# Patient Record
Sex: FEMALE | Race: WHITE | Employment: UNEMPLOYED | URBAN - METROPOLITAN AREA
[De-identification: names, ages, dates, MRNs, and addresses within clinical notes are randomized per-mention and may not be internally consistent; named-entity substitution may affect disease eponyms.]

---

## 2017-01-02 RX ORDER — IBUPROFEN 800 MG/1
TABLET ORAL
Qty: 90 TAB | Refills: 0 | Status: SHIPPED | COMMUNITY
Start: 2017-01-02 | End: 2017-02-01 | Stop reason: SDUPTHER

## 2017-02-01 RX ORDER — IBUPROFEN 800 MG/1
TABLET ORAL
Qty: 90 TAB | Refills: 0 | Status: SHIPPED | OUTPATIENT
Start: 2017-02-01 | End: 2017-03-07 | Stop reason: SDUPTHER

## 2017-02-10 RX ORDER — POTASSIUM CHLORIDE 750 MG/1
CAPSULE, EXTENDED RELEASE ORAL
Qty: 30 CAP | Refills: 0 | Status: SHIPPED | OUTPATIENT
Start: 2017-02-10 | End: 2017-08-13

## 2017-02-27 RX ORDER — POTASSIUM CHLORIDE 750 MG/1
CAPSULE, EXTENDED RELEASE ORAL
Qty: 30 CAP | Refills: 0 | Status: SHIPPED | OUTPATIENT
Start: 2017-02-27 | End: 2018-08-20 | Stop reason: SDUPTHER

## 2017-03-06 ENCOUNTER — OFFICE VISIT (OUTPATIENT)
Dept: INTERNAL MEDICINE CLINIC | Age: 62
End: 2017-03-06

## 2017-03-06 VITALS
HEART RATE: 72 BPM | SYSTOLIC BLOOD PRESSURE: 116 MMHG | DIASTOLIC BLOOD PRESSURE: 74 MMHG | RESPIRATION RATE: 16 BRPM | BODY MASS INDEX: 28.7 KG/M2 | TEMPERATURE: 98.2 F | HEIGHT: 63 IN | OXYGEN SATURATION: 97 % | WEIGHT: 162 LBS

## 2017-03-06 DIAGNOSIS — E11.9 CONTROLLED TYPE 2 DIABETES MELLITUS WITHOUT COMPLICATION, WITHOUT LONG-TERM CURRENT USE OF INSULIN (HCC): ICD-10-CM

## 2017-03-06 DIAGNOSIS — I10 HYPERTENSION WITH GOAL BLOOD PRESSURE LESS THAN 130/85: ICD-10-CM

## 2017-03-06 DIAGNOSIS — E78.00 HYPERCHOLESTEREMIA: ICD-10-CM

## 2017-03-06 DIAGNOSIS — Z12.11 SCREEN FOR COLON CANCER: Primary | ICD-10-CM

## 2017-03-06 LAB
CHOLEST SERPL-MCNC: 194 MG/DL
GLUCOSE POC: 113 MG/DL
HBA1C MFR BLD HPLC: 7.2 %
HDLC SERPL-MCNC: 48 MG/DL
LDL CHOLESTEROL POC: 88
MICROALBUMIN UR TEST STR-MCNC: 10 MG/L
MICROALBUMIN/CREAT RATIO POC: <30 MG/G
NON-HDL GOAL (POC): 147
TCHOL/HDL RATIO (POC): 4.1
TRIGL SERPL-MCNC: 297 MG/DL

## 2017-03-06 NOTE — PROGRESS NOTES
Kimmy Auguste is a 63 y.o. female and presents with Diabetes; Cholesterol Problem; and Hypertension  . Subjective:  Hypertension Review:  The patient has hypertension . Diet and Lifestyle: generally follows a low sodium diet, exercises sporadically  Home BP Monitoring: is not measured at home. Pertinent ROS: taking medications as instructed, no medication side effects noted, no TIA's, no chest pain on exertion, no dyspnea on exertion, no swelling of ankles. Dyslipidemia Review:  Patient presents for evaluation of lipids. Compliance with treatment thus far has been excellent. A repeat fasting lipid profile was done. The patient does not use medications that may worsen dyslipidemias . The patient exercises sporadically. The patient is not known to have coexisting coronary artery disease    Diabetes Mellitus Review:  She has diabetes mellitus. Diabetic ROS - medication compliance: compliant all of the time, diabetic diet compliance: compliant all of the time, home glucose monitoring: is performed. Known diabetic complications: none  Cardiovascular risk factors: family history, dyslipidemia, diabetes mellitus, obesity, hypertension  Current diabetic medications include oral agents  Eye exam current (within one year): no  Weight trend: stable  Prior visit with dietician: no  Current diet: \"healthy\" diet  in general  Current exercise: walking  Current monitoring regimen: home blood tests - daily  Home blood sugar records: trend: stable  Any episodes of hypoglycemia? no  Is She on ACE inhibitor or angiotensin II receptor blocker? Yes   Lab review: orders written for new lab studies as appropriate; see orders.      Health maintenance suggests the needs for a test for occult blood    Health maintenance suggest urine protein check        Review of Systems  Constitutional: negative for fevers, chills, anorexia and weight loss  Eyes:   negative for visual disturbance and irritation  ENT:   negative for tinnitus,sore throat,nasal congestion,ear pains. hoarseness  Respiratory:  negative for cough, hemoptysis, dyspnea,wheezing  CV:   negative for chest pain, palpitations, lower extremity edema  GI:   negative for nausea, vomiting, diarrhea, abdominal pain,melena  Endo:               negative for polyuria,polydipsia,polyphagia,heat intolerance  Genitourinary: negative for frequency, dysuria and hematuria  Integument:  negative for rash and pruritus  Hematologic:  negative for easy bruising and gum/nose bleeding  Musculoskel: negative for myalgias, arthralgias, back pain, muscle weakness, joint pain  Neurological:  negative for headaches, dizziness, vertigo, memory problems and gait   Behavl/Psych: negative for feelings of anxiety, depression, mood changes    Past Medical History:   Diagnosis Date    Arthritis, Degenerative, Knee 7/15/2011    COPD (chronic obstructive pulmonary disease) ([facility] [address] 7/15/2011    Diabetes ([facility]     Gastrointestinal disorder     acid reflux    GERD, gastro - Esophageal Reflux Disease 7/15/2011    Hypercholesterolemia 7/15/2011    Hypertension     Hypertension 7/15/2011    Other ill-defined conditions(799.89)     high cholesterol    Type II or unspecified type diabetes mellitus without mention of complication, uncontrolled 7/15/2011     Past Surgical History:   Procedure Laterality Date    HX GYN  2004    uterine surgery     Social History     Social History    Marital status:      Spouse name: N/A    Number of children: N/A    Years of education: N/A     Social History Main Topics    Smoking status: Former Smoker     Quit date: 4/15/2015    Smokeless tobacco: Never Used    Alcohol use Yes      Comment: occ    Drug use: Yes     Special: Marijuana      Comment: used 3 days ago    Sexual activity: Yes     Partners: Male     Other Topics Concern    None     Social History Narrative     Family History   Problem Relation Age of Onset    Diabetes Father     Deep Vein Thrombosis Daughter     Lung Disease Daughter     Other Daughter      cholesterol     Current Outpatient Prescriptions   Medication Sig Dispense Refill    potassium chloride SA (MICRO-K) 10 mEq capsule TAKE ONE CAPSULE BY MOUTH TWICE DAILY 30 Cap 0    potassium chloride SA (MICRO-K) 10 mEq capsule TAKE ONE CAPSULE BY MOUTH TWICE DAILY 30 Cap 0    ibuprofen (MOTRIN) 800 mg tablet TAKE 1 TABLET BY MOUTH EVERY 8 HOURS AS NEEDED FOR PAIN 90 Tab 0    promethazine (PHENERGAN) 25 mg tablet Take 1 Tab by mouth every six (6) hours as needed. 12 Tab 0    potassium chloride SA (MICRO-K) 10 mEq capsule TAKE ONE CAPSULE BY MOUTH TWICE DAILY 30 Cap 0    potassium chloride SA (MICRO-K) 10 mEq capsule TAKE 1 CAPSULE BY MOUTH TWICE DAILY 30 Cap 0    hydrochlorothiazide (MICROZIDE) 12.5 mg capsule TAKE ONE CAPSULE BY MOUTH ONCE DAILY 30 Cap 12    lisinopril (PRINIVIL, ZESTRIL) 40 mg tablet TAKE 1 TABLET BY MOUTH EVERY DAY 30 Tab 12    glyBURIDE (DIABETA) 2.5 mg tablet TAKE 1 TABLET BY MOUTH ONCE DAILY 30 Tab 12    ranitidine (ZANTAC) 300 mg tablet TAKE 1 TABLET BY MOUTH EVERY DAY 30 Tab 12    sucralfate (CARAFATE) 1 gram tablet Take 1 Tab by mouth four (4) times daily. 120 Tab 12    atorvastatin (LIPITOR) 80 mg tablet Take 1 Tab by mouth daily. 30 Tab 12    ibuprofen (MOTRIN) 800 mg tablet Take 1 Tab by mouth every six (6) hours as needed for Pain. 60 Tab 12    temazepam (RESTORIL) 30 mg capsule Take 1 Cap by mouth nightly as needed. 30 Cap 0    atenolol (TENORMIN) 25 mg tablet   0    Hydrochlorothiazide (HYDRODIURIL) 12.5 mg tablet   0    loperamide (IMODIUM A-D) 2 mg tablet Take 1 Tab by mouth four (4) times daily as needed for Diarrhea. 20 Tab 0    ONETOUCH ULTRA TEST strip TEST BLOOD SUGARS 3 TIMES DAILY 100 Each 12    nisoldipine SR (SULAR) 17 mg Tb24 tablet Take 1 Tab by mouth daily.  30 Tab 12    loperamide (IMODIUM) 2 mg capsule   0    nystatin-triamcinolone (MYCOLOG) 100,000-0.1 unit/gram-% ointment Apply  to affected area two (2) times a day. 30 g 0     No Known Allergies    Objective:  Visit Vitals    /74    Pulse 72    Temp 98.2 °F (36.8 °C) (Oral)    Resp 16    Ht 5' 3\" (1.6 m)    Wt 162 lb (73.5 kg)    SpO2 97%    BMI 28.7 kg/m2     Physical Exam:   General appearance - alert, well appearing, and in no distress  Mental status - alert, oriented to person, place, and time  EYE-KODY, EOMI, corneas normal, no foreign bodies  ENT-ENT exam normal, no neck nodes or sinus tenderness  Nose - normal and patent, no erythema, discharge or polyps  Mouth - mucous membranes moist, pharynx normal without lesions  Neck - supple, no significant adenopathy   Chest - clear to auscultation, no wheezes, rales or rhonchi, symmetric air entry   Heart - normal rate, regular rhythm, normal S1, S2, no murmurs, rubs, clicks or gallops   Abdomen - soft, nontender, nondistended, no masses or organomegaly  Lymph- no adenopathy palpable  Ext-peripheral pulses normal, no pedal edema, no clubbing or cyanosis  Skin-Warm and dry. no hyperpigmentation, vitiligo, or suspicious lesions  Neuro -alert, oriented, normal speech, no focal findings or movement disorder noted  Neck-normal C-spine, no tenderness, full ROM without pain  Feet-no nail deformities or callus formation with good pulses noted      Results for orders placed or performed in visit on 03/06/17   AMB POC GLUCOSE BLOOD, BY GLUCOSE MONITORING DEVICE   Result Value Ref Range    Glucose  mg/dL   AMB POC HEMOGLOBIN A1C   Result Value Ref Range    Hemoglobin A1c (POC) 7.2 %   AMB POC LIPID PROFILE   Result Value Ref Range    Cholesterol (POC) 194     Triglycerides (POC) 297     HDL Cholesterol (POC) 48     LDL Cholesterol (POC) 88     Non-HDL Goal (POC) 147     TChol/HDL Ratio (POC) 4.1        Assessment/Plan:    ICD-10-CM ICD-9-CM    1. Screen for colon cancer Z12.11 V76.51 OCCULT BLOOD, IMMUNOASSAY (FIT)   2.  Hypercholesteremia E78.00 272.0 AMB POC GLUCOSE BLOOD, BY GLUCOSE MONITORING DEVICE      AMB POC HEMOGLOBIN A1C      AMB POC LIPID PROFILE   3. Hypertension with goal blood pressure less than 130/85 I10 401.9 AMB POC GLUCOSE BLOOD, BY GLUCOSE MONITORING DEVICE      AMB POC HEMOGLOBIN A1C      AMB POC LIPID PROFILE   4. Controlled type 2 diabetes mellitus without complication, without long-term current use of insulin (HCC) E11.9 250.00 AMB POC GLUCOSE BLOOD, BY GLUCOSE MONITORING DEVICE      AMB POC HEMOGLOBIN A1C      AMB POC LIPID PROFILE      AMB POC URINE, MICROALBUMIN, SEMIQUANTITATIVE     Orders Placed This Encounter    OCCULT BLOOD, IMMUNOASSAY (FIT)    AMB POC GLUCOSE BLOOD, BY GLUCOSE MONITORING DEVICE    AMB POC HEMOGLOBIN A1C    AMB POC LIPID PROFILE    AMB POC URINE, MICROALBUMIN, SEMIQUANTITATIVE     lose weight, increase physical activity, follow low fat diet, follow low salt diet,Take 81mg aspirin daily  Patient Instructions   [employer] Activation    Thank you for requesting access to [employer]. Please follow the instructions below to securely access and download your online medical record. [employer] allows you to send messages to your doctor, view your test results, renew your prescriptions, schedule appointments, and more. How Do I Sign Up? 1. In your internet browser, go to [email or web address]  2. Click on the First Time User? Click Here link in the Sign In box. You will be redirect to the New Member Sign Up page. 3. Enter your [employer] Access Code exactly as it appears below. You will not need to use this code after youve completed the sign-up process. If you do not sign up before the expiration date, you must request a new code. [employer] Access Code: Activation code not generated  Current [employer] Status: Patient Declined (This is the date your [employer] access code will )    4. Enter the last four digits of your Social Security Number (xxxx) and Date of Birth (mm/dd/yyyy) as indicated and click Submit.  You will be taken to the next sign-up page. 5. Create a FREEjitt ID. This will be your [employer] login ID and cannot be changed, so think of one that is secure and easy to remember. 6. Create a W[employer] password. You can change your password at any time. 7. Enter your Password Reset Question and Answer. This can be used at a later time if you forget your password. 8. Enter your e-mail address. You will receive e-mail notification when new information is available in [address] 9. Click Sign Up. You can now view and download portions of your medical record. 10. Click the Download Summary menu link to download a portable copy of your medical information. Additional Information    If you have questions, please visit the Frequently Asked Questions section of the [employer] website at [email or web address] Remember, [employer] is NOT to be used for urgent needs. For medical emergencies, dial 911. Follow-up Disposition:  Return in about 3 months (around 6/6/2017), or if symptoms worsen or fail to improve. I have reviewed with the patient details of the assessment and plan and all questions were answered. Relevent patient education was performed    An After Visit Summary was printed and given to the patient.

## 2017-03-06 NOTE — PATIENT INSTRUCTIONS
[employer] Activation    Thank you for requesting access to [employer]. Please follow the instructions below to securely access and download your online medical record. [employer] allows you to send messages to your doctor, view your test results, renew your prescriptions, schedule appointments, and more. How Do I Sign Up? 1. In your internet browser, go to [email or web address]  2. Click on the First Time User? Click Here link in the Sign In box. You will be redirect to the New Member Sign Up page. 3. Enter your [employer] Access Code exactly as it appears below. You will not need to use this code after youve completed the sign-up process. If you do not sign up before the expiration date, you must request a new code. [employer] Access Code: Activation code not generated  Current [employer] Status: Patient Declined (This is the date your [employer] access code will )    4. Enter the last four digits of your Social Security Number (xxxx) and Date of Birth (mm/dd/yyyy) as indicated and click Submit. You will be taken to the next sign-up page. 5. Create a [employer] ID. This will be your [employer] login ID and cannot be changed, so think of one that is secure and easy to remember. 6. Create a [employer] password. You can change your password at any time. 7. Enter your Password Reset Question and Answer. This can be used at a later time if you forget your password. 8. Enter your e-mail address. You will receive e-mail notification when new information is available in [address] 9. Click Sign Up. You can now view and download portions of your medical record. 10. Click the Download Summary menu link to download a portable copy of your medical information. Additional Information    If you have questions, please visit the Frequently Asked Questions section of the [employer] website at [email or web address] Remember, [employer] is NOT to be used for urgent needs. For medical emergencies, dial 911.

## 2017-03-06 NOTE — MR AVS SNAPSHOT
Visit Information Date & Time Provider Department Dept. Phone Encounter #  
 3/6/2017  2:00 PM Anuj Feldman MD [facility] [phone] [id number] Follow-up Instructions Return in about 3 months (around 6/6/2017), or if symptoms worsen or fail to improve. Your Appointments 3/14/2017  2:00 PM  
YEAR CHECK UP with Elizabeth Frias MD  
[facility] ([address] [facility]) Appt Note: yearly exam; hx of abnormal pap [place] [address] [facility] [address]  
[phone]  
  
   
 [place] [address] Upcoming Health Maintenance Date Due  
 EYE EXAM RETINAL OR DILATED Q1 8/15/1967 Pneumococcal 19-64 Medium Risk (1 of 1 - PPSV23) 8/15/1976 FOBT Q 1 YEAR AGE 50-75 2/3/2017 HEMOGLOBIN A1C Q6M 6/2/2017 LIPID PANEL Q1 12/2/2017 FOOT EXAM Q1 3/6/2018 MICROALBUMIN Q1 3/6/2018 BREAST CANCER SCRN MAMMOGRAM 8/16/2018 PAP AKA CERVICAL CYTOLOGY 11/9/2018 DTaP/Tdap/Td series (2 - Td) 3/6/2027 Allergies as of 3/6/2017  Review Complete On: 3/6/2017 By: Anuj Feldman MD  
 No Known Allergies Current Immunizations  Never Reviewed No immunizations on file. Not reviewed this visit You Were Diagnosed With   
  
 Codes Comments Screen for colon cancer    -  Primary ICD-10-CM: Z12.11 ICD-9-CM: V76.51 Hypercholesteremia     ICD-10-CM: E78.00 ICD-9-CM: 272.0 Hypertension with goal blood pressure less than 130/85     ICD-10-CM: I10 
ICD-9-CM: 401.9 Controlled type 2 diabetes mellitus without complication, without long-term current use of insulin ([facility] [address]     ICD-10-CM: E11.9 ICD-9-CM: 250.00 Vitals BP Pulse Temp Resp Height(growth percentile) Weight(growth percentile) 116/74 72 98.2 °F (36.8 °C) (Oral) 16 5' 3\" (1.6 m) 162 lb (73.5 kg) SpO2 BMI OB Status Smoking Status 97% 28.7 kg/m2 Postmenopausal Former Smoker Vitals History BMI and BSA Data Body Mass Index Body Surface Area 28.7 kg/m 2 1.81 m 2 Preferred Pharmacy Pharmacy Name Phone [facility], [address] [phone] Your Updated Medication List  
  
   
This list is accurate as of: 3/6/17  3:34 PM.  Always use your most recent med list.  
  
  
  
  
 atenolol 25 mg tablet Commonly known as:  TENORMIN  
  
 atorvastatin 80 mg tablet Commonly known as:  LIPITOR Take 1 Tab by mouth daily. glyBURIDE 2.5 mg tablet Commonly known as:  Martha Locks TAKE 1 TABLET BY MOUTH ONCE DAILY * hydroCHLOROthiazide 12.5 mg tablet Commonly known as:  HYDRODIURIL * hydroCHLOROthiazide 12.5 mg capsule Commonly known as:  Peter Grange TAKE ONE CAPSULE BY MOUTH ONCE DAILY * ibuprofen 800 mg tablet Commonly known as:  MOTRIN Take 1 Tab by mouth every six (6) hours as needed for Pain. * ibuprofen 800 mg tablet Commonly known as:  MOTRIN  
TAKE 1 TABLET BY MOUTH EVERY 8 HOURS AS NEEDED FOR PAIN  
  
 lisinopril 40 mg tablet Commonly known as:  PRINIVIL, ZESTRIL  
TAKE 1 TABLET BY MOUTH EVERY DAY  
  
 * loperamide 2 mg tablet Commonly known as:  IMODIUM A-D Take 1 Tab by mouth four (4) times daily as needed for Diarrhea. * loperamide 2 mg capsule Commonly known as:  IMODIUM  
  
 nisoldipine SR 17 mg Tb24 tablet Commonly known as:  Florinda Nurse Take 1 Tab by mouth daily. nystatin-triamcinolone 100,000-0.1 unit/gram-% ointment Commonly known as:  Maryla Areas Apply  to affected area two (2) times a day. ONETOUCH ULTRA TEST strip Generic drug:  glucose blood VI test strips TEST BLOOD SUGARS 3 TIMES DAILY * potassium chloride SA 10 mEq capsule Commonly known as:  MICRO-K  
TAKE 1 CAPSULE BY MOUTH TWICE DAILY * potassium chloride SA 10 mEq capsule Commonly known as:  MICRO-K  
TAKE ONE CAPSULE BY MOUTH TWICE DAILY * potassium chloride SA 10 mEq capsule Commonly known as:  MICRO-K  
TAKE ONE CAPSULE BY MOUTH TWICE DAILY * potassium chloride SA 10 mEq capsule Commonly known as:  MICRO-K  
TAKE ONE CAPSULE BY MOUTH TWICE DAILY promethazine 25 mg tablet Commonly known as:  PHENERGAN Take 1 Tab by mouth every six (6) hours as needed. raNITIdine 300 mg tablet Commonly known as:  ZANTAC TAKE 1 TABLET BY MOUTH EVERY DAY  
  
 sucralfate 1 gram tablet Commonly known as:  Merlin Boy Take 1 Tab by mouth four (4) times daily. temazepam 30 mg capsule Commonly known as:  RESTORIL Take 1 Cap by mouth nightly as needed. * Notice: This list has 10 medication(s) that are the same as other medications prescribed for you. Read the directions carefully, and ask your doctor or other care provider to review them with you. We Performed the Following AMB POC GLUCOSE BLOOD, BY GLUCOSE MONITORING DEVICE [98207 CPT(R)] AMB POC HEMOGLOBIN A1C [51269 CPT(R)] AMB POC LIPID PROFILE [04057 CPT(R)] AMB POC URINE, MICROALBUMIN, SEMIQUANTITATIVE [75610 CPT(R)] OCCULT BLOOD, IMMUNOASSAY (FIT) [id number] CPT(R)] Follow-up Instructions Return in about 3 months (around 6/6/2017), or if symptoms worsen or fail to improve. Patient Instructions [employer] Activation Thank you for requesting access to [employer]. Please follow the instructions below to securely access and download your online medical record. [employer] allows you to send messages to your doctor, view your test results, renew your prescriptions, schedule appointments, and more. How Do I Sign Up? 1. In your internet browser, go to [email or web address] 
2. Click on the First Time User? Click Here link in the Sign In box. You will be redirect to the New Member Sign Up page. 3. Enter your [employer] Access Code exactly as it appears below. You will not need to use this code after youve completed the sign-up process. If you do not sign up before the expiration date, you must request a new code. MyChart Access Code: Activation code not generated Current [employer] Status: Patient Declined (This is the date your MyChart access code will ) 4. Enter the last four digits of your Social Security Number (xxxx) and Date of Birth (mm/dd/yyyy) as indicated and click Submit. You will be taken to the next sign-up page. 5. Create a [employer] ID. This will be your [employer] login ID and cannot be changed, so think of one that is secure and easy to remember. 6. Create a [employer] password. You can change your password at any time. 7. Enter your Password Reset Question and Answer. This can be used at a later time if you forget your password. 8. Enter your e-mail address. You will receive e-mail notification when new information is available in [address] 9. Click Sign Up. You can now view and download portions of your medical record. 10. Click the Download Summary menu link to download a portable copy of your medical information. Additional Information If you have questions, please visit the Frequently Asked Questions section of the [employer] website at [email or web address] Remember, [employer] is NOT to be used for urgent needs. For medical emergencies, dial 911. Please provide this summary of care documentation to your next provider. Your primary care clinician is listed as Meri Alcala. If you have any questions after today's visit, please call [phone].

## 2017-03-07 RX ORDER — SUCRALFATE 1 G/1
TABLET ORAL
Qty: 120 TAB | Refills: 0 | Status: SHIPPED | OUTPATIENT
Start: 2017-03-07 | End: 2017-04-03 | Stop reason: SDUPTHER

## 2017-03-07 RX ORDER — IBUPROFEN 800 MG/1
TABLET ORAL
Qty: 90 TAB | Refills: 0 | Status: SHIPPED | OUTPATIENT
Start: 2017-03-07 | End: 2017-04-10 | Stop reason: SDUPTHER

## 2017-03-14 ENCOUNTER — OFFICE VISIT (OUTPATIENT)
Dept: OBGYN CLINIC | Age: 62
End: 2017-03-14

## 2017-03-14 ENCOUNTER — HOSPITAL ENCOUNTER (OUTPATIENT)
Dept: LAB | Age: 62
Discharge: HOME | End: 2017-03-14
Payer: COMMERCIAL

## 2017-03-14 VITALS
HEIGHT: 63 IN | BODY MASS INDEX: 29.13 KG/M2 | OXYGEN SATURATION: 97 % | DIASTOLIC BLOOD PRESSURE: 88 MMHG | SYSTOLIC BLOOD PRESSURE: 135 MMHG | WEIGHT: 164.4 LBS | HEART RATE: 58 BPM | TEMPERATURE: 97.9 F | RESPIRATION RATE: 16 BRPM

## 2017-03-14 DIAGNOSIS — Z01.419 WELL WOMAN EXAM WITH ROUTINE GYNECOLOGICAL EXAM: Primary | ICD-10-CM

## 2017-03-14 DIAGNOSIS — R87.610 ASCUS OF CERVIX WITH NEGATIVE HIGH RISK HPV: ICD-10-CM

## 2017-03-14 DIAGNOSIS — B37.2 YEAST INFECTION OF THE SKIN: ICD-10-CM

## 2017-03-14 PROCEDURE — 87624 HPV HI-RISK TYP POOLED RSLT: CPT | Performed by: OBSTETRICS & GYNECOLOGY

## 2017-03-14 PROCEDURE — 88175 CYTOPATH C/V AUTO FLUID REDO: CPT | Performed by: OBSTETRICS & GYNECOLOGY

## 2017-03-14 RX ORDER — NYSTATIN AND TRIAMCINOLONE ACETONIDE 100000; 1 [USP'U]/G; MG/G
CREAM TOPICAL 2 TIMES DAILY
Qty: 30 G | Refills: 0 | Status: SHIPPED | OUTPATIENT
Start: 2017-03-14 | End: 2017-08-13

## 2017-03-14 NOTE — PATIENT INSTRUCTIONS

## 2017-03-14 NOTE — MR AVS SNAPSHOT
Visit Information Date & Time Provider Department Dept. Phone Encounter #  
 3/14/2017  2:00 PM Lucia Silvestre OB/GYN [id number] Follow-up Instructions Return in about 1 year (around 3/14/2018) for annual exam.  
  
Your Appointments 6/6/2017 11:15 AM  
ROUTINE CARE with Estelel Hopper MD  
[facility] - [address] [place] [address] ([address]) Appt Note: 3 month fu  
 [address] [facility] [facility] [address] [address] [phone]  
  
   
 [address] [facility] [facility] [address] Upcoming Health Maintenance Date Due FOBT Q 1 YEAR AGE 50-75 2/3/2017 BREAST CANCER SCRN MAMMOGRAM 8/16/2018 PAP AKA CERVICAL CYTOLOGY 11/9/2018 Allergies as of 3/14/2017  Review Complete On: 3/14/2017 By: Rhett Zavala No Known Allergies Current Immunizations  Never Reviewed No immunizations on file. Not reviewed this visit You Were Diagnosed With   
  
 Codes Comments Well woman exam with routine gynecological exam    -  Primary ICD-10-CM: E99.618 ICD-9-CM: V72.31   
 ASCUS of cervix with negative high risk HPV     ICD-10-CM: R87.610 ICD-9-CM: 795.01 Vitals Height(growth percentile) Weight(growth percentile) BMI OB Status Smoking Status 5' 3\" (1.6 m) 164 lb 6.4 oz (74.6 kg) 29.12 kg/m2 Postmenopausal Former Smoker BMI and BSA Data Body Mass Index Body Surface Area  
 29.12 kg/m 2 1.82 m 2 Preferred Pharmacy Pharmacy Name Phone [facility]ina Glez, [address] [phone] Your Updated Medication List  
  
   
This list is accurate as of: 3/14/17  2:21 PM.  Always use your most recent med list.  
  
  
  
  
 atenolol 25 mg tablet Commonly known as:  TENORMIN  
  
 atorvastatin 80 mg tablet Commonly known as:  LIPITOR Take 1 Tab by mouth daily. glyBURIDE 2.5 mg tablet Commonly known as:  Ronald Kalani TAKE 1 TABLET BY MOUTH ONCE DAILY * hydroCHLOROthiazide 12.5 mg tablet Commonly known as:  HYDRODIURIL * hydroCHLOROthiazide 12.5 mg capsule Commonly known as:  Leonel Benjamin TAKE ONE CAPSULE BY MOUTH ONCE DAILY * ibuprofen 800 mg tablet Commonly known as:  MOTRIN Take 1 Tab by mouth every six (6) hours as needed for Pain. * ibuprofen 800 mg tablet Commonly known as:  MOTRIN  
TAKE 1 TABLET BY MOUTH EVERY 8 HOURS AS NEEDED FOR PAIN  
  
 lisinopril 40 mg tablet Commonly known as:  PRINIVIL, ZESTRIL  
TAKE 1 TABLET BY MOUTH EVERY DAY  
  
 * loperamide 2 mg tablet Commonly known as:  IMODIUM A-D Take 1 Tab by mouth four (4) times daily as needed for Diarrhea. * loperamide 2 mg capsule Commonly known as:  IMODIUM  
  
 nisoldipine SR 17 mg Tb24 tablet Commonly known as:  Cherelle Gloss Take 1 Tab by mouth daily. nystatin-triamcinolone 100,000-0.1 unit/gram-% ointment Commonly known as:  Drusilla Bright Apply  to affected area two (2) times a day. ONETOUCH ULTRA TEST strip Generic drug:  glucose blood VI test strips TEST BLOOD SUGARS 3 TIMES DAILY * potassium chloride SA 10 mEq capsule Commonly known as:  MICRO-K  
TAKE 1 CAPSULE BY MOUTH TWICE DAILY * potassium chloride SA 10 mEq capsule Commonly known as:  MICRO-K  
TAKE ONE CAPSULE BY MOUTH TWICE DAILY * potassium chloride SA 10 mEq capsule Commonly known as:  MICRO-K  
TAKE ONE CAPSULE BY MOUTH TWICE DAILY * potassium chloride SA 10 mEq capsule Commonly known as:  MICRO-K  
TAKE ONE CAPSULE BY MOUTH TWICE DAILY promethazine 25 mg tablet Commonly known as:  PHENERGAN Take 1 Tab by mouth every six (6) hours as needed. raNITIdine 300 mg tablet Commonly known as:  ZANTAC TAKE 1 TABLET BY MOUTH EVERY DAY  
  
 sucralfate 1 gram tablet Commonly known as:  CARAFATE  
TAKE 1 TABLET BY MOUTH FOUR TIMES DAILY  
  
 temazepam 30 mg capsule Commonly known as:  RESTORIL Take 1 Cap by mouth nightly as needed. * Notice: This list has 10 medication(s) that are the same as other medications prescribed for you. Read the directions carefully, and ask your doctor or other care provider to review them with you. Follow-up Instructions Return in about 1 year (around 3/14/2018) for annual exam.  
  
  
Patient Instructions Eating Healthy Foods: Care Instructions Your Care Instructions Eating healthy foods can help lower your risk for disease. Healthy food gives you energy and keeps your heart strong, your brain active, your muscles working, and your bones strong. A healthy diet includes a variety of foods from the basic food groups: grains, vegetables, fruits, milk and milk products, and meat and beans. Some people may eat more of their favorite foods from only one food group and, as a result, miss getting the nutrients they need. So, it is important to pay attention not only to what you eat but also to what you are missing from your diet. You can eat a healthy, balanced diet by making a few small changes. Follow-up care is a key part of your treatment and safety. Be sure to make and go to all appointments, and call your doctor if you are having problems. Its also a good idea to know your test results and keep a list of the medicines you take. How can you care for yourself at home? Look at what you eat · Keep a food diary for a week or two and record everything you eat or drink. Track the number of servings you eat from each food group. · For a balanced diet every day, eat a variety of: ¨ 6 or more ounce-equivalents of grains, such as cereals, breads, crackers, rice, or pasta, every day. An ounce-equivalent is 1 slice of bread, 1 cup of ready-to-eat cereal, or ½ cup of cooked rice, cooked pasta, or cooked cereal. 
¨ 2½ cups of vegetables, especially: § Dark-green vegetables such as broccoli and spinach. § Orange vegetables such as carrots and sweet potatoes. § Dry beans (such as hagan and kidney beans) and peas (such as lentils). ¨ 2 cups of fresh, frozen, or canned fruit. A small apple or 1 banana or orange equals 1 cup. ¨ 3 cups of nonfat or low-fat milk, yogurt, or other milk products. ¨ 5½ ounces of meat and beans, such as chicken, fish, lean meat, beans, nuts, and seeds. One egg, 1 tablespoon of peanut butter, ½ ounce nuts or seeds, or ¼ cup of cooked beans equals 1 ounce of meat. · Learn how to read food labels for serving sizes and ingredients. Fast-food and convenience-food meals often contain few or no fruits or vegetables. Make sure you eat some fruits and vegetables to make the meal more nutritious. · Look at your food diary. For each food group, add up what you have eaten and then divide the total by the number of days. This will give you an idea of how much you are eating from each food group. See if you can find some ways to change your diet to make it more healthy. Start small · Do not try to make dramatic changes to your diet all at once. You might feel that you are missing out on your favorite foods and then be more likely to fail. · Start slowly, and gradually change your habits. Try some of the following: ¨ Use whole wheat bread instead of white bread. ¨ Use nonfat or low-fat milk instead of whole milk. ¨ Eat brown rice instead of white rice, and eat whole wheat pasta instead of white-flour pasta. ¨ Try low-fat cheeses and low-fat yogurt. ¨ Add more fruits and vegetables to meals and have them for snacks. ¨ Add lettuce, tomato, cucumber, and onion to sandwiches. ¨ Add fruit to yogurt and cereal. 
Enjoy food · You can still eat your favorite foods. You just may need to eat less of them. If your favorite foods are high in fat, salt, and sugar, limit how often you eat them, but do not cut them out entirely. · Eat a wide variety of foods. Make healthy choices when eating out · The type of restaurant you choose can help you make healthy choices. Even fast-food chains are now offering more low-fat or healthier choices on the menu. · Choose smaller portions, or take half of your meal home. · When eating out, try: ¨ A veggie pizza with a whole wheat crust or grilled chicken (instead of sausage or pepperoni). ¨ Pasta with roasted vegetables, grilled chicken, or marinara sauce instead of cream sauce. ¨ A vegetable wrap or grilled chicken wrap. ¨ Broiled or poached food instead of fried or breaded items. Make healthy choices easy · Buy packaged, prewashed, ready-to-eat fresh vegetables and fruits, such as baby carrots, salad mixes, and chopped or shredded broccoli and cauliflower. · Buy packaged, presliced fruits, such as melon or pineapple. · Choose 100% fruit or vegetable juice instead of soda. Limit juice intake to 4 to 6 oz (½ to ¾ cup) a day. · Blend low-fat yogurt, fruit juice, and canned or frozen fruit to make a smoothie for breakfast or a snack. Where can you learn more? Go to http://kalia-terell.info/. Enter T756 in the search box to learn more about \"Eating Healthy Foods: Care Instructions. \" Current as of: November 20, 2015 Content Version: 11.1 © 0641-7619 [employer]. Care instructions adapted under license by [employer] (which disclaims liability or warranty for this information). If you have questions about a medical condition or this instruction, always ask your healthcare professional. [place] [address] any warranty or liability for your use of this information. Please provide this summary of care documentation to your next provider. Your primary care clinician is listed as Crispin Serrano. If you have any questions after today's visit, please call [phone].

## 2017-03-14 NOTE — PROGRESS NOTES
SUBJECTIVE: Brittnee Owens is a 63 y.o. female who presents desire for annual well woman exam. No LMP recorded. Patient is postmenopausal.    GYN History  Menopausal age:52  Menopausal symptom: NO  Post menopausal bleeding:   NO  Sexually active: YES  Sexually transmitted diseases/infections: NO  Urinary symptoms:  NO  Dyspareunia: NO    Last pap: 2016  The prior Pap result: ASCUS with NEGATIVE high risk HPV      Past Medical History:   Diagnosis Date    Arthritis, Degenerative, Knee 7/15/2011    COPD (chronic obstructive pulmonary disease) ([facility] 7/15/2011    Diabetes (HCC)     Gastrointestinal disorder     acid reflux    GERD, gastro - Esophageal Reflux Disease 7/15/2011    Hypercholesterolemia 7/15/2011    Hypertension     Hypertension 7/15/2011    Other ill-defined conditions(799.89)     high cholesterol    Type II or unspecified type diabetes mellitus without mention of complication, uncontrolled 7/15/2011       Past Surgical History:   Procedure Laterality Date    HX GYN  2004    uterine surgery    LAP,TUBAL CAUTERY Bilateral 1992       Family History   Problem Relation Age of Onset    Diabetes Father     Deep Vein Thrombosis Daughter     Lung Disease Daughter     Other Daughter      cholesterol       Social History     Social History    Marital status:      Spouse name: N/A    Number of children: N/A    Years of education: N/A     Occupational History    Not on file. Social History Main Topics    Smoking status: Former Smoker     Quit date: 4/15/2015    Smokeless tobacco: Never Used    Alcohol use No      Comment: occ    Drug use: Yes     Special: Marijuana      Comment: used 3 days ago    Sexual activity: Yes     Partners: Male     Other Topics Concern    Not on file     Social History Narrative       Current Outpatient Prescriptions   Medication Sig Dispense Refill    nystatin-triamcinolone (MYCOLOG II) topical cream Apply  to affected area two (2) times a day.  30 g 0  ibuprofen (MOTRIN) 800 mg tablet TAKE 1 TABLET BY MOUTH EVERY 8 HOURS AS NEEDED FOR PAIN 90 Tab 0    potassium chloride SA (MICRO-K) 10 mEq capsule TAKE ONE CAPSULE BY MOUTH TWICE DAILY 30 Cap 0    potassium chloride SA (MICRO-K) 10 mEq capsule TAKE ONE CAPSULE BY MOUTH TWICE DAILY 30 Cap 0    potassium chloride SA (MICRO-K) 10 mEq capsule TAKE ONE CAPSULE BY MOUTH TWICE DAILY 30 Cap 0    potassium chloride SA (MICRO-K) 10 mEq capsule TAKE 1 CAPSULE BY MOUTH TWICE DAILY 30 Cap 0    hydrochlorothiazide (MICROZIDE) 12.5 mg capsule TAKE ONE CAPSULE BY MOUTH ONCE DAILY 30 Cap 12    lisinopril (PRINIVIL, ZESTRIL) 40 mg tablet TAKE 1 TABLET BY MOUTH EVERY DAY 30 Tab 12    glyBURIDE (DIABETA) 2.5 mg tablet TAKE 1 TABLET BY MOUTH ONCE DAILY 30 Tab 12    ranitidine (ZANTAC) 300 mg tablet TAKE 1 TABLET BY MOUTH EVERY DAY 30 Tab 12    atorvastatin (LIPITOR) 80 mg tablet Take 1 Tab by mouth daily. 30 Tab 12    ibuprofen (MOTRIN) 800 mg tablet Take 1 Tab by mouth every six (6) hours as needed for Pain. 60 Tab 12    ONETOUCH ULTRA TEST strip TEST BLOOD SUGARS 3 TIMES DAILY 100 Each 12    Hydrochlorothiazide (HYDRODIURIL) 12.5 mg tablet   0    loperamide (IMODIUM) 2 mg capsule   0    loperamide (IMODIUM A-D) 2 mg tablet Take 1 Tab by mouth four (4) times daily as needed for Diarrhea. 20 Tab 0    sucralfate (CARAFATE) 1 gram tablet TAKE 1 TABLET BY MOUTH FOUR TIMES DAILY 120 Tab 0    promethazine (PHENERGAN) 25 mg tablet Take 1 Tab by mouth every six (6) hours as needed. 12 Tab 0    temazepam (RESTORIL) 30 mg capsule Take 1 Cap by mouth nightly as needed. 30 Cap 0    nisoldipine SR (SULAR) 17 mg Tb24 tablet Take 1 Tab by mouth daily. 30 Tab 12    atenolol (TENORMIN) 25 mg tablet   0         Review of Systems:  Complete review of systems reviewed from social and history data forms. Pertinent positives in HPI.        Objective:     Visit Vitals    /88 (BP 1 Location: Right arm, BP Patient Position: Sitting)    Pulse (!) 58    Temp 97.9 °F (36.6 °C) (Oral)    Resp 16    Ht 5' 3\" (1.6 m)    Wt 164 lb 6.4 oz (74.6 kg)    SpO2 97%  Comment: room air    BMI 29.12 kg/m2     General:  alert, cooperative, no distress, appears stated age   Skin:  Normal.   Lymph Nodes:  Cervical, supraclavicular, and axillary nodes normal.   Breast Exam: normal appearance, no masses or tenderness    Lungs:  clear to auscultation bilaterally   Heart:  regular rate and rhythm, S1, S2 normal, no murmur, click, rub or gallop   Abdomen: soft, non-tender. No masses,  no organomegaly   Back:  Costovertebral angle tenderness absent   Genitourinary: BUS normal. Introitus normal. Normal appearing vaginal epithelium, Vaginal discharge described as normal and physiologic.,   Normal cervix without lesions or tenderness, Uterus normal size anteverted. NT., Adnexa normal in size left and right without tenderness. Extremities:  extremities normal, atraumatic, no cyanosis or edema     Neurologic:  negative   Psychiatric:  non focal           ASSESSMENT:      ICD-10-CM ICD-9-CM    1. Well woman exam with routine gynecological exam Z01.419 V72.31    2. ASCUS of cervix with negative high risk HPV R87.610 795.01    3. Yeast infection of the skin B37.2 112.3 nystatin-triamcinolone (MYCOLOG II) topical cream         Follow-up Disposition:  Return in about 1 year (around 3/14/2018) for annual exam.    Today's care was reviewed and discussed with the patient. She expresses understanding and approval of today's plan.     Gladis Reid MD

## 2017-03-14 NOTE — PROGRESS NOTES
Chief Complaint   Patient presents with    Annual Exam     Patient presents in stable condition, denies complaints. Requests cream for skin under abdomen due to irritation.

## 2017-03-15 ENCOUNTER — DOCUMENTATION ONLY (OUTPATIENT)
Dept: OBGYN CLINIC | Age: 62
End: 2017-03-15

## 2017-04-03 RX ORDER — SUCRALFATE 1 G/1
TABLET ORAL
Qty: 120 TAB | Refills: 0 | Status: SHIPPED | OUTPATIENT
Start: 2017-04-03 | End: 2017-05-03 | Stop reason: SDUPTHER

## 2017-04-10 RX ORDER — IBUPROFEN 800 MG/1
TABLET ORAL
Qty: 90 TAB | Refills: 0 | Status: SHIPPED | OUTPATIENT
Start: 2017-04-10 | End: 2017-05-13 | Stop reason: SDUPTHER

## 2017-05-15 RX ORDER — IBUPROFEN 800 MG/1
TABLET ORAL
Qty: 90 TAB | Refills: 0 | Status: SHIPPED | OUTPATIENT
Start: 2017-05-15 | End: 2017-06-06 | Stop reason: ALTCHOICE

## 2017-06-06 ENCOUNTER — OFFICE VISIT (OUTPATIENT)
Dept: INTERNAL MEDICINE CLINIC | Age: 62
End: 2017-06-06

## 2017-06-06 VITALS
WEIGHT: 161.7 LBS | HEART RATE: 63 BPM | SYSTOLIC BLOOD PRESSURE: 124 MMHG | OXYGEN SATURATION: 97 % | TEMPERATURE: 98 F | BODY MASS INDEX: 28.65 KG/M2 | HEIGHT: 63 IN | RESPIRATION RATE: 16 BRPM | DIASTOLIC BLOOD PRESSURE: 84 MMHG

## 2017-06-06 DIAGNOSIS — M17.0 PRIMARY OSTEOARTHRITIS OF BOTH KNEES: Primary | ICD-10-CM

## 2017-06-06 DIAGNOSIS — I10 HYPERTENSION WITH GOAL BLOOD PRESSURE LESS THAN 130/85: ICD-10-CM

## 2017-06-06 DIAGNOSIS — E11.9 CONTROLLED TYPE 2 DIABETES MELLITUS WITHOUT COMPLICATION, WITHOUT LONG-TERM CURRENT USE OF INSULIN (HCC): ICD-10-CM

## 2017-06-06 DIAGNOSIS — Z12.11 SCREEN FOR COLON CANCER: ICD-10-CM

## 2017-06-06 DIAGNOSIS — E78.00 HYPERCHOLESTEREMIA: ICD-10-CM

## 2017-06-06 LAB
CHOLEST SERPL-MCNC: 170 MG/DL
GLUCOSE POC: 113 MG/DL
HBA1C MFR BLD HPLC: 6.9 %
HDLC SERPL-MCNC: 38 MG/DL
LDL CHOLESTEROL POC: 90
NON-HDL GOAL (POC): 131
TCHOL/HDL RATIO (POC): 4.4
TRIGL SERPL-MCNC: 208 MG/DL

## 2017-06-06 RX ORDER — DICLOFENAC SODIUM 75 MG/1
75 TABLET, DELAYED RELEASE ORAL 2 TIMES DAILY
Qty: 60 TAB | Refills: 12 | Status: SHIPPED | OUTPATIENT
Start: 2017-06-06 | End: 2017-08-13

## 2017-06-06 RX ORDER — DICLOFENAC SODIUM 75 MG/1
75 TABLET, DELAYED RELEASE ORAL 2 TIMES DAILY
Qty: 60 TAB | Refills: 12 | Status: SHIPPED | OUTPATIENT
Start: 2017-06-06 | End: 2017-06-06 | Stop reason: SDUPTHER

## 2017-06-06 NOTE — MR AVS SNAPSHOT
Visit Information Date & Time Provider Department Dept. Phone Encounter #  
 6/6/2017 11:15 AM Aleksandr Chung MD [address] [facility] [phone] [id number] Follow-up Instructions Return in about 3 months (around 9/6/2017), or if symptoms worsen or fail to improve. Your Appointments 3/15/2018 11:00 AM  
YEAR CHECK UP with Jagdish Bridges MD  
[facility] ([facility]) Appt Note: annual exam  
 [place] [address] [address]  
[phone]  
  
   
 [place] [address] Upcoming Health Maintenance Date Due  
 EYE EXAM RETINAL OR DILATED Q1 8/15/1967 Pneumococcal 19-64 Medium Risk (1 of 1 - PPSV23) 8/15/1976 FOBT Q 1 YEAR AGE 50-75 2/3/2017 INFLUENZA AGE 9 TO ADULT 8/1/2017 HEMOGLOBIN A1C Q6M 9/6/2017 FOOT EXAM Q1 3/6/2018 MICROALBUMIN Q1 3/6/2018 LIPID PANEL Q1 3/6/2018 BREAST CANCER SCRN MAMMOGRAM 8/16/2018 PAP AKA CERVICAL CYTOLOGY 3/14/2020 DTaP/Tdap/Td series (2 - Td) 3/6/2027 Allergies as of 6/6/2017  Review Complete On: 6/6/2017 By: Francine Hackett LPN No Known Allergies Current Immunizations  Never Reviewed No immunizations on file. Not reviewed this visit You Were Diagnosed With   
  
 Codes Comments Primary osteoarthritis of both knees    -  Primary ICD-10-CM: M17.0 ICD-9-CM: 715.16 Hypercholesteremia     ICD-10-CM: E78.00 ICD-9-CM: 272.0 Hypertension with goal blood pressure less than 130/85     ICD-10-CM: I10 
ICD-9-CM: 401.9 Controlled type 2 diabetes mellitus without complication, without long-term current use of insulin ([facility]     ICD-10-CM: E11.9 ICD-9-CM: 250.00 Screen for colon cancer     ICD-10-CM: Z12.11 ICD-9-CM: V76.51 Vitals BP Pulse Temp Resp Height(growth percentile) Weight(growth percentile) 124/84 63 98 °F (36.7 °C) (Oral) 16 5' 3\" (1.6 m) 161 lb 11.2 oz (73.3 kg) SpO2 BMI OB Status Smoking Status 97% 28.64 kg/m2 Postmenopausal Former Smoker Vitals History BMI and BSA Data Body Mass Index Body Surface Area  
 28.64 kg/m 2 1.81 m 2 Preferred Pharmacy Pharmacy Name Phone [facility]lez, [address] [phone] Your Updated Medication List  
  
   
This list is accurate as of: 6/6/17 12:29 PM.  Always use your most recent med list.  
  
  
  
  
 atenolol 25 mg tablet Commonly known as:  TENORMIN  
  
 atorvastatin 80 mg tablet Commonly known as:  LIPITOR Take 1 Tab by mouth daily. diclofenac EC 75 mg EC tablet Commonly known as:  VOLTAREN Take 1 Tab by mouth two (2) times a day. glyBURIDE 2.5 mg tablet Commonly known as:  Mansi Fournier TAKE 1 TABLET BY MOUTH EVERY DAY  
  
 * hydroCHLOROthiazide 12.5 mg tablet Commonly known as:  HYDRODIURIL * hydroCHLOROthiazide 12.5 mg capsule Commonly known as:  Graham Spurling TAKE 1 CAPSULE BY MOUTH EVERY DAY  
  
 ibuprofen 800 mg tablet Commonly known as:  MOTRIN Take 1 Tab by mouth every six (6) hours as needed for Pain. lisinopril 40 mg tablet Commonly known as:  PRINIVIL, ZESTRIL  
TAKE 1 TABLET BY MOUTH EVERY DAY  
  
 * loperamide 2 mg tablet Commonly known as:  IMODIUM A-D Take 1 Tab by mouth four (4) times daily as needed for Diarrhea. * loperamide 2 mg capsule Commonly known as:  IMODIUM  
  
 nisoldipine SR 17 mg Tb24 tablet Commonly known as:  Watt Lightning Take 1 Tab by mouth daily. nystatin-triamcinolone topical cream  
Commonly known as:  MYCOLOG II Apply  to affected area two (2) times a day. ONETOUCH ULTRA TEST strip Generic drug:  glucose blood VI test strips TEST BLOOD SUGARS 3 TIMES DAILY * potassium chloride SA 10 mEq capsule Commonly known as:  MICRO-K  
TAKE 1 CAPSULE BY MOUTH TWICE DAILY * potassium chloride SA 10 mEq capsule Commonly known as:  MICRO-K  
TAKE ONE CAPSULE BY MOUTH TWICE DAILY * potassium chloride SA 10 mEq capsule Commonly known as:  MICRO-K  
TAKE ONE CAPSULE BY MOUTH TWICE DAILY promethazine 25 mg tablet Commonly known as:  PHENERGAN Take 1 Tab by mouth every six (6) hours as needed. raNITIdine 300 mg tablet Commonly known as:  ZANTAC TAKE 1 TABLET BY MOUTH EVERY DAY  
  
 sucralfate 1 gram tablet Commonly known as:  CARAFATE  
TAKE 1 TABLET BY MOUTH FOUR TIMES DAILY  
  
 temazepam 30 mg capsule Commonly known as:  RESTORIL Take 1 Cap by mouth nightly as needed. * Notice: This list has 9 medication(s) that are the same as other medications prescribed for you. Read the directions carefully, and ask your doctor or other care provider to review them with you. Prescriptions Sent to Pharmacy Refills  
 diclofenac EC (VOLTAREN) 75 mg EC tablet 12 Sig: Take 1 Tab by mouth two (2) times a day. Class: Normal  
 Pharmacy: [facility] [address] [place] [address] #: [phone] Route: Oral  
  
We Performed the Following AMB POC GLUCOSE BLOOD, BY GLUCOSE MONITORING DEVICE [35217 CPT(R)] AMB POC HEMOGLOBIN A1C [39138 CPT(R)] AMB POC LIPID PROFILE [49617 CPT(R)] OCCULT BLOOD, IMMUNOASSAY (FIT) [id number] CPT(R)] REFERRAL TO ORTHOPEDICS [CXN567 Custom] Follow-up Instructions Return in about 3 months (around 9/6/2017), or if symptoms worsen or fail to improve. Referral Information Referral ID Referred By Referred To [id number] [facility]   
   [address] [place], [address] Visits Status Start Date End Date 1 New Request 6/6/17 6/6/18 If your referral has a status of pending review or denied, additional information will be sent to support the outcome of this decision. Please provide this summary of care documentation to your next provider. Your primary care clinician is listed as Ana Lora. If you have any questions after today's visit, please call [phone].

## 2017-06-06 NOTE — PROGRESS NOTES
Ian Hedrick is a 63 y.o. female and presents with Diabetes; Cholesterol Problem; and Hypertension  . Subjective:    Knee pain Review:  Patient complains of bilaterally knee pain. Onset of the symptoms was months  ago. Inciting event: longstanding problem which has been getting worse. Current symptoms include pain location: both: medial and swelling. none,  Aggravating symptoms: going up and down stairs, walking, lateral movements, any weight bearing. Patient's overall course: gradually worsening Patient has had prior knee problems. Evaluation to date: x-rays. Treatment to date:NSAIDS/steroid injection        Hypertension Review:  The patient has hypertension . Diet and Lifestyle: generally follows a low sodium diet, exercises sporadically  Home BP Monitoring: is not measured at home. Pertinent ROS: taking medications as instructed, no medication side effects noted, no TIA's, no chest pain on exertion, no dyspnea on exertion, no swelling of ankles. Dyslipidemia Review:  Patient presents for evaluation of lipids. Compliance with treatment thus far has been excellent. A repeat fasting lipid profile was done. The patient does not use medications that may worsen dyslipidemias . The patient exercises sporadically. The patient is not known to have coexisting coronary artery disease    Diabetes Mellitus Review:  She has diabetes mellitus. Diabetic ROS - medication compliance: compliant all of the time, diabetic diet compliance: compliant all of the time, home glucose monitoring: is performed.    Known diabetic complications: none  Cardiovascular risk factors: family history, dyslipidemia, diabetes mellitus, obesity, hypertension  Current diabetic medications include oral agents  Eye exam current (within one year): no  Weight trend: stable  Prior visit with dietician: no  Current diet: \"healthy\" diet  in general  Current exercise: walking  Current monitoring regimen: home blood tests - daily  Home blood sugar records: trend: stable  Any episodes of hypoglycemia? no  Is She on ACE inhibitor or angiotensin II receptor blocker? Yes   Lab review: orders written for new lab studies as appropriate; see orders. Review of Systems  Constitutional: negative for fevers, chills, anorexia and weight loss  Eyes:   negative for visual disturbance and irritation  ENT:   negative for tinnitus,sore throat,nasal congestion,ear pains. hoarseness  Respiratory:  negative for cough, hemoptysis, dyspnea,wheezing  CV:   negative for chest pain, palpitations, lower extremity edema  GI:   negative for nausea, vomiting, diarrhea, abdominal pain,melena  Endo:               negative for polyuria,polydipsia,polyphagia,heat intolerance  Genitourinary: negative for frequency, dysuria and hematuria  Integument:  negative for rash and pruritus  Hematologic:  negative for easy bruising and gum/nose bleeding  Musculoskel: negative for myalgias, arthralgias, back pain, muscle weakness, joint pain  Neurological:  negative for headaches, dizziness, vertigo, memory problems and gait   Behavl/Psych: negative for feelings of anxiety, depression, mood changes    Past Medical History:   Diagnosis Date    Arthritis, Degenerative, Knee 7/15/2011    COPD (chronic obstructive pulmonary disease) ([facility] 7/15/2011    Diabetes ([facility]     Gastrointestinal disorder     acid reflux    GERD, gastro - Esophageal Reflux Disease 7/15/2011    Hypercholesterolemia 7/15/2011    Hypertension     Hypertension 7/15/2011    Other ill-defined conditions     high cholesterol    Type II or unspecified type diabetes mellitus without mention of complication, uncontrolled 7/15/2011     Past Surgical History:   Procedure Laterality Date    HX GYN  2004    uterine surgery    LAP,TUBAL CAUTERY Bilateral 1992     Social History     Social History    Marital status:      Spouse name: N/A    Number of children: N/A    Years of education: N/A     Social History Main Topics    Smoking status: Former Smoker     Quit date: 4/15/2015    Smokeless tobacco: Never Used    Alcohol use No      Comment: occ    Drug use: Yes     Special: Marijuana      Comment: used 3 days ago    Sexual activity: Yes     Partners: Male     Other Topics Concern    None     Social History Narrative     Family History   Problem Relation Age of Onset    Diabetes Father     Deep Vein Thrombosis Daughter     Lung Disease Daughter     Other Daughter      cholesterol     Current Outpatient Prescriptions   Medication Sig Dispense Refill    diclofenac EC (VOLTAREN) 75 mg EC tablet Take 1 Tab by mouth two (2) times a day. 60 Tab 12    hydroCHLOROthiazide (MICROZIDE) 12.5 mg capsule TAKE 1 CAPSULE BY MOUTH EVERY DAY 30 Cap 6    lisinopril (PRINIVIL, ZESTRIL) 40 mg tablet TAKE 1 TABLET BY MOUTH EVERY DAY 30 Tab 6    glyBURIDE (DIABETA) 2.5 mg tablet TAKE 1 TABLET BY MOUTH EVERY DAY 30 Tab 6    sucralfate (CARAFATE) 1 gram tablet TAKE 1 TABLET BY MOUTH FOUR TIMES DAILY 120 Tab 6    raNITIdine (ZANTAC) 300 mg tablet TAKE 1 TABLET BY MOUTH EVERY DAY 30 Tab 6    potassium chloride SA (MICRO-K) 10 mEq capsule TAKE ONE CAPSULE BY MOUTH TWICE DAILY 30 Cap 12    nystatin-triamcinolone (MYCOLOG II) topical cream Apply  to affected area two (2) times a day. 30 g 0    potassium chloride SA (MICRO-K) 10 mEq capsule TAKE ONE CAPSULE BY MOUTH TWICE DAILY 30 Cap 0    potassium chloride SA (MICRO-K) 10 mEq capsule TAKE ONE CAPSULE BY MOUTH TWICE DAILY 30 Cap 0    promethazine (PHENERGAN) 25 mg tablet Take 1 Tab by mouth every six (6) hours as needed. 12 Tab 0    potassium chloride SA (MICRO-K) 10 mEq capsule TAKE ONE CAPSULE BY MOUTH TWICE DAILY 30 Cap 0    potassium chloride SA (MICRO-K) 10 mEq capsule TAKE 1 CAPSULE BY MOUTH TWICE DAILY 30 Cap 0    atorvastatin (LIPITOR) 80 mg tablet Take 1 Tab by mouth daily.  30 Tab 12    ibuprofen (MOTRIN) 800 mg tablet Take 1 Tab by mouth every six (6) hours as needed for Pain. 60 Tab 12    ONETOUCH ULTRA TEST strip TEST BLOOD SUGARS 3 TIMES DAILY 100 Each 12    temazepam (RESTORIL) 30 mg capsule Take 1 Cap by mouth nightly as needed. 30 Cap 0    atenolol (TENORMIN) 25 mg tablet   0    Hydrochlorothiazide (HYDRODIURIL) 12.5 mg tablet   0    loperamide (IMODIUM) 2 mg capsule   0    loperamide (IMODIUM A-D) 2 mg tablet Take 1 Tab by mouth four (4) times daily as needed for Diarrhea. 20 Tab 0    nisoldipine SR (SULAR) 17 mg Tb24 tablet Take 1 Tab by mouth daily. 30 Tab 12     No Known Allergies    Objective:  Visit Vitals    /84    Pulse 63    Temp 98 °F (36.7 °C) (Oral)    Resp 16    Ht 5' 3\" (1.6 m)    Wt 161 lb 11.2 oz (73.3 kg)    SpO2 97%    BMI 28.64 kg/m2     Physical Exam:   General appearance - alert, well appearing, and in no distress  Mental status - alert, oriented to person, place, and time  EYE-KODY, EOMI, corneas normal, no foreign bodies  ENT-ENT exam normal, no neck nodes or sinus tenderness  Nose - normal and patent, no erythema, discharge or polyps  Mouth - mucous membranes moist, pharynx normal without lesions  Neck - supple, no significant adenopathy   Chest - clear to auscultation, no wheezes, rales or rhonchi, symmetric air entry   Heart - normal rate, regular rhythm, normal S1, S2, no murmurs, rubs, clicks or gallops   Abdomen - soft, nontender, nondistended, no masses or organomegaly  Lymph- no adenopathy palpable  Ext-peripheral pulses normal, no pedal edema, no clubbing or cyanosis  Skin-Warm and dry.  no hyperpigmentation, vitiligo, or suspicious lesions  Neuro -alert, oriented, normal speech, no focal findings or movement disorder noted  Neck-normal C-spine, no tenderness, full ROM without pain  Feet-no nail deformities or callus formation with good pulses noted  Knees-medial joint line tenderness noted,crepitations bilaterally        Results for orders placed or performed in visit on 03/06/17   AMB POC GLUCOSE BLOOD, BY GLUCOSE MONITORING DEVICE   Result Value Ref Range    Glucose  mg/dL   AMB POC HEMOGLOBIN A1C   Result Value Ref Range    Hemoglobin A1c (POC) 7.2 %   AMB POC LIPID PROFILE   Result Value Ref Range    Cholesterol (POC) 194     Triglycerides (POC) 297     HDL Cholesterol (POC) 48     LDL Cholesterol (POC) 88     Non-HDL Goal (POC) 147     TChol/HDL Ratio (POC) 4.1    AMB POC URINE, MICROALBUMIN, SEMIQUANTITATIVE   Result Value Ref Range    Microalbumin urine (POC) 10 mg/L    Microalbumin/creat ratio (POC) <30 mg/g       Assessment/Plan:    ICD-10-CM ICD-9-CM    1. Primary osteoarthritis of both knees M17.0 715.16 diclofenac EC (VOLTAREN) 75 mg EC tablet      REFERRAL TO ORTHOPEDICS      XR KNEE LT MAX 2 VWS      XR KNEE RT MAX 2 VWS   2. Hypercholesteremia E78.00 272.0 AMB POC GLUCOSE BLOOD, BY GLUCOSE MONITORING DEVICE      AMB POC HEMOGLOBIN A1C      AMB POC LIPID PROFILE   3. Hypertension with goal blood pressure less than 130/85 I10 401.9 AMB POC GLUCOSE BLOOD, BY GLUCOSE MONITORING DEVICE      AMB POC HEMOGLOBIN A1C      AMB POC LIPID PROFILE   4. Controlled type 2 diabetes mellitus without complication, without long-term current use of insulin (HCC) E11.9 250.00 AMB POC GLUCOSE BLOOD, BY GLUCOSE MONITORING DEVICE      AMB POC HEMOGLOBIN A1C      AMB POC LIPID PROFILE   5. Screen for colon cancer Z12.11 V76.51 OCCULT BLOOD, IMMUNOASSAY (FIT)     Orders Placed This Encounter    XR KNEE LT MAX 2 VWS     Standing Status:   Future     Standing Expiration Date:   12/6/2017     Order Specific Question:   Reason for Exam     Answer:   knee pains     Order Specific Question:   Is Patient Allergic to Contrast Dye? Answer:   No    XR KNEE RT MAX 2 VWS     Standing Status:   Future     Standing Expiration Date:   12/6/2017     Order Specific Question:   Reason for Exam     Answer:   knee pains     Order Specific Question:   Is Patient Allergic to Contrast Dye?      Answer:   No    OCCULT BLOOD, IMMUNOASSAY (FIT)    REFERRAL TO ORTHOPEDICS     Referral Priority:   Routine     Referral Type:   Consultation     Referral Reason:   Specialty Services Required     Referral Location:   [facility]     Referred to Provider:   Jamil Melissa MD     Number of Visits Requested:   1    AMB POC GLUCOSE BLOOD, BY GLUCOSE MONITORING DEVICE    AMB POC HEMOGLOBIN A1C    AMB POC LIPID PROFILE    diclofenac EC (VOLTAREN) 75 mg EC tablet     Sig: Take 1 Tab by mouth two (2) times a day. Dispense:  60 Tab     Refill:  12     lose weight, increase physical activity, follow low fat diet, follow low salt diet,Take 81mg aspirin daily  There are no Patient Instructions on file for this visit. Follow-up Disposition:  Return in about 3 months (around 9/6/2017), or if symptoms worsen or fail to improve. I have reviewed with the patient details of the assessment and plan and all questions were answered. Relevent patient education was performed    An After Visit Summary was printed and given to the patient.

## 2017-06-27 RX ORDER — IBUPROFEN 800 MG/1
TABLET ORAL
Qty: 90 TAB | Refills: 0 | Status: SHIPPED | OUTPATIENT
Start: 2017-06-27 | End: 2017-08-13

## 2017-08-13 ENCOUNTER — HOSPITAL ENCOUNTER (INPATIENT)
Age: 62
LOS: 3 days | Discharge: HOME | DRG: 244 | End: 2017-08-16
Attending: EMERGENCY MEDICINE | Admitting: INTERNAL MEDICINE
Payer: COMMERCIAL

## 2017-08-13 ENCOUNTER — APPOINTMENT (OUTPATIENT)
Dept: CT IMAGING | Age: 62
DRG: 244 | End: 2017-08-13
Attending: EMERGENCY MEDICINE
Payer: COMMERCIAL

## 2017-08-13 DIAGNOSIS — K57.80 DIVERTICULITIS OF INTESTINE WITH ABSCESS WITHOUT BLEEDING, UNSPECIFIED PART OF INTESTINAL TRACT: Primary | ICD-10-CM

## 2017-08-13 DIAGNOSIS — N39.0 URINARY TRACT INFECTION WITHOUT HEMATURIA, SITE UNSPECIFIED: ICD-10-CM

## 2017-08-13 PROBLEM — K57.92 ACUTE DIVERTICULITIS: Status: ACTIVE | Noted: 2017-08-13

## 2017-08-13 PROBLEM — K57.32 DIVERTICULITIS OF COLON: Status: ACTIVE | Noted: 2017-08-13

## 2017-08-13 LAB
ALBUMIN SERPL BCP-MCNC: 2.8 G/DL (ref 3.5–5)
ALBUMIN/GLOB SERPL: 0.8 {RATIO} (ref 1.1–2.2)
ALP SERPL-CCNC: 100 U/L (ref 45–117)
ALT SERPL-CCNC: 30 U/L (ref 12–78)
ANION GAP BLD CALC-SCNC: 10 MMOL/L (ref 5–15)
APPEARANCE UR: ABNORMAL
AST SERPL W P-5'-P-CCNC: 15 U/L (ref 15–37)
BACTERIA URNS QL MICRO: ABNORMAL /HPF
BASOPHILS # BLD AUTO: 0 K/UL (ref 0–0.1)
BASOPHILS # BLD: 0 % (ref 0–1)
BILIRUB SERPL-MCNC: 0.3 MG/DL (ref 0.2–1)
BILIRUB UR QL: NEGATIVE
BUN SERPL-MCNC: 17 MG/DL (ref 6–20)
BUN/CREAT SERPL: 26 (ref 12–20)
CALCIUM SERPL-MCNC: 7.9 MG/DL (ref 8.5–10.1)
CHLORIDE SERPL-SCNC: 107 MMOL/L (ref 97–108)
CO2 SERPL-SCNC: 24 MMOL/L (ref 21–32)
COLOR UR: ABNORMAL
CREAT SERPL-MCNC: 0.66 MG/DL (ref 0.55–1.02)
EOSINOPHIL # BLD: 0.2 K/UL (ref 0–0.4)
EOSINOPHIL NFR BLD: 1 % (ref 0–7)
EPITH CASTS URNS QL MICRO: ABNORMAL /LPF
ERYTHROCYTE [DISTWIDTH] IN BLOOD BY AUTOMATED COUNT: 12.2 % (ref 11.5–14.5)
GLOBULIN SER CALC-MCNC: 3.7 G/DL (ref 2–4)
GLUCOSE BLD STRIP.AUTO-MCNC: 124 MG/DL (ref 65–100)
GLUCOSE SERPL-MCNC: 134 MG/DL (ref 65–100)
GLUCOSE UR STRIP.AUTO-MCNC: NEGATIVE MG/DL
HCT VFR BLD AUTO: 37.1 % (ref 35–47)
HGB BLD-MCNC: 12.6 G/DL (ref 11.5–16)
HGB UR QL STRIP: ABNORMAL
KETONES UR QL STRIP.AUTO: NEGATIVE MG/DL
LEUKOCYTE ESTERASE UR QL STRIP.AUTO: NEGATIVE
LIPASE SERPL-CCNC: 69 U/L (ref 73–393)
LYMPHOCYTES # BLD AUTO: 18 % (ref 12–49)
LYMPHOCYTES # BLD: 2.1 K/UL (ref 0.8–3.5)
MCH RBC QN AUTO: 31.3 PG (ref 26–34)
MCHC RBC AUTO-ENTMCNC: 34 G/DL (ref 30–36.5)
MCV RBC AUTO: 92.3 FL (ref 80–99)
MONOCYTES # BLD: 0.9 K/UL (ref 0–1)
MONOCYTES NFR BLD AUTO: 8 % (ref 5–13)
NEUTS SEG # BLD: 8.4 K/UL (ref 1.8–8)
NEUTS SEG NFR BLD AUTO: 73 % (ref 32–75)
NITRITE UR QL STRIP.AUTO: POSITIVE
PH UR STRIP: 7 [PH] (ref 5–8)
PLATELET # BLD AUTO: 200 K/UL (ref 150–400)
POTASSIUM SERPL-SCNC: 3.5 MMOL/L (ref 3.5–5.1)
PROT SERPL-MCNC: 6.5 G/DL (ref 6.4–8.2)
PROT UR STRIP-MCNC: NEGATIVE MG/DL
RBC # BLD AUTO: 4.02 M/UL (ref 3.8–5.2)
RBC #/AREA URNS HPF: ABNORMAL /HPF (ref 0–5)
SERVICE CMNT-IMP: ABNORMAL
SODIUM SERPL-SCNC: 141 MMOL/L (ref 136–145)
SP GR UR REFRACTOMETRY: 1.01 (ref 1–1.03)
UA: UC IF INDICATED,UAUC: ABNORMAL
UROBILINOGEN UR QL STRIP.AUTO: 0.2 EU/DL (ref 0.2–1)
WBC # BLD AUTO: 11.5 K/UL (ref 3.6–11)
WBC URNS QL MICRO: ABNORMAL /HPF (ref 0–4)

## 2017-08-13 PROCEDURE — 74011250636 HC RX REV CODE- 250/636: Performed by: INTERNAL MEDICINE

## 2017-08-13 PROCEDURE — 80053 COMPREHEN METABOLIC PANEL: CPT | Performed by: EMERGENCY MEDICINE

## 2017-08-13 PROCEDURE — 74011250637 HC RX REV CODE- 250/637: Performed by: EMERGENCY MEDICINE

## 2017-08-13 PROCEDURE — 83690 ASSAY OF LIPASE: CPT | Performed by: EMERGENCY MEDICINE

## 2017-08-13 PROCEDURE — 74011000258 HC RX REV CODE- 258: Performed by: EMERGENCY MEDICINE

## 2017-08-13 PROCEDURE — 82962 GLUCOSE BLOOD TEST: CPT

## 2017-08-13 PROCEDURE — 96361 HYDRATE IV INFUSION ADD-ON: CPT

## 2017-08-13 PROCEDURE — 96376 TX/PRO/DX INJ SAME DRUG ADON: CPT

## 2017-08-13 PROCEDURE — 85025 COMPLETE CBC W/AUTO DIFF WBC: CPT | Performed by: EMERGENCY MEDICINE

## 2017-08-13 PROCEDURE — 96367 TX/PROPH/DG ADDL SEQ IV INF: CPT

## 2017-08-13 PROCEDURE — 87077 CULTURE AEROBIC IDENTIFY: CPT | Performed by: EMERGENCY MEDICINE

## 2017-08-13 PROCEDURE — 74177 CT ABD & PELVIS W/CONTRAST: CPT

## 2017-08-13 PROCEDURE — 81001 URINALYSIS AUTO W/SCOPE: CPT | Performed by: EMERGENCY MEDICINE

## 2017-08-13 PROCEDURE — 74011250636 HC RX REV CODE- 250/636: Performed by: EMERGENCY MEDICINE

## 2017-08-13 PROCEDURE — 96375 TX/PRO/DX INJ NEW DRUG ADDON: CPT

## 2017-08-13 PROCEDURE — 74011000250 HC RX REV CODE- 250: Performed by: EMERGENCY MEDICINE

## 2017-08-13 PROCEDURE — 36415 COLL VENOUS BLD VENIPUNCTURE: CPT | Performed by: EMERGENCY MEDICINE

## 2017-08-13 PROCEDURE — 87186 SC STD MICRODIL/AGAR DIL: CPT | Performed by: EMERGENCY MEDICINE

## 2017-08-13 PROCEDURE — 87086 URINE CULTURE/COLONY COUNT: CPT | Performed by: EMERGENCY MEDICINE

## 2017-08-13 PROCEDURE — 65270000032 HC RM SEMIPRIVATE

## 2017-08-13 PROCEDURE — 99284 EMERGENCY DEPT VISIT MOD MDM: CPT

## 2017-08-13 PROCEDURE — 74011636320 HC RX REV CODE- 636/320: Performed by: EMERGENCY MEDICINE

## 2017-08-13 PROCEDURE — 96365 THER/PROPH/DIAG IV INF INIT: CPT

## 2017-08-13 RX ORDER — SODIUM CHLORIDE 9 MG/ML
150 INJECTION, SOLUTION INTRAVENOUS CONTINUOUS
Status: DISCONTINUED | OUTPATIENT
Start: 2017-08-13 | End: 2017-08-14

## 2017-08-13 RX ORDER — ONDANSETRON 2 MG/ML
4 INJECTION INTRAMUSCULAR; INTRAVENOUS
Status: DISCONTINUED | OUTPATIENT
Start: 2017-08-13 | End: 2017-08-16 | Stop reason: HOSPADM

## 2017-08-13 RX ORDER — ONDANSETRON 2 MG/ML
4 INJECTION INTRAMUSCULAR; INTRAVENOUS
Status: COMPLETED | OUTPATIENT
Start: 2017-08-13 | End: 2017-08-13

## 2017-08-13 RX ORDER — PROMETHAZINE HYDROCHLORIDE 25 MG/1
25 TABLET ORAL
Status: COMPLETED | OUTPATIENT
Start: 2017-08-13 | End: 2017-08-13

## 2017-08-13 RX ORDER — HYDROMORPHONE HYDROCHLORIDE 1 MG/ML
0.5 INJECTION, SOLUTION INTRAMUSCULAR; INTRAVENOUS; SUBCUTANEOUS
Status: COMPLETED | OUTPATIENT
Start: 2017-08-13 | End: 2017-08-13

## 2017-08-13 RX ORDER — HYDROMORPHONE HYDROCHLORIDE 2 MG/ML
0.5 INJECTION, SOLUTION INTRAMUSCULAR; INTRAVENOUS; SUBCUTANEOUS
Status: DISCONTINUED | OUTPATIENT
Start: 2017-08-13 | End: 2017-08-14

## 2017-08-13 RX ORDER — LISINOPRIL 20 MG/1
40 TABLET ORAL DAILY
Status: DISCONTINUED | OUTPATIENT
Start: 2017-08-14 | End: 2017-08-16 | Stop reason: HOSPADM

## 2017-08-13 RX ORDER — METRONIDAZOLE 500 MG/100ML
500 INJECTION, SOLUTION INTRAVENOUS EVERY 8 HOURS
Status: DISCONTINUED | OUTPATIENT
Start: 2017-08-14 | End: 2017-08-16 | Stop reason: HOSPADM

## 2017-08-13 RX ORDER — SODIUM CHLORIDE 0.9 % (FLUSH) 0.9 %
10 SYRINGE (ML) INJECTION
Status: COMPLETED | OUTPATIENT
Start: 2017-08-13 | End: 2017-08-13

## 2017-08-13 RX ORDER — MAGNESIUM SULFATE 100 %
4 CRYSTALS MISCELLANEOUS AS NEEDED
Status: DISCONTINUED | OUTPATIENT
Start: 2017-08-13 | End: 2017-08-16 | Stop reason: HOSPADM

## 2017-08-13 RX ORDER — DICYCLOMINE HYDROCHLORIDE 10 MG/1
10 CAPSULE ORAL
Status: COMPLETED | OUTPATIENT
Start: 2017-08-13 | End: 2017-08-13

## 2017-08-13 RX ORDER — LEVOFLOXACIN 5 MG/ML
750 INJECTION, SOLUTION INTRAVENOUS EVERY 24 HOURS
Status: DISCONTINUED | OUTPATIENT
Start: 2017-08-14 | End: 2017-08-16 | Stop reason: HOSPADM

## 2017-08-13 RX ORDER — ENOXAPARIN SODIUM 100 MG/ML
40 INJECTION SUBCUTANEOUS EVERY 24 HOURS
Status: DISCONTINUED | OUTPATIENT
Start: 2017-08-13 | End: 2017-08-16 | Stop reason: HOSPADM

## 2017-08-13 RX ORDER — SODIUM CHLORIDE 0.9 % (FLUSH) 0.9 %
5-10 SYRINGE (ML) INJECTION AS NEEDED
Status: DISCONTINUED | OUTPATIENT
Start: 2017-08-13 | End: 2017-08-16 | Stop reason: HOSPADM

## 2017-08-13 RX ORDER — METRONIDAZOLE 500 MG/100ML
500 INJECTION, SOLUTION INTRAVENOUS
Status: COMPLETED | OUTPATIENT
Start: 2017-08-13 | End: 2017-08-13

## 2017-08-13 RX ORDER — MORPHINE SULFATE 2 MG/ML
2 INJECTION, SOLUTION INTRAMUSCULAR; INTRAVENOUS
Status: COMPLETED | OUTPATIENT
Start: 2017-08-13 | End: 2017-08-13

## 2017-08-13 RX ORDER — LEVOFLOXACIN 500 MG/1
500 TABLET, FILM COATED ORAL
Status: COMPLETED | OUTPATIENT
Start: 2017-08-13 | End: 2017-08-13

## 2017-08-13 RX ORDER — SUCRALFATE 1 G/1
1 TABLET ORAL 4 TIMES DAILY
COMMUNITY
End: 2019-09-19 | Stop reason: SDUPTHER

## 2017-08-13 RX ORDER — DEXTROSE 50 % IN WATER (D50W) INTRAVENOUS SYRINGE
12.5-25 AS NEEDED
Status: DISCONTINUED | OUTPATIENT
Start: 2017-08-13 | End: 2017-08-16 | Stop reason: HOSPADM

## 2017-08-13 RX ORDER — INSULIN LISPRO 100 [IU]/ML
INJECTION, SOLUTION INTRAVENOUS; SUBCUTANEOUS
Status: DISCONTINUED | OUTPATIENT
Start: 2017-08-13 | End: 2017-08-16 | Stop reason: HOSPADM

## 2017-08-13 RX ORDER — SODIUM CHLORIDE 0.9 % (FLUSH) 0.9 %
5-10 SYRINGE (ML) INJECTION EVERY 8 HOURS
Status: DISCONTINUED | OUTPATIENT
Start: 2017-08-13 | End: 2017-08-16 | Stop reason: HOSPADM

## 2017-08-13 RX ADMIN — ONDANSETRON 4 MG: 2 INJECTION INTRAMUSCULAR; INTRAVENOUS at 15:49

## 2017-08-13 RX ADMIN — LEVOFLOXACIN 500 MG: 500 TABLET, FILM COATED ORAL at 18:27

## 2017-08-13 RX ADMIN — CEFTRIAXONE SODIUM 1 G: 1 INJECTION, POWDER, FOR SOLUTION INTRAMUSCULAR; INTRAVENOUS at 17:31

## 2017-08-13 RX ADMIN — METRONIDAZOLE 500 MG: 500 INJECTION, SOLUTION INTRAVENOUS at 18:10

## 2017-08-13 RX ADMIN — ONDANSETRON 4 MG: 2 INJECTION INTRAMUSCULAR; INTRAVENOUS at 14:47

## 2017-08-13 RX ADMIN — SODIUM CHLORIDE 150 ML/HR: 900 INJECTION, SOLUTION INTRAVENOUS at 18:49

## 2017-08-13 RX ADMIN — Medication 10 ML: at 23:53

## 2017-08-13 RX ADMIN — SODIUM CHLORIDE 150 ML/HR: 900 INJECTION, SOLUTION INTRAVENOUS at 21:04

## 2017-08-13 RX ADMIN — DIATRIZOATE MEGLUMINE AND DIATRIZOATE SODIUM 30 ML: 600; 100 SOLUTION ORAL; RECTAL at 15:48

## 2017-08-13 RX ADMIN — PROMETHAZINE HYDROCHLORIDE 25 MG: 25 TABLET ORAL at 17:29

## 2017-08-13 RX ADMIN — Medication 2 MG: at 15:49

## 2017-08-13 RX ADMIN — ENOXAPARIN SODIUM 40 MG: 100 INJECTION SUBCUTANEOUS at 23:52

## 2017-08-13 RX ADMIN — IOPAMIDOL 100 ML: 755 INJECTION, SOLUTION INTRAVENOUS at 17:21

## 2017-08-13 RX ADMIN — SODIUM CHLORIDE 1000 ML: 900 INJECTION, SOLUTION INTRAVENOUS at 17:30

## 2017-08-13 RX ADMIN — Medication 10 ML: at 17:22

## 2017-08-13 RX ADMIN — SODIUM CHLORIDE 1000 ML: 900 INJECTION, SOLUTION INTRAVENOUS at 14:47

## 2017-08-13 RX ADMIN — HYDROMORPHONE HYDROCHLORIDE 0.5 MG: 1 INJECTION, SOLUTION INTRAMUSCULAR; INTRAVENOUS; SUBCUTANEOUS at 17:30

## 2017-08-13 RX ADMIN — DICYCLOMINE HYDROCHLORIDE 10 MG: 10 CAPSULE ORAL at 14:46

## 2017-08-13 NOTE — ED NOTES
Patient here with c/o abdominal pain and cramping with diarrhea. Patient states that she thinks she ate something Friday night which set her body on a bad loop, reports that she ate chocolate bar and chocolate cake and salad Friday and states that she doesn't often eat salads. Patient reports that she had a week in [facility], getting back into town late Wednesday night. Patient reports cramping, pain, along lower abdomen. Reports stools began as hard but quickly transitioned to loose and have been frequent the last couple days. Emergency Department Nursing Plan of Care       The Nursing Plan of Care is developed from the Nursing assessment and Emergency Department Attending provider initial evaluation. The plan of care may be reviewed in the ED Provider note.     The Plan of Care was developed with the following considerations:   Patient / Family readiness to learn indicated by:verbalized understanding  Persons(s) to be included in education: patient  Barriers to Learning/Limitations:No    Signed     Shirley Chiu RN    8/13/2017   2:27 PM

## 2017-08-13 NOTE — IP AVS SNAPSHOT
Benjy Silva 
 
 
 [address] Patient: Baljeet Mayberry MRN: [id number] OUY:[id number] You are allergic to the following No active allergies Recent Documentation Height Weight BMI OB Status Smoking Status 1.6 m 69.4 kg 27.1 kg/m2 Postmenopausal Former Smoker Unresulted Labs Order Current Status LACTOFERRIN, FECAL In process OVA & PARASITES, STOOL In process WBC, STOOL In process Emergency Contacts Name Discharge Info Relation Home Work Mobile Raisa Loepz  Child [2] [phone] About your hospitalization You were admitted on:  August 13, 2017 You last received care in the:  [address] You were discharged on:  August 16, 2017 Unit phone number:  [phone] Why you were hospitalized Your primary diagnosis was:  Acute Diverticulitis Your diagnoses also included:  Diverticulitis Of Colon Providers Seen During Your Hospitalizations Provider Role Specialty Primary office phone Boris London MD Attending Provider Emergency Medicine [phone] Anitra Cueto MD Attending Provider Internal Medicine [phone] Molly Encinas MD Attending Provider Internal Medicine [phone] Your Primary Care Physician (PCP) Primary Care Physician Office Phone Office Fax [address] [phone] Follow-up Information Follow up With Details Comments Contact Info Mona Gibbs MD Go on 8/22/2017 Your appointment is scheduled for 8/22/17 at 12:15pm. [address] [facility] 
[phone] [facility] Go on 8/24/2017 Your appointment is scheduled for 8/24/17 at 10:45am with Dr. Manda Maldonado. Please arrive early to complete new patient paperwork. [address] [facility] [address] [place] [id number] Your Appointments Tuesday August 22, 2017 12:15 PM EDT TRANSITIONAL CARE MANAGEMENT with Anibal Linares MD  
[facility] - [address] [place] [address] ([facility]) [address] [place] [id number]  
[phone] Wednesday September 06, 2017 11:00 AM EDT ROUTINE CARE with Anibal Linares MD  
[facility] - [address] [place] [address] ([facility]) [address] [place] [id number]  
[phone] Current Discharge Medication List  
  
START taking these medications Dose & Instructions Dispensing Information Comments Morning Noon Evening Bedtime  
 ciprofloxacin HCl 500 mg tablet Commonly known as:  CIPRO Your last dose was: Your next dose is:    
   
   
 Dose:  500 mg Take 1 Tab by mouth two (2) times a day for 10 days. Quantity:  20 Tab Refills:  0  
     
   
   
   
  
 metroNIDAZOLE 500 mg tablet Commonly known as:  FLAGYL Your last dose was: Your next dose is:    
   
   
 Dose:  500 mg Take 1 Tab by mouth three (3) times daily for 10 days. Quantity:  30 Tab Refills:  0 CONTINUE these medications which have CHANGED Dose & Instructions Dispensing Information Comments Morning Noon Evening Bedtime  
 potassium chloride SA 10 mEq capsule Commonly known as:  Jimmy Joel What changed:  See the new instructions. Your last dose was: Your next dose is: TAKE ONE CAPSULE BY MOUTH TWICE DAILY Quantity:  30 Cap Refills:  0 CONTINUE these medications which have NOT CHANGED Dose & Instructions Dispensing Information Comments Morning Noon Evening Bedtime  
 bismuth subsalicylate 875 AQ/37 mL Susp Commonly known as:  PEPTO-BISMOL MAXIMUM STRENGTH Your last dose was: Your next dose is:    
   
   
 Dose:  30 mL Take 30 mL by mouth every six (6) hours as needed. Refills:  0 CENTRUM SILVER WOMEN PO Your last dose was: Your next dose is: Take  by mouth daily. Refills:  0  
     
   
   
   
  
 diclofenac EC 75 mg EC tablet Commonly known as:  VOLTAREN Your last dose was: Your next dose is: TAKE 1 TABLET BY MOUTH TWICE DAILY Quantity:  60 Tab Refills:  12  
     
   
   
   
  
 glyBURIDE 2.5 mg tablet Commonly known as:  Negro Nguyen Your last dose was: Your next dose is: TAKE 1 TABLET BY MOUTH EVERY DAY Quantity:  30 Tab Refills:  6  
     
   
   
   
  
 hydroCHLOROthiazide 12.5 mg tablet Commonly known as:  HYDRODIURIL Your last dose was: Your next dose is:    
   
   
 Dose:  12.5 mg Take 12.5 mg by mouth daily. Refills:  0  
     
   
   
   
  
 lisinopril 40 mg tablet Commonly known as:  Angela Pierce Your last dose was: Your next dose is: TAKE 1 TABLET BY MOUTH EVERY DAY Quantity:  30 Tab Refills:  6  
     
   
   
   
  
 loperamide 2 mg tablet Commonly known as:  IMODIUM A-D Your last dose was: Your next dose is:    
   
   
 Dose:  2 mg Take 1 Tab by mouth four (4) times daily as needed for Diarrhea. Quantity:  20 Tab Refills:  0  
     
   
   
   
  
 ONETOUCH ULTRA TEST strip Generic drug:  glucose blood VI test strips Your last dose was: Your next dose is:    
   
   
 TEST BLOOD SUGARS 3 TIMES DAILY Quantity:  100 Each Refills:  12  
     
   
   
   
  
 raNITIdine 300 mg tablet Commonly known as:  ZANTAC Your last dose was: Your next dose is: TAKE 1 TABLET BY MOUTH EVERY DAY Quantity:  30 Tab Refills:  6  
     
   
   
   
  
 sucralfate 1 gram tablet Commonly known as:  Cody Dias Your last dose was: Your next dose is:    
   
   
 Dose:  1 g Take 1 g by mouth four (4) times daily. Refills:  0 Where to Get Your Medications Information on where to get these meds will be given to you by the nurse or doctor. ! Ask your nurse or doctor about these medications  
  ciprofloxacin HCl 500 mg tablet  
 metroNIDAZOLE 500 mg tablet Discharge Instructions Diverticulitis: Care Instructions Your Care Instructions Diverticulitis occurs when pouches form in the wall of the colon and become inflamed or infected. It can be very painful. Doctors aren't sure what causes diverticulitis. There is no proof that foods such as nuts, seeds, or berries cause it or make it worse. A low-fiber diet may cause the colon to work harder to push stool forward. Pouches may form because of this extra work. It may be hard to think about healthy eating while you're in pain. But as you recover, you might think about how you can use healthy eating for overall better health. Healthy eating may help you avoid future attacks. Follow-up care is a key part of your treatment and safety. Be sure to make and go to all appointments, and call your doctor if you are having problems. It's also a good idea to know your test results and keep a list of the medicines you take. How can you care for yourself at home? · Drink plenty of fluids, enough so that your urine is light yellow or clear like water. If you have kidney, heart, or liver disease and have to limit fluids, talk with your doctor before you increase the amount of fluids you drink. · Stick to liquids or a bland diet (plain rice, bananas, dry toast or crackers, applesauce) until you are feeling better. Then you can return to regular foods and gradually increase the amount of fiber in your diet. · Use a heating pad set on low on your belly to relieve mild cramps and pain. · Get extra rest until you are feeling better. · Be safe with medicines. Read and follow all instructions on the label. ¨ If the doctor gave you a prescription medicine for pain, take it as prescribed. ¨ If you are not taking a prescription pain medicine, ask your doctor if you can take an over-the-counter medicine. · If your doctor prescribed antibiotics, take them as directed. Do not stop taking them just because you feel better. You need to take the full course of antibiotics. To prevent future attacks of diverticulitis · Avoid constipation: 
¨ Include fruits, vegetables, beans, and whole grains in your diet each day. These foods are high in fiber. ¨ Drink plenty of fluids, enough so that your urine is light yellow or clear like water. If you have kidney, heart, or liver disease and have to limit fluids, talk with your doctor before you increase the amount of fluids you drink. ¨ Get some exercise every day. Build up slowly to 30 to 60 minutes a day on 5 or more days of the week. ¨ Take a fiber supplement, such as Citrucel or Metamucil, every day if needed. Read and follow all instructions on the label. ¨ Schedule time each day for a bowel movement. Having a daily routine may help. Take your time and do not strain when having a bowel movement. When should you call for help? Call 911 anytime you think you may need emergency care. For example, call if: 
· You passed out (lost consciousness). · You vomit blood or what looks like coffee grounds. · You pass maroon or very bloody stools. Call your doctor now or seek immediate medical care if: 
· You have severe pain or swelling in your belly. · You have a new or higher fever. · You cannot keep down fluids or medicines. · You have new pain that gets worse when you move or cough. Watch closely for changes in your health, and be sure to contact your doctor if: · The symptoms you had when you first started feeling sick come back. · You do not get better as expected. Where can you learn more? Go to http://kalia-terell.info/. Enter H901 in the search box to learn more about \"Diverticulitis: Care Instructions. \" Current as of: August 9, 2016 Content Version: 11.3 © 2006-2017 [employer]. Care instructions adapted under license by [employer] (which disclaims liability or warranty for this information). If you have questions about a medical condition or this instruction, always ask your healthcare professional. [address] any warranty or liability for your use of this information. Discharge Orders None [facility] Announcement We are excited to announce that we are making your provider's discharge notes available to you in [facility]. You will see these notes when they are completed and signed by the physician that discharged you from your recent hospital stay. If you have any questions or concerns about any information you see in [employer], please call the Health Information Department where you were seen or reach out to your Primary Care Provider for more information about your plan of care. General Information Please provide this summary of care documentation to your next provider. Patient Signature:  ____________________________________________________________ Date:  ____________________________________________________________  
  
Mignon Wilson Provider Signature:  ____________________________________________________________ Date:  ____________________________________________________________

## 2017-08-13 NOTE — ED PROVIDER NOTES
HPI Comments: Massimo Peters is a 63 y.o. female with PMhx significant for HTN, HLD, GERD, DM, and COPD who presents ambulatory to the ED with cc of waxing and waning diffuse lower abdominal cramping beginning 8/9/2017 and > 5 episodes of diarrhea beginning ~0400. She states she ate eggs, chicken, and mashed potatoes yesterday, but denies eating any food today as it stimulates her diarrhea. She denies taking any medication for her current symptoms. Of note, pt flew out to [facility] and back last week prior to onset. Pt reports hx of colon polyps she has had removed, but denies any hx of liver disease, obstruction, or pancreatitis. Pt states her blood glucose has been in normal range. She reports marijuana use and occasional EtOH use, but denies any tobacco use. Pt specifically denies any fever and hematochezia. PCP: Rey Obrien MD    There are no other complaints, changes or physical findings at this time. The history is provided by the patient and medical records.         Past Medical History:   Diagnosis Date    Arthritis, Degenerative, Knee 7/15/2011    COPD (chronic obstructive pulmonary disease) ([facility] [address] 7/15/2011    Diabetes (HCC)     Gastrointestinal disorder     acid reflux    GERD, gastro - Esophageal Reflux Disease 7/15/2011    Hypercholesterolemia 7/15/2011    Hypertension     Hypertension 7/15/2011    Other ill-defined conditions     high cholesterol    Type II or unspecified type diabetes mellitus without mention of complication, uncontrolled 7/15/2011       Past Surgical History:   Procedure Laterality Date    HX GYN  2004    uterine surgery    LAP,TUBAL CAUTERY Bilateral 1992         Family History:   Problem Relation Age of Onset    Diabetes Father     Deep Vein Thrombosis Daughter     Lung Disease Daughter     Other Daughter      cholesterol       Social History     Social History    Marital status:      Spouse name: N/A    Number of children: N/A    Years of education: N/A     Occupational History    Not on file. Social History Main Topics    Smoking status: Former Smoker     Quit date: 4/15/2015    Smokeless tobacco: Never Used    Alcohol use No      Comment: occ    Drug use: Yes     Special: Marijuana      Comment: used 3 days ago    Sexual activity: Yes     Partners: Male     Other Topics Concern    Not on file     Social History Narrative         ALLERGIES: Review of patient's allergies indicates no known allergies. Review of Systems   Constitutional: Negative for chills and fever. HENT: Negative for congestion and rhinorrhea. Eyes: Negative for visual disturbance. Respiratory: Negative for cough and shortness of breath. Cardiovascular: Negative for chest pain. Gastrointestinal: Positive for abdominal pain (lower) and diarrhea. Negative for blood in stool, nausea and vomiting. Genitourinary: Negative for difficulty urinating and dysuria. Musculoskeletal: Negative for arthralgias, back pain and neck pain. Skin: Negative for color change and rash. Neurological: Negative for dizziness, weakness and headaches. Patient Vitals for the past 12 hrs:   Temp Pulse Resp BP SpO2   08/13/17 1809 - 89 18 (!) 139/93 99 %   08/13/17 1625 - 81 18 138/87 99 %   08/13/17 1352 98.7 °F (37.1 °C) 98 20 125/84 96 %     Physical Exam   Constitutional: She is oriented to person, place, and time. She appears well-developed and well-nourished. Neck: Normal range of motion. Cardiovascular: Normal rate, regular rhythm, normal heart sounds and intact distal pulses. Pulmonary/Chest: Effort normal and breath sounds normal.   Abdominal: Soft. Bowel sounds are normal. There is tenderness. There is no rebound and no guarding. Suprapubic, RLQ, and LLQ tenderness   Musculoskeletal:   No peripheral edema   Neurological: She is alert and oriented to person, place, and time. Skin: Skin is warm and dry.    No jaundice   Nursing note and vitals reviewed. MDM  Number of Diagnoses or Management Options  Diverticulitis of intestine with abscess without bleeding, unspecified part of intestinal tract:   Urinary tract infection without hematuria, site unspecified:   Diagnosis management comments: DDx: food borne illness, colitis, DM complication, UTI       Amount and/or Complexity of Data Reviewed  Clinical lab tests: ordered and reviewed  Tests in the radiology section of CPT®: ordered and reviewed  Review and summarize past medical records: yes  Discuss the patient with other providers: yes (General surgery)  Independent visualization of images, tracings, or specimens: yes    Patient Progress  Patient progress: stable    ED Course       Procedures    PROGRESS NOTE:  3:23 PM  Pt reports she is still in pain. Written by TYRON Scruggs, as dictated by Leny Bryant MD.    PROGRESS NOTE:  6:01 PM  Pt got her abx and states she is feeling better. Written by TYRON Scruggs, as dictated by Leny Bryant MD.    CONSULT NOTE:   6:06 PM  Leny Bryant MD spoke with Ale Bean MD   Specialty: general surgery  Discussed pt's hx, disposition, and available diagnostic and imaging results. Reviewed care plans. Consultant agrees with plans as outlined. Dr. Kitty Wallace advises pt is admitted. Written by TYRON Scruggs, as dictated by Leny Bryant MD.    PROGRESS NOTE:  6:12 PM  Discussed CT results with pt and Dr. Evelia Lundberg advice to stay. Pt is in agreement. Written by TYRON Scruggs, as dictated by Leny Bryant MD.    CONSULT NOTE:   7:05 PM  Leny Bryant MD spoke with Dr Dashawn Gaming,   Specialty: Hospitalist  Discussed pt's hx, disposition, and available diagnostic and imaging results. Reviewed care plans. Consultant will evaluate pt for admission. They state they will call the overnight hospitalist for admission.   Written by TYRON Scruggs, as dictated by Leny Bryant MD.    LABORATORY TESTS:  Recent Results (from the past 12 hour(s))   URINALYSIS W/ REFLEX CULTURE    Collection Time: 08/13/17  2:10 PM   Result Value Ref Range    Color YELLOW/STRAW      Appearance CLOUDY (A) CLEAR      Specific gravity 1.010 1.003 - 1.030      pH (UA) 7.0 5.0 - 8.0      Protein NEGATIVE  NEG mg/dL    Glucose NEGATIVE  NEG mg/dL    Ketone NEGATIVE  NEG mg/dL    Bilirubin NEGATIVE  NEG      Blood MODERATE (A) NEG      Urobilinogen 0.2 0.2 - 1.0 EU/dL    Nitrites POSITIVE (A) NEG      Leukocyte Esterase NEGATIVE  NEG      WBC 0-4 0 - 4 /hpf    RBC 0-5 0 - 5 /hpf    Epithelial cells FEW FEW /lpf    Bacteria 3+ (A) NEG /hpf    UA:UC IF INDICATED URINE CULTURE ORDERED (A) CNI     CBC WITH AUTOMATED DIFF    Collection Time: 08/13/17  2:38 PM   Result Value Ref Range    WBC 11.5 (H) 3.6 - 11.0 K/uL    RBC 4.02 3.80 - 5.20 M/uL    HGB 12.6 11.5 - 16.0 g/dL    HCT 37.1 35.0 - 47.0 %    MCV 92.3 80.0 - 99.0 FL    MCH 31.3 26.0 - 34.0 PG    MCHC 34.0 30.0 - 36.5 g/dL    RDW 12.2 11.5 - 14.5 %    PLATELET 889 840 - 724 K/uL    NEUTROPHILS 73 32 - 75 %    LYMPHOCYTES 18 12 - 49 %    MONOCYTES 8 5 - 13 %    EOSINOPHILS 1 0 - 7 %    BASOPHILS 0 0 - 1 %    ABS. NEUTROPHILS 8.4 (H) 1.8 - 8.0 K/UL    ABS. LYMPHOCYTES 2.1 0.8 - 3.5 K/UL    ABS. MONOCYTES 0.9 0.0 - 1.0 K/UL    ABS. EOSINOPHILS 0.2 0.0 - 0.4 K/UL    ABS. BASOPHILS 0.0 0.0 - 0.1 K/UL   METABOLIC PANEL, COMPREHENSIVE    Collection Time: 08/13/17  3:07 PM   Result Value Ref Range    Sodium 141 136 - 145 mmol/L    Potassium 3.5 3.5 - 5.1 mmol/L    Chloride 107 97 - 108 mmol/L    CO2 24 21 - 32 mmol/L    Anion gap 10 5 - 15 mmol/L    Glucose 134 (H) 65 - 100 mg/dL    BUN 17 6 - 20 MG/DL    Creatinine 0.66 0.55 - 1.02 MG/DL    BUN/Creatinine ratio 26 (H) 12 - 20      GFR est AA >60 >60 ml/min/1.73m2    GFR est non-AA >60 >60 ml/min/1.73m2    Calcium 7.9 (L) 8.5 - 10.1 MG/DL    Bilirubin, total 0.3 0.2 - 1.0 MG/DL    ALT (SGPT) 30 12 - 78 U/L    AST (SGOT) 15 15 - 37 U/L    Alk.  phosphatase 100 45 - 117 U/L    Protein, total 6.5 6.4 - 8.2 g/dL    Albumin 2.8 (L) 3.5 - 5.0 g/dL    Globulin 3.7 2.0 - 4.0 g/dL    A-G Ratio 0.8 (L) 1.1 - 2.2     LIPASE    Collection Time: 08/13/17  3:07 PM   Result Value Ref Range    Lipase 69 (L) 73 - 393 U/L       IMAGING RESULTS:  CT Results  (Last 48 hours)               08/13/17 1722  CT ABD PELV W CONT Final result    Impression:   impression: Findings likely relate to acute diverticulitis as above. Narrative:  EXAM:  CT ABD PELV W CONT       INDICATION: abd pain, diarrhea x 4d       COMPARISON: 2013       CONTRAST:  100 mL of Isovue-370. TECHNIQUE:    Following the uneventful intravenous administration of contrast, thin axial   images were obtained through the abdomen and pelvis. Coronal and sagittal   reconstructions were generated. Oral contrast was administered. CT dose   reduction was achieved through use of a standardized protocol tailored for this   examination and automatic exposure control for dose modulation. FINDINGS:    Views hepatic steatosis, small liver cyst. Small gallstones suspected without   wall thickening. The a pancreas appears unremarkable. The kidneys appear   unobstructed. The appendix appears normal. There is a focal area of abnormal   sigmoid colon with wall thickening fat reaction around it and a small   pericolonic fluid collection. This is nonspecific but likely correspond to acute   diverticulitis with small abscess formation. The bladder does not show any focal   findings. Uterus and ovaries appear normal by this technique. There is no bowel   obstruction. No adenopathy.                  MEDICATIONS GIVEN:  Medications   sodium chloride 0.9 % bolus infusion 1,000 mL (1,000 mL IntraVENous New Bag 8/13/17 1730)   metroNIDAZOLE (FLAGYL) IVPB premix 500 mg (500 mg IntraVENous New Bag 8/13/17 1810)   0.9% sodium chloride infusion (not administered)   levoFLOXacin (LEVAQUIN) tablet 500 mg (not administered)   sodium chloride 0.9 % bolus infusion 1,000 mL (0 mL IntraVENous IV Completed 8/13/17 1554)   dicyclomine (BENTYL) capsule 10 mg (10 mg Oral Given 8/13/17 1446)   ondansetron (ZOFRAN) injection 4 mg (4 mg IntraVENous Given 8/13/17 1447)   morphine injection 2 mg (2 mg IntraVENous Given 8/13/17 1549)   ondansetron (ZOFRAN) injection 4 mg (4 mg IntraVENous Given 8/13/17 1549)   diatrizoate meglumine-d.sodium (MD-GASTROVIEW,GASTROGRAFIN) 66-10 % contrast solution 30 mL (30 mL Oral Given 8/13/17 1548)   iopamidol (ISOVUE-370) 76 % injection 100 mL (100 mL IntraVENous Given 8/13/17 1721)   sodium chloride (NS) flush 10 mL (10 mL IntraVENous Given 8/13/17 1722)   HYDROmorphone (PF) (DILAUDID) injection 0.5 mg (0.5 mg IntraVENous Given 8/13/17 1730)   cefTRIAXone (ROCEPHIN) 1 g in 0.9% sodium chloride (MBP/ADV) 50 mL (0 g IntraVENous IV Completed 8/13/17 1811)   promethazine (PHENERGAN) tablet 25 mg (25 mg Oral Given 8/13/17 1729)       IMPRESSION:  1. Diverticulitis of intestine with abscess without bleeding, unspecified part of intestinal tract    2. Urinary tract infection without hematuria, site unspecified        PLAN:  1. Admit to hospitalist    ADMIT NOTE:  705pm  The patient is being admitted to the hospital by Dr. Dennys Crawford. The results of their tests and reasons for their admission have been discussed with the patient and/or available family. They convey agreement and understanding for the need to be admitted and for their admission diagnosis. This note is prepared by Sagar Diaz, acting as Scribe for Laura Lockwood MD.    Laura Lockwood MD: The scribe's documentation has been prepared under my direction and personally reviewed by me in its entirety. I confirm that the note above accurately reflects all work, treatment, procedures, and medical decision making performed by me.

## 2017-08-13 NOTE — IP AVS SNAPSHOT
[facility] [facility] 
 
 
 [address] Patient: Tor Guzman MRN: [id number] Current Discharge Medication List  
  
START taking these medications Dose & Instructions Dispensing Information Comments Morning Noon Evening Bedtime  
 ciprofloxacin HCl 500 mg tablet Commonly known as:  CIPRO Your last dose was: Your next dose is:    
   
   
 Dose:  500 mg Take 1 Tab by mouth two (2) times a day for 10 days. Quantity:  20 Tab Refills:  0  
     
   
   
   
  
 metroNIDAZOLE 500 mg tablet Commonly known as:  FLAGYL Your last dose was: Your next dose is:    
   
   
 Dose:  500 mg Take 1 Tab by mouth three (3) times daily for 10 days. Quantity:  30 Tab Refills:  0 CONTINUE these medications which have CHANGED Dose & Instructions Dispensing Information Comments Morning Noon Evening Bedtime  
 potassium chloride SA 10 mEq capsule Commonly known as:  Servando China What changed:  See the new instructions. Your last dose was: Your next dose is: TAKE ONE CAPSULE BY MOUTH TWICE DAILY Quantity:  30 Cap Refills:  0 CONTINUE these medications which have NOT CHANGED Dose & Instructions Dispensing Information Comments Morning Noon Evening Bedtime  
 bismuth subsalicylate 869 KO/84 mL Susp Commonly known as:  PEPTO-BISMOL MAXIMUM STRENGTH Your last dose was: Your next dose is:    
   
   
 Dose:  30 mL Take 30 mL by mouth every six (6) hours as needed. Refills:  0 CENTRUM SILVER WOMEN PO Your last dose was: Your next dose is: Take  by mouth daily. Refills:  0  
     
   
   
   
  
 diclofenac EC 75 mg EC tablet Commonly known as:  VOLTAREN Your last dose was: Your next dose is: TAKE 1 TABLET BY MOUTH TWICE DAILY Quantity:  60 Tab Refills:  12  
     
   
   
   
  
 glyBURIDE 2.5 mg tablet Commonly known as:  Babak Kristin Your last dose was: Your next dose is: TAKE 1 TABLET BY MOUTH EVERY DAY Quantity:  30 Tab Refills:  6  
     
   
   
   
  
 hydroCHLOROthiazide 12.5 mg tablet Commonly known as:  HYDRODIURIL Your last dose was: Your next dose is:    
   
   
 Dose:  12.5 mg Take 12.5 mg by mouth daily. Refills:  0  
     
   
   
   
  
 lisinopril 40 mg tablet Commonly known as:  Dionna Massed Your last dose was: Your next dose is: TAKE 1 TABLET BY MOUTH EVERY DAY Quantity:  30 Tab Refills:  6  
     
   
   
   
  
 loperamide 2 mg tablet Commonly known as:  IMODIUM A-D Your last dose was: Your next dose is:    
   
   
 Dose:  2 mg Take 1 Tab by mouth four (4) times daily as needed for Diarrhea. Quantity:  20 Tab Refills:  0  
     
   
   
   
  
 ONETOUCH ULTRA TEST strip Generic drug:  glucose blood VI test strips Your last dose was: Your next dose is:    
   
   
 TEST BLOOD SUGARS 3 TIMES DAILY Quantity:  100 Each Refills:  12  
     
   
   
   
  
 raNITIdine 300 mg tablet Commonly known as:  ZANTAC Your last dose was: Your next dose is: TAKE 1 TABLET BY MOUTH EVERY DAY Quantity:  30 Tab Refills:  6  
     
   
   
   
  
 sucralfate 1 gram tablet Commonly known as:  Fredda Susana Your last dose was: Your next dose is:    
   
   
 Dose:  1 g Take 1 g by mouth four (4) times daily. Refills:  0 Where to Get Your Medications Information on where to get these meds will be given to you by the nurse or doctor. ! Ask your nurse or doctor about these medications  
  ciprofloxacin HCl 500 mg tablet  
 metroNIDAZOLE 500 mg tablet

## 2017-08-13 NOTE — PROGRESS NOTES
[facility] Medication Reconciliation     Findings:   1) Added: Sucralfate 1 g po QID, Pepto Bismol Liq prn, Centrum Silver multivitamins po daily  2) Deleted: Atenolol 25 mg and Ibuprofen 800 mg  3) Potassium 10 mEq should be taken BID, but patient states she takes ONCE daily. Information obtained from: Patient interview, Rx Query    Past Medical History/Disease States:  Past Medical History:   Diagnosis Date    Arthritis, Degenerative, Knee 7/15/2011    COPD (chronic obstructive pulmonary disease) ([facility] [address] 7/15/2011    Diabetes ([facility])     Gastrointestinal disorder     acid reflux    GERD, gastro - Esophageal Reflux Disease 7/15/2011    Hypercholesterolemia 7/15/2011    Hypertension     Hypertension 7/15/2011    Other ill-defined conditions     high cholesterol    Type II or unspecified type diabetes mellitus without mention of complication, uncontrolled 7/15/2011     Patient allergies: Allergies as of 08/13/2017    (No Known Allergies)     Prior to Admission Medications   Prescriptions Last Dose Informant     Hydrochlorothiazide (HYDRODIURIL) 12.5 mg tablet 8/12/2017  Self     Sig: Take 12.5 mg by mouth daily. MULTIVIT-MIN/IRON/FOLIC/LUTEIN (CENTRUM SILVER WOMEN PO) 8/12/2017 Self     Sig: Take  by mouth daily. ONETOUCH ULTRA TEST strip 8/12/2017 Self     Sig: TEST BLOOD SUGARS 3 TIMES DAILY   bismuth subsalicylate (PEPTO-BISMOL MAXIMUM STRENGTH) 525 mg/15 mL susp 8/12/2017  Self     Sig: Take 30 mL by mouth every six (6) hours as needed.    diclofenac EC (VOLTAREN) 75 mg EC tablet 8/12/2017  Self     Sig: TAKE 1 TABLET BY MOUTH TWICE DAILY   glyBURIDE (DIABETA) 2.5 mg tablet 8/12/2017  Self     Sig: TAKE 1 TABLET BY MOUTH EVERY DAY   lisinopril (PRINIVIL, ZESTRIL) 40 mg tablet 8/12/2017  Self     Sig: TAKE 1 TABLET BY MOUTH EVERY DAY   loperamide (IMODIUM A-D) 2 mg tablet 8/12/2017 Self     Sig: Take 1 Tab by mouth four (4) times daily as needed for Diarrhea.   potassium chloride SA (MICRO-K) 10 mEq capsule 8/12/2017  Self     Sig: TAKE ONE CAPSULE BY MOUTH TWICE DAILY   Patient taking differently: TAKE ONE CAPSULE BY MOUTH once DAILY   raNITIdine (ZANTAC) 300 mg tablet 8/12/2017  Self     Sig: TAKE 1 TABLET BY MOUTH EVERY DAY   sucralfate (CARAFATE) 1 gram tablet 8/12/2017  Self     Sig: Take 1 g by mouth four (4) times daily.          Thank you,  Lashonda Hurley, [facility] [place]

## 2017-08-13 NOTE — ED TRIAGE NOTES
Patient was brought into triage and before I could get her vital signs she said she had to go the bathroom immediately and walked out

## 2017-08-13 NOTE — ED NOTES
Bedside and Verbal shift change report given to me (oncoming nurse) by Shelley Pritchard RN  (offgoing nurse). Report included the following information SBAR, Kardex and ED Summary.

## 2017-08-13 NOTE — ED NOTES
Patient has been instructed that they have been given dilaudid* which contains opioids, benzodiazepines, or other sedating drugs. Patient is aware that they  will need to refrain from driving or operating heavy machinery after taking this medication. Patient also instructed that they need to avoid drinking alcohol and using other products containing opioids, benzodiazepines, or other sedating drugs. Patient verbalized understanding.

## 2017-08-14 LAB
BASOPHILS # BLD AUTO: 0 K/UL (ref 0–0.1)
BASOPHILS # BLD: 0 % (ref 0–1)
EOSINOPHIL # BLD: 0.1 K/UL (ref 0–0.4)
EOSINOPHIL NFR BLD: 1 % (ref 0–7)
ERYTHROCYTE [DISTWIDTH] IN BLOOD BY AUTOMATED COUNT: 12.3 % (ref 11.5–14.5)
GLUCOSE BLD STRIP.AUTO-MCNC: 114 MG/DL (ref 65–100)
GLUCOSE BLD STRIP.AUTO-MCNC: 128 MG/DL (ref 65–100)
GLUCOSE BLD STRIP.AUTO-MCNC: 148 MG/DL (ref 65–100)
GLUCOSE BLD STRIP.AUTO-MCNC: 150 MG/DL (ref 65–100)
HCT VFR BLD AUTO: 33.2 % (ref 35–47)
HGB BLD-MCNC: 11.1 G/DL (ref 11.5–16)
LYMPHOCYTES # BLD AUTO: 26 % (ref 12–49)
LYMPHOCYTES # BLD: 2.2 K/UL (ref 0.8–3.5)
MCH RBC QN AUTO: 31.1 PG (ref 26–34)
MCHC RBC AUTO-ENTMCNC: 33.4 G/DL (ref 30–36.5)
MCV RBC AUTO: 93 FL (ref 80–99)
MONOCYTES # BLD: 0.7 K/UL (ref 0–1)
MONOCYTES NFR BLD AUTO: 8 % (ref 5–13)
NEUTS SEG # BLD: 5.5 K/UL (ref 1.8–8)
NEUTS SEG NFR BLD AUTO: 65 % (ref 32–75)
PLATELET # BLD AUTO: 159 K/UL (ref 150–400)
RBC # BLD AUTO: 3.57 M/UL (ref 3.8–5.2)
SERVICE CMNT-IMP: ABNORMAL
WBC # BLD AUTO: 8.5 K/UL (ref 3.6–11)

## 2017-08-14 PROCEDURE — 74011250636 HC RX REV CODE- 250/636: Performed by: SURGERY

## 2017-08-14 PROCEDURE — 74011250636 HC RX REV CODE- 250/636: Performed by: INTERNAL MEDICINE

## 2017-08-14 PROCEDURE — 85025 COMPLETE CBC W/AUTO DIFF WBC: CPT | Performed by: INTERNAL MEDICINE

## 2017-08-14 PROCEDURE — 83631 LACTOFERRIN FECAL (QUANT): CPT | Performed by: STUDENT IN AN ORGANIZED HEALTH CARE EDUCATION/TRAINING PROGRAM

## 2017-08-14 PROCEDURE — 83036 HEMOGLOBIN GLYCOSYLATED A1C: CPT | Performed by: STUDENT IN AN ORGANIZED HEALTH CARE EDUCATION/TRAINING PROGRAM

## 2017-08-14 PROCEDURE — 87177 OVA AND PARASITES SMEARS: CPT | Performed by: STUDENT IN AN ORGANIZED HEALTH CARE EDUCATION/TRAINING PROGRAM

## 2017-08-14 PROCEDURE — 36415 COLL VENOUS BLD VENIPUNCTURE: CPT | Performed by: INTERNAL MEDICINE

## 2017-08-14 PROCEDURE — 82962 GLUCOSE BLOOD TEST: CPT

## 2017-08-14 PROCEDURE — 65270000032 HC RM SEMIPRIVATE

## 2017-08-14 PROCEDURE — 87045 FECES CULTURE AEROBIC BACT: CPT | Performed by: STUDENT IN AN ORGANIZED HEALTH CARE EDUCATION/TRAINING PROGRAM

## 2017-08-14 PROCEDURE — 74011636637 HC RX REV CODE- 636/637: Performed by: INTERNAL MEDICINE

## 2017-08-14 PROCEDURE — 74011000250 HC RX REV CODE- 250: Performed by: INTERNAL MEDICINE

## 2017-08-14 RX ORDER — HYDROMORPHONE HYDROCHLORIDE 1 MG/ML
0.5 INJECTION, SOLUTION INTRAMUSCULAR; INTRAVENOUS; SUBCUTANEOUS
Status: DISCONTINUED | OUTPATIENT
Start: 2017-08-14 | End: 2017-08-16 | Stop reason: HOSPADM

## 2017-08-14 RX ADMIN — INSULIN LISPRO 2 UNITS: 100 INJECTION, SOLUTION INTRAVENOUS; SUBCUTANEOUS at 12:34

## 2017-08-14 RX ADMIN — HYDROMORPHONE HYDROCHLORIDE 0.5 MG: 1 INJECTION, SOLUTION INTRAMUSCULAR; INTRAVENOUS; SUBCUTANEOUS at 11:37

## 2017-08-14 RX ADMIN — METRONIDAZOLE 500 MG: 500 INJECTION, SOLUTION INTRAVENOUS at 03:31

## 2017-08-14 RX ADMIN — ONDANSETRON 4 MG: 2 SOLUTION INTRAMUSCULAR; INTRAVENOUS at 10:54

## 2017-08-14 RX ADMIN — HYDROMORPHONE HYDROCHLORIDE 0.5 MG: 1 INJECTION, SOLUTION INTRAMUSCULAR; INTRAVENOUS; SUBCUTANEOUS at 20:33

## 2017-08-14 RX ADMIN — HYDROMORPHONE HYDROCHLORIDE 0.5 MG: 1 INJECTION, SOLUTION INTRAMUSCULAR; INTRAVENOUS; SUBCUTANEOUS at 16:46

## 2017-08-14 RX ADMIN — HYDROMORPHONE HYDROCHLORIDE 0.5 MG: 1 INJECTION, SOLUTION INTRAMUSCULAR; INTRAVENOUS; SUBCUTANEOUS at 05:58

## 2017-08-14 RX ADMIN — Medication 10 ML: at 23:28

## 2017-08-14 RX ADMIN — INSULIN LISPRO 2 UNITS: 100 INJECTION, SOLUTION INTRAVENOUS; SUBCUTANEOUS at 16:30

## 2017-08-14 RX ADMIN — LEVOFLOXACIN 750 MG: 5 INJECTION, SOLUTION INTRAVENOUS at 17:55

## 2017-08-14 RX ADMIN — Medication 10 ML: at 05:59

## 2017-08-14 RX ADMIN — METRONIDAZOLE 500 MG: 500 INJECTION, SOLUTION INTRAVENOUS at 18:45

## 2017-08-14 RX ADMIN — METRONIDAZOLE 500 MG: 500 INJECTION, SOLUTION INTRAVENOUS at 10:54

## 2017-08-14 RX ADMIN — Medication 10 ML: at 13:42

## 2017-08-14 RX ADMIN — ONDANSETRON 4 MG: 2 SOLUTION INTRAMUSCULAR; INTRAVENOUS at 16:21

## 2017-08-14 RX ADMIN — ENOXAPARIN SODIUM 40 MG: 100 INJECTION SUBCUTANEOUS at 23:27

## 2017-08-14 NOTE — CONSULTS
Patient seen at request of Dr. Soy Goel. Riri Perrin is an 63 y.o. female who was recently admitted with diverticulitis of the sigmoid colon. Ms. Belgica Ruffin tells me that she began experiencing lower abdominal pain several days ago. She also developed diarrhea which resolved but then recurred. Denies fevers or chills. No nausea or vomitting. No /o melena or blood per rectum. Denies hematuria or pneumaturia. No chest pain or shortness of breath. She has otherwise been in her usual state of health. CT Scan abd/pelvis with po/IV contrast - Focal area of abnormal sigmoid colon with wall thickening and small pericolonic fluid collection. This is nonspecific but likely correspond to acute diverticulitis with small abscess fluid collection. No bowel obstruction. No focal bladder findings. Uterus and ovaries normal. No adenopathy. Started on Levaquin and Flagyl. Of note, colonoscopy in 10/2012 - 5mm polyp in sigmoid colon. Allergies - Review of patient's allergies indicates no known allergies. Meds - Reviewed.     PMH -   Past Medical History:   Diagnosis Date    Arthritis, Degenerative, Knee 7/15/2011    COPD (chronic obstructive pulmonary disease) ([facility] [address] 7/15/2011    Diabetes ([facility])     Gastrointestinal disorder     acid reflux    GERD, gastro - Esophageal Reflux Disease 7/15/2011    Hypercholesterolemia 7/15/2011    Hypertension     Hypertension 7/15/2011    Other ill-defined conditions     high cholesterol    Type II or unspecified type diabetes mellitus without mention of complication, uncontrolled 7/15/2011     PSH -   Past Surgical History:   Procedure Laterality Date    HX GYN  2004    uterine surgery    LAP,TUBAL CAUTERY Bilateral 1992     [facility] Hx -   Family History   Problem Relation Age of Onset    Diabetes Father     Deep Vein Thrombosis Daughter     Lung Disease Daughter     Other Daughter      cholesterol     Soc Hx -   Social History   Substance Use Topics    Smoking status: Former Smoker     Quit date: 4/15/2015    Smokeless tobacco: Never Used    Alcohol use No      Comment: occ     Patient is a well developed, well nourished female in no acute distress. Visit Vitals    /82 (BP 1 Location: Right arm, BP Patient Position: Lying left side)    Pulse 75    Temp 98.1 °F (36.7 °C)    Resp 20    Ht 5' 3\" (1.6 m)    Wt 153 lb (69.4 kg)    SpO2 97%    BMI 27.1 kg/m2     HEENT: Anicteric. Neck: Supple without Lymphadenopathy. Cor: RRR. Lungs: CTA Bilat. Abd: Soft. Non distended. Non tender. No rebound or guarding. Ext: No edema. Neuro: Grossly Non focal.     Labs -   Recent Results (from the past 24 hour(s))   GLUCOSE, POC    Collection Time: 08/13/17 10:52 PM   Result Value Ref Range    Glucose (POC) 124 (H) 65 - 100 mg/dL    Performed by Eloina Bocanegra    CBC WITH AUTOMATED DIFF    Collection Time: 08/14/17  3:37 AM   Result Value Ref Range    WBC 8.5 3.6 - 11.0 K/uL    RBC 3.57 (L) 3.80 - 5.20 M/uL    HGB 11.1 (L) 11.5 - 16.0 g/dL    HCT 33.2 (L) 35.0 - 47.0 %    MCV 93.0 80.0 - 99.0 FL    MCH 31.1 26.0 - 34.0 PG    MCHC 33.4 30.0 - 36.5 g/dL    RDW 12.3 11.5 - 14.5 %    PLATELET 347 227 - 524 K/uL    NEUTROPHILS 65 32 - 75 %    LYMPHOCYTES 26 12 - 49 %    MONOCYTES 8 5 - 13 %    EOSINOPHILS 1 0 - 7 %    BASOPHILS 0 0 - 1 %    ABS. NEUTROPHILS 5.5 1.8 - 8.0 K/UL    ABS. LYMPHOCYTES 2.2 0.8 - 3.5 K/UL    ABS. MONOCYTES 0.7 0.0 - 1.0 K/UL    ABS. EOSINOPHILS 0.1 0.0 - 0.4 K/UL    ABS.  BASOPHILS 0.0 0.0 - 0.1 K/UL   GLUCOSE, POC    Collection Time: 08/14/17  9:01 AM   Result Value Ref Range    Glucose (POC) 128 (H) 65 - 100 mg/dL    Performed by Anderson Punter, POC    Collection Time: 08/14/17 11:35 AM   Result Value Ref Range    Glucose (POC) 148 (H) 65 - 100 mg/dL    Performed by Anderson Espinal, POC    Collection Time: 08/14/17  4:16 PM   Result Value Ref Range    Glucose (POC) 150 (H) 65 - 100 mg/dL    Performed by Jojo Jaramillo (PCT) CT Scan - Reviewed. Imp: Pt. Is a 63 y.o. female with diverticulitis of the sigmoid colon and a small pericolonic abscess. Plan: 1. At this point in time, do not believe that there is an indication for surgical intervention. 2. Continue IV abx - Levaquin and Flagyl. 3. Pain medication and anti-emetics as needed. 4. Will try clear liquid diet. 5. Dieticians to see for instruction in low residue diet. 6. Will hold off on repeat CT Scan abd/pelvis for now. 7. Plans per Dr. Soy Goel. Following.

## 2017-08-14 NOTE — PROGRESS NOTES
RRAT Score: 11  Initial Assessment: CM reviewed chart and met with patient for discharge planning. CM verified patients address and contact number as correct on the facesheet. Pt presented to ED with diverticulitis of the colon. Patient is currently living with her  and friend. At the time of discharge patient will return home and she will arrange her own transportation. Patient is unemployed. Patient consented for CM to make appointment arrangements. Patients PCP is Dr. Bertha Hill. Patient already has an appointment scheduled for 9/6/17. CM has rescheduled the appointment for 8/22/17 at 12:15pm. Patient will not need assistance with obtaining medications. Patient voiced that she does have medications at home. Medications refills will likely be needed on discharge. [facility] to obtain [facility]. Emergency Contact: Larry Bah (child) [phone]     Pertinent Medical Hx: see H&P     Transition Plan: Home with outpatient services. Involve patient/caregiver in assessment, planning, education and implement of intervention. Yes. CM will continue to follow case for discharge planning. CM daily patient care huddles/interdisciplinary rounds. Rounded with IDT. CM will handoff to [address] [place] [address] or PCP practice. CM evaluated for [facility] or [facility], community care coordination of resources. CM will further assess if needed. Care Management Interventions  PCP Verified by CM: Yes  Palliative Care Consult (Criteria: CHF and RRAT>21): No  Mode of Transport at Discharge:  Other (see comment) (Patient will arrange)  Transition of Care Consult (CM Consult): Discharge Planning  Discharge Durable Medical Equipment: No  Physical Therapy Consult: No  Occupational Therapy Consult: No  Speech Therapy Consult: No  Current Support Network: Lives with Spouse (Patient lives with her  and friend)  Confirm Follow Up Transport: Self  Discharge Location  Discharge Placement: Home with outpatient services    Drew Haji

## 2017-08-14 NOTE — PROGRESS NOTES
Problem: Falls - Risk of  Goal: *Absence of Falls  Document Katlyn Fall Risk and appropriate interventions in the flowsheet.   Outcome: Progressing Towards Goal  Fall Risk Interventions:                                   Comments:   Pt will remain free of falls during this admission

## 2017-08-14 NOTE — ROUTINE PROCESS
Bedside shift change report given to Jacinta (oncoming nurse) by Trinh Chakraborty (offgoing nurse). Report included the following information SBAR, Kardex, Intake/Output, MAR and Recent Results.

## 2017-08-14 NOTE — ED NOTES
TRANSFER - OUT REPORT:    Verbal report given to Judah RN (name) on Ingris Manuel  being transferred to [address] [place] [address] (unit) for routine progression of care       Report consisted of patients Situation, Background, Assessment and   Recommendations(SBAR). Information from the following report(s) SBAR, Kardex and ED Summary was reviewed with the receiving nurse. Lines:   Peripheral IV 08/13/17 Left Antecubital (Active)   Phlebitis Assessment 0 8/13/2017  2:43 PM   Infiltration Assessment 0 8/13/2017  2:43 PM   Dressing Status Clean, dry, & intact 8/13/2017  2:43 PM   Dressing Type Transparent 8/13/2017  2:43 PM   Hub Color/Line Status Pink;Flushed;Patent 8/13/2017  2:43 PM   Action Taken Blood drawn 8/13/2017  2:43 PM        Opportunity for questions and clarification was provided.       Patient transported with:   [employer]

## 2017-08-14 NOTE — PROGRESS NOTES
TRANSFER - IN REPORT:    Verbal report received from CarylRN(name) on Romy Zamarripa  being received from ER(unit) for routine progression of care      Report consisted of patients Situation, Background, Assessment and   Recommendations(SBAR). Information from the following report(s) SBAR, Kardex, ED Summary, Intake/Output, MAR and Recent Results was reviewed with the receiving nurse. Opportunity for questions and clarification was provided.

## 2017-08-14 NOTE — H&P
History & Physical Dictation Template      NAME: Bridgett Chun   Date of Service  8/13/2017   MRN  [id number]   Date of Birth 1955   AGE: 63 y.o. ROOM: 210/01     [unfilled]    [unfilled]     [unfilled]     [facility]  The patient Bridgett Chun is a 63 y.o. female that is admitted with abdominal cramping, nausea, loose stools for the past 3-4 days. Urinalysis indicated pyuria and she was initially given a dose of Rocephin in ER. A CT revealed acute diverticulitis  Of sigmoid colon and a small fluid collection with possible abscess. ER DR spoke with surgeon on call Dr. Octavia Gonzalez and has asked for hospitalist service to admit patient and he will consult in AM.    She has been given a liter of NS in ER along with morphine dilaudid and has had some abdominal pain relief  THE PATIENT WAS SEEN VIA REMOTE PRESENCE WITH RN PRESENT. ASSESSMENT/ PLAN   1. Acute sigmoid diverticulitis. With concern of possible small abscess. Dr Octavia Gonzalez surgeon to consult in am.  Continue IV Flagyl, IV levaquin. Recheck CBC in am.  Symptomatic tx with dilaudidi and zofran along with additional 1 liter NS. Clear liquid diet  2. Pyuria. Initially given a dose of rocephin in ER. Continue a/bx as above. F/u urine cx. 3. DM.  SSI. 4. HTN. Continue lisinopril. 5. lovenox for dvt prophylaxis. Principal Problem:    Acute diverticulitis (8/13/2017)    Active Problems:    Diverticulitis of colon (8/13/2017)           Chart reviewed   Pt seen and examined   Please see full dictation for additional        BMI: Body mass index is 27.1 kg/(m^2).   Visit Vitals    BP (!) 145/103 (BP 1 Location: Right arm, BP Patient Position: Sitting)    Pulse 81    Temp 98.7 °F (37.1 °C)    Resp 16    Ht 5' 3\" (1.6 m)    Wt 69.4 kg (153 lb)    SpO2 96%    BMI 27.1 kg/m2       Pertinent Physical Exam Findings: none  ________________________________________________________________________  family history includes Deep Vein Thrombosis in her daughter; Diabetes in her father; Lung Disease in her daughter; Other in her daughter.  ________________________________________________________________________  Present on Admission:   Acute diverticulitis    ________________________________________________________________________  ________________________________________________________________________  Recent Results (from the past 24 hour(s))   URINALYSIS W/ REFLEX CULTURE    Collection Time: 08/13/17  2:10 PM   Result Value Ref Range    Color YELLOW/STRAW      Appearance CLOUDY (A) CLEAR      Specific gravity 1.010 1.003 - 1.030      pH (UA) 7.0 5.0 - 8.0      Protein NEGATIVE  NEG mg/dL    Glucose NEGATIVE  NEG mg/dL    Ketone NEGATIVE  NEG mg/dL    Bilirubin NEGATIVE  NEG      Blood MODERATE (A) NEG      Urobilinogen 0.2 0.2 - 1.0 EU/dL    Nitrites POSITIVE (A) NEG      Leukocyte Esterase NEGATIVE  NEG      WBC 0-4 0 - 4 /hpf    RBC 0-5 0 - 5 /hpf    Epithelial cells FEW FEW /lpf    Bacteria 3+ (A) NEG /hpf    UA:UC IF INDICATED URINE CULTURE ORDERED (A) CNI     CBC WITH AUTOMATED DIFF    Collection Time: 08/13/17  2:38 PM   Result Value Ref Range    WBC 11.5 (H) 3.6 - 11.0 K/uL    RBC 4.02 3.80 - 5.20 M/uL    HGB 12.6 11.5 - 16.0 g/dL    HCT 37.1 35.0 - 47.0 %    MCV 92.3 80.0 - 99.0 FL    MCH 31.3 26.0 - 34.0 PG    MCHC 34.0 30.0 - 36.5 g/dL    RDW 12.2 11.5 - 14.5 %    PLATELET 605 805 - 843 K/uL    NEUTROPHILS 73 32 - 75 %    LYMPHOCYTES 18 12 - 49 %    MONOCYTES 8 5 - 13 %    EOSINOPHILS 1 0 - 7 %    BASOPHILS 0 0 - 1 %    ABS. NEUTROPHILS 8.4 (H) 1.8 - 8.0 K/UL    ABS. LYMPHOCYTES 2.1 0.8 - 3.5 K/UL    ABS. MONOCYTES 0.9 0.0 - 1.0 K/UL    ABS. EOSINOPHILS 0.2 0.0 - 0.4 K/UL    ABS.  BASOPHILS 0.0 0.0 - 0.1 K/UL   METABOLIC PANEL, COMPREHENSIVE    Collection Time: 08/13/17  3:07 PM   Result Value Ref Range    Sodium 141 136 - 145 mmol/L    Potassium 3.5 3.5 - 5.1 mmol/L    Chloride 107 97 - 108 mmol/L    CO2 24 21 - 32 mmol/L    Anion gap 10 5 - 15 mmol/L    Glucose 134 (H) 65 - 100 mg/dL    BUN 17 6 - 20 MG/DL    Creatinine 0.66 0.55 - 1.02 MG/DL    BUN/Creatinine ratio 26 (H) 12 - 20      GFR est AA >60 >60 ml/min/1.73m2    GFR est non-AA >60 >60 ml/min/1.73m2    Calcium 7.9 (L) 8.5 - 10.1 MG/DL    Bilirubin, total 0.3 0.2 - 1.0 MG/DL    ALT (SGPT) 30 12 - 78 U/L    AST (SGOT) 15 15 - 37 U/L    Alk.  phosphatase 100 45 - 117 U/L    Protein, total 6.5 6.4 - 8.2 g/dL    Albumin 2.8 (L) 3.5 - 5.0 g/dL    Globulin 3.7 2.0 - 4.0 g/dL    A-G Ratio 0.8 (L) 1.1 - 2.2     LIPASE    Collection Time: 08/13/17  3:07 PM   Result Value Ref Range    Lipase 69 (L) 73 - 393 U/L     ________________________________________________________________________  Medications reviewed  Current Facility-Administered Medications   Medication Dose Route Frequency    0.9% sodium chloride infusion  150 mL/hr IntraVENous CONTINUOUS    sodium chloride (NS) flush 5-10 mL  5-10 mL IntraVENous Q8H    sodium chloride (NS) flush 5-10 mL  5-10 mL IntraVENous PRN    [START ON 8/14/2017] levoFLOXacin (LEVAQUIN) 750 mg in D5W IVPB  750 mg IntraVENous Q24H    enoxaparin (LOVENOX) injection 40 mg  40 mg SubCUTAneous Q24H    [START ON 8/14/2017] metroNIDAZOLE (FLAGYL) IVPB premix 500 mg  500 mg IntraVENous Q8H    [START ON 8/14/2017] lisinopril (PRINIVIL, ZESTRIL) tablet 40 mg  40 mg Oral DAILY    HYDROmorphone (DILAUDID) injection 0.5 mg  0.5 mg IntraVENous Q4H PRN    ondansetron (ZOFRAN) 4 mg in 0.9% sodium chloride 50 mL IVPB  4 mg IntraVENous Q6H PRN       Konstantin Rankin MD  8/13/2017  9:18 PM

## 2017-08-14 NOTE — H&P
Pt Name  Peggy Carrillo   Date of Birth 1955   Medical Record Number  [id number]      Age  63 y.o. PCP Roni Bertrand MD   Admit date:  8/13/2017    Room Number  210/01  @ [facility]   Date of Service  8/14/2017    Chart reviewed   Pt seen and examined       Admission Diagnoses:  Acute diverticulitis     We are admitting Peggy Carrillo 63 y.o. female with a principle diagnosis of Acute diverticulitis; this patient also suffers from other comorbidities listed below. I have a high level of concern for  leading to life threatening complications      Assessment and plan:    UTI  ->100k GNR    Infectious Diarrhea  -on ivf, iv Levoflox/metronidazole  -stool studies  -concern for diverticulitis, based on CT findings, but this is not clinically consistent given her predominant diarrhea, increased BS    DM  -SSI/accuchecks/ Aic    COPD/HTN:       ED: [address] [facility] 11.5/ Hb 11/ Dirty UA/ Cr 0.6/   Principal Problem:    Acute diverticulitis (8/13/2017)    Active Problems:    Diverticulitis of colon (8/13/2017)        Body mass index is 27.1 kg/(m^2). Functional Status  good   CODE STATUS  Full   Surrogate decision maker: Pt is competent   Prophylaxis  Lovenox   Discharge Plan: Home w/Family,    Contact Payor: [address] [place] [address] Road / Plan: [address] / Product Type: Managed Care Medicaid /    Social issues  Date Comment           Prognosis          PC:Diarrhea    History of Present Illness :   Miss Robe Almaguer presented because of diarrhea. She says this started Saturday from 9 to 12 a.m. and then returned Sunday at 3 a.m. recurring every hour so she decided to come to hospital.  Stools are blackish but is no blood. No fever. She does have mild lower abdominal pain. Jorge García, she did return from [place] on Thursday she was there for one week. She denies any strange foods recently no antibiotics she uses city water. Per review of old notes:   Had C scope and polypectomy in 2012, with Dr Patrice Badillo. Past Medical History:   Diagnosis Date    Arthritis, Degenerative, Knee 7/15/2011    COPD (chronic obstructive pulmonary disease) ([address] 7/15/2011    Diabetes (HCC)     Gastrointestinal disorder     acid reflux    GERD, gastro - Esophageal Reflux Disease 7/15/2011    Hypercholesterolemia 7/15/2011    Hypertension     Hypertension 7/15/2011    Other ill-defined conditions     high cholesterol    Type II or unspecified type diabetes mellitus without mention of complication, uncontrolled 7/15/2011       Past Surgical History:   Procedure Laterality Date    HX GYN  2004    uterine surgery    LAP,TUBAL CAUTERY Bilateral 1992      No Known Allergies   Social History     Social History    Marital status:      Spouse name: N/A    Number of children: N/A    Years of education: N/A     Occupational History    Not on file.      Social History Main Topics    Smoking status: Former Smoker     Quit date: 4/15/2015    Smokeless tobacco: Never Used    Alcohol use No      Comment: occ    Drug use: Yes     Special: Marijuana      Comment: used 3 days ago    Sexual activity: Yes     Partners: Male     Other Topics Concern    Not on file     Social History Narrative        She lives with her  she has five adult children denies alcohol cigarette use     Social History   Substance Use Topics    Smoking status: Former Smoker     Quit date: 4/15/2015    Smokeless tobacco: Never Used    Alcohol use No      Comment: occ       Family History   Problem Relation Age of Onset    Diabetes Father     Deep Vein Thrombosis Daughter     Lung Disease Daughter     Other Daughter      cholesterol       Review of Systems:  (negative unless bold)    Gen:  Weight gain, weight loss, fever, chills, fatigue  Eyes:  Visual changes, pain, conjunctivitis  ENT:  Sore throat, rhinorrhea, decreased hearing  CVS:  Palpitations, chest pain, dizziness, syncope, edema, PND  Pulm:  Cough, dyspnea, sputum, hemoptysis, wheezing  GI: Per HPI  :  Hematuria, incontinence, nocturia, dysuria, discharge  MS:  Pain, weakness, swelling, arthritis  Skin:  Rash, erythema, abscess, wound, moles  Psych: Insomnia, depression, anxiety, crying, suicidal ideation  Endo:  Heat intolerance, cold intolerance, weight gain, polyuria  Hem:  Enlarged nodes, bruising, bleeding, night sweats  Renal:  Edema, change in urine, flank pain  Neuro:  Numbness, tingling, weakness, seizure, headache, tremors          Physical Exam:    Gen:  Well-developed, well-nourished, in no acute distress  HEENT:  Pink conjunctivae, PERRL, hearing intact to voice, moist mucous membranes  Neck:  Supple, without masses, thyroid non-tender  Resp:  No accessory muscle use, clear breath sounds without wheezes rales or rhonchi  Card:  No murmurs, normal S1, S2 without thrills, bruits or peripheral edema  Abd:  Soft, increased bowel sounds globally, lower abd tenderness  Lymph:  No cervical adenopathy  Musc:  No cyanosis or clubbing  Skin:  No rashes or ulcers, skin turgor is good  Neuro:  Cranial nerves 3-12 are grossly intact,  strength is 5/5 bilaterally, dorsi / plantarflexion strength is 5/5 bilaterally, follows commands appropriately  Psych:  Alert with good insight. Oriented to person, place, and time      Home Meds     Prior to Admission medications    Medication Sig Start Date End Date Taking? Authorizing Provider   sucralfate (CARAFATE) 1 gram tablet Take 1 g by mouth four (4) times daily. Yes Historical Provider   bismuth subsalicylate (PEPTO-BISMOL MAXIMUM STRENGTH) 525 mg/15 mL susp Take 30 mL by mouth every six (6) hours as needed. Yes Historical Provider   MULTIVIT-MIN/IRON/FOLIC/LUTEIN (CENTRUM SILVER WOMEN PO) Take  by mouth daily.    Yes Historical Provider   diclofenac EC (VOLTAREN) 75 mg EC tablet TAKE 1 TABLET BY MOUTH TWICE DAILY 7/27/17  Yes Polo Jay MD   lisinopril (PRINIVIL, ZESTRIL) 40 mg tablet TAKE 1 TABLET BY MOUTH EVERY DAY 5/3/17  Yes Mike Velazquez MD   glyBURIDE (DIABETA) 2.5 mg tablet TAKE 1 TABLET BY MOUTH EVERY DAY 5/3/17  Yes Mike Velazquez MD   raNITIdine (ZANTAC) 300 mg tablet TAKE 1 TABLET BY MOUTH EVERY DAY 5/3/17  Yes Mike Velazquez MD   potassium chloride SA (MICRO-K) 10 mEq capsule TAKE ONE CAPSULE BY MOUTH TWICE DAILY  Patient taking differently: TAKE ONE CAPSULE BY MOUTH once DAILY 2/27/17  Yes Mike Velazquez MD   [facility] ULTRA TEST strip TEST BLOOD SUGARS 3 TIMES DAILY 6/9/15  Yes Mike Velazquez MD   Hydrochlorothiazide (HYDRODIURIL) 12.5 mg tablet Take 12.5 mg by mouth daily. 3/8/15  Yes Historical Provider   loperamide (IMODIUM A-D) 2 mg tablet Take 1 Tab by mouth four (4) times daily as needed for Diarrhea. 4/6/15  Yes Yao Han MD       Relevant other informations: Other medical conditions listed in this following active hospital problem list section; all of these and other pertinent data were taken into consideration when treatment plan is developed and customized to this patient's unique overall circumstances and needs.    Patient Active Problem List    Diagnosis Date Noted    Acute diverticulitis 08/13/2017    Diverticulitis of colon 08/13/2017    COPD (chronic obstructive pulmonary disease) ([facility] 07/15/2011    DM (dermatomyositis) 07/15/2011    Insomnia 07/15/2011    Arthritis, Degenerative, Knee 07/15/2011    Hypercholesteremia 07/15/2011    Hypertension with goal blood pressure less than 130/85 07/15/2011    GERD, gastro - Esophageal Reflux Disease 07/15/2011        Data Review:   Recent Days:  Recent Labs      08/14/17   0337  08/13/17   1438   WBC  8.5  11.5*   HGB  11.1*  12.6   HCT  33.2*  37.1   PLT  159  200     Recent Labs      08/13/17   1507   NA  141   K  3.5   CL  107   CO2  24   GLU  134*   BUN  17   CREA  0.66   CA  7.9*   ALB  2.8*   TBILI  0.3   SGOT  15   ALT  30     No results found for: TSH, TSHEXT, TSHEXT  ______________________________________________________________________________________________________    Medications reviewed     Current Facility-Administered Medications   Medication Dose Route Frequency    HYDROmorphone (PF) (DILAUDID) injection 0.5 mg  0.5 mg IntraVENous Q4H PRN    sodium chloride (NS) flush 5-10 mL  5-10 mL IntraVENous Q8H    sodium chloride (NS) flush 5-10 mL  5-10 mL IntraVENous PRN    levoFLOXacin (LEVAQUIN) 750 mg in D5W IVPB  750 mg IntraVENous Q24H    enoxaparin (LOVENOX) injection 40 mg  40 mg SubCUTAneous Q24H    metroNIDAZOLE (FLAGYL) IVPB premix 500 mg  500 mg IntraVENous Q8H    lisinopril (PRINIVIL, ZESTRIL) tablet 40 mg  40 mg Oral DAILY    insulin lispro (HUMALOG) injection   SubCUTAneous AC&HS    glucose chewable tablet 16 g  4 Tab Oral PRN    dextrose (D50W) injection syrg 12.5-25 g  12.5-25 g IntraVENous PRN    glucagon (GLUCAGEN) injection 1 mg  1 mg IntraMUSCular PRN    ondansetron (ZOFRAN) injection 4 mg  4 mg IntraVENous Q6H PRN       _________________________________________________________________________________  Care Plan discussed with: Patient/Family  ______________________________________________________________________________________________________    High complexity decision making was performed for this patient who is at high risk for decompensation with multiple organ involvement. Today total floor/unit time was 60 minutes while caring for this patient and greater than 50% of that time was spent with patient (and/or family) discussing patients clinical issues.     ________________________________________________________________________  Molly Encinas MD                            8/14/2017 8:26 AM   ________________________________________________________________________

## 2017-08-14 NOTE — PROGRESS NOTES
Problem: Falls - Risk of  Goal: *Absence of Falls  Document Katlyn Fall Risk and appropriate interventions in the flowsheet.    Outcome: Progressing Towards Goal  Fall Risk Interventions:              Medication Interventions: Teach patient to arise slowly, Patient to call before getting OOB

## 2017-08-15 LAB
ANION GAP BLD CALC-SCNC: 8 MMOL/L (ref 5–15)
BACTERIA SPEC CULT: ABNORMAL
BUN SERPL-MCNC: 10 MG/DL (ref 6–20)
BUN/CREAT SERPL: 14 (ref 12–20)
C DIFF TOX GENS STL QL NAA+PROBE: NEGATIVE
CALCIUM SERPL-MCNC: 8.7 MG/DL (ref 8.5–10.1)
CC UR VC: ABNORMAL
CHLORIDE SERPL-SCNC: 106 MMOL/L (ref 97–108)
CO2 SERPL-SCNC: 27 MMOL/L (ref 21–32)
CREAT SERPL-MCNC: 0.72 MG/DL (ref 0.55–1.02)
EST. AVERAGE GLUCOSE BLD GHB EST-MCNC: 154 MG/DL
GLUCOSE BLD STRIP.AUTO-MCNC: 100 MG/DL (ref 65–100)
GLUCOSE BLD STRIP.AUTO-MCNC: 125 MG/DL (ref 65–100)
GLUCOSE BLD STRIP.AUTO-MCNC: 137 MG/DL (ref 65–100)
GLUCOSE SERPL-MCNC: 121 MG/DL (ref 65–100)
HBA1C MFR BLD: 7 % (ref 4.2–6.3)
HEMOCCULT STL QL: NEGATIVE
MAGNESIUM SERPL-MCNC: 1.8 MG/DL (ref 1.6–2.4)
PHOSPHATE SERPL-MCNC: 2.2 MG/DL (ref 2.6–4.7)
POTASSIUM SERPL-SCNC: 3.4 MMOL/L (ref 3.5–5.1)
SERVICE CMNT-IMP: ABNORMAL
SERVICE CMNT-IMP: NORMAL
SODIUM SERPL-SCNC: 141 MMOL/L (ref 136–145)

## 2017-08-15 PROCEDURE — 89055 LEUKOCYTE ASSESSMENT FECAL: CPT | Performed by: STUDENT IN AN ORGANIZED HEALTH CARE EDUCATION/TRAINING PROGRAM

## 2017-08-15 PROCEDURE — 87493 C DIFF AMPLIFIED PROBE: CPT | Performed by: STUDENT IN AN ORGANIZED HEALTH CARE EDUCATION/TRAINING PROGRAM

## 2017-08-15 PROCEDURE — 74011250636 HC RX REV CODE- 250/636: Performed by: INTERNAL MEDICINE

## 2017-08-15 PROCEDURE — 84100 ASSAY OF PHOSPHORUS: CPT | Performed by: SURGERY

## 2017-08-15 PROCEDURE — 74011250637 HC RX REV CODE- 250/637: Performed by: STUDENT IN AN ORGANIZED HEALTH CARE EDUCATION/TRAINING PROGRAM

## 2017-08-15 PROCEDURE — 74011250637 HC RX REV CODE- 250/637: Performed by: EMERGENCY MEDICINE

## 2017-08-15 PROCEDURE — 65270000032 HC RM SEMIPRIVATE

## 2017-08-15 PROCEDURE — 80048 BASIC METABOLIC PNL TOTAL CA: CPT | Performed by: SURGERY

## 2017-08-15 PROCEDURE — 74011250637 HC RX REV CODE- 250/637: Performed by: INTERNAL MEDICINE

## 2017-08-15 PROCEDURE — 82272 OCCULT BLD FECES 1-3 TESTS: CPT | Performed by: STUDENT IN AN ORGANIZED HEALTH CARE EDUCATION/TRAINING PROGRAM

## 2017-08-15 PROCEDURE — 82962 GLUCOSE BLOOD TEST: CPT

## 2017-08-15 PROCEDURE — 36415 COLL VENOUS BLD VENIPUNCTURE: CPT | Performed by: SURGERY

## 2017-08-15 PROCEDURE — 83735 ASSAY OF MAGNESIUM: CPT | Performed by: SURGERY

## 2017-08-15 PROCEDURE — 74011636637 HC RX REV CODE- 636/637: Performed by: INTERNAL MEDICINE

## 2017-08-15 PROCEDURE — 74011000250 HC RX REV CODE- 250: Performed by: INTERNAL MEDICINE

## 2017-08-15 RX ORDER — POTASSIUM CHLORIDE 750 MG/1
40 TABLET, FILM COATED, EXTENDED RELEASE ORAL
Status: COMPLETED | OUTPATIENT
Start: 2017-08-15 | End: 2017-08-15

## 2017-08-15 RX ORDER — POTASSIUM CHLORIDE 750 MG/1
40 TABLET, FILM COATED, EXTENDED RELEASE ORAL ONCE
Status: COMPLETED | OUTPATIENT
Start: 2017-08-15 | End: 2017-08-15

## 2017-08-15 RX ADMIN — ENOXAPARIN SODIUM 40 MG: 100 INJECTION SUBCUTANEOUS at 22:02

## 2017-08-15 RX ADMIN — LEVOFLOXACIN 750 MG: 5 INJECTION, SOLUTION INTRAVENOUS at 17:19

## 2017-08-15 RX ADMIN — METRONIDAZOLE 500 MG: 500 INJECTION, SOLUTION INTRAVENOUS at 03:33

## 2017-08-15 RX ADMIN — LISINOPRIL 40 MG: 20 TABLET ORAL at 08:56

## 2017-08-15 RX ADMIN — METRONIDAZOLE 500 MG: 500 INJECTION, SOLUTION INTRAVENOUS at 19:35

## 2017-08-15 RX ADMIN — Medication 10 ML: at 13:14

## 2017-08-15 RX ADMIN — POTASSIUM CHLORIDE 40 MEQ: 750 TABLET, FILM COATED, EXTENDED RELEASE ORAL at 06:38

## 2017-08-15 RX ADMIN — Medication 10 ML: at 06:38

## 2017-08-15 RX ADMIN — POTASSIUM CHLORIDE 40 MEQ: 750 TABLET, FILM COATED, EXTENDED RELEASE ORAL at 10:39

## 2017-08-15 RX ADMIN — METRONIDAZOLE 500 MG: 500 INJECTION, SOLUTION INTRAVENOUS at 10:39

## 2017-08-15 RX ADMIN — INSULIN LISPRO 2 UNITS: 100 INJECTION, SOLUTION INTRAVENOUS; SUBCUTANEOUS at 08:56

## 2017-08-15 RX ADMIN — POTASSIUM CHLORIDE 40 MEQ: 750 TABLET, FILM COATED, EXTENDED RELEASE ORAL at 08:56

## 2017-08-15 RX ADMIN — Medication 5 ML: at 22:03

## 2017-08-15 NOTE — PROGRESS NOTES
Discussed pt in rounds this morning. Dr. Tonja Treviño advised that we wait on low residue diet instruction until stool studies are back. Consult will pend until then. RD following.

## 2017-08-15 NOTE — ROUTINE PROCESS
Bedside shift change report given to SOFY Boyce (oncoming nurse) by Libertad Paez (offgoing nurse). Report included the following information SBAR, Kardex, Intake/Output, MAR and Recent Results.

## 2017-08-15 NOTE — PROGRESS NOTES
0710hrs . Cha Shaikh Bedside and Verbal shift change report given to Sandra Flores (oncoming nurse) by Drea Houser (offgoing nurse). Report included the following information SBAR, Kardex, Intake/Output, MAR, Recent Results and Med Rec Status. 1100hrs Patient had one formed stool, stool specimen for occult blood and WBC-stool was sent to lab. 1230hrs Patient had one loose stool, stool specimen for CDIFF was sent to lab, patient was placed on cdiff contact precaution. MD was notified. 1200hrs Patient's diet was advanced from clear liquid to full liquid, tolerated well. No new abdominal pain, no nausea and vomiting. 1910hrs . Cha Shaikh Bedside shift change report given to Yolanda Jimenez (oncoming nurse) by Sandra Flores (offgoing nurse). Report included the following information SBAR, Kardex, Intake/Output, MAR, Recent Results and Med Rec Status.

## 2017-08-15 NOTE — PROGRESS NOTES
Problem: Falls - Risk of  Goal: *Absence of Falls  Document Katlyn Fall Risk and appropriate interventions in the flowsheet.    Outcome: Progressing Towards Goal  Fall Risk Interventions:              Medication Interventions: Patient to call before getting OOB, Teach patient to arise slowly

## 2017-08-15 NOTE — PROGRESS NOTES
Received consult for diet education for this pt. Will see her this morning. Thank you for the consult.

## 2017-08-15 NOTE — INTERDISCIPLINARY ROUNDS
CM rounded with IDT and discussed patient's care. CM will continue to assist as needed.     JUANJOSE Haji

## 2017-08-15 NOTE — PROGRESS NOTES
No complaints this AM. Ms. Fercho Hester is feeling better today. Tolerating clear liquids. Tm 99.7 HR: 65 BP: 137/97 Resp Rate: 16 per minute 98% sat on room air. Intake/Output Summary (Last 24 hours) at 08/15/17 1115  Last data filed at 08/15/17 0640   Gross per 24 hour   Intake              280 ml   Output                0 ml   Net              280 ml   Exam: Cor: RRR. Lungs: Bilat BS, Clear to auscultation. Abd: Soft, Non distended. Non tender. No associated rebound or guarding. Labs:   Recent Results (from the past 12 hour(s))   METABOLIC PANEL, BASIC    Collection Time: 08/15/17  4:20 AM   Result Value Ref Range    Sodium 141 136 - 145 mmol/L    Potassium 3.4 (L) 3.5 - 5.1 mmol/L    Chloride 106 97 - 108 mmol/L    CO2 27 21 - 32 mmol/L    Anion gap 8 5 - 15 mmol/L    Glucose 121 (H) 65 - 100 mg/dL    BUN 10 6 - 20 MG/DL    Creatinine 0.72 0.55 - 1.02 MG/DL    BUN/Creatinine ratio 14 12 - 20      GFR est AA >60 >60 ml/min/1.73m2    GFR est non-AA >60 >60 ml/min/1.73m2    Calcium 8.7 8.5 - 10.1 MG/DL   MAGNESIUM    Collection Time: 08/15/17  4:20 AM   Result Value Ref Range    Magnesium 1.8 1.6 - 2.4 mg/dL   PHOSPHORUS    Collection Time: 08/15/17  4:20 AM   Result Value Ref Range    Phosphorus 2.2 (L) 2.6 - 4.7 MG/DL   Will try low residue diet. Dieticians to see. IV abx - Levaquin and Flagyl as ordered. OOB, Ambulate. Pain medication and anti-emetics as needed. Plans per Dr. Eliud Renae. Following.

## 2017-08-15 NOTE — PROGRESS NOTES
Hospitalist Progress Note   Patient Name  Riri Perrin   Admit date:  8/13/2017   Medical Record Number  [id number]    Age  63 y.o.    Date of Birth 1955   PCP Paulie Morel MD    Room Number  210/01 [facility]     Admission Diagnoses:  Acute diverticulitis     Assessment/ Plan:     UTI  ->100k GNR     Infectious Diarrhea  -on ivf, iv Levoflox/metronidazole  -stool culture neg, awaiting further studies  -concern for diverticulitis, based on CT findings, but this is not clinically consistent given her predominant diarrhea, increased BS     DM  -SSI/accuchecks/ Aic      Hypokalemia: supplementing       COPD/HTN:         ED: [address] 11.5/ Hb 11/ Dirty UA/ Cr 0.6/     HANNA:    Principal Problem:    Acute diverticulitis (8/13/2017)    Active Problems:    Diverticulitis of colon (8/13/2017)      Body mass index is 27.1 kg/(m^2).     Functional Status  good   CODE STATUS  Full   Surrogate decision maker: Pt is competent   Prophylaxis  Lovenox   Discharge Plan: Home w/Family,    Contact Payor: [address] [place] [address] / Plan: [address] / Product Type: Managed Care Medicaid /    Social issues  Date Comment              Prognosis                Subjective:   pt feels a little better, formed stools, decreased abd tenderness      Objective:     Physical Exam:    Gen:  Well-developed, well-nourished, in no acute distress  HEENT:  Pink conjunctivae, PERRL, hearing intact to voice, moist mucous membranes  Neck:  Supple, without masses, thyroid non-tender  Resp:  No accessory muscle use, clear breath sounds without wheezes rales or rhonchi  Card:  No murmurs, normal S1, S2 without thrills, bruits or peripheral edema  Abd:  Soft, non-tender, non-distended, normoactive bowel sounds are present, no palpable organomegaly  Lymph:  No cervical adenopathy  Musc:  No cyanosis or clubbing  Skin:  No rashes or ulcers, skin turgor is good  Neuro:  Cranial nerves 3-12 are grossly intact,  strength is 5/5 bilaterally, dorsi / plantarflexion strength is 5/5 bilaterally, follows commands appropriately  Psych:  Alert with good insight. Oriented to person, place, and time            Intake/Output Summary (Last 24 hours) at 08/15/17 0832  Last data filed at 08/15/17 0640   Gross per 24 hour   Intake              280 ml   Output                0 ml   Net              280 ml    Weight change: Wt Readings from Last 10 Encounters:   08/13/17 69.4 kg (153 lb)   06/06/17 73.3 kg (161 lb 11.2 oz)   03/14/17 74.6 kg (164 lb 6.4 oz)   03/06/17 73.5 kg (162 lb)   12/02/16 75.8 kg (167 lb)   10/16/16 74.4 kg (164 lb)   10/02/16 74.5 kg (164 lb 3.2 oz)   09/19/16 73.9 kg (163 lb)   09/02/16 75.3 kg (166 lb)   06/02/16 73.5 kg (162 lb)        Relevant informations: Other medical conditions listed in this following active hospital problem list section; all of these and other pertinent data were taken into consideration when treatment plan is developed and customized to this patient's unique overall circumstances and needs.    Patient Active Problem List    Diagnosis Date Noted    Acute diverticulitis 08/13/2017    Diverticulitis of colon 08/13/2017    COPD (chronic obstructive pulmonary disease) ([facility] [address] 07/15/2011    DM (dermatomyositis) 07/15/2011    Insomnia 07/15/2011    Arthritis, Degenerative, Knee 07/15/2011    Hypercholesteremia 07/15/2011    Hypertension with goal blood pressure less than 130/85 07/15/2011    GERD, gastro - Esophageal Reflux Disease 07/15/2011          Data Review:   Recent Days:  Recent Labs      08/14/17   0337  08/13/17   1438   WBC  8.5  11.5*   HGB  11.1*  12.6   HCT  33.2*  37.1   PLT  159  200     Recent Labs      08/15/17   0420  08/13/17   1507   NA  141  141   K  3.4*  3.5   CL  106  107   CO2  27  24   GLU  121*  134*   BUN  10  17   CREA  0.72  0.66   CA  8.7  7.9*   MG  1.8   --    PHOS  2.2*   --    ALB   --   2.8*   TBILI   --   0.3   SGOT   --   15   ALT   --   30 No results found for: TSH, TSHEXT  ______________________________________________________________________________________________________    Medications reviewed     Current Facility-Administered Medications   Medication Dose Route Frequency    HYDROmorphone (PF) (DILAUDID) injection 0.5 mg  0.5 mg IntraVENous Q4H PRN    sodium chloride (NS) flush 5-10 mL  5-10 mL IntraVENous Q8H    sodium chloride (NS) flush 5-10 mL  5-10 mL IntraVENous PRN    levoFLOXacin (LEVAQUIN) 750 mg in D5W IVPB  750 mg IntraVENous Q24H    enoxaparin (LOVENOX) injection 40 mg  40 mg SubCUTAneous Q24H    metroNIDAZOLE (FLAGYL) IVPB premix 500 mg  500 mg IntraVENous Q8H    lisinopril (PRINIVIL, ZESTRIL) tablet 40 mg  40 mg Oral DAILY    insulin lispro (HUMALOG) injection   SubCUTAneous AC&HS    glucose chewable tablet 16 g  4 Tab Oral PRN    dextrose (D50W) injection syrg 12.5-25 g  12.5-25 g IntraVENous PRN    glucagon (GLUCAGEN) injection 1 mg  1 mg IntraMUSCular PRN    ondansetron (ZOFRAN) injection 4 mg  4 mg IntraVENous Q6H PRN       ______________________________________________________________________________________________________  Care Plan discussed with: Patient/Family  ____________________________________________________________________________________________________    High complexity decision making was performed for this patient who is at high risk for decompensation with multiple organ involvement. Today total floor/unit time was 20 minutes while caring for this patient and greater than 50% of that time was spent with patient (and/or family) discussing patients clinical issues.   ______________________________________________________________________________________________________  Diana Donnelly MD                      8/15/2017

## 2017-08-16 VITALS
BODY MASS INDEX: 27.11 KG/M2 | RESPIRATION RATE: 16 BRPM | SYSTOLIC BLOOD PRESSURE: 139 MMHG | HEIGHT: 63 IN | WEIGHT: 153 LBS | TEMPERATURE: 98.3 F | OXYGEN SATURATION: 97 % | DIASTOLIC BLOOD PRESSURE: 97 MMHG | HEART RATE: 59 BPM

## 2017-08-16 LAB
ANION GAP SERPL CALC-SCNC: 9 MMOL/L (ref 5–15)
BACTERIA SPEC CULT: NORMAL
BACTERIA SPEC CULT: NORMAL
BASOPHILS # BLD: 0 K/UL (ref 0–0.1)
BASOPHILS NFR BLD: 1 % (ref 0–1)
BUN SERPL-MCNC: 12 MG/DL (ref 6–20)
BUN/CREAT SERPL: 15 (ref 12–20)
C JEJUNI+C COLI AG STL QL: NEGATIVE
CALCIUM SERPL-MCNC: 9 MG/DL (ref 8.5–10.1)
CHLORIDE SERPL-SCNC: 106 MMOL/L (ref 97–108)
CO2 SERPL-SCNC: 24 MMOL/L (ref 21–32)
CREAT SERPL-MCNC: 0.78 MG/DL (ref 0.55–1.02)
E COLI SXT1+2 STL IA: NEGATIVE
EOSINOPHIL # BLD: 0.2 K/UL (ref 0–0.4)
EOSINOPHIL NFR BLD: 3 % (ref 0–7)
ERYTHROCYTE [DISTWIDTH] IN BLOOD BY AUTOMATED COUNT: 11.9 % (ref 11.5–14.5)
GLUCOSE BLD STRIP.AUTO-MCNC: 127 MG/DL (ref 65–100)
GLUCOSE BLD STRIP.AUTO-MCNC: 142 MG/DL (ref 65–100)
GLUCOSE SERPL-MCNC: 189 MG/DL (ref 65–100)
HCT VFR BLD AUTO: 35.4 % (ref 35–47)
HGB BLD-MCNC: 12.3 G/DL (ref 11.5–16)
LYMPHOCYTES # BLD: 1.9 K/UL (ref 0.8–3.5)
LYMPHOCYTES NFR BLD: 33 % (ref 12–49)
MCH RBC QN AUTO: 31.3 PG (ref 26–34)
MCHC RBC AUTO-ENTMCNC: 34.7 G/DL (ref 30–36.5)
MCV RBC AUTO: 90.1 FL (ref 80–99)
MONOCYTES # BLD: 0.5 K/UL (ref 0–1)
MONOCYTES NFR BLD: 9 % (ref 5–13)
NEUTS SEG # BLD: 3.1 K/UL (ref 1.8–8)
NEUTS SEG NFR BLD: 54 % (ref 32–75)
PLATELET # BLD AUTO: 198 K/UL (ref 150–400)
POTASSIUM SERPL-SCNC: 3.6 MMOL/L (ref 3.5–5.1)
RBC # BLD AUTO: 3.93 M/UL (ref 3.8–5.2)
SERVICE CMNT-IMP: ABNORMAL
SERVICE CMNT-IMP: ABNORMAL
SERVICE CMNT-IMP: NORMAL
SODIUM SERPL-SCNC: 139 MMOL/L (ref 136–145)
WBC # BLD AUTO: 5.7 K/UL (ref 3.6–11)
WBC #/AREA STL HPF: NORMAL /HPF (ref 0–4)

## 2017-08-16 PROCEDURE — 74011000250 HC RX REV CODE- 250: Performed by: INTERNAL MEDICINE

## 2017-08-16 PROCEDURE — 36415 COLL VENOUS BLD VENIPUNCTURE: CPT | Performed by: STUDENT IN AN ORGANIZED HEALTH CARE EDUCATION/TRAINING PROGRAM

## 2017-08-16 PROCEDURE — 80048 BASIC METABOLIC PNL TOTAL CA: CPT | Performed by: STUDENT IN AN ORGANIZED HEALTH CARE EDUCATION/TRAINING PROGRAM

## 2017-08-16 PROCEDURE — 74011250637 HC RX REV CODE- 250/637: Performed by: INTERNAL MEDICINE

## 2017-08-16 PROCEDURE — 85025 COMPLETE CBC W/AUTO DIFF WBC: CPT | Performed by: STUDENT IN AN ORGANIZED HEALTH CARE EDUCATION/TRAINING PROGRAM

## 2017-08-16 RX ORDER — METRONIDAZOLE 500 MG/1
500 TABLET ORAL 3 TIMES DAILY
Qty: 30 TAB | Refills: 0 | Status: SHIPPED | OUTPATIENT
Start: 2017-08-16 | End: 2017-08-26

## 2017-08-16 RX ORDER — POTASSIUM CHLORIDE 750 MG/1
40 TABLET, FILM COATED, EXTENDED RELEASE ORAL
Status: DISCONTINUED | OUTPATIENT
Start: 2017-08-16 | End: 2017-08-16

## 2017-08-16 RX ORDER — CIPROFLOXACIN 500 MG/1
500 TABLET ORAL 2 TIMES DAILY
Qty: 20 TAB | Refills: 0 | Status: SHIPPED | OUTPATIENT
Start: 2017-08-16 | End: 2017-08-26

## 2017-08-16 RX ADMIN — LISINOPRIL 40 MG: 20 TABLET ORAL at 08:36

## 2017-08-16 RX ADMIN — METRONIDAZOLE 500 MG: 500 INJECTION, SOLUTION INTRAVENOUS at 04:11

## 2017-08-16 RX ADMIN — Medication 10 ML: at 08:38

## 2017-08-16 NOTE — PROGRESS NOTES
Discharge instructions given to patient. Follow up appointment noted. Prescriptions given to patient. Diverticulitis diet printed for patient and given with discharge instructions. IV removed. Hard copy signed and placed on chart.

## 2017-08-16 NOTE — DISCHARGE SUMMARY
Physician Discharge Summary     Pt Name  Rabia Holcomb date:  8/13/2017   Discharge date and time:  8/16/2017 10:58 AM    Room Number  210/01    Medical Record Number  [id number] @ [facility]   Age  63 y.o. Date of Birth 1955   PCP Ana Maria Lowe MD     Admission Diagnoses:                        Acute diverticulitis     Hospital Problems  Date Reviewed: 8/14/2017          Codes Class Noted POA    * (Principal)Acute diverticulitis ICD-10-CM: C95.11  ICD-9-CM: 562.11  8/13/2017 Yes        Diverticulitis of colon ICD-10-CM: K57.32  ICD-9-CM: 562.11  8/13/2017 Unknown               No Known Allergies     Excerpt from HPI : Diarrhea   Miss Junnie Seip presented because of diarrhea. She says this started Saturday from 9 to 12 a.m. and then returned Sunday at 3 a.m. recurring every hour so she decided to come to hospital.  Stools are blackish but is no blood. No fever. She does have mild lower abdominal pain. Jamie Bañuelosluann, she did return from [place] on Thursday she was there for one week. She denies any strange foods recently no antibiotics she uses city water.      Per review of old notes: Had C scope and polypectomy in 2012, with Dr Ernesto Adams. Hospital Course: This pt was admitted with  Acute diverticulitis on 8/13/2017. Diverticulitis  -on ivf, iv Levoflox/metronidazole  -stool culture and C diff neg  -concern for diverticulitis based on CT findings      E coli UTI  ->100k GNR      DM  -SSI/accuchecks/ Aic7        Hypokalemia: supplementing         COPD/HTN:           ED: [facility] 11.5/ Hb 11/ Dirty UA/ Cr 0.6/         Condition at the time of discharge improved and stable .      Query:   Treatment team[de-identified] Treatment Team: Attending Provider: Parmjit Cline MD; Consulting Provider: Porter Mary MD; Consulting Provider: Irvin Roman MD; Care Manager: Adithya; Utilization Review: Sahra Lyles       Other Pertinent data:   TODAY's CLINICAL FINDINGS:   Visit Vitals  BP (!) 139/97 (BP 1 Location: Right arm, BP Patient Position: Lying left side)    Pulse (!) 59    Temp 98.3 °F (36.8 °C)    Resp 16    Ht 5' 3\" (1.6 m)    Wt 69.4 kg (153 lb)    SpO2 97%    BMI 27.1 kg/m2      Wt Readings from Last 10 Encounters:   08/13/17 69.4 kg (153 lb)   06/06/17 73.3 kg (161 lb 11.2 oz)   03/14/17 74.6 kg (164 lb 6.4 oz)   03/06/17 73.5 kg (162 lb)   12/02/16 75.8 kg (167 lb)   10/16/16 74.4 kg (164 lb)   10/02/16 74.5 kg (164 lb 3.2 oz)   09/19/16 73.9 kg (163 lb)   09/02/16 75.3 kg (166 lb)   06/02/16 73.5 kg (162 lb)       Physical Exam:    Gen:  Well-developed, well-nourished, in no acute distress  HEENT:  Pink conjunctivae, PERRL, hearing intact to voice, moist mucous membranes  Neck:  Supple, without masses, thyroid non-tender  Resp:  No accessory muscle use, clear breath sounds without wheezes rales or rhonchi  Card:  No murmurs, normal S1, S2 without thrills, bruits or peripheral edema  Abd:  Soft, non-tender, non-distended, normoactive bowel sounds are present, no palpable organomegaly  Lymph:  No cervical adenopathy  Musc:  No cyanosis or clubbing  Skin:  No rashes or ulcers, skin turgor is good  Neuro:  Cranial nerves 3-12 are grossly intact,  strength is 5/5 bilaterally, dorsi / plantarflexion strength is 5/5 bilaterally, follows commands appropriately  Psych:  Alert with good insight. Oriented to person, place, and time       Disposition:Home. Diet: Regular Diet   Care Plan discussed with: Patient/Family   Follow up   Follow-up Information     Follow up With Details Comments Contact Info    Lazaro Cunningham MD Go on 8/22/2017 Your appointment is scheduled for 8/22/17 at 12:15pm. [address]  [phone]           Current Discharge Medication List      START taking these medications    Details   ciprofloxacin HCl (CIPRO) 500 mg tablet Take 1 Tab by mouth two (2) times a day for 10 days.   Qty: 20 Tab, Refills: 0      metroNIDAZOLE (FLAGYL) 500 mg tablet Take 1 Tab by mouth three (3) times daily for 10 days. Qty: 30 Tab, Refills: 0         CONTINUE these medications which have NOT CHANGED    Details   sucralfate (CARAFATE) 1 gram tablet Take 1 g by mouth four (4) times daily. bismuth subsalicylate (PEPTO-BISMOL MAXIMUM STRENGTH) 525 mg/15 mL susp Take 30 mL by mouth every six (6) hours as needed. MULTIVIT-MIN/IRON/FOLIC/LUTEIN (CENTRUM SILVER WOMEN PO) Take  by mouth daily. diclofenac EC (VOLTAREN) 75 mg EC tablet TAKE 1 TABLET BY MOUTH TWICE DAILY  Qty: 60 Tab, Refills: 12    Associated Diagnoses: Primary osteoarthritis of both knees      lisinopril (PRINIVIL, ZESTRIL) 40 mg tablet TAKE 1 TABLET BY MOUTH EVERY DAY  Qty: 30 Tab, Refills: 6      glyBURIDE (DIABETA) 2.5 mg tablet TAKE 1 TABLET BY MOUTH EVERY DAY  Qty: 30 Tab, Refills: 6      raNITIdine (ZANTAC) 300 mg tablet TAKE 1 TABLET BY MOUTH EVERY DAY  Qty: 30 Tab, Refills: 6      potassium chloride SA (MICRO-K) 10 mEq capsule TAKE ONE CAPSULE BY MOUTH TWICE DAILY  Qty: 30 Cap, Refills: 0      ONETOUCH ULTRA TEST strip TEST BLOOD SUGARS 3 TIMES DAILY  Qty: 100 Each, Refills: 12      Hydrochlorothiazide (HYDRODIURIL) 12.5 mg tablet Take 12.5 mg by mouth daily. Refills: 0      loperamide (IMODIUM A-D) 2 mg tablet Take 1 Tab by mouth four (4) times daily as needed for Diarrhea.   Qty: 20 Tab, Refills: 0                Significant Diagnostic Studies:   Recent Labs      08/16/17   0949  08/14/17   0337   WBC  5.7  8.5   HGB  12.3  11.1*   HCT  35.4  33.2*   PLT  198  159     Recent Labs      08/16/17   0949  08/15/17   0420  08/13/17   1507   NA  139  141  141   K  3.6  3.4*  3.5   CL  106  106  107   CO2  24  27  24   BUN  12  10  17   CREA  0.78  0.72  0.66   GLU  189*  121*  134*   CA  9.0  8.7  7.9*   MG   --   1.8   --    PHOS   --   2.2*   --      Recent Labs      08/13/17   1507   SGOT  15   ALT  30   AP  100   TBILI  0.3   TP  6.5   ALB  2.8*   GLOB  3.7   LPSE  69* No results for input(s): INR, PTP, APTT in the last 72 hours. No lab exists for component: INREXT, INREXT   No results for input(s): FE, TIBC, PSAT, FERR in the last 72 hours. No results for input(s): PH, PCO2, PO2 in the last 72 hours. No results for input(s): CPK, CKMB in the last 72 hours. No lab exists for component: TROPONINI  Lab Results   Component Value Date/Time    Glucose (POC) 127 08/16/2017 08:15 AM    Glucose (POC) 125 08/15/2017 09:22 PM    Glucose (POC) 100 08/15/2017 05:06 PM    Glucose (POC) 137 08/15/2017 11:13 AM    Glucose (POC) 142 08/15/2017 08:01 AM           ________________________________________________________________________    Filemon Feather Time for face to face meeting with the pt and examination including care coordination with staff and chart review (>50% of total time listed here )  approx.  30 min]    ________________________________________________________________________    Steven Washburn MD  8/16/2017

## 2017-08-16 NOTE — INTERDISCIPLINARY ROUNDS
CM rounded with IDT and discussed patient's care. CM is assisting with discharge planning due to patient discharging today. YOGI scheduled a follow-up appointment at [facility] for 8/24/17 at 10:45am with Dr. Karolina Shelby.     JUANJOSE Haji

## 2017-08-16 NOTE — PROGRESS NOTES
Visit charted 8/16/17  Spiritual Care Partner Volunteer visited patient in Med Surg/Tele unit on 8/15/17.   Documented by:  Radford Seek [id number] ([id number])

## 2017-08-16 NOTE — INTERDISCIPLINARY ROUNDS
IDR with Dr. Paula Shelby (MD), Юлия Mishra (pharmacy), Silvana Forrester (), Elner Nageotte (nurse manager), and [facility] (RN) to discuss plan of care including waiting for labs, tolerating diet, outpatient coloscopy, GI consult, and possible discharge today.

## 2017-08-16 NOTE — DISCHARGE INSTRUCTIONS
Diverticulitis: Care Instructions  Your Care Instructions    Diverticulitis occurs when pouches form in the wall of the colon and become inflamed or infected. It can be very painful. Doctors aren't sure what causes diverticulitis. There is no proof that foods such as nuts, seeds, or berries cause it or make it worse. A low-fiber diet may cause the colon to work harder to push stool forward. Pouches may form because of this extra work. It may be hard to think about healthy eating while you're in pain. But as you recover, you might think about how you can use healthy eating for overall better health. Healthy eating may help you avoid future attacks. Follow-up care is a key part of your treatment and safety. Be sure to make and go to all appointments, and call your doctor if you are having problems. It's also a good idea to know your test results and keep a list of the medicines you take. How can you care for yourself at home? · Drink plenty of fluids, enough so that your urine is light yellow or clear like water. If you have kidney, heart, or liver disease and have to limit fluids, talk with your doctor before you increase the amount of fluids you drink. · Stick to liquids or a bland diet (plain rice, bananas, dry toast or crackers, applesauce) until you are feeling better. Then you can return to regular foods and gradually increase the amount of fiber in your diet. · Use a heating pad set on low on your belly to relieve mild cramps and pain. · Get extra rest until you are feeling better. · Be safe with medicines. Read and follow all instructions on the label. ¨ If the doctor gave you a prescription medicine for pain, take it as prescribed. ¨ If you are not taking a prescription pain medicine, ask your doctor if you can take an over-the-counter medicine. · If your doctor prescribed antibiotics, take them as directed. Do not stop taking them just because you feel better.  You need to take the full course of antibiotics. To prevent future attacks of diverticulitis  · Avoid constipation:  ¨ Include fruits, vegetables, beans, and whole grains in your diet each day. These foods are high in fiber. ¨ Drink plenty of fluids, enough so that your urine is light yellow or clear like water. If you have kidney, heart, or liver disease and have to limit fluids, talk with your doctor before you increase the amount of fluids you drink. ¨ Get some exercise every day. Build up slowly to 30 to 60 minutes a day on 5 or more days of the week. ¨ Take a fiber supplement, such as Citrucel or Metamucil, every day if needed. Read and follow all instructions on the label. ¨ Schedule time each day for a bowel movement. Having a daily routine may help. Take your time and do not strain when having a bowel movement. When should you call for help? Call 911 anytime you think you may need emergency care. For example, call if:  · You passed out (lost consciousness). · You vomit blood or what looks like coffee grounds. · You pass maroon or very bloody stools. Call your doctor now or seek immediate medical care if:  · You have severe pain or swelling in your belly. · You have a new or higher fever. · You cannot keep down fluids or medicines. · You have new pain that gets worse when you move or cough. Watch closely for changes in your health, and be sure to contact your doctor if:  · The symptoms you had when you first started feeling sick come back. · You do not get better as expected. Where can you learn more? Go to http://kalia-terell.info/. Enter H901 in the search box to learn more about \"Diverticulitis: Care Instructions. \"  Current as of: August 9, 2016  Content Version: 11.3  © 1342-9938 [employer]. Care instructions adapted under license by [employer] (which disclaims liability or warranty for this information).  If you have questions about a medical condition or this instruction, always ask your healthcare professional. [place] [address] any warranty or liability for your use of this information.

## 2017-08-17 ENCOUNTER — TELEPHONE (OUTPATIENT)
Dept: CASE MANAGEMENT | Age: 62
End: 2017-08-17

## 2017-08-17 LAB
LACTOFERRIN, LCTFLT: 5.35 UG/ML(G) (ref 0–7.24)
O+P SPEC MICRO: NORMAL
O+P STL CONC: NORMAL
SPECIMEN SOURCE: NORMAL

## 2017-08-17 NOTE — PROGRESS NOTES
2732) Bedside shift change report given to Neli Escobar, RN (oncoming nurse) by Mortimer Kirks, RN (offgoing nurse). Report included the following information SBAR, Kardex, MAR, Accordion and Recent Results.    1140) Discharge education with Piedad Church.   [phone]) Pt wheeled downstairs by Piedad Church

## 2017-08-17 NOTE — TELEPHONE ENCOUNTER
Care manager called patient to follow up. Identified as correct patient. She states that she is taking all medications as prescribed and is aware of her PCP f/u on 8/22 and GI on 8/24. She does not have any questions for care management today.     Lilliam Curtis, MSW  [phone]

## 2017-08-17 NOTE — PROGRESS NOTES
Care Management Interventions  PCP Verified by CM: Yes  Palliative Care Consult (Criteria: CHF and RRAT>21): No  Mode of Transport at Discharge:  Other (see comment) (Patient will arrange)  Transition of Care Consult (CM Consult): Discharge Planning  Discharge Durable Medical Equipment: No  Physical Therapy Consult: No  Occupational Therapy Consult: No  Speech Therapy Consult: No  Current Support Network: Lives with Spouse (Patient lives with her  and friend)  Confirm Follow Up Transport: Self  Discharge Location  Discharge Placement: Home with outpatient services    Patient will follow up with Dr. Oz Cuellar on 8/22/17 at 12:15pm and Dr. Yrn Mccollum at [facility] on 8/24/17 at 10:45am.    [facility], [facility]

## 2017-08-22 ENCOUNTER — OFFICE VISIT (OUTPATIENT)
Dept: INTERNAL MEDICINE CLINIC | Age: 62
End: 2017-08-22

## 2017-08-22 VITALS
SYSTOLIC BLOOD PRESSURE: 116 MMHG | OXYGEN SATURATION: 98 % | DIASTOLIC BLOOD PRESSURE: 78 MMHG | HEIGHT: 63 IN | HEART RATE: 61 BPM | WEIGHT: 155 LBS | RESPIRATION RATE: 16 BRPM | BODY MASS INDEX: 27.46 KG/M2 | TEMPERATURE: 98.1 F

## 2017-08-22 DIAGNOSIS — E78.00 HYPERCHOLESTEREMIA: ICD-10-CM

## 2017-08-22 DIAGNOSIS — I10 ESSENTIAL HYPERTENSION: ICD-10-CM

## 2017-08-22 DIAGNOSIS — J44.9 CHRONIC OBSTRUCTIVE PULMONARY DISEASE, UNSPECIFIED COPD TYPE (HCC): ICD-10-CM

## 2017-08-22 DIAGNOSIS — K57.32 DIVERTICULITIS OF COLON: Primary | ICD-10-CM

## 2017-08-22 NOTE — PATIENT INSTRUCTIONS
[employer] Activation    Thank you for requesting access to Terapio. Please follow the instructions below to securely access and download your online medical record. Terapio allows you to send messages to your doctor, view your test results, renew your prescriptions, schedule appointments, and more. How Do I Sign Up? 1. In your internet browser, go to [email or web address]  2. Click on the First Time User? Click Here link in the Sign In box. You will be redirect to the New Member Sign Up page. 3. Enter your Terapio Access Code exactly as it appears below. You will not need to use this code after youve completed the sign-up process. If you do not sign up before the expiration date, you must request a new code. Terapio Access Code: Activation code not generated  Current Terapio Status: Patient Declined (This is the date your Terapio access code will )    4. Enter the last four digits of your Social Security Number (xxxx) and Date of Birth (mm/dd/yyyy) as indicated and click Submit. You will be taken to the next sign-up page. 5. Create a Terapio ID. This will be your Terapio login ID and cannot be changed, so think of one that is secure and easy to remember. 6. Create a Terapio password. You can change your password at any time. 7. Enter your Password Reset Question and Answer. This can be used at a later time if you forget your password. 8. Enter your e-mail address. You will receive e-mail notification when new information is available in [address] 9. Click Sign Up. You can now view and download portions of your medical record. 10. Click the Download Summary menu link to download a portable copy of your medical information. Additional Information    If you have questions, please visit the Frequently Asked Questions section of the Terapio website at [email or web address] Remember, Terapio is NOT to be used for urgent needs. For medical emergencies, dial 911.

## 2017-08-22 NOTE — PROGRESS NOTES
Massimo Peters is a 63 y.o. female and presents with Hospital Follow Up and Diverticulitis  . Subjective:  She states she was admitted for diverticulitis treated and released. Hypertension Review:  The patient has essential hypertension  Diet and Lifestyle: generally follows a  low sodium diet, exercises sporadically  Home BP Monitoring: is not measured at home. Pertinent ROS: taking medications as instructed, no medication side effects noted, no TIA's, no chest pain on exertion, no dyspnea on exertion, no swelling of ankles. Diabetes Mellitus Review:  She has diabetes mellitus. Diabetic ROS - medication compliance: compliant all of the time, diabetic diet compliance: compliant all of the time, home glucose monitoring: is performed. Known diabetic complications: none  Cardiovascular risk factors: family history, dyslipidemia, diabetes mellitus, obesity, hypertension  Current diabetic medications include oral agents   Eye exam current (within one year): no  Weight trend: stable  Prior visit with dietician: no  Current diet: \"healthy\" diet  in general  Current exercise: walking  Current monitoring regimen: home blood tests - daily  Home blood sugar records: trend: stable  Any episodes of hypoglycemia? no  Is She on ACE inhibitor or angiotensin II receptor blocker? Yes         Review of Systems  Constitutional: negative for fevers, chills, anorexia and weight loss  Eyes:   negative for visual disturbance and irritation  ENT:   negative for tinnitus,sore throat,nasal congestion,ear pains. hoarseness  Respiratory:  negative for cough, hemoptysis, dyspnea,wheezing  CV:   negative for chest pain, palpitations, lower extremity edema  GI:    diarrhea, abdominal pain,  Endo:               negative for polyuria,polydipsia,polyphagia,heat intolerance  Genitourinary: negative for frequency, dysuria and hematuria  Integument:  negative for rash and pruritus  Hematologic:  negative for easy bruising and gum/nose bleeding  Musculoskel: negative for myalgias, arthralgias, back pain, muscle weakness, joint pain  Neurological:  negative for headaches, dizziness, vertigo, memory problems and gait   Behavl/Psych: negative for feelings of anxiety, depression, mood changes    Past Medical History:   Diagnosis Date    Arthritis, Degenerative, Knee 7/15/2011    COPD (chronic obstructive pulmonary disease) ([facility] 7/15/2011    Diabetes ([facility]     Gastrointestinal disorder     acid reflux    GERD, gastro - Esophageal Reflux Disease 7/15/2011    Hypercholesterolemia 7/15/2011    Hypertension     Hypertension 7/15/2011    Other ill-defined conditions     high cholesterol    Type II or unspecified type diabetes mellitus without mention of complication, uncontrolled 7/15/2011     Past Surgical History:   Procedure Laterality Date    HX GYN  2004    uterine surgery    LAP,TUBAL CAUTERY Bilateral 1992     Social History     Social History    Marital status:      Spouse name: N/A    Number of children: N/A    Years of education: N/A     Social History Main Topics    Smoking status: Former Smoker     Quit date: 4/15/2015    Smokeless tobacco: Never Used    Alcohol use No      Comment: occ    Drug use: Yes     Special: Marijuana      Comment: used 3 days ago    Sexual activity: Yes     Partners: Male     Other Topics Concern    None     Social History Narrative        She lives with her  she has five adult children denies alcohol cigarette use     Family History   Problem Relation Age of Onset    Diabetes Father     Deep Vein Thrombosis Daughter     Lung Disease Daughter     Other Daughter      cholesterol     Current Outpatient Prescriptions   Medication Sig Dispense Refill    ciprofloxacin HCl (CIPRO) 500 mg tablet Take 1 Tab by mouth two (2) times a day for 10 days. 20 Tab 0    metroNIDAZOLE (FLAGYL) 500 mg tablet Take 1 Tab by mouth three (3) times daily for 10 days.  30 Tab 0    sucralfate (CARAFATE) 1 gram tablet Take 1 g by mouth four (4) times daily.  bismuth subsalicylate (PEPTO-BISMOL MAXIMUM STRENGTH) 525 mg/15 mL susp Take 30 mL by mouth every six (6) hours as needed.  MULTIVIT-MIN/IRON/FOLIC/LUTEIN (CENTRUM SILVER WOMEN PO) Take  by mouth daily.  diclofenac EC (VOLTAREN) 75 mg EC tablet TAKE 1 TABLET BY MOUTH TWICE DAILY 60 Tab 12    lisinopril (PRINIVIL, ZESTRIL) 40 mg tablet TAKE 1 TABLET BY MOUTH EVERY DAY 30 Tab 6    glyBURIDE (DIABETA) 2.5 mg tablet TAKE 1 TABLET BY MOUTH EVERY DAY 30 Tab 6    raNITIdine (ZANTAC) 300 mg tablet TAKE 1 TABLET BY MOUTH EVERY DAY 30 Tab 6    potassium chloride SA (MICRO-K) 10 mEq capsule TAKE ONE CAPSULE BY MOUTH TWICE DAILY (Patient taking differently: TAKE ONE CAPSULE BY MOUTH once DAILY) 30 Cap 0    ONETOUCH ULTRA TEST strip TEST BLOOD SUGARS 3 TIMES DAILY 100 Each 12    Hydrochlorothiazide (HYDRODIURIL) 12.5 mg tablet Take 12.5 mg by mouth daily. 0    loperamide (IMODIUM A-D) 2 mg tablet Take 1 Tab by mouth four (4) times daily as needed for Diarrhea.  20 Tab 0     No Known Allergies    Objective:  Visit Vitals    /78    Pulse 61    Temp 98.1 °F (36.7 °C) (Oral)    Resp 16    Ht 5' 3\" (1.6 m)    Wt 155 lb (70.3 kg)    SpO2 98%    BMI 27.46 kg/m2     Physical Exam:   General appearance - alert, well appearing, and in no distress  Mental status - alert, oriented to person, place, and time  EYE-KODY, EOMI, corneas normal, no foreign bodies  ENT-ENT exam normal, no neck nodes or sinus tenderness  Nose - normal and patent, no erythema, discharge or polyps  Mouth - mucous membranes moist, pharynx normal without lesions  Neck - supple, no significant adenopathy   Chest - clear to auscultation, no wheezes, rales or rhonchi, symmetric air entry   Heart - normal rate, regular rhythm, normal S1, S2, no murmurs, rubs, clicks or gallops   Abdomen - soft, nontender, nondistended, no masses or organomegaly  Lymph- no adenopathy palpable  Ext-peripheral pulses normal, no pedal edema, no clubbing or cyanosis  Skin-Warm and dry.  no hyperpigmentation, vitiligo, or suspicious lesions  Neuro -alert, oriented, normal speech, no focal findings or movement disorder noted  Neck-normal C-spine, no tenderness, full ROM without pain  Feet-no nail deformities or callus formation with good pulses noted      Results for orders placed or performed during the hospital encounter of 08/13/17   CULTURE, URINE   Result Value Ref Range    Special Requests: NO SPECIAL REQUESTS  Reflexed from [id number]        Higgins Count >100,000  COLONIES/mL        Culture result: ESCHERICHIA COLI (A)         Susceptibility    Escherichia coli - YESSICA     Amikacin ($) <=16 Susceptible ug/mL     Ampicillin ($) >16 Resistant ug/mL     Ampicillin/sulbactam ($) <=8/4 Susceptible ug/mL     Aztreonam ($$$$) <=4 Susceptible ug/mL     Cefazolin ($) <=8 Susceptible ug/mL     Cefepime ($$) <=4 Susceptible ug/mL     Cefotaxime <=2 Susceptible ug/mL     Ceftazidime ($) <=1 Susceptible ug/mL     Ceftriaxone ($) <=1 Susceptible ug/mL     Cefuroxime ($) <=4 Susceptible ug/mL     Ciprofloxacin ($) <=1 Susceptible ug/mL     Gentamicin ($) <=4 Susceptible ug/mL     Imipenem <=1 Susceptible ug/mL     Levofloxacin ($) <=2 Susceptible ug/mL     Meropenem ($$) <=1 Susceptible ug/mL     Nitrofurantoin <=32 Susceptible ug/mL     Piperacillin/Tazobac ($) <=16 Susceptible ug/mL     Tobramycin ($) <=4 Susceptible ug/mL     Trimeth-Sulfamethoxa <=2/38 Susceptible ug/mL   OVA & PARASITES, STOOL   Result Value Ref Range    Source STOOL      Ova & Parasite exam Final Report Below    CULTURE, STOOL   Result Value Ref Range    Special Requests: NO SPECIAL REQUESTS      Campylobacter antigen NEGATIVE      Shiga toxin-producing E. coli Ag NEGATIVE      Culture result:        NO ROUTINE ENTERIC PATHOGENS ISOLATED INCLUDING SALMONELLA, SHIGELLA, YERSINIA, VIBRIO OR SHIGA TOXIN PRODUCING E. COLI    Culture result: MARKEDLY REDUCED COLIFORMS NOTED     C. DIFFICILE (DNA)   Result Value Ref Range    C. difficile (DNA) NEGATIVE  NEG     URINALYSIS W/ REFLEX CULTURE   Result Value Ref Range    Color YELLOW/STRAW      Appearance CLOUDY (A) CLEAR      Specific gravity 1.010 1.003 - 1.030      pH (UA) 7.0 5.0 - 8.0      Protein NEGATIVE  NEG mg/dL    Glucose NEGATIVE  NEG mg/dL    Ketone NEGATIVE  NEG mg/dL    Bilirubin NEGATIVE  NEG      Blood MODERATE (A) NEG      Urobilinogen 0.2 0.2 - 1.0 EU/dL    Nitrites POSITIVE (A) NEG      Leukocyte Esterase NEGATIVE  NEG      WBC 0-4 0 - 4 /hpf    RBC 0-5 0 - 5 /hpf    Epithelial cells FEW FEW /lpf    Bacteria 3+ (A) NEG /hpf    UA:UC IF INDICATED URINE CULTURE ORDERED (A) CNI     CBC WITH AUTOMATED DIFF   Result Value Ref Range    WBC 11.5 (H) 3.6 - 11.0 K/uL    RBC 4.02 3.80 - 5.20 M/uL    HGB 12.6 11.5 - 16.0 g/dL    HCT 37.1 35.0 - 47.0 %    MCV 92.3 80.0 - 99.0 FL    MCH 31.3 26.0 - 34.0 PG    MCHC 34.0 30.0 - 36.5 g/dL    RDW 12.2 11.5 - 14.5 %    PLATELET 247 526 - 456 K/uL    NEUTROPHILS 73 32 - 75 %    LYMPHOCYTES 18 12 - 49 %    MONOCYTES 8 5 - 13 %    EOSINOPHILS 1 0 - 7 %    BASOPHILS 0 0 - 1 %    ABS. NEUTROPHILS 8.4 (H) 1.8 - 8.0 K/UL    ABS. LYMPHOCYTES 2.1 0.8 - 3.5 K/UL    ABS. MONOCYTES 0.9 0.0 - 1.0 K/UL    ABS. EOSINOPHILS 0.2 0.0 - 0.4 K/UL    ABS. BASOPHILS 0.0 0.0 - 0.1 K/UL   METABOLIC PANEL, COMPREHENSIVE   Result Value Ref Range    Sodium 141 136 - 145 mmol/L    Potassium 3.5 3.5 - 5.1 mmol/L    Chloride 107 97 - 108 mmol/L    CO2 24 21 - 32 mmol/L    Anion gap 10 5 - 15 mmol/L    Glucose 134 (H) 65 - 100 mg/dL    BUN 17 6 - 20 MG/DL    Creatinine 0.66 0.55 - 1.02 MG/DL    BUN/Creatinine ratio 26 (H) 12 - 20      GFR est AA >60 >60 ml/min/1.73m2    GFR est non-AA >60 >60 ml/min/1.73m2    Calcium 7.9 (L) 8.5 - 10.1 MG/DL    Bilirubin, total 0.3 0.2 - 1.0 MG/DL    ALT (SGPT) 30 12 - 78 U/L    AST (SGOT) 15 15 - 37 U/L    Alk.  phosphatase 100 45 - 117 U/L    Protein, total 6.5 6.4 - 8.2 g/dL    Albumin 2.8 (L) 3.5 - 5.0 g/dL    Globulin 3.7 2.0 - 4.0 g/dL    A-G Ratio 0.8 (L) 1.1 - 2.2     LIPASE   Result Value Ref Range    Lipase 69 (L) 73 - 393 U/L   CBC WITH AUTOMATED DIFF   Result Value Ref Range    WBC 8.5 3.6 - 11.0 K/uL    RBC 3.57 (L) 3.80 - 5.20 M/uL    HGB 11.1 (L) 11.5 - 16.0 g/dL    HCT 33.2 (L) 35.0 - 47.0 %    MCV 93.0 80.0 - 99.0 FL    MCH 31.1 26.0 - 34.0 PG    MCHC 33.4 30.0 - 36.5 g/dL    RDW 12.3 11.5 - 14.5 %    PLATELET 224 348 - 672 K/uL    NEUTROPHILS 65 32 - 75 %    LYMPHOCYTES 26 12 - 49 %    MONOCYTES 8 5 - 13 %    EOSINOPHILS 1 0 - 7 %    BASOPHILS 0 0 - 1 %    ABS. NEUTROPHILS 5.5 1.8 - 8.0 K/UL    ABS. LYMPHOCYTES 2.2 0.8 - 3.5 K/UL    ABS. MONOCYTES 0.7 0.0 - 1.0 K/UL    ABS. EOSINOPHILS 0.1 0.0 - 0.4 K/UL    ABS.  BASOPHILS 0.0 0.0 - 0.1 K/UL   LACTOFERRIN, FECAL   Result Value Ref Range    Lactoferrin, fecal 5.35 0.00 - 7.24 ug/ml(g)   METABOLIC PANEL, BASIC   Result Value Ref Range    Sodium 141 136 - 145 mmol/L    Potassium 3.4 (L) 3.5 - 5.1 mmol/L    Chloride 106 97 - 108 mmol/L    CO2 27 21 - 32 mmol/L    Anion gap 8 5 - 15 mmol/L    Glucose 121 (H) 65 - 100 mg/dL    BUN 10 6 - 20 MG/DL    Creatinine 0.72 0.55 - 1.02 MG/DL    BUN/Creatinine ratio 14 12 - 20      GFR est AA >60 >60 ml/min/1.73m2    GFR est non-AA >60 >60 ml/min/1.73m2    Calcium 8.7 8.5 - 10.1 MG/DL   MAGNESIUM   Result Value Ref Range    Magnesium 1.8 1.6 - 2.4 mg/dL   PHOSPHORUS   Result Value Ref Range    Phosphorus 2.2 (L) 2.6 - 4.7 MG/DL   HEMOGLOBIN A1C WITH EAG   Result Value Ref Range    Hemoglobin A1c 7.0 (H) 4.2 - 6.3 %    Est. average glucose 154 mg/dL   OCCULT BLOOD, STOOL   Result Value Ref Range    Occult blood, stool NEGATIVE  NEG     WBC, STOOL   Result Value Ref Range    White blood cells, stool 0 TO 4 0 - 4 /HPF   CBC WITH AUTOMATED DIFF   Result Value Ref Range    WBC 5.7 3.6 - 11.0 K/uL    RBC 3.93 3.80 - 5.20 M/uL    HGB 12.3 11.5 - 16.0 g/dL    HCT 35.4 35.0 - 47.0 %    MCV 90.1 80.0 - 99.0 FL    MCH 31.3 26.0 - 34.0 PG    MCHC 34.7 30.0 - 36.5 g/dL    RDW 11.9 11.5 - 14.5 %    PLATELET 544 366 - 323 K/uL    NEUTROPHILS 54 32 - 75 %    LYMPHOCYTES 33 12 - 49 %    MONOCYTES 9 5 - 13 %    EOSINOPHILS 3 0 - 7 %    BASOPHILS 1 0 - 1 %    ABS. NEUTROPHILS 3.1 1.8 - 8.0 K/UL    ABS. LYMPHOCYTES 1.9 0.8 - 3.5 K/UL    ABS. MONOCYTES 0.5 0.0 - 1.0 K/UL    ABS. EOSINOPHILS 0.2 0.0 - 0.4 K/UL    ABS.  BASOPHILS 0.0 0.0 - 0.1 K/UL   METABOLIC PANEL, BASIC   Result Value Ref Range    Sodium 139 136 - 145 mmol/L    Potassium 3.6 3.5 - 5.1 mmol/L    Chloride 106 97 - 108 mmol/L    CO2 24 21 - 32 mmol/L    Anion gap 9 5 - 15 mmol/L    Glucose 189 (H) 65 - 100 mg/dL    BUN 12 6 - 20 MG/DL    Creatinine 0.78 0.55 - 1.02 MG/DL    BUN/Creatinine ratio 15 12 - 20      GFR est AA >60 >60 ml/min/1.73m2    GFR est non-AA >60 >60 ml/min/1.73m2    Calcium 9.0 8.5 - 10.1 MG/DL   OVA & PARASITES, STOOL, REFLEX   Result Value Ref Range    Result 1 Comment     GLUCOSE, POC   Result Value Ref Range    Glucose (POC) 124 (H) 65 - 100 mg/dL    Performed by Amisha Duff    GLUCOSE, POC   Result Value Ref Range    Glucose (POC) 128 (H) 65 - 100 mg/dL    Performed by Max Machado    GLUCOSE, POC   Result Value Ref Range    Glucose (POC) 148 (H) 65 - 100 mg/dL    Performed by Max Machado    GLUCOSE, POC   Result Value Ref Range    Glucose (POC) 150 (H) 65 - 100 mg/dL    Performed by Judy Hargrove (PCT)    GLUCOSE, POC   Result Value Ref Range    Glucose (POC) 114 (H) 65 - 100 mg/dL    Performed by Brad Adams    GLUCOSE, POC   Result Value Ref Range    Glucose (POC) 137 (H) 65 - 100 mg/dL    Performed by Ryder Christopher (PCT)    GLUCOSE, POC   Result Value Ref Range    Glucose (POC) 100 65 - 100 mg/dL    Performed by Franco Jones    GLUCOSE, POC   Result Value Ref Range    Glucose (POC) 125 (H) 65 - 100 mg/dL    Performed by Brad Adams    GLUCOSE, POC   Result Value Ref Range    Glucose (POC) 142 (H) 65 - 100 mg/dL    Performed by Ramiro End    GLUCOSE, POC   Result Value Ref Range    Glucose (POC) 127 (H) 65 - 100 mg/dL    Performed by Maxi Hobbs        Assessment/Plan:    ICD-10-CM ICD-9-CM    1. Diverticulitis of colon K57.32 562.11 REFERRAL TO GASTROENTEROLOGY   2. Chronic obstructive pulmonary disease, unspecified COPD type ([facility] J44.9 496    3. Hypercholesteremia E78.00 272.0    4. Essential hypertension I10 401.9      Orders Placed This Encounter    REFERRAL TO GASTROENTEROLOGY     Referral Priority:   Routine     Referral Type:   Consultation     Referral Reason:   Specialty Services Required     Referred to Provider:   Jesus Flores MD     Requested Specialty:   Gastroenterology     lose weight, increase physical activity, follow low fat diet, follow low salt diet,Take 81mg aspirin daily    She is to follow up with gi  Patient Instructions   ThoughtBox Activation    Thank you for requesting access to ThoughtBox. Please follow the instructions below to securely access and download your online medical record. ThoughtBox allows you to send messages to your doctor, view your test results, renew your prescriptions, schedule appointments, and more. How Do I Sign Up? 1. In your internet browser, go to [email or web address]  2. Click on the First Time User? Click Here link in the Sign In box. You will be redirect to the New Member Sign Up page. 3. Enter your ThoughtBox Access Code exactly as it appears below. You will not need to use this code after youve completed the sign-up process. If you do not sign up before the expiration date, you must request a new code. ThoughtBox Access Code: Activation code not generated  Current ThoughtBox Status: Patient Declined (This is the date your ThoughtBox access code will )    4. Enter the last four digits of your Social Security Number (xxxx) and Date of Birth (mm/dd/yyyy) as indicated and click Submit.  You will be taken to the next sign-up page.  5. Create a Optynt ID. This will be your FullStory login ID and cannot be changed, so think of one that is secure and easy to remember. 6. Create a FullStory password. You can change your password at any time. 7. Enter your Password Reset Question and Answer. This can be used at a later time if you forget your password. 8. Enter your e-mail address. You will receive e-mail notification when new information is available in [address] 9. Click Sign Up. You can now view and download portions of your medical record. 10. Click the Download Summary menu link to download a portable copy of your medical information. Additional Information    If you have questions, please visit the Frequently Asked Questions section of the FullStory website at [email or web address] Remember, FullStory is NOT to be used for urgent needs. For medical emergencies, dial 911. Follow-up Disposition:  Return in about 4 weeks (around 9/19/2017), or if symptoms worsen or fail to improve. I have reviewed with the patient details of the assessment and plan and all questions were answered. Relevent patient education was performed. The most recent lab findings were reviewed with the patient. An After Visit Summary was printed and given to the patient.

## 2017-08-22 NOTE — MR AVS SNAPSHOT
Visit Information Date & Time Provider Department Dept. Phone Encounter #  
 8/22/2017 12:15 PM John Hall MD [address] [facility] [phone] [id number] Follow-up Instructions Return in about 4 weeks (around 9/19/2017), or if symptoms worsen or fail to improve. Your Appointments 9/6/2017 11:00 AM  
ROUTINE CARE with John Hall MD  
[facility] ([address]) Appt Note: 3 month fu  
 [address] [facility] [facility] [address]  
  
   
 [address] [facility] [facility] 7 [phone]  
  
    
 3/15/2018 11:00 AM  
YEAR CHECK UP with José Miguel London MD  
[facility] ([address] [facility]) Appt Note: annual exam  
 [place] [address] [facility] [phone]  
[phone]  
  
   
 [place] [address] Upcoming Health Maintenance Date Due  
 EYE EXAM RETINAL OR DILATED Q1 8/15/1967 Pneumococcal 19-64 Medium Risk (1 of 1 - PPSV23) 8/15/1976 ZOSTER VACCINE AGE 60> 6/15/2017 INFLUENZA AGE 9 TO ADULT 8/1/2017 HEMOGLOBIN A1C Q6M 2/14/2018 FOOT EXAM Q1 3/6/2018 MICROALBUMIN Q1 3/6/2018 LIPID PANEL Q1 6/6/2018 FOBT Q 1 YEAR AGE 50-75 8/15/2018 BREAST CANCER SCRN MAMMOGRAM 8/16/2018 PAP AKA CERVICAL CYTOLOGY 3/14/2020 DTaP/Tdap/Td series (2 - Td) 3/6/2027 Allergies as of 8/22/2017  Review Complete On: 8/22/2017 By: John Hall MD  
 No Known Allergies Current Immunizations  Never Reviewed No immunizations on file. Not reviewed this visit You Were Diagnosed With   
  
 Codes Comments Diverticulitis of colon    -  Primary ICD-10-CM: K57.32 
ICD-9-CM: 562.11 Chronic obstructive pulmonary disease, unspecified COPD type ([facility]     ICD-10-CM: J44.9 ICD-9-CM: 823 Hypercholesteremia     ICD-10-CM: E78.00 ICD-9-CM: 272.0 Essential hypertension     ICD-10-CM: I10 
ICD-9-CM: 401.9 Vitals BP Pulse Temp Resp Height(growth percentile) Weight(growth percentile) 116/78 61 98.1 °F (36.7 °C) (Oral) 16 5' 3\" (1.6 m) 155 lb (70.3 kg) SpO2 BMI OB Status Smoking Status 98% 27.46 kg/m2 Postmenopausal Former Smoker Vitals History BMI and BSA Data Body Mass Index Body Surface Area  
 27.46 kg/m 2 1.77 m 2 Preferred Pharmacy Pharmacy Name Phone [facility] [address] [facility]  [place] [phone] Your Updated Medication List  
  
   
This list is accurate as of: 8/22/17 12:50 PM.  Always use your most recent med list.  
  
  
  
  
 bismuth subsalicylate 072 OA/81 mL Susp Commonly known as:  PEPTO-BISMOL MAXIMUM STRENGTH Take 30 mL by mouth every six (6) hours as needed. CENTRUM SILVER WOMEN PO Take  by mouth daily. ciprofloxacin HCl 500 mg tablet Commonly known as:  CIPRO Take 1 Tab by mouth two (2) times a day for 10 days. diclofenac EC 75 mg EC tablet Commonly known as:  VOLTAREN  
TAKE 1 TABLET BY MOUTH TWICE DAILY  
  
 glyBURIDE 2.5 mg tablet Commonly known as:  Euna Alar TAKE 1 TABLET BY MOUTH EVERY DAY  
  
 hydroCHLOROthiazide 12.5 mg tablet Commonly known as:  HYDRODIURIL Take 12.5 mg by mouth daily. lisinopril 40 mg tablet Commonly known as:  PRINIVIL, ZESTRIL  
TAKE 1 TABLET BY MOUTH EVERY DAY  
  
 loperamide 2 mg tablet Commonly known as:  IMODIUM A-D Take 1 Tab by mouth four (4) times daily as needed for Diarrhea. metroNIDAZOLE 500 mg tablet Commonly known as:  FLAGYL Take 1 Tab by mouth three (3) times daily for 10 days. ONETOUCH ULTRA TEST strip Generic drug:  glucose blood VI test strips TEST BLOOD SUGARS 3 TIMES DAILY potassium chloride SA 10 mEq capsule Commonly known as:  MICRO-K  
TAKE ONE CAPSULE BY MOUTH TWICE DAILY raNITIdine 300 mg tablet Commonly known as:  ZANTAC TAKE 1 TABLET BY MOUTH EVERY DAY  
  
 sucralfate 1 gram tablet Commonly known as:  Cody Otter Take 1 g by mouth four (4) times daily. We Performed the Following REFERRAL TO GASTROENTEROLOGY [OBB09 Custom] Follow-up Instructions Return in about 4 weeks (around 2017), or if symptoms worsen or fail to improve. Referral Information Referral ID Referred By Referred To  
  
 [id number] Jackson Oppenheim, [address] [place] [address], MD Jesus  [address] , [address] Phone: [phone] Fax: [phone] Visits Status Start Date End Date 1 New Request 17 If your referral has a status of pending review or denied, additional information will be sent to support the outcome of this decision. Patient Instructions Gatheredtablehart Activation Thank you for requesting access to [employer]. Please follow the instructions below to securely access and download your online medical record. [employer] allows you to send messages to your doctor, view your test results, renew your prescriptions, schedule appointments, and more. How Do I Sign Up? 1. In your internet browser, go to [email or web address] 
2. Click on the First Time User? Click Here link in the Sign In box. You will be redirect to the New Member Sign Up page. 3. Enter your [employer] Access Code exactly as it appears below. You will not need to use this code after youve completed the sign-up process. If you do not sign up before the expiration date, you must request a new code. [employer] Access Code: Activation code not generated Current [employer] Status: Patient Declined (This is the date your [employer] access code will ) 4. Enter the last four digits of your Social Security Number (xxxx) and Date of Birth (mm/dd/yyyy) as indicated and click Submit. You will be taken to the next sign-up page. 5. Create a Kaliont ID. This will be your Localisto login ID and cannot be changed, so think of one that is secure and easy to remember. 6. Create a Localisto password. You can change your password at any time. 7. Enter your Password Reset Question and Answer. This can be used at a later time if you forget your password. 8. Enter your e-mail address. You will receive e-mail notification when new information is available in [address] 9. Click Sign Up. You can now view and download portions of your medical record. 10. Click the Download Summary menu link to download a portable copy of your medical information. Additional Information If you have questions, please visit the Frequently Asked Questions section of the Localisto website at [email or web address] Remember, Localisto is NOT to be used for urgent needs. For medical emergencies, dial 911. Please provide this summary of care documentation to your next provider. Your primary care clinician is listed as Elizabeth Jones. If you have any questions after today's visit, please call [phone].

## 2017-09-11 ENCOUNTER — HOSPITAL ENCOUNTER (EMERGENCY)
Age: 62
Discharge: HOME | End: 2017-09-11
Attending: EMERGENCY MEDICINE
Payer: COMMERCIAL

## 2017-09-11 VITALS
BODY MASS INDEX: 25.69 KG/M2 | TEMPERATURE: 98.4 F | DIASTOLIC BLOOD PRESSURE: 81 MMHG | HEIGHT: 63 IN | HEART RATE: 87 BPM | SYSTOLIC BLOOD PRESSURE: 118 MMHG | RESPIRATION RATE: 16 BRPM | OXYGEN SATURATION: 97 % | WEIGHT: 145 LBS

## 2017-09-11 DIAGNOSIS — K57.92 DIVERTICULITIS OF INTESTINE WITHOUT PERFORATION OR ABSCESS WITHOUT BLEEDING, UNSPECIFIED PART OF INTESTINAL TRACT: Primary | ICD-10-CM

## 2017-09-11 LAB
ALBUMIN SERPL-MCNC: 3.5 G/DL (ref 3.5–5)
ALBUMIN/GLOB SERPL: 0.9 {RATIO} (ref 1.1–2.2)
ALP SERPL-CCNC: 92 U/L (ref 45–117)
ALT SERPL-CCNC: 30 U/L (ref 12–78)
ANION GAP SERPL CALC-SCNC: 10 MMOL/L (ref 5–15)
APPEARANCE UR: CLEAR
AST SERPL-CCNC: 15 U/L (ref 15–37)
BACTERIA URNS QL MICRO: NEGATIVE /HPF
BASOPHILS # BLD: 0 K/UL (ref 0–0.1)
BASOPHILS NFR BLD: 0 % (ref 0–1)
BILIRUB SERPL-MCNC: 0.7 MG/DL (ref 0.2–1)
BILIRUB UR QL: NEGATIVE
BUN SERPL-MCNC: 14 MG/DL (ref 6–20)
BUN/CREAT SERPL: 19 (ref 12–20)
CALCIUM SERPL-MCNC: 9.3 MG/DL (ref 8.5–10.1)
CHLORIDE SERPL-SCNC: 102 MMOL/L (ref 97–108)
CO2 SERPL-SCNC: 27 MMOL/L (ref 21–32)
COLOR UR: ABNORMAL
CREAT SERPL-MCNC: 0.72 MG/DL (ref 0.55–1.02)
EOSINOPHIL # BLD: 0.1 K/UL (ref 0–0.4)
EOSINOPHIL NFR BLD: 1 % (ref 0–7)
EPITH CASTS URNS QL MICRO: ABNORMAL /LPF
ERYTHROCYTE [DISTWIDTH] IN BLOOD BY AUTOMATED COUNT: 12.8 % (ref 11.5–14.5)
GLOBULIN SER CALC-MCNC: 4.1 G/DL (ref 2–4)
GLUCOSE SERPL-MCNC: 137 MG/DL (ref 65–100)
GLUCOSE UR STRIP.AUTO-MCNC: NEGATIVE MG/DL
HCT VFR BLD AUTO: 39.7 % (ref 35–47)
HGB BLD-MCNC: 13.6 G/DL (ref 11.5–16)
HGB UR QL STRIP: ABNORMAL
KETONES UR QL STRIP.AUTO: NEGATIVE MG/DL
LACTATE SERPL-SCNC: 0.9 MMOL/L (ref 0.4–2)
LEUKOCYTE ESTERASE UR QL STRIP.AUTO: NEGATIVE
LYMPHOCYTES # BLD: 1.8 K/UL (ref 0.8–3.5)
LYMPHOCYTES NFR BLD: 15 % (ref 12–49)
MCH RBC QN AUTO: 31.6 PG (ref 26–34)
MCHC RBC AUTO-ENTMCNC: 34.3 G/DL (ref 30–36.5)
MCV RBC AUTO: 92.1 FL (ref 80–99)
MONOCYTES # BLD: 1.2 K/UL (ref 0–1)
MONOCYTES NFR BLD: 9 % (ref 5–13)
NEUTS SEG # BLD: 9.5 K/UL (ref 1.8–8)
NEUTS SEG NFR BLD: 75 % (ref 32–75)
NITRITE UR QL STRIP.AUTO: NEGATIVE
PH UR STRIP: 7 [PH] (ref 5–8)
PLATELET # BLD AUTO: 181 K/UL (ref 150–400)
POTASSIUM SERPL-SCNC: 3.6 MMOL/L (ref 3.5–5.1)
PROT SERPL-MCNC: 7.6 G/DL (ref 6.4–8.2)
PROT UR STRIP-MCNC: NEGATIVE MG/DL
RBC # BLD AUTO: 4.31 M/UL (ref 3.8–5.2)
RBC #/AREA URNS HPF: ABNORMAL /HPF (ref 0–5)
SODIUM SERPL-SCNC: 139 MMOL/L (ref 136–145)
SP GR UR REFRACTOMETRY: 1.01 (ref 1–1.03)
UA: UC IF INDICATED,UAUC: ABNORMAL
UROBILINOGEN UR QL STRIP.AUTO: 0.2 EU/DL (ref 0.2–1)
WBC # BLD AUTO: 12.6 K/UL (ref 3.6–11)
WBC URNS QL MICRO: ABNORMAL /HPF (ref 0–4)

## 2017-09-11 PROCEDURE — 96375 TX/PRO/DX INJ NEW DRUG ADDON: CPT

## 2017-09-11 PROCEDURE — 80053 COMPREHEN METABOLIC PANEL: CPT | Performed by: PHYSICIAN ASSISTANT

## 2017-09-11 PROCEDURE — 81001 URINALYSIS AUTO W/SCOPE: CPT | Performed by: EMERGENCY MEDICINE

## 2017-09-11 PROCEDURE — 96374 THER/PROPH/DIAG INJ IV PUSH: CPT

## 2017-09-11 PROCEDURE — 99283 EMERGENCY DEPT VISIT LOW MDM: CPT

## 2017-09-11 PROCEDURE — 74011250636 HC RX REV CODE- 250/636: Performed by: PHYSICIAN ASSISTANT

## 2017-09-11 PROCEDURE — 83605 ASSAY OF LACTIC ACID: CPT | Performed by: PHYSICIAN ASSISTANT

## 2017-09-11 PROCEDURE — 96361 HYDRATE IV INFUSION ADD-ON: CPT

## 2017-09-11 PROCEDURE — 85025 COMPLETE CBC W/AUTO DIFF WBC: CPT | Performed by: PHYSICIAN ASSISTANT

## 2017-09-11 PROCEDURE — 36415 COLL VENOUS BLD VENIPUNCTURE: CPT | Performed by: PHYSICIAN ASSISTANT

## 2017-09-11 RX ORDER — HYDROCODONE BITARTRATE AND ACETAMINOPHEN 5; 325 MG/1; MG/1
1 TABLET ORAL
Qty: 18 TAB | Refills: 0 | Status: SHIPPED | OUTPATIENT
Start: 2017-09-11 | End: 2018-04-24 | Stop reason: ALTCHOICE

## 2017-09-11 RX ORDER — METRONIDAZOLE 500 MG/1
500 TABLET ORAL 2 TIMES DAILY
Qty: 20 TAB | Refills: 0 | Status: SHIPPED | OUTPATIENT
Start: 2017-09-11 | End: 2017-09-21

## 2017-09-11 RX ORDER — ONDANSETRON 4 MG/1
4 TABLET, ORALLY DISINTEGRATING ORAL
Qty: 10 TAB | Refills: 0 | Status: SHIPPED | OUTPATIENT
Start: 2017-09-11 | End: 2018-04-24 | Stop reason: ALTCHOICE

## 2017-09-11 RX ORDER — SODIUM CHLORIDE 0.9 % (FLUSH) 0.9 %
5-10 SYRINGE (ML) INJECTION AS NEEDED
Status: DISCONTINUED | OUTPATIENT
Start: 2017-09-11 | End: 2017-09-11 | Stop reason: HOSPADM

## 2017-09-11 RX ORDER — SODIUM CHLORIDE 0.9 % (FLUSH) 0.9 %
5-10 SYRINGE (ML) INJECTION EVERY 8 HOURS
Status: DISCONTINUED | OUTPATIENT
Start: 2017-09-11 | End: 2017-09-11 | Stop reason: HOSPADM

## 2017-09-11 RX ORDER — MORPHINE SULFATE 4 MG/ML
2 INJECTION, SOLUTION INTRAMUSCULAR; INTRAVENOUS
Status: COMPLETED | OUTPATIENT
Start: 2017-09-11 | End: 2017-09-11

## 2017-09-11 RX ORDER — ONDANSETRON 2 MG/ML
4 INJECTION INTRAMUSCULAR; INTRAVENOUS
Status: COMPLETED | OUTPATIENT
Start: 2017-09-11 | End: 2017-09-11

## 2017-09-11 RX ORDER — CIPROFLOXACIN 500 MG/1
500 TABLET ORAL 2 TIMES DAILY
Qty: 20 TAB | Refills: 0 | Status: SHIPPED | OUTPATIENT
Start: 2017-09-11 | End: 2017-09-21

## 2017-09-11 RX ADMIN — SODIUM CHLORIDE 500 ML: 900 INJECTION, SOLUTION INTRAVENOUS at 12:33

## 2017-09-11 RX ADMIN — ONDANSETRON 4 MG: 2 INJECTION, SOLUTION INTRAMUSCULAR; INTRAVENOUS at 12:33

## 2017-09-11 RX ADMIN — MORPHINE SULFATE 2 MG: 4 INJECTION, SOLUTION INTRAMUSCULAR; INTRAVENOUS at 12:33

## 2017-09-11 NOTE — ED PROVIDER NOTES
HPI     To ED with complaints of lower abdominal pain. Cramping aching pain. Started this AM.  Left > Right. Admitted few days middle August with first episode diverticulitis. Due for colonoscopy later this month. Some nausea. No vomiting. No fevers. No urinary sx.  sts she completed all abx on discharge. No radiation of pain. Pain is moderate to severe. Has not taken any medications. sts has been trying to \"stay on the diverticulitis diet\". Past Medical History:   Diagnosis Date    Arthritis, Degenerative, Knee 7/15/2011    COPD (chronic obstructive pulmonary disease) ([address] 7/15/2011    Diabetes (HCC)     Gastrointestinal disorder     acid reflux    GERD, gastro - Esophageal Reflux Disease 7/15/2011    Hypercholesterolemia 7/15/2011    Hypertension     Hypertension 7/15/2011    Other ill-defined conditions     high cholesterol    Type II or unspecified type diabetes mellitus without mention of complication, uncontrolled 7/15/2011       Past Surgical History:   Procedure Laterality Date    HX GYN  2004    uterine surgery    LAP,TUBAL CAUTERY Bilateral 1992         Family History:   Problem Relation Age of Onset    Diabetes Father     Deep Vein Thrombosis Daughter     Lung Disease Daughter     Other Daughter      cholesterol       Social History     Social History    Marital status:      Spouse name: N/A    Number of children: N/A    Years of education: N/A     Occupational History    Not on file.      Social History Main Topics    Smoking status: Former Smoker     Quit date: 4/15/2015    Smokeless tobacco: Never Used    Alcohol use No      Comment: occ    Drug use: Yes     Special: Marijuana      Comment: \"daily\"    Sexual activity: Yes     Partners: Male     Other Topics Concern    Not on file     Social History Narrative        She lives with her  she has five adult children denies alcohol cigarette use         ALLERGIES: Review of patient's allergies indicates no known allergies. Review of Systems   Constitutional: Negative for chills and fever. HENT: Negative for congestion, rhinorrhea and sore throat. Eyes: Negative for pain and discharge. Respiratory: Negative for cough and shortness of breath. Cardiovascular: Negative for chest pain. Gastrointestinal: Positive for abdominal pain and nausea. Negative for abdominal distention, blood in stool, diarrhea and vomiting. Last BM this AM.    Genitourinary: Negative for dysuria, frequency and urgency. Musculoskeletal: Negative for back pain, joint swelling and neck pain. Skin: Negative for rash and wound. Neurological: Negative for seizures, syncope and headaches. Psychiatric/Behavioral: Negative for confusion. The patient is not nervous/anxious. All other systems reviewed and are negative. Vitals:    09/11/17 1138   BP: 115/83   Pulse: 91   Resp: 16   Temp: 98.4 °F (36.9 °C)   SpO2: 96%   Weight: 65.8 kg (145 lb)   Height: 5' 3\" (1.6 m)            Physical Exam   Constitutional: She is oriented to person, place, and time. She appears well-developed and well-nourished. HENT:   Head: Normocephalic and atraumatic. Right Ear: External ear normal.   Left Ear: External ear normal.   Nose: Nose normal.   Mouth/Throat: Oropharynx is clear and moist.   Eyes: Conjunctivae and EOM are normal. Pupils are equal, round, and reactive to light. Neck: Normal range of motion. Neck supple. Cardiovascular: Normal rate, regular rhythm and normal heart sounds. Exam reveals no gallop and no friction rub. No murmur heard. Pulmonary/Chest: Effort normal and breath sounds normal. She has no wheezes. She has no rales. Abdominal: Soft. Bowel sounds are normal. There is tenderness. There is no rebound and no guarding. Musculoskeletal: Normal range of motion. Neurological: She is alert and oriented to person, place, and time. She has normal reflexes. No cranial nerve deficit.  Coordination normal. Skin: Skin is warm and dry. Psychiatric: She has a normal mood and affect. Her behavior is normal.   Nursing note and vitals reviewed. MDM  Number of Diagnoses or Management Options  Diverticulitis of intestine without perforation or abscess without bleeding, unspecified part of intestinal tract:   Diagnosis management comments: DDX: UTI, diverticulitis, mass,     1:54 PM  Feeling much better. No pain. abd exam benign with minimal tenderness to LLQ, no guarding. No rebound. abd soft. Given recent CT finding of diverticulitis, no need to reimage with lack of fever and benign labs. Will start abx, pain meds, antinausea medication. Low threshold to return with any worsening - to which patient agrees. ED Course       Procedures           LABORATORY TESTS:  Recent Results (from the past 12 hour(s))   URINALYSIS W/ REFLEX CULTURE    Collection Time: 09/11/17 12:31 PM   Result Value Ref Range    Color YELLOW/STRAW      Appearance CLEAR CLEAR      Specific gravity 1.010 1.003 - 1.030      pH (UA) 7.0 5.0 - 8.0      Protein NEGATIVE  NEG mg/dL    Glucose NEGATIVE  NEG mg/dL    Ketone NEGATIVE  NEG mg/dL    Bilirubin NEGATIVE  NEG      Blood SMALL (A) NEG      Urobilinogen 0.2 0.2 - 1.0 EU/dL    Nitrites NEGATIVE  NEG      Leukocyte Esterase NEGATIVE  NEG      WBC 0-4 0 - 4 /hpf    RBC 0-5 0 - 5 /hpf    Epithelial cells FEW FEW /lpf    Bacteria NEGATIVE  NEG /hpf    UA:UC IF INDICATED CULTURE NOT INDICATED BY UA RESULT CNI     CBC WITH AUTOMATED DIFF    Collection Time: 09/11/17 12:31 PM   Result Value Ref Range    WBC 12.6 (H) 3.6 - 11.0 K/uL    RBC 4.31 3.80 - 5.20 M/uL    HGB 13.6 11.5 - 16.0 g/dL    HCT 39.7 35.0 - 47.0 %    MCV 92.1 80.0 - 99.0 FL    MCH 31.6 26.0 - 34.0 PG    MCHC 34.3 30.0 - 36.5 g/dL    RDW 12.8 11.5 - 14.5 %    PLATELET 537 039 - 862 K/uL    NEUTROPHILS 75 32 - 75 %    LYMPHOCYTES 15 12 - 49 %    MONOCYTES 9 5 - 13 %    EOSINOPHILS 1 0 - 7 %    BASOPHILS 0 0 - 1 %    ABS. NEUTROPHILS 9.5 (H) 1.8 - 8.0 K/UL    ABS. LYMPHOCYTES 1.8 0.8 - 3.5 K/UL    ABS. MONOCYTES 1.2 (H) 0.0 - 1.0 K/UL    ABS. EOSINOPHILS 0.1 0.0 - 0.4 K/UL    ABS. BASOPHILS 0.0 0.0 - 0.1 K/UL   METABOLIC PANEL, COMPREHENSIVE    Collection Time: 09/11/17 12:31 PM   Result Value Ref Range    Sodium 139 136 - 145 mmol/L    Potassium 3.6 3.5 - 5.1 mmol/L    Chloride 102 97 - 108 mmol/L    CO2 27 21 - 32 mmol/L    Anion gap 10 5 - 15 mmol/L    Glucose 137 (H) 65 - 100 mg/dL    BUN 14 6 - 20 MG/DL    Creatinine 0.72 0.55 - 1.02 MG/DL    BUN/Creatinine ratio 19 12 - 20      GFR est AA >60 >60 ml/min/1.73m2    GFR est non-AA >60 >60 ml/min/1.73m2    Calcium 9.3 8.5 - 10.1 MG/DL    Bilirubin, total 0.7 0.2 - 1.0 MG/DL    ALT (SGPT) 30 12 - 78 U/L    AST (SGOT) 15 15 - 37 U/L    Alk. phosphatase 92 45 - 117 U/L    Protein, total 7.6 6.4 - 8.2 g/dL    Albumin 3.5 3.5 - 5.0 g/dL    Globulin 4.1 (H) 2.0 - 4.0 g/dL    A-G Ratio 0.9 (L) 1.1 - 2.2     LACTIC ACID    Collection Time: 09/11/17 12:31 PM   Result Value Ref Range    Lactic acid 0.9 0.4 - 2.0 MMOL/L       IMAGING RESULTS:  No orders to display       MEDICATIONS GIVEN:  Medications   sodium chloride (NS) flush 5-10 mL (not administered)   sodium chloride (NS) flush 5-10 mL (not administered)   sodium chloride 0.9 % bolus infusion 500 mL (0 mL IntraVENous IV Completed 9/11/17 4804)   ondansetron (ZOFRAN) injection 4 mg (4 mg IntraVENous Given 9/11/17 1233)   morphine injection 2 mg (2 mg IntraVENous Given 9/11/17 1233)       IMPRESSION:  1. Diverticulitis of intestine without perforation or abscess without bleeding, unspecified part of intestinal tract        PLAN:  Low threshold to return with any worsening. Pt agrees to this plan of care. 1.   Discharge Medication List as of 9/11/2017  2:06 PM      START taking these medications    Details   ciprofloxacin HCl (CIPRO) 500 mg tablet Take 1 Tab by mouth two (2) times a day for 10 days. , Print, Disp-20 Tab, R-0 metroNIDAZOLE (FLAGYL) 500 mg tablet Take 1 Tab by mouth two (2) times a day for 10 days. , Normal, Disp-20 Tab, R-0      ondansetron (ZOFRAN ODT) 4 mg disintegrating tablet Take 1 Tab by mouth every eight (8) hours as needed for Nausea. , Print, Disp-10 Tab, R-0      HYDROcodone-acetaminophen (NORCO) 5-325 mg per tablet Take 1 Tab by mouth every four (4) hours as needed for Pain. Max Daily Amount: 6 Tabs., Print, Disp-18 Tab, R-0         CONTINUE these medications which have NOT CHANGED    Details   sucralfate (CARAFATE) 1 gram tablet Take 1 g by mouth four (4) times daily. , Historical Med      bismuth subsalicylate (PEPTO-BISMOL MAXIMUM STRENGTH) 525 mg/15 mL susp Take 30 mL by mouth every six (6) hours as needed., Historical Med      MULTIVIT-MIN/IRON/FOLIC/LUTEIN (CENTRUM SILVER WOMEN PO) Take  by mouth daily. , Historical Med      lisinopril (PRINIVIL, ZESTRIL) 40 mg tablet TAKE 1 TABLET BY MOUTH EVERY DAY, Normal, Disp-30 Tab, R-6      glyBURIDE (DIABETA) 2.5 mg tablet TAKE 1 TABLET BY MOUTH EVERY DAY, Normal, Disp-30 Tab, R-6      raNITIdine (ZANTAC) 300 mg tablet TAKE 1 TABLET BY MOUTH EVERY DAY, Normal, Disp-30 Tab, R-6      potassium chloride SA (MICRO-K) 10 mEq capsule TAKE ONE CAPSULE BY MOUTH TWICE DAILY, Normal, Disp-30 Cap, R-0      ONETOUCH ULTRA TEST strip TEST BLOOD SUGARS 3 TIMES DAILY, Normal, Disp-100 Each, R-12      Hydrochlorothiazide (HYDRODIURIL) 12.5 mg tablet Take 12.5 mg by mouth daily. , Historical Med, R-0      loperamide (IMODIUM A-D) 2 mg tablet Take 1 Tab by mouth four (4) times daily as needed for Diarrhea., Print, Disp-20 Tab, R-0         STOP taking these medications       diclofenac EC (VOLTAREN) 75 mg EC tablet Comments:   Reason for Stoppin.   Follow-up Information     Follow up With Details Comments Contact Info    Cody Casey MD Schedule an appointment as soon as possible for a visit in 2 days  [facility]  [phone] [facility] EMERGENCY DEPT  If symptoms worsen [address]        Return to ED if worse

## 2017-09-11 NOTE — ED TRIAGE NOTES
Patient states she's having a sharp abdominal pain in her lower abdomen since this morning. States is nauseous, denies vomiting, diarrhea.

## 2017-09-11 NOTE — ED NOTES
Pt presents to ED c/o intermittent LLQ abdominal pain x 2 days. Pt denies change in bowel habits or any urinary symptoms. Pt denies N/V/D. Pt placed in gown and in no acute distress. Emergency Department Nursing Plan of Care       The Nursing Plan of Care is developed from the Nursing assessment and Emergency Department Attending provider initial evaluation. The plan of care may be reviewed in the ED Provider note.     The Plan of Care was developed with the following considerations:   Patient / Family readiness to learn indicated by:verbalized understanding  Persons(s) to be included in education: patient  Barriers to Learning/Limitations:No    Signed     Maximo Esteban RN    9/11/2017   12:08 PM

## 2017-09-11 NOTE — ED NOTES
Discharge instructions were given to the patient by provider. Patient was given 4 prescriptions and was encouraged to call or return to the ED for worsening issues or problems and was encouraged to schedule a follow up appointment for continuing care. Patient given a current medication reconciliation form and verbalized understanding of their medications and importance of discussing medications at follow-up. The patient verbalized understanding of discharge instructions and prescriptions, all questions were answered. The patient has no further concerns at this time. Patient stable at time of discharge. The patient left the Emergency Department ambulatory, with self, and in no acute distress. Patient declined wheelchair transport out of Emergency Department.

## 2017-09-11 NOTE — DISCHARGE INSTRUCTIONS
Diverticulitis: Care Instructions  Your Care Instructions    Diverticulitis occurs when pouches form in the wall of the colon and become inflamed or infected. It can be very painful. Doctors aren't sure what causes diverticulitis. There is no proof that foods such as nuts, seeds, or berries cause it or make it worse. A low-fiber diet may cause the colon to work harder to push stool forward. Pouches may form because of this extra work. It may be hard to think about healthy eating while you're in pain. But as you recover, you might think about how you can use healthy eating for overall better health. Healthy eating may help you avoid future attacks. Follow-up care is a key part of your treatment and safety. Be sure to make and go to all appointments, and call your doctor if you are having problems. It's also a good idea to know your test results and keep a list of the medicines you take. How can you care for yourself at home? · Drink plenty of fluids, enough so that your urine is light yellow or clear like water. If you have kidney, heart, or liver disease and have to limit fluids, talk with your doctor before you increase the amount of fluids you drink. · Stick to liquids or a bland diet (plain rice, bananas, dry toast or crackers, applesauce) until you are feeling better. Then you can return to regular foods and gradually increase the amount of fiber in your diet. · Use a heating pad set on low on your belly to relieve mild cramps and pain. · Get extra rest until you are feeling better. · Be safe with medicines. Read and follow all instructions on the label. ¨ If the doctor gave you a prescription medicine for pain, take it as prescribed. ¨ If you are not taking a prescription pain medicine, ask your doctor if you can take an over-the-counter medicine. · If your doctor prescribed antibiotics, take them as directed. Do not stop taking them just because you feel better.  You need to take the full course of antibiotics. To prevent future attacks of diverticulitis  · Avoid constipation:  ¨ Include fruits, vegetables, beans, and whole grains in your diet each day. These foods are high in fiber. ¨ Drink plenty of fluids, enough so that your urine is light yellow or clear like water. If you have kidney, heart, or liver disease and have to limit fluids, talk with your doctor before you increase the amount of fluids you drink. ¨ Get some exercise every day. Build up slowly to 30 to 60 minutes a day on 5 or more days of the week. ¨ Take a fiber supplement, such as Citrucel or Metamucil, every day if needed. Read and follow all instructions on the label. ¨ Schedule time each day for a bowel movement. Having a daily routine may help. Take your time and do not strain when having a bowel movement. When should you call for help? Call 911 anytime you think you may need emergency care. For example, call if:  · You passed out (lost consciousness). · You vomit blood or what looks like coffee grounds. · You pass maroon or very bloody stools. Call your doctor now or seek immediate medical care if:  · You have severe pain or swelling in your belly. · You have a new or higher fever. · You cannot keep down fluids or medicines. · You have new pain that gets worse when you move or cough. Watch closely for changes in your health, and be sure to contact your doctor if:  · The symptoms you had when you first started feeling sick come back. · You do not get better as expected. Where can you learn more? Go to http://kalia-terell.info/. Enter H901 in the search box to learn more about \"Diverticulitis: Care Instructions. \"  Current as of: August 9, 2016  Content Version: 11.3  © 2543-2686 [employer]. Care instructions adapted under license by [employer] (which disclaims liability or warranty for this information).  If you have questions about a medical condition or this instruction, always ask your healthcare professional. [place] [address] any warranty or liability for your use of this information.

## 2017-09-12 NOTE — PERIOP NOTES
[facility]  Ambulatory Surgery Unit  Pre-operative Instructions for Endo Procedures    Procedure Date  9/18/17            Tentative Arrival Time 0615      1. On the day of your procedure, please report to the Ambulatory Surgery Unit Registration Desk and sign in at your designated time. The Ambulatory Surgery Unit is located in [facility] on the [facility] side of the \[facility] [place] [facility]\"" across from the [address] [place] [address]. Please have all of your health insurance cards and a photo ID. 2. You must have someone with you to drive you home, as you should not drive a car for 24 hours following anesthesia. Please make arrangements for a responsible adult friend or family member to stay with you for at least the first 24 hours after your procedure. 3. Do not have anything to eat or drink (including water, gum, mints, coffee, juice) after midnight   9/17/17. This may not apply to medications prescribed by your physician. (Please note below the special instructions with medications to take the morning of your procedure.)    4. If applicable, follow the clear liquid diet and bowel prep instructions provided by your physician's office. If you do not have this information, or have any questions, please contact your physician's office. 5. We recommend you do not drink any alcoholic beverages for 24 hours before and after your procedure. 6. Stop all Aspirin, non-steroidal anti-inflammatory drugs (i.e. Advil, Aleve), vitamins, and supplements as directed by your surgeon's office. **If you are currently taking Plavix, Coumadin, or other blood-thinning agents, contact your surgeon for instructions. **    7. In an effort to help prevent surgical site infection, we ask that you shower with an anti-bacterial soap (i.e. Dial or Safeguard) on the morning of your procedure. Do not apply any lotions, powders, or deodorants after showering. 8. Wear comfortable clothes. Wear glasses instead of contacts.  Do not bring any jewelry or money (other than copays or fees as instructed). Do not wear make-up, particularly mascara, the morning of your procedure. Wear your hair loose or down, no ponytails, buns, amanda pins or clips. All body piercings must be removed. 9. You should understand that if you do not follow these instructions your procedure may be cancelled. If your physical condition changes (i.e. fever, cold or flu) please contact your surgeon as soon as possible. 10. It is important that you be on time. If a situation occurs where you may be late, or if you have any questions or problems, please call [phone]. 11. Your procedure time may be subject to change. You will receive a phone call the day prior to confirm your arrival time. Special Instructions: Take all medications and inhalers, as prescribed, on the morning of surgery with a sip of water EXCEPT: no blood sugar medicine      I understand a pre-operative phone call will be made to verify my procedure time. In the event that I am not available, I give permission for a message to be left on my answering service and/or with another person?       Yes     (instructions given verbally during phone assessment- pt voiced understanding)     ___________________      ___________________      ___________________  (Signature of Patient)          (Witness)                   (Date and Time)

## 2017-09-15 ENCOUNTER — ANESTHESIA EVENT (OUTPATIENT)
Dept: SURGERY | Age: 62
End: 2017-09-15
Payer: COMMERCIAL

## 2017-09-18 ENCOUNTER — HOSPITAL ENCOUNTER (OUTPATIENT)
Age: 62
Setting detail: OUTPATIENT SURGERY
Discharge: HOME | End: 2017-09-18
Attending: INTERNAL MEDICINE | Admitting: INTERNAL MEDICINE
Payer: COMMERCIAL

## 2017-09-18 ENCOUNTER — ANESTHESIA (OUTPATIENT)
Dept: SURGERY | Age: 62
End: 2017-09-18
Payer: COMMERCIAL

## 2017-09-18 VITALS
OXYGEN SATURATION: 99 % | WEIGHT: 153 LBS | HEART RATE: 61 BPM | BODY MASS INDEX: 27.11 KG/M2 | RESPIRATION RATE: 11 BRPM | DIASTOLIC BLOOD PRESSURE: 87 MMHG | HEIGHT: 63 IN | SYSTOLIC BLOOD PRESSURE: 120 MMHG | TEMPERATURE: 97.6 F

## 2017-09-18 LAB
GLUCOSE BLD STRIP.AUTO-MCNC: 146 MG/DL (ref 65–100)
SERVICE CMNT-IMP: ABNORMAL

## 2017-09-18 PROCEDURE — 74011250636 HC RX REV CODE- 250/636: Performed by: ANESTHESIOLOGY

## 2017-09-18 PROCEDURE — 76210000046 HC AMBSU PH II REC FIRST 0.5 HR: Performed by: INTERNAL MEDICINE

## 2017-09-18 PROCEDURE — 82962 GLUCOSE BLOOD TEST: CPT

## 2017-09-18 PROCEDURE — 76060000073 HC AMB SURG ANES FIRST 0.5 HR: Performed by: INTERNAL MEDICINE

## 2017-09-18 PROCEDURE — 76030000002 HC AMB SURG OR TIME FIRST 0.: Performed by: INTERNAL MEDICINE

## 2017-09-18 PROCEDURE — 74011000250 HC RX REV CODE- 250

## 2017-09-18 PROCEDURE — 74011250636 HC RX REV CODE- 250/636

## 2017-09-18 PROCEDURE — 76210000040 HC AMBSU PH I REC FIRST 0.5 HR: Performed by: INTERNAL MEDICINE

## 2017-09-18 PROCEDURE — 77030020255 HC SOL INJ LR 1000ML BG: Performed by: INTERNAL MEDICINE

## 2017-09-18 RX ORDER — SODIUM CHLORIDE, SODIUM LACTATE, POTASSIUM CHLORIDE, CALCIUM CHLORIDE 600; 310; 30; 20 MG/100ML; MG/100ML; MG/100ML; MG/100ML
25 INJECTION, SOLUTION INTRAVENOUS CONTINUOUS
Status: DISCONTINUED | OUTPATIENT
Start: 2017-09-18 | End: 2017-09-18 | Stop reason: HOSPADM

## 2017-09-18 RX ORDER — FENTANYL CITRATE 50 UG/ML
25 INJECTION, SOLUTION INTRAMUSCULAR; INTRAVENOUS
Status: DISCONTINUED | OUTPATIENT
Start: 2017-09-18 | End: 2017-09-18 | Stop reason: HOSPADM

## 2017-09-18 RX ORDER — DIPHENHYDRAMINE HYDROCHLORIDE 50 MG/ML
12.5 INJECTION, SOLUTION INTRAMUSCULAR; INTRAVENOUS AS NEEDED
Status: DISCONTINUED | OUTPATIENT
Start: 2017-09-18 | End: 2017-09-18 | Stop reason: HOSPADM

## 2017-09-18 RX ORDER — PROPOFOL 10 MG/ML
INJECTION, EMULSION INTRAVENOUS AS NEEDED
Status: DISCONTINUED | OUTPATIENT
Start: 2017-09-18 | End: 2017-09-18 | Stop reason: HOSPADM

## 2017-09-18 RX ORDER — SODIUM CHLORIDE 0.9 % (FLUSH) 0.9 %
5-10 SYRINGE (ML) INJECTION EVERY 8 HOURS
Status: DISCONTINUED | OUTPATIENT
Start: 2017-09-18 | End: 2017-09-18 | Stop reason: HOSPADM

## 2017-09-18 RX ORDER — LIDOCAINE HYDROCHLORIDE 10 MG/ML
0.1 INJECTION, SOLUTION EPIDURAL; INFILTRATION; INTRACAUDAL; PERINEURAL AS NEEDED
Status: DISCONTINUED | OUTPATIENT
Start: 2017-09-18 | End: 2017-09-18 | Stop reason: HOSPADM

## 2017-09-18 RX ORDER — ONDANSETRON 2 MG/ML
4 INJECTION INTRAMUSCULAR; INTRAVENOUS AS NEEDED
Status: DISCONTINUED | OUTPATIENT
Start: 2017-09-18 | End: 2017-09-18 | Stop reason: HOSPADM

## 2017-09-18 RX ORDER — SODIUM CHLORIDE 0.9 % (FLUSH) 0.9 %
5-10 SYRINGE (ML) INJECTION AS NEEDED
Status: DISCONTINUED | OUTPATIENT
Start: 2017-09-18 | End: 2017-09-18 | Stop reason: HOSPADM

## 2017-09-18 RX ORDER — LIDOCAINE HYDROCHLORIDE 20 MG/ML
INJECTION, SOLUTION EPIDURAL; INFILTRATION; INTRACAUDAL; PERINEURAL AS NEEDED
Status: DISCONTINUED | OUTPATIENT
Start: 2017-09-18 | End: 2017-09-18 | Stop reason: HOSPADM

## 2017-09-18 RX ADMIN — PROPOFOL 120 MG: 10 INJECTION, EMULSION INTRAVENOUS at 07:49

## 2017-09-18 RX ADMIN — PROPOFOL 70 MG: 10 INJECTION, EMULSION INTRAVENOUS at 07:40

## 2017-09-18 RX ADMIN — SODIUM CHLORIDE, SODIUM LACTATE, POTASSIUM CHLORIDE, AND CALCIUM CHLORIDE 25 ML/HR: 600; 310; 30; 20 INJECTION, SOLUTION INTRAVENOUS at 06:51

## 2017-09-18 RX ADMIN — LIDOCAINE HYDROCHLORIDE 50 MG: 20 INJECTION, SOLUTION EPIDURAL; INFILTRATION; INTRACAUDAL; PERINEURAL at 07:40

## 2017-09-18 NOTE — ANESTHESIA PREPROCEDURE EVALUATION
Anesthetic History   No history of anesthetic complications            Review of Systems / Medical History  Patient summary reviewed, nursing notes reviewed and pertinent labs reviewed    Pulmonary    COPD      Smoker (marijuana daily)         Neuro/Psych   Within defined limits           Cardiovascular    Hypertension: well controlled              Exercise tolerance: >4 METS     GI/Hepatic/Renal     GERD: well controlled           Endo/Other    Diabetes: type 2    Arthritis     Other Findings              Physical Exam    Airway  Mallampati: I  TM Distance: 4 - 6 cm  Neck ROM: normal range of motion   Mouth opening: Normal     Cardiovascular    Rhythm: regular  Rate: normal      Pertinent negatives: No murmur   Dental    Dentition: Poor dentition     Pulmonary  Breath sounds clear to auscultation               Abdominal  GI exam deferred       Other Findings            Anesthetic Plan    ASA: 2  Anesthesia type: general and total IV anesthesia          Induction: Intravenous  Anesthetic plan and risks discussed with: Patient      preop glucose 146

## 2017-09-18 NOTE — PERIOP NOTES
PRIOR TO CASE, DR. QUINONES [facility] NOTIFIED THAT PATIENT STATES SHE ONLY HAD 1/2 OF THE PREP.

## 2017-09-18 NOTE — IP AVS SNAPSHOT
[address] [facility]. 
[phone] Patient: Sharmila Long MRN: [id number] DDT:7/20/[birth year]74 You are allergic to the following No active allergies Recent Documentation Height Weight BMI OB Status Smoking Status 1.6 m 69.4 kg 27.1 kg/m2 Postmenopausal Former Smoker Emergency Contacts Name Discharge Info Relation Home Work Mobile Melvi Zamarripa DISCHARGE CAREGIVER [3] Child [2] Dutch Amaya DISCHARGE CAREGIVER [3] Spouse [3]   [phone] About your hospitalization You were admitted on:  September 18, 2017 You last received care in the:  \[facility]\"" ASU PACU You were discharged on:  September 18, 2017 Unit phone number:  [phone] Why you were hospitalized Your primary diagnosis was:  Not on File Providers Seen During Your Hospitalizations Provider Role Specialty Primary office phone Hiram Ellis MD Attending Provider Gastroenterology [phone] Your Primary Care Physician (PCP) Primary Care Physician Office Phone Office Fax [address] [facility], [address] [place] [address] [phone] Follow-up Information None Your Appointments Tuesday September 19, 2017 10:45 AM EDT ROUTINE CARE with Kenny Phillips MD  
[facility], [place] ([facility]) [address] [place] [address],[address] [place] [address] [id number] [phone] Current Discharge Medication List  
  
CONTINUE these medications which have CHANGED Dose & Instructions Dispensing Information Comments Morning Noon Evening Bedtime  
 potassium chloride SA 10 mEq capsule Commonly known as:  [place]b Roy What changed:  See the new instructions. Your last dose was: Your next dose is: TAKE ONE CAPSULE BY MOUTH TWICE DAILY Quantity:  30 Cap Refills:  0 CONTINUE these medications which have NOT CHANGED Dose & Instructions Dispensing Information Comments Morning Noon Evening Bedtime  
 bismuth subsalicylate 225 EL/48 mL Susp Commonly known as:  PEPTO-BISMOL MAXIMUM STRENGTH Your last dose was: Your next dose is:    
   
   
 Dose:  30 mL Take 30 mL by mouth every six (6) hours as needed. Refills:  0 CENTRUM SILVER WOMEN PO Your last dose was: Your next dose is: Take  by mouth daily. Refills:  0  
     
   
   
   
  
 ciprofloxacin HCl 500 mg tablet Commonly known as:  CIPRO Your last dose was: Your next dose is:    
   
   
 Dose:  500 mg Take 1 Tab by mouth two (2) times a day for 10 days. Quantity:  20 Tab Refills:  0  
     
   
   
   
  
 glyBURIDE 2.5 mg tablet Commonly known as:  Sherlyn Phil Your last dose was: Your next dose is: TAKE 1 TABLET BY MOUTH EVERY DAY Quantity:  30 Tab Refills:  6  
     
   
   
   
  
 hydroCHLOROthiazide 12.5 mg tablet Commonly known as:  HYDRODIURIL Your last dose was: Your next dose is:    
   
   
 Dose:  12.5 mg Take 12.5 mg by mouth daily. Refills:  0 HYDROcodone-acetaminophen 5-325 mg per tablet Commonly known as:  Janas Wyola Your last dose was: Your next dose is:    
   
   
 Dose:  1 Tab Take 1 Tab by mouth every four (4) hours as needed for Pain. Max Daily Amount: 6 Tabs. Quantity:  18 Tab Refills:  0  
     
   
   
   
  
 lisinopril 40 mg tablet Commonly known as:  Alejandro Sheryl Your last dose was: Your next dose is: TAKE 1 TABLET BY MOUTH EVERY DAY Quantity:  30 Tab Refills:  6  
     
   
   
   
  
 loperamide 2 mg tablet Commonly known as:  IMODIUM A-D Your last dose was: Your next dose is:    
   
   
 Dose:  2 mg Take 1 Tab by mouth four (4) times daily as needed for Diarrhea. Quantity:  20 Tab Refills:  0  
     
   
   
   
  
 metroNIDAZOLE 500 mg tablet Commonly known as:  FLAGYL Your last dose was: Your next dose is:    
   
   
 Dose:  500 mg Take 1 Tab by mouth two (2) times a day for 10 days. Quantity:  20 Tab Refills:  0  
     
   
   
   
  
 ondansetron 4 mg disintegrating tablet Commonly known as:  ZOFRAN ODT Your last dose was: Your next dose is:    
   
   
 Dose:  4 mg Take 1 Tab by mouth every eight (8) hours as needed for Nausea. Quantity:  10 Tab Refills:  0  
     
   
   
   
  
 ONETOUCH ULTRA TEST strip Generic drug:  glucose blood VI test strips Your last dose was: Your next dose is:    
   
   
 TEST BLOOD SUGARS 3 TIMES DAILY Quantity:  100 Each Refills:  12  
     
   
   
   
  
 raNITIdine 300 mg tablet Commonly known as:  ZANTAC Your last dose was: Your next dose is: TAKE 1 TABLET BY MOUTH EVERY DAY Quantity:  30 Tab Refills:  6  
     
   
   
   
  
 sucralfate 1 gram tablet Commonly known as:  [place] Your last dose was: Your next dose is:    
   
   
 Dose:  1 g Take 1 g by mouth four (4) times daily. Refills:  0 Discharge Instructions [place] Office: [phone] Jenny Young [id number] 1955 EGD/COLONOSCOPY DISCHARGE INSTRUCTIONS Discomfort: 
Sore throat- throat lozenges or warm salt water gargle 
redness at IV site- apply warm compress to area; if redness or soreness persist- contact your physician Gaseous discomfort- walking, belching will help relieve any discomfort You may not operate a vehicle for 24 hours You may not engage in an occupation involving machinery or appliances for rest of today. You may not drink alcoholic beverages for at least 24 hours Avoid making any critical decisions for at least 24 hour DIET You may resume your regular diet  however -  remember your colon is empty and a heavy meal will produce gas. Avoid these foods:  fried / greasy foods, excessive carbonated drinks or too much caffeine MEDICATIONS Regarding Aspirin or Nonsteroidal medications specifically, please see below. ACTIVITY You may resume your normal daily activities. Spend the remainder of the day resting -  avoid any strenuous activity. CALL M.D. ANY SIGN OF Increasing pain, nausea, vomiting Abdominal distension (swelling) New increased bleeding (oral or rectal) Fever (chills) Pain in chest area Bloody discharge from nose or mouth Shortness of breath You may  take any Advil, Aspirin, Ibuprofen, Motrin, Aleve, or Tylenol as needed for pain. Follow-up Instructions: 
 Call  Koko Bonds MD for any questions or concerns Telephone # [phone] Follow-up Information None DO NOT TAKE TYLENOL/ACETAMINOPHEN WITH PERCOCET, [facility], [address] TAKE NARCOTIC PAIN MEDICATIONS WITH FOOD Narcotics tend to be constipating, we suggest taking a stool softener such as Colace or Miralax (follow package instructions). DO NOT DRIVE WHILE TAKING NARCOTIC PAIN MEDICATIONS. DO NOT TAKE SLEEPING MEDICATIONS OR ANTIANXIETY MEDICATIONS WHILE TAKING NARCOTIC PAIN MEDICATIONS,  ESPECIALLY THE NIGHT OF ANESTHESIA. CPAP PATIENTS BE SURE TO WEAR MACHINE WHENEVER NAPPING OR SLEEPING. DISCHARGE SUMMARY from Nurse The following personal items collected during your admission are returned to you:  
Dental Appliance: Dental Appliances: None Vision: Visual Aid: Glasses, With patient Hearing Aid:   
Jewelry:   
Clothing:   
Other Valuables:   
Valuables sent to safe:   
 
 
PATIENT INSTRUCTIONS: 
 
 
B - Balance E - Eyes F-  Face looks uneven A-  Arms unable to move or move even S-  Speech slurred or non-existent T-  Time-call 911 as soon as signs and symptoms begin-DO NOT go Back to bed or wait to see if you get better-TIME IS BRAIN. If you have not received your influenza and/or pneumococcal vaccine, please follow up with your primary care physician. The discharge information has been reviewed with the patient and caregiver. The patient and caregiver verbalized understanding. Discharge Orders None Introducing [facility]! Dear Jean Bumpers: Thank you for requesting a [employer] account. Our records indicate that you have previously registered for a [employer] account but its currently inactive. Please call our [employer] support line at [phone]. Additional Information If you have questions, please visit the Frequently Asked Questions section of the [employer] website at [email or web address] Remember, [employer] is NOT to be used for urgent needs. For medical emergencies, dial 911. Now available from your iPhone and Android! General Information Please provide this summary of care documentation to your next provider. Patient Signature:  ____________________________________________________________ Date:  ____________________________________________________________  
  
Deana Palencia Provider Signature:  ____________________________________________________________ Date:  ____________________________________________________________

## 2017-09-18 NOTE — ANESTHESIA POSTPROCEDURE EVALUATION
Post-Anesthesia Evaluation and Assessment    Patient: Massimo Peters MRN: [id number]  SSN: [id number]    YOB: 1955  Age: 63 y.o. Sex: female       Cardiovascular Function/Vital Signs  Visit Vitals    /87 (BP 1 Location: Left arm, BP Patient Position: At rest)    Pulse 61    Temp 36.4 °C (97.6 °F)    Resp 11    Ht 5' 3\" (1.6 m)    Wt 69.4 kg (153 lb)    SpO2 99%    BMI 27.1 kg/m2       Patient is status post general, total IV anesthesia anesthesia for Procedure(s):  COLONOSCOPY. Nausea/Vomiting: None    Postoperative hydration reviewed and adequate. Pain:  Pain Scale 1: Numeric (0 - 10) (09/18/17 0810)  Pain Intensity 1: 0 (09/18/17 0810)   Managed    Neurological Status: At baseline    Mental Status and Level of Consciousness: Arousable    Pulmonary Status:   O2 Device: Room air (09/18/17 0810)   Adequate oxygenation and airway patent    Complications related to anesthesia: None    Post-anesthesia assessment completed.  No concerns    Signed By: Esther Blanca MD     September 18, 2017

## 2017-09-18 NOTE — DISCHARGE INSTRUCTIONS
[place] Office: [phone]    Tayo Sauceda  [id number]  1955    EGD/COLONOSCOPY DISCHARGE INSTRUCTIONS  Discomfort:  Sore throat- throat lozenges or warm salt water gargle  redness at IV site- apply warm compress to area; if redness or soreness persist- contact your physician  Gaseous discomfort- walking, belching will help relieve any discomfort  You may not operate a vehicle for 24 hours  You may not engage in an occupation involving machinery or appliances for rest of today. You may not drink alcoholic beverages for at least 24 hours  Avoid making any critical decisions for at least 24 hour  DIET  You may resume your regular diet - however -  remember your colon is empty and a heavy meal will produce gas. Avoid these foods:  fried / greasy foods, excessive carbonated drinks or too much caffeine  MEDICATIONS   Regarding Aspirin or Nonsteroidal medications specifically, please see below. ACTIVITY  You may resume your normal daily activities. Spend the remainder of the day resting -  avoid any strenuous activity. CALL M.D. ANY SIGN OF   Increasing pain, nausea, vomiting  Abdominal distension (swelling)  New increased bleeding (oral or rectal)  Fever (chills)  Pain in chest area  Bloody discharge from nose or mouth  Shortness of breath    You may  take any Advil, Aspirin, Ibuprofen, Motrin, Aleve, or Tylenol as needed for pain. Follow-up Instructions:   Call  Koko Kaur MD for any questions or concerns   Telephone # [phone]      Follow-up Information     None          DO NOT TAKE TYLENOL/ACETAMINOPHEN WITH PERCOCET, [address], [address] [address] [facility]. TAKE NARCOTIC PAIN MEDICATIONS WITH FOOD   Narcotics tend to be constipating, we suggest taking a stool softener such as Colace or Miralax (follow package instructions). DO NOT DRIVE WHILE TAKING NARCOTIC PAIN MEDICATIONS.     DO NOT TAKE SLEEPING MEDICATIONS OR ANTIANXIETY MEDICATIONS WHILE TAKING NARCOTIC PAIN MEDICATIONS,  ESPECIALLY THE NIGHT OF ANESTHESIA. CPAP PATIENTS BE SURE TO WEAR MACHINE WHENEVER NAPPING OR SLEEPING. DISCHARGE SUMMARY from Nurse    The following personal items collected during your admission are returned to you:   Dental Appliance: Dental Appliances: None  Vision: Visual Aid: Glasses, With patient  Hearing Aid:    Jewelry:    Clothing:    Other Valuables:    Valuables sent to safe:        PATIENT INSTRUCTIONS:    After General Anesthesia or Intravenous Sedation, for 24 hours or while taking prescription Narcotics:        Someone should be with you for the next 24 hours. For your own safety, a responsible adult must drive you home. · Limit your activities  · Recommended activity: Rest today, up with assistance today. Do not climb stairs or shower unattended for the next 24 hours. · Please start with a soft bland diet and advance as tolerated (no nausea) to regular diet. · If you have a sore throat you should try the following: fluids, warm salt water gargles, or throat lozenges. If it does not improve after several days please follow up with your primary physician. · Do not drive and operate hazardous machinery  · Do not make important personal or business decisions  · Do  not drink alcoholic beverages  · If you have not urinated within 8 hours after discharge, please contact your surgeon on call. Report the following to your surgeon:  · Excessive pain, swelling, redness or odor of or around the surgical area  · Temperature over 100.5  · Nausea and vomiting lasting longer than 4 hours or if unable to take medications  · Any signs of decreased circulation or nerve impairment to extremity: change in color, persistent  numbness, tingling, coldness or increase pain      · You will receive a Post Operative Call from one of the Recovery Room Nurses on the day after your surgery to check on you. It is very important for us to know how you are recovering after your surgery.  If you have an issue or need to speak with someone, please call your surgeon, do not wait for the post operative call. · You may receive an e-mail or letter in the mail from [facility] regarding your experience with us in the Ambulatory Surgery Unit. Your feedback is valuable to us and we appreciate your participation in the survey. · If the above instructions are not adequate, please contact Shoshana England RN, [facility] anesthesia Nurse Manager or our Anesthesiologist, at [phone]. If you are having problems after your surgery, call the physician at his office number. · We wish you a speedy recovery ? What to do at Home:      *  Please give a list of your current medications to your Primary Care Provider. *  Please update this list whenever your medications are discontinued, doses are      changed, or new medications (including over-the-counter products) are added. *  Please carry medication information at all times in case of emergency situations. These are general instructions for a healthy lifestyle:    No smoking/ No tobacco products/ Avoid exposure to second hand smoke    Surgeon General's Warning:  Quitting smoking now greatly reduces serious risk to your health. Obesity, smoking, and sedentary lifestyle greatly increases your risk for illness    A healthy diet, regular physical exercise & weight monitoring are important for maintaining a healthy lifestyle    You may be retaining fluid if you have a history of heart failure or if you experience any of the following symptoms:  Weight gain of 3 pounds or more overnight or 5 pounds in a week, increased swelling in our hands or feet or shortness of breath while lying flat in bed. Please call your doctor as soon as you notice any of these symptoms; do not wait until your next office visit.     Recognize signs and symptoms of STROKE:    B - Balance  E - Eyes    F-  Face looks uneven    A-  Arms unable to move or move even    S-  Speech slurred or non-existent    T-  Time-call 911 as soon as signs and symptoms begin-DO NOT go       Back to bed or wait to see if you get better-TIME IS BRAIN. If you have not received your influenza and/or pneumococcal vaccine, please follow up with your primary care physician. The discharge information has been reviewed with the patient and caregiver. The patient and caregiver verbalized understanding.

## 2017-09-18 NOTE — H&P
Pre-endoscopy H and P    The patient was seen and examined in the room/pre-op holding area. The airway was assessed and documented. The problem list, past medical history, and medications were reviewed. Patient Active Problem List   Diagnosis Code    COPD (chronic obstructive pulmonary disease) ([facility] J44.9    DM (dermatomyositis) M33.90    Insomnia G47.00    Arthritis, Degenerative, Knee M17.10    Hypercholesteremia E78.00    Hypertension with goal blood pressure less than 130/85 I10    GERD, gastro - Esophageal Reflux Disease K21.9    Acute diverticulitis K57.92    Diverticulitis of colon K57.32     Social History     Social History    Marital status:      Spouse name: N/A    Number of children: N/A    Years of education: N/A     Occupational History    Not on file. Social History Main Topics    Smoking status: Former Smoker     Quit date: 4/15/2015    Smokeless tobacco: Never Used    Alcohol use No      Comment: occ    Drug use: Yes     Special: Marijuana      Comment: \"daily\"    Sexual activity: Yes     Partners: Male     Other Topics Concern    Not on file     Social History Narrative        She lives with her  she has five adult children denies alcohol cigarette use     Past Medical History:   Diagnosis Date    Arthritis, Degenerative, Knee 7/15/2011    COPD (chronic obstructive pulmonary disease) ([facility] 7/15/2011    Diverticulitis     GERD, gastro - Esophageal Reflux Disease 7/15/2011    Hypercholesterolemia 7/15/2011    Hypertension     Type II or unspecified type diabetes mellitus without mention of complication, uncontrolled 7/15/2011         Prior to Admission Medications   Prescriptions Last Dose Informant Patient Reported? Taking? HYDROcodone-acetaminophen (NORCO) 5-325 mg per tablet 9/17/2017 at Unknown time  No Yes   Sig: Take 1 Tab by mouth every four (4) hours as needed for Pain. Max Daily Amount: 6 Tabs.    Hydrochlorothiazide (HYDRODIURIL) 12.5 mg tablet 9/17/2017 at Unknown time Self Yes Yes   Sig: Take 12.5 mg by mouth daily. MULTIVIT-MIN/IRON/FOLIC/LUTEIN (CENTRUM SILVER WOMEN PO) 9/17/2017 at Unknown time Self Yes Yes   Sig: Take  by mouth daily. ONETOUCH ULTRA TEST strip 9/16/2017 Self No No   Sig: TEST BLOOD SUGARS 3 TIMES DAILY   bismuth subsalicylate (PEPTO-BISMOL MAXIMUM STRENGTH) 525 mg/15 mL susp Unknown at Unknown time Self Yes No   Sig: Take 30 mL by mouth every six (6) hours as needed. ciprofloxacin HCl (CIPRO) 500 mg tablet 9/17/2017 at Unknown time  No Yes   Sig: Take 1 Tab by mouth two (2) times a day for 10 days. glyBURIDE (DIABETA) 2.5 mg tablet 9/17/2017 at Unknown time Self No Yes   Sig: TAKE 1 TABLET BY MOUTH EVERY DAY   lisinopril (PRINIVIL, ZESTRIL) 40 mg tablet 9/17/2017 at Unknown time Self No Yes   Sig: TAKE 1 TABLET BY MOUTH EVERY DAY   loperamide (IMODIUM A-D) 2 mg tablet 8/18/2017 at Unknown time Self No Yes   Sig: Take 1 Tab by mouth four (4) times daily as needed for Diarrhea. metroNIDAZOLE (FLAGYL) 500 mg tablet 9/17/2017 at Unknown time  No Yes   Sig: Take 1 Tab by mouth two (2) times a day for 10 days. ondansetron (ZOFRAN ODT) 4 mg disintegrating tablet 9/10/2017  No Yes   Sig: Take 1 Tab by mouth every eight (8) hours as needed for Nausea. potassium chloride SA (MICRO-K) 10 mEq capsule 9/17/2017 at Unknown time Self No Yes   Sig: TAKE ONE CAPSULE BY MOUTH TWICE DAILY   Patient taking differently: TAKE ONE CAPSULE BY MOUTH once DAILY   raNITIdine (ZANTAC) 300 mg tablet 9/17/2017 at Unknown time Self No Yes   Sig: TAKE 1 TABLET BY MOUTH EVERY DAY   sucralfate (CARAFATE) 1 gram tablet 9/17/2017 at Unknown time Self Yes Yes   Sig: Take 1 g by mouth four (4) times daily.       Facility-Administered Medications: None           The review of systems is:  Negative  for shortness of breath or chest pain      The heart, lungs, and mental status were satisfactory for the administration of deep sedation and for the procedure. I discussed with the patient the objectives, risks, consequences and alternatives to the procedure.       Juanita Patiño MD  9/18/2017  7:34 AM

## 2017-09-18 NOTE — PERIOP NOTES
Quincy Mckeon  [birth year]  [id number]    Situation:  Verbal report given from: Asiya Lester and JANICE Mccarthy RN  Procedure: Procedure(s):  COLONOSCOPY    Background:    Preoperative diagnosis: DIVERTICULITIS   HISTORY OF POLYPS     Postoperative diagnosis: DIVERTICULOSIS, POOR PREP-SOLID STOOL, HEMORRHOIDS    :  Dr. Jasmine Maria    Assistant(s): Circ-1: Estela Serrano RN  Circ-2: Jonathan Carballo RN  Scrub Tech-1: Lisa Strange    Specimens: * No specimens in log *    Assessment:  Intra-procedure medications         Anesthesia gave intra-procedure sedation and medications, see anesthesia flow sheet     Intravenous fluids: LR@ KVO     Vital signs stable       Recommendation:    Permission to share finding with family or friend yes

## 2017-09-18 NOTE — PROCEDURES
Colonoscopy Procedure Note    Quincy Mckeon  [birth year]  [id number]        Pre-operative Diagnosis: DIVERTICULITIS   HISTORY OF POLYPS     Post-operative Diagnosis: DIVERTICULOSIS, POOR PREP/SOLID STOOL, INTERNAL HEMORRHOIDS      : Koko Maria MD    Referring Provider: Brian Rivera MD    Sedation:  MAC anesthesia Propofol        Procedure Details:    After detailed informed consent was obtained with all risks and benefits of procedure explained and preoperative exam completed, the patient was taken to the endoscopy suite and placed in the left lateral decubitus position. Upon sequential sedation as per above, a digital rectal exam was performed  And was normal.  The Olympus videocolonoscope  was inserted in the rectum and carefully advanced to the cecum, which was identified by the ileocecal valve. The quality of preparation was unsatisfactory. The colonoscope was slowly withdrawn with careful evaluation between folds. Retroflexion in the rectum was not performed. Findings:     · The colon is full of stool (solid in right colon and mixed with prep in the left colon). ·  Views are subintimal as a result. · Moderate sigmoid diverticulosis. · Internal hemorrhoids with lax anal muscle tone noted. · Polyps/masses cannot be ruled out with this prep. Therapies:  none    Specimen:  none     Complications: None were encountered during the procedure. EBL:  None. Recommendations:    -Repeat colonoscopy in 6 months.  -High fiber diet.    -For new bleeding, unexplained weight loss,change in bowel habits and anemia, an earlier colonoscopy should be considered. Koko Maria MD  9/18/2017  7:50 AM

## 2017-09-18 NOTE — PERIOP NOTES
Patient: Peggy Carrillo MRN: [id number]  SSN: [id number]   YOB: 1955  Age: 63 y.o. Sex: female     Patient is status post Procedure(s):  COLONOSCOPY. Surgeon(s) and Role:     * Izabel Mehta MD - Primary                      Peripheral IV 09/18/17 Right Hand (Active)   Site Assessment Clean, dry, & intact 9/18/2017  6:42 AM   Phlebitis Assessment 0 9/18/2017  6:42 AM   Infiltration Assessment 0 9/18/2017  6:42 AM   Dressing Status Clean, dry, & intact 9/18/2017  6:42 AM   Dressing Type Tape;Transparent 9/18/2017  6:42 AM   Hub Color/Line Status Pink; Infusing 9/18/2017  6:42 AM

## 2017-09-19 ENCOUNTER — OFFICE VISIT (OUTPATIENT)
Dept: INTERNAL MEDICINE CLINIC | Age: 62
End: 2017-09-19

## 2017-09-19 VITALS
SYSTOLIC BLOOD PRESSURE: 96 MMHG | DIASTOLIC BLOOD PRESSURE: 68 MMHG | WEIGHT: 153 LBS | HEIGHT: 63 IN | BODY MASS INDEX: 27.11 KG/M2 | TEMPERATURE: 98 F | OXYGEN SATURATION: 97 % | HEART RATE: 63 BPM | RESPIRATION RATE: 16 BRPM

## 2017-09-19 DIAGNOSIS — J44.9 CHRONIC OBSTRUCTIVE PULMONARY DISEASE, UNSPECIFIED COPD TYPE (HCC): ICD-10-CM

## 2017-09-19 DIAGNOSIS — K57.32 DIVERTICULITIS OF COLON: Primary | ICD-10-CM

## 2017-09-19 DIAGNOSIS — I10 ESSENTIAL HYPERTENSION: ICD-10-CM

## 2017-09-19 DIAGNOSIS — E11.9 CONTROLLED TYPE 2 DIABETES MELLITUS WITHOUT COMPLICATION, WITHOUT LONG-TERM CURRENT USE OF INSULIN (HCC): ICD-10-CM

## 2017-09-19 NOTE — PATIENT INSTRUCTIONS
[employer] Activation    Thank you for requesting access to [employer]. Please follow the instructions below to securely access and download your online medical record. [employer] allows you to send messages to your doctor, view your test results, renew your prescriptions, schedule appointments, and more. How Do I Sign Up? 1. In your internet browser, go to [email or web address]  2. Click on the First Time User? Click Here link in the Sign In box. You will be redirect to the New Member Sign Up page. 3. Enter your [employer] Access Code exactly as it appears below. You will not need to use this code after youve completed the sign-up process. If you do not sign up before the expiration date, you must request a new code. [employer] Access Code: Activation code not generated  Current [employer] Status: Patient Declined (This is the date your [employer] access code will )    4. Enter the last four digits of your Social Security Number (xxxx) and Date of Birth (mm/dd/yyyy) as indicated and click Submit. You will be taken to the next sign-up page. 5. Create a [employer] ID. This will be your [employer] login ID and cannot be changed, so think of one that is secure and easy to remember. 6. Create a [employer] password. You can change your password at any time. 7. Enter your Password Reset Question and Answer. This can be used at a later time if you forget your password. 8. Enter your e-mail address. You will receive e-mail notification when new information is available in [address] 9. Click Sign Up. You can now view and download portions of your medical record. 10. Click the Download Summary menu link to download a portable copy of your medical information. Additional Information    If you have questions, please visit the Frequently Asked Questions section of the [employer] website at [email or web address] Remember, [employer] is NOT to be used for urgent needs. For medical emergencies, dial 911.

## 2017-09-19 NOTE — PROGRESS NOTES
Massimo Peters is a 63 y.o. female and presents with Diverticulitis and COPD  . Subjective:  She states she was followed for diverticulitis treated recently  States she has a colonoscopy with   She no longer has any abdominal pains. Hypertension Review:  The patient has essential hypertension  Diet and Lifestyle: generally follows a  low sodium diet, exercises sporadically  Home BP Monitoring: is not measured at home. Pertinent ROS: taking medications as instructed, no medication side effects noted, no TIA's, no chest pain on exertion, no dyspnea on exertion, no swelling of ankles. Diabetes Mellitus Review:  She has diabetes mellitus. Diabetic ROS - medication compliance: compliant all of the time, diabetic diet compliance: compliant all of the time, home glucose monitoring: is performed. Known diabetic complications: none  Cardiovascular risk factors: family history, dyslipidemia, diabetes mellitus, obesity, hypertension  Current diabetic medications include oral agents   Eye exam current (within one year): no  Weight trend: stable  Prior visit with dietician: no  Current diet: \"healthy\" diet  in general  Current exercise: walking  Current monitoring regimen: home blood tests - daily  Home blood sugar records: trend: stable  Any episodes of hypoglycemia? no  Is She on ACE inhibitor or angiotensin II receptor blocker? Yes         Review of Systems  Constitutional: negative for fevers, chills, anorexia and weight loss  Eyes:   negative for visual disturbance and irritation  ENT:   negative for tinnitus,sore throat,nasal congestion,ear pains. hoarseness  Respiratory:  negative for cough, hemoptysis, dyspnea,wheezing  CV:   negative for chest pain, palpitations, lower extremity edema  GI:    diarrhea, abdominal pain,  Endo:               negative for polyuria,polydipsia,polyphagia,heat intolerance  Genitourinary: negative for frequency, dysuria and hematuria  Integument:  negative for rash and pruritus  Hematologic:  negative for easy bruising and gum/nose bleeding  Musculoskel: negative for myalgias, arthralgias, back pain, muscle weakness, joint pain  Neurological:  negative for headaches, dizziness, vertigo, memory problems and gait   Behavl/Psych: negative for feelings of anxiety, depression, mood changes    Past Medical History:   Diagnosis Date    Arthritis, Degenerative, Knee 7/15/2011    COPD (chronic obstructive pulmonary disease) ([facility] [address] 7/15/2011    Diverticulitis     GERD, gastro - Esophageal Reflux Disease 7/15/2011    Hypercholesterolemia 7/15/2011    Hypertension     Type II or unspecified type diabetes mellitus without mention of complication, uncontrolled 7/15/2011     Past Surgical History:   Procedure Laterality Date    HX GYN  2004    uterine surgery    LAP,TUBAL CAUTERY Bilateral 1992     Social History     Social History    Marital status:      Spouse name: N/A    Number of children: N/A    Years of education: N/A     Social History Main Topics    Smoking status: Former Smoker     Quit date: 4/15/2015    Smokeless tobacco: Never Used    Alcohol use No      Comment: occ    Drug use: Yes     Special: Marijuana      Comment: \"daily\"    Sexual activity: Yes     Partners: Male     Other Topics Concern    None     Social History Narrative        She lives with her  she has five adult children denies alcohol cigarette use     Family History   Problem Relation Age of Onset    Diabetes Father     Deep Vein Thrombosis Daughter     Lung Disease Daughter     Other Daughter      cholesterol     Current Outpatient Prescriptions   Medication Sig Dispense Refill    ciprofloxacin HCl (CIPRO) 500 mg tablet Take 1 Tab by mouth two (2) times a day for 10 days. 20 Tab 0    metroNIDAZOLE (FLAGYL) 500 mg tablet Take 1 Tab by mouth two (2) times a day for 10 days.  20 Tab 0    ondansetron (ZOFRAN ODT) 4 mg disintegrating tablet Take 1 Tab by mouth every eight (8) hours as needed for Nausea. 10 Tab 0    HYDROcodone-acetaminophen (NORCO) 5-325 mg per tablet Take 1 Tab by mouth every four (4) hours as needed for Pain. Max Daily Amount: 6 Tabs. 18 Tab 0    sucralfate (CARAFATE) 1 gram tablet Take 1 g by mouth four (4) times daily.  bismuth subsalicylate (PEPTO-BISMOL MAXIMUM STRENGTH) 525 mg/15 mL susp Take 30 mL by mouth every six (6) hours as needed.  MULTIVIT-MIN/IRON/FOLIC/LUTEIN (CENTRUM SILVER WOMEN PO) Take  by mouth daily.  lisinopril (PRINIVIL, ZESTRIL) 40 mg tablet TAKE 1 TABLET BY MOUTH EVERY DAY 30 Tab 6    glyBURIDE (DIABETA) 2.5 mg tablet TAKE 1 TABLET BY MOUTH EVERY DAY 30 Tab 6    raNITIdine (ZANTAC) 300 mg tablet TAKE 1 TABLET BY MOUTH EVERY DAY 30 Tab 6    potassium chloride SA (MICRO-K) 10 mEq capsule TAKE ONE CAPSULE BY MOUTH TWICE DAILY (Patient taking differently: TAKE ONE CAPSULE BY MOUTH once DAILY) 30 Cap 0    ONETOUCH ULTRA TEST strip TEST BLOOD SUGARS 3 TIMES DAILY 100 Each 12    Hydrochlorothiazide (HYDRODIURIL) 12.5 mg tablet Take 12.5 mg by mouth daily. 0    loperamide (IMODIUM A-D) 2 mg tablet Take 1 Tab by mouth four (4) times daily as needed for Diarrhea.  20 Tab 0     No Known Allergies    Objective:  Visit Vitals    BP 96/68    Pulse 63    Temp 98 °F (36.7 °C) (Oral)    Resp 16    Ht 5' 3\" (1.6 m)    Wt 153 lb (69.4 kg)    SpO2 97%    BMI 27.1 kg/m2     Physical Exam:   General appearance - alert, well appearing, and in no distress  Mental status - alert, oriented to person, place, and time  EYE-KODY, EOMI, corneas normal, no foreign bodies  ENT-ENT exam normal, no neck nodes or sinus tenderness  Nose - normal and patent, no erythema, discharge or polyps  Mouth - mucous membranes moist, pharynx normal without lesions  Neck - supple, no significant adenopathy   Chest - clear to auscultation, no wheezes, rales or rhonchi, symmetric air entry   Heart - normal rate, regular rhythm, normal S1, S2, no murmurs, rubs, clicks or gallops   Abdomen - soft, nontender, nondistended, no masses or organomegaly  Lymph- no adenopathy palpable  Ext-peripheral pulses normal, no pedal edema, no clubbing or cyanosis  Skin-Warm and dry. no hyperpigmentation, vitiligo, or suspicious lesions  Neuro -alert, oriented, normal speech, no focal findings or movement disorder noted  Neck-normal C-spine, no tenderness, full ROM without pain  Feet-no nail deformities or callus formation with good pulses noted      Results for orders placed or performed during the hospital encounter of 09/18/17   GLUCOSE, POC   Result Value Ref Range    Glucose (POC) 146 (H) 65 - 100 mg/dL    Performed by Sol Peraza        Assessment/Plan:    ICD-10-CM ICD-9-CM    1. Diverticulitis of colon K57.32 562.11    2. Chronic obstructive pulmonary disease, unspecified COPD type [facility] J44.9 496    3. Essential hypertension I10 401.9    4. Controlled type 2 diabetes mellitus without complication, without long-term current use of insulin ([facility]) E11.9 250.00      No orders of the defined types were placed in this encounter. lose weight, increase physical activity, follow low fat diet, follow low salt diet,Take 81mg aspirin daily      Patient Instructions   [employer] Activation    Thank you for requesting access to [employer]. Please follow the instructions below to securely access and download your online medical record. [employer] allows you to send messages to your doctor, view your test results, renew your prescriptions, schedule appointments, and more. How Do I Sign Up? 1. In your internet browser, go to [email or web address]  2. Click on the First Time User? Click Here link in the Sign In box. You will be redirect to the New Member Sign Up page. 3. Enter your [employer] Access Code exactly as it appears below. You will not need to use this code after youve completed the sign-up process. If you do not sign up before the expiration date, you must request a new code.     [employer] Access Code: Activation code not generated  Current iodine Status: Patient Declined (This is the date your [employer] access code will )    4. Enter the last four digits of your Social Security Number (xxxx) and Date of Birth (mm/dd/yyyy) as indicated and click Submit. You will be taken to the next sign-up page. 5. Create a [employer] ID. This will be your iodine login ID and cannot be changed, so think of one that is secure and easy to remember. 6. Create a [employer] password. You can change your password at any time. 7. Enter your Password Reset Question and Answer. This can be used at a later time if you forget your password. 8. Enter your e-mail address. You will receive e-mail notification when new information is available in [address] 9. Click Sign Up. You can now view and download portions of your medical record. 10. Click the Download Summary menu link to download a portable copy of your medical information. Additional Information    If you have questions, please visit the Frequently Asked Questions section of the iodine website at [email or web address] Remember, iodine is NOT to be used for urgent needs. For medical emergencies, dial 911. Follow-up Disposition:  Return in about 3 months (around 2017), or if symptoms worsen or fail to improve. I have reviewed with the patient details of the assessment and plan and all questions were answered. Relevent patient education was performed. The most recent lab findings were reviewed with the patient. An After Visit Summary was printed and given to the patient.

## 2017-09-19 NOTE — MR AVS SNAPSHOT
Visit Information Date & Time Provider Department Dept. Phone Encounter #  
 9/19/2017 10:45 AM Johanny Jones MD [facility] [phone] [id number] Follow-up Instructions Return in about 3 months (around 12/19/2017), or if symptoms worsen or fail to improve. Your Appointments 3/15/2018 11:00 AM  
YEAR CHECK UP with Mary Esparza MD  
[facility] ([address] [facility]) Appt Note: annual exam  
 [address] [address]  
[phone]  
  
   
 [address] Upcoming Health Maintenance Date Due  
 EYE EXAM RETINAL OR DILATED Q1 8/15/1967 Pneumococcal 19-64 Medium Risk (1 of 1 - PPSV23) 8/15/1976 ZOSTER VACCINE AGE 60> 6/15/2017 INFLUENZA AGE 9 TO ADULT 8/1/2017 HEMOGLOBIN A1C Q6M 2/14/2018 FOOT EXAM Q1 3/6/2018 MICROALBUMIN Q1 3/6/2018 LIPID PANEL Q1 6/6/2018 FOBT Q 1 YEAR AGE 50-75 8/15/2018 BREAST CANCER SCRN MAMMOGRAM 8/16/2018 PAP AKA CERVICAL CYTOLOGY 3/14/2020 DTaP/Tdap/Td series (2 - Td) 3/6/2027 Allergies as of 9/19/2017  Review Complete On: 9/19/2017 By: Johanny Jones MD  
 No Known Allergies Current Immunizations  Never Reviewed No immunizations on file. Not reviewed this visit You Were Diagnosed With   
  
 Codes Comments Diverticulitis of colon    -  Primary ICD-10-CM: K57.32 
ICD-9-CM: 562.11 Chronic obstructive pulmonary disease, unspecified COPD type ([facility] [address]     ICD-10-CM: J44.9 ICD-9-CM: 947 Essential hypertension     ICD-10-CM: I10 
ICD-9-CM: 401.9 Controlled type 2 diabetes mellitus without complication, without long-term current use of insulin ([facility] [address]     ICD-10-CM: E11.9 ICD-9-CM: 250.00 Vitals BP Pulse Temp Resp Height(growth percentile) Weight(growth percentile) 96/68 63 98 °F (36.7 °C) (Oral) 16 5' 3\" (1.6 m) 153 lb (69.4 kg) SpO2 BMI OB Status Smoking Status 97% 27.1 kg/m2 Postmenopausal Former Smoker BMI and BSA Data Body Mass Index Body Surface Area  
 27.1 kg/m 2 1.76 m 2 Preferred Pharmacy Pharmacy Name Phone [facility] [address] [facility]  [place] [phone] Your Updated Medication List  
  
   
This list is accurate as of: 9/19/17 12:30 PM.  Always use your most recent med list.  
  
  
  
  
 bismuth subsalicylate 691 LK/75 mL Susp Commonly known as:  PEPTO-BISMOL MAXIMUM STRENGTH Take 30 mL by mouth every six (6) hours as needed. CENTRUM SILVER WOMEN PO Take  by mouth daily. ciprofloxacin HCl 500 mg tablet Commonly known as:  CIPRO Take 1 Tab by mouth two (2) times a day for 10 days. glyBURIDE 2.5 mg tablet Commonly known as:  Anupama Justo TAKE 1 TABLET BY MOUTH EVERY DAY  
  
 hydroCHLOROthiazide 12.5 mg tablet Commonly known as:  HYDRODIURIL Take 12.5 mg by mouth daily. HYDROcodone-acetaminophen 5-325 mg per tablet Commonly known as:  Gleda Ridgel Take 1 Tab by mouth every four (4) hours as needed for Pain. Max Daily Amount: 6 Tabs. lisinopril 40 mg tablet Commonly known as:  PRINIVIL, ZESTRIL  
TAKE 1 TABLET BY MOUTH EVERY DAY  
  
 loperamide 2 mg tablet Commonly known as:  IMODIUM A-D Take 1 Tab by mouth four (4) times daily as needed for Diarrhea. metroNIDAZOLE 500 mg tablet Commonly known as:  FLAGYL Take 1 Tab by mouth two (2) times a day for 10 days. ondansetron 4 mg disintegrating tablet Commonly known as:  ZOFRAN ODT Take 1 Tab by mouth every eight (8) hours as needed for Nausea. ONETOUCH ULTRA TEST strip Generic drug:  glucose blood VI test strips TEST BLOOD SUGARS 3 TIMES DAILY potassium chloride SA 10 mEq capsule Commonly known as:  MICRO-K  
TAKE ONE CAPSULE BY MOUTH TWICE DAILY  
  
 raNITIdine 300 mg tablet Commonly known as:  ZANTAC TAKE 1 TABLET BY MOUTH EVERY DAY  
  
 sucralfate 1 gram tablet Commonly known as:  Maisha Breauxory Take 1 g by mouth four (4) times daily. Follow-up Instructions Return in about 3 months (around 2017), or if symptoms worsen or fail to improve. Patient Instructions [employer] Activation Thank you for requesting access to [employer]. Please follow the instructions below to securely access and download your online medical record. [employer] allows you to send messages to your doctor, view your test results, renew your prescriptions, schedule appointments, and more. How Do I Sign Up? 1. In your internet browser, go to [email or web address] 
2. Click on the First Time User? Click Here link in the Sign In box. You will be redirect to the New Member Sign Up page. 3. Enter your [employer] Access Code exactly as it appears below. You will not need to use this code after youve completed the sign-up process. If you do not sign up before the expiration date, you must request a new code. [employer] Access Code: Activation code not generated Current [employer] Status: Patient Declined (This is the date your [employer] access code will ) 4. Enter the last four digits of your Social Security Number (xxxx) and Date of Birth (mm/dd/yyyy) as indicated and click Submit. You will be taken to the next sign-up page. 5. Create a [employer] ID. This will be your [employer] login ID and cannot be changed, so think of one that is secure and easy to remember. 6. Create a [employer] password. You can change your password at any time. 7. Enter your Password Reset Question and Answer. This can be used at a later time if you forget your password. 8. Enter your e-mail address. You will receive e-mail notification when new information is available in [address] 9. Click Sign Up. You can now view and download portions of your medical record. 10. Click the Download Summary menu link to download a portable copy of your medical information. Additional Information If you have questions, please visit the Frequently Asked Questions section of the [employer] website at [email or web address] Remember, [employer] is NOT to be used for urgent needs. For medical emergencies, dial 911. Please provide this summary of care documentation to your next provider. Your primary care clinician is listed as Jackie Scriver. If you have any questions after today's visit, please call [phone].

## 2017-11-27 RX ORDER — LISINOPRIL 40 MG/1
TABLET ORAL
Qty: 30 TAB | Refills: 0 | Status: SHIPPED | OUTPATIENT
Start: 2017-11-27 | End: 2017-12-27 | Stop reason: SDUPTHER

## 2017-12-11 RX ORDER — RANITIDINE 300 MG/1
TABLET ORAL
Qty: 30 TAB | Refills: 0 | Status: SHIPPED | OUTPATIENT
Start: 2017-12-11 | End: 2018-01-08 | Stop reason: SDUPTHER

## 2017-12-15 RX ORDER — GLYBURIDE 2.5 MG/1
TABLET ORAL
Qty: 30 TAB | Refills: 6 | Status: SHIPPED | OUTPATIENT
Start: 2017-12-15 | End: 2018-07-12 | Stop reason: SDUPTHER

## 2017-12-26 RX ORDER — IBUPROFEN 800 MG/1
TABLET ORAL
Qty: 90 TAB | Refills: 0 | Status: SHIPPED | COMMUNITY
Start: 2017-12-26 | End: 2020-11-19

## 2017-12-27 RX ORDER — LISINOPRIL 40 MG/1
TABLET ORAL
Qty: 30 TAB | Refills: 0 | Status: SHIPPED | OUTPATIENT
Start: 2017-12-27 | End: 2018-01-23 | Stop reason: SDUPTHER

## 2018-01-08 RX ORDER — RANITIDINE 300 MG/1
TABLET ORAL
Qty: 30 TAB | Refills: 0 | Status: SHIPPED | OUTPATIENT
Start: 2018-01-08 | End: 2018-02-03 | Stop reason: SDUPTHER

## 2018-01-24 ENCOUNTER — OFFICE VISIT (OUTPATIENT)
Dept: INTERNAL MEDICINE CLINIC | Age: 63
End: 2018-01-24

## 2018-01-24 VITALS
HEIGHT: 63 IN | WEIGHT: 158 LBS | TEMPERATURE: 97.8 F | BODY MASS INDEX: 28 KG/M2 | OXYGEN SATURATION: 98 % | DIASTOLIC BLOOD PRESSURE: 88 MMHG | RESPIRATION RATE: 16 BRPM | HEART RATE: 65 BPM | SYSTOLIC BLOOD PRESSURE: 138 MMHG

## 2018-01-24 DIAGNOSIS — E78.00 HYPERCHOLESTEREMIA: Primary | ICD-10-CM

## 2018-01-24 DIAGNOSIS — E11.9 CONTROLLED TYPE 2 DIABETES MELLITUS WITHOUT COMPLICATION, WITHOUT LONG-TERM CURRENT USE OF INSULIN (HCC): ICD-10-CM

## 2018-01-24 DIAGNOSIS — M17.0 PRIMARY OSTEOARTHRITIS OF BOTH KNEES: ICD-10-CM

## 2018-01-24 DIAGNOSIS — I10 HYPERTENSION WITH GOAL BLOOD PRESSURE LESS THAN 130/85: ICD-10-CM

## 2018-01-24 DIAGNOSIS — M25.542 ARTHRALGIA OF LEFT HAND: ICD-10-CM

## 2018-01-24 LAB
CHOLEST SERPL-MCNC: 193 MG/DL
GLUCOSE POC: 50 MG/DL
GLUCOSE POC: 98 MG/DL
HBA1C MFR BLD HPLC: 5.9 %
HDLC SERPL-MCNC: 47 MG/DL
LDL CHOLESTEROL POC: 86 MG/DL
NON-HDL CHOLESTEROL, 011976: 146
TCHOL/HDL RATIO (POC): 4.1
TRIGL SERPL-MCNC: 297 MG/DL

## 2018-01-24 RX ORDER — LISINOPRIL 40 MG/1
TABLET ORAL
Qty: 30 TAB | Refills: 0 | Status: SHIPPED | OUTPATIENT
Start: 2018-01-24 | End: 2018-02-28 | Stop reason: SDUPTHER

## 2018-01-24 RX ORDER — IBUPROFEN 800 MG/1
TABLET ORAL
Qty: 90 TAB | Refills: 0 | Status: SHIPPED | OUTPATIENT
Start: 2018-01-24 | End: 2018-03-15 | Stop reason: SDUPTHER

## 2018-01-24 NOTE — PROGRESS NOTES
Chet Mcgee is a 63 y.o. female and presents with Diabetes; Cholesterol Problem; and Hypertension        Hypertension Review:  The patient has essential hypertension  Diet and Lifestyle: generally follows a  low sodium diet, exercises sporadically  Home BP Monitoring: is not measured at home. Pertinent ROS: taking medications as instructed, no medication side effects noted, no TIA's, no chest pain on exertion, no dyspnea on exertion, no swelling of ankles. Diabetes Mellitus Review:  She has diabetes mellitus. Diabetic ROS - medication compliance: compliant all of the time, diabetic diet compliance: compliant all of the time, home glucose monitoring: is performed. Known diabetic complications: none  Cardiovascular risk factors: family history, dyslipidemia, diabetes mellitus, obesity, hypertension  Current diabetic medications include oral agents   Eye exam current (within one year): no  Weight trend: stable  Prior visit with dietician: no  Current diet: \"healthy\" diet  in general  Current exercise: walking  Current monitoring regimen: home blood tests - daily  Home blood sugar records: trend: stable  Any episodes of hypoglycemia? no  Is She on ACE inhibitor or angiotensin II receptor blocker? Yes     She states she had an incomplete colonoscopy recently  She has diverticulosis history. She has pains involving the mcp joints      Knee pain Review:  Patient complains of bilaterally knee pain. Onset of the symptoms was months  ago. Inciting event: longstanding problem which has been getting worse. Current symptoms include pain location: both: medial and swelling. none,  Aggravating symptoms: going up and down stairs, walking, lateral movements, any weight bearing. Patient's overall course: gradually worsening Patient has had prior knee problems. Evaluation to date: none.   Treatment to date:NSAIDS              Review of Systems  Constitutional: negative for fevers, chills, anorexia and weight loss  Eyes:   negative for visual disturbance and irritation  ENT:   negative for tinnitus,sore throat,nasal congestion,ear pains. hoarseness  Respiratory:  negative for cough, hemoptysis, dyspnea,wheezing  CV:   negative for chest pain, palpitations, lower extremity edema  GI:    diarrhea, abdominal pain,  Endo:               negative for polyuria,polydipsia,polyphagia,heat intolerance  Genitourinary: negative for frequency, dysuria and hematuria  Integument:  negative for rash and pruritus  Hematologic:  negative for easy bruising and gum/nose bleeding  Musculoskel: negative for myalgias, arthralgias, back pain, muscle weakness, joint pain  Neurological:  negative for headaches, dizziness, vertigo, memory problems and gait   Behavl/Psych: negative for feelings of anxiety, depression, mood changes    Past Medical History:   Diagnosis Date    Arthritis, Degenerative, Knee 7/15/2011    COPD (chronic obstructive pulmonary disease) ([facility] 7/15/2011    Diverticulitis     GERD, gastro - Esophageal Reflux Disease 7/15/2011    Hypercholesterolemia 7/15/2011    Hypertension     Type II or unspecified type diabetes mellitus without mention of complication, uncontrolled 7/15/2011     Past Surgical History:   Procedure Laterality Date    COLONOSCOPY N/A 9/18/2017    COLONOSCOPY performed by Ro Frazier MD at [facility]\"" AMBULATORY OR    HX GYN  2004    uterine surgery    LAP,TUBAL CAUTERY Bilateral 1992     Social History     Social History    Marital status:      Spouse name: N/A    Number of children: N/A    Years of education: N/A     Social History Main Topics    Smoking status: Former Smoker     Quit date: 4/15/2015    Smokeless tobacco: Never Used    Alcohol use No      Comment: occ    Drug use: Yes     Special: Marijuana      Comment: \"daily\"    Sexual activity: Yes     Partners: Male     Other Topics Concern    None     Social History Narrative        She lives with her  she has five adult children denies alcohol cigarette use     Family History   Problem Relation Age of Onset    Diabetes Father     Deep Vein Thrombosis Daughter     Lung Disease Daughter     Other Daughter      cholesterol     Current Outpatient Prescriptions   Medication Sig Dispense Refill    lisinopril (PRINIVIL, ZESTRIL) 40 mg tablet TAKE 1 TABLET BY MOUTH EVERY DAY 30 Tab 0    ibuprofen (MOTRIN) 800 mg tablet TAKE 1 TABLET BY MOUTH EVERY 8 HOURS AS NEEDED FOR PAIN 90 Tab 0    raNITIdine (ZANTAC) 300 mg tablet TAKE 1 TABLET BY MOUTH EVERY DAY 30 Tab 0    ibuprofen (MOTRIN) 800 mg tablet TAKE 1 TABLET BY MOUTH EVERY 8 HOURS AS NEEDED FOR PAIN 90 Tab 0    glyBURIDE (DIABETA) 2.5 mg tablet TAKE 1 TABLET BY MOUTH EVERY DAY 30 Tab 6    ondansetron (ZOFRAN ODT) 4 mg disintegrating tablet Take 1 Tab by mouth every eight (8) hours as needed for Nausea. 10 Tab 0    sucralfate (CARAFATE) 1 gram tablet Take 1 g by mouth four (4) times daily.  bismuth subsalicylate (PEPTO-BISMOL MAXIMUM STRENGTH) 525 mg/15 mL susp Take 30 mL by mouth every six (6) hours as needed.  MULTIVIT-MIN/IRON/FOLIC/LUTEIN (CENTRUM SILVER WOMEN PO) Take  by mouth daily.  potassium chloride SA (MICRO-K) 10 mEq capsule TAKE ONE CAPSULE BY MOUTH TWICE DAILY (Patient taking differently: TAKE ONE CAPSULE BY MOUTH once DAILY) 30 Cap 0    ONETOUCH ULTRA TEST strip TEST BLOOD SUGARS 3 TIMES DAILY 100 Each 12    Hydrochlorothiazide (HYDRODIURIL) 12.5 mg tablet Take 12.5 mg by mouth daily. 0    loperamide (IMODIUM A-D) 2 mg tablet Take 1 Tab by mouth four (4) times daily as needed for Diarrhea. 20 Tab 0    HYDROcodone-acetaminophen (NORCO) 5-325 mg per tablet Take 1 Tab by mouth every four (4) hours as needed for Pain. Max Daily Amount: 6 Tabs.  18 Tab 0     No Known Allergies    Objective:  Visit Vitals    /88    Pulse 65    Temp 97.8 °F (36.6 °C) (Oral)    Resp 16    Ht 5' 3\" (1.6 m)    Wt 158 lb (71.7 kg)    SpO2 98%    BMI 27.99 kg/m2     Physical Exam:   General appearance - alert, well appearing, and in no distress  Mental status - alert, oriented to person, place, and time  EYE-KODY, EOMI, corneas normal, no foreign bodies  ENT-ENT exam normal, no neck nodes or sinus tenderness  Nose - normal and patent, no erythema, discharge or polyps  Mouth - mucous membranes moist, pharynx normal without lesions  Neck - supple, no significant adenopathy   Chest - clear to auscultation, no wheezes, rales or rhonchi, symmetric air entry   Heart - normal rate, regular rhythm, normal S1, S2, no murmurs, rubs, clicks or gallops   Abdomen - soft, nontender, nondistended, no masses or organomegaly  Lymph- no adenopathy palpable  Ext-peripheral pulses normal, no pedal edema, no clubbing or cyanosis  Skin-Warm and dry. no hyperpigmentation, vitiligo, or suspicious lesions  Neuro -alert, oriented, normal speech, no focal findings or movement disorder noted  Neck-normal C-spine, no tenderness, full ROM without pain  Feet-no nail deformities or callus formation with good pulses noted  Lt.hand-mcp joint tenderness    Results for orders placed or performed in visit on 01/24/18   AMB POC LIPID PROFILE   Result Value Ref Range    Cholesterol (POC) 193     Triglycerides (POC) 297     HDL Cholesterol (POC) 47     Non-HDL Cholesterol 146     LDL Cholesterol (POC) 86 MG/DL    TChol/HDL Ratio (POC) 4.1    AMB POC GLUCOSE BLOOD, BY GLUCOSE MONITORING DEVICE   Result Value Ref Range    Glucose POC 50 mg/dL   AMB POC HEMOGLOBIN A1C   Result Value Ref Range    Hemoglobin A1c (POC) 5.9 %   AMB POC GLUCOSE BLOOD, BY GLUCOSE MONITORING DEVICE   Result Value Ref Range    Glucose POC 98 mg/dL       Assessment/Plan:    ICD-10-CM ICD-9-CM    1. Hypercholesteremia E78.00 272.0 AMB POC LIPID PROFILE      AMB POC GLUCOSE BLOOD, BY GLUCOSE MONITORING DEVICE      AMB POC HEMOGLOBIN A1C      AMB POC GLUCOSE BLOOD, BY GLUCOSE MONITORING DEVICE   2.  Hypertension with goal blood pressure less than 130/85 I10 401.9 AMB POC LIPID PROFILE      AMB POC GLUCOSE BLOOD, BY GLUCOSE MONITORING DEVICE      AMB POC HEMOGLOBIN A1C      AMB POC GLUCOSE BLOOD, BY GLUCOSE MONITORING DEVICE      METABOLIC PANEL, COMPREHENSIVE      CBC W/O DIFF   3. Controlled type 2 diabetes mellitus without complication, without long-term current use of insulin (HCC) E11.9 250.00 AMB POC LIPID PROFILE      AMB POC GLUCOSE BLOOD, BY GLUCOSE MONITORING DEVICE      AMB POC HEMOGLOBIN A1C      AMB POC GLUCOSE BLOOD, BY GLUCOSE MONITORING DEVICE   4. Arthralgia of left hand M25.542 719.44 SED RATE (ESR)      RHEUMATOID FACTOR, QL   5. Primary osteoarthritis of both knees M17.0 715.16 SED RATE (ESR)      RHEUMATOID FACTOR, QL     Orders Placed This Encounter    SED RATE (ESR)     Standing Status:   Future     Standing Expiration Date:   7/24/2018    RHEUMATOID FACTOR, QL    METABOLIC PANEL, COMPREHENSIVE    CBC W/O DIFF    AMB POC LIPID PROFILE    AMB POC GLUCOSE BLOOD, BY GLUCOSE MONITORING DEVICE    AMB POC HEMOGLOBIN A1C    AMB POC GLUCOSE BLOOD, BY GLUCOSE MONITORING DEVICE     lose weight, increase physical activity, follow low fat diet, follow low salt diet,Take 81mg aspirin daily      Patient Instructions   1-800-DENTIST Activation    Thank you for requesting access to 1-800-DENTIST. Please follow the instructions below to securely access and download your online medical record. 1-800-DENTIST allows you to send messages to your doctor, view your test results, renew your prescriptions, schedule appointments, and more. How Do I Sign Up? 1. In your internet browser, go to [email or web address]  2. Click on the First Time User? Click Here link in the Sign In box. You will be redirect to the New Member Sign Up page. 3. Enter your 1-800-DENTIST Access Code exactly as it appears below. You will not need to use this code after youve completed the sign-up process.  If you do not sign up before the expiration date, you must request a new code.    MobileDataforcet Access Code: Activation code not generated  Current Donate Your Desktop Status: Patient Declined (This is the date your MobileDataforcet access code will )    4. Enter the last four digits of your Social Security Number (xxxx) and Date of Birth (mm/dd/yyyy) as indicated and click Submit. You will be taken to the next sign-up page. 5. Create a MobileDataforcet ID. This will be your Donate Your Desktop login ID and cannot be changed, so think of one that is secure and easy to remember. 6. Create a MobileDataforcet password. You can change your password at any time. 7. Enter your Password Reset Question and Answer. This can be used at a later time if you forget your password. 8. Enter your e-mail address. You will receive e-mail notification when new information is available in [address] 9. Click Sign Up. You can now view and download portions of your medical record. 10. Click the Download Summary menu link to download a portable copy of your medical information. Additional Information    If you have questions, please visit the Frequently Asked Questions section of the MobileDataforcet website at [email or web address] Remember, Donate Your Desktop is NOT to be used for urgent needs. For medical emergencies, dial 911. Follow-up Disposition:  Return in about 3 months (around 2018), or if symptoms worsen or fail to improve. I have reviewed with the patient details of the assessment and plan and all questions were answered. Relevent patient education was performed. The most recent lab findings were reviewed with the patient. An After Visit Summary was printed and given to the patient.

## 2018-01-24 NOTE — PATIENT INSTRUCTIONS
APE SystemsharKngine Activation    Thank you for requesting access to [email or web address]. Please follow the instructions below to securely access and download your online medical record. Citydeal.[email or web address] allows you to send messages to your doctor, view your test results, renew your prescriptions, schedule appointments, and more. How Do I Sign Up? 1. In your internet browser, go to [email or web address]  2. Click on the First Time User? Click Here link in the Sign In box. You will be redirect to the New Member Sign Up page. 3. Enter your Citydeal.de Access Code exactly as it appears below. You will not need to use this code after youve completed the sign-up process. If you do not sign up before the expiration date, you must request a new code. Citydeal.de Access Code: Activation code not generated  Current Citydeal.de Status: Patient Declined (This is the date your Citydeal.de access code will )    4. Enter the last four digits of your Social Security Number (xxxx) and Date of Birth (mm/dd/yyyy) as indicated and click Submit. You will be taken to the next sign-up page. 5. Create a Citydeal.de ID. This will be your Citydeal.de login ID and cannot be changed, so think of one that is secure and easy to remember. 6. Create a Citydeal.de password. You can change your password at any time. 7. Enter your Password Reset Question and Answer. This can be used at a later time if you forget your password. 8. Enter your e-mail address. You will receive e-mail notification when new information is available in [address] 9. Click Sign Up. You can now view and download portions of your medical record. 10. Click the Download Summary menu link to download a portable copy of your medical information. Additional Information    If you have questions, please visit the Frequently Asked Questions section of the [email or web address] website at [email or web address] Remember, Citydeal.[email or web address] is NOT to be used for urgent needs. For medical emergencies, dial 911.

## 2018-01-24 NOTE — MR AVS SNAPSHOT
[address] [facility] 
 
 
 [address] [facility],6Th Floor [facility] [address] 
[phone] Patient: Emma Garcia MRN: [id number] [facility]:9[birth year] Visit Information Date & Time Provider Department Dept. Phone Encounter #  
 1/24/2018 11:45 AM Joslyn Mcgrath MD [address] [facility] [phone] [id number] Follow-up Instructions Return in about 3 months (around 4/24/2018), or if symptoms worsen or fail to improve. Your Appointments 3/15/2018 11:00 AM  
ESTABLISHED PATIENT with Jovan Almaguer, QUINN  
[address] [facility] ([facility]) Appt Note: annual exam  
 [place] [address] [facility] 7 [address]  
[phone]  
  
   
 [place] [address] [place] [address], [facility] Upcoming Health Maintenance Date Due  
 EYE EXAM RETINAL OR DILATED Q1 8/15/1967 Pneumococcal 19-64 Medium Risk (1 of 1 - PPSV23) 8/15/1976 ZOSTER VACCINE AGE 60> 6/15/2017 HEMOGLOBIN A1C Q6M 2/14/2018 FOOT EXAM Q1 3/6/2018 MICROALBUMIN Q1 3/6/2018 LIPID PANEL Q1 6/6/2018 FOBT Q 1 YEAR AGE 50-75 8/15/2018 BREAST CANCER SCRN MAMMOGRAM 8/16/2018 PAP AKA CERVICAL CYTOLOGY 3/14/2020 DTaP/Tdap/Td series (2 - Td) 3/6/2027 Allergies as of 1/24/2018  Review Complete On: 1/24/2018 By: Joslyn Mcgrath MD  
 No Known Allergies Current Immunizations  Reviewed on 9/21/2017 Name Date Influenza Vaccine 9/11/2017 Not reviewed this visit You Were Diagnosed With   
  
 Codes Comments Hypercholesteremia    -  Primary ICD-10-CM: E78.00 ICD-9-CM: 272.0 Hypertension with goal blood pressure less than 130/85     ICD-10-CM: I10 
ICD-9-CM: 401.9 Controlled type 2 diabetes mellitus without complication, without long-term current use of insulin [facility]     ICD-10-CM: E11.9 ICD-9-CM: 250.00 Arthralgia of left hand     ICD-10-CM: M25.542 ICD-9-CM: 719.44   
 Primary osteoarthritis of both knees     ICD-10-CM: M17.0 ICD-9-CM: 715.16 Vitals BP Pulse Temp Resp Height(growth percentile) Weight(growth percentile) 138/88 65 97.8 °F (36.6 °C) (Oral) 16 5' 3\" (1.6 m) 158 lb (71.7 kg) SpO2 BMI OB Status Smoking Status 98% 27.99 kg/m2 Postmenopausal Former Smoker Vitals History BMI and BSA Data Body Mass Index Body Surface Area  
 27.99 kg/m 2 1.79 m 2 Preferred Pharmacy Pharmacy Name Phone [facility] [employer] [address]  [place] [phone] Your Updated Medication List  
  
   
This list is accurate as of: 1/24/18  1:32 PM.  Always use your most recent med list.  
  
  
  
  
 bismuth subsalicylate 622 JJ/25 mL Susp Commonly known as:  PEPTO-BISMOL MAXIMUM STRENGTH Take 30 mL by mouth every six (6) hours as needed. CENTRUM SILVER WOMEN PO Take  by mouth daily. glyBURIDE 2.5 mg tablet Commonly known as:  Norm Cheryl TAKE 1 TABLET BY MOUTH EVERY DAY  
  
 hydroCHLOROthiazide 12.5 mg tablet Commonly known as:  HYDRODIURIL Take 12.5 mg by mouth daily. HYDROcodone-acetaminophen 5-325 mg per tablet Commonly known as:  Dyann Srini Take 1 Tab by mouth every four (4) hours as needed for Pain. Max Daily Amount: 6 Tabs. * ibuprofen 800 mg tablet Commonly known as:  MOTRIN  
TAKE 1 TABLET BY MOUTH EVERY 8 HOURS AS NEEDED FOR PAIN  
  
 * ibuprofen 800 mg tablet Commonly known as:  MOTRIN  
TAKE 1 TABLET BY MOUTH EVERY 8 HOURS AS NEEDED FOR PAIN  
  
 lisinopril 40 mg tablet Commonly known as:  PRINIVIL, ZESTRIL  
TAKE 1 TABLET BY MOUTH EVERY DAY  
  
 loperamide 2 mg tablet Commonly known as:  IMODIUM A-D Take 1 Tab by mouth four (4) times daily as needed for Diarrhea. ondansetron 4 mg disintegrating tablet Commonly known as:  ZOFRAN ODT Take 1 Tab by mouth every eight (8) hours as needed for Nausea. ONETOUCH ULTRA TEST strip Generic drug:  glucose blood VI test strips TEST BLOOD SUGARS 3 TIMES DAILY potassium chloride SA 10 mEq capsule Commonly known as:  MICRO-K  
TAKE ONE CAPSULE BY MOUTH TWICE DAILY  
  
 raNITIdine 300 mg tablet Commonly known as:  ZANTAC TAKE 1 TABLET BY MOUTH EVERY DAY  
  
 sucralfate 1 gram tablet Commonly known as:  Unk Crane Take 1 g by mouth four (4) times daily. * Notice: This list has 2 medication(s) that are the same as other medications prescribed for you. Read the directions carefully, and ask your doctor or other care provider to review them with you. We Performed the Following AMB POC GLUCOSE BLOOD, BY GLUCOSE MONITORING DEVICE [52727 CPT(R)] AMB POC GLUCOSE BLOOD, BY GLUCOSE MONITORING DEVICE [53506 CPT(R)] AMB POC HEMOGLOBIN A1C [46978 CPT(R)] AMB POC LIPID PROFILE [24060 CPT(R)] CBC W/O DIFF [73320 CPT(R)] METABOLIC PANEL, COMPREHENSIVE [25042 CPT(R)] RHEUMATOID FACTOR, QL [id number] CPT(R)] Follow-up Instructions Return in about 3 months (around 4/24/2018), or if symptoms worsen or fail to improve. To-Do List   
 01/24/2018 Lab:  SED RATE (ESR) Patient Instructions [employer] Activation Thank you for requesting access to [employer]. Please follow the instructions below to securely access and download your online medical record. [employer]Ph[employer] allows you to send messages to your doctor, view your test results, renew your prescriptions, schedule appointments, and more. How Do I Sign Up? 1. In your internet browser, go to [email or web address] 
2. Click on the First Time User? Click Here link in the Sign In box. You will be redirect to the New Member Sign Up page. 3. Enter your [employer] Access Code exactly as it appears below. You will not need to use this code after youve completed the sign-up process. If you do not sign up before the expiration date, you must request a new code. LifePayt Access Code: Activation code not generated Current Studiekring Status: Patient Declined (This is the date your Studiekring access code will ) 4. Enter the last four digits of your Social Security Number (xxxx) and Date of Birth (mm/dd/yyyy) as indicated and click Submit. You will be taken to the next sign-up page. 5. Create a Studiekring ID. This will be your Studiekring login ID and cannot be changed, so think of one that is secure and easy to remember. 6. Create a Studiekring password. You can change your password at any time. 7. Enter your Password Reset Question and Answer. This can be used at a later time if you forget your password. 8. Enter your e-mail address. You will receive e-mail notification when new information is available in [address] 9. Click Sign Up. You can now view and download portions of your medical record. 10. Click the Download Summary menu link to download a portable copy of your medical information. Additional Information If you have questions, please visit the Frequently Asked Questions section of the Studiekring website at [email or web address] Remember, Studiekring is NOT to be used for urgent needs. For medical emergencies, dial 911. Please provide this summary of care documentation to your next provider. Your primary care clinician is listed as Ag Egan. If you have any questions after today's visit, please call [phone].

## 2018-01-25 LAB
ALBUMIN SERPL-MCNC: 4.1 G/DL (ref 3.6–4.8)
ALBUMIN/GLOB SERPL: 1.6 {RATIO} (ref 1.2–2.2)
ALP SERPL-CCNC: 110 IU/L (ref 39–117)
ALT SERPL-CCNC: 21 IU/L (ref 0–32)
AST SERPL-CCNC: 13 IU/L (ref 0–40)
BILIRUB SERPL-MCNC: <0.2 MG/DL (ref 0–1.2)
BUN SERPL-MCNC: 16 MG/DL (ref 8–27)
BUN/CREAT SERPL: 35 (ref 12–28)
CALCIUM SERPL-MCNC: 9.2 MG/DL (ref 8.7–10.3)
CHLORIDE SERPL-SCNC: 103 MMOL/L (ref 96–106)
CO2 SERPL-SCNC: 23 MMOL/L (ref 18–29)
CREAT SERPL-MCNC: 0.46 MG/DL (ref 0.57–1)
ERYTHROCYTE [DISTWIDTH] IN BLOOD BY AUTOMATED COUNT: 14 % (ref 12.3–15.4)
ERYTHROCYTE [SEDIMENTATION RATE] IN BLOOD BY WESTERGREN METHOD: 3 MM/HR (ref 0–40)
GFR SERPLBLD CREATININE-BSD FMLA CKD-EPI: 108 ML/MIN/1.73
GFR SERPLBLD CREATININE-BSD FMLA CKD-EPI: 125 ML/MIN/1.73
GLOBULIN SER CALC-MCNC: 2.6 G/DL (ref 1.5–4.5)
GLUCOSE SERPL-MCNC: 88 MG/DL (ref 65–99)
HCT VFR BLD AUTO: 41.5 % (ref 34–46.6)
HGB BLD-MCNC: 13.4 G/DL (ref 11.1–15.9)
MCH RBC QN AUTO: 30.6 PG (ref 26.6–33)
MCHC RBC AUTO-ENTMCNC: 32.3 G/DL (ref 31.5–35.7)
MCV RBC AUTO: 95 FL (ref 79–97)
PLATELET # BLD AUTO: 190 X10E3/UL (ref 150–379)
POTASSIUM SERPL-SCNC: 4.1 MMOL/L (ref 3.5–5.2)
PROT SERPL-MCNC: 6.7 G/DL (ref 6–8.5)
RBC # BLD AUTO: 4.38 X10E6/UL (ref 3.77–5.28)
RHEUMATOID FACT SERPL-ACNC: <10 IU/ML (ref 0–13.9)
SODIUM SERPL-SCNC: 144 MMOL/L (ref 134–144)
WBC # BLD AUTO: 8 X10E3/UL (ref 3.4–10.8)

## 2018-02-05 RX ORDER — RANITIDINE 300 MG/1
TABLET ORAL
Qty: 30 TAB | Refills: 0 | Status: SHIPPED | OUTPATIENT
Start: 2018-02-05 | End: 2019-02-19 | Stop reason: SDUPTHER

## 2018-02-28 RX ORDER — IBUPROFEN 800 MG/1
TABLET ORAL
Qty: 90 TAB | Refills: 0 | Status: SHIPPED | OUTPATIENT
Start: 2018-02-28 | End: 2018-03-15 | Stop reason: SDUPTHER

## 2018-02-28 RX ORDER — LISINOPRIL 40 MG/1
TABLET ORAL
Qty: 30 TAB | Refills: 0 | Status: SHIPPED | OUTPATIENT
Start: 2018-02-28 | End: 2018-03-28 | Stop reason: SDUPTHER

## 2018-03-15 ENCOUNTER — OFFICE VISIT (OUTPATIENT)
Dept: OBGYN CLINIC | Age: 63
End: 2018-03-15

## 2018-03-15 VITALS
SYSTOLIC BLOOD PRESSURE: 132 MMHG | HEIGHT: 63 IN | RESPIRATION RATE: 18 BRPM | HEART RATE: 110 BPM | BODY MASS INDEX: 28.31 KG/M2 | TEMPERATURE: 97.7 F | DIASTOLIC BLOOD PRESSURE: 82 MMHG | WEIGHT: 159.8 LBS

## 2018-03-15 DIAGNOSIS — Z01.419 WELL WOMAN EXAM WITH ROUTINE GYNECOLOGICAL EXAM: Primary | ICD-10-CM

## 2018-03-15 RX ORDER — POLYETHYLENE GLYCOL 3350 17 G/17G
POWDER, FOR SOLUTION ORAL
Refills: 3 | COMMUNITY
Start: 2017-12-30 | End: 2022-01-22

## 2018-03-15 RX ORDER — RANITIDINE 300 MG/1
TABLET ORAL
Qty: 30 TAB | Refills: 0 | OUTPATIENT
Start: 2018-03-15

## 2018-03-15 RX ORDER — DICLOFENAC SODIUM 75 MG/1
TABLET, DELAYED RELEASE ORAL
Refills: 12 | COMMUNITY
Start: 2018-03-04 | End: 2018-09-23 | Stop reason: SDUPTHER

## 2018-03-15 RX ORDER — RANITIDINE 300 MG/1
TABLET ORAL
Qty: 30 TAB | Refills: 12 | Status: SHIPPED | OUTPATIENT
Start: 2018-03-15 | End: 2018-03-15 | Stop reason: SDUPTHER

## 2018-03-15 NOTE — PROGRESS NOTES
NEW PATIENT EXAM  Vanna/Post Menopause    SUBJECTIVE: Chet Mcgee is a 63 y.o. female post menopausal  s/p BTL who presents for well woman exam.  Pt. Denies any complaints and reports a healthy year. Pt. Has history of ASCUS pap in  however pap taken in 2017 was WNL. Mammogram is done at [place] and due in November. Pt. Has already had a colonoscopy. Patient's last menstrual period was 03/15/2004 (approximate). GYN History  Menopause:  20 years  Post menopausal bleeding:   NO  Sexually active: NO  Contraception:  tubal ligation  Sexually transmitted diseases/infections: NO    Last pap: 2017  The prior Pap result: normal  Last Mammogram:    Last Dexa Scan: age 74 planned    Past Medical History:   Diagnosis Date    Arthritis, Degenerative, Knee 7/15/2011    COPD (chronic obstructive pulmonary disease) ([facility] [address] 7/15/2011    Diverticulitis     GERD, gastro - Esophageal Reflux Disease 7/15/2011    Hypercholesterolemia 7/15/2011    Hypertension     Type II or unspecified type diabetes mellitus without mention of complication, uncontrolled 7/15/2011     Past Surgical History:   Procedure Laterality Date    COLONOSCOPY N/A 2017    COLONOSCOPY performed by Femi Grande MD at [facility] AM[facility] OR     GYN      uterine surgery    LAP,TUBAL CAUTERY Bilateral      OB History      Para Term  AB Living    5 5 5   4    SAB TAB Ectopic Molar Multiple Live Births                 Obstetric Comments    1 female, 4 sons-- x 5 - no complications        Family History   Problem Relation Age of Onset    Diabetes Father     Deep Vein Thrombosis Daughter     Lung Disease Daughter     Other Daughter      cholesterol     Social History     Social History    Marital status:      Spouse name: N/A    Number of children: N/A    Years of education: N/A     Occupational History    Not on file.      Social History Main Topics    Smoking status: Former Smoker     Quit date: 4/15/2015    Smokeless tobacco: Never Used    Alcohol use No      Comment: occ    Drug use: Yes     Special: Marijuana      Comment: \"daily\"    Sexual activity: Yes     Partners: Male     Other Topics Concern    Not on file     Social History Narrative        She lives with her  she has five adult children denies alcohol cigarette use       Current Outpatient Prescriptions   Medication Sig Dispense Refill    lisinopril (PRINIVIL, ZESTRIL) 40 mg tablet TAKE 1 TABLET BY MOUTH EVERY DAY 30 Tab 0    raNITIdine (ZANTAC) 300 mg tablet TAKE 1 TABLET BY MOUTH EVERY DAY 30 Tab 0    ibuprofen (MOTRIN) 800 mg tablet TAKE 1 TABLET BY MOUTH EVERY 8 HOURS AS NEEDED FOR PAIN 90 Tab 0    glyBURIDE (DIABETA) 2.5 mg tablet TAKE 1 TABLET BY MOUTH EVERY DAY 30 Tab 6    sucralfate (CARAFATE) 1 gram tablet Take 1 g by mouth four (4) times daily.  bismuth subsalicylate (PEPTO-BISMOL MAXIMUM STRENGTH) 525 mg/15 mL susp Take 30 mL by mouth every six (6) hours as needed.  MULTIVIT-MIN/IRON/FOLIC/LUTEIN (CENTRUM SILVER WOMEN PO) Take  by mouth daily.  potassium chloride SA (MICRO-K) 10 mEq capsule TAKE ONE CAPSULE BY MOUTH TWICE DAILY (Patient taking differently: TAKE ONE CAPSULE BY MOUTH once DAILY) 30 Cap 0    ONETOUCH ULTRA TEST strip TEST BLOOD SUGARS 3 TIMES DAILY 100 Each 12    Hydrochlorothiazide (HYDRODIURIL) 12.5 mg tablet Take 12.5 mg by mouth daily. 0    loperamide (IMODIUM A-D) 2 mg tablet Take 1 Tab by mouth four (4) times daily as needed for Diarrhea. 20 Tab 0    diclofenac EC (VOLTAREN) 75 mg EC tablet TK 1 T PO BID  12    polyethylene glycol (MIRALAX) 17 gram/dose powder MIX 17 GRAMS IN A LIQUID AND THEN DRINK ONCE D UTD  3    ondansetron (ZOFRAN ODT) 4 mg disintegrating tablet Take 1 Tab by mouth every eight (8) hours as needed for Nausea. 10 Tab 0    HYDROcodone-acetaminophen (NORCO) 5-325 mg per tablet Take 1 Tab by mouth every four (4) hours as needed for Pain.  Max Daily Amount: 6 Tabs. 18 Tab 0         Review of Systems:   Complete review of systems reviewed from social and history data forms. Pertinent positives in HPI. Objective:     Visit Vitals    /82 (BP 1 Location: Right arm, BP Patient Position: Sitting)    Pulse (!) 110    Temp 97.7 °F (36.5 °C) (Oral)    Resp 18    Ht 5' 3\" (1.6 m)    Wt 159 lb 12.8 oz (72.5 kg)    LMP 03/15/2004 (Approximate)    BMI 28.31 kg/m2       General:  alert, cooperative, no distress, appears stated age   Skin:  Normal.   Lymph Nodes:  Cervical, supraclavicular, and axillary nodes normal.   Breast Exam: normal appearance, no masses or tenderness    Lungs:  clear to auscultation bilaterally   Heart:  regular rate and rhythm, S1, S2 normal, no murmur, click, rub or gallop   Abdomen: soft, non-tender. No masses,  no organomegaly   Back:  Costovertebral angle tenderness absent   Genitourinary: BUS normal. Introitus normal. Atrophic appearing vaginal epithelium, Vaginal discharge described as scant,  Normal cervix without lesions or tenderness, Uterus normal size anteverted. NT., Adnexa normal in size left and right without tenderness. Extremities:  extremities normal, atraumatic, no cyanosis or edema     Neurologic:  negative   Psychiatric:  non focal     ASSESSMENT:  Well woman exam- post menopausal    Plan:  Pap deferred. Mammogram planned in November. Colonoscopy current. Osteoporosis prevention strategies discussed. RTO 1 year. Pt. Voices understanding of treatment plan.   Follow-up Disposition:  Return for annual.    Roro Hernandez NP

## 2018-03-15 NOTE — MR AVS SNAPSHOT
[address] [facility] [address] [facility] 
[phone] Patient: Yazmin Sutton MRN: [id number] Visit Information Date & Time Provider Department Dept. Phone Encounter #  
 3/15/2018 11:00 AM Concepción Godinez NP [facility] [facility] OBGYN AT [address] [facility] [id number] Follow-up Instructions Return for annual.  
  
Your Appointments 4/24/2018 11:00 AM  
ROUTINE CARE with Misha Washington MD  
[facility] - [address] [place] [address]) Appt Note: 3mo fu  
 [facility],[address] [place] [address] [phone]  
[phone]  
  
   
 [address] [place] [address] 7 [phone] Upcoming Health Maintenance Date Due ZOSTER VACCINE AGE 60> 6/15/2017 FOBT Q 1 YEAR AGE 50-75 8/15/2018 BREAST CANCER SCRN MAMMOGRAM 8/16/2018 PAP AKA CERVICAL CYTOLOGY 3/14/2020 Allergies as of 3/15/2018  Review Complete On: 3/15/2018 By: Lucio Leyva LPN No Known Allergies Current Immunizations  Reviewed on 9/21/2017 Name Date Influenza Vaccine 9/11/2017 Not reviewed this visit Vitals BP Pulse Temp Resp Height(growth percentile) Weight(growth percentile) 132/82 (BP 1 Location: Right arm, BP Patient Position: Sitting) (!) 110 97.7 °F (36.5 °C) (Oral) 18 5' 3\" (1.6 m) 159 lb 12.8 oz (72.5 kg) LMP BMI OB Status Smoking Status 03/15/2004 (Approximate) 28.31 kg/m2 Postmenopausal Former Smoker Vitals History BMI and BSA Data Body Mass Index Body Surface Area  
 28.31 kg/m 2 1.8 m 2 Preferred Pharmacy Pharmacy Name Phone [facility]  [place] [phone] Your Updated Medication List  
  
   
This list is accurate as of 3/15/18 11:52 AM.  Always use your most recent med list.  
  
  
  
  
 bismuth subsalicylate 461 EZ/72 mL Susp Commonly known as:  PEPTO-BISMOL MAXIMUM STRENGTH Take 30 mL by mouth every six (6) hours as needed. CENTRUM SILVER WOMEN PO Take  by mouth daily. diclofenac EC 75 mg EC tablet Commonly known as:  VOLTAREN  
TK 1 T PO BID  
  
 glyBURIDE 2.5 mg tablet Commonly known as:  Orlean Polio TAKE 1 TABLET BY MOUTH EVERY DAY  
  
 hydroCHLOROthiazide 12.5 mg tablet Commonly known as:  HYDRODIURIL Take 12.5 mg by mouth daily. HYDROcodone-acetaminophen 5-325 mg per tablet Commonly known as:  Lia Michi Take 1 Tab by mouth every four (4) hours as needed for Pain. Max Daily Amount: 6 Tabs. ibuprofen 800 mg tablet Commonly known as:  MOTRIN  
TAKE 1 TABLET BY MOUTH EVERY 8 HOURS AS NEEDED FOR PAIN  
  
 lisinopril 40 mg tablet Commonly known as:  PRINIVIL, ZESTRIL  
TAKE 1 TABLET BY MOUTH EVERY DAY  
  
 loperamide 2 mg tablet Commonly known as:  IMODIUM A-D Take 1 Tab by mouth four (4) times daily as needed for Diarrhea. ondansetron 4 mg disintegrating tablet Commonly known as:  ZOFRAN ODT Take 1 Tab by mouth every eight (8) hours as needed for Nausea. ONETOUCH ULTRA TEST strip Generic drug:  glucose blood VI test strips TEST BLOOD SUGARS 3 TIMES DAILY polyethylene glycol 17 gram/dose powder Commonly known as:  [facility] MIX 17 GRAMS IN A LIQUID AND THEN DRINK ONCE D UTD potassium chloride SA 10 mEq capsule Commonly known as:  MICRO-K  
TAKE ONE CAPSULE BY MOUTH TWICE DAILY  
  
 raNITIdine 300 mg tablet Commonly known as:  ZANTAC TAKE 1 TABLET BY MOUTH EVERY DAY  
  
 sucralfate 1 gram tablet Commonly known as:  Jose Michael Take 1 g by mouth four (4) times daily. Follow-up Instructions Return for annual.  
  
  
Patient Instructions Preventing Osteoporosis: Care Instructions Your Care Instructions Osteoporosis means the bones are weak and thin enough that they can break easily. The older you are, the more likely you are to get osteoporosis. But with plenty of calcium, vitamin D, and exercise, you can help prevent osteoporosis. The preteen and teen years are a key time for bone building. With the help of calcium, vitamin D, and exercise in those early years and beyond, the bones reach their peak density and strength by age 27. After age 27, your bones naturally start to thin and weaken. The stronger your bones are at around age 27, the lower your risk for osteoporosis. But no matter what your age and risk are, your bones still need calcium, vitamin D, and exercise to stay strong. Also avoid smoking, and limit alcohol. Smoking and heavy alcohol use can make your bones thinner. Talk to your doctor about any special risks you might have, such as having a close relative with osteoporosis or taking a medicine that can weaken bones. Your doctor can tell you the best ways to protect your bones from thinning. Follow-up care is a key part of your treatment and safety. Be sure to make and go to all appointments, and call your doctor if you are having problems. It's also a good idea to know your test results and keep a list of the medicines you take. How can you care for yourself at home? · Get enough calcium and vitamin D. The [place] of Medicine recommends adults younger than age 46 need 1,000 mg of calcium and 600 IU of vitamin D each day. Women ages 46 to 79 need 1,200 mg of calcium and 600 IU of vitamin D each day. Men ages 46 to 79 need 1,000 mg of calcium and 600 IU of vitamin D each day. Adults 71 and older need 1,200 mg of calcium and 800 IU of vitamin D each day. ¨ Eat foods rich in calcium, like yogurt, cheese, milk, and dark green vegetables. ¨ Eat foods rich in vitamin D, like eggs, fatty fish, cereal, and fortified milk. ¨ Get some sunshine. Your body uses sunshine to make its own vitamin D. The safest time to be out in the sun is before 10 a.m. or after 3 p.m. Avoid getting sunburned. Sunburn can increase your risk of skin cancer. ¨ Talk to your doctor about taking a calcium plus vitamin D supplement. Ask about what type of calcium is right for you, and how much to take at a time. Adults ages 23 to 48 should not get more than 2,500 mg of calcium and 4,000 IU of vitamin D each day, whether it is from supplements and/or food. Adults ages 46 and older should not get more than 2,000 mg of calcium and 4,000 IU of vitamin D each day from supplements and/or food. · Get regular bone-building exercise. Weight-bearing and resistance exercises keep bones healthy by working the muscles and bones against gravity. Start out at an exercise level that feels right for you. Add a little at a time until you can do the following: ¨ Do 30 minutes of weight-bearing exercise on most days of the week. Walking, jogging, stair climbing, and dancing are good choices. ¨ Do resistance exercises with weights or elastic bands 2 to 3 days a week. · Limit alcohol. Drink no more than 1 alcohol drink a day if you are a woman. Drink no more than 2 alcohol drinks a day if you are a man. · Do not smoke. Smoking can make bones thin faster. If you need help quitting, talk to your doctor about stop-smoking programs and medicines. These can increase your chances of quitting for good. When should you call for help? Watch closely for changes in your health, and be sure to contact your doctor if you have any problems. Where can you learn more? Go to http://kalia-terell.info/. Enter F703 in the search box to learn more about \"Preventing Osteoporosis: Care Instructions. \" Current as of: May 12, 2017 Content Version: 11.4 © 9608-6415 [employer]. Care instructions adapted under license by [employer] (which disclaims liability or warranty for this information).  If you have questions about a medical condition or this instruction, always ask your healthcare professional. [address] any warranty or liability for your use of this information. Please provide this summary of care documentation to your next provider. Your primary care clinician is listed as Kiki Huerta. If you have any questions after today's visit, please call [phone].

## 2018-03-15 NOTE — PATIENT INSTRUCTIONS
Preventing Osteoporosis: Care Instructions  Your Care Instructions    Osteoporosis means the bones are weak and thin enough that they can break easily. The older you are, the more likely you are to get osteoporosis. But with plenty of calcium, vitamin D, and exercise, you can help prevent osteoporosis. The preteen and teen years are a key time for bone building. With the help of calcium, vitamin D, and exercise in those early years and beyond, the bones reach their peak density and strength by age 27. After age 27, your bones naturally start to thin and weaken. The stronger your bones are at around age 27, the lower your risk for osteoporosis. But no matter what your age and risk are, your bones still need calcium, vitamin D, and exercise to stay strong. Also avoid smoking, and limit alcohol. Smoking and heavy alcohol use can make your bones thinner. Talk to your doctor about any special risks you might have, such as having a close relative with osteoporosis or taking a medicine that can weaken bones. Your doctor can tell you the best ways to protect your bones from thinning. Follow-up care is a key part of your treatment and safety. Be sure to make and go to all appointments, and call your doctor if you are having problems. It's also a good idea to know your test results and keep a list of the medicines you take. How can you care for yourself at home? · Get enough calcium and vitamin D. The [place] of Medicine recommends adults younger than age 46 need 1,000 mg of calcium and 600 IU of vitamin D each day. Women ages 46 to 79 need 1,200 mg of calcium and 600 IU of vitamin D each day. Men ages 46 to 79 need 1,000 mg of calcium and 600 IU of vitamin D each day. Adults 71 and older need 1,200 mg of calcium and 800 IU of vitamin D each day. ¨ Eat foods rich in calcium, like yogurt, cheese, milk, and dark green vegetables.   ¨ Eat foods rich in vitamin D, like eggs, fatty fish, cereal, and fortified milk.  ¨ Get some sunshine. Your body uses sunshine to make its own vitamin D. The safest time to be out in the sun is before 10 a.m. or after 3 p.m. Avoid getting sunburned. Sunburn can increase your risk of skin cancer. ¨ Talk to your doctor about taking a calcium plus vitamin D supplement. Ask about what type of calcium is right for you, and how much to take at a time. Adults ages 23 to 48 should not get more than 2,500 mg of calcium and 4,000 IU of vitamin D each day, whether it is from supplements and/or food. Adults ages 46 and older should not get more than 2,000 mg of calcium and 4,000 IU of vitamin D each day from supplements and/or food. · Get regular bone-building exercise. Weight-bearing and resistance exercises keep bones healthy by working the muscles and bones against gravity. Start out at an exercise level that feels right for you. Add a little at a time until you can do the following:  ¨ Do 30 minutes of weight-bearing exercise on most days of the week. Walking, jogging, stair climbing, and dancing are good choices. ¨ Do resistance exercises with weights or elastic bands 2 to 3 days a week. · Limit alcohol. Drink no more than 1 alcohol drink a day if you are a woman. Drink no more than 2 alcohol drinks a day if you are a man. · Do not smoke. Smoking can make bones thin faster. If you need help quitting, talk to your doctor about stop-smoking programs and medicines. These can increase your chances of quitting for good. When should you call for help? Watch closely for changes in your health, and be sure to contact your doctor if you have any problems. Where can you learn more? Go to http://kalia-terell.info/. Enter R003 in the search box to learn more about \"Preventing Osteoporosis: Care Instructions. \"  Current as of: May 12, 2017  Content Version: 11.4  © 5657-7676 [employer].  Care instructions adapted under license by [employer] (which disclaims liability or warranty for this information). If you have questions about a medical condition or this instruction, always ask your healthcare professional. [place] [address] any warranty or liability for your use of this information.

## 2018-03-28 RX ORDER — IBUPROFEN 800 MG/1
TABLET ORAL
Qty: 90 TAB | Refills: 0 | Status: SHIPPED | OUTPATIENT
Start: 2018-03-28 | End: 2018-04-24 | Stop reason: SDUPTHER

## 2018-03-28 RX ORDER — LISINOPRIL 40 MG/1
TABLET ORAL
Qty: 30 TAB | Refills: 0 | Status: SHIPPED | OUTPATIENT
Start: 2018-03-28 | End: 2018-04-29 | Stop reason: SDUPTHER

## 2018-04-16 RX ORDER — POTASSIUM CHLORIDE 750 MG/1
CAPSULE, EXTENDED RELEASE ORAL
Qty: 30 CAP | Refills: 0 | Status: SHIPPED | OUTPATIENT
Start: 2018-04-16 | End: 2018-04-24 | Stop reason: SDUPTHER

## 2018-04-24 ENCOUNTER — OFFICE VISIT (OUTPATIENT)
Dept: INTERNAL MEDICINE CLINIC | Age: 63
End: 2018-04-24

## 2018-04-24 VITALS
HEIGHT: 63 IN | BODY MASS INDEX: 28.35 KG/M2 | HEART RATE: 62 BPM | DIASTOLIC BLOOD PRESSURE: 82 MMHG | SYSTOLIC BLOOD PRESSURE: 140 MMHG | RESPIRATION RATE: 16 BRPM | WEIGHT: 160 LBS | TEMPERATURE: 98.3 F | OXYGEN SATURATION: 98 %

## 2018-04-24 DIAGNOSIS — E11.9 TYPE 2 DIABETES MELLITUS WITHOUT COMPLICATION, WITHOUT LONG-TERM CURRENT USE OF INSULIN (HCC): ICD-10-CM

## 2018-04-24 DIAGNOSIS — I10 HYPERTENSION WITH GOAL BLOOD PRESSURE LESS THAN 130/85: ICD-10-CM

## 2018-04-24 DIAGNOSIS — E78.00 HYPERCHOLESTEREMIA: Primary | ICD-10-CM

## 2018-04-24 PROBLEM — R73.9 ELEVATED BLOOD SUGAR: Status: ACTIVE | Noted: 2018-04-24

## 2018-04-24 LAB
CHOLEST SERPL-MCNC: 196 MG/DL
GLUCOSE POC: 149 MG/DL
HBA1C MFR BLD HPLC: 5.9 %
HDLC SERPL-MCNC: 44 MG/DL
LDL CHOLESTEROL POC: 97 MG/DL
MICROALBUMIN UR TEST STR-MCNC: 30 MG/L
MICROALBUMIN/CREAT RATIO POC: NORMAL MG/G
TCHOL/HDL RATIO (POC): 4.4
TRIGL SERPL-MCNC: 272 MG/DL
VLDLC SERPL CALC-MCNC: 152 MG/DL

## 2018-04-24 RX ORDER — ASPIRIN 81 MG/1
81 TABLET ORAL DAILY
COMMUNITY
End: 2018-09-23 | Stop reason: SDUPTHER

## 2018-04-24 NOTE — PROGRESS NOTES
Duy Ware is a 63 y.o. female and presents with Cholesterol Problem; Diabetes; and Hypertension    Hypertension Review:  The patient has essential hypertension  Diet and Lifestyle: generally follows a  low sodium diet, exercises sporadically  Home BP Monitoring: is not measured at home. Pertinent ROS: taking medications as instructed, no medication side effects noted, no TIA's, no chest pain on exertion, no dyspnea on exertion, no swelling of ankles. Diabetes Mellitus Review:  She has diabetes mellitus. Diabetic ROS - medication compliance: compliant all of the time, diabetic diet compliance: compliant all of the time, home glucose monitoring: is performed. Known diabetic complications: none  Cardiovascular risk factors: family history, dyslipidemia, diabetes mellitus, obesity, hypertension  Current diabetic medications include oral agents   Eye exam current (within one year): no  Weight trend: stable  Prior visit with dietician: no  Current diet: \"healthy\" diet  in general  Current exercise: walking  Current monitoring regimen: home blood tests - daily  Home blood sugar records: trend: stable  Any episodes of hypoglycemia? no  Is She on ACE inhibitor or angiotensin II receptor blocker? Yes       Knee pain Review:  Patient complaints of bilaterally knee pain are improved. Onset of the symptoms was months  ago. Inciting event: longstanding problem which has been getting worse. Current symptoms include pain location: both: medial and swelling. none,  Aggravating symptoms: going up and down stairs, walking, lateral movements, any weight bearing. Patient's overall course: gradually worsening Patient has had prior knee problems. Evaluation to date: none.   Treatment to date:NSAIDS              Review of Systems  Constitutional: negative for fevers, chills, anorexia and weight loss  Eyes:   negative for visual disturbance and irritation  ENT:   negative for tinnitus,sore throat,nasal congestion,ear pains.hoarseness  Respiratory:  negative for cough, hemoptysis, dyspnea,wheezing  CV:   negative for chest pain, palpitations, lower extremity edema  GI:    diarrhea, abdominal pain,  Endo:               negative for polyuria,polydipsia,polyphagia,heat intolerance  Genitourinary: negative for frequency, dysuria and hematuria  Integument:  negative for rash and pruritus  Hematologic:  negative for easy bruising and gum/nose bleeding  Musculoskel: negative for myalgias, arthralgias, back pain, muscle weakness, joint pain  Neurological:  negative for headaches, dizziness, vertigo, memory problems and gait   Behavl/Psych: negative for feelings of anxiety, depression, mood changes    Past Medical History:   Diagnosis Date    Arthritis, Degenerative, Knee 7/15/2011    [facility]    Diverticulitis     GERD, gastro - Esophageal Reflux Disease 7/15/2011    Hypercholesterolemia 7/15/2011    Hypertension     Type II or unspecified type diabetes mellitus without mention of complication, uncontrolled 7/15/2011     Past Surgical History:   Procedure Laterality Date    COLONOSCOPY N/A 9/18/2017    COLONOSCOPY performed by Balaji Shields MD at [facility] AMBULATORY OR    HX GYN  2004    uterine surgery    LAP,TUBAL CAUTERY Bilateral 1992     Social History     Social History    Marital status:      Spouse name: N/A    Number of children: N/A    Years of education: N/A     Social History Main Topics    Smoking status: Former Smoker     Quit date: 4/15/2015    Smokeless tobacco: Never Used    Alcohol use No      Comment: occ    Drug use: Yes     Special: Marijuana      Comment: \"daily\"    Sexual activity: Yes     Partners: Male     Other Topics Concern    None     Social History Narrative        She lives with her  she has five adult children denies alcohol cigarette use     Family History   Problem Relation Age of Onset    Diabetes Father     Deep Vein Thrombosis Daughter     Lung Disease Daughter     Other Daughter      cholesterol     Current Outpatient Prescriptions   Medication Sig Dispense Refill    lisinopril (PRINIVIL, ZESTRIL) 40 mg tablet TAKE 1 TABLET BY MOUTH EVERY DAY 30 Tab 0    diclofenac EC (VOLTAREN) 75 mg EC tablet TK 1 T PO BID  12    polyethylene glycol (MIRALAX) 17 gram/dose powder MIX 17 GRAMS IN A LIQUID AND THEN DRINK ONCE D UTD  3    raNITIdine (ZANTAC) 300 mg tablet TAKE 1 TABLET BY MOUTH EVERY DAY 30 Tab 0    ibuprofen (MOTRIN) 800 mg tablet TAKE 1 TABLET BY MOUTH EVERY 8 HOURS AS NEEDED FOR PAIN 90 Tab 0    glyBURIDE (DIABETA) 2.5 mg tablet TAKE 1 TABLET BY MOUTH EVERY DAY 30 Tab 6    sucralfate (CARAFATE) 1 gram tablet Take 1 g by mouth four (4) times daily.  bismuth subsalicylate (PEPTO-BISMOL MAXIMUM STRENGTH) 525 mg/15 mL susp Take 30 mL by mouth every six (6) hours as needed.  MULTIVIT-MIN/IRON/FOLIC/LUTEIN (CENTRUM SILVER WOMEN PO) Take  by mouth daily.  potassium chloride SA (MICRO-K) 10 mEq capsule TAKE ONE CAPSULE BY MOUTH TWICE DAILY (Patient taking differently: TAKE ONE CAPSULE BY MOUTH once DAILY) 30 Cap 0    ONETOUCH ULTRA TEST strip TEST BLOOD SUGARS 3 TIMES DAILY 100 Each 12    loperamide (IMODIUM A-D) 2 mg tablet Take 1 Tab by mouth four (4) times daily as needed for Diarrhea.  20 Tab 0     No Known Allergies    Objective:  Visit Vitals    /82 (BP 1 Location: Right arm, BP Patient Position: Sitting)    Pulse 62    Temp 98.3 °F (36.8 °C)    Resp 16    Ht 5' 3\" (1.6 m)    Wt 160 lb (72.6 kg)    LMP 03/15/2004 (Approximate)    SpO2 98%    BMI 28.34 kg/m2     Physical Exam:   General appearance - alert, well appearing, and in no distress  Mental status - alert, oriented to person, place, and time  EYE-KODY, EOMI, corneas normal, no foreign bodies  ENT-ENT exam normal, no neck nodes or sinus tenderness  Nose - normal and patent, no erythema, discharge or polyps  Mouth - mucous membranes moist, pharynx normal without lesions  Neck - supple, no significant adenopathy   Chest - clear to auscultation, no wheezes, rales or rhonchi, symmetric air entry   Heart - normal rate, regular rhythm, normal S1, S2, no murmurs, rubs, clicks or gallops   Abdomen - soft, nontender, nondistended, no masses or organomegaly  Lymph- no adenopathy palpable  Ext-peripheral pulses normal, no pedal edema, no clubbing or cyanosis  Skin-Warm and dry. no hyperpigmentation, vitiligo, or suspicious lesions  Neuro -alert, oriented, normal speech, no focal findings or movement disorder noted  Neck-normal C-spine, no tenderness, full ROM without pain  Feet-no nail deformities or callus formation with good pulses noted  Lt.hand-mcp joint tenderness    Results for orders placed or performed in visit on 04/24/18   AMB POC HEMOGLOBIN A1C   Result Value Ref Range    Hemoglobin A1c (POC) 5.9 %   AMB POC GLUCOSE BLOOD, BY GLUCOSE MONITORING DEVICE   Result Value Ref Range    Glucose  mg/dL   AMB POC LIPID PROFILE   Result Value Ref Range    Cholesterol (POC) 196     Triglycerides (POC) 272     HDL Cholesterol (POC) 44     VLDL (POC) 152 MG/DL    LDL Cholesterol (POC) 97 MG/DL    TChol/HDL Ratio (POC) 4.4    AMB POC URINE, MICROALBUMIN, SEMIQUANTITATIVE   Result Value Ref Range    Microalbumin urine (POC) 30 MG/L    Microalbumin/creat ratio (POC)  <30 MG/G       Assessment/Plan:    ICD-10-CM ICD-9-CM    1. Hypercholesteremia E78.00 272.0 AMB POC LIPID PROFILE   2. Hypertension with goal blood pressure less than 130/85 I10 401.9 AMB POC LIPID PROFILE   3.  Type 2 diabetes mellitus without complication, without long-term current use of insulin (HCC) E11.9 250.00 AMB POC HEMOGLOBIN A1C      AMB POC GLUCOSE BLOOD, BY GLUCOSE MONITORING DEVICE      AMB POC URINE, MICROALBUMIN, SEMIQUANTITATIVE     Orders Placed This Encounter    AMB POC HEMOGLOBIN A1C    AMB POC GLUCOSE BLOOD, BY GLUCOSE MONITORING DEVICE    AMB POC LIPID PROFILE  AMB POC URINE, MICROALBUMIN, SEMIQUANTITATIVE     lose weight, increase physical activity, follow low fat diet, follow low salt diet,Take 81mg aspirin daily      Patient Instructions   [employer] Activation    Thank you for requesting access to [employer]. Please follow the instructions below to securely access and download your online medical record. [facility] allows you to send messages to your doctor, view your test results, renew your prescriptions, schedule appointments, and more. How Do I Sign Up? 1. In your internet browser, go to [email or web address]  2. Click on the First Time User? Click Here link in the Sign In box. You will be redirect to the New Member Sign Up page. 3. Enter your [employer] Access Code exactly as it appears below. You will not need to use this code after youve completed the sign-up process. If you do not sign up before the expiration date, you must request a new code. [facility] Access Code: Activation code not generated  Current [facility] Status: Patient Declined (This is the date your [facility] [employer] access code will )    4. Enter the last four digits of your Social Security Number (xxxx) and Date of Birth (mm/dd/yyyy) as indicated and click Submit. You will be taken to the next sign-up page. 5. Create a [facility] ID. This will be your [employer] login ID and cannot be changed, so think of one that is secure and easy to remember. 6. Create a [facility] [employer] password. You can change your password at any time. 7. Enter your Password Reset Question and Answer. This can be used at a later time if you forget your password. 8. Enter your e-mail address. You will receive e-mail notification when new information is available in [address] 9. Click Sign Up. You can now view and download portions of your medical record. 10. Click the Download Summary menu link to download a portable copy of your medical information.     Additional Information    If you have questions, please visit the Frequently Asked Questions section of the MyChart website at [email or web address] Remember, [employer]t is NOT to be used for urgent needs. For medical emergencies, dial 911. Follow-up Disposition:  Return in about 3 months (around 7/24/2018), or if symptoms worsen or fail to improve. I have reviewed with the patient details of the assessment and plan and all questions were answered. Relevent patient education was performed. The most recent lab findings were reviewed with the patient. An After Visit Summary was printed and given to the patient.

## 2018-04-24 NOTE — PATIENT INSTRUCTIONS
[employer] Activation    Thank you for requesting access to [facility]. Please follow the instructions below to securely access and download your online medical record. [facility] allows you to send messages to your doctor, view your test results, renew your prescriptions, schedule appointments, and more. How Do I Sign Up? 1. In your internet browser, go to [email or web address]  2. Click on the First Time User? Click Here link in the Sign In box. You will be redirect to the New Member Sign Up page. 3. Enter your [facility] Access Code exactly as it appears below. You will not need to use this code after youve completed the sign-up process. If you do not sign up before the expiration date, you must request a new code. [facility] Access Code: Activation code not generated  Current [facility] Status: Patient Declined (This is the date your [facility] access code will )    4. Enter the last four digits of your Social Security Number (xxxx) and Date of Birth (mm/dd/yyyy) as indicated and click Submit. You will be taken to the next sign-up page. 5. Create a [facility] ID. This will be your [facility] login ID and cannot be changed, so think of one that is secure and easy to remember. 6. Create a [facility] password. You can change your password at any time. 7. Enter your Password Reset Question and Answer. This can be used at a later time if you forget your password. 8. Enter your e-mail address. You will receive e-mail notification when new information is available in [address] 9. Click Sign Up. You can now view and download portions of your medical record. 10. Click the Download Summary menu link to download a portable copy of your medical information. Additional Information    If you have questions, please visit the Frequently Asked Questions section of the [facility] website at [email or web address] Remember, [facility] is NOT to be used for urgent needs. For medical emergencies, dial 911.

## 2018-04-24 NOTE — PROGRESS NOTES
Chief Complaint   Patient presents with    Cholesterol Problem    Diabetes    Hypertension     1. Have you been to the ER, urgent care clinic since your last visit? Hospitalized since your last visit? No    2. Have you seen or consulted any other health care providers outside of the [address] [place] [address] since your last visit? Include any pap smears or colon screening.  No

## 2018-04-24 NOTE — MR AVS SNAPSHOT
[address] [facility] [address] 
[phone] Patient: Susan Noyola MRN: [id number] Visit Information Date & Time Provider Department Dept. Phone Encounter #  
 4/24/2018 11:00 AM Shay Love MD [facility] [phone] [id number] Follow-up Instructions Return in about 3 months (around 7/24/2018), or if symptoms worsen or fail to improve. Upcoming Health Maintenance Date Due  
 EYE EXAM RETINAL OR DILATED Q1 8/15/1967 Pneumococcal 19-64 Medium Risk (1 of 1 - PPSV23) 8/15/1976 ZOSTER VACCINE AGE 60> 6/15/2017 MICROALBUMIN Q1 3/6/2018 BREAST CANCER SCRN MAMMOGRAM 8/16/2018 HEMOGLOBIN A1C Q6M 7/24/2018 FOBT Q 1 YEAR AGE 50-75 8/15/2018 LIPID PANEL Q1 1/24/2019 FOOT EXAM Q1 4/24/2019 PAP AKA CERVICAL CYTOLOGY 3/14/2020 DTaP/Tdap/Td series (2 - Td) 3/6/2027 Allergies as of 4/24/2018  Review Complete On: 4/24/2018 By: Shay Love MD  
 No Known Allergies Current Immunizations  Reviewed on 9/21/2017 Name Date Influenza Vaccine 9/11/2017 Not reviewed this visit You Were Diagnosed With   
  
 Codes Comments Hypercholesteremia    -  Primary ICD-10-CM: E78.00 ICD-9-CM: 272.0 Hypertension with goal blood pressure less than 130/85     ICD-10-CM: I10 
ICD-9-CM: 401.9 Type 2 diabetes mellitus without complication, without long-term current use of insulin (HCC)     ICD-10-CM: E11.9 ICD-9-CM: 250.00 Vitals BP Pulse Temp Resp Height(growth percentile) Weight(growth percentile) 140/82 (BP 1 Location: Right arm, BP Patient Position: Sitting) 62 98.3 °F (36.8 °C) 16 5' 3\" (1.6 m) 160 lb (72.6 kg) LMP SpO2 BMI OB Status Smoking Status 03/15/2004 (Approximate) 98% 28.34 kg/m2 Postmenopausal Former Smoker Vitals History BMI and BSA Data Body Mass Index Body Surface Area 28.34 kg/m 2 1.8 m 2 Preferred Pharmacy Pharmacy Name Phone [facility] [address] [facility]  [place] [phone] Your Updated Medication List  
  
   
This list is accurate as of 4/24/18 11:47 AM.  Always use your most recent med list.  
  
  
  
  
 bismuth subsalicylate 366 HI/31 mL Susp Commonly known as:  PEPTO-BISMOL MAXIMUM STRENGTH Take 30 mL by mouth every six (6) hours as needed. CENTRUM SILVER WOMEN PO Take  by mouth daily. diclofenac EC 75 mg EC tablet Commonly known as:  VOLTAREN  
TK 1 T PO BID  
  
 glyBURIDE 2.5 mg tablet Commonly known as:  Jetty Belt TAKE 1 TABLET BY MOUTH EVERY DAY  
  
 ibuprofen 800 mg tablet Commonly known as:  MOTRIN  
TAKE 1 TABLET BY MOUTH EVERY 8 HOURS AS NEEDED FOR PAIN  
  
 lisinopril 40 mg tablet Commonly known as:  PRINIVIL, ZESTRIL  
TAKE 1 TABLET BY MOUTH EVERY DAY  
  
 loperamide 2 mg tablet Commonly known as:  IMODIUM A-D Take 1 Tab by mouth four (4) times daily as needed for Diarrhea. ONETOUCH ULTRA TEST strip Generic drug:  glucose blood VI test strips TEST BLOOD SUGARS 3 TIMES DAILY polyethylene glycol 17 gram/dose powder Commonly known as:  Parviz Spaniel MIX 17 GRAMS IN A LIQUID AND THEN DRINK ONCE D UTD potassium chloride SA 10 mEq capsule Commonly known as:  MICRO-K  
TAKE ONE CAPSULE BY MOUTH TWICE DAILY  
  
 raNITIdine 300 mg tablet Commonly known as:  ZANTAC TAKE 1 TABLET BY MOUTH EVERY DAY  
  
 sucralfate 1 gram tablet Commonly known as:  Williams Dec Take 1 g by mouth four (4) times daily. We Performed the Following AMB POC GLUCOSE BLOOD, BY GLUCOSE MONITORING DEVICE [38884 CPT(R)] AMB POC HEMOGLOBIN A1C [46370 CPT(R)] AMB POC LIPID PROFILE [10562 CPT(R)] AMB POC URINE, MICROALBUMIN, SEMIQUANTITATIVE [69031 CPT(R)] CBC WITH AUTOMATED DIFF [43969 CPT(R)] METABOLIC PANEL, COMPREHENSIVE [99968 CPT(R)] Follow-up Instructions Return in about 3 months (around 2018), or if symptoms worsen or fail to improve. Patient Instructions [employer] Activation Thank you for requesting access to [facility]. Please follow the instructions below to securely access and download your online medical record. [facility] allows you to send messages to your doctor, view your test results, renew your prescriptions, schedule appointments, and more. How Do I Sign Up? 1. In your internet browser, go to [email or web address] 
2. Click on the First Time User? Click Here link in the Sign In box. You will be redirect to the New Member Sign Up page. 3. Enter your [facility] Access Code exactly as it appears below. You will not need to use this code after youve completed the sign-up process. If you do not sign up before the expiration date, you must request a new code. [facility] Access Code: Activation code not generated Current [facility] Status: Patient Declined (This is the date your [facility] [employer] access code will ) 4. Enter the last four digits of your Social Security Number (xxxx) and Date of Birth (mm/dd/yyyy) as indicated and click Submit. You will be taken to the next sign-up page. 5. Create a [facility] ID. This will be your [facility] login ID and cannot be changed, so think of one that is secure and easy to remember. 6. Create a [facility] [employer] password. You can change your password at any time. 7. Enter your Password Reset Question and Answer. This can be used at a later time if you forget your password. 8. Enter your e-mail address. You will receive e-mail notification when new information is available in [address] 9. Click Sign Up. You can now view and download portions of your medical record. 10. Click the Download Summary menu link to download a portable copy of your medical information. Additional Information If you have questions, please visit the Frequently Asked Questions section of the [employer] website at [email or web address] Remember, [employer] is NOT to be used for urgent needs. For medical emergencies, dial 911. Please provide this summary of care documentation to your next provider. Your primary care clinician is listed as Tiffany Wasserman. If you have any questions after today's visit, please call [phone].

## 2018-04-25 ENCOUNTER — ANESTHESIA EVENT (OUTPATIENT)
Dept: ENDOSCOPY | Age: 63
End: 2018-04-25
Payer: COMMERCIAL

## 2018-04-25 ENCOUNTER — ANESTHESIA (OUTPATIENT)
Dept: ENDOSCOPY | Age: 63
End: 2018-04-25
Payer: COMMERCIAL

## 2018-04-25 ENCOUNTER — HOSPITAL ENCOUNTER (OUTPATIENT)
Age: 63
Setting detail: OUTPATIENT SURGERY
Discharge: HOME | End: 2018-04-25
Attending: INTERNAL MEDICINE | Admitting: INTERNAL MEDICINE
Payer: COMMERCIAL

## 2018-04-25 VITALS
OXYGEN SATURATION: 94 % | SYSTOLIC BLOOD PRESSURE: 147 MMHG | WEIGHT: 159.13 LBS | BODY MASS INDEX: 28.2 KG/M2 | DIASTOLIC BLOOD PRESSURE: 96 MMHG | RESPIRATION RATE: 15 BRPM | HEIGHT: 63 IN | TEMPERATURE: 98 F | HEART RATE: 74 BPM

## 2018-04-25 LAB
ALBUMIN SERPL-MCNC: 4.1 G/DL (ref 3.6–4.8)
ALBUMIN/GLOB SERPL: 1.6 {RATIO} (ref 1.2–2.2)
ALP SERPL-CCNC: 127 IU/L (ref 39–117)
ALT SERPL-CCNC: 28 IU/L (ref 0–32)
AST SERPL-CCNC: 19 IU/L (ref 0–40)
BASOPHILS # BLD AUTO: 0 X10E3/UL (ref 0–0.2)
BASOPHILS NFR BLD AUTO: 1 %
BILIRUB SERPL-MCNC: 0.3 MG/DL (ref 0–1.2)
BUN SERPL-MCNC: 20 MG/DL (ref 8–27)
BUN/CREAT SERPL: 30 (ref 12–28)
CALCIUM SERPL-MCNC: 9.3 MG/DL (ref 8.7–10.3)
CHLORIDE SERPL-SCNC: 105 MMOL/L (ref 96–106)
CO2 SERPL-SCNC: 22 MMOL/L (ref 18–29)
CREAT SERPL-MCNC: 0.67 MG/DL (ref 0.57–1)
EOSINOPHIL # BLD AUTO: 0.3 X10E3/UL (ref 0–0.4)
EOSINOPHIL NFR BLD AUTO: 4 %
ERYTHROCYTE [DISTWIDTH] IN BLOOD BY AUTOMATED COUNT: 13.8 % (ref 12.3–15.4)
GFR SERPLBLD CREATININE-BSD FMLA CKD-EPI: 110 ML/MIN/1.73
GFR SERPLBLD CREATININE-BSD FMLA CKD-EPI: 96 ML/MIN/1.73
GLOBULIN SER CALC-MCNC: 2.5 G/DL (ref 1.5–4.5)
GLUCOSE SERPL-MCNC: 112 MG/DL (ref 65–99)
HCT VFR BLD AUTO: 42.5 % (ref 34–46.6)
HGB BLD-MCNC: 13.9 G/DL (ref 11.1–15.9)
IMM GRANULOCYTES # BLD: 0 X10E3/UL (ref 0–0.1)
IMM GRANULOCYTES NFR BLD: 0 %
LYMPHOCYTES # BLD AUTO: 2.8 X10E3/UL (ref 0.7–3.1)
LYMPHOCYTES NFR BLD AUTO: 41 %
MCH RBC QN AUTO: 30.8 PG (ref 26.6–33)
MCHC RBC AUTO-ENTMCNC: 32.7 G/DL (ref 31.5–35.7)
MCV RBC AUTO: 94 FL (ref 79–97)
MONOCYTES # BLD AUTO: 0.5 X10E3/UL (ref 0.1–0.9)
MONOCYTES NFR BLD AUTO: 7 %
NEUTROPHILS # BLD AUTO: 3.2 X10E3/UL (ref 1.4–7)
NEUTROPHILS NFR BLD AUTO: 47 %
PLATELET # BLD AUTO: 209 X10E3/UL (ref 150–379)
POTASSIUM SERPL-SCNC: 4.4 MMOL/L (ref 3.5–5.2)
PROT SERPL-MCNC: 6.6 G/DL (ref 6–8.5)
RBC # BLD AUTO: 4.51 X10E6/UL (ref 3.77–5.28)
SODIUM SERPL-SCNC: 141 MMOL/L (ref 134–144)
WBC # BLD AUTO: 6.8 X10E3/UL (ref 3.4–10.8)

## 2018-04-25 PROCEDURE — 74011250636 HC RX REV CODE- 250/636

## 2018-04-25 PROCEDURE — 76060000031 HC ANESTHESIA FIRST 0.5 HR: Performed by: INTERNAL MEDICINE

## 2018-04-25 PROCEDURE — 74011250636 HC RX REV CODE- 250/636: Performed by: INTERNAL MEDICINE

## 2018-04-25 PROCEDURE — 76040000019: Performed by: INTERNAL MEDICINE

## 2018-04-25 PROCEDURE — 74011250637 HC RX REV CODE- 250/637: Performed by: INTERNAL MEDICINE

## 2018-04-25 RX ORDER — EPINEPHRINE 0.1 MG/ML
1 INJECTION INTRACARDIAC; INTRAVENOUS
Status: DISCONTINUED | OUTPATIENT
Start: 2018-04-25 | End: 2018-04-25 | Stop reason: HOSPADM

## 2018-04-25 RX ORDER — SODIUM CHLORIDE 0.9 % (FLUSH) 0.9 %
5-10 SYRINGE (ML) INJECTION EVERY 8 HOURS
Status: DISCONTINUED | OUTPATIENT
Start: 2018-04-25 | End: 2018-04-25 | Stop reason: HOSPADM

## 2018-04-25 RX ORDER — FLUMAZENIL 0.1 MG/ML
0.2 INJECTION INTRAVENOUS
Status: CANCELLED | OUTPATIENT
Start: 2018-04-25 | End: 2018-04-25

## 2018-04-25 RX ORDER — FENTANYL CITRATE 50 UG/ML
50 INJECTION, SOLUTION INTRAMUSCULAR; INTRAVENOUS
Status: CANCELLED | OUTPATIENT
Start: 2018-04-25 | End: 2018-04-25

## 2018-04-25 RX ORDER — SODIUM CHLORIDE 0.9 % (FLUSH) 0.9 %
5-10 SYRINGE (ML) INJECTION AS NEEDED
Status: CANCELLED | OUTPATIENT
Start: 2018-04-25 | End: 2018-04-25

## 2018-04-25 RX ORDER — ATROPINE SULFATE 0.1 MG/ML
0.5 INJECTION INTRAVENOUS
Status: DISCONTINUED | OUTPATIENT
Start: 2018-04-25 | End: 2018-04-25 | Stop reason: HOSPADM

## 2018-04-25 RX ORDER — SODIUM CHLORIDE 9 MG/ML
75 INJECTION, SOLUTION INTRAVENOUS CONTINUOUS
Status: DISCONTINUED | OUTPATIENT
Start: 2018-04-25 | End: 2018-04-25 | Stop reason: HOSPADM

## 2018-04-25 RX ORDER — NALOXONE HYDROCHLORIDE 0.4 MG/ML
0.4 INJECTION, SOLUTION INTRAMUSCULAR; INTRAVENOUS; SUBCUTANEOUS
Status: DISCONTINUED | OUTPATIENT
Start: 2018-04-25 | End: 2018-04-25 | Stop reason: HOSPADM

## 2018-04-25 RX ORDER — SODIUM CHLORIDE 0.9 % (FLUSH) 0.9 %
5-10 SYRINGE (ML) INJECTION AS NEEDED
Status: DISCONTINUED | OUTPATIENT
Start: 2018-04-25 | End: 2018-04-25 | Stop reason: HOSPADM

## 2018-04-25 RX ORDER — MIDAZOLAM HYDROCHLORIDE 1 MG/ML
.25-5 INJECTION, SOLUTION INTRAMUSCULAR; INTRAVENOUS
Status: DISCONTINUED | OUTPATIENT
Start: 2018-04-25 | End: 2018-04-25 | Stop reason: HOSPADM

## 2018-04-25 RX ORDER — SODIUM CHLORIDE 0.9 % (FLUSH) 0.9 %
5-10 SYRINGE (ML) INJECTION EVERY 8 HOURS
Status: CANCELLED | OUTPATIENT
Start: 2018-04-25 | End: 2018-04-25

## 2018-04-25 RX ORDER — PROPOFOL 10 MG/ML
INJECTION, EMULSION INTRAVENOUS AS NEEDED
Status: DISCONTINUED | OUTPATIENT
Start: 2018-04-25 | End: 2018-04-25 | Stop reason: HOSPADM

## 2018-04-25 RX ORDER — NALOXONE HYDROCHLORIDE 0.4 MG/ML
0.4 INJECTION, SOLUTION INTRAMUSCULAR; INTRAVENOUS; SUBCUTANEOUS
Status: CANCELLED | OUTPATIENT
Start: 2018-04-25 | End: 2018-04-25

## 2018-04-25 RX ORDER — DEXTROMETHORPHAN/PSEUDOEPHED 2.5-7.5/.8
1.2 DROPS ORAL
Status: DISCONTINUED | OUTPATIENT
Start: 2018-04-25 | End: 2018-04-25 | Stop reason: HOSPADM

## 2018-04-25 RX ORDER — FLUMAZENIL 0.1 MG/ML
0.2 INJECTION INTRAVENOUS
Status: DISCONTINUED | OUTPATIENT
Start: 2018-04-25 | End: 2018-04-25 | Stop reason: HOSPADM

## 2018-04-25 RX ORDER — MIDAZOLAM HYDROCHLORIDE 1 MG/ML
.25-5 INJECTION, SOLUTION INTRAMUSCULAR; INTRAVENOUS
Status: CANCELLED | OUTPATIENT
Start: 2018-04-25 | End: 2018-04-25

## 2018-04-25 RX ADMIN — SODIUM CHLORIDE 75 ML/HR: 900 INJECTION, SOLUTION INTRAVENOUS at 10:52

## 2018-04-25 RX ADMIN — SIMETHICONE 80 MG: 20 SUSPENSION/ DROPS ORAL at 11:46

## 2018-04-25 RX ADMIN — PROPOFOL 250 MG: 10 INJECTION, EMULSION INTRAVENOUS at 11:51

## 2018-04-25 NOTE — IP AVS SNAPSHOT
[address] [facility] 83. 
[phone] Patient: Deveron Osgood MRN: [id number] ZAZ:2/79/2423 About your hospitalization You were admitted on:  April 25, 2018 You last received care in the:  [facility] ENDOSCOPY You were discharged on:  April 25, 2018 Why you were hospitalized Your primary diagnosis was:  Not on File Follow-up Information None Discharge Orders None A check chester indicates which time of day the medication should be taken. My Medications CHANGE how you take these medications Instructions Each Dose to Equal  
 Morning Noon Evening Bedtime  
 potassium chloride SA 10 mEq capsule Commonly known as:  Jeffrey Pump What changed:  See the new instructions. Your last dose was: Your next dose is: TAKE ONE CAPSULE BY MOUTH TWICE DAILY CONTINUE taking these medications Instructions Each Dose to Equal  
 Morning Noon Evening Bedtime  
 aspirin delayed-release 81 mg tablet Your last dose was: Your next dose is: Take 81 mg by mouth daily. 81 mg  
    
   
   
   
  
 bismuth subsalicylate 245 FP/62 mL Susp Commonly known as:  PEPTO-BISMOL MAXIMUM STRENGTH Your last dose was: Your next dose is: Take 30 mL by mouth every six (6) hours as needed. 30 mL CENTRUM SILVER WOMEN PO Your last dose was: Your next dose is: Take  by mouth daily. diclofenac EC 75 mg EC tablet Commonly known as:  VOLTAREN Your last dose was: Your next dose is:    
   
   
 TK 1 T PO DAILY  
     
   
   
   
  
 glyBURIDE 2.5 mg tablet Commonly known as:  Ceil  Your last dose was: Your next dose is: TAKE 1 TABLET BY MOUTH EVERY DAY  
     
   
   
   
  
 ibuprofen 800 mg tablet Commonly known as:  MOTRIN Your last dose was: Your next dose is: TAKE 1 TABLET BY MOUTH EVERY 8 HOURS AS NEEDED FOR PAIN  
     
   
   
   
  
 lisinopril 40 mg tablet Commonly known as:  Elizabeth Fish Your last dose was: Your next dose is: TAKE 1 TABLET BY MOUTH EVERY DAY  
     
   
   
   
  
 loperamide 2 mg tablet Commonly known as:  IMODIUM A-D Your last dose was: Your next dose is: Take 1 Tab by mouth four (4) times daily as needed for Diarrhea.  
 2 mg ONETOUCH ULTRA TEST strip Generic drug:  glucose blood VI test strips Your last dose was: Your next dose is:    
   
   
 TEST BLOOD SUGARS 3 TIMES DAILY polyethylene glycol 17 gram/dose powder Commonly known as:  Orysia [place] Your last dose was: Your next dose is: MIX 17 GRAMS IN A LIQUID AND THEN DRINK ONCE D UTD  
     
   
   
   
  
 raNITIdine 300 mg tablet Commonly known as:  ZANTAC Your last dose was: Your next dose is: TAKE 1 TABLET BY MOUTH EVERY DAY  
     
   
   
   
  
 sucralfate 1 gram tablet Commonly known as:  Samul Se Your last dose was: Your next dose is: Take 1 g by mouth four (4) times daily. 1 g Discharge Instructions [place] Office: [phone] Zelda Goldman [id number] 1955 EGD/COLONOSCOPY DISCHARGE INSTRUCTIONS Discomfort: 
Sore throat- throat lozenges or warm salt water gargle 
redness at IV site- apply warm compress to area; if redness or soreness persist- contact your physician Gaseous discomfort- walking, belching will help relieve any discomfort You may not operate a vehicle for 12 hours You may not engage in an occupation involving machinery or appliances for rest of today. You may not drink alcoholic beverages for at least 12 hours Avoid making any critical decisions for at least 24 hour DIET You may resume your regular diet  however -  remember your colon is empty and a heavy meal will produce gas. Avoid these foods:  fried / greasy foods, excessive carbonated drinks or too much caffeine MEDICATIONS Regarding Aspirin or Nonsteroidal medications specifically, please see below. ACTIVITY You may resume your normal daily activities. Spend the remainder of the day resting -  avoid any strenuous activity. CALL M.D. ANY SIGN OF Increasing pain, nausea, vomiting Abdominal distension (swelling) New increased bleeding (oral or rectal) Fever (chills) \ Follow-up Instructions: 
 Call  Koko Gregorio MD for any questions or concerns Telephone # [phone] Follow-up Information None Group 47hart Activation Thank you for requesting access to [employer]. Please follow the instructions below to securely access and download your online medical record. [employer] allows you to send messages to your doctor, view your test results, renew your prescriptions, schedule appointments, and more. How Do I Sign Up? 1. In your internet browser, go to [email or web address] 
2. Click on the First Time User? Click Here link in the Sign In box. You will be redirect to the New Member Sign Up page. 3. Enter your [employer] Access Code exactly as it appears below. You will not need to use this code after youve completed the sign-up process. If you do not sign up before the expiration date, you must request a new code. [employer] Access Code: Activation code not generated Current [employer] Status: Patient Declined (This is the date your [employer] access code will ) 4. Enter the last four digits of your Social Security Number (xxxx) and Date of Birth (mm/dd/yyyy) as indicated and click Submit. You will be taken to the next sign-up page. 5. Create a [employer] ID.  This will be your [employer] login ID and cannot be changed, so think of one that is secure and easy to remember. 6. Create a [employer] password. You can change your password at any time. 7. Enter your Password Reset Question and Answer. This can be used at a later time if you forget your password. 8. Enter your e-mail address. You will receive e-mail notification when new information is available in [address] 9. Click Sign Up. You can now view and download portions of your medical record. 10. Click the Download Summary menu link to download a portable copy of your medical information. Additional Information If you have questions, please visit the Frequently Asked Questions section of the [employer] website at [email or web address] Remember, [employer] is NOT to be used for urgent needs. For medical emergencies, dial 911. Introducing Edmundo Jernigan As a [facility] patient, I wanted to make you aware of our electronic visit tool called Edmundo Jernigan. [facility] 24/7 allows you to connect within minutes with a medical provider 24 hours a day, seven days a week via a mobile device or tablet or logging into a secure website from your computer. You can access Edmundo Jernigan from anywhere in the [place]. A virtual visit might be right for you when you have a simple condition and feel like you just dont want to get out of bed, or cant get away from work for an appointment, when your regular [facility] provider is not available (evenings, weekends or holidays), or when youre out of town and need minor care. Electronic visits cost only $49 and if the [facility] 24/7 provider determines a prescription is needed to treat your condition, one can be electronically transmitted to a nearby pharmacy*. Please take a moment to enroll today if you have not already done so. The enrollment process is free and takes just a few minutes.   To enroll, please download the [employer] 24/7 josé to your tablet or phone, or visit [email or web address] to enroll on your computer. And, as an [address] [place] [address] patient with a [employer] account, the results of your visits will be scanned into your electronic medical record and your primary care provider will be able to view the scanned results. We urge you to continue to see your regular [employer] provider for your ongoing medical care. And while your primary care provider may not be the one available when you seek a [employer] virtual visit, the peace of mind you get from getting a real diagnosis real time can be priceless. For more information on [employer], view our Frequently Asked Questions (FAQs) at [email or web address]. Sincerely, 
 
Torrie Garcia MD 
Chief Medical Officer Kurt Rita Brown *:  certain medications cannot be prescribed via [employer] Providers Seen During Your Hospitalization Provider Specialty Primary office phone Jesus Flores MD Gastroenterology [phone] Your Primary Care Physician (PCP) Primary Care Physician Office Phone Office Fax [address] [facility], [address] [place] [phone] You are allergic to the following No active allergies Recent Documentation Height Weight BMI OB Status Smoking Status 1.6 m 72.2 kg 28.19 kg/m2 Postmenopausal Former Smoker Emergency Contacts Name Discharge Info Relation Home Work Mobile Melvi Zamarripa DISCHARGE CAREGIVER [3] Child [2] Dutch Amaya DISCHARGE CAREGIVER [3] Spouse [3]   [phone] Patient Belongings The following personal items are in your possession at time of discharge: 
  Dental Appliances: None  Visual Aid: None Please provide this summary of care documentation to your next provider. Signatures-by signing, you are acknowledging that this After Visit Summary has been reviewed with you and you have received a copy. Patient Signature:  ____________________________________________________________ Date:  ____________________________________________________________  
  
[facility] Provider Signature:  ____________________________________________________________ Date:  ____________________________________________________________

## 2018-04-25 NOTE — PERIOP NOTES
Endoscope was pre-cleaned at the bedside immediately following procedure by [facility].

## 2018-04-25 NOTE — PROCEDURES
Colonoscopy Procedure Note    Tamela Conley  [birth year]  [id number]      Pre-operative Diagnosis: history of polyps    Post-operative Diagnosis: Diverticulosis, Hemorrhoids, rectal prolapse      : Koko Jain MD    Referring Provider: Lazaro Cunningham MD    Sedation:  MAC anesthesia Propofol        Procedure Details:    After detailed informed consent was obtained with all risks and benefits of procedure explained and preoperative exam completed, the patient was taken to the endoscopy suite and placed in the left lateral decubitus position. Upon sequential sedation as per above, a digital rectal exam was performed  And was normal.  The Olympus videocolonoscope  was inserted in the rectum and carefully advanced to the cecum, which was identified by the ileocecal valve and appendiceal orifice. The quality of preparation was good. The colonoscope was slowly withdrawn with careful evaluation between folds. Retroflexion in the rectum was performed. Findings:  · The procedure was made very challenging by her constant coughing and hacking (hx of COPD and daily marijuana smoking). · No large polyps are noted. · Moderate sigmoid diverticulosis. · Rectal views are very limited with mucosal prolapse from constant coughing. · Possible mixed hemorrhoids    Therapies:  none    Specimen:Specimens were collected as described above and sent to pathology. Complications: None were encountered during the procedure. EBL:  None. Recommendations:     -Repeat colonoscopy in 2-3 years.  -High fiber diet.  -Follow up with primary care physician.    -Naturally, for new bleeding, unexplained weight loss,change in bowel habits and anemia, an earlier colonoscopy should be considered. Koko Jain MD  4/25/2018  11:48 AM

## 2018-04-25 NOTE — ROUTINE PROCESS
Tayo Sauceda  1955  [id number]    Situation:  Verbal report received from: Liban Davis, RN  Procedure: Procedure(s):  COLONOSCOPY    Background:    Preoperative diagnosis: history of polyps  Postoperative diagnosis: Diverticulosis, Hemorrhoids, rectal prolapse    :  Dr. Regina Kaur  Assistant(s): Endoscopy Technician-1: Nikia Andrade  Endoscopy RN-1: Hilda Cooper RN    Specimens: * No specimens in log *  H. Pylori  no    Assessment:  Intra-procedure medications     Anesthesia gave intra-procedure sedation and medications, see anesthesia flow sheet yes    Intravenous fluids: NS@ KVO     Vital signs stable     Abdominal assessment: round and soft     Recommendation:  Discharge patient per MD order.   Family  Permission to share finding with family or friend yes

## 2018-04-25 NOTE — DISCHARGE INSTRUCTIONS
[place] Office: [phone]    Riri Perrin  [id number]  1955    EGD/COLONOSCOPY DISCHARGE INSTRUCTIONS  Discomfort:  Sore throat- throat lozenges or warm salt water gargle  redness at IV site- apply warm compress to area; if redness or soreness persist- contact your physician  Gaseous discomfort- walking, belching will help relieve any discomfort  You may not operate a vehicle for 12 hours  You may not engage in an occupation involving machinery or appliances for rest of today. You may not drink alcoholic beverages for at least 12 hours  Avoid making any critical decisions for at least 24 hour  DIET  You may resume your regular diet - however -  remember your colon is empty and a heavy meal will produce gas. Avoid these foods:  fried / greasy foods, excessive carbonated drinks or too much caffeine  MEDICATIONS   Regarding Aspirin or Nonsteroidal medications specifically, please see below. ACTIVITY  You may resume your normal daily activities. Spend the remainder of the day resting -  avoid any strenuous activity. CALL M.D. ANY SIGN OF   Increasing pain, nausea, vomiting  Abdominal distension (swelling)  New increased bleeding (oral or rectal)  Fever (chills)  \  Follow-up Instructions:   Call  Koko Gregorio MD for any questions or concerns   Telephone # [phone]      Follow-up Information     None         [employer] Activation    Thank you for requesting access to [facility]. Please follow the instructions below to securely access and download your online medical record. [facility] allows you to send messages to your doctor, view your test results, renew your prescriptions, schedule appointments, and more. How Do I Sign Up? 1. In your internet browser, go to [email or web address]  2. Click on the First Time User? Click Here link in the Sign In box. You will be redirect to the New Member Sign Up page. 3. Enter your [facility] Access Code exactly as it appears below.  You will not need to use this code after youve completed the sign-up process. If you do not sign up before the expiration date, you must request a new code. [employer] Access Code: Activation code not generated  Current [employer] Status: Patient Declined (This is the date your [employer] access code will )    4. Enter the last four digits of your Social Security Number (xxxx) and Date of Birth (mm/dd/yyyy) as indicated and click Submit. You will be taken to the next sign-up page. 5. Create a [employer] ID. This will be your [employer] login ID and cannot be changed, so think of one that is secure and easy to remember. 6. Create a [employer] password. You can change your password at any time. 7. Enter your Password Reset Question and Answer. This can be used at a later time if you forget your password. 8. Enter your e-mail address. You will receive e-mail notification when new information is available in [address] 9. Click Sign Up. You can now view and download portions of your medical record. 10. Click the Download Summary menu link to download a portable copy of your medical information. Additional Information    If you have questions, please visit the Frequently Asked Questions section of the [employer] website at [email or web address] Remember, [employer] is NOT to be used for urgent needs. For medical emergencies, dial 911.

## 2018-04-25 NOTE — ANESTHESIA PREPROCEDURE EVALUATION
Anesthetic History   No history of anesthetic complications            Review of Systems / Medical History  Patient summary reviewed, nursing notes reviewed and pertinent labs reviewed    Pulmonary    COPD      Smoker (marijuana daily)         Neuro/Psych   Within defined limits           Cardiovascular    Hypertension: well controlled              Exercise tolerance: >4 METS     GI/Hepatic/Renal     GERD: well controlled           Endo/Other    Diabetes: type 2    Arthritis     Other Findings              Physical Exam    Airway  Mallampati: I  TM Distance: 4 - 6 cm  Neck ROM: normal range of motion   Mouth opening: Normal     Cardiovascular    Rhythm: regular  Rate: normal      Pertinent negatives: No murmur   Dental    Dentition: Poor dentition     Pulmonary  Breath sounds clear to auscultation               Abdominal  GI exam deferred       Other Findings            Anesthetic Plan    ASA: 2  Anesthesia type: general and total IV anesthesia          Induction: Intravenous  Anesthetic plan and risks discussed with: Patient      Propofol MAC

## 2018-04-25 NOTE — H&P
Pre-endoscopy H and P    The patient was seen and examined in the room/pre-op holding area. The airway was assessed and documented. The problem list, past medical history, and medications were reviewed. 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 Social History Main Topics    Smoking status: Former Smoker     Quit date: 4/15/2015    Smokeless tobacco: Never Used    Alcohol use No    Drug use: Yes     Special: Marijuana      Comment: \"daily\"    Sexual activity: Yes     Partners: Male     Other Topics Concern    Not on file     Social History Narrative        She lives with her  she has five adult children denies alcohol cigarette use     Past Medical History:   Diagnosis Date    Arthritis, Degenerative, Knee 7/15/2011    COPD (chronic obstructive pulmonary disease) ([facility] 7/15/2011    Diverticulitis     GERD (gastroesophageal reflux disease)     GERD, gastro - Esophageal Reflux Disease 7/15/2011    Hypercholesterolemia 7/15/2011    Hypertension     Type II or unspecified type diabetes mellitus without mention of complication, uncontrolled 7/15/2011         Prior to Admission Medications   Prescriptions Last Dose Informant Patient Reported? Taking? MULTIVIT-MIN/IRON/FOLIC/LUTEIN (CENTRUM SILVER WOMEN PO) 4/24/2018 at Unknown time Self Yes Yes   Sig: Take  by mouth daily.    ONETOUCH ULTRA TEST strip  Self No Yes Sig: TEST BLOOD SUGARS 3 TIMES DAILY   aspirin delayed-release 81 mg tablet Unknown at Unknown time  Yes No   Sig: Take 81 mg by mouth daily. bismuth subsalicylate (PEPTO-BISMOL MAXIMUM STRENGTH) 525 mg/15 mL susp 3/25/2018 at Unknown time Self Yes Yes   Sig: Take 30 mL by mouth every six (6) hours as needed. diclofenac EC (VOLTAREN) 75 mg EC tablet Unknown at Unknown time  Yes No   Sig: TK 1 T PO DAILY   glyBURIDE (DIABETA) 2.5 mg tablet 4/24/2018 at Unknown time  No Yes   Sig: TAKE 1 TABLET BY MOUTH EVERY DAY   ibuprofen (MOTRIN) 800 mg tablet 4/18/2018 at Unknown time  No Yes   Sig: TAKE 1 TABLET BY MOUTH EVERY 8 HOURS AS NEEDED FOR PAIN   lisinopril (PRINIVIL, ZESTRIL) 40 mg tablet 4/24/2018 at Unknown time  No Yes   Sig: TAKE 1 TABLET BY MOUTH EVERY DAY   loperamide (IMODIUM A-D) 2 mg tablet 3/25/2018 at Unknown time Self No Yes   Sig: Take 1 Tab by mouth four (4) times daily as needed for Diarrhea.   polyethylene glycol (MIRALAX) 17 gram/dose powder 4/24/2018 at Unknown time  Yes Yes   Sig: MIX 17 GRAMS IN A LIQUID AND THEN DRINK ONCE D UTD   potassium chloride SA (MICRO-K) 10 mEq capsule 4/24/2018 at Unknown time Self No Yes   Sig: TAKE ONE CAPSULE BY MOUTH TWICE DAILY   Patient taking differently: TAKE ONE CAPSULE BY MOUTH once DAILY   raNITIdine (ZANTAC) 300 mg tablet 4/24/2018 at Unknown time  No Yes   Sig: TAKE 1 TABLET BY MOUTH EVERY DAY   sucralfate (CARAFATE) 1 gram tablet 4/24/2018 at Unknown time Self Yes Yes   Sig: Take 1 g by mouth four (4) times daily. Facility-Administered Medications: None           The review of systems is:  Negative  for shortness of breath or chest pain      The heart, lungs, and mental status were satisfactory for the administration of deep sedation and for the procedure. I discussed with the patient the objectives, risks, consequences and alternatives to the procedure.       Derek Diaz MD  4/25/2018  11:24 AM

## 2018-04-25 NOTE — PERIOP NOTES
Anesthesia reports 250mg Propofol and 300mL NS given during procedure. Received report from anesthesia staff on vital signs and status of patient.

## 2018-04-25 NOTE — ANESTHESIA POSTPROCEDURE EVALUATION
Post-Anesthesia Evaluation and Assessment    Patient: Baljeet Mayberry MRN: [id number]  SSN: [id number]    YOB: 1955  Age: 63 y.o. Sex: female       Cardiovascular Function/Vital Signs  Visit Vitals    BP (!) 147/96    Pulse 74    Temp 36.7 °C (98 °F)    Resp 15    Ht 5' 3\" (1.6 m)    Wt 72.2 kg (159 lb 2 oz)    SpO2 94%    BMI 28.19 kg/m2       Patient is status post general, total IV anesthesia anesthesia for Procedure(s):  COLONOSCOPY. Nausea/Vomiting: None    Postoperative hydration reviewed and adequate. Pain:  Pain Scale 1: Numeric (0 - 10) (04/25/18 1219)  Pain Intensity 1: 0 (04/25/18 1219)   Managed    Neurological Status: At baseline    Mental Status and Level of Consciousness: Arousable    Pulmonary Status:   O2 Device: Room air (04/25/18 1219)   Adequate oxygenation and airway patent    Complications related to anesthesia: None    Post-anesthesia assessment completed.  No concerns    Signed By: Marion Gee MD     April 25, 2018

## 2018-04-30 RX ORDER — LISINOPRIL 40 MG/1
TABLET ORAL
Qty: 30 TAB | Refills: 0 | Status: SHIPPED | OUTPATIENT
Start: 2018-04-30 | End: 2018-05-30 | Stop reason: SDUPTHER

## 2018-04-30 RX ORDER — IBUPROFEN 800 MG/1
TABLET ORAL
Qty: 90 TAB | Refills: 0 | Status: SHIPPED | OUTPATIENT
Start: 2018-04-30 | End: 2018-08-20 | Stop reason: SDUPTHER

## 2018-04-30 RX ORDER — POTASSIUM CHLORIDE 750 MG/1
CAPSULE, EXTENDED RELEASE ORAL
Qty: 30 CAP | Refills: 0 | Status: SHIPPED | OUTPATIENT
Start: 2018-04-30 | End: 2018-08-20 | Stop reason: SDUPTHER

## 2018-05-01 RX ORDER — NYSTATIN AND TRIAMCINOLONE ACETONIDE 100000; 1 [USP'U]/G; MG/G
CREAM TOPICAL 2 TIMES DAILY
Qty: 30 G | Refills: 0 | Status: SHIPPED | OUTPATIENT
Start: 2018-05-01 | End: 2018-09-06 | Stop reason: SDUPTHER

## 2018-05-30 RX ORDER — LISINOPRIL 40 MG/1
TABLET ORAL
Qty: 30 TAB | Refills: 0 | Status: SHIPPED | OUTPATIENT
Start: 2018-05-30 | End: 2018-06-30 | Stop reason: SDUPTHER

## 2018-05-30 RX ORDER — IBUPROFEN 800 MG/1
TABLET ORAL
Qty: 90 TAB | Refills: 0 | Status: SHIPPED | OUTPATIENT
Start: 2018-05-30 | End: 2018-08-20 | Stop reason: SDUPTHER

## 2018-07-02 RX ORDER — IBUPROFEN 800 MG/1
TABLET ORAL
Qty: 90 TAB | Refills: 0 | Status: SHIPPED | OUTPATIENT
Start: 2018-07-02 | End: 2018-08-20 | Stop reason: SDUPTHER

## 2018-07-02 RX ORDER — LISINOPRIL 40 MG/1
TABLET ORAL
Qty: 30 TAB | Refills: 0 | Status: SHIPPED | OUTPATIENT
Start: 2018-07-02 | End: 2018-08-04 | Stop reason: SDUPTHER

## 2018-07-04 DIAGNOSIS — M17.0 PRIMARY OSTEOARTHRITIS OF BOTH KNEES: ICD-10-CM

## 2018-07-05 RX ORDER — DICLOFENAC SODIUM 75 MG/1
TABLET, DELAYED RELEASE ORAL
Qty: 60 TAB | Refills: 0 | Status: SHIPPED | OUTPATIENT
Start: 2018-07-05 | End: 2018-08-16 | Stop reason: SDUPTHER

## 2018-07-12 RX ORDER — GLYBURIDE 2.5 MG/1
TABLET ORAL
Qty: 30 TAB | Refills: 0 | Status: SHIPPED | OUTPATIENT
Start: 2018-07-12 | End: 2018-08-13 | Stop reason: SDUPTHER

## 2018-08-06 RX ORDER — IBUPROFEN 800 MG/1
TABLET ORAL
Qty: 90 TAB | Refills: 0 | Status: SHIPPED | OUTPATIENT
Start: 2018-08-06 | End: 2018-08-20 | Stop reason: SDUPTHER

## 2018-08-06 RX ORDER — LISINOPRIL 40 MG/1
TABLET ORAL
Qty: 30 TAB | Refills: 0 | Status: SHIPPED | OUTPATIENT
Start: 2018-08-06 | End: 2018-09-11 | Stop reason: SDUPTHER

## 2018-08-14 RX ORDER — GLYBURIDE 2.5 MG/1
TABLET ORAL
Qty: 30 TAB | Refills: 0 | Status: SHIPPED | OUTPATIENT
Start: 2018-08-14 | End: 2018-08-20 | Stop reason: ALTCHOICE

## 2018-08-16 DIAGNOSIS — M17.0 PRIMARY OSTEOARTHRITIS OF BOTH KNEES: ICD-10-CM

## 2018-08-16 RX ORDER — DICLOFENAC SODIUM 75 MG/1
TABLET, DELAYED RELEASE ORAL
Qty: 60 TAB | Refills: 0 | Status: SHIPPED | OUTPATIENT
Start: 2018-08-16 | End: 2018-09-12 | Stop reason: SDUPTHER

## 2018-08-20 ENCOUNTER — OFFICE VISIT (OUTPATIENT)
Dept: INTERNAL MEDICINE CLINIC | Age: 63
End: 2018-08-20

## 2018-08-20 VITALS
OXYGEN SATURATION: 100 % | DIASTOLIC BLOOD PRESSURE: 80 MMHG | SYSTOLIC BLOOD PRESSURE: 140 MMHG | BODY MASS INDEX: 28.53 KG/M2 | HEART RATE: 61 BPM | WEIGHT: 161 LBS | HEIGHT: 63 IN | RESPIRATION RATE: 17 BRPM | TEMPERATURE: 98.1 F

## 2018-08-20 DIAGNOSIS — Z12.39 SCREENING FOR BREAST CANCER: ICD-10-CM

## 2018-08-20 DIAGNOSIS — E78.00 HYPERCHOLESTEREMIA: ICD-10-CM

## 2018-08-20 DIAGNOSIS — E11.9 TYPE 2 DIABETES MELLITUS WITHOUT COMPLICATION, WITHOUT LONG-TERM CURRENT USE OF INSULIN (HCC): Primary | ICD-10-CM

## 2018-08-20 DIAGNOSIS — Z12.11 SCREENING FOR COLON CANCER: ICD-10-CM

## 2018-08-20 DIAGNOSIS — I10 HYPERTENSION WITH GOAL BLOOD PRESSURE LESS THAN 130/85: ICD-10-CM

## 2018-08-20 LAB
CHOLEST SERPL-MCNC: 203 MG/DL
GLUCOSE POC: 62 MG/DL
HBA1C MFR BLD HPLC: 6.2 %
HDLC SERPL-MCNC: 42 MG/DL
LDL CHOLESTEROL POC: 95 MG/DL
NON-HDL GOAL (POC): 161
TCHOL/HDL RATIO (POC): 4.9
TRIGL SERPL-MCNC: 330 MG/DL

## 2018-08-20 RX ORDER — METFORMIN HYDROCHLORIDE 500 MG/1
TABLET ORAL
Qty: 30 TAB | Refills: 12 | Status: SHIPPED | OUTPATIENT
Start: 2018-08-20 | End: 2019-06-19

## 2018-08-20 NOTE — PROGRESS NOTES
1. Have you been to the ER, urgent care clinic since your last visit? Hospitalized since your last visit?no    2. Have you seen or consulted any other health care providers outside of the [address] [place] [address] since your last visit? Include any pap smears or colon screening.  No  PHQ over the last two weeks 8/20/2018   PHQ Not Done -   Little interest or pleasure in doing things Not at all   Feeling down, depressed, irritable, or hopeless Not at all   Total Score PHQ 2 0

## 2018-08-20 NOTE — MR AVS SNAPSHOT
[address] [place] [address],[address] [place] [id number] 
[phone] Patient: Mohamud Lo MRN: [id number] CGB:[id number] Visit Information Date & Time Provider Department Dept. Phone Encounter #  
 8/20/2018 11:00 AM Kelly Barrios MD [address] [place] [phone] [id number] Follow-up Instructions Return in about 3 months (around 11/20/2018), or if symptoms worsen or fail to improve. Follow-up and Disposition History Upcoming Health Maintenance Date Due  
 EYE EXAM RETINAL OR DILATED Q1 8/15/1967 Pneumococcal 19-64 Medium Risk (1 of 1 - PPSV23) 8/15/1976 ZOSTER VACCINE AGE 60> 6/15/2017 Influenza Age 5 to Adult 8/1/2018 FOBT Q 1 YEAR AGE 50-75 8/15/2018 BREAST CANCER SCRN MAMMOGRAM 8/16/2018 HEMOGLOBIN A1C Q6M 10/24/2018 FOOT EXAM Q1 4/24/2019 MICROALBUMIN Q1 4/24/2019 LIPID PANEL Q1 4/24/2019 PAP AKA CERVICAL CYTOLOGY 3/14/2020 DTaP/Tdap/Td series (2 - Td) 3/6/2027 Allergies as of 8/20/2018  Review Complete On: 8/20/2018 By: Kelly Barrios MD  
 No Known Allergies Current Immunizations  Reviewed on 9/21/2017 Name Date Influenza Vaccine 9/11/2017 Not reviewed this visit You Were Diagnosed With   
  
 Codes Comments Type 2 diabetes mellitus without complication, without long-term current use of insulin (HCC)    -  Primary ICD-10-CM: E11.9 ICD-9-CM: 250.00 Screening for colon cancer     ICD-10-CM: Z12.11 ICD-9-CM: V76.51 Hypertension with goal blood pressure less than 130/85     ICD-10-CM: I10 
ICD-9-CM: 401.9 Hypercholesteremia     ICD-10-CM: E78.00 ICD-9-CM: 272.0 Screening for breast cancer     ICD-10-CM: Z12.31 
ICD-9-CM: V76.10 Vitals BP Pulse Temp Resp Height(growth percentile) Weight(growth percentile)  140/80 (BP 1 Location: Right arm, BP Patient Position: Sitting) 61 98.1 °F (36.7 °C) (Oral) 17 5' 3\" (1.6 m) 161 lb (73 kg) LMP SpO2 BMI OB Status Smoking Status 03/15/2004 (Approximate) 100% 28.52 kg/m2 Postmenopausal Former Smoker Vitals History BMI and BSA Data Body Mass Index Body Surface Area 28.52 kg/m 2 1.8 m 2 Preferred Pharmacy Pharmacy Name Phone [facility] [email or web address]  [place] [phone] Your Updated Medication List  
  
   
This list is accurate as of 18 12:30 PM.  Always use your most recent med list.  
  
  
  
  
 aspirin delayed-release 81 mg tablet Take 81 mg by mouth daily. bismuth subsalicylate 165 XS/79 mL Susp Commonly known as:  PEPTO-BISMOL MAXIMUM STRENGTH Take 30 mL by mouth every six (6) hours as needed. CENTRUM SILVER WOMEN PO Take  by mouth daily. * diclofenac EC 75 mg EC tablet Commonly known as:  VOLTAREN  
TK 1 T PO DAILY  
  
 * diclofenac EC 75 mg EC tablet Commonly known as:  VOLTAREN  
TAKE 1 TABLET BY MOUTH TWICE DAILY  
  
 ibuprofen 800 mg tablet Commonly known as:  MOTRIN  
TAKE 1 TABLET BY MOUTH EVERY 8 HOURS AS NEEDED FOR PAIN  
  
 lisinopril 40 mg tablet Commonly known as:  PRINIVIL, ZESTRIL  
TAKE 1 TABLET BY MOUTH EVERY DAY  
  
 loperamide 2 mg tablet Commonly known as:  IMODIUM A-D Take 1 Tab by mouth four (4) times daily as needed for Diarrhea. metFORMIN 500 mg tablet Commonly known as:  GLUCOPHAGE Si tab daily  
  
 nystatin-triamcinolone topical cream  
Commonly known as:  MYCOLOG II Apply  to affected area two (2) times a day. ONETOUCH ULTRA TEST strip Generic drug:  glucose blood VI test strips TEST BLOOD SUGARS 3 TIMES DAILY polyethylene glycol 17 gram/dose powder Commonly known as:  Terri Margaux MIX 17 GRAMS IN A LIQUID AND THEN DRINK ONCE D UTD potassium chloride SA 10 mEq capsule Commonly known as:  Fabián Panda  
 TAKE ONE CAPSULE BY MOUTH TWICE DAILY  
  
 raNITIdine 300 mg tablet Commonly known as:  ZANTAC TAKE 1 TABLET BY MOUTH EVERY DAY  
  
 sucralfate 1 gram tablet Commonly known as:  Ronita Hy Take 1 g by mouth four (4) times daily. * Notice: This list has 2 medication(s) that are the same as other medications prescribed for you. Read the directions carefully, and ask your doctor or other care provider to review them with you. Prescriptions Sent to Pharmacy Refills  
 metFORMIN (GLUCOPHAGE) 500 mg tablet 12 Sig: Si tab daily Class: Normal  
 [facility] [address] #: [phone] We Performed the Following AMB POC GLUCOSE BLOOD, BY GLUCOSE MONITORING DEVICE [65213 CPT(R)] AMB POC HEMOGLOBIN A1C [64146 CPT(R)] AMB POC LIPID PROFILE [84622 CPT(R)] CBC W/O DIFF [59875 CPT(R)] METABOLIC PANEL, COMPREHENSIVE [67803 CPT(R)] OCCULT BLOOD IMMUNOASSAY,DIAGNOSTIC [58800 CPT(R)] REFERRAL TO OPHTHALMOLOGY [REF57 Custom] Follow-up Instructions Return in about 3 months (around 2018), or if symptoms worsen or fail to improve. To-Do List   
 2018 Imaging:  [facility] MAMMO BI SCREENING INCL CAD Referral Information Referral ID Referred By Referred To  
  
 [id number] Sebastián Mak, [address] MD   
   [address], [address] Phone: [phone] Fax: [phone] Visits Status Start Date End Date 1 New Request 18 If your referral has a status of pending review or denied, additional information will be sent to support the outcome of this decision. Patient Instructions Newsblur Activation Thank you for requesting access to Newsblur. Please follow the instructions below to securely access and download your online medical record.  Newsblur allows you to send messages to your doctor, view your test results, renew your prescriptions, schedule appointments, and more. How Do I Sign Up? 1. In your internet browser, go to [email or web address] 
2. Click on the First Time User? Click Here link in the Sign In box. You will be redirect to the New Member Sign Up page. 3. Enter your UR Mobile Access Code exactly as it appears below. You will not need to use this code after youve completed the sign-up process. If you do not sign up before the expiration date, you must request a new code. [employer] Access Code: Activation code not generated Current UR Mobile Status: Patient Declined (This is the date your [employer] access code will ) 4. Enter the last four digits of your Social Security Number (xxxx) and Date of Birth (mm/dd/yyyy) as indicated and click Submit. You will be taken to the next sign-up page. 5. Create a UR Mobile ID. This will be your UR Mobile login ID and cannot be changed, so think of one that is secure and easy to remember. 6. Create a UR Mobile password. You can change your password at any time. 7. Enter your Password Reset Question and Answer. This can be used at a later time if you forget your password. 8. Enter your e-mail address. You will receive e-mail notification when new information is available in [address] 9. Click Sign Up. You can now view and download portions of your medical record. 10. Click the Download Summary menu link to download a portable copy of your medical information. Additional Information If you have questions, please visit the Frequently Asked Questions section of the UR Mobile website at [email or web address] Remember, UR Mobile is NOT to be used for urgent needs. For medical emergencies, dial 911. Please provide this summary of care documentation to your next provider. Your primary care clinician is listed as Bridgett Hirsch.  If you have any questions after today's visit, please call [phone].

## 2018-08-20 NOTE — PATIENT INSTRUCTIONS
[employer] Activation    Thank you for requesting access to NBO TV. Please follow the instructions below to securely access and download your online medical record. NBO TV allows you to send messages to your doctor, view your test results, renew your prescriptions, schedule appointments, and more. How Do I Sign Up? 1. In your internet browser, go to [email or web address]  2. Click on the First Time User? Click Here link in the Sign In box. You will be redirect to the New Member Sign Up page. 3. Enter your NBO TV Access Code exactly as it appears below. You will not need to use this code after youve completed the sign-up process. If you do not sign up before the expiration date, you must request a new code. NBO TV Access Code: Activation code not generated  Current NBO TV Status: Patient Declined (This is the date your NBO TV access code will )    4. Enter the last four digits of your Social Security Number (xxxx) and Date of Birth (mm/dd/yyyy) as indicated and click Submit. You will be taken to the next sign-up page. 5. Create a NBO TV ID. This will be your NBO TV login ID and cannot be changed, so think of one that is secure and easy to remember. 6. Create a NBO TV password. You can change your password at any time. 7. Enter your Password Reset Question and Answer. This can be used at a later time if you forget your password. 8. Enter your e-mail address. You will receive e-mail notification when new information is available in [address] 9. Click Sign Up. You can now view and download portions of your medical record. 10. Click the Download Summary menu link to download a portable copy of your medical information. Additional Information    If you have questions, please visit the Frequently Asked Questions section of the NBO TV website at [email or web address] Remember, NBO TV is NOT to be used for urgent needs. For medical emergencies, dial 911.

## 2018-08-20 NOTE — PROGRESS NOTES
Monalisa Wharton is a 66 y.o. female and presents with Diabetes and Hypertension    Hypertension Review:  The patient has essential hypertension  Diet and Lifestyle: generally follows a  low sodium diet, exercises sporadically  Home BP Monitoring: is not measured at home. Pertinent ROS: taking medications as instructed, no medication side effects noted, no TIA's, no chest pain on exertion, no dyspnea on exertion, no swelling of ankles. Diabetes Mellitus Review:  She has diabetes mellitus. Diabetic ROS - medication compliance: compliant all of the time, diabetic diet compliance: compliant all of the time, home glucose monitoring: is performed. Known diabetic complications: hypoglycemia  Cardiovascular risk factors: family history, dyslipidemia, diabetes mellitus, obesity, hypertension  Current diabetic medications include oral agents   Eye exam current (within one year): no  Weight trend: stable  Prior visit with dietician: no  Current diet: \"healthy\" diet  in general  Current exercise: walking  Current monitoring regimen: home blood tests - daily  Home blood sugar records: trend: stable  Any episodes of hypoglycemia? no  Is She on ACE inhibitor or angiotensin II receptor blocker? Yes       Knee pain Review:  Patient complaints of bilaterally knee pain are improved. Onset of the symptoms was months  ago. Inciting event: longstanding problem which has been getting worse. Current symptoms include pain location: both: medial and swelling. none,  Aggravating symptoms: going up and down stairs, walking, lateral movements, any weight bearing. Patient's overall course: gradually worsening Patient has had prior knee problems. Evaluation to date: none.   Treatment to date:NSAIDS         Health maintenance suggests the needs for an eye referral    Health maintenance suggests the needs for a mammogram            Review of Systems  Constitutional: negative for fevers, chills, anorexia and weight loss  Eyes:   negative for visual disturbance and irritation  ENT:   negative for tinnitus,sore throat,nasal congestion,ear pains. hoarseness  Respiratory:  negative for cough, hemoptysis, dyspnea,wheezing  CV:   negative for chest pain, palpitations, lower extremity edema  GI:    diarrhea, abdominal pain,  Endo:               negative for polyuria,polydipsia,polyphagia,heat intolerance  Genitourinary: negative for frequency, dysuria and hematuria  Integument:  negative for rash and pruritus  Hematologic:  negative for easy bruising and gum/nose bleeding  Musculoskel: negative for myalgias, arthralgias, back pain, muscle weakness, joint pain  Neurological:  negative for headaches, dizziness, vertigo, memory problems and gait   Behavl/Psych: negative for feelings of anxiety, depression, mood changes    Past Medical History:   Diagnosis Date    Arthritis, Degenerative, Knee 7/15/2011    COPD (chronic obstructive pulmonary disease) ([facility] [address] 7/15/2011    Diverticulitis     GERD (gastroesophageal reflux disease)     GERD, gastro - Esophageal Reflux Disease 7/15/2011    Hypercholesterolemia 7/15/2011    Hypertension     Type II or unspecified type diabetes mellitus without mention of complication, uncontrolled 7/15/2011     Past Surgical History:   Procedure Laterality Date    COLONOSCOPY N/A 9/18/2017    COLONOSCOPY performed [facility] at [facility] AMBULATORY OR    COLONOSCOPY N/A 4/25/2018    COLONOSCOPY performed by [facility]upa [facility]ina MD at [facility] ENDOSCOPY    HX GYN  2004    uterine surgery    LAP,TUBAL CAUTERY Bilateral 1992     Social History     Social History    Marital status:      Spouse name: N/A    Number of children: N/A    Years of education: N/A     Social History Main Topics    Smoking status: Former Smoker     Quit date: 4/15/2015    Smokeless tobacco: Never Used    Alcohol use No    Drug use: Yes     Special: Marijuana      Comment: \"daily\"    Sexual activity: Yes     Partners: Male     Other Topics Concern    None     Social History Narrative        She lives with her  she has five adult children denies alcohol cigarette use     Family History   Problem Relation Age of Onset    Diabetes Father     Deep Vein Thrombosis Daughter     Lung Disease Daughter     Other Daughter      cholesterol     Current Outpatient Prescriptions   Medication Sig Dispense Refill    metFORMIN (GLUCOPHAGE) 500 mg tablet Si tab daily 30 Tab 12    diclofenac EC (VOLTAREN) 75 mg EC tablet TAKE 1 TABLET BY MOUTH TWICE DAILY 60 Tab 0    lisinopril (PRINIVIL, ZESTRIL) 40 mg tablet TAKE 1 TABLET BY MOUTH EVERY DAY 30 Tab 0    potassium chloride SA (MICRO-K) 10 mEq capsule TAKE ONE CAPSULE BY MOUTH TWICE DAILY 30 Cap 12    nystatin-triamcinolone (MYCOLOG II) topical cream Apply  to affected area two (2) times a day. 30 g 0    aspirin delayed-release 81 mg tablet Take 81 mg by mouth daily.  diclofenac EC (VOLTAREN) 75 mg EC tablet TK 1 T PO DAILY  12    polyethylene glycol (MIRALAX) 17 gram/dose powder MIX 17 GRAMS IN A LIQUID AND THEN DRINK ONCE D UTD  3    raNITIdine (ZANTAC) 300 mg tablet TAKE 1 TABLET BY MOUTH EVERY DAY 30 Tab 0    ibuprofen (MOTRIN) 800 mg tablet TAKE 1 TABLET BY MOUTH EVERY 8 HOURS AS NEEDED FOR PAIN 90 Tab 0    sucralfate (CARAFATE) 1 gram tablet Take 1 g by mouth four (4) times daily.  bismuth subsalicylate (PEPTO-BISMOL MAXIMUM STRENGTH) 525 mg/15 mL susp Take 30 mL by mouth every six (6) hours as needed.  MULTIVIT-MIN/IRON/FOLIC/LUTEIN (CENTRUM SILVER WOMEN PO) Take  by mouth daily.  ONETOUCH ULTRA TEST strip TEST BLOOD SUGARS 3 TIMES DAILY 100 Each 12    loperamide (IMODIUM A-D) 2 mg tablet Take 1 Tab by mouth four (4) times daily as needed for Diarrhea.  20 Tab 0     No Known Allergies    Objective:  Visit Vitals    /80 (BP 1 Location: Right arm, BP Patient Position: Sitting)    Pulse 61    Temp 98.1 °F (36.7 °C) (Oral)    Resp 17    Ht 5' 3\" (1.6 m)    [employer] 161 lb (73 kg)    LMP 03/15/2004 (Approximate)    SpO2 100%    BMI 28.52 kg/m2     Physical Exam:   General appearance - alert, well appearing, and in no distress  Mental status - alert, oriented to person, place, and time  EYE-KODY, EOMI, corneas normal, no foreign bodies  ENT-ENT exam normal, no neck nodes or sinus tenderness  Nose - normal and patent, no erythema, discharge or polyps  Mouth - mucous membranes moist, pharynx normal without lesions  Neck - supple, no significant adenopathy   Chest - clear to auscultation, no wheezes, rales or rhonchi, symmetric air entry   Heart - normal rate, regular rhythm, normal S1, S2, no murmurs, rubs, clicks or gallops   Abdomen - soft, nontender, nondistended, no masses or organomegaly  Lymph- no adenopathy palpable  Ext-peripheral pulses normal, no pedal edema, no clubbing or cyanosis  Skin-Warm and dry. no hyperpigmentation, vitiligo, or suspicious lesions  Neuro -alert, oriented, normal speech, no focal findings or movement disorder noted  Neck-normal C-spine, no tenderness, full ROM without pain  Feet-no nail deformities or callus formation with good pulses noted  Lt.hand-mcp joint tenderness    Results for orders placed or performed in visit on 08/20/18   AMB POC GLUCOSE BLOOD, BY GLUCOSE MONITORING DEVICE   Result Value Ref Range    Glucose POC 62 mg/dL   AMB POC HEMOGLOBIN A1C   Result Value Ref Range    Hemoglobin A1c (POC) 6.2 %   AMB POC LIPID PROFILE   Result Value Ref Range    Cholesterol (POC) 203     Triglycerides (POC) 330     HDL Cholesterol (POC) 42     LDL Cholesterol (POC) 95 MG/DL    Non-HDL Goal (POC) 161     TChol/HDL Ratio (POC) 4.9        Assessment/Plan:    ICD-10-CM ICD-9-CM    1. Type 2 diabetes mellitus without complication, without long-term current use of insulin (HCC) E11.9 250.00 REFERRAL TO OPHTHALMOLOGY      AMB POC GLUCOSE BLOOD, BY GLUCOSE MONITORING DEVICE      AMB POC HEMOGLOBIN A1C   2.  Screening for colon cancer Z12.11 V76.51 OCCULT BLOOD IMMUNOASSAY,DIAGNOSTIC   3. Hypertension with goal blood pressure less than 130/85 I10 401.9 AMB POC LIPID PROFILE      CBC W/O DIFF      METABOLIC PANEL, COMPREHENSIVE   4. Hypercholesteremia E78.00 272.0 AMB POC LIPID PROFILE   5. Screening for breast cancer Z12.31 V76.10 [facility] MAMMO BI SCREENING INCL CAD     Orders Placed This Encounter    [facility] MAMMO BI SCREENING INCL CAD     Standing Status:   Future     Standing Expiration Date:   2019     Order Specific Question:   Reason for Exam     Answer:   screening    OCCULT BLOOD IMMUNOASSAY,DIAGNOSTIC    CBC W/O DIFF    METABOLIC PANEL, COMPREHENSIVE    REFERRAL TO OPHTHALMOLOGY     Referral Priority:   Routine     Referral Type:   Consultation     Referral Reason:   Specialty Services Required     Referred to Provider:   Jacqueline Perez MD    AMB POC GLUCOSE BLOOD, BY GLUCOSE MONITORING DEVICE    AMB POC HEMOGLOBIN A1C    AMB POC LIPID PROFILE    metFORMIN (GLUCOPHAGE) 500 mg tablet     Sig: Si tab daily     Dispense:  30 Tab     Refill:  12     lose weight, increase physical activity, follow low fat diet, follow low salt diet,Take 81mg aspirin daily      Patient Instructions   Crowd[employer]der Activation    Thank you for requesting access to Crowdfunder. Please follow the instructions below to securely access and download your online medical record. Crowdfunder allows you to send messages to your doctor, view your test results, renew your prescriptions, schedule appointments, and more. How Do I Sign Up? 1. In your internet browser, go to [email or web address]  2. Click on the First Time User? Click Here link in the Sign In box. You will be redirect to the New Member Sign Up page. 3. Enter your Crowdfunder Access Code exactly as it appears below. You will not need to use this code after youve completed the sign-up process. If you do not sign up before the expiration date, you must request a new code. Crowdfunder Access Code:  Activation code not generated  Current NVMdurance Status: Patient Declined (This is the date your NVMdurance access code will )    4. Enter the last four digits of your Social Security Number (xxxx) and Date of Birth (mm/dd/yyyy) as indicated and click Submit. You will be taken to the next sign-up page. 5. Create a K[employer] Investment Managementt ID. This will be your NVMdurance login ID and cannot be changed, so think of one that is secure and easy to remember. 6. Create a K[employer]t password. You can change your password at any time. 7. Enter your Password Reset Question and Answer. This can be used at a later time if you forget your password. 8. Enter your e-mail address. You will receive e-mail notification when new information is available in [address] 9. Click Sign Up. You can now view and download portions of your medical record. 10. Click the Download Summary menu link to download a portable copy of your medical information. Additional Information    If you have questions, please visit the Frequently Asked Questions section of the NVMdurance website at [email or web address] Remember, NVMdurance is NOT to be used for urgent needs. For medical emergencies, dial 911. Follow-up Disposition:  Return in about 3 months (around 2018), or if symptoms worsen or fail to improve. I have reviewed with the patient details of the assessment and plan and all questions were answered. Relevent patient education was performed. The most recent lab findings were reviewed with the patient. An After Visit Summary was printed and given to the patient.

## 2018-08-21 LAB
ALBUMIN SERPL-MCNC: 4.4 G/DL (ref 3.6–4.8)
ALBUMIN/GLOB SERPL: 1.8 {RATIO} (ref 1.2–2.2)
ALP SERPL-CCNC: 134 IU/L (ref 39–117)
ALT SERPL-CCNC: 44 IU/L (ref 0–32)
AST SERPL-CCNC: 32 IU/L (ref 0–40)
BILIRUB SERPL-MCNC: 0.3 MG/DL (ref 0–1.2)
BUN SERPL-MCNC: 17 MG/DL (ref 8–27)
BUN/CREAT SERPL: 25 (ref 12–28)
CALCIUM SERPL-MCNC: 9.5 MG/DL (ref 8.7–10.3)
CHLORIDE SERPL-SCNC: 103 MMOL/L (ref 96–106)
CO2 SERPL-SCNC: 25 MMOL/L (ref 20–29)
CREAT SERPL-MCNC: 0.68 MG/DL (ref 0.57–1)
ERYTHROCYTE [DISTWIDTH] IN BLOOD BY AUTOMATED COUNT: 13.2 % (ref 12.3–15.4)
GLOBULIN SER CALC-MCNC: 2.5 G/DL (ref 1.5–4.5)
GLUCOSE SERPL-MCNC: 47 MG/DL (ref 65–99)
HCT VFR BLD AUTO: 42.2 % (ref 34–46.6)
HGB BLD-MCNC: 14 G/DL (ref 11.1–15.9)
MCH RBC QN AUTO: 31.3 PG (ref 26.6–33)
MCHC RBC AUTO-ENTMCNC: 33.2 G/DL (ref 31.5–35.7)
MCV RBC AUTO: 94 FL (ref 79–97)
PLATELET # BLD AUTO: 200 X10E3/UL (ref 150–379)
POTASSIUM SERPL-SCNC: 4.4 MMOL/L (ref 3.5–5.2)
PROT SERPL-MCNC: 6.9 G/DL (ref 6–8.5)
RBC # BLD AUTO: 4.48 X10E6/UL (ref 3.77–5.28)
SODIUM SERPL-SCNC: 142 MMOL/L (ref 134–144)
WBC # BLD AUTO: 8.8 X10E3/UL (ref 3.4–10.8)

## 2018-09-10 ENCOUNTER — HOSPITAL ENCOUNTER (EMERGENCY)
Age: 63
Discharge: HOME | End: 2018-09-10
Attending: EMERGENCY MEDICINE
Payer: MEDICAID

## 2018-09-10 ENCOUNTER — TELEPHONE (OUTPATIENT)
Dept: OBGYN CLINIC | Age: 63
End: 2018-09-10

## 2018-09-10 VITALS
OXYGEN SATURATION: 98 % | HEART RATE: 72 BPM | SYSTOLIC BLOOD PRESSURE: 152 MMHG | TEMPERATURE: 98.5 F | RESPIRATION RATE: 18 BRPM | HEIGHT: 63 IN | DIASTOLIC BLOOD PRESSURE: 84 MMHG | BODY MASS INDEX: 28.53 KG/M2 | WEIGHT: 161 LBS

## 2018-09-10 DIAGNOSIS — B37.2 INTERTRIGINOUS CANDIDIASIS: Primary | ICD-10-CM

## 2018-09-10 PROCEDURE — 99282 EMERGENCY DEPT VISIT SF MDM: CPT

## 2018-09-10 RX ORDER — NYSTATIN 100000 [USP'U]/G
POWDER TOPICAL 3 TIMES DAILY
Qty: 1 BOTTLE | Refills: 0 | Status: SHIPPED | OUTPATIENT
Start: 2018-09-10 | End: 2021-02-03 | Stop reason: SDUPTHER

## 2018-09-10 RX ORDER — NYSTATIN AND TRIAMCINOLONE ACETONIDE 100000; 1 [USP'U]/G; MG/G
CREAM TOPICAL
Qty: 30 G | Refills: 0 | Status: SHIPPED | OUTPATIENT
Start: 2018-09-10 | End: 2018-09-23

## 2018-09-10 NOTE — DISCHARGE INSTRUCTIONS
Yeast Skin Infection: Care Instructions  Your Care Instructions    Yeast normally lives on your skin. Sometimes too much yeast can overgrow in certain areas of the skin and cause an infection. The infection causes red, scaly, moist patches on your skin that may itch. Common areas for skin yeast infections are skin folds under the breasts or belly area. The warm and moist areas in the skin folds can make it easier for yeast to overgrow. Yeast infections also can be found on other parts of the body such as the groin or armpits. You will probably get a cream or ointment that contains an antifungal medicine. Examples of these are miconazole and clotrimazole. You put it on your skin to treat the infection. Your doctor may give you a prescription for the cream or ointment. Or you may be able to buy it without a prescription at most drugstores. If the infection is severe, the doctor will prescribe antifungal pills. A yeast infection usually goes away after about a week of treatment. But it's important to use the medicine for as long as your doctor tells you to. Follow-up care is a key part of your treatment and safety. Be sure to make and go to all appointments, and call your doctor if you are having problems. It's also a good idea to know your test results and keep a list of the medicines you take. How can you care for yourself at home? · Be safe with medicines. Take your medicines exactly as prescribed. Call your doctor if you think you are having a problem with your medicine. · Keep your skin clean and dry. Your doctor may suggest using powder that contains an antifungal medicine in the skin folds. · Wear loose clothing. When should you call for help? Call your doctor now or seek immediate medical care if:    · You have symptoms of infection, such as:  ¨ Increased pain, swelling, warmth, or redness. ¨ Red streaks leading from the area. ¨ Pus draining from the area.   ¨ A fever.    Watch closely for changes in your health, and be sure to contact your doctor if:    · You do not get better as expected. Where can you learn more? Go to http://kalia-terell.info/. Enter C591 in the search box to learn more about \"Yeast Skin Infection: Care Instructions. \"  Current as of: October 5, 2017  Content Version: 11.7  © 5696-8766 [employer]. Care instructions adapted under license by [employer] (which disclaims liability or warranty for this information). If you have questions about a medical condition or this instruction, always ask your healthcare professional. [address] any warranty or liability for your use of this information.

## 2018-09-10 NOTE — ED PROVIDER NOTES
EMERGENCY DEPARTMENT HISTORY AND PHYSICAL EXAM 
 
 
Date: 9/10/2018 Patient Name: Leonardo Lilly History of Presenting Illness Chief Complaint Patient presents with  Rash Pt c/o rash on lower left stomach x 3 days. History Provided By: Patient HPI: Leonardo Lilly, 66 y.o. female with PMHx significant for COPD, DM, arthritis, GERD, HTN, diverticulitis, presents ambulatory to the ED with cc of worsening rash on her lower abdomen and groin x 3 days. Pt reports she used abx cream with no relief to her rash. Pt endorses some relief with pain medication but did not specify what kind. Pt specifically denies any HA, CP, SOB, fever, chills, abdominal pain, nausea, vomiting, diarrhea, or urinary sxs. There are no other complaints, changes, or physical findings at this time. PCP: Cesar Lewis MD 
 
Current Outpatient Prescriptions Medication Sig Dispense Refill  nystatin (MYCOSTATIN) powder Apply  to affected area three (3) times daily. Apply until rash has resolved 1 Bottle 0  
 metFORMIN (GLUCOPHAGE) 500 mg tablet Si tab daily 30 Tab 12  
 diclofenac EC (VOLTAREN) 75 mg EC tablet TAKE 1 TABLET BY MOUTH TWICE DAILY 60 Tab 0  
 lisinopril (PRINIVIL, ZESTRIL) 40 mg tablet TAKE 1 TABLET BY MOUTH EVERY DAY 30 Tab 0  
 potassium chloride SA (MICRO-K) 10 mEq capsule TAKE ONE CAPSULE BY MOUTH TWICE DAILY 30 Cap 12  
 aspirin delayed-release 81 mg tablet Take 81 mg by mouth daily.  diclofenac EC (VOLTAREN) 75 mg EC tablet TK 1 T PO DAILY  12  
 polyethylene glycol (MIRALAX) 17 gram/dose powder MIX 17 GRAMS IN A LIQUID AND THEN DRINK ONCE D UTD  3  
 raNITIdine (ZANTAC) 300 mg tablet TAKE 1 TABLET BY MOUTH EVERY DAY 30 Tab 0  
 sucralfate (CARAFATE) 1 gram tablet Take 1 g by mouth four (4) times daily.  MULTIVIT-MIN/IRON/FOLIC/LUTEIN (CENTRUM SILVER WOMEN PO) Take  by mouth daily.     
 ONETOUCH ULTRA TEST strip TEST BLOOD SUGARS 3 TIMES DAILY 100 Each 12  
  loperamide (IMODIUM A-D) 2 mg tablet Take 1 Tab by mouth four (4) times daily as needed for Diarrhea. 20 Tab 0  
 nystatin-triamcinolone (MYCOLOG II) topical cream APPLY EXTERNALLY TO THE AFFECTED AREA TWICE DAILY 30 g 0  
 ibuprofen (MOTRIN) 800 mg tablet TAKE 1 TABLET BY MOUTH EVERY 8 HOURS AS NEEDED FOR PAIN 90 Tab 0  
 bismuth subsalicylate (PEPTO-BISMOL MAXIMUM STRENGTH) 525 mg/15 mL susp Take 30 mL by mouth every six (6) hours as needed. Past History Past Medical History: 
Past Medical History:  
Diagnosis Date  Arthritis, Degenerative, Knee 7/15/2011  COPD (chronic obstructive pulmonary disease) ([facility] [address] 7/15/2011  Diverticulitis  GERD (gastroesophageal reflux disease)  GERD, gastro - Esophageal Reflux Disease 7/15/2011  Hypercholesterolemia 7/15/2011  Hypertension  Type II or unspecified type diabetes mellitus without mention of complication, uncontrolled 7/15/2011 Past Surgical History: 
Past Surgical History:  
Procedure Laterality Date  COLONOSCOPY N/A 9/18/2017 COLONOSCOPY performed by Tayo Fong MD at . [facility] COLONOSCOPY N/A 4/25/2018 COLONOSCOPY performed by Tyao Fong MD at \[facility]\"" ENDOSCOPY  
 HX GYN  2004  
 uterine surgery [address] [place] [address] 1992 Family History: 
Family History Problem Relation Age of Onset  Diabetes Father  Deep Vein Thrombosis Daughter  Lung Disease Daughter  Other Daughter   
  cholesterol Social History: 
Social History Substance Use Topics  Smoking status: Former Smoker Quit date: 4/15/2015  Smokeless tobacco: Never Used  Alcohol use No  
 
 
Allergies: 
No Known Allergies Review of Systems Review of Systems Constitutional: Negative for chills, fatigue and fever. HENT: Negative for congestion, ear pain and rhinorrhea. Eyes: Negative for pain and visual disturbance. Respiratory: Negative for cough and shortness of breath. Cardiovascular: Negative for chest pain and leg swelling. Gastrointestinal: Negative for abdominal pain, diarrhea, nausea and vomiting. Genitourinary: Negative for dysuria and flank pain. Musculoskeletal: Negative for back pain and neck pain. Skin: Positive for rash (lower abdomen/groin). Negative for wound. Neurological: Negative for dizziness, syncope and headaches. Psychiatric/Behavioral: Negative for self-injury and suicidal ideas. Physical Exam  
Physical Exam 
GENERAL: alert and oriented, no acute distress EYES: PEERL, No injection, discharge or icterus. ENT: Mucous membranes pink and moist. 
NECK: Supple LUNGS: Airway patent. Non-labored respirations. Breath sounds clear with good air entry bilaterally. HEART: Regular rate and rhythm. No peripheral edema ABDOMEN: Non-distended and non-tender, without guarding or rebound. SKIN:  warm, dry, erythematous rash under panus with satellite lesions EXTREMITIES: Without swelling, tenderness or deformity, symmetric with normal ROM 
NEUROLOGICAL: Alert, oriented Diagnostic Study Results Labs - No results found for this or any previous visit (from the past 12 hour(s)). Radiologic Studies - No orders to display CT Results  (Last 48 hours) None CXR Results  (Last 48 hours) None Medical Decision Making I am the first provider for this patient. I reviewed the vital signs, available nursing notes, past medical history, past surgical history, family history and social history. Vital Signs-Reviewed the patient's vital signs. Patient Vitals for the past 12 hrs: 
 Temp Pulse Resp BP SpO2  
09/10/18 1943 - - - - 98 % 09/10/18 1843 98.5 °F (36.9 °C) 72 18 152/84 98 % Records Reviewed: Nursing Notes and Old Medical Records Provider Notes (Medical Decision Making): DDx: intertrigo, cellulitis, yeast infection, erysipelas On presentation the patient is well appearing, in no acute distress with normal vital signs. Based on the history and exam the differential diagnosis is above. Clinical exam c/w yeast intertrigo. less likely cellulitis. Will start on course of nystatin powder. ED Course:  
Initial assessment performed. The patients presenting problems have been discussed, and they are in agreement with the care plan formulated and outlined with them. I have encouraged them to ask questions as they arise throughout their visit. Critical Care Time: None Disposition: 
Discharge Note: 
7:53 PM 
The pt is ready for discharge. The pt's signs, symptoms, diagnosis, and discharge instructions have been discussed and pt has conveyed their understanding. The pt is to follow up as recommended or return to ER should their symptoms worsen. Plan has been discussed and pt is in agreement. PLAN: 
1. Discharge Medication List as of 9/10/2018  7:52 PM  
  
START taking these medications Details  
nystatin (MYCOSTATIN) powder Apply  to affected area three (3) times daily. Apply until rash has resolved, Normal, Disp-1 Bottle, R-0  
  
  
CONTINUE these medications which have NOT CHANGED Details  
metFORMIN (GLUCOPHAGE) 500 mg tablet Si tab daily, Normal, Disp-30 Tab, R-12  
  
!! diclofenac EC (VOLTAREN) 75 mg EC tablet TAKE 1 TABLET BY MOUTH TWICE DAILY, Normal, Disp-60 Tab, R-0  
  
lisinopril (PRINIVIL, ZESTRIL) 40 mg tablet TAKE 1 TABLET BY MOUTH EVERY DAY, Normal, Disp-30 Tab, R-0  
  
potassium chloride SA (MICRO-K) 10 mEq capsule TAKE ONE CAPSULE BY MOUTH TWICE DAILY, Normal, Disp-30 Cap, R-12  
  
aspirin delayed-release 81 mg tablet Take 81 mg by mouth daily. , Historical Med  
  
!! diclofenac EC (VOLTAREN) 75 mg EC tablet TK 1 T PO DAILY, Historical Med, R-12  
  
polyethylene glycol (MIRALAX) 17 gram/dose powder MIX 17 GRAMS IN A LIQUID AND THEN DRINK ONCE D UTD, Historical Med, R-3  
  
 raNITIdine (ZANTAC) 300 mg tablet TAKE 1 TABLET BY MOUTH EVERY DAY, Normal, Disp-30 Tab, R-0  
  
sucralfate (CARAFATE) 1 gram tablet Take 1 g by mouth four (4) times daily. , Historical Med MULTIVIT-MIN/IRON/FOLIC/LUTEIN (CENTRUM SILVER WOMEN PO) Take  by mouth daily. , Historical Med ONETOUCH ULTRA TEST strip TEST BLOOD SUGARS 3 TIMES DAILY, Normal, Disp-100 Each, R-12  
  
loperamide (IMODIUM A-D) 2 mg tablet Take 1 Tab by mouth four (4) times daily as needed for Diarrhea., Print, Disp-20 Tab, R-0  
  
nystatin-triamcinolone (MYCOLOG II) topical cream APPLY EXTERNALLY TO THE AFFECTED AREA TWICE DAILY, Normal, Disp-30 g, R-0  
  
ibuprofen (MOTRIN) 800 mg tablet TAKE 1 TABLET BY MOUTH EVERY 8 HOURS AS NEEDED FOR PAIN, Mail Order, Disp-90 Tab, R-0  
  
bismuth subsalicylate (PEPTO-BISMOL MAXIMUM STRENGTH) 525 mg/15 mL susp Take 30 mL by mouth every six (6) hours as needed., Historical Med  
  
 !! - Potential duplicate medications found. Please discuss with provider. 2.  
Follow-up Information Follow up With Details Comments Contact Info Amaris Lion MD In 2 days  [address] [facility] 
[phone] Return to ED if worse Diagnosis Clinical Impression:  
1. Intertriginous candidiasis Attestations: This note is prepared by [facility], acting as Scribe for Crew. Stephen Roe MD. Shoshana Roe MD: The scribe's documentation has been prepared under my direction and personally reviewed by me in its entirety. I confirm that the note above accurately reflects all work, treatment, procedures, and medical decision making performed by me.

## 2018-09-10 NOTE — ED NOTES
Pt arrived to ED via ambulatory with c/o rash left lower abdominal/groin area x 4 days. Pt states itches and burns. Area is red and damp in appearance. Pt is alert and orientated X 4; skin is intact; lungs are clear; pt breathes well on room air; Pt is in no acute distress. Will continue to monitor. See nursing assessment. Safety precautions in place; call light within reach. Emergency Department Nursing Plan of Care The Nursing Plan of Care is developed from the Nursing assessment and Emergency Department Attending provider initial evaluation. The plan of care may be reviewed in the ED Provider note. The Plan of Care was developed with the following considerations:  
Patient / Family readiness to learn indicated by:verbalized understanding Persons(s) to be included in education: patient Barriers to Learning/Limitations:No 
 
Signed Stacey Manzano RN   
9/10/2018   7:42 PM

## 2018-09-11 RX ORDER — GLYBURIDE 2.5 MG/1
TABLET ORAL
Qty: 30 TAB | Refills: 0 | Status: SHIPPED | OUTPATIENT
Start: 2018-09-11 | End: 2018-09-23

## 2018-09-11 RX ORDER — IBUPROFEN 800 MG/1
TABLET ORAL
Qty: 90 TAB | Refills: 0 | Status: SHIPPED | OUTPATIENT
Start: 2018-09-11 | End: 2018-09-23 | Stop reason: SDUPTHER

## 2018-09-11 RX ORDER — LISINOPRIL 40 MG/1
TABLET ORAL
Qty: 30 TAB | Refills: 0 | Status: SHIPPED | OUTPATIENT
Start: 2018-09-11 | End: 2018-10-13 | Stop reason: SDUPTHER

## 2018-09-12 DIAGNOSIS — M17.0 PRIMARY OSTEOARTHRITIS OF BOTH KNEES: ICD-10-CM

## 2018-09-12 RX ORDER — DICLOFENAC SODIUM 75 MG/1
TABLET, DELAYED RELEASE ORAL
Qty: 60 TAB | Refills: 0 | Status: SHIPPED | OUTPATIENT
Start: 2018-09-12 | End: 2018-10-13 | Stop reason: SDUPTHER

## 2018-09-23 ENCOUNTER — HOSPITAL ENCOUNTER (EMERGENCY)
Age: 63
Discharge: HOME | End: 2018-09-23
Attending: EMERGENCY MEDICINE
Payer: MEDICAID

## 2018-09-23 VITALS
DIASTOLIC BLOOD PRESSURE: 94 MMHG | TEMPERATURE: 98.2 F | HEIGHT: 63 IN | RESPIRATION RATE: 18 BRPM | BODY MASS INDEX: 25.69 KG/M2 | SYSTOLIC BLOOD PRESSURE: 163 MMHG | WEIGHT: 145 LBS | OXYGEN SATURATION: 99 % | HEART RATE: 55 BPM

## 2018-09-23 DIAGNOSIS — H10.9 CONJUNCTIVITIS OF RIGHT EYE, UNSPECIFIED CONJUNCTIVITIS TYPE: Primary | ICD-10-CM

## 2018-09-23 DIAGNOSIS — L03.213 PERIORBITAL CELLULITIS OF RIGHT EYE: ICD-10-CM

## 2018-09-23 PROCEDURE — 74011000250 HC RX REV CODE- 250: Performed by: EMERGENCY MEDICINE

## 2018-09-23 PROCEDURE — 99283 EMERGENCY DEPT VISIT LOW MDM: CPT

## 2018-09-23 RX ORDER — ERYTHROMYCIN 5 MG/G
OINTMENT OPHTHALMIC
Qty: 1 G | Refills: 0 | Status: SHIPPED | OUTPATIENT
Start: 2018-09-23 | End: 2018-09-30

## 2018-09-23 RX ORDER — CETIRIZINE HCL 10 MG
10 TABLET ORAL DAILY
Qty: 20 TAB | Refills: 0 | OUTPATIENT
Start: 2018-09-23 | End: 2022-01-22

## 2018-09-23 RX ORDER — CLINDAMYCIN HYDROCHLORIDE 300 MG/1
300 CAPSULE ORAL 3 TIMES DAILY
Qty: 21 CAP | Refills: 0 | Status: SHIPPED | OUTPATIENT
Start: 2018-09-23 | End: 2018-09-30

## 2018-09-23 RX ORDER — TETRACAINE HYDROCHLORIDE 5 MG/ML
1 SOLUTION OPHTHALMIC
Status: DISPENSED | OUTPATIENT
Start: 2018-09-23 | End: 2018-09-23

## 2018-09-23 RX ADMIN — FLUORESCEIN SODIUM 1 STRIP: 0.6 STRIP OPHTHALMIC at 19:33

## 2018-09-23 RX ADMIN — TETRACAINE HYDROCHLORIDE 1 DROP: 5 SOLUTION OPHTHALMIC at 19:33

## 2018-09-23 NOTE — ED NOTES
Pt presents to ED ambulatory complaining of redness and tenderness to the right eye that she noticed PTA. Pt denies recent injury. Pt reports she was working in the yard yesterday and was wiping her eyes a lot. Pt is alert and oriented x 4, RR even and unlabored, skin is warm and dry. Assessment completed and pt updated on plan of care. Emergency Department Nursing Plan of Care The Nursing Plan of Care is developed from the Nursing assessment and Emergency Department Attending provider initial evaluation. The plan of care may be reviewed in the ED Provider note. The Plan of Care was developed with the following considerations:  
Patient / Family readiness to learn indicated by:verbalized understanding Persons(s) to be included in education: patient Barriers to Learning/Limitations:No 
 
Signed Mary Lou Watson RN   
9/23/2018   7:46 PM

## 2018-09-24 NOTE — DISCHARGE INSTRUCTIONS
Pinkeye: Care Instructions  Your Care Instructions    Pinkeye is redness and swelling of the eye surface and the conjunctiva (the lining of the eyelid and the covering of the white part of the eye). Pinkeye is also called conjunctivitis. Pinkeye is often caused by infection with bacteria or a virus. Dry air, allergies, smoke, and chemicals are other common causes. Pinkeye often clears on its own in 7 to 10 days. Antibiotics only help if the pinkeye is caused by bacteria. Pinkeye caused by infection spreads easily. If an allergy or chemical is causing pinkeye, it will not go away unless you can avoid whatever is causing it. Follow-up care is a key part of your treatment and safety. Be sure to make and go to all appointments, and call your doctor if you are having problems. It's also a good idea to know your test results and keep a list of the medicines you take. How can you care for yourself at home? · Wash your hands often. Always wash them before and after you treat pinkeye or touch your eyes or face. · Use moist cotton or a clean, wet cloth to remove crust. Wipe from the inside corner of the eye to the outside. Use a clean part of the cloth for each wipe. · Put cold or warm wet cloths on your eye a few times a day if the eye hurts. · Do not wear contact lenses or eye makeup until the pinkeye is gone. Throw away any eye makeup you were using when you got pinkeye. Clean your contacts and storage case. If you wear disposable contacts, use a new pair when your eye has cleared and it is safe to wear contacts again. · If the doctor gave you antibiotic ointment or eyedrops, use them as directed. Use the medicine for as long as instructed, even if your eye starts looking better soon. Keep the bottle tip clean, and do not let it touch the eye area. · To put in eyedrops or ointment:  ¨ Tilt your head back, and pull your lower eyelid down with one finger.   ¨ Drop or squirt the medicine inside the lower lid.  ¨ Close your eye for 30 to 60 seconds to let the drops or ointment move around. ¨ Do not touch the ointment or dropper tip to your eyelashes or any other surface. · Do not share towels, pillows, or washcloths while you have pinkeye. When should you call for help? Call your doctor now or seek immediate medical care if:    · You have pain in your eye, not just irritation on the surface.     · You have a change in vision or loss of vision.     · You have an increase in discharge from the eye.     · Your eye has not started to improve or begins to get worse within 48 hours after you start using antibiotics.     · Pinkeye lasts longer than 7 days.    Watch closely for changes in your health, and be sure to contact your doctor if you have any problems. Where can you learn more? Go to http://kaliaEvodentalterell.info/. Enter Y392 in the search box to learn more about \"Pinkeye: Care Instructions. \"  Current as of: November 20, 2017  Content Version: 11.7  © 2631-0495 [employer]. Care instructions adapted under license by [employer] (which disclaims liability or warranty for this information). If you have questions about a medical condition or this instruction, always ask your healthcare professional. [address] any warranty or liability for your use of this information. Cellulitis: Care Instructions  Your Care Instructions    Cellulitis is a skin infection caused by bacteria, most often strep or staph. It often occurs after a break in the skin from a scrape, cut, bite, or puncture, or after a rash. Cellulitis may be treated without doing tests to find out what caused it. But your doctor may do tests, if needed, to look for a specific bacteria, like methicillin-resistant Staphylococcus aureus (MRSA). The doctor has checked you carefully, but problems can develop later.  If you notice any problems or new symptoms, get medical treatment right away.  Follow-up care is a key part of your treatment and safety. Be sure to make and go to all appointments, and call your doctor if you are having problems. It's also a good idea to know your test results and keep a list of the medicines you take. How can you care for yourself at home? · Take your antibiotics as directed. Do not stop taking them just because you feel better. You need to take the full course of antibiotics. · Prop up the infected area on pillows to reduce pain and swelling. Try to keep the area above the level of your heart as often as you can. · If your doctor told you how to care for your wound, follow your doctor's instructions. If you did not get instructions, follow this general advice:  ¨ Wash the wound with clean water 2 times a day. Don't use hydrogen peroxide or alcohol, which can slow healing. ¨ You may cover the wound with a thin layer of petroleum jelly, such as Vaseline, and a nonstick bandage. ¨ Apply more petroleum jelly and replace the bandage as needed. · Be safe with medicines. Take pain medicines exactly as directed. ¨ If the doctor gave you a prescription medicine for pain, take it as prescribed. ¨ If you are not taking a prescription pain medicine, ask your doctor if you can take an over-the-counter medicine. To prevent cellulitis in the future  · Try to prevent cuts, scrapes, or other injuries to your skin. Cellulitis most often occurs where there is a break in the skin. · If you get a scrape, cut, mild burn, or bite, wash the wound with clean water as soon as you can to help avoid infection. Don't use hydrogen peroxide or alcohol, which can slow healing. · If you have swelling in your legs (edema), support stockings and good skin care may help prevent leg sores and cellulitis. · Take care of your feet, especially if you have diabetes or other conditions that increase the risk of infection. Wear shoes and socks. Do not go barefoot.  If you have athlete's foot or other skin problems on your feet, talk to your doctor about how to treat them. When should you call for help? Call your doctor now or seek immediate medical care if:    · You have signs that your infection is getting worse, such as:  ¨ Increased pain, swelling, warmth, or redness. ¨ Red streaks leading from the area. ¨ Pus draining from the area. ¨ A fever.     · You get a rash.    Watch closely for changes in your health, and be sure to contact your doctor if:    · You do not get better as expected. Where can you learn more? Go to http://kalia-terell.info/. Rayo Bird in the search box to learn more about \"Cellulitis: Care Instructions. \"  Current as of: May 10, 2017  Content Version: 11.7  © 1564-3149 [employer]. Care instructions adapted under license by [employer] (which disclaims liability or warranty for this information). If you have questions about a medical condition or this instruction, always ask your healthcare professional. [address] any warranty or liability for your use of this information.

## 2018-09-24 NOTE — ED NOTES
Discharge instructions were given to the patient by Gurvinder Potter RN. The patient left the Emergency Department ambulatory, alert and oriented and in no acute distress with 3 prescriptions. The patient was encouraged to call or return to the ED for worsening issues or problems and was encouraged to schedule a follow up appointment for continuing care. The patient verbalized understanding of discharge instructions and prescriptions, all questions were answered. The patient has no further concerns at this time.

## 2018-09-24 NOTE — ED PROVIDER NOTES
EMERGENCY DEPARTMENT HISTORY AND PHYSICAL EXAM 
 
 
Date: 2018 Patient Name: Wells Goodpasture History of Presenting Illness Chief Complaint Patient presents with  Eye Pain Pt c/o R eye pain that started this AM. Pt c/o tenderness and redness in eye. History Provided By: Patient HPI: Wells Goodpasture, 66 y.o. female with PMHx significant for COPD, arthritis, hypercholesterolemia, GERC, HTN, DM, diverticulitis who presents ambulatory to the ED with cc of new onset of right eye redness that onset today. She repots associated tenderness around her eye as well as right eye pain that occurs with blinking. Pt denies use of medication to modify her symptoms. She states that she was working in her garden yesterday and often wiped her eye with a wet towel secondary to sweat. Pt denies any trauma to her eye. She denies any vision changes, photophobia, eye discharge, or HA. There are no other complaints, changes, or physical findings at this time. PCP: Casi Gardiner MD 
 
Current Outpatient Prescriptions Medication Sig Dispense Refill  erythromycin (ILOTYCIN) ophthalmic ointment 1/2 in strip in affected eye TID for 7 days 1 g 0  
 cetirizine (ZYRTEC) 10 mg tablet Take 1 Tab by mouth daily. 20 Tab 0  
 clindamycin (CLEOCIN) 300 mg capsule Take 1 Cap by mouth three (3) times daily for 7 days. 21 Cap 0  
 diclofenac EC (VOLTAREN) 75 mg EC tablet TAKE 1 TABLET BY MOUTH TWICE DAILY 60 Tab 0  
 lisinopril (PRINIVIL, ZESTRIL) 40 mg tablet TAKE 1 TABLET BY MOUTH EVERY DAY 30 Tab 0  
 nystatin (MYCOSTATIN) powder Apply  to affected area three (3) times daily.  Apply until rash has resolved 1 Bottle 0  
 metFORMIN (GLUCOPHAGE) 500 mg tablet Si tab daily 30 Tab 12  
 potassium chloride SA (MICRO-K) 10 mEq capsule TAKE ONE CAPSULE BY MOUTH TWICE DAILY 30 Cap 12  
 polyethylene glycol (MIRALAX) 17 gram/dose powder MIX 17 GRAMS IN A LIQUID AND THEN DRINK ONCE D UTD  3  
  raNITIdine (ZANTAC) 300 mg tablet TAKE 1 TABLET BY MOUTH EVERY DAY 30 Tab 0  ibuprofen (MOTRIN) 800 mg tablet TAKE 1 TABLET BY MOUTH EVERY 8 HOURS AS NEEDED FOR PAIN 90 Tab 0  
 sucralfate (CARAFATE) 1 gram tablet Take 1 g by mouth four (4) times daily.  bismuth subsalicylate (PEPTO-BISMOL MAXIMUM STRENGTH) 525 mg/15 mL susp Take 30 mL by mouth every six (6) hours as needed.  MULTIVIT-MIN/IRON/FOLIC/LUTEIN (CENTRUM SILVER WOMEN PO) Take  by mouth daily.  ONETOUCH ULTRA TEST strip TEST BLOOD SUGARS 3 TIMES DAILY 100 Each 12  
 loperamide (IMODIUM A-D) 2 mg tablet Take 1 Tab by mouth four (4) times daily as needed for Diarrhea. 20 Tab 0 Past History Past Medical History: 
Past Medical History:  
Diagnosis Date  Arthritis, Degenerative, Knee 7/15/2011  COPD (chronic obstructive pulmonary disease) ([facility] [address] 7/15/2011  Diverticulitis  GERD (gastroesophageal reflux disease)  GERD, gastro - Esophageal Reflux Disease 7/15/2011  Hypercholesterolemia 7/15/2011  Hypertension  Type II or unspecified type diabetes mellitus without mention of complication, uncontrolled 7/15/2011 Past Surgical History: 
Past Surgical History:  
Procedure Laterality Date  COLONOSCOPY N/A 9/18/2017 COLONOSCOPY performed by Karlie Lewis MD at . [facility] COLONOSCOPY N/A 4/25/2018 COLONOSCOPY performed by Karlie Lewis MD at [facility] ENDOSCOPY  
 HX GYN  2004  
 uterine surgery [address] [facility] 1992 Family History: 
Family History Problem Relation Age of Onset  Diabetes Father  Deep Vein Thrombosis Daughter  Lung Disease Daughter  Other Daughter   
  cholesterol Social History: 
Social History Substance Use Topics  Smoking status: Former Smoker Quit date: 4/15/2015  Smokeless tobacco: Never Used  Alcohol use No  
 
 
Allergies: 
No Known Allergies Review of Systems Review of Systems Constitutional: Negative for chills, fatigue and fever. HENT: Negative for congestion, ear pain and rhinorrhea. Eyes: Positive for pain (right) and redness (right). Negative for photophobia, discharge and visual disturbance. Respiratory: Negative for cough and shortness of breath. Cardiovascular: Negative for chest pain and leg swelling. Gastrointestinal: Negative for abdominal pain, diarrhea, nausea and vomiting. Genitourinary: Negative for dysuria and flank pain. Musculoskeletal: Negative for back pain and neck pain. Skin: Negative for rash and wound. Neurological: Negative for dizziness, syncope and headaches. Psychiatric/Behavioral: Negative for self-injury and suicidal ideas. Physical Exam  
Physical Exam  
GENERAL: alert and oriented, no acute distress EYES: PEERL, No injection, discharge or icterus. . No hyphema or hypopyon. Some conjunctival injection on the lateral conjunctiva that spares the limbus. intraocular pressures were 22 on the right and 18 on the left. Visual acuity documented by nursing, WNL. Mild jazmine-orbital erythema,  EOM intact and painless. ENT: Mucous membranes pink and moist. 
NECK: Supple LUNGS: Airway patent. Non-labored respirations. Breath sounds clear with good air entry bilaterally. HEART: Regular rate and rhythm. No peripheral edema ABDOMEN: Non-distended and non-tender, without guarding or rebound. SKIN:  warm, dry EXTREMITIES: Without swelling, tenderness or deformity, symmetric with normal ROM 
NEUROLOGICAL: Alert, oriented Medical Decision Making I am the first provider for this patient. I reviewed the vital signs, available nursing notes, past medical history, past surgical history, family history and social history. Vital Signs-Reviewed the patient's vital signs. Patient Vitals for the past 12 hrs: 
 Temp Pulse Resp BP SpO2  
09/23/18 1911 98.2 °F (36.8 °C) (!) 55 18 (!) 163/94 99 % Records Reviewed: Nursing Notes and Old Medical Records Provider Notes (Medical Decision Making): On presentation the patient is well appearing, in no acute distress with normal vital signs. Based on the history and exam the differential diagnosis for this patient includes Allergic conjunctivitis, viral conjunctivitis, bacterial conjunctivitis, glaucoma, iritis, preseptal cellulitis. No hpyopion or hyphema,  No photophobia to suggest iritis. IOP WNL with normal VA so ruled out glaucoma. No FB or fluorescein uptake on woods lamp exam.  She is complaining of some mild pain around the eye with some erythema so will treat for pre-septal cellulitis as well as possible allergic conjunctivitis. Nothing on exam so suggest orbital cellulitis. ED Course:  
Initial assessment performed. The patients presenting problems have been discussed, and they are in agreement with the care plan formulated and outlined with them. I have encouraged them to ask questions as they arise throughout their visit. Procedure Note - Wood's lamp exam: 
8:12 PM 
Performed by: Caleb Reddy MD 
Pts right eye was anesthetized with tetracaine, stained with fluorescein, and examined with a Wood's lamp, using lid eversion. Foreign body: no 
Fluorescein uptake: no The procedure took 1-15 minutes, and pt tolerated well. Written by Cathy Felder ED scribe, as dictated by Tanja Metcalf MD 
 
Disposition: 
 
DISCHARGE NOTE 
8:26 PM 
The patient has been re-evaluated and is ready for discharge. Reviewed available results with patient. Counseled patient on diagnosis and care plan. Patient has expressed understanding, and all questions have been answered. Patient agrees with plan and agrees to follow up as recommended, or return to the ED if their symptoms worsen. Discharge instructions have been provided and explained to the patient, along with reasons to return to the ED. PLAN: 
1. Current Discharge Medication List  
  
START taking these medications Details  
erythromycin (ILOTYCIN) ophthalmic ointment 1/2 in strip in affected eye TID for 7 days Qty: 1 g, Refills: 0  
  
cetirizine (ZYRTEC) 10 mg tablet Take 1 Tab by mouth daily. Qty: 20 Tab, Refills: 0  
  
clindamycin (CLEOCIN) 300 mg capsule Take 1 Cap by mouth three (3) times daily for 7 days. Qty: 21 Cap, Refills: 0  
  
  
 
2. Follow-up Information Follow up With Details Comments Contact Info Bola Lyn MD Schedule an appointment as soon as possible for a visit in 2 days  [address] [facility] [address] 
[phone] [facility] Call in 1 day  [address] [address] 
[phone] Return to ED if worse Diagnosis Clinical Impression: 1. Conjunctivitis of right eye, unspecified conjunctivitis type 2. Periorbital cellulitis of right eye Attestations: This note is prepared by Xavier Hoang, acting as Scribe for [employer]. Caroline Parsons MD. Ramirez Parsons MD: The scribe's documentation has been prepared under my direction and personally reviewed by me in its entirety. I confirm that the note above accurately reflects all work, treatment, procedures, and medical decision making performed by me.

## 2018-10-13 DIAGNOSIS — M17.0 PRIMARY OSTEOARTHRITIS OF BOTH KNEES: ICD-10-CM

## 2018-10-16 RX ORDER — DICLOFENAC SODIUM 75 MG/1
TABLET, DELAYED RELEASE ORAL
Qty: 60 TAB | Refills: 0 | Status: SHIPPED | OUTPATIENT
Start: 2018-10-16 | End: 2018-11-17 | Stop reason: SDUPTHER

## 2018-10-16 RX ORDER — LISINOPRIL 40 MG/1
TABLET ORAL
Qty: 30 TAB | Refills: 0 | Status: SHIPPED | OUTPATIENT
Start: 2018-10-16 | End: 2019-01-11 | Stop reason: SDUPTHER

## 2018-10-16 RX ORDER — GLYBURIDE 2.5 MG/1
TABLET ORAL
Qty: 30 TAB | Refills: 0 | Status: SHIPPED | OUTPATIENT
Start: 2018-10-16 | End: 2020-04-23 | Stop reason: ALTCHOICE

## 2018-10-16 RX ORDER — LISINOPRIL 40 MG/1
TABLET ORAL
Qty: 30 TAB | Refills: 0 | Status: SHIPPED | OUTPATIENT
Start: 2018-10-16 | End: 2019-06-19 | Stop reason: ALTCHOICE

## 2018-10-16 RX ORDER — IBUPROFEN 800 MG/1
TABLET ORAL
Qty: 90 TAB | Refills: 0 | Status: SHIPPED | OUTPATIENT
Start: 2018-10-16 | End: 2019-01-27

## 2018-11-17 DIAGNOSIS — M17.0 PRIMARY OSTEOARTHRITIS OF BOTH KNEES: ICD-10-CM

## 2018-11-19 RX ORDER — DICLOFENAC SODIUM 75 MG/1
TABLET, DELAYED RELEASE ORAL
Qty: 60 TAB | Refills: 0 | Status: SHIPPED | OUTPATIENT
Start: 2018-11-19 | End: 2018-12-18 | Stop reason: SDUPTHER

## 2018-11-19 RX ORDER — IBUPROFEN 800 MG/1
TABLET ORAL
Qty: 90 TAB | Refills: 0 | Status: SHIPPED | OUTPATIENT
Start: 2018-11-19 | End: 2019-01-27

## 2018-11-20 ENCOUNTER — OFFICE VISIT (OUTPATIENT)
Dept: INTERNAL MEDICINE CLINIC | Age: 63
End: 2018-11-20

## 2018-11-20 VITALS
SYSTOLIC BLOOD PRESSURE: 133 MMHG | HEIGHT: 63 IN | DIASTOLIC BLOOD PRESSURE: 76 MMHG | RESPIRATION RATE: 16 BRPM | HEART RATE: 66 BPM | TEMPERATURE: 98 F | WEIGHT: 156 LBS | OXYGEN SATURATION: 94 % | BODY MASS INDEX: 27.64 KG/M2

## 2018-11-20 DIAGNOSIS — E78.00 HYPERCHOLESTEREMIA: ICD-10-CM

## 2018-11-20 DIAGNOSIS — L30.4 INTERTRIGO: ICD-10-CM

## 2018-11-20 DIAGNOSIS — I10 HYPERTENSION WITH GOAL BLOOD PRESSURE LESS THAN 130/85: ICD-10-CM

## 2018-11-20 DIAGNOSIS — Z23 ENCOUNTER FOR IMMUNIZATION: ICD-10-CM

## 2018-11-20 DIAGNOSIS — E11.9 CONTROLLED TYPE 2 DIABETES MELLITUS WITHOUT COMPLICATION, WITHOUT LONG-TERM CURRENT USE OF INSULIN (HCC): ICD-10-CM

## 2018-11-20 DIAGNOSIS — J44.9 CHRONIC OBSTRUCTIVE PULMONARY DISEASE, UNSPECIFIED COPD TYPE (HCC): ICD-10-CM

## 2018-11-20 DIAGNOSIS — R73.9 ELEVATED BLOOD SUGAR: Primary | ICD-10-CM

## 2018-11-20 LAB
CHOLEST SERPL-MCNC: 196 MG/DL
GLUCOSE POC: 115 MG/DL
HBA1C MFR BLD HPLC: 7.2 %
HDLC SERPL-MCNC: 50 MG/DL
LDL CHOLESTEROL POC: 71 MG/DL
NON-HDL CHOLESTEROL, 011976: 146
TCHOL/HDL RATIO (POC): 3.9
TRIGL SERPL-MCNC: 373 MG/DL

## 2018-11-20 RX ORDER — NYSTATIN 100000 [USP'U]/G
POWDER TOPICAL 3 TIMES DAILY
Qty: 30 G | Refills: 3 | Status: SHIPPED | OUTPATIENT
Start: 2018-11-20 | End: 2019-01-27

## 2018-11-20 NOTE — PROGRESS NOTES
1. Have you been to the ER, urgent care clinic since your last visit? Hospitalized since your last visit?no    2. Have you seen or consulted any other health care providers outside of the [address] [place] [address] since your last visit? Include any pap smears or colon screening.  No    PHQ over the last two weeks 8/20/2018   PHQ Not Done Medical Reason (indicate in comments)   Little interest or pleasure in doing things Not at all   Feeling down, depressed, irritable, or hopeless Not at all   Total Score PHQ 2 0

## 2018-11-20 NOTE — PROGRESS NOTES
Lady Ro is a 66 y.o. female and presents with Diabetes; Cholesterol Problem; and Hypertension    Hypertension Review:  The patient has essential hypertension  Diet and Lifestyle: generally follows a  low sodium diet, exercises sporadically  Home BP Monitoring: is not measured at home. Pertinent ROS: taking medications as instructed, no medication side effects noted, no TIA's, no chest pain on exertion, no dyspnea on exertion, no swelling of ankles. She has a recurrent rash under both breast as well as the inguinal region    Diabetes Mellitus Review:  She has diabetes mellitus. Diabetic ROS - medication compliance: compliant all of the time, diabetic diet compliance: compliant all of the time, home glucose monitoring: is performed. Known diabetic complications: hypoglycemia  Cardiovascular risk factors: family history, dyslipidemia, diabetes mellitus, obesity, hypertension  Current diabetic medications include oral agents   Eye exam current (within one year): no  Weight trend: stable  Prior visit with dietician: no  Current diet: \"healthy\" diet  in general  Current exercise: walking  Current monitoring regimen: home blood tests - daily  Home blood sugar records: trend: stable  Any episodes of hypoglycemia? no  Is She on ACE inhibitor or angiotensin II receptor blocker? Yes       Review of Systems  Constitutional: negative for fevers, chills, anorexia and weight loss  Eyes:   negative for visual disturbance and irritation  ENT:   negative for tinnitus,sore throat,nasal congestion,ear pains. hoarseness  Respiratory:  negative for cough, hemoptysis, dyspnea,wheezing  CV:   negative for chest pain, palpitations, lower extremity edema  GI:    diarrhea, abdominal pain,  Endo:               negative for polyuria,polydipsia,polyphagia,heat intolerance  Genitourinary: negative for frequency, dysuria and hematuria  Integument:  negative for rash and pruritus  Hematologic:  negative for easy bruising and gum/nose bleeding  Musculoskel: negative for myalgias, arthralgias, back pain, muscle weakness, joint pain  Neurological:  negative for headaches, dizziness, vertigo, memory problems and gait   Behavl/Psych: negative for feelings of anxiety, depression, mood changes    Past Medical History:   Diagnosis Date    Arthritis, Degenerative, Knee 7/15/2011    COPD (chronic obstructive pulmonary disease) ([facility] [address] 7/15/2011    Diverticulitis     GERD (gastroesophageal reflux disease)     GERD, gastro - Esophageal Reflux Disease 7/15/2011    Hypercholesterolemia 7/15/2011    Hypertension     Type II or unspecified type diabetes mellitus without mention of complication, uncontrolled 7/15/2011     Past Surgical History:   Procedure Laterality Date    HX GYN  2004    uterine surgery    LAP,TUBAL CAUTERY Bilateral 1992       Family History   Problem Relation Age of Onset    Diabetes Father     Deep Vein Thrombosis Daughter     Lung Disease Daughter     Other Daughter         cholesterol     Current Outpatient Medications   Medication Sig Dispense Refill    nystatin (NYSTOP) powder Apply  to affected area three (3) times daily. 30 g 3    diclofenac EC (VOLTAREN) 75 mg EC tablet TAKE 1 TABLET BY MOUTH TWICE DAILY 60 Tab 0    lisinopril (PRINIVIL, ZESTRIL) 40 mg tablet TAKE 1 TABLET BY MOUTH EVERY DAY 30 Tab 0    cetirizine (ZYRTEC) 10 mg tablet Take 1 Tab by mouth daily. 20 Tab 0    ibuprofen (MOTRIN) 800 mg tablet TAKE 1 TABLET BY MOUTH EVERY 8 HOURS AS NEEDED FOR PAIN 90 Tab 0    sucralfate (CARAFATE) 1 gram tablet Take 1 g by mouth four (4) times daily.  MULTIVIT-MIN/IRON/FOLIC/LUTEIN (CENTRUM SILVER WOMEN PO) Take  by mouth daily.       ibuprofen (MOTRIN) 800 mg tablet TAKE 1 TABLET BY MOUTH EVERY 8 HOURS AS NEEDED FOR PAIN 90 Tab 0    glyBURIDE (DIABETA) 2.5 mg tablet TAKE 1 TABLET BY MOUTH EVERY DAY (Patient not taking: Reported on 11/20/2018) 30 Tab 0    ibuprofen (MOTRIN) 800 mg tablet TAKE 1 TABLET BY MOUTH EVERY 8 HOURS AS NEEDED FOR PAIN 90 Tab 0    lisinopril (PRINIVIL, ZESTRIL) 40 mg tablet TAKE 1 TABLET BY MOUTH EVERY DAY 30 Tab 0    nystatin (MYCOSTATIN) powder Apply  to affected area three (3) times daily. Apply until rash has resolved 1 Bottle 0    metFORMIN (GLUCOPHAGE) 500 mg tablet Si tab daily 30 Tab 12    potassium chloride SA (MICRO-K) 10 mEq capsule TAKE ONE CAPSULE BY MOUTH TWICE DAILY 30 Cap 12    polyethylene glycol (MIRALAX) 17 gram/dose powder MIX 17 GRAMS IN A LIQUID AND THEN DRINK ONCE D UTD  3    raNITIdine (ZANTAC) 300 mg tablet TAKE 1 TABLET BY MOUTH EVERY DAY 30 Tab 0    bismuth subsalicylate (PEPTO-BISMOL MAXIMUM STRENGTH) 525 mg/15 mL susp Take 30 mL by mouth every six (6) hours as needed.  ONETOUCH ULTRA TEST strip TEST BLOOD SUGARS 3 TIMES DAILY 100 Each 12    loperamide (IMODIUM A-D) 2 mg tablet Take 1 Tab by mouth four (4) times daily as needed for Diarrhea. 20 Tab 0     No Known Allergies    Objective:  Visit Vitals  /76   Pulse 66   Temp 98 °F (36.7 °C) (Oral)   Resp 16   Ht 5' 3\" (1.6 m)   Wt 156 lb (70.8 kg)   LMP 03/15/2004 (Approximate)   SpO2 94%   BMI 27.63 kg/m²     Physical Exam:   General appearance - alert, well appearing, and in no distress  Mental status - alert, oriented to person, place, and time  EYE-KODY, EOMI, corneas normal, no foreign bodies  ENT-ENT exam normal, no neck nodes or sinus tenderness  Nose - normal and patent, no erythema, discharge or polyps  Mouth - mucous membranes moist, pharynx normal without lesions  Neck - supple, no significant adenopathy   Chest - clear to auscultation, no wheezes, rales or rhonchi, symmetric air entry   Heart - normal rate, regular rhythm, normal S1, S2, no murmurs, rubs, clicks or gallops   Abdomen - soft, nontender, nondistended, no masses or organomegaly  Lymph- no adenopathy palpable  Ext-peripheral pulses normal, no pedal edema, no clubbing or cyanosis  Skin-Warm and dry.  no hyperpigmentation, vitiligo, or suspicious lesions  Neuro -alert, oriented, normal speech, no focal findings or movement disorder noted  Neck-normal C-spine, no tenderness, full ROM without pain  Feet-no nail deformities or callus formation with good pulses noted  Lt.hand-mcp joint tenderness    Results for orders placed or performed in visit on 11/20/18   AMB POC GLUCOSE BLOOD, BY GLUCOSE MONITORING DEVICE   Result Value Ref Range    Glucose  mg/dL   AMB POC HEMOGLOBIN A1C   Result Value Ref Range    Hemoglobin A1c (POC) 7.2 %   AMB POC LIPID PROFILE   Result Value Ref Range    Cholesterol (POC) 196     Triglycerides (POC) 373     HDL Cholesterol (POC) 50     Non-HDL Cholesterol 146     LDL Cholesterol (POC) 71 MG/DL    TChol/HDL Ratio (POC) 3.9        Assessment/Plan:    ICD-10-CM ICD-9-CM    1. Elevated blood sugar R73.9 790.29 AMB POC GLUCOSE BLOOD, BY GLUCOSE MONITORING DEVICE      AMB POC HEMOGLOBIN A1C      AMB POC LIPID PROFILE   2. Hypertension with goal blood pressure less than 130/85 I10 401.9 AMB POC GLUCOSE BLOOD, BY GLUCOSE MONITORING DEVICE      AMB POC HEMOGLOBIN A1C      AMB POC LIPID PROFILE   3. Hypercholesteremia E78.00 272.0 AMB POC GLUCOSE BLOOD, BY GLUCOSE MONITORING DEVICE      AMB POC HEMOGLOBIN A1C      AMB POC LIPID PROFILE   4. Intertrigo L30.4 695.89    5. Controlled type 2 diabetes mellitus without complication, without long-term current use of insulin ([facility]) E11.9 250.00    6. Chronic obstructive pulmonary disease, unspecified COPD type ([facility] [address] J44.9 496      Orders Placed This Encounter    AMB POC GLUCOSE BLOOD, BY GLUCOSE MONITORING DEVICE    AMB POC HEMOGLOBIN A1C    AMB POC LIPID PROFILE    nystatin (NYSTOP) powder     Sig: Apply  to affected area three (3) times daily.      Dispense:  30 g     Refill:  3     lose weight, increase physical activity, follow low fat diet, follow low salt diet,Take 81mg aspirin daily      Patient Instructions   MyChart Activation    Thank you for requesting access to [employer]. Please follow the instructions below to securely access and download your online medical record. [facility] [employer] allows you to send messages to your doctor, view your test results, renew your prescriptions, schedule appointments, and more. How Do I Sign Up? 1. In your internet browser, go to [email or web address]  2. Click on the First Time User? Click Here link in the Sign In box. You will be redirect to the New Member Sign Up page. 3. Enter your [facility] [facility] Access Code exactly as it appears below. You will not need to use this code after youve completed the sign-up process. If you do not sign up before the expiration date, you must request a new code. [facility] Access Code: [email or web address]  Expires: 2018  6:08 PM (This is the date your [facility] access code will )    4. Enter the last four digits of your Social Security Number (xxxx) and Date of Birth (mm/dd/yyyy) as indicated and click Submit. You will be taken to the next sign-up page. 5. Create a [facility] ID. This will be your [facility] login ID and cannot be changed, so think of one that is secure and easy to remember. 6. Create a [facility] password. You can change your password at any time. 7. Enter your Password Reset Question and Answer. This can be used at a later time if you forget your password. 8. Enter your e-mail address. You will receive e-mail notification when new information is available in [address]. 9. Click Sign Up. You can now view and download portions of your medical record. 10. Click the Download Summary menu link to download a portable copy of your medical information. Additional Information    If you have questions, please visit the Frequently Asked Questions section of the [employer] website at [email or web address] Remember, [facility] is NOT to be used for urgent needs. For medical emergencies, dial 911.            Follow-up Disposition:  Return in about 3 months (around 2/20/2019), or if symptoms worsen or fail to improve. I have reviewed with the patient details of the assessment and plan and all questions were answered. Relevent patient education was performed. The most recent lab findings were reviewed with the patient. An After Visit Summary was printed and given to the patient.

## 2018-11-20 NOTE — PROGRESS NOTES
After obtaining consent, and per verbal order from Dr. Freddy Dakin., patient received influenza vaccine given by Francine Hinojosa LPN in Left Deltoid. Influenza Vaccine 0.5 mL IM now. Patient was observed for 10 minutes post injection. Patient tolerated injection well.

## 2018-11-20 NOTE — PATIENT INSTRUCTIONS
[facility] [facility] Activation    Thank you for requesting access to [facility] [facility]. Please follow the instructions below to securely access and download your online medical record. [facility] allows you to send messages to your doctor, view your test results, renew your prescriptions, schedule appointments, and more. How Do I Sign Up? 1. In your internet browser, go to [email or web address]  2. Click on the First Time User? Click Here link in the Sign In box. You will be redirect to the New Member Sign Up page. 3. Enter your [employer] Access Code exactly as it appears below. You will not need to use this code after youve completed the sign-up process. If you do not sign up before the expiration date, you must request a new code. [facility] Access Code: [email or web address]  Expires: 2018  6:08 PM (This is the date your [employer] access code will )    4. Enter the last four digits of your Social Security Number (xxxx) and Date of Birth (mm/dd/yyyy) as indicated and click Submit. You will be taken to the next sign-up page. 5. Create a [employer] ID. This will be your [employer] login ID and cannot be changed, so think of one that is secure and easy to remember. 6. Create a [employer] password. You can change your password at any time. 7. Enter your Password Reset Question and Answer. This can be used at a later time if you forget your password. 8. Enter your e-mail address. You will receive e-mail notification when new information is available in [address] 9. Click Sign Up. You can now view and download portions of your medical record. 10. Click the Download Summary menu link to download a portable copy of your medical information. Additional Information    If you have questions, please visit the Frequently Asked Questions section of the [employer] website at [email or web address] Remember, [employer] is NOT to be used for urgent needs. For medical emergencies, dial 911.

## 2018-12-18 DIAGNOSIS — M17.0 PRIMARY OSTEOARTHRITIS OF BOTH KNEES: ICD-10-CM

## 2018-12-18 RX ORDER — IBUPROFEN 800 MG/1
TABLET ORAL
Qty: 90 TAB | Refills: 0 | Status: SHIPPED | OUTPATIENT
Start: 2018-12-18 | End: 2019-01-27

## 2018-12-18 RX ORDER — DICLOFENAC SODIUM 75 MG/1
TABLET, DELAYED RELEASE ORAL
Qty: 60 TAB | Refills: 0 | Status: SHIPPED | OUTPATIENT
Start: 2018-12-18 | End: 2019-01-14 | Stop reason: SDUPTHER

## 2019-01-11 RX ORDER — LISINOPRIL 40 MG/1
TABLET ORAL
Qty: 30 TAB | Refills: 0 | Status: SHIPPED | OUTPATIENT
Start: 2019-01-11 | End: 2019-01-27

## 2019-01-14 DIAGNOSIS — M17.0 PRIMARY OSTEOARTHRITIS OF BOTH KNEES: ICD-10-CM

## 2019-01-14 RX ORDER — DICLOFENAC SODIUM 75 MG/1
TABLET, DELAYED RELEASE ORAL
Qty: 60 TAB | Refills: 0 | Status: SHIPPED | OUTPATIENT
Start: 2019-01-14 | End: 2019-02-13 | Stop reason: SDUPTHER

## 2019-01-14 RX ORDER — IBUPROFEN 800 MG/1
TABLET ORAL
Qty: 90 TAB | Refills: 0 | Status: SHIPPED | OUTPATIENT
Start: 2019-01-14 | End: 2019-01-27

## 2019-01-27 ENCOUNTER — HOSPITAL ENCOUNTER (EMERGENCY)
Age: 64
Discharge: HOME | End: 2019-01-27
Attending: EMERGENCY MEDICINE | Admitting: EMERGENCY MEDICINE
Payer: MEDICAID

## 2019-01-27 VITALS
OXYGEN SATURATION: 100 % | HEIGHT: 63 IN | DIASTOLIC BLOOD PRESSURE: 100 MMHG | WEIGHT: 159 LBS | BODY MASS INDEX: 28.17 KG/M2 | HEART RATE: 74 BPM | TEMPERATURE: 97.6 F | RESPIRATION RATE: 18 BRPM | SYSTOLIC BLOOD PRESSURE: 162 MMHG

## 2019-01-27 DIAGNOSIS — N81.4 CYSTOCELE WITH PROLAPSE: Primary | ICD-10-CM

## 2019-01-27 PROCEDURE — 99282 EMERGENCY DEPT VISIT SF MDM: CPT

## 2019-01-28 NOTE — ED TRIAGE NOTES
Pt reports \"a ball like thing slipped out of my vagina, it was smooth like a balloon. I pushed it back in on the toilet and it slipped back out again\".

## 2019-01-28 NOTE — ED PROVIDER NOTES
EMERGENCY DEPARTMENT HISTORY AND PHYSICAL EXAM 
 
 
Date: 2019 Patient Name: Jad Garcia History of Presenting Illness Chief Complaint Patient presents with  Vaginal Pain History Provided By: Patient HPI: Jad Garcia, 66 y.o. female with PMHx significant for COPD, arthritis, hypercholesterolemia, GERD, HTN, DM, diverticulitis, presents ambulatory to the ED with cc of \"something slipped out of vagina \" earlier tonight. Denies trauma/injury. Able to push it back in. Denies trouble urinating or defecating. Has not taken anything for sx. Has not recently f/u gyn. Postmenopause - about 11 years ago. Denies taking estrogen. There are no other complaints, changes, or physical findings at this time. PCP: Inna Alicea MD 
 
No current facility-administered medications on file prior to encounter. Current Outpatient Medications on File Prior to Encounter Medication Sig Dispense Refill  diclofenac EC (VOLTAREN) 75 mg EC tablet TAKE 1 TABLET BY MOUTH TWICE DAILY 60 Tab 0  
 lisinopril (PRINIVIL, ZESTRIL) 40 mg tablet TAKE 1 TABLET BY MOUTH EVERY DAY 30 Tab 0  
 cetirizine (ZYRTEC) 10 mg tablet Take 1 Tab by mouth daily. 20 Tab 0  
 nystatin (MYCOSTATIN) powder Apply  to affected area three (3) times daily. Apply until rash has resolved 1 Bottle 0  
 metFORMIN (GLUCOPHAGE) 500 mg tablet Si tab daily 30 Tab 12  
 potassium chloride SA (MICRO-K) 10 mEq capsule TAKE ONE CAPSULE BY MOUTH TWICE DAILY 30 Cap 12  
 raNITIdine (ZANTAC) 300 mg tablet TAKE 1 TABLET BY MOUTH EVERY DAY 30 Tab 0  ibuprofen (MOTRIN) 800 mg tablet TAKE 1 TABLET BY MOUTH EVERY 8 HOURS AS NEEDED FOR PAIN 90 Tab 0  
 sucralfate (CARAFATE) 1 gram tablet Take 1 g by mouth four (4) times daily.  bismuth subsalicylate (PEPTO-BISMOL MAXIMUM STRENGTH) 525 mg/15 mL susp Take 30 mL by mouth every six (6) hours as needed.  MULTIVIT-MIN/IRON/FOLIC/LUTEIN (CENTRUM SILVER WOMEN PO) Take  by mouth daily.  ONETOUCH ULTRA TEST strip TEST BLOOD SUGARS 3 TIMES DAILY 100 Each 12  
 loperamide (IMODIUM A-D) 2 mg tablet Take 1 Tab by mouth four (4) times daily as needed for Diarrhea. 20 Tab 0  
 glyBURIDE (DIABETA) 2.5 mg tablet TAKE 1 TABLET BY MOUTH EVERY DAY (Patient not taking: Reported on 11/20/2018) 30 Tab 0  
 polyethylene glycol (MIRALAX) 17 gram/dose powder MIX 17 GRAMS IN A LIQUID AND THEN DRINK ONCE D UTD  3 Past History Past Medical History: 
Past Medical History:  
Diagnosis Date  Arthritis, Degenerative, Knee 7/15/2011  COPD (chronic obstructive pulmonary disease) ([facility] [address] 7/15/2011  Diverticulitis  GERD (gastroesophageal reflux disease)  GERD, gastro - Esophageal Reflux Disease 7/15/2011  Hypercholesterolemia 7/15/2011  Hypertension  Type II or unspecified type diabetes mellitus without mention of complication, uncontrolled 7/15/2011 Past Surgical History: 
Past Surgical History:  
Procedure Laterality Date  COLONOSCOPY N/A 9/18/2017 COLONOSCOPY performed by Luan Smith MD at [address] COLONOSCOPY N/A 4/25/2018 COLONOSCOPY performed by Luan Smith MD at [facility] ENDOSCOPY  
  GYN  2004  
 uterine surgery [address] [place] [address] 1990 Family History: 
Family History Problem Relation Age of Onset  Diabetes Father  Deep Vein Thrombosis Daughter  Lung Disease Daughter  Other Daughter   
     cholesterol Social History: 
Social History Tobacco Use  Smoking status: Former Smoker Last attempt to quit: 4/15/2015 Years since quitting: 3.7  Smokeless tobacco: Never Used Substance Use Topics  Alcohol use: No  
 Drug use: Yes Types: Marijuana Comment: \"daily\" Allergies: 
No Known Allergies Review of Systems Review of Systems Constitutional: Negative for chills and fever. Respiratory: Negative for shortness of breath. Cardiovascular: Negative for chest pain. Gastrointestinal: Negative for abdominal pain, nausea and vomiting. Genitourinary: Negative for flank pain. \"feels like something is coming through my vagina\" Musculoskeletal: Negative for back pain and myalgias. Skin: Negative for color change, pallor, rash and wound. Neurological: Negative for dizziness, weakness and light-headedness. All other systems reviewed and are negative. Physical Exam  
Physical Exam  
Constitutional: She is oriented to person, place, and time. She appears well-developed and well-nourished. No distress. HENT:  
Head: Normocephalic and atraumatic. Eyes: Conjunctivae are normal.  
Cardiovascular: Normal rate, regular rhythm and normal heart sounds. Pulmonary/Chest: Effort normal and breath sounds normal. No respiratory distress. Abdominal: Soft. Bowel sounds are normal. She exhibits no distension. Genitourinary:  
Genitourinary Comments: Bladder prolapsed into vaginal vault. Able to reduce. Cervical os visualized. Non-TTP Musculoskeletal: Normal range of motion. Neurological: She is alert and oriented to person, place, and time. Skin: Skin is warm. No rash noted. Psychiatric: She has a normal mood and affect. Her behavior is normal.  
Nursing note and vitals reviewed. Diagnostic Study Results Labs - No results found for this or any previous visit (from the past 12 hour(s)). Radiologic Studies - No orders to display CT Results  (Last 48 hours) None CXR Results  (Last 48 hours) None Medical Decision Making I am the first provider for this patient. I reviewed the vital signs, available nursing notes, past medical history, past surgical history, family history and social history. Vital Signs-Reviewed the patient's vital signs. Patient Vitals for the past 12 hrs: 
 Temp Pulse Resp BP SpO2 01/27/19 2155 97.6 °F (36.4 °C) 74 18 (!) 162/100 100 % Records Reviewed: Nursing Notes and Old Medical Records Provider Notes (Medical Decision Making):  
DDx; Prolapsed bladder vs uterus vs rectum ED Course:  
Initial assessment performed. The patients presenting problems have been discussed, and they are in agreement with the care plan formulated and outlined with them. I have encouraged them to ask questions as they arise throughout their visit. Disposition: 
Discussed dx and treatment plan. Discussed importance of  gyn follow up. All questions answered. Pt voiced they understood. Return if sx worsen. PLAN: 
1. Discharge Medication List as of 1/27/2019 10:41 PM  
  
 
2. Follow-up Information Follow up With Specialties Details Why Contact Info Juanpablo Woodard MD Obstetrics & Gynecology Schedule an appointment as soon as possible for a visit As needed [address] 
[phone] Return to ED if worse Diagnosis Clinical Impression: 1. Cystocele with prolapse

## 2019-01-28 NOTE — ED NOTES
Patient educated on discharge instructions by [facility]. Patient ambulated out of the ED with male . NO acute distress noted. Emergency Department Nursing Plan of Care The Nursing Plan of Care is developed from the Nursing assessment and Emergency Department Attending provider initial evaluation. The plan of care may be reviewed in the ED Provider note. The Plan of Care was developed with the following considerations:  
Patient / Family readiness to learn indicated by:verbalized understanding Persons(s) to be included in education: patient Barriers to Learning/Limitations:No 
 
Signed Migel Paige RN   
1/27/2019   10:47 PM

## 2019-01-28 NOTE — DISCHARGE INSTRUCTIONS
Patient Education        Cystocele: Care Instructions  Your Care Instructions    Cystocele happens when the bladder sags or presses into the vagina. It is also called bladder prolapse. Pregnancy, pelvic surgery, or being overweight may damage the muscles and other support tissues in your pelvis. Or the muscles and tissues may get weaker as you age. These can make the bladder sag. This may cause uncomfortable pressure in your vagina. A cystocele usually does not cause serious health problems. You may find relief by making lifestyle changes and doing exercises to make the pelvic muscles stronger. Talk to your doctor about steps you can take to reduce symptoms. If your symptoms go on, you may want to talk to your doctor about surgery. Follow-up care is a key part of your treatment and safety. Be sure to make and go to all appointments, and call your doctor if you are having problems. It's also a good idea to know your test results and keep a list of the medicines you take. How can you care for yourself at home? · Do not lift heavy objects or do anything that puts pressure on your pelvic muscles. · Do pelvic floor (Kegel) exercises. These tighten and strengthen pelvic muscles. ? Squeeze the same muscles you would use to stop your urine. Your belly and thighs should not move. ? Hold the squeeze for 3 seconds. Then relax for 3 seconds. ? Start with 3 seconds. Then add 1 second each week until you are able to squeeze for 10 seconds. ? Repeat the exercise 10 to 15 times in each session. Do 3 or more sessions a day. · Lie down and put a pillow under your knees. This eases pressure on your vagina. You also can lie on your side and bring your knees up to your chest.  · Ask your doctor about a vaginal pessary. You can place this in your vagina. It supports the bladder. Your doctor can teach you how and when to remove, clean, and reinsert it.   · If your doctor prescribes vaginal estrogen cream, use it exactly as prescribed. When should you call for help? Watch closely for changes in your health, and be sure to contact your doctor if you have any problems. Where can you learn more? Go to http://kalia-terell.info/. Enter S624 in the search box to learn more about \"Cystocele: Care Instructions. \"  Current as of: May 14, 2018  Content Version: 11.9  © 8778-2655 [employer]. Care instructions adapted under license by [employer] (which disclaims liability or warranty for this information). If you have questions about a medical condition or this instruction, always ask your healthcare professional. [address] any warranty or liability for your use of this information.

## 2019-02-01 RX ORDER — RANITIDINE 300 MG/1
TABLET ORAL
Qty: 30 TAB | Refills: 0 | Status: SHIPPED | OUTPATIENT
Start: 2019-02-01 | End: 2019-03-04 | Stop reason: SDUPTHER

## 2019-02-11 RX ORDER — LISINOPRIL 40 MG/1
TABLET ORAL
Qty: 30 TAB | Refills: 0 | Status: SHIPPED | OUTPATIENT
Start: 2019-02-11 | End: 2019-02-19 | Stop reason: SDUPTHER

## 2019-02-13 DIAGNOSIS — M17.0 PRIMARY OSTEOARTHRITIS OF BOTH KNEES: ICD-10-CM

## 2019-02-13 RX ORDER — IBUPROFEN 800 MG/1
TABLET ORAL
Qty: 90 TAB | Refills: 0 | Status: SHIPPED | OUTPATIENT
Start: 2019-02-13 | End: 2019-02-19 | Stop reason: SDUPTHER

## 2019-02-13 RX ORDER — DICLOFENAC SODIUM 75 MG/1
TABLET, DELAYED RELEASE ORAL
Qty: 60 TAB | Refills: 0 | Status: SHIPPED | OUTPATIENT
Start: 2019-02-13 | End: 2019-03-14 | Stop reason: SDUPTHER

## 2019-02-19 ENCOUNTER — OFFICE VISIT (OUTPATIENT)
Dept: INTERNAL MEDICINE CLINIC | Age: 64
End: 2019-02-19

## 2019-02-19 VITALS
SYSTOLIC BLOOD PRESSURE: 160 MMHG | HEART RATE: 69 BPM | OXYGEN SATURATION: 98 % | WEIGHT: 160 LBS | RESPIRATION RATE: 16 BRPM | HEIGHT: 63 IN | DIASTOLIC BLOOD PRESSURE: 100 MMHG | BODY MASS INDEX: 28.35 KG/M2 | TEMPERATURE: 97.9 F

## 2019-02-19 DIAGNOSIS — I10 ESSENTIAL HYPERTENSION: ICD-10-CM

## 2019-02-19 DIAGNOSIS — E78.00 HYPERCHOLESTEREMIA: ICD-10-CM

## 2019-02-19 DIAGNOSIS — E11.9 CONTROLLED TYPE 2 DIABETES MELLITUS WITHOUT COMPLICATION, WITHOUT LONG-TERM CURRENT USE OF INSULIN (HCC): ICD-10-CM

## 2019-02-19 LAB
CHOLEST SERPL-MCNC: 205 MG/DL
GLUCOSE POC: 140 MG/DL
HBA1C MFR BLD HPLC: 7.4 %
HDLC SERPL-MCNC: 48 MG/DL
LDL CHOLESTEROL POC: 104 MG/DL
NON-HDL CHOLESTEROL, 011976: 157
TCHOL/HDL RATIO (POC): 4.3
TRIGL SERPL-MCNC: 264 MG/DL

## 2019-02-19 RX ORDER — HYDROCHLOROTHIAZIDE 25 MG/1
25 TABLET ORAL DAILY
Qty: 30 TAB | Refills: 12 | Status: SHIPPED | OUTPATIENT
Start: 2019-02-19 | End: 2020-02-17

## 2019-02-19 RX ORDER — AMLODIPINE BESYLATE 5 MG/1
5 TABLET ORAL DAILY
Qty: 30 TAB | Refills: 12 | Status: SHIPPED | OUTPATIENT
Start: 2019-02-19 | End: 2020-02-17

## 2019-02-19 NOTE — PROGRESS NOTES
1. Have you been to the ER, urgent care clinic since your last visit? Hospitalized since your last visit? YES/[facility]/[place] bladder    2. Have you seen or consulted any other health care providers outside of the [address] [place] [address] since your last visit? Include any pap smears or colon screening. No    3 most recent PHQ Screens 8/20/2018   PHQ Not Done Medical Reason (indicate in comments)   Little interest or pleasure in doing things Not at all   Feeling down, depressed, irritable, or hopeless Not at all   Total Score PHQ 2 0     Per Dr. Mauro Ibarra.,  verbal order given for needed amb poc labs.     Chief Complaint   Patient presents with    Diabetes    Hypertension

## 2019-02-19 NOTE — PROGRESS NOTES
Mily Cui is a 66 y.o. female and presents with Diabetes and Hypertension    Hypertension Review:  The patient has essential hypertension  Diet and Lifestyle: generally follows a  low sodium diet, exercises sporadically  Home BP Monitoring: is not measured at home. Pertinent ROS: taking medications as instructed, no medication side effects noted, no TIA's, no chest pain on exertion, no dyspnea on exertion, no swelling of ankles. Diabetes Mellitus Review:  She has diabetes mellitus. Diabetic ROS - medication compliance: compliant all of the time, diabetic diet compliance: compliant all of the time, home glucose monitoring: is performed. Known diabetic complications: hypoglycemia  Cardiovascular risk factors: family history, dyslipidemia, diabetes mellitus, obesity, hypertension  Current diabetic medications include oral agents   Eye exam current (within one year): no  Weight trend: stable  Prior visit with dietician: no  Current diet: \"healthy\" diet  in general  Current exercise: walking  Current monitoring regimen: home blood tests - daily  Home blood sugar records: trend: stable  Any episodes of hypoglycemia? no  Is She on ACE inhibitor or angiotensin II receptor blocker? Yes       Review of Systems  Constitutional: negative for fevers, chills, anorexia and weight loss  Eyes:   negative for visual disturbance and irritation  ENT:   negative for tinnitus,sore throat,nasal congestion,ear pains. hoarseness  Respiratory:  negative for cough, hemoptysis, dyspnea,wheezing  CV:   negative for chest pain, palpitations, lower extremity edema  GI:    diarrhea, abdominal pain,  Endo:               negative for polyuria,polydipsia,polyphagia,heat intolerance  Genitourinary: negative for frequency, dysuria and hematuria  Integument:  negative for rash and pruritus  Hematologic:  negative for easy bruising and gum/nose bleeding  Musculoskel: negative for myalgias, arthralgias, back pain, muscle weakness, joint pain  Neurological:  negative for headaches, dizziness, vertigo, memory problems and gait   Behavl/Psych: negative for feelings of anxiety, depression, mood changes    Past Medical History:   Diagnosis Date    Arthritis, Degenerative, Knee 7/15/2011    COPD (chronic obstructive pulmonary disease) ([facility] [address] 7/15/2011    Diverticulitis     GERD (gastroesophageal reflux disease)     GERD, gastro - Esophageal Reflux Disease 7/15/2011    Hypercholesterolemia 7/15/2011    Hypertension     Type II or unspecified type diabetes mellitus without mention of complication, uncontrolled 7/15/2011     Past Surgical History:   Procedure Laterality Date    COLONOSCOPY N/A 2017    [facility] AM[facility]UL[facility] OR    COLONOSCOPY N/A 2018    COLONOSCOPY [facility] MD at [facility] ENDOSCOPY    HX GYN      uterine surgery    LAP,TUBAL CAUTERY Bilateral        Family History   Problem Relation Age of Onset    Diabetes Father     Deep Vein Thrombosis Daughter     Lung Disease Daughter     Other Daughter         cholesterol     Current Outpatient Medications   Medication Sig Dispense Refill    amLODIPine (NORVASC) 5 mg tablet Take 1 Tab by mouth daily. 30 Tab 12    hydroCHLOROthiazide (HYDRODIURIL) 25 mg tablet Take 1 Tab by mouth daily. 30 Tab 12    diclofenac EC (VOLTAREN) 75 mg EC tablet TAKE 1 TABLET BY MOUTH TWICE DAILY 60 Tab 0    raNITIdine (ZANTAC) 300 mg tab TAKE 1 TABLET BY MOUTH EVERY DAY 30 Tab 0    lisinopril (PRINIVIL, ZESTRIL) 40 mg tablet TAKE 1 TABLET BY MOUTH EVERY DAY 30 Tab 0    cetirizine (ZYRTEC) 10 mg tablet Take 1 Tab by mouth daily. 20 Tab 0    nystatin (MYCOSTATIN) powder Apply  to affected area three (3) times daily.  Apply until rash has resolved 1 Bottle 0    metFORMIN (GLUCOPHAGE) 500 mg tablet Si tab daily 30 Tab 12    potassium chloride SA (MICRO-K) 10 mEq capsule TAKE ONE CAPSULE BY MOUTH TWICE DAILY 30 Cap 12    polyethylene glycol (MIRALAX) 17 gram/dose powder MIX 17 GRAMS IN A LIQUID AND THEN DRINK ONCE D UTD  3    ibuprofen (MOTRIN) 800 mg tablet TAKE 1 TABLET BY MOUTH EVERY 8 HOURS AS NEEDED FOR PAIN 90 Tab 0    sucralfate (CARAFATE) 1 gram tablet Take 1 g by mouth four (4) times daily.  bismuth subsalicylate (PEPTO-BISMOL MAXIMUM STRENGTH) 525 mg/15 mL susp Take 30 mL by mouth every six (6) hours as needed.  MULTIVIT-MIN/IRON/FOLIC/LUTEIN (CENTRUM SILVER WOMEN PO) Take  by mouth daily.  ONETOUCH ULTRA TEST strip TEST BLOOD SUGARS 3 TIMES DAILY 100 Each 12    loperamide (IMODIUM A-D) 2 mg tablet Take 1 Tab by mouth four (4) times daily as needed for Diarrhea. 20 Tab 0    glyBURIDE (DIABETA) 2.5 mg tablet TAKE 1 TABLET BY MOUTH EVERY DAY (Patient not taking: Reported on 11/20/2018) 30 Tab 0     No Known Allergies    Objective:  Visit Vitals  BP (!) 160/100   Pulse 69   Temp 97.9 °F (36.6 °C) (Oral)   Resp 16   Ht 5' 3\" (1.6 m)   Wt 160 lb (72.6 kg)   LMP 03/15/2004 (Approximate)   SpO2 98%   BMI 28.34 kg/m²     Physical Exam:   General appearance - alert, well appearing, and in no distress  Mental status - alert, oriented to person, place, and time  EYE-KODY, EOMI, corneas normal, no foreign bodies  ENT-ENT exam normal, no neck nodes or sinus tenderness  Nose - normal and patent, no erythema, discharge or polyps  Mouth - mucous membranes moist, pharynx normal without lesions  Neck - supple, no significant adenopathy   Chest - clear to auscultation, no wheezes, rales or rhonchi, symmetric air entry   Heart - normal rate, regular rhythm, normal S1, S2, no murmurs, rubs, clicks or gallops   Abdomen - soft, nontender, nondistended, no masses or organomegaly  Lymph- no adenopathy palpable  Ext-peripheral pulses normal, no pedal edema, no clubbing or cyanosis  Skin-Warm and dry.  no hyperpigmentation, vitiligo, or suspicious lesions  Neuro -alert, oriented, normal speech, no focal findings or movement disorder noted  Neck-normal C-spine, no tenderness, full ROM without pain  Feet-no nail deformities or callus formation with good pulses noted  Lt.hand-mcp joint tenderness    Results for orders placed or performed in visit on 02/19/19   AMB POC GLUCOSE BLOOD, BY GLUCOSE MONITORING DEVICE   Result Value Ref Range    Glucose  mg/dL   AMB POC LIPID PROFILE   Result Value Ref Range    Cholesterol (POC) 205     Triglycerides (POC) 264     HDL Cholesterol (POC) 48     Non-HDL Cholesterol 157     LDL Cholesterol (POC) 104 MG/DL    TChol/HDL Ratio (POC) 4.3    AMB POC HEMOGLOBIN A1C   Result Value Ref Range    Hemoglobin A1c (POC) 7.4 %       Assessment/Plan:    ICD-10-CM ICD-9-CM    1. Hypercholesteremia E78.00 272.0 AMB POC GLUCOSE BLOOD, BY GLUCOSE MONITORING DEVICE      AMB POC LIPID PROFILE      AMB POC HEMOGLOBIN A1C   2. Essential hypertension I10 401.9 AMB POC GLUCOSE BLOOD, BY GLUCOSE MONITORING DEVICE      AMB POC LIPID PROFILE      AMB POC HEMOGLOBIN A1C   3. Controlled type 2 diabetes mellitus without complication, without long-term current use of insulin (HCC) E11.9 250.00      Orders Placed This Encounter    AMB POC GLUCOSE BLOOD, BY GLUCOSE MONITORING DEVICE    AMB POC LIPID PROFILE    AMB POC HEMOGLOBIN A1C    amLODIPine (NORVASC) 5 mg tablet     Sig: Take 1 Tab by mouth daily. Dispense:  30 Tab     Refill:  12    hydroCHLOROthiazide (HYDRODIURIL) 25 mg tablet     Sig: Take 1 Tab by mouth daily. Dispense:  30 Tab     Refill:  12     lose weight, increase physical activity, follow low fat diet, follow low salt diet,Take 81mg aspirin daily      Patient Instructions   [email or web address] Activation    Thank you for requesting access to [email or web address]. Please follow the instructions below to securely access and download your online medical record. [email or web address] allows you to send messages to your doctor, view your test results, renew your prescriptions, schedule appointments, and more. How Do I Sign Up? 1.  In your internet browser, go to [email or web address]  2. Click on the First Time User? Click Here link in the Sign In box. You will be redirect to the New Member Sign Up page. 3. Enter your Fipeo Access Code exactly as it appears below. You will not need to use this code after youve completed the sign-up process. If you do not sign up before the expiration date, you must request a new code. Fipeo Access Code: [email or web address]  Expires: 3/13/2019  9:59 PM (This is the date your Fipeo access code will )    4. Enter the last four digits of your Social Security Number (xxxx) and Date of Birth (mm/dd/yyyy) as indicated and click Submit. You will be taken to the next sign-up page. 5. Create a Fipeo ID. This will be your Fipeo login ID and cannot be changed, so think of one that is secure and easy to remember. 6. Create a Fipeo password. You can change your password at any time. 7. Enter your Password Reset Question and Answer. This can be used at a later time if you forget your password. 8. Enter your e-mail address. You will receive e-mail notification when new information is available in [address] 9. Click Sign Up. You can now view and download portions of your medical record. 10. Click the Download Summary menu link to download a portable copy of your medical information. Additional Information    If you have questions, please visit the Frequently Asked Questions section of the Fipeo website at [email or web address] Remember, Fipeo is NOT to be used for urgent needs. For medical emergencies, dial 911. Follow-up Disposition:  Return in about 4 weeks (around 3/19/2019), or if symptoms worsen or fail to improve. I have reviewed with the patient details of the assessment and plan and all questions were answered. Relevent patient education was performed. The most recent lab findings were reviewed with the patient.     An After Visit Summary was printed and given to the patient.

## 2019-02-19 NOTE — PATIENT INSTRUCTIONS
Invodo Activation    Thank you for requesting access to Invodo. Please follow the instructions below to securely access and download your online medical record. Invodo allows you to send messages to your doctor, view your test results, renew your prescriptions, schedule appointments, and more. How Do I Sign Up? 1. In your internet browser, go to [email or web address]  2. Click on the First Time User? Click Here link in the Sign In box. You will be redirect to the New Member Sign Up page. 3. Enter your Invodo Access Code exactly as it appears below. You will not need to use this code after youve completed the sign-up process. If you do not sign up before the expiration date, you must request a new code. Invodo Access Code: [email or web address]  Expires: 3/13/2019  9:59 PM (This is the date your Invodo access code will )    4. Enter the last four digits of your Social Security Number (xxxx) and Date of Birth (mm/dd/yyyy) as indicated and click Submit. You will be taken to the next sign-up page. 5. Create a Invodo ID. This will be your Invodo login ID and cannot be changed, so think of one that is secure and easy to remember. 6. Create a Invodo password. You can change your password at any time. 7. Enter your Password Reset Question and Answer. This can be used at a later time if you forget your password. 8. Enter your e-mail address. You will receive e-mail notification when new information is available in [address] 9. Click Sign Up. You can now view and download portions of your medical record. 10. Click the Download Summary menu link to download a portable copy of your medical information. Additional Information    If you have questions, please visit the Frequently Asked Questions section of the Invodo website at [email or web address] Remember, Invodo is NOT to be used for urgent needs. For medical emergencies, dial 911.

## 2019-02-21 ENCOUNTER — OFFICE VISIT (OUTPATIENT)
Dept: OBGYN CLINIC | Age: 64
End: 2019-02-21

## 2019-02-21 VITALS
WEIGHT: 158 LBS | RESPIRATION RATE: 18 BRPM | HEIGHT: 63 IN | HEART RATE: 85 BPM | SYSTOLIC BLOOD PRESSURE: 157 MMHG | DIASTOLIC BLOOD PRESSURE: 106 MMHG | TEMPERATURE: 96.5 F | OXYGEN SATURATION: 96 % | BODY MASS INDEX: 28 KG/M2

## 2019-02-21 DIAGNOSIS — N81.10 FEMALE CYSTOCELE: Primary | ICD-10-CM

## 2019-02-21 NOTE — PROGRESS NOTES
Prolapse evaluation Chief Complaint Cystocele (f/u) HPI: 
The patient specifically complains of vaginal pressure and a bulging sensation at the introitus. Patient went to ED with \"a plug from her vagina\" and was told that she had prolapse. First time that she noted this was a couple of days ago. No pain, problems with urination or defecation. The bulge is visible to the patient. The patient states she has no difficulty emptying her bladder. She states that she has no problems completing bowel movements.   x5, all around 6 lbs. She also complains of no urinary incontinence. She states the symptoms are not accentuated by lifting, straining, and exercise. She has had no pelvic pain associated with this. The patient has the following additional complaints: none Past Medical History:  
Diagnosis Date  Arthritis, Degenerative, Knee 7/15/2011  COPD (chronic obstructive pulmonary disease) ([facility] [address] 7/15/2011  Diverticulitis  GERD (gastroesophageal reflux disease)  GERD, gastro - Esophageal Reflux Disease 7/15/2011  Hypercholesterolemia 7/15/2011  Hypertension  Type II or unspecified type diabetes mellitus without mention of complication, uncontrolled 7/15/2011 Past Surgical History:  
Procedure Laterality Date  COLONOSCOPY N/A 2017 [facility] N/A 2018 COLONOSCOPY performed by Jewel Pfeiffer [facility] at [facility] ENDOSCOPY  
 HX GYN  2004  
 uterine surgery [address] [place] [address] 1990 Social History Occupational History  Not on file Tobacco Use  Smoking status: Former Smoker Last attempt to quit: 4/15/2015 Years since quitting: 3.8  Smokeless tobacco: Never Used Substance and Sexual Activity  Alcohol use: No  
 Drug use: Yes Types: Marijuana Comment: \"daily\"  Sexual activity: Yes  
  Partners: Male Family History Problem Relation Age of Onset  Diabetes Father  Deep Vein Thrombosis Daughter  Lung Disease Daughter  Other Daughter   
     cholesterol No Known Allergies Prior to Admission medications Medication Sig Start Date End Date Taking? Authorizing Provider  
amLODIPine (NORVASC) 5 mg tablet Take 1 Tab by mouth daily. 19  Yes Hanny Browne MD  
hydroCHLOROthiazide (HYDRODIURIL) 25 mg tablet Take 1 Tab by mouth daily. 19  Yes Hanny Browne MD  
diclofenac EC (VOLTAREN) 75 mg EC tablet TAKE 1 TABLET BY MOUTH TWICE DAILY 19  Yes Hanny Browne MD  
raNITIdine (ZANTAC) 300 mg tab TAKE 1 TABLET BY MOUTH EVERY DAY 19  Yes Hanny Browne MD  
cetirizine (ZYRTEC) 10 mg tablet Take 1 Tab by mouth daily. 18  Yes Renita Hardin MD  
nystatin (MYCOSTATIN) powder Apply  to affected area three (3) times daily. Apply until rash has resolved 9/10/18  Yes Renita Hardin MD  
metFORMIN (GLUCOPHAGE) 500 mg tablet Si tab daily 18  Yes Hanny Browne MD  
potassium chloride SA (MICRO-K) 10 mEq capsule TAKE ONE CAPSULE BY MOUTH TWICE DAILY 5/15/18  Yes Hanny Browne MD  
polyethylene glycol (MIRALAX) 17 gram/dose powder MIX 17 GRAMS IN A LIQUID AND THEN DRINK ONCE D UTD 17  Yes Provider, Historical  
ibuprofen (MOTRIN) 800 mg tablet TAKE 1 TABLET BY MOUTH EVERY 8 HOURS AS NEEDED FOR PAIN 17  Yes Hanny Browne MD  
sucralfate (CARAFATE) 1 gram tablet Take 1 g by mouth four (4) times daily. Yes Provider, Historical  
bismuth subsalicylate (PEPTO-BISMOL MAXIMUM STRENGTH) 525 mg/15 mL susp Take 30 mL by mouth every six (6) hours as needed. Yes Provider, Historical  
MULTIVIT-MIN/IRON/FOLIC/LUTEIN (CENTRUM SILVER WOMEN PO) Take  by mouth daily.    Yes Provider, Historical  
ONETOUCH ULTRA TEST strip TEST BLOOD SUGARS 3 TIMES DAILY 6/9/15  Yes Hanny Browne MD  
 loperamide (IMODIUM A-D) 2 mg tablet Take 1 Tab by mouth four (4) times daily as needed for Diarrhea. 4/6/15  Yes Juanpablo Tamayo MD  
glyBURIDE (DIABETA) 2.5 mg tablet TAKE 1 TABLET BY MOUTH EVERY DAY 10/16/18   Alma Young MD  
lisinopril (PRINIVIL, ZESTRIL) 40 mg tablet TAKE 1 TABLET BY MOUTH EVERY DAY 10/16/18   Alma Young MD  
  
 
Review of Systems: History obtained from the patient Constitutional: negative for weight loss, fever, night sweats HEENT: negative for hearing loss, earache, congestion, snoring, sorethroat CV: negative for chest pain, palpitations, edema Resp: negative for cough, shortness of breath, wheezing Breast: negative for breast lumps, nipple discharge, galactorrhea GI: negative for change in bowel habits, abdominal pain, black or bloody stools : negative for frequency, dysuria, hematuria, vaginal discharge MSK: negative for back pain, joint pain, muscle pain Skin: negative for itching, rash, hives Neuro: negative for dizziness, headache, confusion, weakness Psych: negative for anxiety, depression, change in mood Heme/lymph: negative for bleeding, bruising, pallor Objective: 
Visit Vitals BP (!) 157/106 (BP 1 Location: Right arm, BP Patient Position: Sitting) Pulse 85 Temp 96.5 °F (35.8 °C) (Oral) Resp 18 Ht 5' 3\" (1.6 m) Wt 158 lb (71.7 kg) LMP 03/15/2004 (Approximate) SpO2 96% BMI 27.99 kg/m² Physical Exam:  
 
Constitutional 
· Appearance: well-nourished, well developed, alert, in no acute distress HENT 
· Head and Face: appears normal 
 
Gastrointestinal 
· Abdominal Examination: abdomen non-tender to palpation, normal bowel sounds, no masses present · Liver and spleen: no hepatomegaly present, spleen not palpable · Hernias: no hernias identified Genitourinary · External Genitalia: normal appearance for age, no discharge present, no tenderness present, no inflammatory lesions present, no masses present, no atrophy present · Vagina: normal vaginal vault with stage 2 cystocele (to introitus), minima rectocele, minimal vaginal apex prolapse, no discharge present, no inflammatory lesions present, no masses present · Bladder: non-tender to palpation · Urethra: appears normal 
· Cervix: normal  
· Uterus: minimal prolapse, otherwise normal size, shape and consistency · Adnexa: no adnexal tenderness present, no adnexal masses present · Perineum: perineum within normal limits, no evidence of trauma, no rashes or skin lesions present · Anus: anus within normal limits, no hemorrhoids present · Inguinal Lymph Nodes: no lymphadenopathy present Skin · General Inspection: no rash, no lesions identified Neurologic/Psychiatric · Mental Status: · Orientation: grossly oriented to person, place and time · Mood and Affect: mood normal, affect appropriate Assessment: 
66 y.o. with cystocele. Good posterior and apical support still. Plan:  
- discussed options at length with patient including expectant management, PT, pessary and surgery. Would need an anterior repair only. Patient opted for PT with surgery if this does not work. RTO prn if symptoms persist or worsen. Instructions given to pt. Handouts given to pt.  
 
Return to clinic PRN or for annual exam

## 2019-02-21 NOTE — PROGRESS NOTES
Chief Complaint Patient presents with  Cystocele f/u Pt is following up after being seen in the ER on 1/27.  
3 most recent PHQ Screens 2/21/2019 PHQ Not Done - Little interest or pleasure in doing things Not at all Feeling down, depressed, irritable, or hopeless Not at all Total Score PHQ 2 0  
 
 
1. Have you been to the ER, urgent care clinic since your last visit? Hospitalized since your last visit? No 
 
2. Have you seen or consulted any other health care providers outside of the [address] [place] [address] since your last visit? Include any pap smears or colon screening.  Yes When: 1/26/19 at  [facility]

## 2019-02-21 NOTE — PATIENT INSTRUCTIONS
Cystocele: Care Instructions Your Care Instructions Cystocele happens when the bladder sags or presses into the vagina. It is also called bladder prolapse. Pregnancy, pelvic surgery, or being overweight may damage the muscles and other support tissues in your pelvis. Or the muscles and tissues may get weaker as you age. These can make the bladder sag. This may cause uncomfortable pressure in your vagina. A cystocele usually does not cause serious health problems. You may find relief by making lifestyle changes and doing exercises to make the pelvic muscles stronger. Talk to your doctor about steps you can take to reduce symptoms. If your symptoms go on, you may want to talk to your doctor about surgery. Follow-up care is a key part of your treatment and safety. Be sure to make and go to all appointments, and call your doctor if you are having problems. It's also a good idea to know your test results and keep a list of the medicines you take. How can you care for yourself at home? · Do not lift heavy objects or do anything that puts pressure on your pelvic muscles. · Do pelvic floor (Kegel) exercises. These tighten and strengthen pelvic muscles. ? Squeeze the same muscles you would use to stop your urine. Your belly and thighs should not move. ? Hold the squeeze for 3 seconds. Then relax for 3 seconds. ? Start with 3 seconds. Then add 1 second each week until you are able to squeeze for 10 seconds. ? Repeat the exercise 10 to 15 times in each session. Do 3 or more sessions a day. · Lie down and put a pillow under your knees. This eases pressure on your vagina. You also can lie on your side and bring your knees up to your chest. 
· Ask your doctor about a vaginal pessary. You can place this in your vagina. It supports the bladder. Your doctor can teach you how and when to remove, clean, and reinsert it. · If your doctor prescribes vaginal estrogen cream, use it exactly as prescribed. When should you call for help? Watch closely for changes in your health, and be sure to contact your doctor if you have any problems. Where can you learn more? Go to http://kalia-terell.info/. Enter S624 in the search box to learn more about \"Cystocele: Care Instructions. \" Current as of: May 14, 2018 Content Version: 11.9 © 1310-4584 [employer]. Care instructions adapted under license by [employer] (which disclaims liability or warranty for this information). If you have questions about a medical condition or this instruction, always ask your healthcare professional. [address] any warranty or liability for your use of this information. Pelvic Prolapse: Before Your Surgery What is surgery for pelvic prolapse? Your pelvic muscles hold your pelvic organs in place. If they become weak, your uterus, bowel, or bladder may press against your vagina. This is called a pelvic prolapse. Surgery puts your organ back in place. It also adds support to your muscles. You will be asleep during the surgery. You will not feel pain. The doctor can do the surgery in two ways. In open surgery, the doctor makes a cut in your belly or inside the vagina. The cut is called an incision. In laparoscopic surgery, the doctor puts a lighted tube and other surgical tools through small incisions near your belly button and groin. This tube is called a scope. It lets the doctor see your organs. If you have open surgery, you will go home in 1 to 4 days. It usually takes about 4 to 6 weeks to fully recover. If you have laparoscopic surgery, you will go home in 1 or 2 days. It usually takes 1 to 2 weeks to fully recover. At home, you may need to wear a catheter. This is a tube in your bladder. It carries urine out of your body. After surgery, you may have less pain during sex.  The surgery may also help with any bladder or bowel problems you may have had. If you still have your uterus, your ability to have children will not be affected. Follow-up care is a key part of your treatment and safety. Be sure to make and go to all appointments, and call your doctor if you are having problems. It's also a good idea to know your test results and keep a list of the medicines you take. What happens before surgery? 
 Surgery can be stressful. This information will help you understand what you can expect. And it will help you safely prepare for surgery. 
 Preparing for surgery 
  · Understand exactly what surgery is planned, along with the risks, benefits, and other options. · Tell your doctors ALL the medicines, vitamins, supplements, and herbal remedies you take. Some of these can increase the risk of bleeding or interact with anesthesia.  
  · If you take blood thinners, such as warfarin (Coumadin), clopidogrel (Plavix), or aspirin, be sure to talk to your doctor. He or she will tell you if you should stop taking these medicines before your surgery. Make sure that you understand exactly what your doctor wants you to do.  
  · Your doctor will tell you which medicines to take or stop before your surgery. You may need to stop taking certain medicines a week or more before surgery. So talk to your doctor as soon as you can.  
  · If you have an advance directive, let your doctor know. It may include a living will and a durable power of  for health care. Bring a copy to the hospital. If you don't have one, you may want to prepare one. It lets your doctor and loved ones know your health care wishes. Doctors advise that everyone prepare these papers before any type of surgery or procedure.  
  · You may need to take a laxative or enema before surgery. Your doctor will tell you how to do this. What happens on the day of surgery? · Follow the instructions exactly about when to stop eating and drinking. If you don't, your surgery may be canceled. If your doctor told you to take your medicines on the day of surgery, take them with only a sip of water.  
  · Take a bath or shower before you come in for your surgery. Do not apply lotions, perfumes, deodorants, or nail polish.  
  · Do not shave the surgical site yourself.  
  · Take off all jewelry and piercings. And take out contact lenses, if you wear them.  
 At the hospital or surgery center · Bring a picture ID.  
  · The area for surgery is often marked to make sure there are no errors.  
  · You will be kept comfortable and safe by your anesthesia provider. You will be asleep during the surgery.  
  · The surgery will take 45 minutes to 2 hours. Going home · Be sure you have someone to drive you home. Anesthesia and pain medicine make it unsafe for you to drive.  
  · You will be given more specific instructions about recovering from your surgery. They will cover things like diet, wound care, follow-up care, driving, and getting back to your normal routine. When should you call your doctor? · You have questions or concerns.  
  · You don't understand how to prepare for your surgery.  
  · You become ill before the surgery (such as fever, flu, or a cold).  
  · You need to reschedule or have changed your mind about having the surgery. Where can you learn more? Go to http://kalia-terell.info/. Enter C693 in the search box to learn more about \"Pelvic Prolapse: Before Your Surgery. \" Current as of: May 14, 2018 Content Version: 11.9 © 1094-6248 [employer]. Care instructions adapted under license by [employer] (which disclaims liability or warranty for this information). If you have questions about a medical condition or this instruction, always ask your healthcare professional. [address] any warranty or liability for your use of this information. Pelvic Prolapse: What to Expect at [facility] Your Recovery You can expect to feel better and stronger each day, although you may get tired quickly and need pain medicine for a week or two. You may need about 4 to 6 weeks to fully recover from open surgery and 1 to 2 weeks to recover from laparoscopic surgery or vaginal surgery. It is important to avoid heavy lifting while you are recovering, so that your incision can heal. 
This care sheet gives you a general idea about how long it will take for you to recover. But each person recovers at a different pace. Follow the steps below to get better as quickly as possible. How can you care for yourself at home? Activity 
  · Rest when you feel tired. Getting enough sleep will help you recover.  
  · Try to walk each day. Start out by walking a little more than you did the day before. Bit by bit, increase the amount you walk. Walking boosts blood flow and helps prevent pneumonia and constipation.  
  · Avoid lifting anything that will make you strainwhich includes lifting a child, a vacuum, or grocery bagsfor about 1 week after laparoscopic surgery and 4 to 6 weeks after open surgery.  
  · Avoid strenuous activities, such as biking, jogging, weightlifting, and aerobic exercise, for 4 to 6 weeks after open surgery and 1 week after laparoscopic surgery.  
  · You may shower. Pat the incision dry when you are done. Do not take a bath for the first week after surgery or until your doctor tells you it is okay.  
  · You may have some light vaginal bleeding. Wear sanitary pads if needed. Do not douche or use tampons.  
  · Ask your doctor when you can drive again.  
  · You will probably need to take 2 to 4 weeks off work for open surgery and 1 week off for laparoscopic surgery or vaginal surgery. It depends on the type of work you do and how you feel.  
  · Your doctor will tell you when you can have sex again. Diet   · You can eat your normal diet. If your stomach is upset, try bland, low-fat foods like plain rice, broiled chicken, toast, and yogurt.  
  · Drink plenty of fluids (unless your doctor tells you not to).  
  · You may notice that your bowel movements are not regular right after your surgery. This is common. Try to avoid constipation and straining with bowel movements. You may want to take a fiber supplement every day. If you have not had a bowel movement after a couple of days, ask your doctor about taking a mild laxative. Medicines 
  · Your doctor will tell you if and when you can restart your medicines. He or she will also give you instructions about taking any new medicines.  
  · If you take blood thinners, such as warfarin (Coumadin), clopidogrel (Plavix), or aspirin, be sure to talk to your doctor. He or she will tell you if and when to start taking those medicines again. Make sure that you understand exactly what your doctor wants you to do.  
  · Be safe with medicines. Take pain medicines exactly as directed. ? If the doctor gave you a prescription medicine for pain, take it as prescribed. ? If you are not taking a prescription pain medicine, ask your doctor if you can take an over-the-counter medicine.  
  · If you think your pain medicine is making you sick to your stomach: 
? Take your medicine after meals (unless your doctor tells you not to). ? Ask your doctor for a different pain medicine.  
  · If your doctor prescribed antibiotics, take them as directed. Do not stop taking them just because you feel better. You need to take the full course of antibiotics. Incision care 
  · If you have strips of tape on the cut (incision) the doctor made, leave the tape on for a week or until it falls off.  
  · Wash the area daily with warm, soapy water and pat it dry.  
  · Keep the area clean and dry.  You may cover it with a gauze bandage if it weeps or rubs against clothing. Change the bandage every day. Other instructions 
  · If you go home with a urinary catheter, follow your doctor's instructions on catheter care.  
  · Wear loose, comfortable clothing and avoid anything that puts pressure on your belly, such as a girdle, for a few weeks.  
  · Your doctor may recommend pelvic floor (Kegel) exercises, which tighten and strengthen pelvic muscles, once you have completely healed. To do Kegel exercises: 
? Squeeze the same muscles you would use to stop your urine. Your belly and thighs should not move. ? Hold the squeeze for 3 seconds, and then relax for 3 seconds. ? Start with 3 seconds. Then add 1 second each week until you are able to squeeze for 10 seconds. ? Repeat the exercise 10 to 15 times for each session. Do three or more sessions each day. Follow-up care is a key part of your treatment and safety. Be sure to make and go to all appointments, and call your doctor if you are having problems. It's also a good idea to know your test results and keep a list of the medicines you take. When should you call for help? Call 911 anytime you think you may need emergency care. For example, call if: 
  · You passed out (lost consciousness).  
  · You have chest pain, are short of breath, or cough up blood.  
 Call your doctor now or seek immediate medical care if: 
  · You have bright red vaginal bleeding that soaks one or more pads in an hour, or you have large clots.  
  · You are sick to your stomach or cannot drink fluids.  
  · You have pain that does not get better after you take pain medicine.  
  · You have loose stitches, or your incision comes open.  
  · Bright red blood has soaked through the bandage over your incision.  
  · You have vaginal discharge that has increased in amount or smells bad.  
  · You have signs of infection, such as: 
? Increased pain, swelling, warmth, or redness. ? Red streaks leading from the incision. ? Pus draining from the incision. ? A fever.  
  · You cannot pass stools or gas.  
  · You have signs of a blood clot in your leg (called deep vein thrombosis), such as: 
? Pain in your calf, back of knee, thigh, or groin. ? Redness and swelling in your leg.  
 Watch closely for any changes in your health, and be sure to contact your doctor if you have any problems. Where can you learn more? Go to http://kalia-terell.info/. Enter P501 in the search box to learn more about \"Pelvic Prolapse: What to Expect at Home. \" Current as of: May 14, 2018 Content Version: 11.9 © 7910-0125 [employer]. Care instructions adapted under license by [employer] (which disclaims liability or warranty for this information). If you have questions about a medical condition or this instruction, always ask your healthcare professional. [address] any warranty or liability for your use of this information. Kegel Exercises: Care Instructions Your Care Instructions Kegel exercises strengthen muscles around the bladder. These muscles control the flow of urine. Kegel exercises are sometime called \"pelvic floor\" exercises. They can help prevent urine leakage and keep the pelvic organs in place. A woman who just had a baby might want to try Kegel exercises. They can strengthen pelvic muscles that have been weakened by pregnancy and childbirth. A man or woman may use Kegel exercises to treat urine leakage. You do Kegel exercises by tightening the muscles you use when you urinate. You will likely need to do these exercises for several weeks to get better. Follow-up care is a key part of your treatment and safety. Be sure to make and go to all appointments, and call your doctor if you are having problems. It's also a good idea to know your test results and keep a list of the medicines you take. How can you care for yourself at home? · Do Kegel exercises. ? Find the muscles you need to strengthen. To do this, tighten the muscles that stop your urine while you are going to the bathroom. These are the same muscles you squeeze during Kegel exercises. ? Squeeze the muscles as hard as you can. Your belly and thighs should not move. ? Hold the squeeze for 3 seconds. Then relax for 3 seconds. ? Start with 3 seconds, and then add 1 second each week until you are able to squeeze for 10 seconds. ? Repeat the exercise 10 to 15 times for each session. Do three or more sessions each day. · You can check to see if you are using the right muscles. Place a finger in your vagina and squeeze around it. You are doing them right when you feel pressure around your finger. Your doctor may also suggest that you put special weights in your vagina while you do the exercises. · Do not smoke. It can irritate the bladder. If you need help quitting, talk to your doctor about stop-smoking programs and medicines. These can increase your chances of quitting for good. Where can you learn more? Go to http://kalia-terell.info/. Enter M845 in the search box to learn more about \"Kegel Exercises: Care Instructions. \" Current as of: March 20, 2018 Content Version: 11.9 © 5009-7777 [employer]. Care instructions adapted under license by [employer] (which disclaims liability or warranty for this information). If you have questions about a medical condition or this instruction, always ask your healthcare professional. [place] [address] any warranty or liability for your use of this information.

## 2019-03-06 RX ORDER — RANITIDINE 300 MG/1
TABLET ORAL
Qty: 30 TAB | Refills: 0 | Status: SHIPPED | OUTPATIENT
Start: 2019-03-06 | End: 2019-04-07 | Stop reason: SDUPTHER

## 2019-03-14 DIAGNOSIS — M17.0 PRIMARY OSTEOARTHRITIS OF BOTH KNEES: ICD-10-CM

## 2019-03-14 RX ORDER — IBUPROFEN 800 MG/1
TABLET ORAL
Qty: 90 TAB | Refills: 0 | Status: SHIPPED | OUTPATIENT
Start: 2019-03-14 | End: 2019-06-19 | Stop reason: ALTCHOICE

## 2019-03-14 RX ORDER — LISINOPRIL 40 MG/1
TABLET ORAL
Qty: 30 TAB | Refills: 0 | Status: SHIPPED | OUTPATIENT
Start: 2019-03-14 | End: 2019-03-19 | Stop reason: SINTOL

## 2019-03-14 RX ORDER — DICLOFENAC SODIUM 75 MG/1
TABLET, DELAYED RELEASE ORAL
Qty: 60 TAB | Refills: 0 | Status: SHIPPED | OUTPATIENT
Start: 2019-03-14 | End: 2019-04-13 | Stop reason: SDUPTHER

## 2019-03-19 ENCOUNTER — OFFICE VISIT (OUTPATIENT)
Dept: INTERNAL MEDICINE CLINIC | Age: 64
End: 2019-03-19

## 2019-03-19 VITALS
HEART RATE: 70 BPM | RESPIRATION RATE: 20 BRPM | SYSTOLIC BLOOD PRESSURE: 128 MMHG | TEMPERATURE: 97.8 F | HEIGHT: 63 IN | WEIGHT: 156 LBS | OXYGEN SATURATION: 97 % | BODY MASS INDEX: 27.64 KG/M2 | DIASTOLIC BLOOD PRESSURE: 70 MMHG

## 2019-03-19 DIAGNOSIS — M17.0 PRIMARY OSTEOARTHRITIS OF BOTH KNEES: ICD-10-CM

## 2019-03-19 DIAGNOSIS — E78.00 HYPERCHOLESTEREMIA: ICD-10-CM

## 2019-03-19 DIAGNOSIS — Z12.11 SCREENING FOR COLON CANCER: ICD-10-CM

## 2019-03-19 DIAGNOSIS — I10 ESSENTIAL HYPERTENSION: ICD-10-CM

## 2019-03-19 DIAGNOSIS — E11.9 CONTROLLED TYPE 2 DIABETES MELLITUS WITHOUT COMPLICATION, WITHOUT LONG-TERM CURRENT USE OF INSULIN (HCC): Primary | ICD-10-CM

## 2019-03-19 DIAGNOSIS — R05.8 ACE-INHIBITOR COUGH: ICD-10-CM

## 2019-03-19 DIAGNOSIS — T46.4X5A ACE-INHIBITOR COUGH: ICD-10-CM

## 2019-03-19 LAB — GLUCOSE POC: 233 MG/DL

## 2019-03-19 RX ORDER — LOSARTAN POTASSIUM 50 MG/1
50 TABLET ORAL DAILY
Qty: 30 TAB | Refills: 12 | Status: SHIPPED | OUTPATIENT
Start: 2019-03-19 | End: 2020-03-16

## 2019-03-19 NOTE — PATIENT INSTRUCTIONS
[employer] Activation Thank you for requesting access to [employer]. Please follow the instructions below to securely access and download your online medical record. [employer] allows you to send messages to your doctor, view your test results, renew your prescriptions, schedule appointments, and more. How Do I Sign Up? 1. In your internet browser, go to [email or web address] 
2. Click on the First Time User? Click Here link in the Sign In box. You will be redirect to the New Member Sign Up page. 3. Enter your [employer] Access Code exactly as it appears below. You will not need to use this code after youve completed the sign-up process. If you do not sign up before the expiration date, you must request a new code. [employer] Access Code: [email or web address] Expires: 5/3/2019 11:41 AM (This is the date your [employer] access code will ) 4. Enter the last four digits of your Social Security Number (xxxx) and Date of Birth (mm/dd/yyyy) as indicated and click Submit. You will be taken to the next sign-up page. 5. Create a [employer] ID. This will be your [employer] login ID and cannot be changed, so think of one that is secure and easy to remember. 6. Create a [employer] password. You can change your password at any time. 7. Enter your Password Reset Question and Answer. This can be used at a later time if you forget your password. 8. Enter your e-mail address. You will receive e-mail notification when new information is available in [address]. 9. Click Sign Up. You can now view and download portions of your medical record. 10. Click the Download Summary menu link to download a portable copy of your medical information. Additional Information If you have questions, please visit the Frequently Asked Questions section of the [employer] website at [email or web address] Remember, [employer] is NOT to be used for urgent needs. For medical emergencies, dial 911.

## 2019-03-19 NOTE — PROGRESS NOTES
1. Have you been to the ER, urgent care clinic since your last visit? Hospitalized since your last visit? NO 
 
2. Have you seen or consulted any other health care providers outside of the [address] [place] [address] since your last visit? Include any pap smears or colon screening. NO 
 
3 most recent PHQ Screens 3/19/2019 PHQ Not Done - Little interest or pleasure in doing things Not at all Feeling down, depressed, irritable, or hopeless Not at all Total Score PHQ 2 0

## 2019-03-19 NOTE — PROGRESS NOTES
Quincy Mckeon is a 66 y.o. female and presents with Hypertension; Diabetes; and Cough Hypertension Review: 
The patient has essential hypertension Diet and Lifestyle: generally follows a  low sodium diet, exercises sporadically Home BP Monitoring: is not measured at home. Pertinent ROS: taking medications as instructed,  medication side effects noted with Lisinopril being cough, no TIA's, no chest pain on exertion, no dyspnea on exertion, no swelling of ankles. Diabetes Mellitus Review: She has diabetes mellitus. Diabetic ROS - medication compliance: compliant all of the time, diabetic diet compliance: compliant all of the time, home glucose monitoring: is performed. Known diabetic complications: hypoglycemia Cardiovascular risk factors: family history, dyslipidemia, diabetes mellitus, obesity, hypertension Current diabetic medications include oral agents Eye exam current (within one year): no 
Weight trend: stable Prior visit with dietician: no 
Current diet: \"healthy\" diet  in general 
Current exercise: walking Current monitoring regimen: home blood tests - daily Home blood sugar records: trend: stable Any episodes of hypoglycemia? no 
Is She on ACE inhibitor or angiotensin II receptor blocker? Yes She has a cough while on lisinopril Review of Systems Constitutional: negative for fevers, chills, anorexia and weight loss Eyes:   negative for visual disturbance and irritation ENT:   negative for tinnitus,sore throat,nasal congestion,ear pains. hoarseness Respiratory:  negative for cough, hemoptysis, dyspnea,wheezing CV:   negative for chest pain, palpitations, lower extremity edema GI:    diarrhea, abdominal pain, Endo:               negative for polyuria,polydipsia,polyphagia,heat intolerance Genitourinary: negative for frequency, dysuria and hematuria Integument:  negative for rash and pruritus Hematologic:  negative for easy bruising and gum/nose bleeding Musculoskel: negative for myalgias, arthralgias, back pain, muscle weakness, joint pain Neurological:  negative for headaches, dizziness, vertigo, memory problems and gait Behavl/Psych: negative for feelings of anxiety, depression, mood changes Past Medical History:  
Diagnosis Date  Arthritis, Degenerative, Knee 7/15/2011  COPD (chronic obstructive pulmonary disease) ([facility] [address] 7/15/2011  Diverticulitis  GERD (gastroesophageal reflux disease)  GERD, gastro - Esophageal Reflux Disease 7/15/2011  Hypercholesterolemia 7/15/2011  Hypertension  Type II or unspecified type diabetes mellitus without mention of complication, uncontrolled 7/15/2011 Past Surgical History:  
Procedure Laterality Date  COLONOSCOPY N/A 2017 COLONOSCOPY performed by Nahun Bello MD at . [facility] COLONOSCOPY N/A 2018 COLONOSCOPY performed by Nahun Bello MD at \[facility]\"" ENDOSCOPY  
 HX GYN  2004  
 uterine surgery 1 [facility] 1992 Family History Problem Relation Age of Onset  Diabetes Father  Deep Vein Thrombosis Daughter  Lung Disease Daughter  Other Daughter   
     cholesterol Current Outpatient Medications Medication Sig Dispense Refill  ibuprofen (MOTRIN) 800 mg tablet TAKE 1 TABLET BY MOUTH EVERY 8 HOURS AS NEEDED FOR PAIN 90 Tab 0  
 diclofenac EC (VOLTAREN) 75 mg EC tablet TAKE 1 TABLET BY MOUTH TWICE DAILY 60 Tab 0  
 raNITIdine (ZANTAC) 300 mg tab TAKE 1 TABLET BY MOUTH EVERY DAY 30 Tab 0  
 amLODIPine (NORVASC) 5 mg tablet Take 1 Tab by mouth daily. 30 Tab 12  
 hydroCHLOROthiazide (HYDRODIURIL) 25 mg tablet Take 1 Tab by mouth daily. 30 Tab 12  
 cetirizine (ZYRTEC) 10 mg tablet Take 1 Tab by mouth daily. 20 Tab 0  
 nystatin (MYCOSTATIN) powder Apply  to affected area three (3) times daily. Apply until rash has resolved 1 Bottle 0  
 metFORMIN (GLUCOPHAGE) 500 mg tablet Si tab daily 30 Tab 12  potassium chloride SA (MICRO-K) 10 mEq capsule TAKE ONE CAPSULE BY MOUTH TWICE DAILY 30 Cap 12  
 polyethylene glycol (MIRALAX) 17 gram/dose powder MIX 17 GRAMS IN A LIQUID AND THEN DRINK ONCE D UTD  3  
 ibuprofen (MOTRIN) 800 mg tablet TAKE 1 TABLET BY MOUTH EVERY 8 HOURS AS NEEDED FOR PAIN 90 Tab 0  
 sucralfate (CARAFATE) 1 gram tablet Take 1 g by mouth four (4) times daily.  bismuth subsalicylate (PEPTO-BISMOL MAXIMUM STRENGTH) 525 mg/15 mL susp Take 30 mL by mouth every six (6) hours as needed.  MULTIVIT-MIN/IRON/FOLIC/LUTEIN (CENTRUM SILVER WOMEN PO) Take  by mouth daily.  ONETOUCH ULTRA TEST strip TEST BLOOD SUGARS 3 TIMES DAILY 100 Each 12  
 glyBURIDE (DIABETA) 2.5 mg tablet TAKE 1 TABLET BY MOUTH EVERY DAY 30 Tab 0  
 lisinopril (PRINIVIL, ZESTRIL) 40 mg tablet TAKE 1 TABLET BY MOUTH EVERY DAY 30 Tab 0  
 loperamide (IMODIUM A-D) 2 mg tablet Take 1 Tab by mouth four (4) times daily as needed for Diarrhea. 20 Tab 0 No Known Allergies Objective: 
Visit Vitals /70 (BP 1 Location: Right arm, BP Patient Position: Sitting) Pulse 70 Temp 97.8 °F (36.6 °C) (Oral) Resp 20 Ht 5' 3\" (1.6 m) Wt 156 lb (70.8 kg) LMP 03/15/2004 (Approximate) SpO2 97% BMI 27.63 kg/m² Physical Exam:  
General appearance - alert, well appearing, and in no distress Mental status - alert, oriented to person, place, and time EYE-KODY, EOMI, corneas normal, no foreign bodies ENT-ENT exam normal, no neck nodes or sinus tenderness Nose - normal and patent, no erythema, discharge or polyps Mouth - mucous membranes moist, pharynx normal without lesions Neck - supple, no significant adenopathy Chest - clear to auscultation, no wheezes, rales or rhonchi, symmetric air entry Heart - normal rate, regular rhythm, normal S1, S2, no murmurs, rubs, clicks or gallops Abdomen - soft, nontender, nondistended, no masses or organomegaly Lymph- no adenopathy palpable Ext-peripheral pulses normal, no pedal edema, no clubbing or cyanosis Skin-Warm and dry. no hyperpigmentation, vitiligo, or suspicious lesions Neuro -alert, oriented, normal speech, no focal findings or movement disorder noted Neck-normal C-spine, no tenderness, full ROM without pain Feet-no nail deformities or callus formation with good pulses noted Lt.hand-mcp joint tenderness Results for orders placed or performed in visit on 02/19/19 AMB POC GLUCOSE BLOOD, BY GLUCOSE MONITORING DEVICE Result Value Ref Range Glucose  mg/dL AMB POC LIPID PROFILE Result Value Ref Range Cholesterol (POC) 205 Triglycerides (POC) 264 HDL Cholesterol (POC) 48 Non-HDL Cholesterol 157 LDL Cholesterol (POC) 104 MG/DL TChol/HDL Ratio (POC) 4.3 AMB POC HEMOGLOBIN A1C Result Value Ref Range Hemoglobin A1c (POC) 7.4 % Assessment/Plan: ICD-10-CM ICD-9-CM 1. Controlled type 2 diabetes mellitus without complication, without long-term current use of insulin (HCC) E11.9 250.00   
2. Essential hypertension I10 401.9 3. Hypercholesteremia E78.00 272.0 4. Primary osteoarthritis of both knees M17.0 715.16   
5. Screening for colon cancer Z12.11 V76.51 No orders of the defined types were placed in this encounter. lose weight, increase physical activity, follow low fat diet, follow low salt diet,Take 81mg aspirin daily Patient Instructions [email or web address] Activation Thank you for requesting access to Pretty in my Pocket (PRIMP). Please follow the instructions below to securely access and download your online medical record. Pretty in my Pocket (PRIMP) allows you to send messages to your doctor, view your test results, renew your prescriptions, schedule appointments, and more. How Do I Sign Up? 1. In your internet browser, go to [email or web address] 
2. Click on the First Time User? Click Here link in the Sign In box. You will be redirect to the New Member Sign Up page. 3. Enter your Magin Access Code exactly as it appears below. You will not need to use this code after youve completed the sign-up process. If you do not sign up before the expiration date, you must request a new code. Magin Access Code: [email or web address] Expires: 5/3/2019 11:41 AM (This is the date your ebookpiet access code will ) 4. Enter the last four digits of your Social Security Number (xxxx) and Date of Birth (mm/dd/yyyy) as indicated and click Submit. You will be taken to the next sign-up page. 5. Create a ebookpiet ID. This will be your Magin login ID and cannot be changed, so think of one that is secure and easy to remember. 6. Create a Magin password. You can change your password at any time. 7. Enter your Password Reset Question and Answer. This can be used at a later time if you forget your password. 8. Enter your e-mail address. You will receive e-mail notification when new information is available in [address] 9. Click Sign Up. You can now view and download portions of your medical record. 10. Click the Download Summary menu link to download a portable copy of your medical information. Additional Information If you have questions, please visit the Frequently Asked Questions section of the Magin website at [email or web address] Remember, Magin is NOT to be used for urgent needs. For medical emergencies, dial 911. Follow-up Disposition: 
Return in about 3 months (around 2019). I have reviewed with the patient details of the assessment and plan and all questions were answered. Relevent patient education was performed. The most recent lab findings were reviewed with the patient. An After Visit Summary was printed and given to the patient.

## 2019-03-20 LAB
ALBUMIN SERPL-MCNC: 4.2 G/DL (ref 3.6–4.8)
ALBUMIN/GLOB SERPL: 1.7 {RATIO} (ref 1.2–2.2)
ALP SERPL-CCNC: 183 IU/L (ref 39–117)
ALT SERPL-CCNC: 44 IU/L (ref 0–32)
AST SERPL-CCNC: 29 IU/L (ref 0–40)
BILIRUB SERPL-MCNC: 0.2 MG/DL (ref 0–1.2)
BUN SERPL-MCNC: 21 MG/DL (ref 8–27)
BUN/CREAT SERPL: 36 (ref 12–28)
CALCIUM SERPL-MCNC: 9.8 MG/DL (ref 8.7–10.3)
CHLORIDE SERPL-SCNC: 102 MMOL/L (ref 96–106)
CO2 SERPL-SCNC: 23 MMOL/L (ref 20–29)
CREAT SERPL-MCNC: 0.59 MG/DL (ref 0.57–1)
ERYTHROCYTE [DISTWIDTH] IN BLOOD BY AUTOMATED COUNT: 12.9 % (ref 12.3–15.4)
GLOBULIN SER CALC-MCNC: 2.5 G/DL (ref 1.5–4.5)
GLUCOSE SERPL-MCNC: 198 MG/DL (ref 65–99)
HCT VFR BLD AUTO: 41.8 % (ref 34–46.6)
HGB BLD-MCNC: 14 G/DL (ref 11.1–15.9)
MCH RBC QN AUTO: 31.4 PG (ref 26.6–33)
MCHC RBC AUTO-ENTMCNC: 33.5 G/DL (ref 31.5–35.7)
MCV RBC AUTO: 94 FL (ref 79–97)
PLATELET # BLD AUTO: 200 X10E3/UL (ref 150–379)
POTASSIUM SERPL-SCNC: 4.1 MMOL/L (ref 3.5–5.2)
PROT SERPL-MCNC: 6.7 G/DL (ref 6–8.5)
RBC # BLD AUTO: 4.46 X10E6/UL (ref 3.77–5.28)
SODIUM SERPL-SCNC: 141 MMOL/L (ref 134–144)
WBC # BLD AUTO: 7.3 X10E3/UL (ref 3.4–10.8)

## 2019-04-07 RX ORDER — RANITIDINE 300 MG/1
TABLET ORAL
Qty: 30 TAB | Refills: 0 | Status: SHIPPED | OUTPATIENT
Start: 2019-04-07 | End: 2019-06-19 | Stop reason: SDUPTHER

## 2019-04-07 RX ORDER — RANITIDINE 300 MG/1
TABLET ORAL
Qty: 30 TAB | Refills: 0 | Status: SHIPPED | OUTPATIENT
Start: 2019-04-07 | End: 2019-06-10 | Stop reason: SDUPTHER

## 2019-04-13 DIAGNOSIS — M17.0 PRIMARY OSTEOARTHRITIS OF BOTH KNEES: ICD-10-CM

## 2019-04-15 RX ORDER — LISINOPRIL 40 MG/1
TABLET ORAL
Qty: 30 TAB | Refills: 0 | Status: SHIPPED | OUTPATIENT
Start: 2019-04-15 | End: 2019-05-14 | Stop reason: SDUPTHER

## 2019-04-15 RX ORDER — IBUPROFEN 800 MG/1
TABLET ORAL
Qty: 90 TAB | Refills: 0 | Status: SHIPPED | OUTPATIENT
Start: 2019-04-15 | End: 2019-09-19 | Stop reason: SDUPTHER

## 2019-04-15 RX ORDER — DICLOFENAC SODIUM 75 MG/1
TABLET, DELAYED RELEASE ORAL
Qty: 60 TAB | Refills: 0 | Status: SHIPPED | OUTPATIENT
Start: 2019-04-15 | End: 2019-05-14 | Stop reason: SDUPTHER

## 2019-05-14 DIAGNOSIS — M17.0 PRIMARY OSTEOARTHRITIS OF BOTH KNEES: ICD-10-CM

## 2019-05-14 RX ORDER — IBUPROFEN 800 MG/1
TABLET ORAL
Qty: 90 TAB | Refills: 0 | Status: SHIPPED | OUTPATIENT
Start: 2019-05-14 | End: 2019-06-19 | Stop reason: ALTCHOICE

## 2019-05-14 RX ORDER — LISINOPRIL 40 MG/1
TABLET ORAL
Qty: 30 TAB | Refills: 0 | Status: SHIPPED | OUTPATIENT
Start: 2019-05-14 | End: 2020-01-23 | Stop reason: ALTCHOICE

## 2019-05-14 RX ORDER — DICLOFENAC SODIUM 75 MG/1
TABLET, DELAYED RELEASE ORAL
Qty: 60 TAB | Refills: 0 | Status: SHIPPED | OUTPATIENT
Start: 2019-05-14 | End: 2019-06-10 | Stop reason: SDUPTHER

## 2019-06-10 DIAGNOSIS — M17.0 PRIMARY OSTEOARTHRITIS OF BOTH KNEES: ICD-10-CM

## 2019-06-10 RX ORDER — RANITIDINE 300 MG/1
TABLET ORAL
Qty: 30 TAB | Refills: 0 | Status: SHIPPED | OUTPATIENT
Start: 2019-06-10 | End: 2019-09-19 | Stop reason: SDUPTHER

## 2019-06-10 RX ORDER — DICLOFENAC SODIUM 75 MG/1
TABLET, DELAYED RELEASE ORAL
Qty: 60 TAB | Refills: 0 | Status: SHIPPED | OUTPATIENT
Start: 2019-06-10 | End: 2019-07-08 | Stop reason: SDUPTHER

## 2019-06-10 RX ORDER — IBUPROFEN 800 MG/1
TABLET ORAL
Qty: 90 TAB | Refills: 0 | Status: SHIPPED | OUTPATIENT
Start: 2019-06-10 | End: 2019-09-19 | Stop reason: SDUPTHER

## 2019-06-19 ENCOUNTER — OFFICE VISIT (OUTPATIENT)
Dept: INTERNAL MEDICINE CLINIC | Age: 64
End: 2019-06-19

## 2019-06-19 VITALS
HEART RATE: 71 BPM | SYSTOLIC BLOOD PRESSURE: 100 MMHG | WEIGHT: 153 LBS | RESPIRATION RATE: 20 BRPM | TEMPERATURE: 98.1 F | BODY MASS INDEX: 27.11 KG/M2 | HEIGHT: 63 IN | DIASTOLIC BLOOD PRESSURE: 60 MMHG | OXYGEN SATURATION: 98 %

## 2019-06-19 DIAGNOSIS — I10 ESSENTIAL HYPERTENSION: Primary | ICD-10-CM

## 2019-06-19 DIAGNOSIS — E11.9 CONTROLLED TYPE 2 DIABETES MELLITUS WITHOUT COMPLICATION, WITHOUT LONG-TERM CURRENT USE OF INSULIN (HCC): ICD-10-CM

## 2019-06-19 DIAGNOSIS — E78.00 HYPERCHOLESTEREMIA: ICD-10-CM

## 2019-06-19 LAB
CHOLEST SERPL-MCNC: 183 MG/DL
GLUCOSE POC: 227 MG/DL
HBA1C MFR BLD HPLC: 8.1 %
HDLC SERPL-MCNC: 44 MG/DL
LDL CHOLESTEROL POC: 96 MG/DL
NON-HDL GOAL (POC): 139
TCHOL/HDL RATIO (POC): 4.2
TRIGL SERPL-MCNC: 218 MG/DL

## 2019-06-19 RX ORDER — RANITIDINE 300 MG/1
300 TABLET ORAL 2 TIMES DAILY
Qty: 60 TAB | Refills: 12 | Status: SHIPPED | OUTPATIENT
Start: 2019-06-19 | End: 2020-01-23 | Stop reason: CLARIF

## 2019-06-19 RX ORDER — METFORMIN HYDROCHLORIDE 1000 MG/1
TABLET ORAL
Qty: 60 TAB | Refills: 12 | Status: SHIPPED | OUTPATIENT
Start: 2019-06-19 | End: 2020-07-06 | Stop reason: SDUPTHER

## 2019-06-19 NOTE — PATIENT INSTRUCTIONS
Official Limited Virtual Activation Thank you for requesting access to Official Limited [employer]. Please follow the instructions below to securely access and download your online medical record. Official Limited Virtual allows you to send messages to your doctor, view your test results, renew your prescriptions, schedule appointments, and more. How Do I Sign Up? 1. In your internet browser, go to [email or web address] 
2. Click on the First Time User? Click Here link in the Sign In box. You will be redirect to the New Member Sign Up page. 3. Enter your Official Limited Virtual Access Code exactly as it appears below. You will not need to use this code after youve completed the sign-up process. If you do not sign up before the expiration date, you must request a new code. Official Limited Virtual Access Code: Quadra Quadra [id number] Expires: 8/3/2019 11:10 AM (This is the date your Official Limited Virtual access code will ) 4. Enter the last four digits of your Social Security Number (xxxx) and Date of Birth (mm/dd/yyyy) as indicated and click Submit. You will be taken to the next sign-up page. 5. Create a Official Limited Virtual ID. This will be your Official Limited Virtual login ID and cannot be changed, so think of one that is secure and easy to remember. 6. Create a Official Limited Virtual password. You can change your password at any time. 7. Enter your Password Reset Question and Answer. This can be used at a later time if you forget your password. 8. Enter your e-mail address. You will receive e-mail notification when new information is available in [address] 9. Click Sign Up. You can now view and download portions of your medical record. 10. Click the Download Summary menu link to download a portable copy of your medical information. Additional Information If you have questions, please visit the Frequently Asked Questions section of the Official Limited Virtual website at [email or web address] "[email or web address] Remember, Official Limited Virtual is NOT to be used for urgent needs. For medical emergencies, dial 911.

## 2019-06-19 NOTE — PROGRESS NOTES
Chief Complaint   Patient presents with    Diabetes    Hypertension     1. Have you been to the ER, urgent care clinic since your last visit? Hospitalized since your last visit?no    2. Have you seen or consulted any other health care providers outside of the [address] [place] [address] since your last visit? Include any pap smears or colon screening.  No    3 most recent PHQ Screens 6/19/2019   PHQ Not Done -   Little interest or pleasure in doing things Not at all   Feeling down, depressed, irritable, or hopeless Not at all   Total Score PHQ 2 0

## 2019-06-19 NOTE — PROGRESS NOTES
Nicolette Obrien is a 66 y.o. female and presents with Diabetes and Hypertension    Hypertension Review:  The patient has essential hypertension  Diet and Lifestyle: generally follows a  low sodium diet, exercises sporadically  Home BP Monitoring: is not measured at home. Pertinent ROS: taking medications as instructed,  medication side effects noted with Lisinopril being cough, no TIA's, no chest pain on exertion, no dyspnea on exertion, no swelling of ankles. Diabetes Mellitus Review:  She has diabetes mellitus. Diabetic ROS - medication compliance: compliant all of the time, diabetic diet compliance: compliant all of the time, home glucose monitoring: is performed. Known diabetic complications: hypoglycemia  Cardiovascular risk factors: family history, dyslipidemia, diabetes mellitus, obesity, hypertension  Current diabetic medications include oral agents   Eye exam current (within one year): no  Weight trend: stable  Prior visit with dietician: no  Current diet: \"healthy\" diet  in general  Current exercise: walking  Current monitoring regimen: home blood tests - daily  Home blood sugar records: trend: stable  Any episodes of hypoglycemia? no  Is She on ACE inhibitor or angiotensin II receptor blocker? Yes     Varicose vein Review:  Nicolette Obrien is a 66 y.o. female and presents with Diabetes and Hypertension   who has numerous bulgy ropey veins in thebilaterally  causing pain/swelling that she has had for about  years. Patient has/has not tried stockings. The patient's symptons have stable. The patient's diabetes mellitus, sedentary life style, hypertension. Review of Systems  Constitutional: negative for fevers, chills, anorexia and weight loss  Eyes:   negative for visual disturbance and irritation  ENT:   negative for tinnitus,sore throat,nasal congestion,ear pains. hoarseness  Respiratory:  negative for cough, hemoptysis, dyspnea,wheezing  CV:   negative for chest pain, palpitations, lower extremity edema  GI:    diarrhea, abdominal pain,  Endo:               negative for polyuria,polydipsia,polyphagia,heat intolerance  Genitourinary: negative for frequency, dysuria and hematuria  Integument:  negative for rash and pruritus  Hematologic:  negative for easy bruising and gum/nose bleeding  Musculoskel: negative for myalgias, arthralgias, back pain, muscle weakness, joint pain  Neurological:  negative for headaches, dizziness, vertigo, memory problems and gait   Behavl/Psych: negative for feelings of anxiety, depression, mood changes    Past Medical History:   Diagnosis Date    Arthritis, Degenerative, Knee 7/15/2011    COPD (chronic obstructive pulmonary disease) ([facility] [address] 7/15/2011    Diverticulitis     GERD (gastroesophageal reflux disease)     GERD, gastro - Esophageal Reflux Disease 7/15/2011    Hypercholesterolemia 7/15/2011    Hypertension     Type II or unspecified type diabetes mellitus without mention of complication, uncontrolled 7/15/2011     Past Surgical History:   Procedure Laterality Date    COLONOSCOPY N/A 2017    [facility] AM[facility]ULATORY OR    COLONOSCOPY N/A 2018    COLONOSCOPY performed by Nico Camarillo MD at [facility] ENDOSCOPY    HX GYN  2004    uterine surgery    LAP,TUBAL CAUTERY Bilateral        Family History   Problem Relation Age of Onset    Diabetes Father     Deep Vein Thrombosis Daughter     Lung Disease Daughter     Other Daughter         cholesterol     Current Outpatient Medications   Medication Sig Dispense Refill    metFORMIN (GLUCOPHAGE) 1,000 mg tablet Si tab daily 60 Tab 12    raNITIdine (ZANTAC) 300 mg tab Take 1 Tab by mouth two (2) times a day.  60 Tab 12    raNITIdine (ZANTAC) 300 mg tab TAKE 1 TABLET BY MOUTH EVERY DAY 30 Tab 0    diclofenac EC (VOLTAREN) 75 mg EC tablet TAKE 1 TABLET BY MOUTH TWICE DAILY 60 Tab 0    ibuprofen (MOTRIN) 800 mg tablet TAKE 1 TABLET BY MOUTH EVERY 8 HOURS AS NEEDED FOR PAIN 90 Tab 0    lisinopril (PRINIVIL, ZESTRIL) 40 mg tablet TAKE 1 TABLET BY MOUTH EVERY DAY 30 Tab 0    ibuprofen (MOTRIN) 800 mg tablet TAKE 1 TABLET BY MOUTH EVERY 8 HOURS AS NEEDED FOR PAIN 90 Tab 0    losartan (COZAAR) 50 mg tablet Take 1 Tab by mouth daily. 30 Tab 12    amLODIPine (NORVASC) 5 mg tablet Take 1 Tab by mouth daily. 30 Tab 12    hydroCHLOROthiazide (HYDRODIURIL) 25 mg tablet Take 1 Tab by mouth daily. 30 Tab 12    glyBURIDE (DIABETA) 2.5 mg tablet TAKE 1 TABLET BY MOUTH EVERY DAY 30 Tab 0    cetirizine (ZYRTEC) 10 mg tablet Take 1 Tab by mouth daily. 20 Tab 0    nystatin (MYCOSTATIN) powder Apply  to affected area three (3) times daily. Apply until rash has resolved 1 Bottle 0    potassium chloride SA (MICRO-K) 10 mEq capsule TAKE ONE CAPSULE BY MOUTH TWICE DAILY 30 Cap 12    polyethylene glycol (MIRALAX) 17 gram/dose powder MIX 17 GRAMS IN A LIQUID AND THEN DRINK ONCE D UTD  3    ibuprofen (MOTRIN) 800 mg tablet TAKE 1 TABLET BY MOUTH EVERY 8 HOURS AS NEEDED FOR PAIN 90 Tab 0    sucralfate (CARAFATE) 1 gram tablet Take 1 g by mouth four (4) times daily.  bismuth subsalicylate (PEPTO-BISMOL MAXIMUM STRENGTH) 525 mg/15 mL susp Take 30 mL by mouth every six (6) hours as needed.  MULTIVIT-MIN/IRON/FOLIC/LUTEIN (CENTRUM SILVER WOMEN PO) Take  by mouth daily.  ONETOUCH ULTRA TEST strip TEST BLOOD SUGARS 3 TIMES DAILY 100 Each 12    loperamide (IMODIUM A-D) 2 mg tablet Take 1 Tab by mouth four (4) times daily as needed for Diarrhea.  20 Tab 0     No Known Allergies    Objective:  Visit Vitals  /60 (BP 1 Location: Right arm, BP Patient Position: Sitting)   Pulse 71   Temp 98.1 °F (36.7 °C) (Oral)   Resp 20   Ht 5' 3\" (1.6 m)   Wt 153 lb (69.4 kg)   LMP 03/15/2004 (Approximate)   SpO2 98%   BMI 27.10 kg/m²     Physical Exam:   General appearance - alert, well appearing, and in no distress  Mental status - alert, oriented to person, place, and time  EYE-KODY, EOMI, corneas normal, no foreign bodies  ENT-ENT exam normal, no neck nodes or sinus tenderness  Nose - normal and patent, no erythema, discharge or polyps  Mouth - mucous membranes moist, pharynx normal without lesions  Neck - supple, no significant adenopathy   Chest - clear to auscultation, no wheezes, rales or rhonchi, symmetric air entry   Heart - normal rate, regular rhythm, normal S1, S2, no murmurs, rubs, clicks or gallops   Abdomen - soft, nontender, nondistended, no masses or organomegaly  Lymph- no adenopathy palpable  Ext-peripheral pulses normal, no pedal edema, no clubbing or cyanosis  Skin-Warm and dry. no hyperpigmentation, vitiligo, or suspicious lesions  Neuro -alert, oriented, normal speech, no focal findings or movement disorder noted  Neck-normal C-spine, no tenderness, full ROM without pain  Feet-no nail deformities or callus formation with good pulses noted  Scattered lower leg(bilateral) varicosities noted    Results for orders placed or performed in visit on 06/19/19   AMB POC LIPID PROFILE   Result Value Ref Range    Cholesterol (POC) 183     Triglycerides (POC) 218     HDL Cholesterol (POC) 44     LDL Cholesterol (POC) 96 MG/DL    Non-HDL Goal (POC) 139     TChol/HDL Ratio (POC) 4.2    AMB POC GLUCOSE BLOOD, BY GLUCOSE MONITORING DEVICE   Result Value Ref Range    Glucose  mg/dL   AMB POC HEMOGLOBIN A1C   Result Value Ref Range    Hemoglobin A1c (POC) 8.1 %       Assessment/Plan:    ICD-10-CM ICD-9-CM    1. Essential hypertension I10 401.9 AMB POC LIPID PROFILE   2. Hypercholesteremia E78.00 272.0 AMB POC LIPID PROFILE   3.  Controlled type 2 diabetes mellitus without complication, without long-term current use of insulin (HCC) E11.9 250.00 AMB POC GLUCOSE BLOOD, BY GLUCOSE MONITORING DEVICE      AMB POC HEMOGLOBIN A1C     Orders Placed This Encounter    AMB POC LIPID PROFILE    AMB POC GLUCOSE BLOOD, BY GLUCOSE MONITORING DEVICE    AMB POC HEMOGLOBIN A1C    metFORMIN (GLUCOPHAGE) 1,000 mg tablet     Sig: Si tab daily     Dispense:  60 Tab     Refill:  12    raNITIdine (ZANTAC) 300 mg tab     Sig: Take 1 Tab by mouth two (2) times a day. Dispense:  60 Tab     Refill:  12     lose weight, increase physical activity, follow low fat diet, follow low salt diet,Take 81mg aspirin daily      Patient Instructions   MyChart Activation    Thank you for requesting access to [employer]. Please follow the instructions below to securely access and download your online medical record. [employer] allows you to send messages to your doctor, view your test results, renew your prescriptions, schedule appointments, and more. How Do I Sign Up? 1. In your internet browser, go to [email or web address]  2. Click on the First Time User? Click Here link in the Sign In box. You will be redirect to the New Member Sign Up page. 3. Enter your [employer] Access Code exactly as it appears below. You will not need to use this code after youve completed the sign-up process. If you do not sign up before the expiration date, you must request a new code. [employer] Access Code: Quadra Quadra [id number]  Expires: 8/3/2019 11:10 AM (This is the date your [employer] access code will )    4. Enter the last four digits of your Social Security Number (xxxx) and Date of Birth (mm/dd/yyyy) as indicated and click Submit. You will be taken to the next sign-up page. 5. Create a [employer] ID. This will be your [employer] login ID and cannot be changed, so think of one that is secure and easy to remember. 6. Create a [employer] password. You can change your password at any time. 7. Enter your Password Reset Question and Answer. This can be used at a later time if you forget your password. 8. Enter your e-mail address. You will receive e-mail notification when new information is available in [address] 9. Click Sign Up. You can now view and download portions of your medical record.   10. Click the Download Summary menu link to download a portable copy of your medical information. Additional Information    If you have questions, please visit the Frequently Asked Questions section of the [employer]og[employer] website at [email or web address] Remember, GoNogging is NOT to be used for urgent needs. For medical emergencies, dial 911. Follow-up and Dispositions    · Return in about 3 months (around 9/19/2019), or if symptoms worsen or fail to improve. I have reviewed with the patient details of the assessment and plan and all questions were answered. Relevent patient education was performed. The most recent lab findings were reviewed with the patient. An After Visit Summary was printed and given to the patient.

## 2019-06-20 LAB — HEMOCCULT STL QL IA: NEGATIVE

## 2019-06-24 RX ORDER — POTASSIUM CHLORIDE 750 MG/1
CAPSULE, EXTENDED RELEASE ORAL
Qty: 30 CAP | Refills: 0 | Status: SHIPPED | OUTPATIENT
Start: 2019-06-24 | End: 2019-09-19 | Stop reason: SDUPTHER

## 2019-07-08 DIAGNOSIS — M17.0 PRIMARY OSTEOARTHRITIS OF BOTH KNEES: ICD-10-CM

## 2019-07-08 RX ORDER — DICLOFENAC SODIUM 75 MG/1
TABLET, DELAYED RELEASE ORAL
Qty: 60 TAB | Refills: 0 | Status: SHIPPED | OUTPATIENT
Start: 2019-07-08 | End: 2019-08-05 | Stop reason: SDUPTHER

## 2019-07-08 RX ORDER — IBUPROFEN 800 MG/1
TABLET ORAL
Qty: 90 TAB | Refills: 0 | Status: SHIPPED | OUTPATIENT
Start: 2019-07-08 | End: 2019-09-19 | Stop reason: SDUPTHER

## 2019-07-22 ENCOUNTER — HOSPITAL ENCOUNTER (EMERGENCY)
Age: 64
Discharge: HOME | End: 2019-07-22
Attending: EMERGENCY MEDICINE
Payer: MEDICAID

## 2019-07-22 ENCOUNTER — APPOINTMENT (OUTPATIENT)
Dept: CT IMAGING | Age: 64
End: 2019-07-22
Attending: PHYSICIAN ASSISTANT
Payer: MEDICAID

## 2019-07-22 VITALS
RESPIRATION RATE: 16 BRPM | WEIGHT: 148 LBS | HEIGHT: 63 IN | DIASTOLIC BLOOD PRESSURE: 86 MMHG | OXYGEN SATURATION: 97 % | HEART RATE: 87 BPM | TEMPERATURE: 98.3 F | SYSTOLIC BLOOD PRESSURE: 143 MMHG | BODY MASS INDEX: 26.22 KG/M2

## 2019-07-22 DIAGNOSIS — E87.6 HYPOKALEMIA: ICD-10-CM

## 2019-07-22 DIAGNOSIS — R73.9 HYPERGLYCEMIA: ICD-10-CM

## 2019-07-22 DIAGNOSIS — N30.00 ACUTE CYSTITIS WITHOUT HEMATURIA: Primary | ICD-10-CM

## 2019-07-22 LAB
ALBUMIN SERPL-MCNC: 3.8 G/DL (ref 3.5–5)
ALBUMIN/GLOB SERPL: 1 {RATIO} (ref 1.1–2.2)
ALP SERPL-CCNC: 159 U/L (ref 45–117)
ALT SERPL-CCNC: 59 U/L (ref 12–78)
ANION GAP SERPL CALC-SCNC: 11 MMOL/L (ref 5–15)
APPEARANCE UR: ABNORMAL
AST SERPL-CCNC: 31 U/L (ref 15–37)
BACTERIA URNS QL MICRO: ABNORMAL /HPF
BASOPHILS # BLD: 0.1 K/UL (ref 0–0.1)
BASOPHILS NFR BLD: 1 % (ref 0–1)
BILIRUB SERPL-MCNC: 0.4 MG/DL (ref 0.2–1)
BILIRUB UR QL: NEGATIVE
BUN SERPL-MCNC: 23 MG/DL (ref 6–20)
BUN/CREAT SERPL: 25 (ref 12–20)
CALCIUM SERPL-MCNC: 10.1 MG/DL (ref 8.5–10.1)
CHLORIDE SERPL-SCNC: 98 MMOL/L (ref 97–108)
CO2 SERPL-SCNC: 27 MMOL/L (ref 21–32)
COLOR UR: ABNORMAL
CREAT SERPL-MCNC: 0.92 MG/DL (ref 0.55–1.02)
DIFFERENTIAL METHOD BLD: NORMAL
EOSINOPHIL # BLD: 0.3 K/UL (ref 0–0.4)
EOSINOPHIL NFR BLD: 3 % (ref 0–7)
EPITH CASTS URNS QL MICRO: ABNORMAL /LPF
ERYTHROCYTE [DISTWIDTH] IN BLOOD BY AUTOMATED COUNT: 11.7 % (ref 11.5–14.5)
GLOBULIN SER CALC-MCNC: 3.9 G/DL (ref 2–4)
GLUCOSE BLD STRIP.AUTO-MCNC: 314 MG/DL (ref 65–100)
GLUCOSE SERPL-MCNC: 307 MG/DL (ref 65–100)
GLUCOSE UR STRIP.AUTO-MCNC: >1000 MG/DL
HCT VFR BLD AUTO: 40.2 % (ref 35–47)
HGB BLD-MCNC: 14.4 G/DL (ref 11.5–16)
HGB UR QL STRIP: ABNORMAL
IMM GRANULOCYTES # BLD AUTO: 0 K/UL (ref 0–0.04)
IMM GRANULOCYTES NFR BLD AUTO: 0 % (ref 0–0.5)
KETONES UR QL STRIP.AUTO: NEGATIVE MG/DL
LEUKOCYTE ESTERASE UR QL STRIP.AUTO: ABNORMAL
LYMPHOCYTES # BLD: 2.8 K/UL (ref 0.8–3.5)
LYMPHOCYTES NFR BLD: 27 % (ref 12–49)
MCH RBC QN AUTO: 32.1 PG (ref 26–34)
MCHC RBC AUTO-ENTMCNC: 35.8 G/DL (ref 30–36.5)
MCV RBC AUTO: 89.5 FL (ref 80–99)
MONOCYTES # BLD: 0.9 K/UL (ref 0–1)
MONOCYTES NFR BLD: 9 % (ref 5–13)
NEUTS SEG # BLD: 6.2 K/UL (ref 1.8–8)
NEUTS SEG NFR BLD: 60 % (ref 32–75)
NITRITE UR QL STRIP.AUTO: NEGATIVE
NRBC # BLD: 0 K/UL (ref 0–0.01)
NRBC BLD-RTO: 0 PER 100 WBC
PH UR STRIP: 6.5 [PH] (ref 5–8)
PLATELET # BLD AUTO: 222 K/UL (ref 150–400)
PMV BLD AUTO: 10.7 FL (ref 8.9–12.9)
POTASSIUM SERPL-SCNC: 2.8 MMOL/L (ref 3.5–5.1)
PROT SERPL-MCNC: 7.7 G/DL (ref 6.4–8.2)
PROT UR STRIP-MCNC: NEGATIVE MG/DL
RBC # BLD AUTO: 4.49 M/UL (ref 3.8–5.2)
RBC #/AREA URNS HPF: ABNORMAL /HPF (ref 0–5)
SERVICE CMNT-IMP: ABNORMAL
SODIUM SERPL-SCNC: 136 MMOL/L (ref 136–145)
SP GR UR REFRACTOMETRY: 1.02 (ref 1–1.03)
UA: UC IF INDICATED,UAUC: ABNORMAL
UROBILINOGEN UR QL STRIP.AUTO: 0.2 EU/DL (ref 0.2–1)
WBC # BLD AUTO: 10.4 K/UL (ref 3.6–11)
WBC URNS QL MICRO: ABNORMAL /HPF (ref 0–4)

## 2019-07-22 PROCEDURE — 74176 CT ABD & PELVIS W/O CONTRAST: CPT

## 2019-07-22 PROCEDURE — 81001 URINALYSIS AUTO W/SCOPE: CPT

## 2019-07-22 PROCEDURE — 80053 COMPREHEN METABOLIC PANEL: CPT

## 2019-07-22 PROCEDURE — 74011250637 HC RX REV CODE- 250/637: Performed by: PHYSICIAN ASSISTANT

## 2019-07-22 PROCEDURE — 85025 COMPLETE CBC W/AUTO DIFF WBC: CPT

## 2019-07-22 PROCEDURE — 82962 GLUCOSE BLOOD TEST: CPT

## 2019-07-22 PROCEDURE — 87077 CULTURE AEROBIC IDENTIFY: CPT

## 2019-07-22 PROCEDURE — 87186 SC STD MICRODIL/AGAR DIL: CPT

## 2019-07-22 PROCEDURE — 36415 COLL VENOUS BLD VENIPUNCTURE: CPT

## 2019-07-22 PROCEDURE — 99284 EMERGENCY DEPT VISIT MOD MDM: CPT

## 2019-07-22 PROCEDURE — 87086 URINE CULTURE/COLONY COUNT: CPT

## 2019-07-22 RX ORDER — PROCHLORPERAZINE MALEATE 10 MG
5 TABLET ORAL
Status: COMPLETED | OUTPATIENT
Start: 2019-07-22 | End: 2019-07-22

## 2019-07-22 RX ORDER — POTASSIUM CHLORIDE 750 MG/1
40 TABLET, FILM COATED, EXTENDED RELEASE ORAL
Status: COMPLETED | OUTPATIENT
Start: 2019-07-22 | End: 2019-07-22

## 2019-07-22 RX ORDER — CEPHALEXIN 500 MG/1
500 CAPSULE ORAL 2 TIMES DAILY
Qty: 14 CAP | Refills: 0 | Status: SHIPPED | OUTPATIENT
Start: 2019-07-22 | End: 2019-07-29

## 2019-07-22 RX ADMIN — PROCHLORPERAZINE MALEATE 5 MG: 10 TABLET, FILM COATED ORAL at 13:06

## 2019-07-22 RX ADMIN — POTASSIUM CHLORIDE 40 MEQ: 750 TABLET, FILM COATED, EXTENDED RELEASE ORAL at 15:07

## 2019-07-22 NOTE — ED PROVIDER NOTES
EMERGENCY DEPARTMENT HISTORY AND PHYSICAL EXAM      Date: 2019  Patient Name: Nahomy Lombardo    History of Presenting Illness     Chief Complaint   Patient presents with    Abdominal Pain       History Provided By: Patient    HPI: Nahomy Lombardo, 66 y.o. female with PMHx significant for COPD, arthritis, hypercholesterolemia, GERD, htn, DM2, diverticulitis, GERD presents ambulatory to the ED with cc of intermittent aching generalized abdominal pain x 3 days with assoc nausea. Last BM - this am, normal for pt. Previous abd surgeries include tubal ligation. Denies dysuria, hematuria, change in vaginal discharge. Pt reports one bout of emesis because pt \"made herself throw up\". Pt also reports decreased appetite. Rates pain 10/10. Pt reports taking zofran without relief of sx. There are no other complaints, changes, or physical findings at this time. PCP: Kinsey Koroma MD    No current facility-administered medications on file prior to encounter. Current Outpatient Medications on File Prior to Encounter   Medication Sig Dispense Refill    ibuprofen (MOTRIN) 800 mg tablet TAKE 1 TABLET BY MOUTH EVERY 8 HOURS AS NEEDED FOR PAIN 90 Tab 0    diclofenac EC (VOLTAREN) 75 mg EC tablet TAKE 1 TABLET BY MOUTH TWICE DAILY 60 Tab 0    potassium chloride SA (MICRO-K) 10 mEq capsule TAKE ONE CAPSULE BY MOUTH TWICE DAILY 30 Cap 0    metFORMIN (GLUCOPHAGE) 1,000 mg tablet Si tab daily 60 Tab 12    raNITIdine (ZANTAC) 300 mg tab Take 1 Tab by mouth two (2) times a day.  60 Tab 12    raNITIdine (ZANTAC) 300 mg tab TAKE 1 TABLET BY MOUTH EVERY DAY 30 Tab 0    ibuprofen (MOTRIN) 800 mg tablet TAKE 1 TABLET BY MOUTH EVERY 8 HOURS AS NEEDED FOR PAIN 90 Tab 0    lisinopril (PRINIVIL, ZESTRIL) 40 mg tablet TAKE 1 TABLET BY MOUTH EVERY DAY 30 Tab 0    ibuprofen (MOTRIN) 800 mg tablet TAKE 1 TABLET BY MOUTH EVERY 8 HOURS AS NEEDED FOR PAIN 90 Tab 0    losartan (COZAAR) 50 mg tablet Take 1 Tab by mouth daily. 30 Tab 12    amLODIPine (NORVASC) 5 mg tablet Take 1 Tab by mouth daily. 30 Tab 12    hydroCHLOROthiazide (HYDRODIURIL) 25 mg tablet Take 1 Tab by mouth daily. 30 Tab 12    glyBURIDE (DIABETA) 2.5 mg tablet TAKE 1 TABLET BY MOUTH EVERY DAY 30 Tab 0    cetirizine (ZYRTEC) 10 mg tablet Take 1 Tab by mouth daily. 20 Tab 0    nystatin (MYCOSTATIN) powder Apply  to affected area three (3) times daily. Apply until rash has resolved 1 Bottle 0    potassium chloride SA (MICRO-K) 10 mEq capsule TAKE ONE CAPSULE BY MOUTH TWICE DAILY 30 Cap 12    polyethylene glycol (MIRALAX) 17 gram/dose powder MIX 17 GRAMS IN A LIQUID AND THEN DRINK ONCE D UTD  3    ibuprofen (MOTRIN) 800 mg tablet TAKE 1 TABLET BY MOUTH EVERY 8 HOURS AS NEEDED FOR PAIN 90 Tab 0    sucralfate (CARAFATE) 1 gram tablet Take 1 g by mouth four (4) times daily.  bismuth subsalicylate (PEPTO-BISMOL MAXIMUM STRENGTH) 525 mg/15 mL susp Take 30 mL by mouth every six (6) hours as needed.  MULTIVIT-MIN/IRON/FOLIC/LUTEIN (CENTRUM SILVER WOMEN PO) Take  by mouth daily.  ONETOUCH ULTRA TEST strip TEST BLOOD SUGARS 3 TIMES DAILY 100 Each 12    loperamide (IMODIUM A-D) 2 mg tablet Take 1 Tab by mouth four (4) times daily as needed for Diarrhea.  20 Tab 0       Past History     Past Medical History:  Past Medical History:   Diagnosis Date    Arthritis, Degenerative, Knee 7/15/2011    COPD (chronic obstructive pulmonary disease) ([facility] [address] 7/15/2011    Diverticulitis     GERD (gastroesophageal reflux disease)     GERD, gastro - Esophageal Reflux Disease 7/15/2011    Hypercholesterolemia 7/15/2011    Hypertension     Type II or unspecified type diabetes mellitus without mention of complication, uncontrolled 7/15/2011       Past Surgical History:  Past Surgical History:   Procedure Laterality Date    COLONOSCOPY N/A 9/18/2017    COL[facility] performed [facility] at [facility]    COLONOSCOPY N/A 4/25/2018    COLONOSCOPY performed by [facility] Al [facility] at [facility] ENDOSCOPY    HX GYN  2004    uterine surgery    LAP,TUBAL CAUTERY Bilateral        Family History:  Family History   Problem Relation Age of Onset    Diabetes Father     Deep Vein Thrombosis Daughter     Lung Disease Daughter     Other Daughter         cholesterol       Social History:  Social History     Tobacco Use    Smoking status: Former Smoker     Last attempt to quit: 4/15/2015     Years since quittin.2    Smokeless tobacco: Never Used   Substance Use Topics    Alcohol use: No    Drug use: Yes     Types: Marijuana     Comment: \"daily\"       Allergies:  No Known Allergies      Review of Systems   Review of Systems   Constitutional: Negative for chills and fever. Respiratory: Negative for shortness of breath. Cardiovascular: Negative for chest pain. Gastrointestinal: Positive for abdominal pain and nausea. Negative for vomiting. Genitourinary: Negative for flank pain. Musculoskeletal: Negative for back pain and myalgias. Skin: Negative for color change, pallor, rash and wound. Neurological: Negative for dizziness, weakness and light-headedness. All other systems reviewed and are negative. Physical Exam   Physical Exam   Constitutional: She is oriented to person, place, and time. She appears well-developed and well-nourished. No distress. HENT:   Head: Normocephalic and atraumatic. Eyes: Conjunctivae are normal.   Cardiovascular: Normal rate, regular rhythm and normal heart sounds. Pulmonary/Chest: Effort normal and breath sounds normal. No respiratory distress. Abdominal: Soft. Bowel sounds are normal. She exhibits no distension. Abdomen soft, nontender  Pain not reproducible on exam   Musculoskeletal: Normal range of motion. Neurological: She is alert and oriented to person, place, and time. Skin: Skin is warm. No rash noted. Psychiatric: She has a normal mood and affect.  Her behavior is normal.   Nursing note and vitals reviewed. Diagnostic Study Results     Labs -     Recent Results (from the past 12 hour(s))   URINALYSIS W/ REFLEX CULTURE    Collection Time: 07/22/19  1:31 PM   Result Value Ref Range    Color YELLOW/STRAW      Appearance CLOUDY (A) CLEAR      Specific gravity 1.020 1.003 - 1.030      pH (UA) 6.5 5.0 - 8.0      Protein NEGATIVE  NEG mg/dL    Glucose >1,000 (A) NEG mg/dL    Ketone NEGATIVE  NEG mg/dL    Bilirubin NEGATIVE  NEG      Blood SMALL (A) NEG      Urobilinogen 0.2 0.2 - 1.0 EU/dL    Nitrites NEGATIVE  NEG      Leukocyte Esterase SMALL (A) NEG      WBC 0-4 0 - 4 /hpf    RBC 0-5 0 - 5 /hpf    Epithelial cells FEW FEW /lpf    Bacteria 2+ (A) NEG /hpf    UA:UC IF INDICATED URINE CULTURE ORDERED (A) CNI     GLUCOSE, POC    Collection Time: 07/22/19  1:56 PM   Result Value Ref Range    Glucose (POC) 314 (H) 65 - 100 mg/dL    Performed by Stephany Clifton (Tech)    CBC WITH AUTOMATED DIFF    Collection Time: 07/22/19  1:58 PM   Result Value Ref Range    WBC 10.4 3.6 - 11.0 K/uL    RBC 4.49 3.80 - 5.20 M/uL    HGB 14.4 11.5 - 16.0 g/dL    HCT 40.2 35.0 - 47.0 %    MCV 89.5 80.0 - 99.0 FL    MCH 32.1 26.0 - 34.0 PG    MCHC 35.8 30.0 - 36.5 g/dL    RDW 11.7 11.5 - 14.5 %    PLATELET 387 885 - 601 K/uL    MPV 10.7 8.9 - 12.9 FL    NRBC 0.0 0  WBC    ABSOLUTE NRBC 0.00 0.00 - 0.01 K/uL    NEUTROPHILS 60 32 - 75 %    LYMPHOCYTES 27 12 - 49 %    MONOCYTES 9 5 - 13 %    EOSINOPHILS 3 0 - 7 %    BASOPHILS 1 0 - 1 %    IMMATURE GRANULOCYTES 0 0.0 - 0.5 %    ABS. NEUTROPHILS 6.2 1.8 - 8.0 K/UL    ABS. LYMPHOCYTES 2.8 0.8 - 3.5 K/UL    ABS. MONOCYTES 0.9 0.0 - 1.0 K/UL    ABS. EOSINOPHILS 0.3 0.0 - 0.4 K/UL    ABS. BASOPHILS 0.1 0.0 - 0.1 K/UL    ABS. IMM.  GRANS. 0.0 0.00 - 0.04 K/UL    DF AUTOMATED     METABOLIC PANEL, COMPREHENSIVE    Collection Time: 07/22/19  1:58 PM   Result Value Ref Range    Sodium 136 136 - 145 mmol/L    Potassium 2.8 (L) 3.5 - 5.1 mmol/L    Chloride 98 97 - 108 mmol/L    CO2 27 21 - 32 mmol/L    Anion gap 11 5 - 15 mmol/L    Glucose 307 (H) 65 - 100 mg/dL    BUN 23 (H) 6 - 20 MG/DL    Creatinine 0.92 0.55 - 1.02 MG/DL    BUN/Creatinine ratio 25 (H) 12 - 20      GFR est AA >60 >60 ml/min/1.73m2    GFR est non-AA >60 >60 ml/min/1.73m2    Calcium 10.1 8.5 - 10.1 MG/DL    Bilirubin, total 0.4 0.2 - 1.0 MG/DL    ALT (SGPT) 59 12 - 78 U/L    AST (SGOT) 31 15 - 37 U/L    Alk. phosphatase 159 (H) 45 - 117 U/L    Protein, total 7.7 6.4 - 8.2 g/dL    Albumin 3.8 3.5 - 5.0 g/dL    Globulin 3.9 2.0 - 4.0 g/dL    A-G Ratio 1.0 (L) 1.1 - 2.2         Radiologic Studies -   CT ABD PELV WO CONT   Final Result   IMPRESSION:   No acute process seen. Incidental hepatic steatosis, granulomatous disease, sigmoid diverticulosis,   interstitial fibrosis. CT Results  (Last 48 hours)               07/22/19 1314  CT ABD PELV WO CONT Final result    Impression:  IMPRESSION:   No acute process seen. Incidental hepatic steatosis, granulomatous disease, sigmoid diverticulosis,   interstitial fibrosis. Narrative:  EXAM: CT ABD PELV WO CONT       INDICATION: Abdominal pain       COMPARISON: 8/13/2017       CONTRAST:  None. TECHNIQUE:    Thin axial images were obtained through the abdomen and pelvis. Coronal and   sagittal reconstructions were generated. Oral contrast was not administered. CT   dose reduction was achieved through use of a standardized protocol tailored for   this examination and automatic exposure control for dose modulation. The absence of intravenous contrast material reduces the sensitivity for   evaluation of the solid parenchymal organs of the abdomen. FINDINGS:    LUNG BASES: Bibasilar fibrosis   INCIDENTALLY IMAGED HEART AND MEDIASTINUM: Unremarkable. LIVER: No mass or biliary dilatation. Diffuse hypodensity of the liver. GALLBLADDER: Unremarkable. SPLEEN: No mass. Multiple granulomata. PANCREAS: No mass or ductal dilatation. ADRENALS: Unremarkable. KIDNEYS/URETERS: No mass, calculus, or hydronephrosis. STOMACH: Unremarkable. SMALL BOWEL: No dilatation or wall thickening. COLON: No dilatation or wall thickening. Multiple sigmoid diverticula. APPENDIX: Unremarkable. Axial image 54   PERITONEUM: No ascites or pneumoperitoneum. RETROPERITONEUM: No lymphadenopathy or aortic aneurysm. URINARY BLADDER: No mass or calculus. BONES: Levoconvex lumbar curvature with right lateral subluxation of L1 by 5 mm   relative to L2. .   ADDITIONAL COMMENTS: N/A               CXR Results  (Last 48 hours)    None            Medical Decision Making   I am the first provider for this patient. I reviewed the vital signs, available nursing notes, past medical history, past surgical history, family history and social history. Vital Signs-Reviewed the patient's vital signs. Patient Vitals for the past 12 hrs:   Temp Pulse Resp BP SpO2   07/22/19 1155 98.3 °F (36.8 °C) 87 16 143/86 97 %         Records Reviewed: Nursing Notes and Old Medical Records    Provider Notes (Medical Decision Making):   DDx: UTI, Ventral hernia, Gastroenteritis, Hyperglycemia, Dehydration, Electrolyte abnormality     ED Course:   Initial assessment performed. The patients presenting problems have been discussed, and they are in agreement with the care plan formulated and outlined with them. I have encouraged them to ask questions as they arise throughout their visit. Lengthy discussion with pt regarding results. Discussed incidental CT findings. Also discussed glucose and need for possible additional medication needed. Pt previously taking glipizide and switched to metformin 4 months prior. Did not start pt on additional medication, because she previously took medication that would've been prescribed. Also discussed hypokalemia and HCTZ. Pt states she has been taking potassium as prescribed.  Discussed talking with PCP about decreased potassium and possible need for change in BP medication. All questions answered. Pt stated she understood. Disposition:  3:25 PM  Discussed lab and imaging results with pt along with dx and treatment plan. Discussed importance of PCP follow up. All questions answered. Pt voiced they understood. Return if sx worsen. PLAN:  1. Discharge Medication List as of 7/22/2019  3:16 PM        2. Follow-up Information     Follow up With Specialties Details Why [facility]  Schedule an appointment as soon as possible for a visit As needed [facility], [address] [place] [address] Drive  [phone]        Return to ED if worse     Diagnosis     Clinical Impression:   1. Acute cystitis without hematuria    2. Hyperglycemia    3.  Hypokalemia

## 2019-07-22 NOTE — ED NOTES
Pt arrives in the ED with complaints of bilateral leg pain/aching x 5 days with generalized fatigue, nausea, and loss of appetite x 5 days. Denies fevers or diarrhea. Reports one episode of vomiting but states that she forced herself to due to thinking she ate something that didn't agree with her. Pt reports that she felt well enough to go to the pool on both Saturday and Sunday with her grandchildren.

## 2019-07-22 NOTE — ED NOTES
Emergency Department Nursing Plan of Care       The Nursing Plan of Care is developed from the Nursing assessment and Emergency Department Attending provider initial evaluation. The plan of care may be reviewed in the ED Provider note.     The Plan of Care was developed with the following considerations:   Patient / Family readiness to learn indicated by:verbalized understanding  Persons(s) to be included in education: patient  Barriers to Learning/Limitations:No    [facility]    7/22/2019   12:07 PM

## 2019-07-22 NOTE — DISCHARGE INSTRUCTIONS
Patient Education        Urinary Tract Infection in Women: Care Instructions  Your Care Instructions    A urinary tract infection, or UTI, is a general term for an infection anywhere between the kidneys and the urethra (where urine comes out). Most UTIs are bladder infections. They often cause pain or burning when you urinate. UTIs are caused by bacteria and can be cured with antibiotics. Be sure to complete your treatment so that the infection goes away. Follow-up care is a key part of your treatment and safety. Be sure to make and go to all appointments, and call your doctor if you are having problems. It's also a good idea to know your test results and keep a list of the medicines you take. How can you care for yourself at home? · Take your antibiotics as directed. Do not stop taking them just because you feel better. You need to take the full course of antibiotics. · Drink extra water and other fluids for the next day or two. This may help wash out the bacteria that are causing the infection. (If you have kidney, heart, or liver disease and have to limit fluids, talk with your doctor before you increase your fluid intake.)  · Avoid drinks that are carbonated or have caffeine. They can irritate the bladder. · Urinate often. Try to empty your bladder each time. · To relieve pain, take a hot bath or lay a heating pad set on low over your lower belly or genital area. Never go to sleep with a heating pad in place. To prevent UTIs  · Drink plenty of water each day. This helps you urinate often, which clears bacteria from your system. (If you have kidney, heart, or liver disease and have to limit fluids, talk with your doctor before you increase your fluid intake.)  · Urinate when you need to. · Urinate right after you have sex. · Change sanitary pads often. · Avoid douches, bubble baths, feminine hygiene sprays, and other feminine hygiene products that have deodorants.   · After going to the bathroom, wipe from front to back. When should you call for help? Call your doctor now or seek immediate medical care if:    · Symptoms such as fever, chills, nausea, or vomiting get worse or appear for the first time.     · You have new pain in your back just below your rib cage. This is called flank pain.     · There is new blood or pus in your urine.     · You have any problems with your antibiotic medicine.    Watch closely for changes in your health, and be sure to contact your doctor if:    · You are not getting better after taking an antibiotic for 2 days.     · Your symptoms go away but then come back. Where can you learn more? Go to http://kalia-terell.info/. Enter P112 in the search box to learn more about \"Urinary Tract Infection in Women: Care Instructions. \"  Current as of: December 19, 2018  Content Version: 12.1  © 5981-0091 [employer]. Care instructions adapted under license by [employer] (which disclaims liability or warranty for this information). If you have questions about a medical condition or this instruction, always ask your healthcare professional. [place] [address] any warranty or liability for your use of this information. Patient Education        Hypokalemia: Care Instructions  Your Care Instructions    Hypokalemia (say \"gi-[id number]-wuv-BRIAN-wallace-uh\") is a low level of potassium. The heart, muscles, kidneys, and nervous system all need potassium to work well. This problem has many different causes. Kidney problems, diet, and medicines like diuretics and laxatives can cause it. So can vomiting or diarrhea. In some cases, cancer is the cause. Your doctor may do tests to find the cause of your low potassium levels. You may need medicines to bring your potassium levels back to normal. You may also need regular blood tests to check your potassium. If you have very low potassium, you may need intravenous (IV) medicines.  You also may need tests to check the electrical activity of your heart. Heart problems caused by low potassium levels can be very serious. Follow-up care is a key part of your treatment and safety. Be sure to make and go to all appointments, and call your doctor if you are having problems. It's also a good idea to know your test results and keep a list of the medicines you take. How can you care for yourself at home? · If your doctor recommends it, eat foods that have a lot of potassium. These include fresh fruits, juices, and vegetables. They also include nuts, beans, and milk. · Be safe with medicines. If your doctor prescribes medicines or potassium supplements, take them exactly as directed. Call your doctor if you have any problems with your medicines. · Get your potassium levels tested as often as your doctor tells you. When should you call for help? Call 911 anytime you think you may need emergency care. For example, call if:    · You feel like your heart is missing beats. Heart problems caused by low potassium can cause death.     · You passed out (lost consciousness).     · You have a seizure.    Call your doctor now or seek immediate medical care if:    · You feel weak or unusually tired.     · You have severe arm or leg cramps.     · You have tingling or numbness.     · You feel sick to your stomach, or you vomit.     · You have belly cramps.     · You feel bloated or constipated.     · You have to urinate a lot.     · You feel very thirsty most of the time.     · You are dizzy or lightheaded, or you feel like you may faint.     · You feel depressed, or you lose touch with reality.    Watch closely for changes in your health, and be sure to contact your doctor if:    · You do not get better as expected. Where can you learn more? Go to http://kalia-terell.info/. Enter G358 in the search box to learn more about \"Hypokalemia: Care Instructions. \"  Current as of: November 6, 2018  Content Version: 12.1  © 0627-1722 [employer]. Care instructions adapted under license by [employer] (which disclaims liability or warranty for this information). If you have questions about a medical condition or this instruction, always ask your healthcare professional. [address] any warranty or liability for your use of this information.

## 2019-07-24 LAB
BACTERIA SPEC CULT: ABNORMAL
CC UR VC: ABNORMAL
SERVICE CMNT-IMP: ABNORMAL

## 2019-07-30 RX ORDER — NYSTATIN 100000 [USP'U]/G
POWDER TOPICAL
Qty: 30 G | Refills: 0 | Status: SHIPPED | OUTPATIENT
Start: 2019-07-30 | End: 2019-08-30 | Stop reason: SDUPTHER

## 2019-07-30 RX ORDER — POTASSIUM CHLORIDE 750 MG/1
CAPSULE, EXTENDED RELEASE ORAL
Qty: 30 CAP | Refills: 0 | Status: SHIPPED | OUTPATIENT
Start: 2019-07-30 | End: 2019-09-19 | Stop reason: SDUPTHER

## 2019-08-05 DIAGNOSIS — M17.0 PRIMARY OSTEOARTHRITIS OF BOTH KNEES: ICD-10-CM

## 2019-08-06 RX ORDER — DICLOFENAC SODIUM 75 MG/1
TABLET, DELAYED RELEASE ORAL
Qty: 60 TAB | Refills: 0 | Status: SHIPPED | OUTPATIENT
Start: 2019-08-06 | End: 2019-09-02 | Stop reason: SDUPTHER

## 2019-08-06 RX ORDER — IBUPROFEN 800 MG/1
TABLET ORAL
Qty: 90 TAB | Refills: 0 | Status: SHIPPED | OUTPATIENT
Start: 2019-08-06 | End: 2019-09-19 | Stop reason: SDUPTHER

## 2019-09-02 DIAGNOSIS — M17.0 PRIMARY OSTEOARTHRITIS OF BOTH KNEES: ICD-10-CM

## 2019-09-06 RX ORDER — NYSTATIN 100000 [USP'U]/G
POWDER TOPICAL
Qty: 30 G | Refills: 0 | Status: SHIPPED | OUTPATIENT
Start: 2019-09-06 | End: 2019-10-03 | Stop reason: SDUPTHER

## 2019-09-06 RX ORDER — IBUPROFEN 800 MG/1
TABLET ORAL
Qty: 90 TAB | Refills: 0 | Status: SHIPPED | OUTPATIENT
Start: 2019-09-06 | End: 2019-09-19 | Stop reason: SDUPTHER

## 2019-09-06 RX ORDER — POTASSIUM CHLORIDE 750 MG/1
CAPSULE, EXTENDED RELEASE ORAL
Qty: 30 CAP | Refills: 0 | Status: SHIPPED | OUTPATIENT
Start: 2019-09-06 | End: 2019-09-19 | Stop reason: SDUPTHER

## 2019-09-06 RX ORDER — DICLOFENAC SODIUM 75 MG/1
TABLET, DELAYED RELEASE ORAL
Qty: 60 TAB | Refills: 0 | Status: SHIPPED | OUTPATIENT
Start: 2019-09-06 | End: 2019-10-03 | Stop reason: SDUPTHER

## 2019-09-19 ENCOUNTER — OFFICE VISIT (OUTPATIENT)
Dept: INTERNAL MEDICINE CLINIC | Age: 64
End: 2019-09-19

## 2019-09-19 VITALS
TEMPERATURE: 97.5 F | BODY MASS INDEX: 26.58 KG/M2 | WEIGHT: 150 LBS | RESPIRATION RATE: 20 BRPM | SYSTOLIC BLOOD PRESSURE: 120 MMHG | HEIGHT: 63 IN | HEART RATE: 65 BPM | OXYGEN SATURATION: 98 % | DIASTOLIC BLOOD PRESSURE: 70 MMHG

## 2019-09-19 DIAGNOSIS — E11.9 CONTROLLED TYPE 2 DIABETES MELLITUS WITHOUT COMPLICATION, WITHOUT LONG-TERM CURRENT USE OF INSULIN (HCC): Primary | ICD-10-CM

## 2019-09-19 DIAGNOSIS — I10 ESSENTIAL HYPERTENSION: ICD-10-CM

## 2019-09-19 DIAGNOSIS — M17.0 PRIMARY OSTEOARTHRITIS OF BOTH KNEES: ICD-10-CM

## 2019-09-19 DIAGNOSIS — E11.9 TYPE 2 DIABETES MELLITUS WITHOUT COMPLICATION, WITHOUT LONG-TERM CURRENT USE OF INSULIN (HCC): ICD-10-CM

## 2019-09-19 DIAGNOSIS — E78.00 HYPERCHOLESTEREMIA: ICD-10-CM

## 2019-09-19 LAB
CHOLEST SERPL-MCNC: 170 MG/DL
GLUCOSE POC: 144 MG/DL
HBA1C MFR BLD HPLC: 8.2 %
HDLC SERPL-MCNC: 46 MG/DL
LDL CHOLESTEROL POC: 66 MG/DL
NON-HDL GOAL (POC): 124
TCHOL/HDL RATIO (POC): 3.7
TRIGL SERPL-MCNC: 286 MG/DL

## 2019-09-19 RX ORDER — SUCRALFATE 1 G/1
1 TABLET ORAL 4 TIMES DAILY
Qty: 120 TAB | Refills: 6 | Status: SHIPPED | OUTPATIENT
Start: 2019-09-19 | End: 2020-04-09

## 2019-09-19 NOTE — PROGRESS NOTES
Ms Rafa Mcneil was seen in ER because of chest pain and exertional dyspnea  Workup was negative    Will schedule for stress test  Riley Marsh is a 60 y.o. female and presents with Hypertension; Diabetes; and Leg Pain    Hypertension Review:  The patient has essential hypertension  Diet and Lifestyle: generally follows a  low sodium diet, exercises sporadically  Home BP Monitoring: is not measured at home. Pertinent ROS: taking medications as instructed,  medication side effects noted with Lisinopril being cough, no TIA's, no chest pain on exertion, no dyspnea on exertion, no swelling of ankles. Knee pain Review:  Patient complains of left  knee pain. Onset of the symptoms was months  ago. Inciting event: longstanding problem which has been stable. Current symptoms include no pain. Aggravating symptoms: going up and down stairs, walking, lateral movements, any weight bearing. Patient's overall course: stable, Patient has had prior knee problems. Evaluation to date: X-RAYS  Treatment to date:NSAIDS/        Diabetes Mellitus Review:  She has diabetes mellitus. Diabetic ROS - medication compliance: compliant all of the time, diabetic diet compliance: compliant all of the time, home glucose monitoring: is performed. Known diabetic complications: hypoglycemia  Cardiovascular risk factors: family history, dyslipidemia, diabetes mellitus, obesity, hypertension  Current diabetic medications include oral agents   Eye exam current (within one year): no  Weight trend: stable  Prior visit with dietician: no  Current diet: \"healthy\" diet  in general  Current exercise: walking  Current monitoring regimen: home blood tests - daily  Home blood sugar records: trend: stable  Any episodes of hypoglycemia? no  Is She on ACE inhibitor or angiotensin II receptor blocker? Yes     Varicose vein Review:  Riley Marsh is a 60 y.o. female and presents with Hypertension; Diabetes; and Leg Pain   who has numerous bulgy ropey veins in thebilaterally  causing pain/swelling that she has had for about  years. Patient has/has not tried stockings. The patient's symptons have stable. The patient's diabetes mellitus, sedentary life style, hypertension. Review of Systems  Constitutional: negative for fevers, chills, anorexia and weight loss  Eyes:   negative for visual disturbance and irritation  ENT:   negative for tinnitus,sore throat,nasal congestion,ear pains. hoarseness  Respiratory:  negative for cough, hemoptysis, dyspnea,wheezing  CV:   negative for chest pain, palpitations, lower extremity edema  GI:    diarrhea, abdominal pain,  Endo:               negative for polyuria,polydipsia,polyphagia,heat intolerance  Genitourinary: negative for frequency, dysuria and hematuria  Integument:  negative for rash and pruritus  Hematologic:  negative for easy bruising and gum/nose bleeding  Musculoskel: , joint pain  Neurological:  negative for headaches, dizziness, vertigo, memory problems and gait   Behavl/Psych: negative for feelings of anxiety, depression, mood changes    Past Medical History:   Diagnosis Date    Arthritis, Degenerative, Knee 7/15/2011    [facility]    Diverticulitis     GERD (gastroesophageal reflux disease)     GERD, gastro - Esophageal Reflux Disease 7/15/2011    Hypercholesterolemia 7/15/2011    Hypertension     Type II or unspecified type diabetes mellitus without mention of complication, uncontrolled 7/15/2011     Past Surgical History:   Procedure Laterality Date    COLONOSCOPY N/A 9/18/2017    COLONOSCOPY performed by Yoko Way MD at [facility]"" AMBULATORY OR    COLONOSCOPY N/A 4/25/2018    COLONOSCOPY performed by Yoko Way MD at \[facility]\"" ENDOSCOPY    HX GYN  2004    uterine surgery    LAP,TUBAL CAUTERY Bilateral 1992       Family History   Problem Relation Age of Onset    Diabetes Father     Deep Vein Thrombosis Daughter     Lung Disease Daughter     Other Daughter         cholesterol     Current Outpatient Medications   Medication Sig Dispense Refill    sucralfate (CARAFATE) 1 gram tablet Take 1 Tab by mouth four (4) times daily. 120 Tab 6    NYSTOP powder APPLY TOPICALLY TO AFFECTED AREA THREE TIMES DAILY 30 g 0    diclofenac EC (VOLTAREN) 75 mg EC tablet TAKE 1 TABLET BY MOUTH TWICE DAILY 60 Tab 0    metFORMIN (GLUCOPHAGE) 1,000 mg tablet Si tab daily 60 Tab 12    raNITIdine (ZANTAC) 300 mg tab Take 1 Tab by mouth two (2) times a day. 60 Tab 12    lisinopril (PRINIVIL, ZESTRIL) 40 mg tablet TAKE 1 TABLET BY MOUTH EVERY DAY 30 Tab 0    losartan (COZAAR) 50 mg tablet Take 1 Tab by mouth daily. 30 Tab 12    amLODIPine (NORVASC) 5 mg tablet Take 1 Tab by mouth daily. 30 Tab 12    hydroCHLOROthiazide (HYDRODIURIL) 25 mg tablet Take 1 Tab by mouth daily. 30 Tab 12    glyBURIDE (DIABETA) 2.5 mg tablet TAKE 1 TABLET BY MOUTH EVERY DAY 30 Tab 0    cetirizine (ZYRTEC) 10 mg tablet Take 1 Tab by mouth daily. 20 Tab 0    nystatin (MYCOSTATIN) powder Apply  to affected area three (3) times daily. Apply until rash has resolved 1 Bottle 0    potassium chloride SA (MICRO-K) 10 mEq capsule TAKE ONE CAPSULE BY MOUTH TWICE DAILY 30 Cap 12    polyethylene glycol (MIRALAX) 17 gram/dose powder MIX 17 GRAMS IN A LIQUID AND THEN DRINK ONCE D UTD  3    ibuprofen (MOTRIN) 800 mg tablet TAKE 1 TABLET BY MOUTH EVERY 8 HOURS AS NEEDED FOR PAIN 90 Tab 0    bismuth subsalicylate (PEPTO-BISMOL MAXIMUM STRENGTH) 525 mg/15 mL susp Take 30 mL by mouth every six (6) hours as needed.  MULTIVIT-MIN/IRON/FOLIC/LUTEIN (CENTRUM SILVER WOMEN PO) Take  by mouth daily.  ONETOUCH ULTRA TEST strip TEST BLOOD SUGARS 3 TIMES DAILY 100 Each 12    loperamide (IMODIUM A-D) 2 mg tablet Take 1 Tab by mouth four (4) times daily as needed for Diarrhea.  20 Tab 0     No Known Allergies    Objective:  Visit Vitals  /70 (BP 1 Location: Right arm, BP Patient Position: Sitting)   Pulse 65   Temp 97.5 °F (36.4 °C) (Oral)   Resp 20   Ht 5' 3\" (1.6 m)   Wt 150 lb (68 kg)   LMP 03/15/2004 (Approximate) SpO2 98%   BMI 26.57 kg/m²     Physical Exam:   General appearance - alert, well appearing, and in mild distress  Mental status - alert, oriented to person, place, and time  EYE-KODY, EOMI, corneas normal, no foreign bodies  ENT-ENT exam normal, no neck nodes or sinus tenderness  Nose - normal and patent, no erythema, discharge or polyps  Mouth - mucous membranes moist, pharynx normal without lesions  Neck - supple, no significant adenopathy   Chest - clear to auscultation, no wheezes, rales or rhonchi, symmetric air entry   Heart - normal rate, regular rhythm, normal S1, S2, no murmurs, rubs, clicks or gallops   Abdomen - soft, nontender, nondistended, no masses or organomegaly  Lymph- no adenopathy palpable  Ext-peripheral pulses normal, no pedal edema, no clubbing or cyanosis  Skin-Warm and dry. no hyperpigmentation, vitiligo, or suspicious lesions  Neuro -alert, oriented, normal speech, no focal findings or movement disorder noted  Neck-normal C-spine, no tenderness, full ROM without pain  Feet-no nail deformities or callus formation with good pulses noted  Scattered lower leg(bilateral) varicosities noted  Lt.knee-crepitations      Results for orders placed or performed in visit on 09/19/19   AMB POC LIPID PROFILE   Result Value Ref Range    Cholesterol (POC) 170     Triglycerides (POC) 286     HDL Cholesterol (POC) 46     LDL Cholesterol (POC) 66 MG/DL    Non-HDL Goal (POC) 124     TChol/HDL Ratio (POC) 3.7    AMB POC GLUCOSE BLOOD, BY GLUCOSE MONITORING DEVICE   Result Value Ref Range    Glucose  mg/dL   AMB POC HEMOGLOBIN A1C   Result Value Ref Range    Hemoglobin A1c (POC) 8.2 %       Assessment/Plan:    ICD-10-CM ICD-9-CM    1. Controlled type 2 diabetes mellitus without complication, without long-term current use of insulin (HCC) E11.9 250.00 AMB POC GLUCOSE BLOOD, BY GLUCOSE MONITORING DEVICE      AMB POC HEMOGLOBIN A1C   2. Hypercholesteremia E78.00 272.0 AMB POC LIPID PROFILE   3.  Type 2 diabetes mellitus without complication, without long-term current use of insulin (HCC) E11.9 250.00 AMB POC GLUCOSE BLOOD, BY GLUCOSE MONITORING DEVICE      AMB POC HEMOGLOBIN A1C      AMB POC URINE, MICROALBUMIN, SEMIQUANT (3 RESULTS)   4. Primary osteoarthritis of both knees M17.0 715.16    5. Essential hypertension I10 401.9      Orders Placed This Encounter    AMB POC LIPID PROFILE    AMB POC GLUCOSE BLOOD, BY GLUCOSE MONITORING DEVICE    AMB POC HEMOGLOBIN A1C    AMB POC URINE, MICROALBUMIN, SEMIQUANT (3 RESULTS)    sucralfate (CARAFATE) 1 gram tablet     Sig: Take 1 Tab by mouth four (4) times daily. Dispense:  120 Tab     Refill:  6     lose weight, increase physical activity, follow low fat diet, follow low salt diet,Take 81mg aspirin daily      Patient Instructions   [employer] Activation    Thank you for requesting access to [employer]. Please follow the instructions below to securely access and download your online medical record. [employer]turn allows you to send messages to your doctor, view your test results, renew your prescriptions, schedule appointments, and more. How Do I Sign Up? 1. In your internet browser, go to [email or web address]  2. Click on the First Time User? Click Here link in the Sign In box. You will be redirect to the New Member Sign Up page. 3. Enter your Gigturn Access Code exactly as it appears below. You will not need to use this code after youve completed the sign-up process. If you do not sign up before the expiration date, you must request a new code. Gigturn Access Code: [email or web address]  Expires: 11/3/2019 12:59 PM (This is the date your Gigturn access code will )    4. Enter the last four digits of your Social Security Number (xxxx) and Date of Birth (mm/dd/yyyy) as indicated and click Submit. You will be taken to the next sign-up page. 5. Create a Gigturn ID.  This will be your Gigturn login ID and cannot be changed, so think of one that is secure and easy to remember. 6. Create a SmartFleet password. You can change your password at any time. 7. Enter your Password Reset Question and Answer. This can be used at a later time if you forget your password. 8. Enter your e-mail address. You will receive e-mail notification when new information is available in [address] 9. Click Sign Up. You can now view and download portions of your medical record. 10. Click the Download Summary menu link to download a portable copy of your medical information. Additional Information    If you have questions, please visit the Frequently Asked Questions section of the [employer]et website at [email or web address] Remember, [employer]et is NOT to be used for urgent needs. For medical emergencies, dial 911. Follow-up and Dispositions    · Return in about 3 months (around 12/19/2019). I have reviewed with the patient details of the assessment and plan and all questions were answered. Relevent patient education was performed. The most recent lab findings were reviewed with the patient. An After Visit Summary was printed and given to the patient.

## 2019-09-19 NOTE — PATIENT INSTRUCTIONS
[employer]Tube Activation Thank you for requesting access to [employer]. Please follow the instructions below to securely access and download your online medical record. [employer] allows you to send messages to your doctor, view your test results, renew your prescriptions, schedule appointments, and more. How Do I Sign Up? 1. In your internet browser, go to [email or web address] 
2. Click on the First Time User? Click Here link in the Sign In box. You will be redirect to the New Member Sign Up page. 3. Enter your AdoTube Access Code exactly as it appears below. You will not need to use this code after youve completed the sign-up process. If you do not sign up before the expiration date, you must request a new code. AdoTube Access Code: [email or web address] Expires: 11/3/2019 12:59 PM (This is the date your AdoTube access code will ) 4. Enter the last four digits of your Social Security Number (xxxx) and Date of Birth (mm/dd/yyyy) as indicated and click Submit. You will be taken to the next sign-up page. 5. Create a AdoTube ID. This will be your AdoTube login ID and cannot be changed, so think of one that is secure and easy to remember. 6. Create a AdoTube password. You can change your password at any time. 7. Enter your Password Reset Question and Answer. This can be used at a later time if you forget your password. 8. Enter your e-mail address. You will receive e-mail notification when new information is available in [address] 9. Click Sign Up. You can now view and download portions of your medical record. 10. Click the Download Summary menu link to download a portable copy of your medical information. Additional Information If you have questions, please visit the Frequently Asked Questions section of the AdoTube website at [email or web address] Remember, AdoTube is NOT to be used for urgent needs. For medical emergencies, dial 911.

## 2019-10-03 DIAGNOSIS — M17.0 PRIMARY OSTEOARTHRITIS OF BOTH KNEES: ICD-10-CM

## 2019-10-03 RX ORDER — IBUPROFEN 800 MG/1
TABLET ORAL
Qty: 90 TAB | Refills: 0 | Status: SHIPPED | OUTPATIENT
Start: 2019-10-03 | End: 2020-04-23 | Stop reason: ALTCHOICE

## 2019-10-03 RX ORDER — NYSTATIN 100000 [USP'U]/G
POWDER TOPICAL
Qty: 30 G | Refills: 0 | Status: SHIPPED | OUTPATIENT
Start: 2019-10-03 | End: 2019-12-23

## 2019-10-03 RX ORDER — DICLOFENAC SODIUM 75 MG/1
TABLET, DELAYED RELEASE ORAL
Qty: 60 TAB | Refills: 0 | Status: SHIPPED | OUTPATIENT
Start: 2019-10-03 | End: 2019-10-31 | Stop reason: SDUPTHER

## 2019-10-03 RX ORDER — POTASSIUM CHLORIDE 750 MG/1
CAPSULE, EXTENDED RELEASE ORAL
Qty: 30 CAP | Refills: 0 | Status: SHIPPED | OUTPATIENT
Start: 2019-10-03 | End: 2019-11-08 | Stop reason: SDUPTHER

## 2019-10-31 DIAGNOSIS — M17.0 PRIMARY OSTEOARTHRITIS OF BOTH KNEES: ICD-10-CM

## 2019-10-31 RX ORDER — DICLOFENAC SODIUM 75 MG/1
TABLET, DELAYED RELEASE ORAL
Qty: 60 TAB | Refills: 0 | Status: SHIPPED | OUTPATIENT
Start: 2019-10-31 | End: 2019-11-30 | Stop reason: SDUPTHER

## 2019-10-31 RX ORDER — IBUPROFEN 800 MG/1
TABLET ORAL
Qty: 90 TAB | Refills: 0 | Status: SHIPPED | OUTPATIENT
Start: 2019-10-31 | End: 2020-01-23 | Stop reason: SDUPTHER

## 2019-11-08 RX ORDER — POTASSIUM CHLORIDE 750 MG/1
CAPSULE, EXTENDED RELEASE ORAL
Qty: 30 CAP | Refills: 0 | Status: SHIPPED | OUTPATIENT
Start: 2019-11-08 | End: 2019-12-23

## 2019-11-26 ENCOUNTER — OFFICE VISIT (OUTPATIENT)
Dept: INTERNAL MEDICINE CLINIC | Age: 64
End: 2019-11-26

## 2019-11-26 VITALS
HEART RATE: 99 BPM | OXYGEN SATURATION: 98 % | DIASTOLIC BLOOD PRESSURE: 78 MMHG | SYSTOLIC BLOOD PRESSURE: 108 MMHG | WEIGHT: 148 LBS | TEMPERATURE: 98.8 F | BODY MASS INDEX: 26.22 KG/M2 | RESPIRATION RATE: 16 BRPM | HEIGHT: 63 IN

## 2019-11-26 DIAGNOSIS — J06.9 UPPER RESPIRATORY TRACT INFECTION, UNSPECIFIED TYPE: Primary | ICD-10-CM

## 2019-11-26 DIAGNOSIS — I10 ESSENTIAL HYPERTENSION: ICD-10-CM

## 2019-11-26 DIAGNOSIS — E11.9 CONTROLLED TYPE 2 DIABETES MELLITUS WITHOUT COMPLICATION, WITHOUT LONG-TERM CURRENT USE OF INSULIN (HCC): ICD-10-CM

## 2019-11-26 DIAGNOSIS — R05.9 COUGH: ICD-10-CM

## 2019-11-26 RX ORDER — BENZONATATE 200 MG/1
200 CAPSULE ORAL
Qty: 21 CAP | Refills: 0 | Status: SHIPPED | OUTPATIENT
Start: 2019-11-26 | End: 2019-12-03

## 2019-11-26 RX ORDER — LEVOFLOXACIN 500 MG/1
500 TABLET, FILM COATED ORAL DAILY
Qty: 7 TAB | Refills: 0 | Status: SHIPPED | OUTPATIENT
Start: 2019-11-26 | End: 2019-12-03

## 2019-11-26 NOTE — PATIENT INSTRUCTIONS
Cough: Care Instructions Your Care Instructions A cough is your body's response to something that bothers your throat or airways. Many things can cause a cough. You might cough because of a cold or the flu, bronchitis, or asthma. Smoking, postnasal drip, allergies, and stomach acid that backs up into your throat also can cause coughs. A cough is a symptom, not a disease. Most coughs stop when the cause, such as a cold, goes away. You can take a few steps at home to cough less and feel better. Follow-up care is a key part of your treatment and safety. Be sure to make and go to all appointments, and call your doctor if you are having problems. It's also a good idea to know your test results and keep a list of the medicines you take. How can you care for yourself at home? · Drink lots of water and other fluids. This helps thin the mucus and soothes a dry or sore throat. Honey or lemon juice in hot water or tea may ease a dry cough. · Take cough medicine as directed by your doctor. · Prop up your head on pillows to help you breathe and ease a dry cough. · Try cough drops to soothe a dry or sore throat. Cough drops don't stop a cough. Medicine-flavored cough drops are no better than candy-flavored drops or hard candy. · Do not smoke. Avoid secondhand smoke. If you need help quitting, talk to your doctor about stop-smoking programs and medicines. These can increase your chances of quitting for good. When should you call for help? Call 911 anytime you think you may need emergency care.  For example, call if: 
  · You have severe trouble breathing.  
 Call your doctor now or seek immediate medical care if: 
  · You cough up blood.  
  · You have new or worse trouble breathing.  
  · You have a new or higher fever.  
  · You have a new rash.  
 Watch closely for changes in your health, and be sure to contact your doctor if: 
  · You cough more deeply or more often, especially if you notice more mucus or a change in the color of your mucus.  
  · You have new symptoms, such as a sore throat, an earache, or sinus pain.  
  · You do not get better as expected. Where can you learn more? Go to http://kalia-terell.info/. Enter D279 in the search box to learn more about \"Cough: Care Instructions. \" Current as of: June 9, 2019 Content Version: 12.2 © 0632-7738 [employer]. Care instructions adapted under license by [employer] (which disclaims liability or warranty for this information). If you have questions about a medical condition or this instruction, always ask your healthcare professional. [address] any warranty or liability for your use of this information. Diabetes Sick-Day Plan: Care Instructions Your Care Instructions If you have diabetes, many other illnesses can make your blood sugar go up. This can be dangerous. When you are sick with the flu or another illness, your body releases hormones to fight infection. These hormones raise blood sugar levels. They also make it hard for insulin or other medicines to lower your blood sugar. Work with your doctor to make a plan for what to do on days when you are sick. Follow-up care is a key part of your treatment and safety. Be sure to make and go to all appointments, and call your doctor if you are having problems. It's also a good idea to know your test results and keep a list of the medicines you take. How can you care for yourself at home? · Work with your doctor to write up a sick-day plan for what to do on days when you are sick. Your blood sugar can go up or down, depending on your illness and whether you can keep food down. Call your doctor when you are sick. Ask if you need to adjust your pills or insulin. · Write down the diabetes medicines you have been taking and whether you have changed the dose based on your sick-day plan.  Have this information ready when you call your doctor. · Eat your normal types and amounts of food. Drink extra fluids, such as water, broth, and fruit juice, to prevent dehydration. ? If your blood sugar level is higher than the blood sugar level your doctor recommends (for example, above 240 milligrams per deciliter [mg/dL]), drink extra liquids that do not contain sugar. Examples are water and sugar-free cola. ? If you can't eat your usual foods, drink extra liquids, such as soup, sports drinks, or milk. You may also eat food that is gentle on the stomach. These foods include crackers, gelatin dessert, and applesauce. Try to eat or drink 50 grams of carbohydrates every 3 to 4 hours. For example, 6 saltine crackers, 1 cup (8 ounces) of milk, and ½ cup (4 ounces) of orange juice each contain about 15 grams of carbohydrate. · Check your blood sugar at least every 3 to 4 hours. If it goes up fast, check it more often. And check it even through the night. Take insulin if your doctor told you to do so. If you and your doctor did not have a sick-day plan for taking extra insulin, call him or her for advice. · If you take insulin, check your urine or blood for ketones. This is even more important if your blood sugar is high. · Do not take any over-the-counter medicines, such as pain relievers, decongestants, or herbal products or other natural medicines, without talking with your doctor first. 
· Do not drive. If you need to see your doctor or go anywhere else, ask a family member or friend to drive you. When should you call for help? Call 911 anytime you think you may need emergency care. For example, call if: 
  · You passed out (lost consciousness).  
  · You are confused or cannot think clearly.  
  · Your blood sugar is very high or very low.  
 Watch closely for changes in your health, and be sure to contact your doctor if: 
  · Your blood sugar stays outside the level your doctor set for you.  
  · You have any problems. Where can you learn more? Go to http://kalia-terell.info/. Enter T497 in the search box to learn more about \"Diabetes Sick-Day Plan: Care Instructions. \" Current as of: April 16, 2019 Content Version: 12.2 © 8135-3118 [employer]. Care instructions adapted under license by [employer] (which disclaims liability or warranty for this information). If you have questions about a medical condition or this instruction, always ask your healthcare professional. [address] any warranty or liability for your use of this information. High Blood Pressure: Care Instructions Overview It's normal for blood pressure to go up and down throughout the day. But if it stays up, you have high blood pressure. Another name for high blood pressure is hypertension. Despite what a lot of people think, high blood pressure usually doesn't cause headaches or make you feel dizzy or lightheaded. It usually has no symptoms. But it does increase your risk of stroke, heart attack, and other problems. You and your doctor will talk about your risks of these problems based on your blood pressure. Your doctor will give you a goal for your blood pressure. Your goal will be based on your health and your age. Lifestyle changes, such as eating healthy and being active, are always important to help lower blood pressure. You might also take medicine to reach your blood pressure goal. 
Follow-up care is a key part of your treatment and safety. Be sure to make and go to all appointments, and call your doctor if you are having problems. It's also a good idea to know your test results and keep a list of the medicines you take. How can you care for yourself at home? Medical treatment · If you stop taking your medicine, your blood pressure will go back up. You may take one or more types of medicine to lower your blood pressure. Be safe with medicines. Take your medicine exactly as prescribed. Call your doctor if you think you are having a problem with your medicine. · Talk to your doctor before you start taking aspirin every day. Aspirin can help certain people lower their risk of a heart attack or stroke. But taking aspirin isn't right for everyone, because it can cause serious bleeding. · See your doctor regularly. You may need to see the doctor more often at first or until your blood pressure comes down. · If you are taking blood pressure medicine, talk to your doctor before you take decongestants or anti-inflammatory medicine, such as ibuprofen. Some of these medicines can raise blood pressure. · Learn how to check your blood pressure at home. Lifestyle changes · Stay at a healthy weight. This is especially important if you put on weight around the waist. Losing even 10 pounds can help you lower your blood pressure. · If your doctor recommends it, get more exercise. Walking is a good choice. Bit by bit, increase the amount you walk every day. Try for at least 30 minutes on most days of the week. You also may want to swim, bike, or do other activities. · Avoid or limit alcohol. Talk to your doctor about whether you can drink any alcohol. · Try to limit how much sodium you eat to less than 2,300 milligrams (mg) a day. Your doctor may ask you to try to eat less than 1,500 mg a day. · Eat plenty of fruits (such as bananas and oranges), vegetables, legumes, whole grains, and low-fat dairy products. · Lower the amount of saturated fat in your diet. Saturated fat is found in animal products such as milk, cheese, and meat. Limiting these foods may help you lose weight and also lower your risk for heart disease. · Do not smoke. Smoking increases your risk for heart attack and stroke. If you need help quitting, talk to your doctor about stop-smoking programs and medicines. These can increase your chances of quitting for good. When should you call for help? Call  911 anytime you think you may need emergency care. This may mean having symptoms that suggest that your blood pressure is causing a serious heart or blood vessel problem. Your blood pressure may be over 180/120. 
 For example, call  911 if: 
  · You have symptoms of a heart attack. These may include: 
? Chest pain or pressure, or a strange feeling in the chest. 
? Sweating. ? Shortness of breath. ? Nausea or vomiting. ? Pain, pressure, or a strange feeling in the back, neck, jaw, or upper belly or in one or both shoulders or arms. ? Lightheadedness or sudden weakness. ? A fast or irregular heartbeat.  
  · You have symptoms of a stroke. These may include: 
? Sudden numbness, tingling, weakness, or loss of movement in your face, arm, or leg, especially on only one side of your body. ? Sudden vision changes. ? Sudden trouble speaking. ? Sudden confusion or trouble understanding simple statements. ? Sudden problems with walking or balance. ? A sudden, severe headache that is different from past headaches.  
  · You have severe back or belly pain.  
 Do not wait until your blood pressure comes down on its own. Get help right away. 
 Call your doctor now or seek immediate care if: 
  · Your blood pressure is much higher than normal (such as 180/120 or higher), but you don't have symptoms.  
  · You think high blood pressure is causing symptoms, such as: 
? Severe headache. 
? Blurry vision.  
 Watch closely for changes in your health, and be sure to contact your doctor if: 
  · Your blood pressure measures higher than your doctor recommends at least 2 times. That means the top number is higher or the bottom number is higher, or both.  
  · You think you may be having side effects from your blood pressure medicine. Where can you learn more? Go to http://kalia-terell.info/.  
Enter E186 in the search box to learn more about \"High Blood Pressure: Care Instructions. \" Current as of: April 9, 2019 Content Version: 12.2 © 8879-8540 [employer]. Care instructions adapted under license by [employer] (which disclaims liability or warranty for this information). If you have questions about a medical condition or this instruction, always ask your healthcare professional. [address] any warranty or liability for your use of this information. Upper Respiratory Infection (Cold): Care Instructions Your Care Instructions An upper respiratory infection, or URI, is an infection of the nose, sinuses, or throat. URIs are spread by coughs, sneezes, and direct contact. The common cold is the most frequent kind of URI. The flu and sinus infections are other kinds of URIs. Almost all URIs are caused by viruses. Antibiotics won't cure them. But you can treat most infections with home care. This may include drinking lots of fluids and taking over-the-counter pain medicine. You will probably feel better in 4 to 10 days. The doctor has checked you carefully, but problems can develop later. If you notice any problems or new symptoms, get medical treatment right away. Follow-up care is a key part of your treatment and safety. Be sure to make and go to all appointments, and call your doctor if you are having problems. It's also a good idea to know your test results and keep a list of the medicines you take. How can you care for yourself at home? · To prevent dehydration, drink plenty of fluids, enough so that your urine is light yellow or clear like water. Choose water and other caffeine-free clear liquids until you feel better. If you have kidney, heart, or liver disease and have to limit fluids, talk with your doctor before you increase the amount of fluids you drink. · Take an over-the-counter pain medicine, such as acetaminophen (Tylenol), ibuprofen (Advil, Motrin), or naproxen (Aleve).  Read and follow all instructions on the label. · Before you use cough and cold medicines, check the label. These medicines may not be safe for young children or for people with certain health problems. · Be careful when taking over-the-counter cold or flu medicines and Tylenol at the same time. Many of these medicines have acetaminophen, which is Tylenol. Read the labels to make sure that you are not taking more than the recommended dose. Too much acetaminophen (Tylenol) can be harmful. · Get plenty of rest. 
· Do not smoke or allow others to smoke around you. If you need help quitting, talk to your doctor about stop-smoking programs and medicines. These can increase your chances of quitting for good. When should you call for help? Call 911 anytime you think you may need emergency care. For example, call if: 
  · You have severe trouble breathing.  
 Call your doctor now or seek immediate medical care if: 
  · You seem to be getting much sicker.  
  · You have new or worse trouble breathing.  
  · You have a new or higher fever.  
  · You have a new rash.  
 Watch closely for changes in your health, and be sure to contact your doctor if: 
  · You have a new symptom, such as a sore throat, an earache, or sinus pain.  
  · You cough more deeply or more often, especially if you notice more mucus or a change in the color of your mucus.  
  · You do not get better as expected. Where can you learn more? Go to http://kalia-terell.info/. Enter R738 in the search box to learn more about \"Upper Respiratory Infection (Cold): Care Instructions. \" Current as of: June 9, 2019 Content Version: 12.2 © 6927-3231 [employer]. Care instructions adapted under license by [employer] (which disclaims liability or warranty for this information).  If you have questions about a medical condition or this instruction, always ask your healthcare professional. [employer] disclaims any warranty or liability for your use of this information.

## 2019-11-26 NOTE — PROGRESS NOTES
1. Have you been to the ER, urgent care clinic since your last visit? Hospitalized since your last visit?no    2. Have you seen or consulted any other health care providers outside of the [address] [place] [address] since your last visit? Include any pap smears or colon screening.  No    3 most recent PHQ Screens 9/19/2019   PHQ Not Done -   Little interest or pleasure in doing things Not at all   Feeling down, depressed, irritable, or hopeless Not at all   Total Score PHQ 2 0     Chief Complaint   Patient presents with    Cold Symptoms    Cough

## 2019-11-27 NOTE — PROGRESS NOTES
Cold Symptoms and Cough       Subjective:   HPI   60year old Ms. Minerva Patton presents with c/o 3 wk hx of cold symptoms including cough and congestion. She denies fever, chest pain or SOB. She has taken OTC preparations which have not helped. She has a hx of diabetes, COPD and hypertension. Past Medical History:   Diagnosis Date    Arthritis, Degenerative, Knee 7/15/2011    COPD (chronic obstructive pulmonary disease) ([address] 7/15/2011    Diverticulitis     GERD (gastroesophageal reflux disease)     GERD, gastro - Esophageal Reflux Disease 7/15/2011    Hypercholesterolemia 7/15/2011    Hypertension     Type II or unspecified type diabetes mellitus without mention of complication, uncontrolled 7/15/2011       Past Surgical History:   Procedure Laterality Date    COLONOSCOPY N/A 9/18/2017    COLONOSCOPY performed by Danay Baeza MD at [place] [address] COLONOSCOPY N/A 4/25/2018    COLONOSCOPY performed by [facility]ay [facility] MD at [facility] ENDOSCOPY    HX GYN  2004    uterine surgery    LAP,TUBAL CAUTERY Bilateral 1992       Prior to Admission medications    Medication Sig Start Date End Date Taking? Authorizing Provider   levoFLOXacin (LEVAQUIN) 500 mg tablet Take 1 Tab by mouth daily for 7 days. 11/26/19 12/3/19 Yes Kiki Hawk NP   benzonatate (TESSALON) 200 mg capsule Take 1 Cap by mouth three (3) times daily as needed for Cough for up to 7 days.  11/26/19 12/3/19 Yes Kiki Hawk NP   potassium chloride SA (MICRO-K) 10 mEq capsule TAKE ONE CAPSULE BY MOUTH TWICE DAILY 11/8/19  Yes Julissa Samson MD   diclofenac EC (VOLTAREN) 75 mg EC tablet TAKE 1 TABLET BY MOUTH TWICE DAILY 10/31/19  Yes Julissa Samson MD   ibuprofen (MOTRIN) 800 mg tablet TAKE 1 TABLET BY MOUTH EVERY 8 HOURS AS NEEDED FOR PAIN 10/31/19  Yes Julissa Samson MD   ibuprofen (MOTRIN) 800 mg tablet TAKE 1 TABLET BY MOUTH EVERY 8 HOURS AS NEEDED FOR PAIN 10/3/19  Yes Julissa Samson MD   NYSTOP powder APPLY TOPICALLY TO AFFECTED AREA THREE TIMES DAILY 10/3/19  Yes [facility] MD   sucralfate (CARAFATE) 1 gram tablet Take 1 Tab by mouth four (4) times daily. 19  Yes [facility] MD   metFORMIN (GLUCOPHAGE) 1,000 mg tablet Si tab daily 19  Yes [facility] MD   raNITIdine (ZANTAC) 300 mg tab Take 1 Tab by mouth two (2) times a day. 19  Yes [facility] MD   lisinopril (PRINIVIL, ZESTRIL) 40 mg tablet TAKE 1 TABLET BY MOUTH EVERY DAY 19  Yes [facility] MD   losartan (COZAAR) 50 mg tablet Take 1 Tab by mouth daily. 3/19/19  Yes [facility] MD   amLODIPine (NORVASC) 5 mg tablet Take 1 Tab by mouth daily. 19  Yes [facility] MD   hydroCHLOROthiazide (HYDRODIURIL) 25 mg tablet Take 1 Tab by mouth daily. 19  Yes [facility] MD   glyBURIDE (DIABETA) 2.5 mg tablet TAKE 1 TABLET BY MOUTH EVERY DAY 10/16/18  Yes [facility] MD   cetirizine (ZYRTEC) 10 mg tablet Take 1 Tab by mouth daily. 18  Yes Adela Gaxiola MD   nystatin (MYCOSTATIN) powder Apply  to affected area three (3) times daily. Apply until rash has resolved 9/10/18  Yes Adela Gaxiola MD   potassium chloride SA (MICRO-K) 10 mEq capsule TAKE ONE CAPSULE BY MOUTH TWICE DAILY 5/15/18  Yes [facility] MD   polyethylene glycol (MIRALAX) 17 gram/dose powder MIX 17 GRAMS IN A LIQUID AND THEN DRINK ONCE D UTD 17  Yes Provider, Historical   ibuprofen (MOTRIN) 800 mg tablet TAKE 1 TABLET BY MOUTH EVERY 8 HOURS AS NEEDED FOR PAIN 17  Yes [facility] Aury., MD   bismuth subsalicylate (PEPTO-BISMOL MAXIMUM STRENGTH) 525 mg/15 mL susp Take 30 mL by mouth every six (6) hours as needed. Yes Provider, Historical   ONETOUCH ULTRA TEST strip TEST BLOOD SUGARS 3 TIMES DAILY 6/9/15  Yes Minnie Velasquez MD   loperamide (IMODIUM A-D) 2 mg tablet Take 1 Tab by mouth four (4) times daily as needed for Diarrhea.  4/6/15  Yes Alisa Da Silva, MD        No Known Allergies     Social History     Socioeconomic History    Marital status:      Spouse name: Not on file    Number of children: Not on file    Years of education: Not on file    Highest education level: Not on file   Occupational History    Not on file   Social Needs    Financial resource strain: Not on file    Food insecurity:     Worry: Not on file     Inability: Not on file    Transportation needs:     Medical: Not on file     Non-medical: Not on file   Tobacco Use    Smoking status: Former Smoker     Last attempt to quit: 4/15/2015     Years since quittin.6    Smokeless tobacco: Never Used   Substance and Sexual Activity    Alcohol use: No    Drug use: Yes     Types: Marijuana     Comment: \"daily\"    Sexual activity: Yes     Partners: Male   Lifestyle    Physical activity:     Days per week: Not on file     Minutes per session: Not on file    Stress: Not on file   Relationships    Social connections:     Talks on phone: Not on file     Gets together: Not on file     Attends Hinduism service: Not on file     Active member of club or organization: Not on file     Attends meetings of clubs or organizations: Not on file     Relationship status: Not on file    Intimate partner violence:     Fear of current or ex partner: Not on file     Emotionally abused: Not on file     Physically abused: Not on file     Forced sexual activity: Not on file   Other Topics Concern    Not on file   Social History Narrative        She lives with her  she has five adult children denies alcohol cigarette use        Family History   Problem Relation Age of Onset    Diabetes Father     Deep Vein Thrombosis Daughter     Lung Disease Daughter     Other Daughter         cholesterol          Review of Systems   Constitutional: Negative for chills, fever and malaise/fatigue. HENT: Positive for congestion. Negative for ear discharge, ear pain, nosebleeds, sinus pain and sore throat. Nasal congestion   Eyes: Negative for blurred vision, pain, discharge and redness. Respiratory: Positive for cough. Negative for hemoptysis, sputum production, shortness of breath and wheezing. Cardiovascular: Negative for chest pain and palpitations. Gastrointestinal: Negative for abdominal pain, constipation, diarrhea, heartburn, nausea and vomiting. Genitourinary: Negative for dysuria, flank pain, frequency and urgency. Musculoskeletal: Negative for myalgias. Skin: Negative for rash. Neurological: Negative for dizziness, tingling, tremors, sensory change, speech change and headaches. Endo/Heme/Allergies: Negative for polydipsia. Does not bruise/bleed easily. Psychiatric/Behavioral: Negative for depression. Objective:     Vitals:    11/26/19 1441   BP: 108/78   Pulse: 99   Resp: 16   Temp: 98.8 °F (37.1 °C)   TempSrc: Oral   SpO2: 98%   Weight: 148 lb (67.1 kg)   Height: 5' 3\" (1.6 m)   LMP: 03/15/2004        Physical Exam  Constitutional:       General: She is not in acute distress. Appearance: Normal appearance. She is normal weight. She is ill-appearing. She is not diaphoretic. HENT:      Head: Normocephalic and atraumatic. Right Ear: Tympanic membrane, ear canal and external ear normal.      Left Ear: Tympanic membrane, ear canal and external ear normal.      Nose: Congestion and rhinorrhea present. Mouth/Throat:      Mouth: Mucous membranes are moist.      Pharynx: Oropharynx is clear. No oropharyngeal exudate or posterior oropharyngeal erythema. Eyes:      General:         Right eye: No discharge. Left eye: No discharge. Conjunctiva/sclera: Conjunctivae normal.      Pupils: Pupils are equal, round, and reactive to light. Cardiovascular:      Rate and Rhythm: Normal rate and regular rhythm. Pulses: Normal pulses. Pulmonary:      Effort: Pulmonary effort is normal. No respiratory distress. Breath sounds: Normal breath sounds.  No wheezing, rhonchi or rales. Chest:      Chest wall: No tenderness. Abdominal:      General: Bowel sounds are normal. There is no distension. Palpations: Abdomen is soft. Tenderness: There is no tenderness. Musculoskeletal: Normal range of motion. Skin:     General: Skin is warm and dry. Neurological:      Mental Status: She is alert and oriented to person, place, and time. Psychiatric:         Mood and Affect: Mood normal.          Assessment/ Plan:       ICD-10-CM ICD-9-CM    1. Upper respiratory tract infection, unspecified type J06.9 465.9 levoFLOXacin (LEVAQUIN) 500 mg tablet   2. Cough R05 786.2 benzonatate (TESSALON) 200 mg capsule   3. Essential hypertension I10 401.9    4. Controlled type 2 diabetes mellitus without complication, without long-term current use of insulin (HCC) E11.9 250.00         Orders Placed This Encounter    levoFLOXacin (LEVAQUIN) 500 mg tablet     Sig: Take 1 Tab by mouth daily for 7 days. Dispense:  7 Tab     Refill:  0    benzonatate (TESSALON) 200 mg capsule     Sig: Take 1 Cap by mouth three (3) times daily as needed for Cough for up to 7 days. Dispense:  21 Cap     Refill:  0        I have reviewed the patient's medical history in detail and updated the computerized patient record. Instructed patient in use of Levofloxacin antibiotic and potential side effects, use, s/s allergic reaction. Advised use humidifier and saline nasal spray. We had a prolonged discussion about these complex clinical issues and went over the various important aspects to consider. All questions were answered. Advised her to call back,  return to office, or go to the ER if symptoms do not improve, change in nature, or persist, otherwise keep scheduled appt. She was given an after visit summary or informed of Eyepic Access which includes patient instructions, diagnoses, current medications, & vitals.   She expressed understanding with the diagnosis and plan and was given the opportunity to ask questions.     [place], [facility]

## 2019-11-30 DIAGNOSIS — M17.0 PRIMARY OSTEOARTHRITIS OF BOTH KNEES: ICD-10-CM

## 2019-12-02 RX ORDER — IBUPROFEN 800 MG/1
TABLET ORAL
Qty: 90 TAB | Refills: 0 | Status: SHIPPED | OUTPATIENT
Start: 2019-12-02 | End: 2020-01-02

## 2019-12-02 RX ORDER — DICLOFENAC SODIUM 75 MG/1
TABLET, DELAYED RELEASE ORAL
Qty: 60 TAB | Refills: 0 | Status: SHIPPED | OUTPATIENT
Start: 2019-12-02 | End: 2020-01-02

## 2019-12-23 RX ORDER — NYSTATIN 100000 [USP'U]/G
POWDER TOPICAL
Qty: 30 G | Refills: 0 | Status: SHIPPED | OUTPATIENT
Start: 2019-12-23 | End: 2020-06-15

## 2019-12-23 RX ORDER — POTASSIUM CHLORIDE 750 MG/1
CAPSULE, EXTENDED RELEASE ORAL
Qty: 30 CAP | Refills: 0 | Status: SHIPPED | OUTPATIENT
Start: 2019-12-23 | End: 2020-01-07

## 2020-01-01 DIAGNOSIS — M17.0 PRIMARY OSTEOARTHRITIS OF BOTH KNEES: ICD-10-CM

## 2020-01-02 RX ORDER — DICLOFENAC SODIUM 75 MG/1
TABLET, DELAYED RELEASE ORAL
Qty: 60 TAB | Refills: 0 | Status: SHIPPED | OUTPATIENT
Start: 2020-01-02 | End: 2020-02-05

## 2020-01-02 RX ORDER — IBUPROFEN 800 MG/1
TABLET ORAL
Qty: 90 TAB | Refills: 0 | Status: SHIPPED | OUTPATIENT
Start: 2020-01-02 | End: 2020-02-05

## 2020-01-07 RX ORDER — POTASSIUM CHLORIDE 750 MG/1
CAPSULE, EXTENDED RELEASE ORAL
Qty: 30 CAP | Refills: 0 | Status: SHIPPED | OUTPATIENT
Start: 2020-01-07 | End: 2020-01-21

## 2020-01-21 RX ORDER — POTASSIUM CHLORIDE 750 MG/1
CAPSULE, EXTENDED RELEASE ORAL
Qty: 30 CAP | Refills: 0 | Status: SHIPPED | OUTPATIENT
Start: 2020-01-21 | End: 2020-02-03

## 2020-01-23 ENCOUNTER — OFFICE VISIT (OUTPATIENT)
Dept: INTERNAL MEDICINE CLINIC | Age: 65
End: 2020-01-23

## 2020-01-23 VITALS
HEART RATE: 99 BPM | HEIGHT: 63 IN | RESPIRATION RATE: 16 BRPM | TEMPERATURE: 98.2 F | WEIGHT: 149 LBS | SYSTOLIC BLOOD PRESSURE: 130 MMHG | DIASTOLIC BLOOD PRESSURE: 88 MMHG | OXYGEN SATURATION: 99 % | BODY MASS INDEX: 26.4 KG/M2

## 2020-01-23 DIAGNOSIS — Z23 ENCOUNTER FOR IMMUNIZATION: ICD-10-CM

## 2020-01-23 DIAGNOSIS — E78.00 HYPERCHOLESTEREMIA: ICD-10-CM

## 2020-01-23 DIAGNOSIS — E11.9 CONTROLLED TYPE 2 DIABETES MELLITUS WITHOUT COMPLICATION, WITHOUT LONG-TERM CURRENT USE OF INSULIN (HCC): Primary | ICD-10-CM

## 2020-01-23 DIAGNOSIS — I10 ESSENTIAL HYPERTENSION: ICD-10-CM

## 2020-01-23 DIAGNOSIS — K21.9 GASTROESOPHAGEAL REFLUX DISEASE WITHOUT ESOPHAGITIS: ICD-10-CM

## 2020-01-23 LAB
GLUCOSE POC: 168 MG/DL
HBA1C MFR BLD HPLC: 7.6 %

## 2020-01-23 RX ORDER — PANTOPRAZOLE SODIUM 40 MG/1
40 TABLET, DELAYED RELEASE ORAL DAILY
Qty: 30 TAB | Refills: 3 | Status: SHIPPED | OUTPATIENT
Start: 2020-01-23 | End: 2020-05-14

## 2020-01-23 NOTE — PATIENT INSTRUCTIONS
[employer] Activation Thank you for requesting access to Accept Software. Please follow the instructions below to securely access and download your online medical record. Accept Software allows you to send messages to your doctor, view your test results, renew your prescriptions, schedule appointments, and more. How Do I Sign Up? 1. In your internet browser, go to [email or web address] 
2. Click on the First Time User? Click Here link in the Sign In box. You will be redirect to the New Member Sign Up page. 3. Enter your Accept Software Access Code exactly as it appears below. You will not need to use this code after youve completed the sign-up process. If you do not sign up before the expiration date, you must request a new code. Accept Software Access Code: Activation code not generated Current Accept Software Status: Active (This is the date your Accept Software access code will ) 4. Enter the last four digits of your Social Security Number (xxxx) and Date of Birth (mm/dd/yyyy) as indicated and click Submit. You will be taken to the next sign-up page. 5. Create a Accept Software ID. This will be your Accept Software login ID and cannot be changed, so think of one that is secure and easy to remember. 6. Create a Accept Software password. You can change your password at any time. 7. Enter your Password Reset Question and Answer. This can be used at a later time if you forget your password. 8. Enter your e-mail address. You will receive e-mail notification when new information is available in [address] 9. Click Sign Up. You can now view and download portions of your medical record. 10. Click the Download Summary menu link to download a portable copy of your medical information. Additional Information If you have questions, please visit the Frequently Asked Questions section of the Accept Software website at [email or web address] Remember, Accept Software is NOT to be used for urgent needs. For medical emergencies, dial 911.

## 2020-01-23 NOTE — PROGRESS NOTES
1. Have you been to the ER, urgent care clinic since your last visit? Hospitalized since your last visit?no    2. Have you seen or consulted any other health care providers outside of the [address] [place] [address] since your last visit? Include any pap smears or colon screening. No    3 most recent PHQ Screens 9/19/2019   PHQ Not Done -   Little interest or pleasure in doing things Not at all   Feeling down, depressed, irritable, or hopeless Not at all   Total Score PHQ 2 0     Chief Complaint   Patient presents with    Diabetes    Hypertension     Per Dr. Brody Cavanaugh.,  verbal order given for needed amb poc labs.

## 2020-01-23 NOTE — PROGRESS NOTES
Parker Feliz is a 60 y.o. female and presents with Diabetes; Hypertension; and Cholesterol Problem    Hypertension Review:  The patient has essential hypertension  Diet and Lifestyle: generally follows a  low sodium diet, exercises sporadically  Home BP Monitoring: is not measured at home. Pertinent ROS: taking medications as instructed,  medication side effects noted with Lisinopril being cough, no TIA's, no chest pain on exertion, no dyspnea on exertion, no swelling of ankles. GERD Review:   Patient has a history of gastroesophageal reflux with heartburn. Symptoms have been present for a few months. She denies dysphagia. She  has not lost weight. She denies melena, hematochezia, hematemesis, and coffee ground emesis. This has been associated with fullness after meals. She denies abdominal bloating and none. Medical therapy in the past has included proton pump inhibitor    Health maintenance suggests the needs for an influenza injection      Diabetes Mellitus Review:  She has diabetes mellitus. Diabetic ROS - medication compliance: compliant all of the time, diabetic diet compliance: compliant all of the time, home glucose monitoring: is performed. Known diabetic complications: hypoglycemia  Cardiovascular risk factors: family history, dyslipidemia, diabetes mellitus, obesity, hypertension  Current diabetic medications include oral agents   Eye exam current (within one year): no  Weight trend: stable  Prior visit with dietician: no  Current diet: \"healthy\" diet  in general  Current exercise: walking  Current monitoring regimen: home blood tests - daily  Home blood sugar records: trend: stable  Any episodes of hypoglycemia? no  Is She on ACE inhibitor or angiotensin II receptor blocker? Yes     Varicose vein Review:  Parker Feliz is a 60 y.o. female and presents with Diabetes;  Hypertension; and Cholesterol Problem   who has numerous bulgy ropey veins in thebilaterally  causing pain/swelling that she has had for about  years. Patient has/has not tried stockings. The patient's symptons have stable. The patient's diabetes mellitus, sedentary life style, hypertension. Review of Systems  Constitutional: negative for fevers, chills, anorexia and weight loss  Eyes:   negative for visual disturbance and irritation  ENT:   negative for tinnitus,sore throat,nasal congestion,ear pains. hoarseness  Respiratory:  negative for cough, hemoptysis, dyspnea,wheezing  CV:   negative for chest pain, palpitations, lower extremity edema  GI:    diarrhea, abdominal pain,  Endo:               negative for polyuria,polydipsia,polyphagia,heat intolerance  Genitourinary: negative for frequency, dysuria and hematuria  Integument:  negative for rash and pruritus  Hematologic:  negative for easy bruising and gum/nose bleeding  Musculoskel: , joint pain  Neurological:  negative for headaches, dizziness, vertigo, memory problems and gait   Behavl/Psych: negative for feelings of anxiety, depression, mood changes    Past Medical History:   Diagnosis Date    Arthritis, Degenerative, Knee 7/15/2011    COPD (chronic obstructive pulmonary disease) [address] [address] 7/15/2011    Diverticulitis     GERD (gastroesophageal reflux disease)     GERD, gastro - Esophageal Reflux Disease 7/15/2011    Hypercholesterolemia 7/15/2011    Hypertension     Type II or unspecified type diabetes mellitus without mention of complication, uncontrolled 7/15/2011     Past Surgical History:   Procedure Laterality Date    COLONOSCOPY N/A 9/18/2017    [facility]    COLONOSCOPY N/A 4/25/2018    COLONOSCOPY performed [facility] at [facility] ENDOSCOPY    HX GYN  2004    uterine surgery    LAP,TUBAL CAUTERY Bilateral 1992       Family History   Problem Relation Age of Onset    Diabetes Father     Deep Vein Thrombosis Daughter     Lung Disease Daughter     Other Daughter         cholesterol Current Outpatient Medications   Medication Sig Dispense Refill    pantoprazole (PROTONIX) 40 mg tablet Take 1 Tab by mouth daily. 30 Tab 3    potassium chloride SA (MICRO-K) 10 mEq capsule TAKE 1 CAPSULE BY MOUTH TWICE DAILY 30 Cap 0    diclofenac EC (VOLTAREN) 75 mg EC tablet TAKE 1 TABLET BY MOUTH TWICE DAILY 60 Tab 0    ibuprofen (MOTRIN) 800 mg tablet TAKE 1 TABLET BY MOUTH EVERY 8 HOURS AS NEEDED FOR PAIN 90 Tab 0    NYSTOP powder APPLY TOPICALLY TO AFFECTED AREA THREE TIMES DAILY 30 g 0    ibuprofen (MOTRIN) 800 mg tablet TAKE 1 TABLET BY MOUTH EVERY 8 HOURS AS NEEDED FOR PAIN 90 Tab 0    sucralfate (CARAFATE) 1 gram tablet Take 1 Tab by mouth four (4) times daily. 120 Tab 6    metFORMIN (GLUCOPHAGE) 1,000 mg tablet Si tab daily 60 Tab 12    losartan (COZAAR) 50 mg tablet Take 1 Tab by mouth daily. 30 Tab 12    amLODIPine (NORVASC) 5 mg tablet Take 1 Tab by mouth daily. 30 Tab 12    hydroCHLOROthiazide (HYDRODIURIL) 25 mg tablet Take 1 Tab by mouth daily. 30 Tab 12    glyBURIDE (DIABETA) 2.5 mg tablet TAKE 1 TABLET BY MOUTH EVERY DAY 30 Tab 0    cetirizine (ZYRTEC) 10 mg tablet Take 1 Tab by mouth daily. 20 Tab 0    nystatin (MYCOSTATIN) powder Apply  to affected area three (3) times daily. Apply until rash has resolved 1 Bottle 0    potassium chloride SA (MICRO-K) 10 mEq capsule TAKE ONE CAPSULE BY MOUTH TWICE DAILY 30 Cap 12    polyethylene glycol (MIRALAX) 17 gram/dose powder MIX 17 GRAMS IN A LIQUID AND THEN DRINK ONCE D UTD  3    ibuprofen (MOTRIN) 800 mg tablet TAKE 1 TABLET BY MOUTH EVERY 8 HOURS AS NEEDED FOR PAIN 90 Tab 0    bismuth subsalicylate (PEPTO-BISMOL MAXIMUM STRENGTH) 525 mg/15 mL susp Take 30 mL by mouth every six (6) hours as needed.  ONETOUCH ULTRA TEST strip TEST BLOOD SUGARS 3 TIMES DAILY 100 Each 12    loperamide (IMODIUM A-D) 2 mg tablet Take 1 Tab by mouth four (4) times daily as needed for Diarrhea.  20 Tab 0     No Known Allergies    Objective:  Visit Vitals  /88   Pulse 99   Temp 98.2 °F (36.8 °C) (Oral)   Resp 16   Ht 5' 3\" (1.6 m)   Wt 149 lb (67.6 kg)   LMP 03/15/2004 (Approximate)   SpO2 99%   BMI 26.39 kg/m²     Physical Exam:   General appearance - alert, well appearing, and in mild distress  Mental status - alert, oriented to person, place, and time  EYE-KODY, EOMI, corneas normal, no foreign bodies  ENT-ENT exam normal, no neck nodes or sinus tenderness  Nose - normal and patent, no erythema, discharge or polyps  Mouth - mucous membranes moist, pharynx normal without lesions  Neck - supple, no significant adenopathy   Chest - clear to auscultation, no wheezes, rales or rhonchi, symmetric air entry   Heart - normal rate, regular rhythm, normal S1, S2, no murmurs, rubs, clicks or gallops   Abdomen - soft, nontender, nondistended, no masses or organomegaly  Lymph- no adenopathy palpable  Ext-peripheral pulses normal, no pedal edema, no clubbing or cyanosis  Skin-Warm and dry. no hyperpigmentation, vitiligo, or suspicious lesions  Neuro -alert, oriented, normal speech, no focal findings or movement disorder noted  Neck-normal C-spine, no tenderness, full ROM without pain  Feet-no nail deformities or callus formation with good pulses noted  Scattered lower leg(bilateral) varicosities noted  Lt.knee-crepitations      Results for orders placed or performed in visit on 01/23/20   AMB POC GLUCOSE BLOOD, BY GLUCOSE MONITORING DEVICE   Result Value Ref Range    Glucose  mg/dL   AMB POC HEMOGLOBIN A1C   Result Value Ref Range    Hemoglobin A1c (POC) 7.6 %       Assessment/Plan:    ICD-10-CM ICD-9-CM    1. Controlled type 2 diabetes mellitus without complication, without long-term current use of insulin (HCC) E11.9 250.00 LIPID PANEL      CBC W/O DIFF      METABOLIC PANEL, COMPREHENSIVE      AMB POC GLUCOSE BLOOD, BY GLUCOSE MONITORING DEVICE      AMB POC HEMOGLOBIN A1C   2.  Hypercholesteremia E78.00 272.0 LIPID PANEL      CBC W/O DIFF      METABOLIC PANEL, COMPREHENSIVE      AMB POC GLUCOSE BLOOD, BY GLUCOSE MONITORING DEVICE      AMB POC HEMOGLOBIN A1C   3. Essential hypertension I10 401.9 LIPID PANEL      CBC W/O DIFF      METABOLIC PANEL, COMPREHENSIVE      AMB POC GLUCOSE BLOOD, BY GLUCOSE MONITORING DEVICE      AMB POC HEMOGLOBIN A1C   4. Gastroesophageal reflux disease without esophagitis K21.9 530.81      Orders Placed This Encounter    LIPID PANEL    CBC W/O DIFF    METABOLIC PANEL, COMPREHENSIVE    AMB POC GLUCOSE BLOOD, BY GLUCOSE MONITORING DEVICE    AMB POC HEMOGLOBIN A1C    pantoprazole (PROTONIX) 40 mg tablet     Sig: Take 1 Tab by mouth daily. Dispense:  30 Tab     Refill:  3     lose weight, increase physical activity, follow low fat diet, follow low salt diet,Take 81mg aspirin daily      Patient Instructions   [email or web address] [facility] Activation    Thank you for requesting access to [employer]. Please follow the instructions below to securely access and download your online medical record. [employer]uit allows you to send messages to your doctor, view your test results, renew your prescriptions, schedule appointments, and more. How Do I Sign Up? 1. In your internet browser, go to [email or web address]  2. Click on the First Time User? Click Here link in the Sign In box. You will be redirect to the New Member Sign Up page. 3. Enter your SportP[employer]uit Access Code exactly as it appears below. You will not need to use this code after youve completed the sign-up process. If you do not sign up before the expiration date, you must request a new code. [employer]Pursuit Access Code: Activation code not generated  Current [employer]uit Status: Active (This is the date your SportPursuit access code will )    4. Enter the last four digits of your Social Security Number (xxxx) and Date of Birth (mm/dd/yyyy) as indicated and click Submit. You will be taken to the next sign-up page. 5. Create a SportPursuit ID.  This will be your [employer]uit login ID and cannot be changed, so think of one that is secure and easy to remember. 6. Create a Lapio password. You can change your password at any time. 7. Enter your Password Reset Question and Answer. This can be used at a later time if you forget your password. 8. Enter your e-mail address. You will receive e-mail notification when new information is available in [address] 9. Click Sign Up. You can now view and download portions of your medical record. 10. Click the Download Summary menu link to download a portable copy of your medical information. Additional Information    If you have questions, please visit the Frequently Asked Questions section of the Lapio website at [email or web address] Remember, Lapio is NOT to be used for urgent needs. For medical emergencies, dial 911. Follow-up and Dispositions    · Return in about 3 months (around 4/23/2020), or if symptoms worsen or fail to improve. I have reviewed with the patient details of the assessment and plan and all questions were answered. Relevent patient education was performed. The most recent lab findings were reviewed with the patient. An After Visit Summary was printed and given to the patient.

## 2020-02-03 RX ORDER — POTASSIUM CHLORIDE 750 MG/1
CAPSULE, EXTENDED RELEASE ORAL
Qty: 30 CAP | Refills: 0 | Status: SHIPPED | OUTPATIENT
Start: 2020-02-03 | End: 2020-02-19

## 2020-02-05 DIAGNOSIS — M17.0 PRIMARY OSTEOARTHRITIS OF BOTH KNEES: ICD-10-CM

## 2020-02-05 RX ORDER — DICLOFENAC SODIUM 75 MG/1
TABLET, DELAYED RELEASE ORAL
Qty: 60 TAB | Refills: 0 | Status: SHIPPED | OUTPATIENT
Start: 2020-02-05 | End: 2020-03-05

## 2020-02-05 RX ORDER — IBUPROFEN 800 MG/1
TABLET ORAL
Qty: 90 TAB | Refills: 0 | Status: SHIPPED | OUTPATIENT
Start: 2020-02-05 | End: 2020-03-05

## 2020-02-17 RX ORDER — AMLODIPINE BESYLATE 5 MG/1
TABLET ORAL
Qty: 30 TAB | Refills: 12 | Status: SHIPPED | OUTPATIENT
Start: 2020-02-17 | End: 2020-11-03 | Stop reason: SDUPTHER

## 2020-02-17 RX ORDER — HYDROCHLOROTHIAZIDE 25 MG/1
TABLET ORAL
Qty: 30 TAB | Refills: 12 | Status: SHIPPED | OUTPATIENT
Start: 2020-02-17 | End: 2020-11-03 | Stop reason: SDUPTHER

## 2020-02-19 RX ORDER — POTASSIUM CHLORIDE 750 MG/1
CAPSULE, EXTENDED RELEASE ORAL
Qty: 30 CAP | Refills: 0 | Status: SHIPPED | OUTPATIENT
Start: 2020-02-19 | End: 2020-03-04

## 2020-03-04 RX ORDER — POTASSIUM CHLORIDE 750 MG/1
CAPSULE, EXTENDED RELEASE ORAL
Qty: 30 CAP | Refills: 0 | Status: SHIPPED | OUTPATIENT
Start: 2020-03-04 | End: 2020-03-18

## 2020-03-05 DIAGNOSIS — M17.0 PRIMARY OSTEOARTHRITIS OF BOTH KNEES: ICD-10-CM

## 2020-03-05 RX ORDER — IBUPROFEN 800 MG/1
TABLET ORAL
Qty: 90 TAB | Refills: 0 | Status: SHIPPED | OUTPATIENT
Start: 2020-03-05 | End: 2020-04-02

## 2020-03-05 RX ORDER — DICLOFENAC SODIUM 75 MG/1
TABLET, DELAYED RELEASE ORAL
Qty: 60 TAB | Refills: 0 | Status: SHIPPED | OUTPATIENT
Start: 2020-03-05 | End: 2020-04-02

## 2020-03-16 RX ORDER — LOSARTAN POTASSIUM 50 MG/1
TABLET ORAL
Qty: 30 TAB | Refills: 12 | Status: SHIPPED | OUTPATIENT
Start: 2020-03-16 | End: 2020-11-03 | Stop reason: SDUPTHER

## 2020-03-18 RX ORDER — POTASSIUM CHLORIDE 750 MG/1
CAPSULE, EXTENDED RELEASE ORAL
Qty: 30 CAP | Refills: 0 | Status: SHIPPED | OUTPATIENT
Start: 2020-03-18 | End: 2020-03-30

## 2020-03-24 ENCOUNTER — OFFICE VISIT (OUTPATIENT)
Dept: INTERNAL MEDICINE CLINIC | Age: 65
End: 2020-03-24

## 2020-03-24 VITALS
WEIGHT: 154 LBS | OXYGEN SATURATION: 96 % | HEART RATE: 90 BPM | RESPIRATION RATE: 20 BRPM | TEMPERATURE: 98.3 F | HEIGHT: 63 IN | DIASTOLIC BLOOD PRESSURE: 70 MMHG | BODY MASS INDEX: 27.29 KG/M2 | SYSTOLIC BLOOD PRESSURE: 140 MMHG

## 2020-03-24 DIAGNOSIS — E78.00 HYPERCHOLESTEREMIA: ICD-10-CM

## 2020-03-24 DIAGNOSIS — I10 ESSENTIAL HYPERTENSION: ICD-10-CM

## 2020-03-24 DIAGNOSIS — G56.01 CARPAL TUNNEL SYNDROME OF RIGHT WRIST: Primary | ICD-10-CM

## 2020-03-24 DIAGNOSIS — E11.9 CONTROLLED TYPE 2 DIABETES MELLITUS WITHOUT COMPLICATION, WITHOUT LONG-TERM CURRENT USE OF INSULIN (HCC): ICD-10-CM

## 2020-03-24 RX ORDER — LIDOCAINE HYDROCHLORIDE 20 MG/ML
1 INJECTION, SOLUTION EPIDURAL; INFILTRATION; INTRACAUDAL; PERINEURAL ONCE
Qty: 1 ML | Refills: 0
Start: 2020-03-24 | End: 2020-03-24

## 2020-03-24 RX ORDER — TRIAMCINOLONE ACETONIDE 40 MG/ML
40 INJECTION, SUSPENSION INTRA-ARTICULAR; INTRAMUSCULAR ONCE
Qty: 1 ML | Refills: 0
Start: 2020-03-24 | End: 2020-03-24

## 2020-03-24 NOTE — PROGRESS NOTES
Chief Complaint   Patient presents with    Hand Pain     3 most recent PHQ Screens 9/19/2019   PHQ Not Done -   Little interest or pleasure in doing things Not at all   Feeling down, depressed, irritable, or hopeless Not at all   Total Score PHQ 2 0     1. Have you been to the ER, urgent care clinic since your last visit? Hospitalized since your last visit?no    2. Have you seen or consulted any other health care providers outside of the [address] [place] [address] since your last visit? Include any pap smears or colon screening.  no

## 2020-03-24 NOTE — PROGRESS NOTES
Krystal Ashby is a 60 y.o. female and presents with Hand Pain      Carpal Tunnel Syndrome Review:  Patient presents for evaluation of pain in rt. hand(s), hand paresthesias. Onset of the symptoms was several months ago. Current symptoms include tingling involving the medial aspect of the bilateral hand(s): severity: moderate. Patient's overall course: gradually worsening. Patient has had no prior elbow problems. Previous visits for this problem: none. Evaluation to date: none. Treatment to date: injection. Hypertension Review:  The patient has essential hypertension  Diet and Lifestyle: generally follows a  low sodium diet, exercises sporadically  Home BP Monitoring: is not measured at home. Pertinent ROS: taking medications as instructed,  medication side effects noted with Lisinopril being cough, no TIA's, no chest pain on exertion, no dyspnea on exertion, no swelling of ankles. GERD Review:   Patient has a history of gastroesophageal reflux with heartburn. Symptoms have been present for a few months. She denies dysphagia. She  has not lost weight. She denies melena, hematochezia, hematemesis, and coffee ground emesis. This has been associated with fullness after meals. She denies abdominal bloating and none. Medical therapy in the past has included proton pump inhibitor    Health maintenance suggests the needs for an influenza injection      Diabetes Mellitus Review:  She has diabetes mellitus. Diabetic ROS - medication compliance: compliant all of the time, diabetic diet compliance: compliant all of the time, home glucose monitoring: is performed.    Known diabetic complications: hypoglycemia  Cardiovascular risk factors: family history, dyslipidemia, diabetes mellitus, obesity, hypertension  Current diabetic medications include oral agents   Eye exam current (within one year): no  Weight trend: stable  Prior visit with dietician: no  Current diet: \"healthy\" diet  in general  Current exercise: walking  Current monitoring regimen: home blood tests - daily  Home blood sugar records: trend: stable  Any episodes of hypoglycemia? no  Is She on ACE inhibitor or angiotensin II receptor blocker? Yes     Varicose vein Review:  Parker Feliz is a 60 y.o. female and presents with Hand Pain   who has numerous bulgy ropey veins in thebilaterally  causing pain/swelling that she has had for about  years. Patient has/has not tried stockings. The patient's symptons have stable. The patient's diabetes mellitus, sedentary life style, hypertension. Review of Systems  Constitutional: negative for fevers, chills, anorexia and weight loss  Eyes:   negative for visual disturbance and irritation  ENT:   negative for tinnitus,sore throat,nasal congestion,ear pains. hoarseness  Respiratory:  negative for cough, hemoptysis, dyspnea,wheezing  CV:   negative for chest pain, palpitations, lower extremity edema  GI:    diarrhea, abdominal pain,  Endo:               negative for polyuria,polydipsia,polyphagia,heat intolerance  Genitourinary: negative for frequency, dysuria and hematuria  Integument:  negative for rash and pruritus  Hematologic:  negative for easy bruising and gum/nose bleeding  Musculoskel: , joint pain  Neurological:  negative for headaches, dizziness, vertigo, memory problems and gait   Behavl/Psych: negative for feelings of anxiety, depression, mood changes    Past Medical History:   Diagnosis Date    Arthritis, Degenerative, Knee 7/15/2011    COPD (chronic obstructive pulmonary disease) ([facility] [address] 7/15/2011    Diverticulitis     GERD (gastroesophageal reflux disease)     GERD, gastro - Esophageal Reflux Disease 7/15/2011    Hypercholesterolemia 7/15/2011    Hypertension     Type II or unspecified type diabetes mellitus without mention of complication, uncontrolled 7/15/2011     Past Surgical History:   Procedure Laterality Date    COLONOSCOPY N/A 9/18/2017    COLONOSCOPY performed by Koko PATTERSON Aaron Rogers MD at \[facility]\"" AMBULATORY OR    COLONOSCOPY N/A 2018    COLONOSCOPY performed by Malgorzata Fuentes MD at \[facility]\"" ENDOSCOPY    HX GYN  2004    uterine surgery    LAP,TUBAL CAUTERY Bilateral        Family History   Problem Relation Age of Onset    Diabetes Father     Deep Vein Thrombosis Daughter     Lung Disease Daughter     Other Daughter         cholesterol     Current Outpatient Medications   Medication Sig Dispense Refill    triamcinolone acetonide (Kenalog) 40 mg/mL injection 1 mL by Intra artICUlar route once for 1 dose. 1 mL 0    lidocaine, PF, (XYLOCAINE) 20 mg/mL (2 %) injection 1 mL by Intra artICUlar route once for 1 dose. Indications: administration of local anesthetic drug 1 mL 0    potassium chloride SA (MICRO-K) 10 mEq capsule TAKE 1 CAPSULE BY MOUTH TWICE DAILY 30 Cap 0    losartan (COZAAR) 50 mg tablet TAKE 1 TABLET BY MOUTH DAILY 30 Tab 12    ibuprofen (MOTRIN) 800 mg tablet TAKE 1 TABLET BY MOUTH EVERY 8 HOURS AS NEEDED FOR PAIN 90 Tab 0    diclofenac EC (VOLTAREN) 75 mg EC tablet TAKE 1 TABLET BY MOUTH TWICE DAILY 60 Tab 0    amLODIPine (NORVASC) 5 mg tablet TAKE 1 TABLET BY MOUTH DAILY 30 Tab 12    hydroCHLOROthiazide (HYDRODIURIL) 25 mg tablet TAKE 1 TABLET BY MOUTH DAILY 30 Tab 12    pantoprazole (PROTONIX) 40 mg tablet Take 1 Tab by mouth daily. 30 Tab 3    NYSTOP powder APPLY TOPICALLY TO AFFECTED AREA THREE TIMES DAILY 30 g 0    ibuprofen (MOTRIN) 800 mg tablet TAKE 1 TABLET BY MOUTH EVERY 8 HOURS AS NEEDED FOR PAIN 90 Tab 0    sucralfate (CARAFATE) 1 gram tablet Take 1 Tab by mouth four (4) times daily. 120 Tab 6    metFORMIN (GLUCOPHAGE) 1,000 mg tablet Si tab daily 60 Tab 12    glyBURIDE (DIABETA) 2.5 mg tablet TAKE 1 TABLET BY MOUTH EVERY DAY 30 Tab 0    cetirizine (ZYRTEC) 10 mg tablet Take 1 Tab by mouth daily. 20 Tab 0    nystatin (MYCOSTATIN) powder Apply  to affected area three (3) times daily.  Apply until rash has resolved 1 Bottle 0    potassium chloride SA (MICRO-K) 10 mEq capsule TAKE ONE CAPSULE BY MOUTH TWICE DAILY 30 Cap 12    polyethylene glycol (MIRALAX) 17 gram/dose powder MIX 17 GRAMS IN A LIQUID AND THEN DRINK ONCE D UTD  3    ibuprofen (MOTRIN) 800 mg tablet TAKE 1 TABLET BY MOUTH EVERY 8 HOURS AS NEEDED FOR PAIN 90 Tab 0    bismuth subsalicylate (PEPTO-BISMOL MAXIMUM STRENGTH) 525 mg/15 mL susp Take 30 mL by mouth every six (6) hours as needed.  ONETOUCH ULTRA TEST strip TEST BLOOD SUGARS 3 TIMES DAILY 100 Each 12    loperamide (IMODIUM A-D) 2 mg tablet Take 1 Tab by mouth four (4) times daily as needed for Diarrhea. 20 Tab 0     No Known Allergies    Objective:  Visit Vitals  /70 (BP 1 Location: Right arm, BP Patient Position: Sitting)   Pulse 90   Temp 98.3 °F (36.8 °C) (Oral)   Resp 20   Ht 5' 3\" (1.6 m)   Wt 154 lb (69.9 kg)   LMP 03/15/2004 (Approximate)   SpO2 96%   BMI 27.28 kg/m²     Physical Exam:   General appearance - alert, well appearing, and in mild distress  Mental status - alert, oriented to person, place, and time  EYE-KODY, EOMI, corneas normal, no foreign bodies  ENT-ENT exam normal, no neck nodes or sinus tenderness  Nose - normal and patent, no erythema, discharge or polyps  Mouth - mucous membranes moist, pharynx normal without lesions  Neck - supple, no significant adenopathy   Chest - clear to auscultation, no wheezes, rales or rhonchi, symmetric air entry   Heart - normal rate, regular rhythm, normal S1, S2, no murmurs, rubs, clicks or gallops   Abdomen - soft, nontender, nondistended, no masses or organomegaly  Lymph- no adenopathy palpable  Ext-peripheral pulses normal, no pedal edema, no clubbing or cyanosis  Skin-Warm and dry.  no hyperpigmentation, vitiligo, or suspicious lesions  Neuro -alert, oriented, normal speech, no focal findings or movement disorder noted  Neck-normal C-spine, no tenderness, full ROM without pain  Feet-no nail deformities or callus formation with good pulses noted  Scattered lower leg(bilateral) varicosities noted  Lt.knee-crepitations  Rt.hand-positive phalen    Results for orders placed or performed in visit on 20   AMB POC GLUCOSE BLOOD, BY GLUCOSE MONITORING DEVICE   Result Value Ref Range    Glucose  mg/dL   AMB POC HEMOGLOBIN A1C   Result Value Ref Range    Hemoglobin A1c (POC) 7.6 %       Assessment/Plan:    ICD-10-CM ICD-9-CM    1. Carpal tunnel syndrome of right wrist G56.01 354.0 TRIAMCINOLONE ACETONIDE INJ      CO THER/PROPH/DIAG INJECTION, SUBCUT/IM      LIDOCAINE INJECTION      INJECT CARPAL TUNNEL   2. Essential hypertension I10 401.9    3. Controlled type 2 diabetes mellitus without complication, without long-term current use of insulin (HCC) E11.9 250.00    4. Hypercholesteremia E78.00 272.0      Orders Placed This Encounter    INJECT CARPAL TUNNEL    TRIAMCINOLONE ACETONIDE INJ     Order Specific Question:   Charge Quantity? Answer:   4    CO THER/PROPH/DIAG INJECTION, SUBCUT/IM    LIDOCAINE INJECTION    triamcinolone acetonide (Kenalog) 40 mg/mL injection     Si mL by Intra artICUlar route once for 1 dose. Dispense:  1 mL     Refill:  0    lidocaine, PF, (XYLOCAINE) 20 mg/mL (2 %) injection     Si mL by Intra artICUlar route once for 1 dose. Indications: administration of local anesthetic drug     Dispense:  1 mL     Refill:  0     lose weight, increase physical activity, follow low fat diet, follow low salt diet,Take 81mg aspirin daily    Indications:   pain    Procedure:  After consent was obtained, using sterile technique the  hand carpal tunnel sheath  using alcohol. Local anesthetic used: 1% lidocaine. .Kenalog 40 mg was mixed with 2% lidocaine 1 ml  and injected into the sheath and the needle withdrawn. The procedure was well tolerated. The patient is asked to continue to rest the joint for a few more days before resuming regular activities. It may be more painful for the first 1-2 days.   Watch for fever, or increased swelling or persistent pain in the joint. Call or return to clinic prn if such symptoms occur or there is failure to improve as anticipated. Patient Instructions   [employer] Activation    Thank you for requesting access to [employer]. Please follow the instructions below to securely access and download your online medical record. Netflix allows you to send messages to your doctor, view your test results, renew your prescriptions, schedule appointments, and more. How Do I Sign Up? 1. In your internet browser, go to [email or web address]  2. Click on the First Time User? Click Here link in the Sign In box. You will be redirect to the New Member Sign Up page. 3. Enter your Netflix Access Code exactly as it appears below. You will not need to use this code after youve completed the sign-up process. If you do not sign up before the expiration date, you must request a new code. Netflix Access Code: Activation code not generated  Current Netflix Status: Active (This is the date your Netflix access code will )    4. Enter the last four digits of your Social Security Number (xxxx) and Date of Birth (mm/dd/yyyy) as indicated and click Submit. You will be taken to the next sign-up page. 5. Create a Netflix ID. This will be your Netflix login ID and cannot be changed, so think of one that is secure and easy to remember. 6. Create a Netflix password. You can change your password at any time. 7. Enter your Password Reset Question and Answer. This can be used at a later time if you forget your password. 8. Enter your e-mail address. You will receive e-mail notification when new information is available in [address] 9. Click Sign Up. You can now view and download portions of your medical record. 10. Click the Download Summary menu link to download a portable copy of your medical information.     Additional Information    If you have questions, please visit the Frequently Asked Questions section of the [employer] website at [email or web address] Remember, [employer] is NOT to be used for urgent needs. For medical emergencies, dial 911. Follow-up and Dispositions    · Return in about 4 weeks (around 4/21/2020), or if symptoms worsen or fail to improve. I have reviewed with the patient details of the assessment and plan and all questions were answered. Relevent patient education was performed. The most recent lab findings were reviewed with the patient. An After Visit Summary was printed and given to the patient.

## 2020-03-24 NOTE — PATIENT INSTRUCTIONS
[employer] Activation Thank you for requesting access to [employer]. Please follow the instructions below to securely access and download your online medical record. [employer]ar allows you to send messages to your doctor, view your test results, renew your prescriptions, schedule appointments, and more. How Do I Sign Up? 1. In your internet browser, go to [email or web address] 
2. Click on the First Time User? Click Here link in the Sign In box. You will be redirect to the New Member Sign Up page. 3. Enter your An[employer]ar Access Code exactly as it appears below. You will not need to use this code after youve completed the sign-up process. If you do not sign up before the expiration date, you must request a new code. An[employer]ar Access Code: Activation code not generated Current Anagear Status: Active (This is the date your Anagear access code will ) 4. Enter the last four digits of your Social Security Number (xxxx) and Date of Birth (mm/dd/yyyy) as indicated and click Submit. You will be taken to the next sign-up page. 5. Create a Anagear ID. This will be your Anagear login ID and cannot be changed, so think of one that is secure and easy to remember. 6. Create a An[employer]ar password. You can change your password at any time. 7. Enter your Password Reset Question and Answer. This can be used at a later time if you forget your password. 8. Enter your e-mail address. You will receive e-mail notification when new information is available in [address] 9. Click Sign Up. You can now view and download portions of your medical record. 10. Click the Download Summary menu link to download a portable copy of your medical information. Additional Information If you have questions, please visit the Frequently Asked Questions section of the An[employer]ar website at [email or web address] Remember, [employer] is NOT to be used for urgent needs. For medical emergencies, dial 911.

## 2020-03-30 RX ORDER — POTASSIUM CHLORIDE 750 MG/1
CAPSULE, EXTENDED RELEASE ORAL
Qty: 30 CAP | Refills: 0 | Status: SHIPPED | OUTPATIENT
Start: 2020-03-30 | End: 2020-04-14

## 2020-04-02 DIAGNOSIS — M17.0 PRIMARY OSTEOARTHRITIS OF BOTH KNEES: ICD-10-CM

## 2020-04-02 RX ORDER — DICLOFENAC SODIUM 75 MG/1
TABLET, DELAYED RELEASE ORAL
Qty: 60 TAB | Refills: 0 | Status: SHIPPED | OUTPATIENT
Start: 2020-04-02 | End: 2020-04-29

## 2020-04-02 RX ORDER — IBUPROFEN 800 MG/1
TABLET ORAL
Qty: 90 TAB | Refills: 0 | Status: SHIPPED | OUTPATIENT
Start: 2020-04-02 | End: 2020-11-03 | Stop reason: SDUPTHER

## 2020-04-09 RX ORDER — SUCRALFATE 1 G/1
TABLET ORAL
Qty: 120 TAB | Refills: 6 | Status: SHIPPED | OUTPATIENT
Start: 2020-04-09 | End: 2020-10-28

## 2020-04-14 RX ORDER — POTASSIUM CHLORIDE 750 MG/1
CAPSULE, EXTENDED RELEASE ORAL
Qty: 30 CAP | Refills: 0 | Status: SHIPPED | OUTPATIENT
Start: 2020-04-14 | End: 2020-04-26

## 2020-04-23 ENCOUNTER — VIRTUAL VISIT (OUTPATIENT)
Dept: INTERNAL MEDICINE CLINIC | Age: 65
End: 2020-04-23

## 2020-04-23 DIAGNOSIS — E11.9 CONTROLLED TYPE 2 DIABETES MELLITUS WITHOUT COMPLICATION, WITHOUT LONG-TERM CURRENT USE OF INSULIN (HCC): Primary | ICD-10-CM

## 2020-04-23 DIAGNOSIS — I10 ESSENTIAL HYPERTENSION: ICD-10-CM

## 2020-04-23 DIAGNOSIS — M17.0 PRIMARY OSTEOARTHRITIS OF BOTH KNEES: ICD-10-CM

## 2020-04-23 DIAGNOSIS — E78.00 HYPERCHOLESTEREMIA: ICD-10-CM

## 2020-04-23 NOTE — PROGRESS NOTES
Xena Acosta is a 60 y.o. female being evaluated by a Virtual Visit (video visit) encounter to address concerns as mentioned above. A caregiver was present when appropriate. Due to this being a TeleHealth encounter (During GFZFR-57 public health emergency), evaluation of the following organ systems was limited: Vitals/Constitutional/EENT/Resp/CV/GI//MS/Neuro/Skin/Heme-Lymph-Imm. Pursuant to the emergency declaration under the [address] [place] [address] and the [employer] Act, this Virtual Visit was conducted with patient's (and/or legal guardian's) consent, to reduce the risk of exposure to COVID-19 and provide necessary medical care. Services were provided through a video synchronous discussion virtually to substitute for in-person encounter. --Adrian Amanda MD on 4/23/2020 at 2:37 PM    An electronic signature was used to authenticate this note. Xena Acosta is a 60 y.o. female and presents with Diabetes and Hypertension  . Subjective:    Hypertension Review:  The patient has hypertension . Diet and Lifestyle: generally follows a low sodium diet, exercises sporadically  Home BP Monitoring: is not measured at home. Pertinent ROS: taking medications as instructed, no medication side effects noted, no TIA's, no chest pain on exertion, no dyspnea on exertion, no swelling of ankles. Dyslipidemia Review:  Patient presents for evaluation of lipids. Compliance with treatment thus far has been excellent. A repeat fasting lipid profile was done. The patient does not use medications that may worsen dyslipidemias . The patient exercises sporadically. Diabetes Mellitus Review:  She has diabetes mellitus. Diabetic ROS - medication compliance: compliant all of the time, diabetic diet compliance: compliant all of the time, home glucose monitoring: is performed.    Known diabetic complications: none  Cardiovascular risk factors: family history, dyslipidemia, diabetes mellitus, obesity, hypertension  Current diabetic medications include oral agents  Eye exam current (within one year): no  Weight trend: stable  Prior visit with dietician: no  Current diet: \"healthy\" diet  in general  Current exercise: walking  Current monitoring regimen: home blood tests - daily  Home blood sugar records: trend: stable  Any episodes of hypoglycemia? no  Is She on ACE inhibitor or angiotensin II receptor blocker? Yes   Lab review: orders written for new lab studies as appropriate; see orders. Glucose:170    Review of Systems  Constitutional: negative for fevers, chills, anorexia and weight loss  Eyes:   negative for visual disturbance and irritation  ENT:   negative for tinnitus,sore throat,nasal congestion,ear pains. hoarseness  Respiratory:  negative for cough, hemoptysis, dyspnea,wheezing  CV:   negative for chest pain, palpitations, lower extremity edema  GI:   negative for nausea, vomiting, diarrhea, abdominal pain,melena  Endo:               negative for polyuria,polydipsia,polyphagia,heat intolerance  Genitourinary: negative for frequency, dysuria and hematuria  Integument:  negative for rash and pruritus  Hematologic:  negative for easy bruising and gum/nose bleeding  Musculoskel: negative for myalgias, arthralgias, back pain, muscle weakness, joint pain  Neurological:  negative for headaches, dizziness, vertigo, memory problems and gait   Behavl/Psych: negative for feelings of anxiety, depression, mood changes    Past Medical History:   Diagnosis Date    Arthritis, Degenerative, Knee 7/15/2011    [facility] 7/15/2011    Diverticulitis     GERD (gastroesophageal reflux disease)     GERD, gastro - Esophageal Reflux Disease 7/15/2011    Hypercholesterolemia 7/15/2011    Hypertension     Type II or unspecified type diabetes mellitus without mention of complication, uncontrolled 7/15/2011     Past Surgical History:   Procedure Laterality Date    COLONOSCOPY N/A 2017    COL[facility] performed [facility] Chuck [facility]baum [facility] at [facility] AMBULATORY OR    COLONOSCOPY N/A 2018    COLONOSCOPY performed by Chuck Rosenbaum MD at [facility] ENDOSCOPY    HX GYN  2004    uterine surgery    LAP,TUBAL CAUTERY Bilateral 1992     Social History     Socioeconomic History    Marital status:      Spouse name: Not on file    Number of children: Not on file    Years of education: Not on file    Highest education level: Not on file   Tobacco Use    Smoking status: Former Smoker     Last attempt to quit: 4/15/2015     Years since quittin.0    Smokeless tobacco: Never Used   Substance and Sexual Activity    Alcohol use: No    Drug use: Yes     Types: Marijuana     Comment: \"daily\"    Sexual activity: Yes     Partners: Male   Social History Narrative        She lives with her  she has five adult children denies alcohol cigarette use     Family History   Problem Relation Age of Onset    Diabetes Father     Deep Vein Thrombosis Daughter     Lung Disease Daughter     Other Daughter         cholesterol     Current Outpatient Medications   Medication Sig Dispense Refill    potassium chloride SA (MICRO-K) 10 mEq capsule TAKE 1 CAPSULE BY MOUTH TWICE DAILY 30 Cap 0    sucralfate (CARAFATE) 1 gram tablet TAKE 1 TABLET BY MOUTH FOUR TIMES DAILY 120 Tab 6    ibuprofen (MOTRIN) 800 mg tablet TAKE 1 TABLET BY MOUTH EVERY 8 HOURS AS NEEDED FOR PAIN 90 Tab 0    diclofenac EC (VOLTAREN) 75 mg EC tablet TAKE 1 TABLET BY MOUTH TWICE DAILY 60 Tab 0    losartan (COZAAR) 50 mg tablet TAKE 1 TABLET BY MOUTH DAILY 30 Tab 12    amLODIPine (NORVASC) 5 mg tablet TAKE 1 TABLET BY MOUTH DAILY 30 Tab 12    hydroCHLOROthiazide (HYDRODIURIL) 25 mg tablet TAKE 1 TABLET BY MOUTH DAILY 30 Tab 12    pantoprazole (PROTONIX) 40 mg tablet Take 1 Tab by mouth daily.  30 Tab 3    NYSTOP powder APPLY TOPICALLY TO AFFECTED AREA THREE TIMES DAILY 30 g 0    metFORMIN (GLUCOPHAGE) 1,000 mg tablet Si tab daily 60 Tab 12    cetirizine (ZYRTEC) 10 mg tablet Take 1 Tab by mouth daily. 20 Tab 0    nystatin (MYCOSTATIN) powder Apply  to affected area three (3) times daily. Apply until rash has resolved 1 Bottle 0    potassium chloride SA (MICRO-K) 10 mEq capsule TAKE ONE CAPSULE BY MOUTH TWICE DAILY 30 Cap 12    polyethylene glycol (MIRALAX) 17 gram/dose powder MIX 17 GRAMS IN A LIQUID AND THEN DRINK ONCE D UTD  3    ibuprofen (MOTRIN) 800 mg tablet TAKE 1 TABLET BY MOUTH EVERY 8 HOURS AS NEEDED FOR PAIN 90 Tab 0    bismuth subsalicylate (PEPTO-BISMOL MAXIMUM STRENGTH) 525 mg/15 mL susp Take 30 mL by mouth every six (6) hours as needed.  ONETOUCH ULTRA TEST strip TEST BLOOD SUGARS 3 TIMES DAILY 100 Each 12    loperamide (IMODIUM A-D) 2 mg tablet Take 1 Tab by mouth four (4) times daily as needed for Diarrhea. 20 Tab 0     No Known Allergies    Objective:  Visit Vitals  LMP 03/15/2004 (Approximate)     Physical Exam:   General appearance - alert, well appearing, and in no distress  Mental status - alert, oriented to person, place, and time  EYE-KODY, EOMI, corneas normal, no foreign bodies  ENT-ENT exam normal, no neck nodes or sinus tenderness  Nose - normal and patent, no erythema, discharge or polyps  Mouth - mucous membranes moist, pharynx normal without lesions  Skin-Warm and dry. no hyperpigmentation, vitiligo, or suspicious lesions  Neuro -alert, oriented, normal speech, no focal findings or movement disorder noted  Neck-normal C-spine, no tenderness, full ROM without pain        Results for orders placed or performed in visit on 20   AMB POC GLUCOSE BLOOD, BY GLUCOSE MONITORING DEVICE   Result Value Ref Range    Glucose  mg/dL   AMB POC HEMOGLOBIN A1C   Result Value Ref Range    Hemoglobin A1c (POC) 7.6 %       Assessment/Plan:    ICD-10-CM ICD-9-CM    1.  Controlled type 2 diabetes mellitus without complication, without long-term current use of insulin (HCC) E11.9 250.00    2. Primary osteoarthritis of both knees M17.0 715.16    3. Essential hypertension I10 401.9    4. Hypercholesteremia E78.00 272.0      No orders of the defined types were placed in this encounter. call if any problems,Take 81mg aspirin daily  Patient Instructions   [email or web address] Activation    Thank you for requesting access to [email or web address]. Please follow the instructions below to securely access and download your online medical record. [email or web address] allows you to send messages to your doctor, view your test results, renew your prescriptions, schedule appointments, and more. How Do I Sign Up? 1. In your internet browser, go to [email or web address]  2. Click on the First Time User? Click Here link in the Sign In box. You will be redirect to the New Member Sign Up page. 3. Enter your [email or web address] Access Code exactly as it appears below. You will not need to use this code after youve completed the sign-up process. If you do not sign up before the expiration date, you must request a new code. [email or web address] Access Code: Activation code not generated  Current [email or web address] Status: Active (This is the date your [email or web address] access code will )    4. Enter the last four digits of your Social Security Number (xxxx) and Date of Birth (mm/dd/yyyy) as indicated and click Submit. You will be taken to the next sign-up page. 5. Create a [email or web address] ID. This will be your [email or web address] login ID and cannot be changed, so think of one that is secure and easy to remember. 6. Create a [email or web address] password. You can change your password at any time. 7. Enter your Password Reset Question and Answer. This can be used at a later time if you forget your password. 8. Enter your e-mail address. You will receive e-mail notification when new information is available in [address] 9. Click Sign Up. You can now view and download portions of your medical record.   10. Click the Download Summary menu link to download a portable copy of your medical information. Additional Information    If you have questions, please visit the Frequently Asked Questions section of the [employer] website at [email or web address] Remember, [employer] is NOT to be used for urgent needs. For medical emergencies, dial 911. Follow-up and Dispositions    · Return in about 3 months (around 7/23/2020), or if symptoms worsen or fail to improve. I have reviewed with the patient details of the assessment and plan and all questions were answered. Relevent patient education was performed. The most recent lab findings were reviewed with the patient. An After Visit Summary was printed and given to the patient.

## 2020-04-23 NOTE — PATIENT INSTRUCTIONS
[email or web address] Activation Thank you for requesting access to [employer] Please follow the instructions below to securely access and download your online medical record. [employer] allows you to send messages to your doctor, view your test results, renew your prescriptions, schedule appointments, and more. How Do I Sign Up? 1. In your internet browser, go to [email or web address] 
2. Click on the First Time User? Click Here link in the Sign In box. You will be redirect to the New Member Sign Up page. 3. Enter your [employer] Access Code exactly as it appears below. You will not need to use this code after youve completed the sign-up process. If you do not sign up before the expiration date, you must request a new code. [employer] Access Code: Activation code not generated Current [employer] Status: Active (This is the date your [employer] access code will ) 4. Enter the last four digits of your Social Security Number (xxxx) and Date of Birth (mm/dd/yyyy) as indicated and click Submit. You will be taken to the next sign-up page. 5. Create a [employer] ID. This will be your [employer] login ID and cannot be changed, so think of one that is secure and easy to remember. 6. Create a [employer] password. You can change your password at any time. 7. Enter your Password Reset Question and Answer. This can be used at a later time if you forget your password. 8. Enter your e-mail address. You will receive e-mail notification when new information is available in [address] 9. Click Sign Up. You can now view and download portions of your medical record. 10. Click the Download Summary menu link to download a portable copy of your medical information. Additional Information If you have questions, please visit the Frequently Asked Questions section of the [employer] website at [email or web address] Remember, [employer] is NOT to be used for urgent needs. For medical emergencies, dial 911.

## 2020-04-26 RX ORDER — POTASSIUM CHLORIDE 750 MG/1
CAPSULE, EXTENDED RELEASE ORAL
Qty: 30 CAP | Refills: 0 | Status: SHIPPED | OUTPATIENT
Start: 2020-04-26 | End: 2020-05-08

## 2020-05-08 RX ORDER — POTASSIUM CHLORIDE 750 MG/1
CAPSULE, EXTENDED RELEASE ORAL
Qty: 30 CAP | Refills: 0 | Status: SHIPPED | OUTPATIENT
Start: 2020-05-08 | End: 2020-05-20

## 2020-05-14 RX ORDER — PANTOPRAZOLE SODIUM 40 MG/1
TABLET, DELAYED RELEASE ORAL
Qty: 30 TAB | Refills: 3 | Status: SHIPPED | OUTPATIENT
Start: 2020-05-14 | End: 2020-09-04

## 2020-05-20 RX ORDER — POTASSIUM CHLORIDE 750 MG/1
CAPSULE, EXTENDED RELEASE ORAL
Qty: 30 CAP | Refills: 0 | Status: SHIPPED | OUTPATIENT
Start: 2020-05-20 | End: 2020-06-03

## 2020-05-28 DIAGNOSIS — M17.0 PRIMARY OSTEOARTHRITIS OF BOTH KNEES: ICD-10-CM

## 2020-05-28 RX ORDER — DICLOFENAC SODIUM 75 MG/1
TABLET, DELAYED RELEASE ORAL
Qty: 60 TAB | Refills: 0 | Status: SHIPPED | OUTPATIENT
Start: 2020-05-28 | End: 2020-06-29

## 2020-06-15 RX ORDER — NYSTATIN 100000 [USP'U]/G
POWDER TOPICAL
Qty: 30 G | Refills: 0 | OUTPATIENT
Start: 2020-06-15 | End: 2022-01-22

## 2020-06-21 ENCOUNTER — HOSPITAL ENCOUNTER (EMERGENCY)
Age: 65
Discharge: HOME | End: 2020-06-21
Attending: EMERGENCY MEDICINE
Payer: MEDICAID

## 2020-06-21 VITALS
SYSTOLIC BLOOD PRESSURE: 134 MMHG | HEIGHT: 63 IN | TEMPERATURE: 98.5 F | WEIGHT: 150 LBS | OXYGEN SATURATION: 98 % | BODY MASS INDEX: 26.58 KG/M2 | DIASTOLIC BLOOD PRESSURE: 85 MMHG | HEART RATE: 83 BPM | RESPIRATION RATE: 16 BRPM

## 2020-06-21 DIAGNOSIS — B96.89 BV (BACTERIAL VAGINOSIS): ICD-10-CM

## 2020-06-21 DIAGNOSIS — R73.9 HYPERGLYCEMIA: ICD-10-CM

## 2020-06-21 DIAGNOSIS — N76.0 BV (BACTERIAL VAGINOSIS): ICD-10-CM

## 2020-06-21 DIAGNOSIS — N30.00 ACUTE CYSTITIS WITHOUT HEMATURIA: Primary | ICD-10-CM

## 2020-06-21 LAB
ANION GAP SERPL CALC-SCNC: 10 MMOL/L (ref 5–15)
APPEARANCE UR: ABNORMAL
BACTERIA URNS QL MICRO: ABNORMAL /HPF
BASOPHILS # BLD: 0.1 K/UL (ref 0–0.1)
BASOPHILS NFR BLD: 1 % (ref 0–1)
BILIRUB UR QL: NEGATIVE
BUN SERPL-MCNC: 23 MG/DL (ref 6–20)
BUN/CREAT SERPL: 24 (ref 12–20)
CALCIUM SERPL-MCNC: 8.9 MG/DL (ref 8.5–10.1)
CHLORIDE SERPL-SCNC: 98 MMOL/L (ref 97–108)
CLUE CELLS VAG QL WET PREP: NORMAL
CO2 SERPL-SCNC: 26 MMOL/L (ref 21–32)
COLOR UR: ABNORMAL
CREAT SERPL-MCNC: 0.94 MG/DL (ref 0.55–1.02)
DIFFERENTIAL METHOD BLD: ABNORMAL
EOSINOPHIL # BLD: 0.2 K/UL (ref 0–0.4)
EOSINOPHIL NFR BLD: 2 % (ref 0–7)
EPITH CASTS URNS QL MICRO: ABNORMAL /LPF
ERYTHROCYTE [DISTWIDTH] IN BLOOD BY AUTOMATED COUNT: 11.9 % (ref 11.5–14.5)
GLUCOSE BLD STRIP.AUTO-MCNC: 447 MG/DL (ref 65–100)
GLUCOSE SERPL-MCNC: 399 MG/DL (ref 65–100)
GLUCOSE UR STRIP.AUTO-MCNC: >1000 MG/DL
HCT VFR BLD AUTO: 40.8 % (ref 35–47)
HGB BLD-MCNC: 13.7 G/DL (ref 11.5–16)
HGB UR QL STRIP: NEGATIVE
IMM GRANULOCYTES # BLD AUTO: 0 K/UL (ref 0–0.04)
IMM GRANULOCYTES NFR BLD AUTO: 1 % (ref 0–0.5)
KETONES UR QL STRIP.AUTO: NEGATIVE MG/DL
KOH PREP SPEC: NORMAL
LEUKOCYTE ESTERASE UR QL STRIP.AUTO: ABNORMAL
LYMPHOCYTES # BLD: 2.4 K/UL (ref 0.8–3.5)
LYMPHOCYTES NFR BLD: 30 % (ref 12–49)
MCH RBC QN AUTO: 30.3 PG (ref 26–34)
MCHC RBC AUTO-ENTMCNC: 33.6 G/DL (ref 30–36.5)
MCV RBC AUTO: 90.3 FL (ref 80–99)
MONOCYTES # BLD: 0.5 K/UL (ref 0–1)
MONOCYTES NFR BLD: 7 % (ref 5–13)
NEUTS SEG # BLD: 4.7 K/UL (ref 1.8–8)
NEUTS SEG NFR BLD: 59 % (ref 32–75)
NITRITE UR QL STRIP.AUTO: POSITIVE
NRBC # BLD: 0 K/UL (ref 0–0.01)
NRBC BLD-RTO: 0 PER 100 WBC
PH UR STRIP: 6 [PH] (ref 5–8)
PLATELET # BLD AUTO: 172 K/UL (ref 150–400)
PMV BLD AUTO: 10.8 FL (ref 8.9–12.9)
POTASSIUM SERPL-SCNC: 3.7 MMOL/L (ref 3.5–5.1)
PROT UR STRIP-MCNC: NEGATIVE MG/DL
RBC # BLD AUTO: 4.52 M/UL (ref 3.8–5.2)
RBC #/AREA URNS HPF: ABNORMAL /HPF (ref 0–5)
SERVICE CMNT-IMP: ABNORMAL
SERVICE CMNT-IMP: NORMAL
SODIUM SERPL-SCNC: 134 MMOL/L (ref 136–145)
SP GR UR REFRACTOMETRY: 1.01 (ref 1–1.03)
T VAGINALIS VAG QL WET PREP: NORMAL
UA: UC IF INDICATED,UAUC: ABNORMAL
UROBILINOGEN UR QL STRIP.AUTO: 0.2 EU/DL (ref 0.2–1)
WBC # BLD AUTO: 7.9 K/UL (ref 3.6–11)
WBC URNS QL MICRO: ABNORMAL /HPF (ref 0–4)

## 2020-06-21 PROCEDURE — 87210 SMEAR WET MOUNT SALINE/INK: CPT

## 2020-06-21 PROCEDURE — 85025 COMPLETE CBC W/AUTO DIFF WBC: CPT

## 2020-06-21 PROCEDURE — 87491 CHLMYD TRACH DNA AMP PROBE: CPT

## 2020-06-21 PROCEDURE — 99283 EMERGENCY DEPT VISIT LOW MDM: CPT

## 2020-06-21 PROCEDURE — 80048 BASIC METABOLIC PNL TOTAL CA: CPT

## 2020-06-21 PROCEDURE — 36415 COLL VENOUS BLD VENIPUNCTURE: CPT

## 2020-06-21 PROCEDURE — 74011636637 HC RX REV CODE- 636/637: Performed by: EMERGENCY MEDICINE

## 2020-06-21 PROCEDURE — 82962 GLUCOSE BLOOD TEST: CPT

## 2020-06-21 PROCEDURE — 74011250636 HC RX REV CODE- 250/636: Performed by: EMERGENCY MEDICINE

## 2020-06-21 PROCEDURE — 96374 THER/PROPH/DIAG INJ IV PUSH: CPT

## 2020-06-21 PROCEDURE — 81001 URINALYSIS AUTO W/SCOPE: CPT

## 2020-06-21 PROCEDURE — 96361 HYDRATE IV INFUSION ADD-ON: CPT

## 2020-06-21 RX ORDER — SODIUM CHLORIDE 0.9 % (FLUSH) 0.9 %
5-40 SYRINGE (ML) INJECTION EVERY 8 HOURS
Status: DISCONTINUED | OUTPATIENT
Start: 2020-06-21 | End: 2020-06-21 | Stop reason: HOSPADM

## 2020-06-21 RX ORDER — SODIUM CHLORIDE 0.9 % (FLUSH) 0.9 %
5-40 SYRINGE (ML) INJECTION AS NEEDED
Status: DISCONTINUED | OUTPATIENT
Start: 2020-06-21 | End: 2020-06-21 | Stop reason: HOSPADM

## 2020-06-21 RX ORDER — FLUCONAZOLE 150 MG/1
150 TABLET ORAL
Qty: 1 TAB | Refills: 0 | Status: SHIPPED | OUTPATIENT
Start: 2020-06-21 | End: 2020-06-21

## 2020-06-21 RX ORDER — CLINDAMYCIN PHOSPHATE 20 MG/G
1 CREAM VAGINAL
Qty: 7 TUBE | Refills: 0 | Status: SHIPPED | OUTPATIENT
Start: 2020-06-21 | End: 2020-06-28

## 2020-06-21 RX ORDER — CEPHALEXIN 500 MG/1
500 CAPSULE ORAL 2 TIMES DAILY
Qty: 10 CAP | Refills: 0 | OUTPATIENT
Start: 2020-06-21 | End: 2020-10-09

## 2020-06-21 RX ADMIN — Medication 10 ML: at 16:48

## 2020-06-21 RX ADMIN — SODIUM CHLORIDE 500 ML: 900 INJECTION, SOLUTION INTRAVENOUS at 15:55

## 2020-06-21 RX ADMIN — SODIUM CHLORIDE 250 ML: 900 INJECTION, SOLUTION INTRAVENOUS at 16:47

## 2020-06-21 RX ADMIN — HUMAN INSULIN 10 UNITS: 100 INJECTION, SOLUTION SUBCUTANEOUS at 16:47

## 2020-06-21 NOTE — DISCHARGE INSTRUCTIONS
Patient Education        Bacterial Vaginosis: Care Instructions  Overview     Bacterial vaginosis is a type of vaginal infection. It is caused by excess growth of certain bacteria that are normally found in the vagina. Symptoms can include itching, swelling, pain when you urinate or have sex, and a gray or yellow discharge with a \"fishy\" odor. It is not considered an infection that is spread through sexual contact. Symptoms can be annoying and uncomfortable. But bacterial vaginosis does not usually cause other health problems. However, if you have it while you are pregnant, it can cause complications. While the infection may go away on its own, most doctors use antibiotics to treat it. You may have been prescribed pills or vaginal cream. With treatment, bacterial vaginosis usually clears up in 5 to 7 days. Follow-up care is a key part of your treatment and safety. Be sure to make and go to all appointments, and call your doctor if you are having problems. It's also a good idea to know your test results and keep a list of the medicines you take. How can you care for yourself at home? · Take your antibiotics as directed. Do not stop taking them just because you feel better. You need to take the full course of antibiotics. · Do not eat or drink anything that contains alcohol if you are taking metronidazole or tinidazole. · Keep using your medicine if you start your period. Use pads instead of tampons while using a vaginal cream or suppository. Tampons can absorb the medicine. · Wear loose cotton clothing. Do not wear nylon and other materials that hold body heat and moisture close to the skin. · Do not scratch. Relieve itching with a cold pack or a cool bath. · Do not wash your vaginal area more than once a day. Use plain water or a mild, unscented soap. Do not douche. When should you call for help?   Watch closely for changes in your health, and be sure to contact your doctor if:  · You have unexpected vaginal bleeding. · You have a fever. · You have new or increased pain in your vagina or pelvis. · You are not getting better after 1 week. · Your symptoms return after you finish the course of your medicine. Where can you learn more? Go to http://kalia-terell.info/  Enter X360 in the search box to learn more about \"Bacterial Vaginosis: Care Instructions. \"  Current as of: November 8, 2019               Content Version: 12.5  © 8073-9588 [employer]. Care instructions adapted under license by [employer] (which disclaims liability or warranty for this information). If you have questions about a medical condition or this instruction, always ask your healthcare professional. [address] any warranty or liability for your use of this information. Patient Education        Learning About High Blood Sugar  What is high blood sugar? Your body turns the food you eat into glucose (sugar), which it uses for energy. But if your body isn't able to use the sugar right away, it can build up in your blood and lead to high blood sugar. When the amount of sugar in your blood stays too high for too much of the time, you may have diabetes. Diabetes is a disease that can cause serious health problems. The good news is that lifestyle changes may help you get your blood sugar back to normal and avoid or delay diabetes. What causes high blood sugar? Sugar (glucose) can build up in your blood if you:  · Are overweight. · Have a family history of diabetes. · Take certain medicines, such as steroids. What are the symptoms? Having high blood sugar may not cause any symptoms at all. Or it may make you feel very thirsty or very hungry. You may also urinate more often than usual, have blurry vision, or lose weight without trying. How is high blood sugar treated?   You can take steps to lower your blood sugar level if you understand what makes it get higher. Your doctor may want you to learn how to test your blood sugar level at home. Then you can see how illness, stress, or different kinds of food or medicine raise or lower your blood sugar level. Other tests may be needed to see if you have diabetes. How can you prevent high blood sugar? · Watch your weight. If you're overweight, losing just a small amount of weight may help. Reducing fat around your waist is most important. · Limit the amount of calories, sweets, and unhealthy fat you eat. Ask your doctor if a dietitian can help you. A registered dietitian can help you create meal plans that fit your lifestyle. · Get at least 30 minutes of exercise on most days of the week. Exercise helps control your blood sugar. It also helps you maintain a healthy weight. Walking is a good choice. You also may want to do other activities, such as running, swimming, cycling, or playing tennis or team sports. · If your doctor prescribed medicines, take them exactly as prescribed. Call your doctor if you think you are having a problem with your medicine. You will get more details on the specific medicines your doctor prescribes. Follow-up care is a key part of your treatment and safety. Be sure to make and go to all appointments, and call your doctor if you are having problems. It's also a good idea to know your test results and keep a list of the medicines you take. Where can you learn more? Go to http://kalia-terell.info/  Enter O108 in the search box to learn more about \"Learning About High Blood Sugar. \"  Current as of: December 20, 2019               Content Version: 12.5  © 7490-1180 [employer]. Care instructions adapted under license by [employer] (which disclaims liability or warranty for this information).  If you have questions about a medical condition or this instruction, always ask your healthcare professional. [employer] disclaims any warranty or liability for your use of this information. Patient Education        Urinary Tract Infection in Women: Care Instructions  Your Care Instructions     A urinary tract infection, or UTI, is a general term for an infection anywhere between the kidneys and the urethra (where urine comes out). Most UTIs are bladder infections. They often cause pain or burning when you urinate. UTIs are caused by bacteria and can be cured with antibiotics. Be sure to complete your treatment so that the infection goes away. Follow-up care is a key part of your treatment and safety. Be sure to make and go to all appointments, and call your doctor if you are having problems. It's also a good idea to know your test results and keep a list of the medicines you take. How can you care for yourself at home? · Take your antibiotics as directed. Do not stop taking them just because you feel better. You need to take the full course of antibiotics. · Drink extra water and other fluids for the next day or two. This may help wash out the bacteria that are causing the infection. (If you have kidney, heart, or liver disease and have to limit fluids, talk with your doctor before you increase your fluid intake.)  · Avoid drinks that are carbonated or have caffeine. They can irritate the bladder. · Urinate often. Try to empty your bladder each time. · To relieve pain, take a hot bath or lay a heating pad set on low over your lower belly or genital area. Never go to sleep with a heating pad in place. To prevent UTIs  · Drink plenty of water each day. This helps you urinate often, which clears bacteria from your system. (If you have kidney, heart, or liver disease and have to limit fluids, talk with your doctor before you increase your fluid intake.)  · Urinate when you need to. · Urinate right after you have sex. · Change sanitary pads often.   · Avoid douches, bubble baths, feminine hygiene sprays, and other feminine hygiene products that have deodorants. · After going to the bathroom, wipe from front to back. When should you call for help? Call your doctor now or seek immediate medical care if:  · Symptoms such as fever, chills, nausea, or vomiting get worse or appear for the first time. · You have new pain in your back just below your rib cage. This is called flank pain. · There is new blood or pus in your urine. · You have any problems with your antibiotic medicine. Watch closely for changes in your health, and be sure to contact your doctor if:  · You are not getting better after taking an antibiotic for 2 days. · Your symptoms go away but then come back. Where can you learn more? Go to http://kalia-terell.info/  Enter K035 in the search box to learn more about \"Urinary Tract Infection in Women: Care Instructions. \"  Current as of: August 22, 2019               Content Version: 12.5  © 5324-6685 Healthwise, Incorporated. Care instructions adapted under license by [employer] (which disclaims liability or warranty for this information). If you have questions about a medical condition or this instruction, always ask your healthcare professional. [place] [address] any warranty or liability for your use of this information.

## 2020-06-21 NOTE — ED PROVIDER NOTES
EMERGENCY DEPARTMENT HISTORY AND PHYSICAL EXAM      Date: 2020  Patient Name: Chip Li    History of Presenting Illness     Chief Complaint   Patient presents with    Vaginal Itching     x 4 days       History Provided By: Patient    HPI: Chip Li, 60 y.o. female with PMHx significant for COPD, HLD, GERD, HTN, DM, presents ambulatory to the ED with cc of mild to moderate vaginal itching x a few days. Denies any associated symptoms. Denies any alleviating or exacerbating factors. States symptoms are similar to UTI. Denies associated vaginal discharge. Pt specifically denies any fever, chills, CP, SOB, abd pain, NVD. There are no other complaints, changes, or physical findings at this time. PCP: George Cox MD    No current facility-administered medications on file prior to encounter. Current Outpatient Medications on File Prior to Encounter   Medication Sig Dispense Refill    Nystop powder APPLY TO THE AFFECTED AREA THREE TIMES DAILY 30 g 0    potassium chloride SA (MICRO-K) 10 mEq capsule TAKE 1 CAPSULE BY MOUTH TWICE DAILY 30 Cap 12    diclofenac EC (VOLTAREN) 75 mg EC tablet TAKE 1 TABLET BY MOUTH TWICE DAILY 60 Tab 0    pantoprazole (PROTONIX) 40 mg tablet TAKE 1 TABLET BY MOUTH DAILY 30 Tab 3    sucralfate (CARAFATE) 1 gram tablet TAKE 1 TABLET BY MOUTH FOUR TIMES DAILY 120 Tab 6    ibuprofen (MOTRIN) 800 mg tablet TAKE 1 TABLET BY MOUTH EVERY 8 HOURS AS NEEDED FOR PAIN 90 Tab 0    losartan (COZAAR) 50 mg tablet TAKE 1 TABLET BY MOUTH DAILY 30 Tab 12    amLODIPine (NORVASC) 5 mg tablet TAKE 1 TABLET BY MOUTH DAILY 30 Tab 12    hydroCHLOROthiazide (HYDRODIURIL) 25 mg tablet TAKE 1 TABLET BY MOUTH DAILY 30 Tab 12    metFORMIN (GLUCOPHAGE) 1,000 mg tablet Si tab daily 60 Tab 12    cetirizine (ZYRTEC) 10 mg tablet Take 1 Tab by mouth daily. 20 Tab 0    nystatin (MYCOSTATIN) powder Apply  to affected area three (3) times daily.  Apply until rash has resolved 1 Bottle 0    potassium chloride SA (MICRO-K) 10 mEq capsule TAKE ONE CAPSULE BY MOUTH TWICE DAILY 30 Cap 12    polyethylene glycol (MIRALAX) 17 gram/dose powder MIX 17 GRAMS IN A LIQUID AND THEN DRINK ONCE D UTD  3    ibuprofen (MOTRIN) 800 mg tablet TAKE 1 TABLET BY MOUTH EVERY 8 HOURS AS NEEDED FOR PAIN 90 Tab 0    bismuth subsalicylate (PEPTO-BISMOL MAXIMUM STRENGTH) 525 mg/15 mL susp Take 30 mL by mouth every six (6) hours as needed.  ONETOUCH ULTRA TEST strip TEST BLOOD SUGARS 3 TIMES DAILY 100 Each 12    loperamide (IMODIUM A-D) 2 mg tablet Take 1 Tab by mouth four (4) times daily as needed for Diarrhea.  20 Tab 0       Past History     Past Medical History:  Past Medical History:   Diagnosis Date    Arthritis, Degenerative, Knee 7/15/2011    COPD (chronic obstructive pulmonary disease) ([facility] [address] 7/15/2011    Diverticulitis     GERD (gastroesophageal reflux disease)     GERD, gastro - Esophageal Reflux Disease 7/15/2011    Hypercholesterolemia 7/15/2011    Hypertension     Type II or unspecified type diabetes mellitus without mention of complication, uncontrolled 7/15/2011       Past Surgical History:  Past Surgical History:   Procedure Laterality Date    COLONOSCOPY N/A 2017    COLONOSCOPY performed [facility]as [facility] AMBULATORY OR    COLONOSCOPY N/A 2018    COLONOSCOPY performed by Anna Elias MD at [facility] ENDOSCOPY    HX GYN  2004    uterine surgery    LAP,TUBAL CAUTERY Bilateral        Family History:  Family History   Problem Relation Age of Onset    Diabetes Father     Deep Vein Thrombosis Daughter     Lung Disease Daughter     Other Daughter         cholesterol       Social History:  Social History     Tobacco Use    Smoking status: Former Smoker     Last attempt to quit: 4/15/2015     Years since quittin.1    Smokeless tobacco: Never Used   Substance Use Topics    Alcohol use: No    Drug use: Yes     Types: Marijuana     Comment: \"daily\" Allergies:  No Known Allergies      Review of Systems   Review of Systems   Constitutional: Negative for fever. HENT: Negative for sore throat. Eyes: Negative for photophobia and redness. Respiratory: Negative for shortness of breath and wheezing. Cardiovascular: Negative for chest pain and leg swelling. Gastrointestinal: Negative for abdominal pain, blood in stool, nausea and vomiting. Genitourinary: Negative for difficulty urinating, dysuria, hematuria, menstrual problem and vaginal bleeding.        + vaginal itching   Musculoskeletal: Negative for back pain and joint swelling. Neurological: Negative for dizziness, seizures, syncope, speech difficulty, weakness, numbness and headaches. Hematological: Negative for adenopathy. Psychiatric/Behavioral: Negative for agitation, confusion and suicidal ideas. The patient is not nervous/anxious. All other systems reviewed and are negative. Physical Exam   Physical Exam  Vitals signs and nursing note reviewed. Constitutional:       General: She is not in acute distress. Appearance: She is well-developed. HENT:      Head: Normocephalic and atraumatic. Eyes:      Conjunctiva/sclera: Conjunctivae normal.      Pupils: Pupils are equal, round, and reactive to light. Neck:      Musculoskeletal: Normal range of motion and neck supple. Cardiovascular:      Rate and Rhythm: Normal rate and regular rhythm. Heart sounds: Normal heart sounds. Pulmonary:      Effort: Pulmonary effort is normal. No respiratory distress. Breath sounds: Normal breath sounds. No wheezing. Abdominal:      General: Bowel sounds are normal. There is no distension. Palpations: Abdomen is soft. Tenderness: There is no abdominal tenderness. Musculoskeletal: Normal range of motion. General: No tenderness. Skin:     General: Skin is warm and dry. Findings: No rash.    Neurological:      Mental Status: She is alert and oriented to person, place, and time. Cranial Nerves: No cranial nerve deficit. Psychiatric:         Behavior: Behavior normal.         Diagnostic Study Results     Labs -     Recent Results (from the past 12 hour(s))   URINALYSIS W/ REFLEX CULTURE    Collection Time: 06/21/20  3:11 PM   Result Value Ref Range    Color YELLOW/STRAW      Appearance CLOUDY (A) CLEAR      Specific gravity 1.010 1.003 - 1.030      pH (UA) 6.0 5.0 - 8.0      Protein Negative NEG mg/dL    Glucose >1,000 (A) NEG mg/dL    Ketone Negative NEG mg/dL    Bilirubin Negative NEG      Blood Negative NEG      Urobilinogen 0.2 0.2 - 1.0 EU/dL    Nitrites Positive (A) NEG      Leukocyte Esterase SMALL (A) NEG      WBC 5-10 0 - 4 /hpf    RBC 0-5 0 - 5 /hpf    Epithelial cells FEW FEW /lpf    Bacteria 3+ (A) NEG /hpf    UA:UC IF INDICATED CULTURE NOT INDICATED BY UA RESULT CNI     WET PREP    Collection Time: 06/21/20  3:11 PM   Result Value Ref Range    Clue cells CLUE CELLS PRESENT      Wet prep NO TRICHOMONAS SEEN     KOH, OTHER SOURCES    Collection Time: 06/21/20  3:11 PM   Result Value Ref Range    Special Requests: NO SPECIAL REQUESTS      KOH NO YEAST SEEN     GLUCOSE, POC    Collection Time: 06/21/20  3:16 PM   Result Value Ref Range    Glucose (POC) 447 (H) 65 - 100 mg/dL    Performed by Marquis Mcmahon    CBC WITH AUTOMATED DIFF    Collection Time: 06/21/20  3:53 PM   Result Value Ref Range    WBC 7.9 3.6 - 11.0 K/uL    RBC 4.52 3.80 - 5.20 M/uL    HGB 13.7 11.5 - 16.0 g/dL    HCT 40.8 35.0 - 47.0 %    MCV 90.3 80.0 - 99.0 FL    MCH 30.3 26.0 - 34.0 PG    MCHC 33.6 30.0 - 36.5 g/dL    RDW 11.9 11.5 - 14.5 %    PLATELET 604 674 - 395 K/uL    MPV 10.8 8.9 - 12.9 FL    NRBC 0.0 0  WBC    ABSOLUTE NRBC 0.00 0.00 - 0.01 K/uL    NEUTROPHILS 59 32 - 75 %    LYMPHOCYTES 30 12 - 49 %    MONOCYTES 7 5 - 13 %    EOSINOPHILS 2 0 - 7 %    BASOPHILS 1 0 - 1 %    IMMATURE GRANULOCYTES 1 (H) 0.0 - 0.5 %    ABS. NEUTROPHILS 4.7 1.8 - 8.0 K/UL    ABS. LYMPHOCYTES 2.4 0.8 - 3.5 K/UL    ABS. MONOCYTES 0.5 0.0 - 1.0 K/UL    ABS. EOSINOPHILS 0.2 0.0 - 0.4 K/UL    ABS. BASOPHILS 0.1 0.0 - 0.1 K/UL    ABS. IMM. GRANS. 0.0 0.00 - 0.04 K/UL    DF AUTOMATED     METABOLIC PANEL, BASIC    Collection Time: 06/21/20  3:53 PM   Result Value Ref Range    Sodium 134 (L) 136 - 145 mmol/L    Potassium 3.7 3.5 - 5.1 mmol/L    Chloride 98 97 - 108 mmol/L    CO2 26 21 - 32 mmol/L    Anion gap 10 5 - 15 mmol/L    Glucose 399 (H) 65 - 100 mg/dL    BUN 23 (H) 6 - 20 MG/DL    Creatinine 0.94 0.55 - 1.02 MG/DL    BUN/Creatinine ratio 24 (H) 12 - 20      GFR est AA >60 >60 ml/min/1.73m2    GFR est non-AA >60 >60 ml/min/1.73m2    Calcium 8.9 8.5 - 10.1 MG/DL       Radiologic Studies -   No orders to display     CT Results  (Last 48 hours)    None        CXR Results  (Last 48 hours)    None            Medical Decision Making   I am the first provider for this patient. I reviewed the vital signs, available nursing notes, past medical history, past surgical history, family history and social history. Vital Signs-Reviewed the patient's vital signs. Patient Vitals for the past 12 hrs:   Temp Pulse Resp BP SpO2   06/21/20 1459 98.5 °F (36.9 °C) 83 16 134/85 98 %       Records Reviewed: Nursing Notes, Old Medical Records, Previous Radiology Studies and Previous Laboratory Studies    Provider Notes (Medical Decision Making):   DDx: UTI, vaginitis, candidiasis, BV, STD exposure     ED Course:   Initial assessment performed. The patients presenting problems have been discussed, and they are in agreement with the care plan formulated and outlined with them. I have encouraged them to ask questions as they arise throughout their visit. 4:24 PM  Pt reevaluated. States she has up to date refills of her insulin at home. Critical Care Time:   none    Disposition:  DISCHARGE  The patient has been re-evaluated and is ready for discharge. Reviewed available results with patient.  Counseled pt on diagnosis and care plan. Pt has expressed understanding, and all questions have been answered. Pt agrees with plan and agrees to follow up as recommended, or return to the ED if their symptoms worsen. Discharge instructions have been provided and explained to the pt, along with reasons to return to the ED. PLAN:  1. Current Discharge Medication List      START taking these medications    Details   cephALEXin (Keflex) 500 mg capsule Take 1 Cap by mouth two (2) times a day. Qty: 10 Cap, Refills: 0      fluconazole (Diflucan) 150 mg tablet Take 1 Tab by mouth now for 1 dose. FDA advises cautious prescribing of oral fluconazole in pregnancy. Qty: 1 Tab, Refills: 0      clindamycin (CLEOCIN) 2 % vaginal cream Insert 1 Applicator into vagina nightly for 7 days. Qty: 7 Tube, Refills: 0           2. Follow-up Information     Follow up With Specialties Details Why Contact Info    Laine Conley MD Internal Medicine In 1 week  [facility]  [phone]          Return to ED if worse     Diagnosis     Clinical Impression:   1. Acute cystitis without hematuria    2. BV (bacterial vaginosis)    3. Hyperglycemia        Attestations: This note is prepared by Phill Mata, acting as Scribe for Jarrod Wilburn MD.    Jarrod Wilburn MD: The scribe's documentation has been prepared under my direction and personally reviewed by me in its entirety. I confirm that the note above accurately reflects all work, treatment, procedures, and medical decision making performed by me.

## 2020-06-21 NOTE — ED NOTES
Patient (s)   given copy of dc instructions and 3 script(s). Patient(s)   verbalized understanding of instructions and script (s). Patient given a current medication reconciliation form and verbalized understanding of their medications. Patient (s)  verbalized understanding of the importance of discussing medications with  his or her physician or clinic when they follow up. Patient alert and oriented and in no acute distress. Pt verbalizes pain scale of 0 out of 10. Patient discharged home ambulatory with self.

## 2020-06-22 LAB
C TRACH DNA SPEC QL NAA+PROBE: NEGATIVE
N GONORRHOEA DNA SPEC QL NAA+PROBE: NEGATIVE
SAMPLE TYPE: NORMAL
SERVICE CMNT-IMP: NORMAL
SPECIMEN SOURCE: NORMAL

## 2020-06-24 RX ORDER — INSULIN PUMP SYRINGE, 3 ML
EACH MISCELLANEOUS
Qty: 1 KIT | Refills: 0 | Status: SHIPPED | OUTPATIENT
Start: 2020-06-24

## 2020-06-28 DIAGNOSIS — M17.0 PRIMARY OSTEOARTHRITIS OF BOTH KNEES: ICD-10-CM

## 2020-06-29 RX ORDER — BLOOD-GLUCOSE CONTROL, NORMAL
EACH MISCELLANEOUS
Qty: 100 LANCET | Refills: 3 | Status: SHIPPED | OUTPATIENT
Start: 2020-06-29 | End: 2020-11-03 | Stop reason: SDUPTHER

## 2020-06-29 RX ORDER — DICLOFENAC SODIUM 75 MG/1
TABLET, DELAYED RELEASE ORAL
Qty: 60 TAB | Refills: 0 | Status: SHIPPED | OUTPATIENT
Start: 2020-06-29 | End: 2020-07-26

## 2020-07-06 ENCOUNTER — OFFICE VISIT (OUTPATIENT)
Dept: INTERNAL MEDICINE CLINIC | Age: 65
End: 2020-07-06

## 2020-07-06 VITALS — HEIGHT: 63 IN | RESPIRATION RATE: 16 BRPM | WEIGHT: 156 LBS | BODY MASS INDEX: 27.64 KG/M2

## 2020-07-06 DIAGNOSIS — M65.341 TRIGGER FINGER, RIGHT RING FINGER: ICD-10-CM

## 2020-07-06 DIAGNOSIS — E11.9 CONTROLLED TYPE 2 DIABETES MELLITUS WITHOUT COMPLICATION, WITHOUT LONG-TERM CURRENT USE OF INSULIN (HCC): Primary | ICD-10-CM

## 2020-07-06 DIAGNOSIS — E78.00 HYPERCHOLESTEREMIA: ICD-10-CM

## 2020-07-06 DIAGNOSIS — G56.01 CARPAL TUNNEL SYNDROME, RIGHT: ICD-10-CM

## 2020-07-06 DIAGNOSIS — I10 ESSENTIAL HYPERTENSION: ICD-10-CM

## 2020-07-06 LAB
CHOLEST SERPL-MCNC: 181 MG/DL
GLUCOSE POC: 311 MG/DL
HBA1C MFR BLD HPLC: 11.3 %
HDLC SERPL-MCNC: 48 MG/DL
LDL CHOLESTEROL POC: 94 MG/DL
NON-HDL CHOLESTEROL, 011976: 133
TCHOL/HDL RATIO (POC): 3.8
TRIGL SERPL-MCNC: 196 MG/DL

## 2020-07-06 RX ORDER — PIOGLITAZONEHYDROCHLORIDE 45 MG/1
45 TABLET ORAL
Qty: 30 TAB | Refills: 12 | Status: SHIPPED | OUTPATIENT
Start: 2020-07-06 | End: 2020-11-03 | Stop reason: SDUPTHER

## 2020-07-06 RX ORDER — METFORMIN HYDROCHLORIDE 1000 MG/1
1000 TABLET ORAL 2 TIMES DAILY WITH MEALS
Qty: 60 TAB | Refills: 12 | Status: SHIPPED | OUTPATIENT
Start: 2020-07-06 | End: 2020-11-03 | Stop reason: SDUPTHER

## 2020-07-06 NOTE — PROGRESS NOTES
1. Have you been to the ER, urgent care clinic since your last visit? Hospitalized since your last visit?no    2. Have you seen or consulted any other health care providers outside of the [address] [place] [address] since your last visit? Include any pap smears or colon screening. 3 most recent PHQ Screens 4/23/2020   PHQ Not Done -   Little interest or pleasure in doing things Not at all   Feeling down, depressed, irritable, or hopeless Not at all   Total Score PHQ 2 0     Chief Complaint   Patient presents with    Diabetes     Per Dr. Maty Page.,  verbal order given for needed amb poc labs.

## 2020-07-06 NOTE — PROGRESS NOTES
Jesus Reyes is a 60 y.o. female and presents with Diabetes and Hypertension  . Subjective:    Hypertension Review:  The patient has essential hypertension . Diet and Lifestyle: generally follows a low sodium diet, exercises sporadically  Home BP Monitoring: is not measured at home. Pertinent ROS: taking medications as instructed, no medication side effects noted, no TIA's, no chest pain on exertion, no dyspnea on exertion, no swelling of ankles. Diabetes Mellitus Review:  She has diabetes mellitus. she states her glucoses have beeb elevated. Diabetic ROS - medication compliance: compliant all of the time, diabetic diet compliance: compliant all of the time, home glucose monitoring: is performed. Known diabetic complications: none  Cardiovascular risk factors: family history, dyslipidemia, diabetes mellitus, obesity, hypertension  Current diabetic medications include oral agents/   Eye exam current (within one year): no  Weight trend: stable  Prior visit with dietician: no  Current diet: \"healthy\" diet  in general  Current exercise: walking  Home blood sugar records: trend: stable  Any episodes of hypoglycemia? No    Dyslipidemia Review:  Patient presents for evaluation of lipids. Compliance with treatment thus far has been excellent. A repeat fasting lipid profile was not done. The patient does not use medications that may worsen dyslipidemias (corticosteroids, progestins, anabolic steroids, amiodarone, cyclosporine, olanzapine). The patient exercises some    COPD REVIEW:  The patient is being seen for follow up of COPD,the patient has been stable  Oxygen: She currently is not on home oxygen therapy. Symptoms: chronic dyspnea: severity = not present: course of sx: stable. Patient uses 2 pillows at night. Patient does continue to smoke cigarettes. She has a trigger finger on the 4th finger on the rt.           Review of Systems  Constitutional: negative for fevers, chills, anorexia and weight loss  Eyes:   negative for visual disturbance and irritation  ENT:   negative for tinnitus,sore throat,nasal congestion,ear pains. hoarseness  Respiratory:  negative for cough, hemoptysis, dyspnea,wheezing  CV:   negative for chest pain, palpitations, lower extremity edema  GI:   negative for nausea, vomiting, diarrhea, abdominal pain,melena  Endo:               negative for polyuria,polydipsia,polyphagia,heat intolerance  Genitourinary: negative for frequency, dysuria and hematuria  Integument:  negative for rash and pruritus  Hematologic:  negative for easy bruising and gum/nose bleeding  Musculoskel: negative for myalgias, arthralgias, back pain, muscle weakness, joint pain  Neurological:  negative for headaches, dizziness, vertigo, memory problems and gait   Behavl/Psych: negative for feelings of anxiety, depression, mood changes    Past Medical History:   Diagnosis Date    Arthritis, Degenerative, Knee 7/15/2011    COPD (chronic obstructive pulmonary disease) ([facility] [address] 7/15/2011    Diverticulitis     GERD (gastroesophageal reflux disease)     GERD, gastro - Esophageal Reflux Disease 7/15/2011    Hypercholesterolemia 7/15/2011    Hypertension     Type II or unspecified type diabetes mellitus without mention of complication, uncontrolled 7/15/2011     Past Surgical History:   Procedure Laterality Date    COLONOSCOPY N/A 2017    COL[facility] performed [facility] at [facility] AM[facility]ULAT[facility] OR    COLONOSCOPY N/A 2018    COLONOSCOPY performed by Mickey Poon MD at [facility] ENDOSCOPY    HX GYN  2004    uterine surgery    LAP,TUBAL CAUTERY Bilateral      Social History     Socioeconomic History    Marital status:      Spouse name: Not on file    Number of children: Not on file    Years of education: Not on file    Highest education level: Not on file   Tobacco Use    Smoking status: Former Smoker     Last attempt to quit: 4/15/2015     Years since quittin.2    Smokeless tobacco: Never Used   Substance and Sexual Activity    Alcohol use: No    Drug use: Yes     Types: Marijuana     Comment: \"daily\"    Sexual activity: Yes     Partners: Male   Social History Narrative        She lives with her  she has five adult children denies alcohol cigarette use     Family History   Problem Relation Age of Onset    Diabetes Father     Deep Vein Thrombosis Daughter     Lung Disease Daughter     Other Daughter         cholesterol     Current Outpatient Medications   Medication Sig Dispense Refill    metFORMIN (GLUCOPHAGE) 1,000 mg tablet Take 1 Tab by mouth two (2) times daily (with meals). Si tab daily 60 Tab 12    pioglitazone (ACTOS) 45 mg tablet Take 1 Tab by mouth nightly. 30 Tab 12    lancets (OneTouch Delica Lancets) 30 gauge misc Use once daily;E11.9:Type 2 diabetes 100 Lancet 3    glucose blood VI test strips (OneTouch Ultra Blue Test Strip) strip Test once daily;E11. 9;Type 2 diabetes. 100 Strip 3    diclofenac EC (VOLTAREN) 75 mg EC tablet TAKE 1 TABLET BY MOUTH TWICE DAILY 60 Tab 0    Blood-Glucose Meter (OneTouch Ultra2 Meter) monitoring kit Test once daily; E11.9; Diabetes Type 2 1 Kit 0    Nystop powder APPLY TO THE AFFECTED AREA THREE TIMES DAILY 30 g 0    potassium chloride SA (MICRO-K) 10 mEq capsule TAKE 1 CAPSULE BY MOUTH TWICE DAILY 30 Cap 12    pantoprazole (PROTONIX) 40 mg tablet TAKE 1 TABLET BY MOUTH DAILY 30 Tab 3    sucralfate (CARAFATE) 1 gram tablet TAKE 1 TABLET BY MOUTH FOUR TIMES DAILY 120 Tab 6    ibuprofen (MOTRIN) 800 mg tablet TAKE 1 TABLET BY MOUTH EVERY 8 HOURS AS NEEDED FOR PAIN 90 Tab 0    losartan (COZAAR) 50 mg tablet TAKE 1 TABLET BY MOUTH DAILY 30 Tab 12    amLODIPine (NORVASC) 5 mg tablet TAKE 1 TABLET BY MOUTH DAILY 30 Tab 12    hydroCHLOROthiazide (HYDRODIURIL) 25 mg tablet TAKE 1 TABLET BY MOUTH DAILY 30 Tab 12    cetirizine (ZYRTEC) 10 mg tablet Take 1 Tab by mouth daily.  20 Tab 0    nystatin (MYCOSTATIN) powder Apply  to affected area three (3) times daily. Apply until rash has resolved 1 Bottle 0    potassium chloride SA (MICRO-K) 10 mEq capsule TAKE ONE CAPSULE BY MOUTH TWICE DAILY 30 Cap 12    polyethylene glycol (MIRALAX) 17 gram/dose powder MIX 17 GRAMS IN A LIQUID AND THEN DRINK ONCE D UTD  3    ibuprofen (MOTRIN) 800 mg tablet TAKE 1 TABLET BY MOUTH EVERY 8 HOURS AS NEEDED FOR PAIN 90 Tab 0    bismuth subsalicylate (PEPTO-BISMOL MAXIMUM STRENGTH) 525 mg/15 mL susp Take 30 mL by mouth every six (6) hours as needed.  loperamide (IMODIUM A-D) 2 mg tablet Take 1 Tab by mouth four (4) times daily as needed for Diarrhea. 20 Tab 0    cephALEXin (Keflex) 500 mg capsule Take 1 Cap by mouth two (2) times a day. (Patient not taking: Reported on 7/6/2020) 10 Cap 0     No Known Allergies    Objective:  Visit Vitals  Resp 16   Ht 5' 3\" (1.6 m)   Wt 156 lb (70.8 kg)   LMP 03/15/2004 (Approximate)   BMI 27.63 kg/m²     Physical Exam:   General appearance - alert, well appearing, and in no distress  Mental status - alert, oriented to person, place, and time  EYE-KODY, EOMI, corneas normal, no foreign bodies  ENT-ENT exam normal, no neck nodes or sinus tenderness  Nose - normal and patent, no erythema, discharge or polyps  Mouth - mucous membranes moist, pharynx normal without lesions  Neck - supple, no significant adenopathy   Chest - clear to auscultation, no wheezes, rales or rhonchi, symmetric air entry   Heart - normal rate, regular rhythm, normal S1, S2, no murmurs, rubs, clicks or gallops   Abdomen - soft, nontender, nondistended, no masses or organomegaly  Lymph- no adenopathy palpable  Ext-peripheral pulses normal, no pedal edema, no clubbing or cyanosis  Skin-Warm and dry.  no hyperpigmentation, vitiligo, or suspicious lesions  Neuro -alert, oriented, normal speech, no focal findings or movement disorder noted  Neck-normal C-spine, no tenderness, full ROM without pain  Feet-no nail deformities or callus formation with good pulses noted      Results for orders placed or performed in visit on 20   AMB POC LIPID PROFILE   Result Value Ref Range    Cholesterol (POC) 181     Triglycerides (POC) 196     HDL Cholesterol (POC) 48     Non-HDL Cholesterol 133     LDL Cholesterol (POC) 94 MG/DL    TChol/HDL Ratio (POC) 3.8    AMB POC HEMOGLOBIN A1C   Result Value Ref Range    Hemoglobin A1c (POC) 11.3 %   AMB POC GLUCOSE BLOOD, BY GLUCOSE MONITORING DEVICE   Result Value Ref Range    Glucose  mg/dL       Assessment/Plan:    ICD-10-CM ICD-9-CM    1. Controlled type 2 diabetes mellitus without complication, without long-term current use of insulin (HCC) E11.9 250.00 AMB POC LIPID PROFILE      AMB POC HEMOGLOBIN A1C      AMB POC GLUCOSE BLOOD, BY GLUCOSE MONITORING DEVICE   2. Hypercholesteremia E78.00 272.0 AMB POC LIPID PROFILE      AMB POC HEMOGLOBIN A1C      AMB POC GLUCOSE BLOOD, BY GLUCOSE MONITORING DEVICE   3. Essential hypertension I10 401.9 AMB POC LIPID PROFILE      AMB POC HEMOGLOBIN A1C      AMB POC GLUCOSE BLOOD, BY GLUCOSE MONITORING DEVICE     Orders Placed This Encounter    AMB POC LIPID PROFILE    AMB POC HEMOGLOBIN A1C    AMB POC GLUCOSE BLOOD, BY GLUCOSE MONITORING DEVICE    metFORMIN (GLUCOPHAGE) 1,000 mg tablet     Sig: Take 1 Tab by mouth two (2) times daily (with meals). Si tab daily     Dispense:  60 Tab     Refill:  12    pioglitazone (ACTOS) 45 mg tablet     Sig: Take 1 Tab by mouth nightly. Dispense:  30 Tab     Refill:  12     lose weight, increase physical activity, follow low fat diet, follow low salt diet, call if any problems,Take 81mg aspirin daily  There are no Patient Instructions on file for this visit. I have reviewed with the patient details of the assessment and plan and all questions were answered. Relevent patient education was performed. The most recent lab findings were reviewed with the patient. An After Visit Summary was printed and given to the patient.

## 2020-07-26 DIAGNOSIS — M17.0 PRIMARY OSTEOARTHRITIS OF BOTH KNEES: ICD-10-CM

## 2020-07-26 RX ORDER — DICLOFENAC SODIUM 75 MG/1
TABLET, DELAYED RELEASE ORAL
Qty: 60 TAB | Refills: 0 | Status: SHIPPED | OUTPATIENT
Start: 2020-07-26 | End: 2020-08-23

## 2020-08-03 ENCOUNTER — OFFICE VISIT (OUTPATIENT)
Dept: INTERNAL MEDICINE CLINIC | Age: 65
End: 2020-08-03
Payer: MEDICAID

## 2020-08-03 VITALS
WEIGHT: 161 LBS | DIASTOLIC BLOOD PRESSURE: 70 MMHG | HEART RATE: 66 BPM | RESPIRATION RATE: 16 BRPM | SYSTOLIC BLOOD PRESSURE: 110 MMHG | HEIGHT: 63 IN | OXYGEN SATURATION: 95 % | BODY MASS INDEX: 28.53 KG/M2 | TEMPERATURE: 98.1 F

## 2020-08-03 DIAGNOSIS — I10 ESSENTIAL HYPERTENSION: ICD-10-CM

## 2020-08-03 DIAGNOSIS — E78.00 HYPERCHOLESTEREMIA: ICD-10-CM

## 2020-08-03 DIAGNOSIS — G56.01 CARPAL TUNNEL SYNDROME, RIGHT: ICD-10-CM

## 2020-08-03 DIAGNOSIS — E11.9 CONTROLLED TYPE 2 DIABETES MELLITUS WITHOUT COMPLICATION, WITHOUT LONG-TERM CURRENT USE OF INSULIN (HCC): Primary | ICD-10-CM

## 2020-08-03 LAB
GLUCOSE POC: 148 MG/DL
HBA1C MFR BLD HPLC: 9.5 %

## 2020-08-03 PROCEDURE — 99214 OFFICE O/P EST MOD 30 MIN: CPT | Performed by: INTERNAL MEDICINE

## 2020-08-03 PROCEDURE — 83036 HEMOGLOBIN GLYCOSYLATED A1C: CPT | Performed by: INTERNAL MEDICINE

## 2020-08-03 PROCEDURE — 82962 GLUCOSE BLOOD TEST: CPT | Performed by: INTERNAL MEDICINE

## 2020-08-03 NOTE — PROGRESS NOTES
Chief Complaint   Patient presents with    Diabetes     4 week follow up        1. Have you been to the ER, urgent care clinic since your last visit? Hospitalized since your last visit? No    2. Have you seen or consulted any other health care providers outside of the [address] [place] [address] since your last visit? Include any pap smears or colon screening.  No

## 2020-08-03 NOTE — PATIENT INSTRUCTIONS
[email or web address] Activation Thank you for requesting access to [employer]. Please follow the instructions below to securely access and download your online medical record. [employer] allows you to send messages to your doctor, view your test results, renew your prescriptions, schedule appointments, and more. How Do I Sign Up? 1. In your internet browser, go to [email or web address] 
2. Click on the First Time User? Click Here link in the Sign In box. You will be redirect to the New Member Sign Up page. 3. Enter your Live[employer] Access Code exactly as it appears below. You will not need to use this code after youve completed the sign-up process. If you do not sign up before the expiration date, you must request a new code. LiveGO Access Code: Activation code not generated Current LiveGO Status: Active (This is the date your LiveGO access code will ) 4. Enter the last four digits of your Social Security Number (xxxx) and Date of Birth (mm/dd/yyyy) as indicated and click Submit. You will be taken to the next sign-up page. 5. Create a LiveGO ID. This will be your LiveGO login ID and cannot be changed, so think of one that is secure and easy to remember. 6. Create a LiveGO password. You can change your password at any time. 7. Enter your Password Reset Question and Answer. This can be used at a later time if you forget your password. 8. Enter your e-mail address. You will receive e-mail notification when new information is available in [address] 9. Click Sign Up. You can now view and download portions of your medical record. 10. Click the Download Summary menu link to download a portable copy of your medical information. Additional Information If you have questions, please visit the Frequently Asked Questions section of the LiveGO website at [email or web address] Remember, [employer] is NOT to be used for urgent needs. For medical emergencies, dial 911.

## 2020-08-03 NOTE — PROGRESS NOTES
Matt Lafleur is a 60 y.o. female and presents with Diabetes (4 week follow up )  . Subjective:    Carpal Tunnel Syndrome Review:  Patient presents for evaluation of pain in rt. hand(s), hand paresthesias. Onset of the symptoms was several months ago. Current symptoms include tingling involving the medial aspect of the bilateral hand(s): severity: moderate. Patient's overall course: gradually worsening. Patient has had no prior elbow problems. Previous visits for this problem: none. Evaluation to date: none. Treatment to date: none. Hypertension Review:  The patient has essential hypertension . Diet and Lifestyle: generally follows a low sodium diet, exercises sporadically  Home BP Monitoring: is not measured at home. Pertinent ROS: taking medications as instructed, no medication side effects noted, no TIA's, no chest pain on exertion, no dyspnea on exertion, no swelling of ankles. Diabetes Mellitus Review:  She has diabetes mellitus. Diabetic ROS - medication compliance: compliant all of the time, diabetic diet compliance: compliant all of the time, home glucose monitoring: is performed. Known diabetic complications: none  Cardiovascular risk factors: family history, dyslipidemia, diabetes mellitus, obesity, hypertension  Current diabetic medications include oral agents/insulin   Eye exam current (within one year): no  Weight trend: stable  Prior visit with dietician: no  Current diet: \"healthy\" diet  in general  Current exercise: walking  Home blood sugar records: trend: stable  Any episodes of hypoglycemia? no            Review of Systems  Constitutional: negative for fevers, chills, anorexia and weight loss  Eyes:   negative for visual disturbance and irritation  ENT:   negative for tinnitus,sore throat,nasal congestion,ear pains. hoarseness  Respiratory:  negative for cough, hemoptysis, dyspnea,wheezing  CV:   negative for chest pain, palpitations, lower extremity edema  GI:   negative for nausea, vomiting, diarrhea, abdominal pain,melena  Endo:               negative for polyuria,polydipsia,polyphagia,heat intolerance  Genitourinary: negative for frequency, dysuria and hematuria  Integument:  negative for rash and pruritus  Hematologic:  negative for easy bruising and gum/nose bleeding  Musculoskel: negative for myalgias, arthralgias, back pain, muscle weakness, joint pain  Neurological:  negative for headaches, dizziness, vertigo, memory problems and gait   Behavl/Psych: negative for feelings of anxiety, depression, mood changes    Past Medical History:   Diagnosis Date    Arthritis, Degenerative, Knee 7/15/2011    COPD (chronic obstructive pulmonary disease) ([facility] [address] 7/15/2011    Diverticulitis     GERD (gastroesophageal reflux disease)     GERD, gastro - Esophageal Reflux Disease 7/15/2011    Hypercholesterolemia 7/15/2011    Hypertension     Type II or unspecified type diabetes mellitus without mention of complication, uncontrolled 7/15/2011     Past Surgical History:   Procedure Laterality Date    COLONOSCOPY N/A 2017    COLONOSCOPY performed by Edith Slade MD at [facility] AMBULATORY OR    COLONOSCOPY N/A 2018    COLONOSCOPY performed by Edith Slade MD at [facility] ENDOSCOPY    HX GYN  2004    uterine surgery    LAP,TUBAL CAUTERY Bilateral      Social History     Socioeconomic History    Marital status:      Spouse name: Not on file    Number of children: Not on file    Years of education: Not on file    Highest education level: Not on file   Tobacco Use    Smoking status: Former Smoker     Last attempt to quit: 4/15/2015     Years since quittin.3    Smokeless tobacco: Never Used   Substance and Sexual Activity    Alcohol use: No    Drug use: Yes     Types: Marijuana     Comment: \"daily\"    Sexual activity: Yes     Partners: Male   Social History Narrative        She lives with her  she has five adult children denies alcohol cigarette use Family History   Problem Relation Age of Onset    Diabetes Father     Deep Vein Thrombosis Daughter     Lung Disease Daughter     Other Daughter         cholesterol     Current Outpatient Medications   Medication Sig Dispense Refill    diclofenac EC (VOLTAREN) 75 mg EC tablet TAKE 1 TABLET BY MOUTH TWICE DAILY 60 Tab 0    metFORMIN (GLUCOPHAGE) 1,000 mg tablet Take 1 Tab by mouth two (2) times daily (with meals). Si tab daily 60 Tab 12    pantoprazole (PROTONIX) 40 mg tablet TAKE 1 TABLET BY MOUTH DAILY 30 Tab 3    sucralfate (CARAFATE) 1 gram tablet TAKE 1 TABLET BY MOUTH FOUR TIMES DAILY 120 Tab 6    losartan (COZAAR) 50 mg tablet TAKE 1 TABLET BY MOUTH DAILY 30 Tab 12    amLODIPine (NORVASC) 5 mg tablet TAKE 1 TABLET BY MOUTH DAILY 30 Tab 12    hydroCHLOROthiazide (HYDRODIURIL) 25 mg tablet TAKE 1 TABLET BY MOUTH DAILY 30 Tab 12    nystatin (MYCOSTATIN) powder Apply  to affected area three (3) times daily. Apply until rash has resolved 1 Bottle 0    potassium chloride SA (MICRO-K) 10 mEq capsule TAKE ONE CAPSULE BY MOUTH TWICE DAILY 30 Cap 12    polyethylene glycol (MIRALAX) 17 gram/dose powder MIX 17 GRAMS IN A LIQUID AND THEN DRINK ONCE D UTD  3    bismuth subsalicylate (PEPTO-BISMOL MAXIMUM STRENGTH) 525 mg/15 mL susp Take 30 mL by mouth every six (6) hours as needed.  loperamide (IMODIUM A-D) 2 mg tablet Take 1 Tab by mouth four (4) times daily as needed for Diarrhea. 20 Tab 0    pioglitazone (ACTOS) 45 mg tablet Take 1 Tab by mouth nightly. 30 Tab 12    lancets (OneTouch Delica Lancets) 30 gauge misc Use once daily;E11.9:Type 2 diabetes 100 Lancet 3    glucose blood VI test strips (OneTouch Ultra Blue Test Strip) strip Test once daily;E11. 9;Type 2 diabetes. 100 Strip 3    Blood-Glucose Meter (OneTouch Ultra2 Meter) monitoring kit Test once daily; E11.9; Diabetes Type 2 1 Kit 0    cephALEXin (Keflex) 500 mg capsule Take 1 Cap by mouth two (2) times a day.  (Patient not taking: Reported on 7/6/2020) 10 Cap 0    Nystop powder APPLY TO THE AFFECTED AREA THREE TIMES DAILY 30 g 0    potassium chloride SA (MICRO-K) 10 mEq capsule TAKE 1 CAPSULE BY MOUTH TWICE DAILY 30 Cap 12    ibuprofen (MOTRIN) 800 mg tablet TAKE 1 TABLET BY MOUTH EVERY 8 HOURS AS NEEDED FOR PAIN 90 Tab 0    cetirizine (ZYRTEC) 10 mg tablet Take 1 Tab by mouth daily. 20 Tab 0    ibuprofen (MOTRIN) 800 mg tablet TAKE 1 TABLET BY MOUTH EVERY 8 HOURS AS NEEDED FOR PAIN 90 Tab 0     No Known Allergies    Objective:  Visit Vitals  /70 (BP 1 Location: Left arm, BP Patient Position: Sitting)   Pulse 66   Temp 98.1 °F (36.7 °C) (Oral)   Resp 16   Ht 5' 3\" (1.6 m)   Wt 161 lb (73 kg)   LMP 03/15/2004 (Approximate)   SpO2 95%   BMI 28.52 kg/m²     Physical Exam:   General appearance - alert, well appearing, and in no distress  Mental status - alert, oriented to person, place, and time  EYE-KODY, EOMI, corneas normal, no foreign bodies  ENT-ENT exam normal, no neck nodes or sinus tenderness  Nose - normal and patent, no erythema, discharge or polyps  Mouth - mucous membranes moist, pharynx normal without lesions  Neck - supple, no significant adenopathy   Chest - clear to auscultation, no wheezes, rales or rhonchi, symmetric air entry   Heart - normal rate, regular rhythm, normal S1, S2, no murmurs, rubs, clicks or gallops   Abdomen - soft, nontender, nondistended, no masses or organomegaly  Lymph- no adenopathy palpable  Ext-peripheral pulses normal, no pedal edema, no clubbing or cyanosis  Skin-Warm and dry.  no hyperpigmentation, vitiligo, or suspicious lesions  Neuro -alert, oriented, normal speech, no focal findings or movement disorder noted  Neck-normal C-spine, no tenderness, full ROM without pain  Feet-no nail deformities or callus formation with good pulses noted      Results for orders placed or performed in visit on 08/03/20   AMB POC GLUCOSE BLOOD, BY GLUCOSE MONITORING DEVICE   Result Value Ref Range    Glucose  mg/dL   AMB POC HEMOGLOBIN A1C   Result Value Ref Range    Hemoglobin A1c (POC) 9.5 %       Assessment/Plan:    ICD-10-CM ICD-9-CM    1. Controlled type 2 diabetes mellitus without complication, without long-term current use of insulin (HCC)  E11.9 250.00 AMB POC GLUCOSE BLOOD, BY GLUCOSE MONITORING DEVICE      AMB POC HEMOGLOBIN O5R      METABOLIC PANEL, COMPREHENSIVE   2. Hypercholesteremia  E78.00 272.0    3. Carpal tunnel syndrome, right  G56.01 354.0    4. Essential hypertension  Y88 540.3 METABOLIC PANEL, COMPREHENSIVE     Orders Placed This Encounter    METABOLIC PANEL, COMPREHENSIVE    AMB POC GLUCOSE BLOOD, BY GLUCOSE MONITORING DEVICE    AMB POC HEMOGLOBIN A1C     call if any problems,Take 81mg aspirin daily  Patient Instructions   [employer] Activation    Thank you for requesting access to [employer]. Please follow the instructions below to securely access and download your online medical record. [employer] allows you to send messages to your doctor, view your test results, renew your prescriptions, schedule appointments, and more. How Do I Sign Up? 1. In your internet browser, go to [email or web address]  2. Click on the First Time User? Click Here link in the Sign In box. You will be redirect to the New Member Sign Up page. 3. Enter your [employer] Access Code exactly as it appears below. You will not need to use this code after youve completed the sign-up process. If you do not sign up before the expiration date, you must request a new code. [employer] Access Code: Activation code not generated  Current [employer] Status: Active (This is the date your [employer] access code will )    4. Enter the last four digits of your Social Security Number (xxxx) and Date of Birth (mm/dd/yyyy) as indicated and click Submit. You will be taken to the next sign-up page. 5. Create a [employer] ID.  This will be your [employer] login ID and cannot be changed, so think of one that is secure and easy to remember. 6. Create a [employer] password. You can change your password at any time. 7. Enter your Password Reset Question and Answer. This can be used at a later time if you forget your password. 8. Enter your e-mail address. You will receive e-mail notification when new information is available in [address] 9. Click Sign Up. You can now view and download portions of your medical record. 10. Click the Download Summary menu link to download a portable copy of your medical information. Additional Information    If you have questions, please visit the Frequently Asked Questions section of the [employer] website at [email or web address]. [email or web address] Remember, [employer] is NOT to be used for urgent needs. For medical emergencies, dial 911. Follow-up and Dispositions    · Return in about 3 months (around 11/3/2020), or if symptoms worsen or fail to improve. I have reviewed with the patient details of the assessment and plan and all questions were answered. Relevent patient education was performed. The most recent lab findings were reviewed with the patient. An After Visit Summary was printed and given to the patient.

## 2020-08-06 LAB
ALBUMIN SERPL-MCNC: 4.2 G/DL (ref 3.8–4.8)
ALBUMIN/GLOB SERPL: 1.6 {RATIO} (ref 1.2–2.2)
ALP SERPL-CCNC: 137 IU/L (ref 39–117)
ALT SERPL-CCNC: 36 IU/L (ref 0–32)
AST SERPL-CCNC: 34 IU/L (ref 0–40)
BILIRUB SERPL-MCNC: 0.3 MG/DL (ref 0–1.2)
BUN SERPL-MCNC: 34 MG/DL (ref 8–27)
BUN/CREAT SERPL: 34 (ref 12–28)
CALCIUM SERPL-MCNC: 9.5 MG/DL (ref 8.7–10.3)
CHLORIDE SERPL-SCNC: 103 MMOL/L (ref 96–106)
CO2 SERPL-SCNC: 23 MMOL/L (ref 20–29)
CREAT SERPL-MCNC: 1.01 MG/DL (ref 0.57–1)
GLOBULIN SER CALC-MCNC: 2.7 G/DL (ref 1.5–4.5)
GLUCOSE SERPL-MCNC: 120 MG/DL (ref 65–99)
POTASSIUM SERPL-SCNC: 3.8 MMOL/L (ref 3.5–5.2)
PROT SERPL-MCNC: 6.9 G/DL (ref 6–8.5)
SODIUM SERPL-SCNC: 140 MMOL/L (ref 134–144)

## 2020-08-23 DIAGNOSIS — M17.0 PRIMARY OSTEOARTHRITIS OF BOTH KNEES: ICD-10-CM

## 2020-08-23 RX ORDER — DICLOFENAC SODIUM 75 MG/1
TABLET, DELAYED RELEASE ORAL
Qty: 60 TAB | Refills: 0 | Status: SHIPPED | OUTPATIENT
Start: 2020-08-23 | End: 2021-05-03 | Stop reason: SDUPTHER

## 2020-09-04 RX ORDER — PANTOPRAZOLE SODIUM 40 MG/1
TABLET, DELAYED RELEASE ORAL
Qty: 30 TAB | Refills: 3 | Status: SHIPPED | OUTPATIENT
Start: 2020-09-04 | End: 2020-11-03 | Stop reason: SDUPTHER

## 2020-10-09 ENCOUNTER — HOSPITAL ENCOUNTER (EMERGENCY)
Age: 65
Discharge: HOME | End: 2020-10-09
Attending: EMERGENCY MEDICINE
Payer: MEDICAID

## 2020-10-09 VITALS
OXYGEN SATURATION: 98 % | DIASTOLIC BLOOD PRESSURE: 79 MMHG | TEMPERATURE: 98 F | HEART RATE: 90 BPM | WEIGHT: 160.94 LBS | BODY MASS INDEX: 28.52 KG/M2 | HEIGHT: 63 IN | SYSTOLIC BLOOD PRESSURE: 122 MMHG

## 2020-10-09 DIAGNOSIS — N30.00 ACUTE CYSTITIS WITHOUT HEMATURIA: Primary | ICD-10-CM

## 2020-10-09 LAB
APPEARANCE UR: ABNORMAL
BACTERIA URNS QL MICRO: NEGATIVE /HPF
BILIRUB UR QL: NEGATIVE
COLOR UR: ABNORMAL
EPITH CASTS URNS QL MICRO: ABNORMAL /LPF
GLUCOSE UR STRIP.AUTO-MCNC: NEGATIVE MG/DL
HGB UR QL STRIP: ABNORMAL
KETONES UR QL STRIP.AUTO: NEGATIVE MG/DL
LEUKOCYTE ESTERASE UR QL STRIP.AUTO: ABNORMAL
NITRITE UR QL STRIP.AUTO: NEGATIVE
PH UR STRIP: 6.5 [PH] (ref 5–8)
PROT UR STRIP-MCNC: ABNORMAL MG/DL
RBC #/AREA URNS HPF: ABNORMAL /HPF (ref 0–5)
SP GR UR REFRACTOMETRY: 1.02 (ref 1–1.03)
UA: UC IF INDICATED,UAUC: ABNORMAL
UROBILINOGEN UR QL STRIP.AUTO: 1 EU/DL (ref 0.2–1)
WBC URNS QL MICRO: ABNORMAL /HPF (ref 0–4)

## 2020-10-09 PROCEDURE — 81001 URINALYSIS AUTO W/SCOPE: CPT

## 2020-10-09 PROCEDURE — 87186 SC STD MICRODIL/AGAR DIL: CPT

## 2020-10-09 PROCEDURE — 87086 URINE CULTURE/COLONY COUNT: CPT

## 2020-10-09 PROCEDURE — 87077 CULTURE AEROBIC IDENTIFY: CPT

## 2020-10-09 PROCEDURE — 99283 EMERGENCY DEPT VISIT LOW MDM: CPT

## 2020-10-09 RX ORDER — CEPHALEXIN 500 MG/1
500 CAPSULE ORAL 2 TIMES DAILY
Qty: 14 CAP | Refills: 0 | Status: SHIPPED | OUTPATIENT
Start: 2020-10-09 | End: 2020-10-16

## 2020-10-09 NOTE — ED NOTES
Pt presents to ED ambulatory complaining of urinary frequency for several days and burning with urination. Pt denies abdominal pain. Pt is alert and oriented x 4, RR even and unlabored, skin is warm and dry. Assessment completed and pt updated on plan of care. Call bell in reach. Emergency Department Nursing Plan of Care       The Nursing Plan of Care is developed from the Nursing assessment and Emergency Department Attending provider initial evaluation. The plan of care may be reviewed in the ED Provider note.     The Plan of Care was developed with the following considerations:   Patient / Family readiness to learn indicated by:verbalized understanding  Persons(s) to be included in education: patient  Barriers to Learning/Limitations:No    Signed     Eulalia Bravo, RN    10/9/2020   6:37 PM

## 2020-10-09 NOTE — DISCHARGE INSTRUCTIONS

## 2020-10-12 LAB
BACTERIA SPEC CULT: ABNORMAL
CC UR VC: ABNORMAL
SERVICE CMNT-IMP: ABNORMAL

## 2020-10-17 NOTE — ED PROVIDER NOTES
EMERGENCY DEPARTMENT HISTORY AND PHYSICAL EXAM      Date: 10/9/2020  Patient Name: Ngoc Hoang    Please note that this dictation was completed with [employer], the computer voice recognition software. Quite often unanticipated grammatical, syntax, homophones, and other interpretive errors are inadvertently transcribed by the computer software. Please disregard these errors. Please excuse any errors that have escaped final proofreading. History of Presenting Illness     Chief Complaint   Patient presents with    Urinary Frequency       History Provided By: Patient     HPI: Ngoc Hoang, 63 y.o. female, presenting with dysuria, frequency. NO fever. No abdominal pain, no hematuria. No other exacerbating or relieving factors. Feels similar to prior uti's. BS no significant elevated at home. PCP: Goran Quan MD    No current facility-administered medications on file prior to encounter. Current Outpatient Medications on File Prior to Encounter   Medication Sig Dispense Refill    pantoprazole (PROTONIX) 40 mg tablet TAKE 1 TABLET BY MOUTH DAILY 30 Tab 3    metFORMIN (GLUCOPHAGE) 1,000 mg tablet Take 1 Tab by mouth two (2) times daily (with meals). Si tab daily 60 Tab 12    pioglitazone (ACTOS) 45 mg tablet Take 1 Tab by mouth nightly. 30 Tab 12    lancets (OneTouch Delica Lancets) 30 gauge misc Use once daily;E11.9:Type 2 diabetes 100 Lancet 3    glucose blood VI test strips (OneTouch Ultra Blue Test Strip) strip Test once daily;E11. 9;Type 2 diabetes.  100 Strip 3    Blood-Glucose Meter (OneTouch Ultra2 Meter) monitoring kit Test once daily; E11.9; Diabetes Type 2 1 Kit 0    potassium chloride SA (MICRO-K) 10 mEq capsule TAKE 1 CAPSULE BY MOUTH TWICE DAILY 30 Cap 12    sucralfate (CARAFATE) 1 gram tablet TAKE 1 TABLET BY MOUTH FOUR TIMES DAILY 120 Tab 6    losartan (COZAAR) 50 mg tablet TAKE 1 TABLET BY MOUTH DAILY 30 Tab 12    amLODIPine (NORVASC) 5 mg tablet TAKE 1 TABLET BY MOUTH DAILY 30 Tab 12    hydroCHLOROthiazide (HYDRODIURIL) 25 mg tablet TAKE 1 TABLET BY MOUTH DAILY 30 Tab 12    cetirizine (ZYRTEC) 10 mg tablet Take 1 Tab by mouth daily. 20 Tab 0    potassium chloride SA (MICRO-K) 10 mEq capsule TAKE ONE CAPSULE BY MOUTH TWICE DAILY 30 Cap 12    loperamide (IMODIUM A-D) 2 mg tablet Take 1 Tab by mouth four (4) times daily as needed for Diarrhea. 20 Tab 0    diclofenac EC (VOLTAREN) 75 mg EC tablet TAKE 1 TABLET BY MOUTH TWICE DAILY 60 Tab 0    Nystop powder APPLY TO THE AFFECTED AREA THREE TIMES DAILY 30 g 0    ibuprofen (MOTRIN) 800 mg tablet TAKE 1 TABLET BY MOUTH EVERY 8 HOURS AS NEEDED FOR PAIN 90 Tab 0    nystatin (MYCOSTATIN) powder Apply  to affected area three (3) times daily. Apply until rash has resolved 1 Bottle 0    polyethylene glycol (MIRALAX) 17 gram/dose powder MIX 17 GRAMS IN A LIQUID AND THEN DRINK ONCE D UTD  3    ibuprofen (MOTRIN) 800 mg tablet TAKE 1 TABLET BY MOUTH EVERY 8 HOURS AS NEEDED FOR PAIN 90 Tab 0    bismuth subsalicylate (PEPTO-BISMOL MAXIMUM STRENGTH) 525 mg/15 mL susp Take 30 mL by mouth every six (6) hours as needed.          Past History     Past Medical History:  Past Medical History:   Diagnosis Date    Arthritis, Degenerative, Knee 7/15/2011    COPD (chronic obstructive pulmonary disease) ([facility] [address] 7/15/2011    Diverticulitis     GERD (gastroesophageal reflux disease)     GERD, gastro - Esophageal Reflux Disease 7/15/2011    Hypercholesterolemia 7/15/2011    Hypertension     Type II or unspecified type diabetes mellitus without mention of complication, uncontrolled 7/15/2011       Past Surgical History:  Past Surgical History:   Procedure Laterality Date    COLONOSCOPY N/A 9/18/2017    COL[facility] AM[facility] OR    COLONOSCOPY N/A 4/25/2018    COLONOSCOPY performed by [facility]avi Galvin MD at [facility] ENDOSCOPY    HX GYN  2004    uterine surgery    LAP,TUBAL CAUTERY Bilateral 1992 Family History:  Family History   Problem Relation Age of Onset    Diabetes Father     Deep Vein Thrombosis Daughter     Lung Disease Daughter     Other Daughter         cholesterol       Social History:  Social History     Tobacco Use    Smoking status: Former Smoker     Last attempt to quit: 4/15/2015     Years since quittin.5    Smokeless tobacco: Never Used   Substance Use Topics    Alcohol use: No    Drug use: Yes     Types: Marijuana     Comment: \"daily\"       Allergies:  No Known Allergies      Review of Systems   Review of Systems   Constitutional: Negative for chills and fever. HENT: Negative for congestion and sore throat. Eyes: Negative for visual disturbance. Respiratory: Negative for cough and shortness of breath. Cardiovascular: Negative for chest pain and leg swelling. Gastrointestinal: Negative for abdominal pain, blood in stool, diarrhea and nausea. Endocrine: Negative for polyuria. Genitourinary: Positive for dysuria and urgency. Negative for flank pain, vaginal bleeding and vaginal discharge. Musculoskeletal: Negative for myalgias. Skin: Negative for rash. Allergic/Immunologic: Negative for immunocompromised state. Neurological: Negative for weakness and headaches. Psychiatric/Behavioral: Negative for confusion. Physical Exam   Physical Exam  Vitals signs and nursing note reviewed. Constitutional:       Appearance: She is well-developed. HENT:      Head: Normocephalic and atraumatic. Eyes:      General:         Right eye: No discharge. Left eye: No discharge. Conjunctiva/sclera: Conjunctivae normal.      Pupils: Pupils are equal, round, and reactive to light. Neck:      Musculoskeletal: Normal range of motion and neck supple. Trachea: No tracheal deviation. Cardiovascular:      Rate and Rhythm: Normal rate and regular rhythm. Heart sounds: Normal heart sounds. No murmur.    Pulmonary:      Effort: Pulmonary effort is normal. No respiratory distress. Breath sounds: Normal breath sounds. No wheezing or rales. Abdominal:      General: Bowel sounds are normal.      Palpations: Abdomen is soft. Tenderness: There is no abdominal tenderness. There is no guarding or rebound. Musculoskeletal: Normal range of motion. General: No tenderness or deformity. Skin:     General: Skin is warm and dry. Findings: No erythema or rash. Neurological:      Mental Status: She is alert and oriented to person, place, and time. Psychiatric:         Behavior: Behavior normal.         Diagnostic Study Results     Labs -   No results found for this or any previous visit (from the past 12 hour(s)). Radiologic Studies -   No orders to display     CT Results  (Last 48 hours)    None        CXR Results  (Last 48 hours)    None            Medical Decision Making   I am the first provider for this patient. I reviewed the vital signs, available nursing notes, past medical history, past surgical history, family history and social history. Vital Signs-Reviewed the patient's vital signs. No data found. Records Reviewed:   Nursing notes, Prior visits     Provider Notes (Medical Decision Making):   Consistent with UTI. Will give PO abx. ED Course:   Initial assessment performed. The patients presenting problems have been discussed, and they are in agreement with the care plan formulated and outlined with them. I have encouraged them to ask questions as they arise throughout their visit. Critical Care Time:   none    Disposition:  DISCHARGE NOTE  Patients results have been reviewed with them.   Patient and/or family have verbally conveyed their understanding and agreement of the patient's signs, symptoms, diagnosis, treatment and prognosis and additionally agree to follow up as recommended or return to the Emergency Room should their condition change or have any new concerns prior to their follow-up appointment. Patient verbally agrees with the care-plan and verbally conveys that all of their questions have been answered. Discharge instructions have also been provided to the patient with some educational information regarding their diagnosis as well a list of reasons why they would want to return to the ER prior to their follow-up appointment should their condition change. PLAN:  1. Discharge Medication List as of 10/9/2020  6:57 PM      CONTINUE these medications which have CHANGED    Details   cephALEXin (Keflex) 500 mg capsule Take 1 Cap by mouth two (2) times a day for 7 days. , Normal, Disp-14 Cap,R-0         CONTINUE these medications which have NOT CHANGED    Details   pantoprazole (PROTONIX) 40 mg tablet TAKE 1 TABLET BY MOUTH DAILY, Normal, Disp-30 Tab,R-3      metFORMIN (GLUCOPHAGE) 1,000 mg tablet Take 1 Tab by mouth two (2) times daily (with meals). Si tab daily, Normal, Disp-60 Tab, R-12      pioglitazone (ACTOS) 45 mg tablet Take 1 Tab by mouth nightly., Normal, Disp-30 Tab, R-12      lancets (OneTouch Delica Lancets) 30 gauge misc Use once daily;E11.9:Type 2 diabetes, Normal, Disp-100 Lancet, R-3      glucose blood VI test strips (OneTouch Ultra Blue Test Strip) strip Test once daily;E11. 9;Type 2 diabetes. , Normal, Disp-100 Strip, R-3      Blood-Glucose Meter (OneTouch Ultra2 Meter) monitoring kit Test once daily; E11.9; Diabetes Type 2, Normal, Disp-1 Kit, R-0      !! potassium chloride SA (MICRO-K) 10 mEq capsule TAKE 1 CAPSULE BY MOUTH TWICE DAILY, Normal, Disp-30 Cap, R-12      sucralfate (CARAFATE) 1 gram tablet TAKE 1 TABLET BY MOUTH FOUR TIMES DAILY, Normal, Disp-120 Tab, R-6      losartan (COZAAR) 50 mg tablet TAKE 1 TABLET BY MOUTH DAILY, Normal, Disp-30 Tab, R-12      amLODIPine (NORVASC) 5 mg tablet TAKE 1 TABLET BY MOUTH DAILY, Normal, Disp-30 Tab, R-12      hydroCHLOROthiazide (HYDRODIURIL) 25 mg tablet TAKE 1 TABLET BY MOUTH DAILY, Normal, Disp-30 Tab, R-12      cetirizine (ZYRTEC) 10 mg tablet Take 1 Tab by mouth daily. , Normal, Disp-20 Tab, R-0      !! potassium chloride SA (MICRO-K) 10 mEq capsule TAKE ONE CAPSULE BY MOUTH TWICE DAILY, Normal, Disp-30 Cap, R-12      loperamide (IMODIUM A-D) 2 mg tablet Take 1 Tab by mouth four (4) times daily as needed for Diarrhea., Print, Disp-20 Tab, R-0      diclofenac EC (VOLTAREN) 75 mg EC tablet TAKE 1 TABLET BY MOUTH TWICE DAILY, Normal, Disp-60 Tab,R-0      !! Nystop powder APPLY TO THE AFFECTED AREA THREE TIMES DAILY, Normal, Disp-30 g, R-0      !! ibuprofen (MOTRIN) 800 mg tablet TAKE 1 TABLET BY MOUTH EVERY 8 HOURS AS NEEDED FOR PAIN, Normal, Disp-90 Tab, R-0      !! nystatin (MYCOSTATIN) powder Apply  to affected area three (3) times daily. Apply until rash has resolved, Normal, Disp-1 Bottle, R-0      polyethylene glycol (MIRALAX) 17 gram/dose powder MIX 17 GRAMS IN A LIQUID AND THEN DRINK ONCE D UTD, Historical Med, R-3      !! ibuprofen (MOTRIN) 800 mg tablet TAKE 1 TABLET BY MOUTH EVERY 8 HOURS AS NEEDED FOR PAIN, Mail Order, Disp-90 Tab, R-0      bismuth subsalicylate (PEPTO-BISMOL MAXIMUM STRENGTH) 525 mg/15 mL susp Take 30 mL by mouth every six (6) hours as needed., Historical Med       !! - Potential duplicate medications found. Please discuss with provider. 2.   Follow-up Information     Follow up With Specialties Details Why Contact Info    Zenaida Roger MD Internal Medicine Schedule an appointment as soon as possible for a visit  [address] [place] [address] [phone]      [facility] EMERGENCY DEPT Emergency Medicine  If symptoms worsen [facility]  [phone]          Return to ED if worse     Diagnosis     Clinical Impression:   1. Acute cystitis without hematuria        Attestations:   This note was completed by Annelise Kwok DO

## 2020-10-28 RX ORDER — SUCRALFATE 1 G/1
TABLET ORAL
Qty: 120 TAB | Refills: 1 | Status: SHIPPED | OUTPATIENT
Start: 2020-10-28 | End: 2021-06-28

## 2020-11-03 ENCOUNTER — OFFICE VISIT (OUTPATIENT)
Dept: INTERNAL MEDICINE CLINIC | Age: 65
End: 2020-11-03
Payer: MEDICAID

## 2020-11-03 VITALS
BODY MASS INDEX: 28.53 KG/M2 | HEIGHT: 63 IN | HEART RATE: 72 BPM | SYSTOLIC BLOOD PRESSURE: 110 MMHG | OXYGEN SATURATION: 98 % | TEMPERATURE: 98.4 F | WEIGHT: 161 LBS | DIASTOLIC BLOOD PRESSURE: 70 MMHG | RESPIRATION RATE: 19 BRPM

## 2020-11-03 DIAGNOSIS — E55.9 VITAMIN D DEFICIENCY: ICD-10-CM

## 2020-11-03 DIAGNOSIS — E78.00 HYPERCHOLESTEREMIA: Primary | ICD-10-CM

## 2020-11-03 DIAGNOSIS — E11.9 CONTROLLED TYPE 2 DIABETES MELLITUS WITHOUT COMPLICATION, WITHOUT LONG-TERM CURRENT USE OF INSULIN (HCC): ICD-10-CM

## 2020-11-03 DIAGNOSIS — I10 ESSENTIAL HYPERTENSION: ICD-10-CM

## 2020-11-03 LAB
25(OH)D3 SERPL-MCNC: 37.4 NG/ML (ref 30–100)
ALBUMIN SERPL-MCNC: 3.5 G/DL (ref 3.5–5)
ALBUMIN/GLOB SERPL: 1 {RATIO} (ref 1.1–2.2)
ALP SERPL-CCNC: 124 U/L (ref 45–117)
ALT SERPL-CCNC: 33 U/L (ref 12–78)
ANION GAP SERPL CALC-SCNC: 9 MMOL/L (ref 5–15)
AST SERPL-CCNC: 39 U/L (ref 15–37)
BILIRUB SERPL-MCNC: 0.3 MG/DL (ref 0.2–1)
BUN SERPL-MCNC: 25 MG/DL (ref 6–20)
BUN/CREAT SERPL: 22 (ref 12–20)
CALCIUM SERPL-MCNC: 9.9 MG/DL (ref 8.5–10.1)
CHLORIDE SERPL-SCNC: 103 MMOL/L (ref 97–108)
CHOLEST SERPL-MCNC: 187 MG/DL
CO2 SERPL-SCNC: 27 MMOL/L (ref 21–32)
CREAT SERPL-MCNC: 1.13 MG/DL (ref 0.55–1.02)
ERYTHROCYTE [DISTWIDTH] IN BLOOD BY AUTOMATED COUNT: 13.2 % (ref 11.5–14.5)
GLOBULIN SER CALC-MCNC: 3.5 G/DL (ref 2–4)
GLUCOSE POC: 127 MG/DL
GLUCOSE SERPL-MCNC: 122 MG/DL (ref 65–100)
HBA1C MFR BLD HPLC: 6.5 %
HCT VFR BLD AUTO: 39.8 % (ref 35–47)
HDLC SERPL-MCNC: 52 MG/DL
HGB BLD-MCNC: 12.9 G/DL (ref 11.5–16)
LDL CHOLESTEROL POC: 92 MG/DL
MCH RBC QN AUTO: 30.3 PG (ref 26–34)
MCHC RBC AUTO-ENTMCNC: 32.4 G/DL (ref 30–36.5)
MCV RBC AUTO: 93.4 FL (ref 80–99)
NON-HDL GOAL (POC): 134
NRBC # BLD: 0 K/UL (ref 0–0.01)
NRBC BLD-RTO: 0 PER 100 WBC
PLATELET # BLD AUTO: 237 K/UL (ref 150–400)
PMV BLD AUTO: 10.9 FL (ref 8.9–12.9)
POTASSIUM SERPL-SCNC: 4.1 MMOL/L (ref 3.5–5.1)
PROT SERPL-MCNC: 7 G/DL (ref 6.4–8.2)
RBC # BLD AUTO: 4.26 M/UL (ref 3.8–5.2)
SODIUM SERPL-SCNC: 139 MMOL/L (ref 136–145)
TCHOL/HDL RATIO (POC): 3.6
TRIGL SERPL-MCNC: 212 MG/DL
WBC # BLD AUTO: 9.8 K/UL (ref 3.6–11)

## 2020-11-03 PROCEDURE — 99214 OFFICE O/P EST MOD 30 MIN: CPT | Performed by: INTERNAL MEDICINE

## 2020-11-03 PROCEDURE — 83036 HEMOGLOBIN GLYCOSYLATED A1C: CPT | Performed by: INTERNAL MEDICINE

## 2020-11-03 PROCEDURE — 82962 GLUCOSE BLOOD TEST: CPT | Performed by: INTERNAL MEDICINE

## 2020-11-03 PROCEDURE — 80061 LIPID PANEL: CPT | Performed by: INTERNAL MEDICINE

## 2020-11-03 RX ORDER — LOSARTAN POTASSIUM 50 MG/1
TABLET ORAL
Qty: 30 TAB | Refills: 12 | Status: SHIPPED | OUTPATIENT
Start: 2020-11-03 | End: 2021-11-18 | Stop reason: SDUPTHER

## 2020-11-03 RX ORDER — BLOOD-GLUCOSE CONTROL, NORMAL
EACH MISCELLANEOUS
Qty: 100 LANCET | Refills: 3 | Status: SHIPPED | OUTPATIENT
Start: 2020-11-03

## 2020-11-03 RX ORDER — POTASSIUM CHLORIDE 750 MG/1
CAPSULE, EXTENDED RELEASE ORAL
Qty: 30 CAP | Refills: 12 | Status: SHIPPED | OUTPATIENT
Start: 2020-11-03 | End: 2021-11-18 | Stop reason: SDUPTHER

## 2020-11-03 RX ORDER — AMLODIPINE BESYLATE 5 MG/1
TABLET ORAL
Qty: 30 TAB | Refills: 12 | Status: SHIPPED | OUTPATIENT
Start: 2020-11-03 | End: 2021-11-18 | Stop reason: SDUPTHER

## 2020-11-03 RX ORDER — PANTOPRAZOLE SODIUM 40 MG/1
TABLET, DELAYED RELEASE ORAL
Qty: 30 TAB | Refills: 3 | Status: SHIPPED | OUTPATIENT
Start: 2020-11-03 | End: 2021-03-25

## 2020-11-03 RX ORDER — PIOGLITAZONEHYDROCHLORIDE 45 MG/1
45 TABLET ORAL
Qty: 30 TAB | Refills: 12 | Status: SHIPPED | OUTPATIENT
Start: 2020-11-03 | End: 2021-11-18 | Stop reason: SDUPTHER

## 2020-11-03 RX ORDER — HYDROCHLOROTHIAZIDE 25 MG/1
TABLET ORAL
Qty: 30 TAB | Refills: 12 | Status: SHIPPED | OUTPATIENT
Start: 2020-11-03 | End: 2021-11-18 | Stop reason: SDUPTHER

## 2020-11-03 RX ORDER — METFORMIN HYDROCHLORIDE 1000 MG/1
1000 TABLET ORAL 2 TIMES DAILY WITH MEALS
Qty: 60 TAB | Refills: 12 | Status: SHIPPED | OUTPATIENT
Start: 2020-11-03 | End: 2021-11-10

## 2020-11-03 RX ORDER — IBUPROFEN 800 MG/1
TABLET ORAL
Qty: 90 TAB | Refills: 0 | OUTPATIENT
Start: 2020-11-03 | End: 2020-11-19

## 2020-11-03 NOTE — PROGRESS NOTES
Rena Reagan is a 63 y.o. female and presents with Diabetes and Hypertension  . Subjective:  Hypertension Review:  The patient has essential hypertension . Diet and Lifestyle: generally follows a low sodium diet, exercises sporadically  Home BP Monitoring: is not measured at home. Pertinent ROS: taking medications as instructed, no medication side effects noted, no TIA's, no chest pain on exertion, no dyspnea on exertion, no swelling of ankles. Diabetes Mellitus Review:  She has diabetes mellitus. Diabetic ROS - medication compliance: compliant all of the time, diabetic diet compliance: compliant all of the time, home glucose monitoring: is performed. Known diabetic complications: none  Cardiovascular risk factors: family history, dyslipidemia, diabetes mellitus, obesity, hypertension  Current diabetic medications include oral agents/insulin   Eye exam current (within one year): no  Weight trend: stable  Prior visit with dietician: no  Current diet: \"healthy\" diet  in general  Current exercise: walking  Home blood sugar records: trend: stable  Any episodes of hypoglycemia? no      COPD REVIEW:  The patient is being seen for follow up of COPD,the patient has been stable  Oxygen: She currently is not on home oxygen therapy. Symptoms: chronic dyspnea: severity = not present: course of sx: stable. Patient uses 2 pillows at night. Patient does continue to smoke cigarettes. Review of Systems  Constitutional: negative for fevers, chills, anorexia and weight loss  Eyes:   negative for visual disturbance and irritation  ENT:   negative for tinnitus,sore throat,nasal congestion,ear pains. hoarseness  Respiratory:  negative for cough, hemoptysis, dyspnea,wheezing  CV:   negative for chest pain, palpitations, lower extremity edema  GI:   negative for nausea, vomiting, diarrhea, abdominal pain,melena  Endo:               negative for polyuria,polydipsia,polyphagia,heat intolerance  Genitourinary: negative for frequency, dysuria and hematuria  Integument:  negative for rash and pruritus  Hematologic:  negative for easy bruising and gum/nose bleeding  Musculoskel: negative for myalgias, arthralgias, back pain, muscle weakness, joint pain  Neurological:  negative for headaches, dizziness, vertigo, memory problems and gait   Behavl/Psych: negative for feelings of anxiety, depression, mood changes    Past Medical History:   Diagnosis Date    Arthritis, Degenerative, Knee 7/15/2011    [facility] 7/15/2011    Diverticulitis     GERD (gastroesophageal reflux disease)     GERD, gastro - Esophageal Reflux Disease 7/15/2011    Hypercholesterolemia 7/15/2011    Hypertension     Type II or unspecified type diabetes mellitus without mention of complication, uncontrolled 7/15/2011     Past Surgical History:   Procedure Laterality Date    COLONOSCOPY N/A 2017    COLON[facility] performed [facility] Loren [facility]menta [facility] at [facility] AMBULATORY OR    COLONOSCOPY N/A 2018    COLONOSCOPY performed by Loren Armenta MD at [facility] ENDOSCOPY    HX GYN  2004    uterine surgery    LAP,TUBAL CAUTERY Bilateral      Social History     Socioeconomic History    Marital status:      Spouse name: Not on file    Number of children: Not on file    Years of education: Not on file    Highest education level: Not on file   Tobacco Use    Smoking status: Former Smoker     Last attempt to quit: 4/15/2015     Years since quittin.5    Smokeless tobacco: Never Used   Substance and Sexual Activity    Alcohol use: No    Drug use: Yes     Types: Marijuana     Comment: \"daily\"    Sexual activity: Yes     Partners: Male   Social History Narrative        She lives with her  she has five adult children denies alcohol cigarette use     Family History   Problem Relation Age of Onset    Diabetes Father     Deep Vein Thrombosis Daughter     Lung Disease Daughter     Other Daughter         cholesterol     Current Outpatient Medications   Medication Sig Dispense Refill    hydroCHLOROthiazide (HYDRODIURIL) 25 mg tablet TAKE 1 TABLET BY MOUTH DAILY 30 Tab 12    amLODIPine (NORVASC) 5 mg tablet TAKE 1 TABLET BY MOUTH DAILY 30 Tab 12    ibuprofen (MOTRIN) 800 mg tablet TAKE 1 TABLET BY MOUTH EVERY 8 HOURS AS NEEDED FOR PAIN 90 Tab 0    lancets (OneTouch Delica Lancets) 30 gauge misc Use once daily;E11.9:Type 2 diabetes 100 Lancet 3    losartan (COZAAR) 50 mg tablet TAKE 1 TABLET BY MOUTH DAILY 30 Tab 12    metFORMIN (GLUCOPHAGE) 1,000 mg tablet Take 1 Tab by mouth two (2) times daily (with meals). Si tab daily 60 Tab 12    pantoprazole (PROTONIX) 40 mg tablet TAKE 1 TABLET BY MOUTH DAILY 30 Tab 3    pioglitazone (ACTOS) 45 mg tablet Take 1 Tab by mouth nightly. 30 Tab 12    potassium chloride SA (MICRO-K) 10 mEq capsule TAKE 1 CAPSULE BY MOUTH TWICE DAILY 30 Cap 12    sucralfate (CARAFATE) 1 gram tablet TAKE 1 TABLET BY MOUTH 4 TIMES A  Tab 1    diclofenac EC (VOLTAREN) 75 mg EC tablet TAKE 1 TABLET BY MOUTH TWICE DAILY 60 Tab 0    glucose blood VI test strips (OneTouch Ultra Blue Test Strip) strip Test once daily;E11. 9;Type 2 diabetes. 100 Strip 3    Blood-Glucose Meter (OneTouch Ultra2 Meter) monitoring kit Test once daily; E11.9; Diabetes Type 2 1 Kit 0    Nystop powder APPLY TO THE AFFECTED AREA THREE TIMES DAILY 30 g 0    cetirizine (ZYRTEC) 10 mg tablet Take 1 Tab by mouth daily. 20 Tab 0    nystatin (MYCOSTATIN) powder Apply  to affected area three (3) times daily.  Apply until rash has resolved 1 Bottle 0    potassium chloride SA (MICRO-K) 10 mEq capsule TAKE ONE CAPSULE BY MOUTH TWICE DAILY 30 Cap 12    polyethylene glycol (MIRALAX) 17 gram/dose powder MIX 17 GRAMS IN A LIQUID AND THEN DRINK ONCE D UTD  3    ibuprofen (MOTRIN) 800 mg tablet TAKE 1 TABLET BY MOUTH EVERY 8 HOURS AS NEEDED FOR PAIN 90 Tab 0    bismuth subsalicylate (PEPTO-BISMOL MAXIMUM STRENGTH) 525 mg/15 mL susp Take 30 mL by mouth every six (6) hours as needed.  loperamide (IMODIUM A-D) 2 mg tablet Take 1 Tab by mouth four (4) times daily as needed for Diarrhea. 20 Tab 0     No Known Allergies    Objective:  Visit Vitals  /70 (BP 1 Location: Right arm, BP Patient Position: Sitting)   Pulse 72   Temp 98.4 °F (36.9 °C) (Oral)   Resp 19   Ht 5' 3\" (1.6 m)   Wt 161 lb (73 kg)   LMP 03/15/2004 (Approximate)   SpO2 98%   BMI 28.52 kg/m²     Physical Exam:   General appearance - alert, well appearing, and in no distress  Mental status - alert, oriented to person, place, and time  EYE-KODY, EOMI, corneas normal, no foreign bodies  ENT-ENT exam normal, no neck nodes or sinus tenderness  Nose - normal and patent, no erythema, discharge or polyps  Mouth - mucous membranes moist, pharynx normal without lesions  Neck - supple, no significant adenopathy   Chest - clear to auscultation, no wheezes, rales or rhonchi, symmetric air entry   Heart - normal rate, regular rhythm, normal S1, S2, no murmurs, rubs, clicks or gallops   Abdomen - soft, nontender, nondistended, no masses or organomegaly  Lymph- no adenopathy palpable  Ext-peripheral pulses normal, no pedal edema, no clubbing or cyanosis  Skin-Warm and dry.  no hyperpigmentation, vitiligo, or suspicious lesions  Neuro -alert, oriented, normal speech, no focal findings or movement disorder noted  Neck-normal C-spine, no tenderness, full ROM without pain  Feet-no nail deformities or callus formation with good pulses noted      Results for orders placed or performed in visit on 11/03/20   AMB POC HEMOGLOBIN A1C   Result Value Ref Range    Hemoglobin A1c (POC) 6.5 %   AMB POC GLUCOSE BLOOD, BY GLUCOSE MONITORING DEVICE   Result Value Ref Range    Glucose  mg/dL   AMB POC LIPID PROFILE   Result Value Ref Range    Cholesterol (POC) 187     Triglycerides (POC) 212     HDL Cholesterol (POC) 52     LDL Cholesterol (POC) 92 MG/DL    Non-HDL Goal (POC) 134     TChol/HDL Ratio (POC) 3.6        Assessment/Plan:    ICD-10-CM ICD-9-CM    1. Hypercholesteremia  E78.00 272.0 AMB POC LIPID PROFILE   2. Controlled type 2 diabetes mellitus without complication, without long-term current use of insulin (HCC)  E11.9 250.00 AMB POC HEMOGLOBIN A1C      AMB POC GLUCOSE BLOOD, BY GLUCOSE MONITORING DEVICE   3. Essential hypertension  V83 389.8 METABOLIC PANEL, COMPREHENSIVE      CBC W/O DIFF   4. Vitamin D deficiency  E55.9 268.9 VITAMIN D, 25 HYDROXY     Orders Placed This Encounter    METABOLIC PANEL, COMPREHENSIVE     Standing Status:   Future     Standing Expiration Date:   11/3/2021    CBC W/O DIFF     Standing Status:   Future     Standing Expiration Date:   11/3/2021    VITAMIN D, 25 HYDROXY     Standing Status:   Future     Standing Expiration Date:   11/3/2021    AMB POC HEMOGLOBIN A1C    AMB POC GLUCOSE BLOOD, BY GLUCOSE MONITORING DEVICE    AMB POC LIPID PROFILE    hydroCHLOROthiazide (HYDRODIURIL) 25 mg tablet     Sig: TAKE 1 TABLET BY MOUTH DAILY     Dispense:  30 Tab     Refill:  12    amLODIPine (NORVASC) 5 mg tablet     Sig: TAKE 1 TABLET BY MOUTH DAILY     Dispense:  30 Tab     Refill:  12    ibuprofen (MOTRIN) 800 mg tablet     Sig: TAKE 1 TABLET BY MOUTH EVERY 8 HOURS AS NEEDED FOR PAIN     Dispense:  90 Tab     Refill:  0    lancets (OneTouch Delica Lancets) 30 gauge misc     Sig: Use once daily;E11.9:Type 2 diabetes     Dispense:  100 Lancet     Refill:  3    losartan (COZAAR) 50 mg tablet     Sig: TAKE 1 TABLET BY MOUTH DAILY     Dispense:  30 Tab     Refill:  12    metFORMIN (GLUCOPHAGE) 1,000 mg tablet     Sig: Take 1 Tab by mouth two (2) times daily (with meals). Si tab daily     Dispense:  60 Tab     Refill:  12    pantoprazole (PROTONIX) 40 mg tablet     Sig: TAKE 1 TABLET BY MOUTH DAILY     Dispense:  30 Tab     Refill:  3    pioglitazone (ACTOS) 45 mg tablet     Sig: Take 1 Tab by mouth nightly. Dispense:  30 Tab     Refill:  12    potassium chloride SA (MICRO-K) 10 mEq capsule     Sig: TAKE 1 CAPSULE BY MOUTH TWICE DAILY     Dispense:  30 Cap     Refill:  12     lose weight, increase physical activity, follow low fat diet, follow low salt diet, continue present plan, routine labs ordered,Take 81mg aspirin daily  Patient Instructions   [employer] Activation    Thank you for requesting access to [employer]. Please follow the instructions below to securely access and download your online medical record. [employer] allows you to send messages to your doctor, view your test results, renew your prescriptions, schedule appointments, and more. How Do I Sign Up? 1. In your internet browser, go to [email or web address]  2. Click on the First Time User? Click Here link in the Sign In box. You will be redirect to the New Member Sign Up page. 3. Enter your [employer] Access Code exactly as it appears below. You will not need to use this code after youve completed the sign-up process. If you do not sign up before the expiration date, you must request a new code. [employer] Access Code: Activation code not generated  Current [employer] Status: Active (This is the date your [employer] access code will )    4. Enter the last four digits of your Social Security Number (xxxx) and Date of Birth (mm/dd/yyyy) as indicated and click Submit. You will be taken to the next sign-up page. 5. Create a [employer] ID. This will be your [employer] login ID and cannot be changed, so think of one that is secure and easy to remember. 6. Create a [employer] password. You can change your password at any time. 7. Enter your Password Reset Question and Answer. This can be used at a later time if you forget your password. 8. Enter your e-mail address. You will receive e-mail notification when new information is available in [address] 9. Click Sign Up. You can now view and download portions of your medical record.   10. Click the [place] link to download a portable copy of your medical information. Additional Information    If you have questions, please visit the Frequently Asked Questions section of the [employer] website at [email or web address] Remember, [employer] is NOT to be used for urgent needs. For medical emergencies, dial 911. Follow-up and Dispositions    · Return in about 3 months (around 2/3/2021), or if symptoms worsen or fail to improve. I have reviewed with the patient details of the assessment and plan and all questions were answered. Relevent patient education was performed. The most recent lab findings were reviewed with the patient. An After Visit Summary was printed and given to the patient.

## 2020-11-03 NOTE — PATIENT INSTRUCTIONS
[employer] Activation Thank you for requesting access to [facility]. Please follow the instructions below to securely access and download your online medical record. [facility] allows you to send messages to your doctor, view your test results, renew your prescriptions, schedule appointments, and more. How Do I Sign Up? 1. In your internet browser, go to [email or web address] 
2. Click on the First Time User? Click Here link in the Sign In box. You will be redirect to the New Member Sign Up page. 3. Enter your [facility] Access Code exactly as it appears below. You will not need to use this code after youve completed the sign-up process. If you do not sign up before the expiration date, you must request a new code. [facility] Access Code: Activation code not generated Current [facility] Status: Active (This is the date your [facility] access code will ) 4. Enter the last four digits of your Social Security Number (xxxx) and Date of Birth (mm/dd/yyyy) as indicated and click Submit. You will be taken to the next sign-up page. 5. Create a [facility] ID. This will be your [facility] login ID and cannot be changed, so think of one that is secure and easy to remember. 6. Create a [facility] password. You can change your password at any time. 7. Enter your Password Reset Question and Answer. This can be used at a later time if you forget your password. 8. Enter your e-mail address. You will receive e-mail notification when new information is available in [address] 9. Click Sign Up. You can now view and download portions of your medical record. 10. Click the Download Summary menu link to download a portable copy of your medical information. Additional Information If you have questions, please visit the Frequently Asked Questions section of the [facility] website at [email or web address] Remember, [facility] is NOT to be used for urgent needs. For medical emergencies, dial 911.

## 2020-11-19 ENCOUNTER — HOSPITAL ENCOUNTER (EMERGENCY)
Age: 65
Discharge: HOME | End: 2020-11-19
Attending: EMERGENCY MEDICINE
Payer: MEDICAID

## 2020-11-19 VITALS
TEMPERATURE: 98.3 F | BODY MASS INDEX: 28 KG/M2 | WEIGHT: 158 LBS | HEART RATE: 93 BPM | HEIGHT: 63 IN | SYSTOLIC BLOOD PRESSURE: 118 MMHG | OXYGEN SATURATION: 96 % | DIASTOLIC BLOOD PRESSURE: 77 MMHG | RESPIRATION RATE: 18 BRPM

## 2020-11-19 DIAGNOSIS — S39.012A BACK STRAIN, INITIAL ENCOUNTER: Primary | ICD-10-CM

## 2020-11-19 PROCEDURE — 99283 EMERGENCY DEPT VISIT LOW MDM: CPT

## 2020-11-19 PROCEDURE — 74011250637 HC RX REV CODE- 250/637: Performed by: EMERGENCY MEDICINE

## 2020-11-19 RX ORDER — METHOCARBAMOL 500 MG/1
500 TABLET, FILM COATED ORAL ONCE
Status: COMPLETED | OUTPATIENT
Start: 2020-11-19 | End: 2020-11-19

## 2020-11-19 RX ORDER — IBUPROFEN 400 MG/1
800 TABLET ORAL
Status: COMPLETED | OUTPATIENT
Start: 2020-11-19 | End: 2020-11-19

## 2020-11-19 RX ORDER — IBUPROFEN 800 MG/1
800 TABLET ORAL
Qty: 30 TAB | Refills: 0 | OUTPATIENT
Start: 2020-11-19 | End: 2022-01-22

## 2020-11-19 RX ORDER — METHOCARBAMOL 750 MG/1
750 TABLET, FILM COATED ORAL
Qty: 15 TAB | Refills: 0 | OUTPATIENT
Start: 2020-11-19 | End: 2022-01-22

## 2020-11-19 RX ADMIN — IBUPROFEN 800 MG: 400 TABLET, FILM COATED ORAL at 09:41

## 2020-11-19 RX ADMIN — METHOCARBAMOL 500 MG: 500 TABLET ORAL at 09:41

## 2020-11-19 NOTE — ED NOTES
Pt presents ambulatory to ED complaining of atraumatic lower back pain x3 weeks. Pt states she got a new mattress 3 weeks ago and that is when pain started. Pt is alert and oriented x 4, RR even and unlabored, skin is warm and dry. Assesment completed and pt updated on plan of care. Emergency Department Nursing Plan of Care       The Nursing Plan of Care is developed from the Nursing assessment and Emergency Department Attending provider initial evaluation. The plan of care may be reviewed in the ED Provider note.     The Plan of Care was developed with the following considerations:   Patient / Family readiness to learn indicated by:verbalized understanding  Persons(s) to be included in education: patient  Barriers to Learning/Limitations:No    [address]    11/19/2020   9:01 AM

## 2020-11-19 NOTE — ED PROVIDER NOTES
EMERGENCY DEPARTMENT HISTORY AND PHYSICAL EXAM      Date: 2020  Patient Name: Fouzia Angeles    History of Presenting Illness     Chief Complaint   Patient presents with    Back Pain     atraumatic x3 weeks     History Provided By: Patient    HPI: Fouzia Angeles, 63 y.o. female with past medical history significant for osteoarthritis, COPD, GERD, hypertension, hyperlipidemia, and diabetes who presents via private vehicle to the ED with cc of atraumatic back pain for the past 3 weeks. Patient states the symptoms started shortly after getting a new mattress and she believes that is the cause of her pain. She denies any falls, trauma, or heavy lifting. Her pain is located in the bilateral lower back and does not radiate. Her pain is worse first thing in the morning and gradually improves throughout the day. Over the past 2 days it has been so uncomfortable that she has been able to sleep. She did try taking over-the-counter Aleve with no improvement of symptoms last night. PMHx: OA, COPD, GERD, hypertension, hyperlipidemia, diabetes  Social Hx: Former smoker, denies alcohol use, daily marijuana use    PCP: Prince Bah MD    There are no other complaints, changes, or physical findings at this time. No current facility-administered medications on file prior to encounter. Current Outpatient Medications on File Prior to Encounter   Medication Sig Dispense Refill    hydroCHLOROthiazide (HYDRODIURIL) 25 mg tablet TAKE 1 TABLET BY MOUTH DAILY 30 Tab 12    amLODIPine (NORVASC) 5 mg tablet TAKE 1 TABLET BY MOUTH DAILY 30 Tab 12    losartan (COZAAR) 50 mg tablet TAKE 1 TABLET BY MOUTH DAILY 30 Tab 12    metFORMIN (GLUCOPHAGE) 1,000 mg tablet Take 1 Tab by mouth two (2) times daily (with meals).  Si tab daily 60 Tab 12    lancets (OneTouch Delica Lancets) 30 gauge misc Use once daily;E11.9:Type 2 diabetes 100 Lancet 3    pantoprazole (PROTONIX) 40 mg tablet TAKE 1 TABLET BY MOUTH DAILY 30 Tab 3    pioglitazone (ACTOS) 45 mg tablet Take 1 Tab by mouth nightly. 30 Tab 12    potassium chloride SA (MICRO-K) 10 mEq capsule TAKE 1 CAPSULE BY MOUTH TWICE DAILY 30 Cap 12    [DISCONTINUED] ibuprofen (MOTRIN) 800 mg tablet TAKE 1 TABLET BY MOUTH EVERY 8 HOURS AS NEEDED FOR PAIN 90 Tab 0    sucralfate (CARAFATE) 1 gram tablet TAKE 1 TABLET BY MOUTH 4 TIMES A  Tab 1    diclofenac EC (VOLTAREN) 75 mg EC tablet TAKE 1 TABLET BY MOUTH TWICE DAILY 60 Tab 0    glucose blood VI test strips (OneTouch Ultra Blue Test Strip) strip Test once daily;E11. 9;Type 2 diabetes. 100 Strip 3    Blood-Glucose Meter (OneTouch Ultra2 Meter) monitoring kit Test once daily; E11.9; Diabetes Type 2 1 Kit 0    Nystop powder APPLY TO THE AFFECTED AREA THREE TIMES DAILY 30 g 0    cetirizine (ZYRTEC) 10 mg tablet Take 1 Tab by mouth daily. 20 Tab 0    nystatin (MYCOSTATIN) powder Apply  to affected area three (3) times daily. Apply until rash has resolved 1 Bottle 0    potassium chloride SA (MICRO-K) 10 mEq capsule TAKE ONE CAPSULE BY MOUTH TWICE DAILY 30 Cap 12    polyethylene glycol (MIRALAX) 17 gram/dose powder MIX 17 GRAMS IN A LIQUID AND THEN DRINK ONCE D UTD  3    [DISCONTINUED] ibuprofen (MOTRIN) 800 mg tablet TAKE 1 TABLET BY MOUTH EVERY 8 HOURS AS NEEDED FOR PAIN 90 Tab 0    bismuth subsalicylate (PEPTO-BISMOL MAXIMUM STRENGTH) 525 mg/15 mL susp Take 30 mL by mouth every six (6) hours as needed.  loperamide (IMODIUM A-D) 2 mg tablet Take 1 Tab by mouth four (4) times daily as needed for Diarrhea.  20 Tab 0     Past History     Past Medical History:  Past Medical History:   Diagnosis Date    Arthritis, Degenerative, Knee 7/15/2011    COPD (chronic obstructive pulmonary disease) ([facility] [address] 7/15/2011    Diverticulitis     GERD (gastroesophageal reflux disease)     GERD, gastro - Esophageal Reflux Disease 7/15/2011    Hypercholesterolemia 7/15/2011    Hypertension     Type II or unspecified type diabetes mellitus without mention of complication, uncontrolled 7/15/2011     Past Surgical History:  Past Surgical History:   Procedure Laterality Date    COLONOSCOPY N/A 2017    COL[facility] performed [facility] at [facility] AM[facility] OR    COLONOSCOPY N/A 2018    COLONOSCOPY performed by Binh Temple MD at [facility] ENDOSCOPY    HX GYN  2004    uterine surgery    LAP,TUBAL CAUTERY Bilateral 1992     Family History:  Family History   Problem Relation Age of Onset    Diabetes Father     Deep Vein Thrombosis Daughter     Lung Disease Daughter     Other Daughter         cholesterol     Social History:  Social History     Tobacco Use    Smoking status: Former Smoker     Last attempt to quit: 4/15/2015     Years since quittin.6    Smokeless tobacco: Never Used   Substance Use Topics    Alcohol use: No    Drug use: Yes     Types: Marijuana     Comment: \"daily\"     Allergies:  No Known Allergies  Review of Systems   Review of Systems   Constitutional: Negative for chills and fever. HENT: Negative for congestion, rhinorrhea, sneezing and sore throat. Eyes: Negative for redness and visual disturbance. Respiratory: Negative for shortness of breath. Cardiovascular: Negative for leg swelling. Gastrointestinal: Negative for abdominal pain, nausea and vomiting. Genitourinary: Negative for difficulty urinating and frequency. Musculoskeletal: Positive for back pain. Negative for myalgias and neck stiffness. Skin: Negative for rash. Neurological: Negative for dizziness, syncope, weakness and headaches. Hematological: Negative for adenopathy. All other systems reviewed and are negative. Physical Exam   Physical Exam  Vitals signs and nursing note reviewed. Constitutional:       Appearance: Normal appearance. She is well-developed. HENT:      Head: Normocephalic and atraumatic.    Eyes:      Conjunctiva/sclera: Conjunctivae normal.   Neck:      Musculoskeletal: Full passive range of motion without pain, normal range of motion and neck supple. Cardiovascular:      Rate and Rhythm: Normal rate and regular rhythm. Pulses: Normal pulses. Heart sounds: Normal heart sounds, S1 normal and S2 normal. No murmur. Pulmonary:      Effort: Pulmonary effort is normal. No respiratory distress. Breath sounds: Normal breath sounds. No wheezing. Abdominal:      General: Bowel sounds are normal. There is no distension. Palpations: Abdomen is soft. Tenderness: There is no abdominal tenderness. There is no rebound. Musculoskeletal: Normal range of motion. Lumbar back: She exhibits tenderness, pain and spasm. She exhibits no bony tenderness. Back:    Skin:     General: Skin is warm and dry. Findings: No rash. Neurological:      Mental Status: She is alert and oriented to person, place, and time. Psychiatric:         Speech: Speech normal.         Behavior: Behavior normal.         Thought Content: Thought content normal.         Judgment: Judgment normal.       Diagnostic Study Results   Labs -   No results found for this or any previous visit (from the past 12 hour(s)). Radiologic Studies -   No orders to display     No results found. Medical Decision Making   I am the first provider for this patient. I reviewed the vital signs, available nursing notes, past medical history, past surgical history, family history and social history. Vital Signs-Reviewed the patient's vital signs. Patient Vitals for the past 24 hrs:   Temp Pulse Resp BP SpO2   11/19/20 0855 98.3 °F (36.8 °C) 93 18 118/77 96 %     Pulse Oximetry Analysis - 96% on RA    Records Reviewed: Nursing Notes    Provider Notes (Medical Decision Making):   68-year-old female presents with atraumatic low back pain for the past 3 weeks after getting a new mattress.   Differential includes musculoskeletal strain, osteoarthritis, degenerative disc disease, and low suspicion for herniated disc or compression fracture. Will treat with muscle relaxers and NSAIDs and have her follow-up with primary care. Also discussed with patient that she may want to consider using a mattress pad or a mattress topper to help minimize the effects of her stiff mattress. ED Course:   Initial assessment performed. The patients presenting problems have been discussed, and they are in agreement with the care plan formulated and outlined with them. I have encouraged them to ask questions as they arise throughout their visit. Progress Note:   Updated pt on all returned results and findings. Discussed the importance of proper follow up as referred below along with return precautions. Pt in agreement with the care plan and expresses agreement with and understanding of all items discussed. Disposition:  Discharge Note:  The pt is ready for discharge. The pt's signs, symptoms, diagnosis, and discharge instructions have been discussed and pt has conveyed their understanding. The pt is to follow up as recommended or return to ER should their symptoms worsen. Plan has been discussed and pt is in agreement. PLAN:  1. Discharge Medication List as of 11/19/2020  9:43 AM      START taking these medications    Details   methocarbamoL (ROBAXIN) 750 mg tablet Take 1 Tab by mouth three (3) times daily as needed for Muscle Spasm(s). , Normal, Disp-15 Tab,R-0         CONTINUE these medications which have CHANGED    Details   ibuprofen (MOTRIN) 800 mg tablet Take 1 Tab by mouth every eight (8) hours as needed for Pain., Normal, Disp-30 Tab,R-0         CONTINUE these medications which have NOT CHANGED    Details   hydroCHLOROthiazide (HYDRODIURIL) 25 mg tablet TAKE 1 TABLET BY MOUTH DAILY, Normal, Disp-30 Tab,R-12      amLODIPine (NORVASC) 5 mg tablet TAKE 1 TABLET BY MOUTH DAILY, Normal, Disp-30 Tab,R-12      losartan (COZAAR) 50 mg tablet TAKE 1 TABLET BY MOUTH DAILY, Normal, Disp-30 Tab,R-12      metFORMIN (GLUCOPHAGE) 1,000 mg tablet Take 1 Tab by mouth two (2) times daily (with meals). Si tab daily, Normal, Disp-60 Tab,R-12      lancets (OneTouch Delica Lancets) 30 gauge misc Use once daily;E11.9:Type 2 diabetes, Normal, Disp-100 Lancet,R-3      pantoprazole (PROTONIX) 40 mg tablet TAKE 1 TABLET BY MOUTH DAILY, Normal, Disp-30 Tab,R-3      pioglitazone (ACTOS) 45 mg tablet Take 1 Tab by mouth nightly., Normal, Disp-30 Tab,R-12      !! potassium chloride SA (MICRO-K) 10 mEq capsule TAKE 1 CAPSULE BY MOUTH TWICE DAILY, Normal, Disp-30 Cap,R-12      sucralfate (CARAFATE) 1 gram tablet TAKE 1 TABLET BY MOUTH 4 TIMES A DAY, Normal, Disp-120 Tab,R-1      diclofenac EC (VOLTAREN) 75 mg EC tablet TAKE 1 TABLET BY MOUTH TWICE DAILY, Normal, Disp-60 Tab,R-0      glucose blood VI test strips (OneTouch Ultra Blue Test Strip) strip Test once daily;E11. 9;Type 2 diabetes. , Normal, Disp-100 Strip, R-3      Blood-Glucose Meter (OneTouch Ultra2 Meter) monitoring kit Test once daily; E11.9; Diabetes Type 2, Normal, Disp-1 Kit, R-0      !! Nystop powder APPLY TO THE AFFECTED AREA THREE TIMES DAILY, Normal, Disp-30 g, R-0      cetirizine (ZYRTEC) 10 mg tablet Take 1 Tab by mouth daily. , Normal, Disp-20 Tab, R-0      !! nystatin (MYCOSTATIN) powder Apply  to affected area three (3) times daily. Apply until rash has resolved, Normal, Disp-1 Bottle, R-0      !! potassium chloride SA (MICRO-K) 10 mEq capsule TAKE ONE CAPSULE BY MOUTH TWICE DAILY, Normal, Disp-30 Cap, R-12      polyethylene glycol (MIRALAX) 17 gram/dose powder MIX 17 GRAMS IN A LIQUID AND THEN DRINK ONCE D UTD, Historical Med, R-3      bismuth subsalicylate (PEPTO-BISMOL MAXIMUM STRENGTH) 525 mg/15 mL susp Take 30 mL by mouth every six (6) hours as needed., Historical Med      loperamide (IMODIUM A-D) 2 mg tablet Take 1 Tab by mouth four (4) times daily as needed for Diarrhea., Print, Disp-20 Tab, R-0       !! - Potential duplicate medications found. Please discuss with provider.         2. Follow-up Information     Follow up With Specialties Details Why Contact Info    Avery Parada MD Internal Medicine Schedule an appointment as soon as possible for a visit  As needed [id number] [facility] [phone]      [address] [place] [address] EMERGENCY DEPT Emergency Medicine  As needed, If symptoms worsen [facility]  [phone]        Return to ED if worse     Diagnosis     Clinical Impression:   1. Back strain, initial encounter            Please note that this dictation was completed with Dragon, computer voice recognition software. Quite often unanticipated grammatical, syntax, homophones, and other interpretive errors are inadvertently transcribed by the computer software. Please disregard these errors. Additionally, please excuse any errors that have escaped final proofreading.

## 2020-11-19 NOTE — ED TRIAGE NOTES
Pt presents to ED with c/o atraumatic lower back pain x3 weeks. Pt reports getting a new mattress 3 weeks ago. Pt denies taking pain medication today for pain.

## 2020-11-19 NOTE — ED NOTES
Discharge instructions were given to the patient by Julius Abreu.     The patient left the Emergency Department ambulatory, alert and oriented and in no acute distress with 2 prescriptions. The patient was encouraged to call or return to the ED for worsening issues or problems and was encouraged to schedule a follow up appointment for continuing care. The patient verbalized understanding of discharge instructions and prescriptions, all questions were answered. The patient has no further concerns at this time.

## 2021-02-03 ENCOUNTER — OFFICE VISIT (OUTPATIENT)
Dept: INTERNAL MEDICINE CLINIC | Age: 66
End: 2021-02-03
Payer: MEDICAID

## 2021-02-03 VITALS
RESPIRATION RATE: 20 BRPM | OXYGEN SATURATION: 95 % | WEIGHT: 162 LBS | DIASTOLIC BLOOD PRESSURE: 80 MMHG | SYSTOLIC BLOOD PRESSURE: 120 MMHG | BODY MASS INDEX: 28.7 KG/M2 | HEART RATE: 92 BPM | TEMPERATURE: 98 F | HEIGHT: 63 IN

## 2021-02-03 DIAGNOSIS — I10 ESSENTIAL HYPERTENSION: ICD-10-CM

## 2021-02-03 DIAGNOSIS — E78.00 HYPERCHOLESTEREMIA: ICD-10-CM

## 2021-02-03 DIAGNOSIS — G56.02 CARPAL TUNNEL SYNDROME OF LEFT WRIST: ICD-10-CM

## 2021-02-03 DIAGNOSIS — E11.9 CONTROLLED TYPE 2 DIABETES MELLITUS WITHOUT COMPLICATION, WITHOUT LONG-TERM CURRENT USE OF INSULIN (HCC): Primary | ICD-10-CM

## 2021-02-03 LAB
ALBUMIN SERPL-MCNC: 3.4 G/DL (ref 3.5–5)
ALBUMIN/GLOB SERPL: 1 {RATIO} (ref 1.1–2.2)
ALP SERPL-CCNC: 126 U/L (ref 45–117)
ALT SERPL-CCNC: 26 U/L (ref 12–78)
ANION GAP SERPL CALC-SCNC: 12 MMOL/L (ref 5–15)
AST SERPL-CCNC: 22 U/L (ref 15–37)
BILIRUB SERPL-MCNC: 0.5 MG/DL (ref 0.2–1)
BUN SERPL-MCNC: 17 MG/DL (ref 6–20)
BUN/CREAT SERPL: 24 (ref 12–20)
CALCIUM SERPL-MCNC: 9.7 MG/DL (ref 8.5–10.1)
CHLORIDE SERPL-SCNC: 103 MMOL/L (ref 97–108)
CHOLEST SERPL-MCNC: 183 MG/DL
CO2 SERPL-SCNC: 26 MMOL/L (ref 21–32)
CREAT SERPL-MCNC: 0.71 MG/DL (ref 0.55–1.02)
ERYTHROCYTE [DISTWIDTH] IN BLOOD BY AUTOMATED COUNT: 14.3 % (ref 11.5–14.5)
GLOBULIN SER CALC-MCNC: 3.5 G/DL (ref 2–4)
GLUCOSE POC: 144 MG/DL
GLUCOSE SERPL-MCNC: 124 MG/DL (ref 65–100)
HBA1C MFR BLD HPLC: 6.3 %
HCT VFR BLD AUTO: 37.6 % (ref 35–47)
HDLC SERPL-MCNC: 62 MG/DL
HGB BLD-MCNC: 12 G/DL (ref 11.5–16)
LDL CHOLESTEROL POC: 100 MG/DL
MCH RBC QN AUTO: 29.6 PG (ref 26–34)
MCHC RBC AUTO-ENTMCNC: 31.9 G/DL (ref 30–36.5)
MCV RBC AUTO: 92.8 FL (ref 80–99)
NON-HDL GOAL (POC): 120
NRBC # BLD: 0 K/UL (ref 0–0.01)
NRBC BLD-RTO: 0 PER 100 WBC
PLATELET # BLD AUTO: 225 K/UL (ref 150–400)
PMV BLD AUTO: 11.4 FL (ref 8.9–12.9)
POTASSIUM SERPL-SCNC: 4.3 MMOL/L (ref 3.5–5.1)
PROT SERPL-MCNC: 6.9 G/DL (ref 6.4–8.2)
RBC # BLD AUTO: 4.05 M/UL (ref 3.8–5.2)
SODIUM SERPL-SCNC: 141 MMOL/L (ref 136–145)
TCHOL/HDL RATIO (POC): 2.9
TRIGL SERPL-MCNC: 105 MG/DL
WBC # BLD AUTO: 6.9 K/UL (ref 3.6–11)

## 2021-02-03 PROCEDURE — 99214 OFFICE O/P EST MOD 30 MIN: CPT | Performed by: INTERNAL MEDICINE

## 2021-02-03 PROCEDURE — 83036 HEMOGLOBIN GLYCOSYLATED A1C: CPT | Performed by: INTERNAL MEDICINE

## 2021-02-03 PROCEDURE — 82962 GLUCOSE BLOOD TEST: CPT | Performed by: INTERNAL MEDICINE

## 2021-02-03 PROCEDURE — 80061 LIPID PANEL: CPT | Performed by: INTERNAL MEDICINE

## 2021-02-03 RX ORDER — NYSTATIN 100000 [USP'U]/G
POWDER TOPICAL 3 TIMES DAILY
Qty: 1 BOTTLE | Refills: 0 | Status: SHIPPED | OUTPATIENT
Start: 2021-02-03 | End: 2021-05-03 | Stop reason: SDUPTHER

## 2021-02-03 NOTE — PROGRESS NOTES
Barnabas Bernheim is a 63 y.o. female and presents with Arthritis, Hypertension, and Diabetes    Subjective:  Hypertension Review:  The patient has essential hypertension . Diet and Lifestyle: generally follows a low sodium diet, exercises sporadically  Home BP Monitoring: is not measured at home. Pertinent ROS: taking medications as instructed, no medication side effects noted, no TIA's, no chest pain on exertion, no dyspnea on exertion, no swelling of ankles. Diabetes Mellitus Review:  She has diabetes mellitus. Diabetic ROS - medication compliance: compliant all of the time, diabetic diet compliance: compliant all of the time, home glucose monitoring: is performed. Known diabetic complications: none  Cardiovascular risk factors: family history, dyslipidemia, diabetes mellitus, obesity, hypertension  Current diabetic medications include oral agents   Eye exam current (within one year): no  Weight trend: stable  Prior visit with dietician: no  Current diet: \"healthy\" diet  in general  Current exercise: walking  Home blood sugar records: trend: stable  Any episodes of hypoglycemia? no      Dyslipidemia Review:  Patient presents for evaluation of lipids. Compliance with treatment thus far has been excellent. A repeat fasting lipid profile was not done. The patient does not use medications that may worsen dyslipidemias (corticosteroids, progestins, anabolic steroids, amiodarone, cyclosporine, olanzapine). The patient exercises some    Carpal Tunnel Syndrome Review:  Patient presents for evaluation of pain in lt. hand(s), hand paresthesias of the first three fingers. Onset of the symptoms was several months ago. Current symptoms include tingling and weakness involving the medial aspect of the bilateral hand(s): severity: moderate. Patient's overall course: gradually worsening. Patient has had no prior elbow problems. Previous visits for this problem: none. Evaluation to date:she has seen hand surgery . Treatment to date: nsaid    COPD REVIEW:  The patient is being seen for follow up of COPD,the patient has been stable  Oxygen: She currently is not on home oxygen therapy. Symptoms: chronic dyspnea: severity = not present: course of sx: stable. Patient uses 2 pillows at night. Patient does not continue to smoke cigarettes. Intertrigo Review:  Has itching under both breasts with a rash noted on ocasion      Review of Systems  Constitutional: negative for fevers, chills, anorexia and weight loss  Eyes:   negative for visual disturbance and irritation  ENT:   negative for tinnitus,sore throat,nasal congestion,ear pains. hoarseness  Respiratory:  negative for cough, hemoptysis, dyspnea,wheezing  CV:   negative for chest pain, palpitations, lower extremity edema  GI:   negative for nausea, vomiting, diarrhea, abdominal pain,melena  Endo:               negative for polyuria,polydipsia,polyphagia,heat intolerance  Genitourinary: negative for frequency, dysuria and hematuria  Integument:  rash and pruritus  Hematologic:  negative for easy bruising and gum/nose bleeding  Musculoskel: joint pain  Neurological:  negative for headaches, dizziness, vertigo, memory problems and gait   Behavl/Psych: negative for feelings of anxiety, depression, mood changes    Past Medical History:   Diagnosis Date    Arthritis, Degenerative, Knee 7/15/2011    COPD (chronic obstructive pulmonary disease) ([facility] [address] 7/15/2011    Diverticulitis     GERD (gastroesophageal reflux disease)     GERD, gastro - Esophageal Reflux Disease 7/15/2011    Hypercholesterolemia 7/15/2011    Hypertension     Type II or unspecified type diabetes mellitus without mention of complication, uncontrolled 7/15/2011     Past Surgical History:   Procedure Laterality Date    COLONOSCOPY N/A 9/18/2017    COLONOSCOPY performed by Courtney Ryan MD at [place] [address] COLONOSCOPY N/A 4/25/2018    COLONOSCOPY performed by Courtney Ryan MD at \[facility]\"" ENDOSCOPY  HX GYN  2004    uterine surgery    [place] LAP,TUBAL CAUTERY Bilateral      Social History     Socioeconomic History    Marital status:      Spouse name: Not on file    Number of children: Not on file    Years of education: Not on file    Highest education level: Not on file   Tobacco Use    Smoking status: Former Smoker     Quit date: 4/15/2015     Years since quittin.8    Smokeless tobacco: Never Used   Substance and Sexual Activity    Alcohol use: No    Drug use: Yes     Types: Marijuana     Comment: \"daily\"    Sexual activity: Yes     Partners: Male   Social History Narrative        She lives with her  she has five adult children denies alcohol cigarette use     Family History   Problem Relation Age of Onset    Diabetes Father     Deep Vein Thrombosis Daughter     Lung Disease Daughter     Other Daughter         cholesterol     Current Outpatient Medications   Medication Sig Dispense Refill    nystatin (MYCOSTATIN) powder Apply  to affected area three (3) times daily. Apply until rash has resolved 1 Bottle 0    ibuprofen (MOTRIN) 800 mg tablet Take 1 Tab by mouth every eight (8) hours as needed for Pain. 30 Tab 0    methocarbamoL (ROBAXIN) 750 mg tablet Take 1 Tab by mouth three (3) times daily as needed for Muscle Spasm(s). 15 Tab 0    hydroCHLOROthiazide (HYDRODIURIL) 25 mg tablet TAKE 1 TABLET BY MOUTH DAILY 30 Tab 12    amLODIPine (NORVASC) 5 mg tablet TAKE 1 TABLET BY MOUTH DAILY 30 Tab 12    lancets (OneTouch Delica Lancets) 30 gauge misc Use once daily;E11.9:Type 2 diabetes 100 Lancet 3    losartan (COZAAR) 50 mg tablet TAKE 1 TABLET BY MOUTH DAILY 30 Tab 12    metFORMIN (GLUCOPHAGE) 1,000 mg tablet Take 1 Tab by mouth two (2) times daily (with meals). Si tab daily 60 Tab 12    pantoprazole (PROTONIX) 40 mg tablet TAKE 1 TABLET BY MOUTH DAILY 30 Tab 3    pioglitazone (ACTOS) 45 mg tablet Take 1 Tab by mouth nightly.  30 Tab 12    potassium chloride SA (MICRO-K) 10 mEq capsule TAKE 1 CAPSULE BY MOUTH TWICE DAILY 30 Cap 12    sucralfate (CARAFATE) 1 gram tablet TAKE 1 TABLET BY MOUTH 4 TIMES A  Tab 1    diclofenac EC (VOLTAREN) 75 mg EC tablet TAKE 1 TABLET BY MOUTH TWICE DAILY 60 Tab 0    glucose blood VI test strips (OneTouch Ultra Blue Test Strip) strip Test once daily;E11. 9;Type 2 diabetes. 100 Strip 3    Blood-Glucose Meter (OneTouch Ultra2 Meter) monitoring kit Test once daily; E11.9; Diabetes Type 2 1 Kit 0    Nystop powder APPLY TO THE AFFECTED AREA THREE TIMES DAILY 30 g 0    cetirizine (ZYRTEC) 10 mg tablet Take 1 Tab by mouth daily. 20 Tab 0    potassium chloride SA (MICRO-K) 10 mEq capsule TAKE ONE CAPSULE BY MOUTH TWICE DAILY 30 Cap 12    polyethylene glycol (MIRALAX) 17 gram/dose powder MIX 17 GRAMS IN A LIQUID AND THEN DRINK ONCE D UTD  3    bismuth subsalicylate (PEPTO-BISMOL MAXIMUM STRENGTH) 525 mg/15 mL susp Take 30 mL by mouth every six (6) hours as needed.  loperamide (IMODIUM A-D) 2 mg tablet Take 1 Tab by mouth four (4) times daily as needed for Diarrhea.  20 Tab 0     No Known Allergies    Objective:  Visit Vitals  /80 (BP 1 Location: Right upper arm, BP Patient Position: Sitting)   Pulse 92   Temp 98 °F (36.7 °C) (Oral)   Resp 20   Ht 5' 3\" (1.6 m)   Wt 162 lb (73.5 kg)   LMP 03/15/2004 (Approximate)   SpO2 95%   BMI 28.70 kg/m²     Physical Exam:   General appearance - alert, well appearing, and in no distress  Mental status - alert, oriented to person, place, and time  EYE-KODY, EOMI, corneas normal, no foreign bodies  ENT-ENT exam normal, no neck nodes or sinus tenderness  Nose - normal and patent, no erythema, discharge or polyps  Mouth - mucous membranes moist, pharynx normal without lesions  Neck - supple, no significant adenopathy   Chest - clear to auscultation, no wheezes, rales or rhonchi, symmetric air entry   Heart - normal rate, regular rhythm, normal S1, S2, no murmurs, rubs, clicks or gallops Abdomen - soft, nontender, nondistended, no masses or organomegaly  Lymph- no adenopathy palpable  Ext-peripheral pulses normal, no pedal edema, no clubbing or cyanosis  Skin-Warm and dry. no hyperpigmentation, vitiligo, or suspicious lesions  Neuro -alert, oriented, normal speech, no focal findings or movement disorder noted  Neck-normal C-spine, no tenderness, full ROM without pain  Feet-no nail deformities or callus formation with good pulses noted      Results for orders placed or performed in visit on 02/03/21   AMB POC GLUCOSE BLOOD, BY GLUCOSE MONITORING DEVICE   Result Value Ref Range    Glucose  mg/dL   AMB POC HEMOGLOBIN A1C   Result Value Ref Range    Hemoglobin A1c (POC) 6.3 %   AMB POC LIPID PROFILE   Result Value Ref Range    Cholesterol (POC) 183     Triglycerides (POC) 105     HDL Cholesterol (POC) 62     LDL Cholesterol (POC) 100 MG/DL    Non-HDL Goal (POC) 120     TChol/HDL Ratio (POC) 2.9        Assessment/Plan:    ICD-10-CM ICD-9-CM    1. Controlled type 2 diabetes mellitus without complication, without long-term current use of insulin (HCC)  E11.9 250.00 AMB POC GLUCOSE BLOOD, BY GLUCOSE MONITORING DEVICE      AMB POC HEMOGLOBIN A1C   2. Hypercholesteremia  E78.00 272.0 AMB POC LIPID PROFILE   3. Essential hypertension  I10 401.9 AMB POC LIPID PROFILE      CBC W/O DIFF      METABOLIC PANEL, COMPREHENSIVE   4. Carpal tunnel syndrome of left wrist  G56.02 354.0      Orders Placed This Encounter    CBC W/O DIFF     Standing Status:   Future     Standing Expiration Date:   0/1/8690    METABOLIC PANEL, COMPREHENSIVE     Standing Status:   Future     Standing Expiration Date:   2/3/2022    AMB POC GLUCOSE BLOOD, BY GLUCOSE MONITORING DEVICE    AMB POC HEMOGLOBIN A1C    AMB POC LIPID PROFILE    nystatin (MYCOSTATIN) powder     Sig: Apply  to affected area three (3) times daily.  Apply until rash has resolved     Dispense:  1 Bottle     Refill:  0     lose weight, increase physical activity, follow low fat diet, follow low salt diet, continue present plan, call if any problems,Take 81mg aspirin daily  Patient Instructions   [employer] Activation    Thank you for requesting access to [employer]. Please follow the instructions below to securely access and download your online medical record. [employer] allows you to send messages to your doctor, view your test results, renew your prescriptions, schedule appointments, and more. How Do I Sign Up? 1. In your internet browser, go to [email or web address]  2. Click on the First Time User? Click Here link in the Sign In box. You will be redirect to the New Member Sign Up page. 3. Enter your [employer] Access Code exactly as it appears below. You will not need to use this code after youve completed the sign-up process. If you do not sign up before the expiration date, you must request a new code. [employer] Access Code: Activation code not generated  Current [employer] Status: Active (This is the date your [employer] access code will )    4. Enter the last four digits of your Social Security Number (xxxx) and Date of Birth (mm/dd/yyyy) as indicated and click Submit. You will be taken to the next sign-up page. 5. Create a [employer] ID. This will be your [employer]itude login ID and cannot be changed, so think of one that is secure and easy to remember. 6. Create a [employer] password. You can change your password at any time. 7. Enter your Password Reset Question and Answer. This can be used at a later time if you forget your password. 8. Enter your e-mail address. You will receive e-mail notification when new information is available in [address] 9. Click Sign Up. You can now view and download portions of your medical record. 10. Click the Download Summary menu link to download a portable copy of your medical information.     Additional Information    If you have questions, please visit the Frequently Asked Questions section of the [employer] website at [email or web address] Remember, [employer] is NOT to be used for urgent needs. For medical emergencies, dial 911. Follow-up and Dispositions    · Return in about 3 months (around 5/3/2021), or if symptoms worsen or fail to improve. I have reviewed with the patient details of the assessment and plan and all questions were answered. Relevent patient education was performed. The most recent lab findings were reviewed with the patient. An After Visit Summary was printed and given to the patient.

## 2021-02-03 NOTE — PATIENT INSTRUCTIONS
[employer] Activation Thank you for requesting access to [employer]. Please follow the instructions below to securely access and download your online medical record. [employer] allows you to send messages to your doctor, view your test results, renew your prescriptions, schedule appointments, and more. How Do I Sign Up? 1. In your internet browser, go to [email or web address] 
2. Click on the First Time User? Click Here link in the Sign In box. You will be redirect to the New Member Sign Up page. 3. Enter your [employer] Access Code exactly as it appears below. You will not need to use this code after youve completed the sign-up process. If you do not sign up before the expiration date, you must request a new code. [employer] Access Code: Activation code not generated Current [employer] Status: Active (This is the date your [employer] access code will ) 4. Enter the last four digits of your Social Security Number (xxxx) and Date of Birth (mm/dd/yyyy) as indicated and click Submit. You will be taken to the next sign-up page. 5. Create a [employer] ID. This will be your [employer] login ID and cannot be changed, so think of one that is secure and easy to remember. 6. Create a [employer] password. You can change your password at any time. 7. Enter your Password Reset Question and Answer. This can be used at a later time if you forget your password. 8. Enter your e-mail address. You will receive e-mail notification when new information is available in [address] 9. Click Sign Up. You can now view and download portions of your medical record. 10. Click the Download Summary menu link to download a portable copy of your medical information. Additional Information If you have questions, please visit the Frequently Asked Questions section of the [employer] website at [email or web address] Remember, [employer] is NOT to be used for urgent needs. For medical emergencies, dial 911.

## 2021-03-25 RX ORDER — PANTOPRAZOLE SODIUM 40 MG/1
TABLET, DELAYED RELEASE ORAL
Qty: 30 TAB | Refills: 3 | Status: SHIPPED | OUTPATIENT
Start: 2021-03-25 | End: 2022-10-11

## 2021-04-07 RX ORDER — FAMOTIDINE 40 MG/1
40 TABLET, FILM COATED ORAL DAILY
Qty: 30 TAB | Refills: 3 | Status: SHIPPED | OUTPATIENT
Start: 2021-04-07 | End: 2021-09-26

## 2021-05-03 ENCOUNTER — OFFICE VISIT (OUTPATIENT)
Dept: INTERNAL MEDICINE CLINIC | Age: 66
End: 2021-05-03
Payer: MEDICAID

## 2021-05-03 VITALS
RESPIRATION RATE: 20 BRPM | HEART RATE: 80 BPM | BODY MASS INDEX: 27.64 KG/M2 | OXYGEN SATURATION: 100 % | SYSTOLIC BLOOD PRESSURE: 138 MMHG | DIASTOLIC BLOOD PRESSURE: 70 MMHG | WEIGHT: 156 LBS | TEMPERATURE: 98.1 F | HEIGHT: 63 IN

## 2021-05-03 DIAGNOSIS — E55.9 VITAMIN D DEFICIENCY: ICD-10-CM

## 2021-05-03 DIAGNOSIS — E11.9 CONTROLLED TYPE 2 DIABETES MELLITUS WITHOUT COMPLICATION, WITHOUT LONG-TERM CURRENT USE OF INSULIN (HCC): ICD-10-CM

## 2021-05-03 DIAGNOSIS — B37.2 CANDIDAL INTERTRIGO: ICD-10-CM

## 2021-05-03 DIAGNOSIS — G56.02 CARPAL TUNNEL SYNDROME OF LEFT WRIST: ICD-10-CM

## 2021-05-03 DIAGNOSIS — M17.0 PRIMARY OSTEOARTHRITIS OF BOTH KNEES: Primary | ICD-10-CM

## 2021-05-03 DIAGNOSIS — I10 ESSENTIAL HYPERTENSION: ICD-10-CM

## 2021-05-03 LAB
ALBUMIN SERPL-MCNC: 3.8 G/DL (ref 3.5–5)
ALBUMIN/GLOB SERPL: 1.2 {RATIO} (ref 1.1–2.2)
ALP SERPL-CCNC: 137 U/L (ref 45–117)
ALT SERPL-CCNC: 30 U/L (ref 12–78)
ANION GAP SERPL CALC-SCNC: 5 MMOL/L (ref 5–15)
AST SERPL-CCNC: 31 U/L (ref 15–37)
BILIRUB SERPL-MCNC: 0.4 MG/DL (ref 0.2–1)
BUN SERPL-MCNC: 21 MG/DL (ref 6–20)
BUN/CREAT SERPL: 34 (ref 12–20)
CALCIUM SERPL-MCNC: 9.7 MG/DL (ref 8.5–10.1)
CHLORIDE SERPL-SCNC: 106 MMOL/L (ref 97–108)
CHOLEST SERPL-MCNC: 223 MG/DL
CO2 SERPL-SCNC: 27 MMOL/L (ref 21–32)
CREAT SERPL-MCNC: 0.61 MG/DL (ref 0.55–1.02)
ERYTHROCYTE [DISTWIDTH] IN BLOOD BY AUTOMATED COUNT: 14.2 % (ref 11.5–14.5)
GLOBULIN SER CALC-MCNC: 3.3 G/DL (ref 2–4)
GLUCOSE POC: 104 MG/DL
GLUCOSE SERPL-MCNC: 111 MG/DL (ref 65–100)
HBA1C MFR BLD HPLC: 6.4 %
HCT VFR BLD AUTO: 38.9 % (ref 35–47)
HDLC SERPL-MCNC: 72 MG/DL
HGB BLD-MCNC: 12.4 G/DL (ref 11.5–16)
LDL CHOLESTEROL POC: 109 MG/DL
MCH RBC QN AUTO: 30.2 PG (ref 26–34)
MCHC RBC AUTO-ENTMCNC: 31.9 G/DL (ref 30–36.5)
MCV RBC AUTO: 94.9 FL (ref 80–99)
NON-HDL GOAL (POC): 150
NRBC # BLD: 0 K/UL (ref 0–0.01)
NRBC BLD-RTO: 0 PER 100 WBC
PLATELET # BLD AUTO: 231 K/UL (ref 150–400)
PMV BLD AUTO: 11 FL (ref 8.9–12.9)
POTASSIUM SERPL-SCNC: 4 MMOL/L (ref 3.5–5.1)
PROT SERPL-MCNC: 7.1 G/DL (ref 6.4–8.2)
RBC # BLD AUTO: 4.1 M/UL (ref 3.8–5.2)
SODIUM SERPL-SCNC: 138 MMOL/L (ref 136–145)
TCHOL/HDL RATIO (POC): 3.1
TRIGL SERPL-MCNC: 205 MG/DL
WBC # BLD AUTO: 6.8 K/UL (ref 3.6–11)

## 2021-05-03 PROCEDURE — 99214 OFFICE O/P EST MOD 30 MIN: CPT | Performed by: INTERNAL MEDICINE

## 2021-05-03 PROCEDURE — 80061 LIPID PANEL: CPT | Performed by: INTERNAL MEDICINE

## 2021-05-03 PROCEDURE — 82962 GLUCOSE BLOOD TEST: CPT | Performed by: INTERNAL MEDICINE

## 2021-05-03 PROCEDURE — 83036 HEMOGLOBIN GLYCOSYLATED A1C: CPT | Performed by: INTERNAL MEDICINE

## 2021-05-03 RX ORDER — DICLOFENAC SODIUM 75 MG/1
TABLET, DELAYED RELEASE ORAL
Qty: 180 TAB | Refills: 3 | Status: SHIPPED | OUTPATIENT
Start: 2021-05-03 | End: 2021-11-18 | Stop reason: SDUPTHER

## 2021-05-03 RX ORDER — NYSTATIN 100000 [USP'U]/G
POWDER TOPICAL 3 TIMES DAILY
Qty: 1 BOTTLE | Refills: 0 | Status: SHIPPED | OUTPATIENT
Start: 2021-05-03 | End: 2021-08-04

## 2021-05-03 NOTE — PROGRESS NOTES
Laly Ontiveros is a 63 y.o. female and presents with Diabetes, Hypertension, and Cholesterol Problem    Subjective:    She states she has had recent operation on her female organs    Knee pain Review:  Patient complains of bilaterally knee pain. Onset of the symptoms was months  ago. Inciting event: longstanding problem which has been getting worse. Current symptoms include pain location: both: medial and swelling. none,  Aggravating symptoms: going up and down stairs, walking, lateral movements, any weight bearing. Patient's overall course: gradually worsening Patient has had prior knee problems. Evaluation to date: none. Treatment to date:NSAIDS            Hypertension Review:  The patient has essential hypertension . Diet and Lifestyle: generally follows a low sodium diet, exercises sporadically  Home BP Monitoring: is not measured at home. Pertinent ROS: taking medications as instructed, no medication side effects noted, no TIA's, no chest pain on exertion, no dyspnea on exertion, no swelling of ankles. Diabetes Mellitus Review:  She has diabetes mellitus. Diabetic ROS - medication compliance: compliant all of the time, diabetic diet compliance: compliant all of the time, home glucose monitoring: is performed. Known diabetic complications: none  Cardiovascular risk factors: family history, dyslipidemia, diabetes mellitus, obesity, hypertension  Current diabetic medications include oral agents   Eye exam current (within one year): no  Weight trend: stable  Prior visit with dietician: no  Current diet: \"healthy\" diet  in general  Current exercise: walking  Home blood sugar records: trend: stable  Any episodes of hypoglycemia? no      Dyslipidemia Review:  Patient presents for evaluation of lipids. Compliance with treatment thus far has been excellent. A repeat fasting lipid profile was not done.   The patient does not use medications that may worsen dyslipidemias (corticosteroids, progestins, anabolic steroids, amiodarone, cyclosporine, olanzapine). The patient exercises some    Carpal Tunnel Syndrome Review:  Patient presents for evaluation of pain in lt. hand(s), hand paresthesias of the first three fingers. Onset of the symptoms was several months ago. Current symptoms include tingling and weakness involving the medial aspect of the bilateral hand(s): severity: moderate. Patient's overall course: gradually worsening. Patient has had no prior elbow problems. Previous visits for this problem: none. Evaluation to date:she has seen hand surgery . Treatment to date: nsaid    COPD REVIEW:  The patient is being seen for follow up of COPD,the patient has been stable  Oxygen: She currently is not on home oxygen therapy. Symptoms: chronic dyspnea: severity = not present: course of sx: stable. Patient uses 2 pillows at night. Patient does not continue to smoke cigarettes. Intertrigo Review:  She still Has itching under both breasts with a rash noted on ocasion      Review of Systems  Constitutional: negative for fevers, chills, anorexia and weight loss  Eyes:   negative for visual disturbance and irritation  ENT:   negative for tinnitus,sore throat,nasal congestion,ear pains. hoarseness  Respiratory:  negative for cough, hemoptysis, dyspnea,wheezing  CV:   negative for chest pain, palpitations, lower extremity edema  GI:   negative for nausea, vomiting, diarrhea, abdominal pain,melena  Endo:               negative for polyuria,polydipsia,polyphagia,heat intolerance  Genitourinary: negative for frequency, dysuria and hematuria  Integument:  rash and pruritus  Hematologic:  negative for easy bruising and gum/nose bleeding  Musculoskel: joint pain  Neurological:  negative for headaches, dizziness, vertigo, memory problems and gait   Behavl/Psych: negative for feelings of anxiety, depression, mood changes    Past Medical History:   Diagnosis Date    Arthritis, Degenerative, Knee 7/15/2011    COPD (chronic obstructive pulmonary disease) ([facility] [address] 7/15/2011    Diverticulitis     GERD (gastroesophageal reflux disease)     GERD, gastro - Esophageal Reflux Disease 7/15/2011    Hypercholesterolemia 7/15/2011    Hypertension     Type II or unspecified type diabetes mellitus without mention of complication, uncontrolled 7/15/2011     Past Surgical History:   Procedure Laterality Date    COLONOSCOPY N/A 2017    COLON[facility]    COLONOSCOPY N/A 2018    COLONOSCOPY performed by [facility]    HX GYN  2004    uterine surgery    [place] LAP,TUBAL CAUTERY Bilateral      Social History     Socioeconomic History    Marital status:      Spouse name: Not on file    Number of children: Not on file    Years of education: Not on file    Highest education level: Not on file   Tobacco Use    Smoking status: Former Smoker     Quit date: 4/15/2015     Years since quittin.0    Smokeless tobacco: Never Used   Substance and Sexual Activity    Alcohol use: No    Drug use: Yes     Types: Marijuana     Comment: \"daily\"    Sexual activity: Yes     Partners: Male   Social History Narrative        She lives with her  she has five adult children denies alcohol cigarette use     Family History   Problem Relation Age of Onset    Diabetes Father     Deep Vein Thrombosis Daughter     Lung Disease Daughter     Other Daughter         cholesterol     Current Outpatient Medications   Medication Sig Dispense Refill    famotidine (PEPCID) 40 mg tablet Take 1 Tab by mouth daily. 30 Tab 3    pantoprazole (PROTONIX) 40 mg tablet TAKE 1 TABLET BY MOUTH EVERY DAY 30 Tab 3    nystatin (MYCOSTATIN) powder Apply  to affected area three (3) times daily. Apply until rash has resolved 1 Bottle 0    ibuprofen (MOTRIN) 800 mg tablet Take 1 Tab by mouth every eight (8) hours as needed for Pain.  30 Tab 0    methocarbamoL (ROBAXIN) 750 mg tablet Take 1 Tab by mouth three (3) times daily as needed for Muscle Spasm(s). 15 Tab 0    hydroCHLOROthiazide (HYDRODIURIL) 25 mg tablet TAKE 1 TABLET BY MOUTH DAILY 30 Tab 12    amLODIPine (NORVASC) 5 mg tablet TAKE 1 TABLET BY MOUTH DAILY 30 Tab 12    lancets (OneTouch Delica Lancets) 30 gauge misc Use once daily;E11.9:Type 2 diabetes 100 Lancet 3    losartan (COZAAR) 50 mg tablet TAKE 1 TABLET BY MOUTH DAILY 30 Tab 12    metFORMIN (GLUCOPHAGE) 1,000 mg tablet Take 1 Tab by mouth two (2) times daily (with meals). Si tab daily 60 Tab 12    pioglitazone (ACTOS) 45 mg tablet Take 1 Tab by mouth nightly. 30 Tab 12    potassium chloride SA (MICRO-K) 10 mEq capsule TAKE 1 CAPSULE BY MOUTH TWICE DAILY 30 Cap 12    sucralfate (CARAFATE) 1 gram tablet TAKE 1 TABLET BY MOUTH 4 TIMES A  Tab 1    diclofenac EC (VOLTAREN) 75 mg EC tablet TAKE 1 TABLET BY MOUTH TWICE DAILY 60 Tab 0    glucose blood VI test strips (OneTouch Ultra Blue Test Strip) strip Test once daily;E11. 9;Type 2 diabetes. 100 Strip 3    Blood-Glucose Meter (OneTouch Ultra2 Meter) monitoring kit Test once daily; E11.9; Diabetes Type 2 1 Kit 0    Nystop powder APPLY TO THE AFFECTED AREA THREE TIMES DAILY 30 g 0    cetirizine (ZYRTEC) 10 mg tablet Take 1 Tab by mouth daily. 20 Tab 0    potassium chloride SA (MICRO-K) 10 mEq capsule TAKE ONE CAPSULE BY MOUTH TWICE DAILY 30 Cap 12    polyethylene glycol (MIRALAX) 17 gram/dose powder MIX 17 GRAMS IN A LIQUID AND THEN DRINK ONCE D UTD  3    bismuth subsalicylate (PEPTO-BISMOL MAXIMUM STRENGTH) 525 mg/15 mL susp Take 30 mL by mouth every six (6) hours as needed.  loperamide (IMODIUM A-D) 2 mg tablet Take 1 Tab by mouth four (4) times daily as needed for Diarrhea.  20 Tab 0     No Known Allergies    Objective:  Visit Vitals  /70 (BP 1 Location: Right arm, BP Patient Position: Sitting, BP Cuff Size: Adult)   Pulse 80   Temp 98.1 °F (36.7 °C) (Oral)   Resp 20   Ht 5' 3\" (1.6 m)   Wt 156 lb (70.8 kg)   LMP 03/15/2004 (Approximate)   SpO2 100%   BMI 27.63 kg/m²     Physical Exam:   General appearance - alert, well appearing, and in no distress  Mental status - alert, oriented to person, place, and time  EYE-KODY, EOMI, corneas normal, no foreign bodies  ENT-ENT exam normal, no neck nodes or sinus tenderness  Nose - normal and patent, no erythema, discharge or polyps  Mouth - mucous membranes moist, pharynx normal without lesions  Neck - supple, no significant adenopathy   Chest - clear to auscultation, no wheezes, rales or rhonchi, symmetric air entry   Heart - normal rate, regular rhythm, normal S1, S2, no murmurs, rubs, clicks or gallops   Abdomen - soft, nontender, nondistended, no masses or organomegaly  Lymph- no adenopathy palpable  Ext-peripheral pulses normal, no pedal edema, no clubbing or cyanosis  Skin-Warm and dry. no hyperpigmentation, vitiligo, or suspicious lesions  Neuro -alert, oriented, normal speech, no focal findings or movement disorder noted  Neck-normal C-spine, no tenderness, full ROM without pain  Feet-no nail deformities or callus formation with good pulses noted  Both knees-medial joint line tenderness noted      Results for orders placed or performed in visit on 05/03/21   AMB POC HEMOGLOBIN A1C   Result Value Ref Range    Hemoglobin A1c (POC) 6.4 %       Assessment/Plan:    ICD-10-CM ICD-9-CM    1. Primary osteoarthritis of both knees  M17.0 715.16 diclofenac EC (VOLTAREN) 75 mg EC tablet   2. Controlled type 2 diabetes mellitus without complication, without long-term current use of insulin ([facility])  E11.9 250.00 AMB POC HEMOGLOBIN A1C      AMB POC GLUCOSE BLOOD, BY GLUCOSE MONITORING DEVICE   3. Essential hypertension  I10 401.9 CBC W/O DIFF      METABOLIC PANEL, COMPREHENSIVE      AMB POC LIPID PROFILE      AMB POC URINE, MICROALBUMIN, SEMIQUANT (3 RESULTS)   4. Carpal tunnel syndrome of left wrist  G56.02 354.0    5. Vitamin D deficiency  E55.9 268.9    6.  Candidal intertrigo B37.2 112.3      Orders Placed This Encounter    AMB POC HEMOGLOBIN A1C    AMB POC GLUCOSE BLOOD, BY GLUCOSE MONITORING DEVICE    AMB POC LIPID PROFILE     lose weight, increase physical activity, follow low fat diet, follow low salt diet, continue present plan, call if any problems,Take 81mg aspirin daily  Patient Instructions   [employer] Activation    Thank you for requesting access to [employer]. Please follow the instructions below to securely access and download your online medical record. [employer] allows you to send messages to your doctor, view your test results, renew your prescriptions, schedule appointments, and more. How Do I Sign Up? 1. In your internet browser, go to [email or web address]  2. Click on the First Time User? Click Here link in the Sign In box. You will be redirect to the New Member Sign Up page. 3. Enter your [employer] Access Code exactly as it appears below. You will not need to use this code after youve completed the sign-up process. If you do not sign up before the expiration date, you must request a new code. [employer] Access Code: Activation code not generated  Current [employer] Status: Active (This is the date your [employer] access code will )    4. Enter the last four digits of your Social Security Number (xxxx) and Date of Birth (mm/dd/yyyy) as indicated and click Submit. You will be taken to the next sign-up page. 5. Create a [employer] ID. This will be your [employer] login ID and cannot be changed, so think of one that is secure and easy to remember. 6. Create a [employer] password. You can change your password at any time. 7. Enter your Password Reset Question and Answer. This can be used at a later time if you forget your password. 8. Enter your e-mail address. You will receive e-mail notification when new information is available in [address] 9. Click Sign Up. You can now view and download portions of your medical record.   10. Click the Download Summary menu link to download a portable copy of your medical information. Additional Information    If you have questions, please visit the Frequently Asked Questions section of the [facility] website at [email or web address] Remember, [facility] is NOT to be used for urgent needs. For medical emergencies, dial 911. Follow-up and Dispositions    · Return in about 3 months (around 8/3/2021), or if symptoms worsen or fail to improve. I have reviewed with the patient details of the assessment and plan and all questions were answered. Relevent patient education was performed. The most recent lab findings were reviewed with the patient. An After Visit Summary was printed and given to the patient.

## 2021-05-03 NOTE — PATIENT INSTRUCTIONS
[employer] Activation    Thank you for requesting access to [employer]. Please follow the instructions below to securely access and download your online medical record. [employer] allows you to send messages to your doctor, view your test results, renew your prescriptions, schedule appointments, and more. How Do I Sign Up? 1. In your internet browser, go to [email or web address]  2. Click on the First Time User? Click Here link in the Sign In box. You will be redirect to the New Member Sign Up page. 3. Enter your [employer] Access Code exactly as it appears below. You will not need to use this code after youve completed the sign-up process. If you do not sign up before the expiration date, you must request a new code. [employer] Access Code: Activation code not generated  Current [employer] Status: Active (This is the date your [employer] access code will )    4. Enter the last four digits of your Social Security Number (xxxx) and Date of Birth (mm/dd/yyyy) as indicated and click Submit. You will be taken to the next sign-up page. 5. Create a [employer] ID. This will be your [employer] login ID and cannot be changed, so think of one that is secure and easy to remember. 6. Create a [employer] password. You can change your password at any time. 7. Enter your Password Reset Question and Answer. This can be used at a later time if you forget your password. 8. Enter your e-mail address. You will receive e-mail notification when new information is available in [address] 9. Click Sign Up. You can now view and download portions of your medical record. 10. Click the Download Summary menu link to download a portable copy of your medical information. Additional Information    If you have questions, please visit the Frequently Asked Questions section of the [employer] website at [email or web address] Remember, [employer] is NOT to be used for urgent needs. For medical emergencies, dial 911.

## 2021-05-05 ENCOUNTER — TELEPHONE (OUTPATIENT)
Dept: INTERNAL MEDICINE CLINIC | Age: 66
End: 2021-05-05

## 2021-05-25 RX ORDER — POTASSIUM CHLORIDE 750 MG/1
CAPSULE, EXTENDED RELEASE ORAL
Qty: 60 CAPSULE | Refills: 3 | Status: SHIPPED | OUTPATIENT
Start: 2021-05-25 | End: 2021-09-26

## 2021-06-28 RX ORDER — SUCRALFATE 1 G/1
TABLET ORAL
Qty: 120 TABLET | Refills: 1 | Status: SHIPPED | OUTPATIENT
Start: 2021-06-28 | End: 2021-09-16

## 2021-07-19 RX ORDER — BLOOD SUGAR DIAGNOSTIC
STRIP MISCELLANEOUS
Qty: 100 STRIP | Refills: 2 | Status: SHIPPED | OUTPATIENT
Start: 2021-07-19 | End: 2022-03-07

## 2021-08-04 RX ORDER — NYSTATIN 100000 [USP'U]/G
POWDER TOPICAL
Qty: 15 G | Refills: 0 | Status: SHIPPED | OUTPATIENT
Start: 2021-08-04 | End: 2021-11-18 | Stop reason: SDUPTHER

## 2021-08-13 ENCOUNTER — OFFICE VISIT (OUTPATIENT)
Dept: INTERNAL MEDICINE CLINIC | Age: 66
End: 2021-08-13
Payer: MEDICAID

## 2021-08-13 VITALS
RESPIRATION RATE: 20 BRPM | TEMPERATURE: 98.5 F | BODY MASS INDEX: 30.83 KG/M2 | SYSTOLIC BLOOD PRESSURE: 120 MMHG | WEIGHT: 174 LBS | DIASTOLIC BLOOD PRESSURE: 80 MMHG | OXYGEN SATURATION: 93 % | HEART RATE: 80 BPM | HEIGHT: 63 IN

## 2021-08-13 DIAGNOSIS — E78.00 HYPERCHOLESTEREMIA: ICD-10-CM

## 2021-08-13 DIAGNOSIS — I10 ESSENTIAL HYPERTENSION: ICD-10-CM

## 2021-08-13 DIAGNOSIS — E11.9 CONTROLLED TYPE 2 DIABETES MELLITUS WITHOUT COMPLICATION, WITHOUT LONG-TERM CURRENT USE OF INSULIN (HCC): Primary | ICD-10-CM

## 2021-08-13 DIAGNOSIS — B37.2 CANDIDAL INTERTRIGO: ICD-10-CM

## 2021-08-13 LAB
ALBUMIN SERPL-MCNC: 3.8 G/DL (ref 3.5–5)
ALBUMIN/GLOB SERPL: 1.1 {RATIO} (ref 1.1–2.2)
ALP SERPL-CCNC: 173 U/L (ref 45–117)
ALT SERPL-CCNC: 39 U/L (ref 12–78)
ANION GAP SERPL CALC-SCNC: 5 MMOL/L (ref 5–15)
AST SERPL-CCNC: 36 U/L (ref 15–37)
BILIRUB SERPL-MCNC: 0.3 MG/DL (ref 0.2–1)
BUN SERPL-MCNC: 21 MG/DL (ref 6–20)
BUN/CREAT SERPL: 34 (ref 12–20)
CALCIUM SERPL-MCNC: 9.5 MG/DL (ref 8.5–10.1)
CHLORIDE SERPL-SCNC: 106 MMOL/L (ref 97–108)
CHOLEST SERPL-MCNC: 224 MG/DL
CO2 SERPL-SCNC: 27 MMOL/L (ref 21–32)
CREAT SERPL-MCNC: 0.62 MG/DL (ref 0.55–1.02)
ERYTHROCYTE [DISTWIDTH] IN BLOOD BY AUTOMATED COUNT: 13.2 % (ref 11.5–14.5)
GLOBULIN SER CALC-MCNC: 3.5 G/DL (ref 2–4)
GLUCOSE POC: 191 MG/DL
GLUCOSE SERPL-MCNC: 151 MG/DL (ref 65–100)
HBA1C MFR BLD HPLC: 6.4 %
HCT VFR BLD AUTO: 40 % (ref 35–47)
HDLC SERPL-MCNC: 51 MG/DL
HGB BLD-MCNC: 13 G/DL (ref 11.5–16)
LDL CHOLESTEROL POC: 141 MG/DL
MCH RBC QN AUTO: 30.3 PG (ref 26–34)
MCHC RBC AUTO-ENTMCNC: 32.5 G/DL (ref 30–36.5)
MCV RBC AUTO: 93.2 FL (ref 80–99)
NON-HDL GOAL (POC): 173
NRBC # BLD: 0 K/UL (ref 0–0.01)
NRBC BLD-RTO: 0 PER 100 WBC
PLATELET # BLD AUTO: 227 K/UL (ref 150–400)
PMV BLD AUTO: 11.1 FL (ref 8.9–12.9)
POTASSIUM SERPL-SCNC: 4.4 MMOL/L (ref 3.5–5.1)
PROT SERPL-MCNC: 7.3 G/DL (ref 6.4–8.2)
RBC # BLD AUTO: 4.29 M/UL (ref 3.8–5.2)
SODIUM SERPL-SCNC: 138 MMOL/L (ref 136–145)
TCHOL/HDL RATIO (POC): 4.4
TRIGL SERPL-MCNC: 158 MG/DL
WBC # BLD AUTO: 8 K/UL (ref 3.6–11)

## 2021-08-13 PROCEDURE — 83036 HEMOGLOBIN GLYCOSYLATED A1C: CPT | Performed by: INTERNAL MEDICINE

## 2021-08-13 PROCEDURE — 80061 LIPID PANEL: CPT | Performed by: INTERNAL MEDICINE

## 2021-08-13 PROCEDURE — 99214 OFFICE O/P EST MOD 30 MIN: CPT | Performed by: INTERNAL MEDICINE

## 2021-08-13 PROCEDURE — 82962 GLUCOSE BLOOD TEST: CPT | Performed by: INTERNAL MEDICINE

## 2021-08-13 RX ORDER — ATORVASTATIN CALCIUM 40 MG/1
40 TABLET, FILM COATED ORAL DAILY
Qty: 30 TABLET | Refills: 12 | Status: SHIPPED | OUTPATIENT
Start: 2021-08-13 | End: 2022-09-04 | Stop reason: SDUPTHER

## 2021-08-13 NOTE — PATIENT INSTRUCTIONS
[employer] Activation    Thank you for requesting access to [employer]. Please follow the instructions below to securely access and download your online medical record. [employer] allows you to send messages to your doctor, view your test results, renew your prescriptions, schedule appointments, and more. How Do I Sign Up? 1. In your internet browser, go to [email or web address]  2. Click on the First Time User? Click Here link in the Sign In box. You will be redirect to the New Member Sign Up page. 3. Enter your [employer] Access Code exactly as it appears below. You will not need to use this code after youve completed the sign-up process. If you do not sign up before the expiration date, you must request a new code. [employer] Access Code: Activation code not generated  Current [employer] Status: Active (This is the date your [employer] access code will )    4. Enter the last four digits of your Social Security Number (xxxx) and Date of Birth (mm/dd/yyyy) as indicated and click Submit. You will be taken to the next sign-up page. 5. Create a [employer] ID. This will be your [employer] login ID and cannot be changed, so think of one that is secure and easy to remember. 6. Create a [employer] password. You can change your password at any time. 7. Enter your Password Reset Question and Answer. This can be used at a later time if you forget your password. 8. Enter your e-mail address. You will receive e-mail notification when new information is available in [address] 9. Click Sign Up. You can now view and download portions of your medical record. 10. Click the Download Summary menu link to download a portable copy of your medical information. Additional Information    If you have questions, please visit the Frequently Asked Questions section of the [employer] website at [email or web address] Remember, [employer] is NOT to be used for urgent needs. For medical emergencies, dial 911.

## 2021-08-13 NOTE — PROGRESS NOTES
Chief Complaint   Patient presents with    Cough    Diabetes    Hypertension     3 most recent PHQ Screens 8/13/2021   PHQ Not Done Medical Reason (indicate in comments)   Little interest or pleasure in doing things Not at all   Feeling down, depressed, irritable, or hopeless Not at all   Total Score PHQ 2 0     1. Have you been to the ER, urgent care clinic since your last visit? Hospitalized since your last visit? NO    2. Have you seen or consulted any other health care providers outside of the [address] [place] [address] since your last visit? Include any pap smears or colon screening.  NO

## 2021-08-13 NOTE — PROGRESS NOTES
Santos Chow is a 63 y.o. female and presents with Cough, Diabetes, and Hypertension    Subjective:    Knee pain Review:  Patient complains of bilaterally knee pain. Onset of the symptoms was months  ago. Inciting event: longstanding problem which has been getting worse. Current symptoms include pain location: both: medial and swelling. none,  Aggravating symptoms: going up and down stairs, walking, lateral movements, any weight bearing. Patient's overall course: gradually worsening Patient has had prior knee problems. Evaluation to date: none. Treatment to date:NSAIDS        Hypertension Review:  The patient has essential hypertension . Diet and Lifestyle: generally follows a low sodium diet, exercises sporadically  Home BP Monitoring: is not measured at home. Pertinent ROS: taking medications as instructed, no medication side effects noted, no TIA's, no chest pain on exertion, no dyspnea on exertion, no swelling of ankles. Diabetes Mellitus Review:  She has diabetes mellitus. Diabetic ROS - medication compliance: compliant all of the time, diabetic diet compliance: compliant all of the time, home glucose monitoring: is performed. Known diabetic complications: none  Cardiovascular risk factors: family history, dyslipidemia, diabetes mellitus, obesity, hypertension  Current diabetic medications include oral agents   Eye exam current (within one year): no  Weight trend: stable  Prior visit with dietician: no  Current diet: \"healthy\" diet  in general  Current exercise: walking  Home blood sugar records: trend: stable  Any episodes of hypoglycemia? no      Dyslipidemia Review:  Patient presents for evaluation of lipids. Compliance with treatment thus far has been excellent. A repeat fasting lipid profile was not done. The patient does not use medications that may worsen dyslipidemias (corticosteroids, progestins, anabolic steroids, amiodarone, cyclosporine, olanzapine).  The patient exercises some    Carpal Tunnel Syndrome Review:  Patient presents for evaluation of pain in lt. hand(s), hand paresthesias of the first three fingers. Onset of the symptoms was several months ago. Current symptoms include tingling and weakness involving the medial aspect of the bilateral hand(s): severity: moderate. Patient's overall course: gradually worsening. Patient has had no prior elbow problems. Previous visits for this problem: none. Evaluation to date:she has seen hand surgery . Treatment to date: nsaid    COPD REVIEW:  The patient is being seen for follow up of COPD,the patient has been stable. Bernice Hood is a 63 y.o. female who complains of a lingering cough,  for the past few weeks, gradually improving since that time. She denies a history of shortness of breath. Evaluation to date: none. Treatment to date: OTC products. Relevant PMH: No pertinent additional PMH. Oxygen: She currently is not on home oxygen therapy. Symptoms: chronic dyspnea: severity = not present: course of sx: stable. Patient uses 2 pillows at night. Patient does not continue to smoke cigarettes. Intertrigo Review:  She still has less itching under both breasts      Review of Systems  Constitutional: negative for fevers, chills, anorexia and weight loss  Eyes:   negative for visual disturbance and irritation  ENT:   negative for tinnitus,sore throat,nasal congestion,ear pains. hoarseness  Respiratory:  cough,  CV:   negative for chest pain, palpitations, lower extremity edema  GI:   negative for nausea, vomiting, diarrhea, abdominal pain,melena  Endo:               negative for polyuria,polydipsia,polyphagia,heat intolerance  Genitourinary: negative for frequency, dysuria and hematuria  Integument:  rash and pruritus  Hematologic:  negative for easy bruising and gum/nose bleeding  Musculoskel: joint pain  Neurological:  negative for headaches, dizziness, vertigo, memory problems and gait   Behavl/Psych: negative for feelings of anxiety, depression, mood changes    Past Medical History:   Diagnosis Date    Arthritis, Degenerative, Knee 7/15/2011    COPD (chronic obstructive pulmonary disease) ([facility] [address] 7/15/2011    Diverticulitis     GERD (gastroesophageal reflux disease)     GERD, gastro - Esophageal Reflux Disease 7/15/2011    Hypercholesterolemia 7/15/2011    Hypertension     Type II or unspecified type diabetes mellitus without mention of complication, uncontrolled 7/15/2011     Past Surgical History:   Procedure Laterality Date    COLONOSCOPY N/A 2017    COL[facility] performed [facility] at [facility] AMBULATORY OR    COLONOSCOPY N/A 2018    COLONOSCOPY performed by [facility] Sanchez MD at [facility] ENDOSCOPY    HX GYN  2004    uterine surgery    ME LAP,TUBAL CAUTERY Bilateral      Social History     Socioeconomic History    Marital status:      Spouse name: Not on file    Number of children: Not on file    Years of education: Not on file    Highest education level: Not on file   Tobacco Use    Smoking status: Former Smoker     Quit date: 4/15/2015     Years since quittin.3    Smokeless tobacco: Never Used   Vaping Use    Vaping Use: Never used   Substance and Sexual Activity    Alcohol use: No    Drug use: Yes     Types: Marijuana     Comment: \"daily\"    Sexual activity: Yes     Partners: Male   Social History Narrative        She lives with her  she has five adult children denies alcohol cigarette use     Social Determinants of Health     Financial Resource Strain:     Difficulty of Paying Living Expenses:    Food Insecurity:     Worried About Running Out of Food in the Last Year:     Ran Out of Food in the Last Year:    Transportation Needs:     Lack of Transportation (Medical):      Lack of Transportation (Non-Medical):    Physical Activity:     Days of Exercise per Week:     Minutes of Exercise per Session:    Stress:     Feeling of Stress :    Social Connections:  Frequency of Communication with Friends and Family:     Frequency of Social Gatherings with Friends and Family:     Attends Congregational Services:     Active Member of Clubs or Organizations:     Attends Club or Organization Meetings:     Marital Status:      Family History   Problem Relation Age of Onset    Diabetes Father     Deep Vein Thrombosis Daughter     Lung Disease Daughter     Other Daughter         cholesterol     Current Outpatient Medications   Medication Sig Dispense Refill    atorvastatin (LIPITOR) 40 mg tablet Take 1 Tablet by mouth daily. 30 Tablet 12    nystatin (MYCOSTATIN) powder APPLY TO AFFECTED AREA 3 TIMES DAILY. APPLY UNTIL RASH HAS RESOLVED. 15 g 0    glucose blood VI test strips (OneTouch Ultra Test) strip USE TO CHECK BLOOD SUGAR EVERY  Strip 2    sucralfate (CARAFATE) 1 gram tablet TAKE 1 TABLET BY MOUTH 4 TIMES A  Tablet 1    potassium chloride SA (MICRO-K) 10 mEq capsule TAKE 1 CAPSULE BY MOUTH TWICE A DAY 60 Capsule 3    diclofenac EC (VOLTAREN) 75 mg EC tablet TAKE 1 TABLET BY MOUTH TWICE DAILY 180 Tab 3    famotidine (PEPCID) 40 mg tablet Take 1 Tab by mouth daily. 30 Tab 3    pantoprazole (PROTONIX) 40 mg tablet TAKE 1 TABLET BY MOUTH EVERY DAY 30 Tab 3    ibuprofen (MOTRIN) 800 mg tablet Take 1 Tab by mouth every eight (8) hours as needed for Pain. 30 Tab 0    methocarbamoL (ROBAXIN) 750 mg tablet Take 1 Tab by mouth three (3) times daily as needed for Muscle Spasm(s). 15 Tab 0    hydroCHLOROthiazide (HYDRODIURIL) 25 mg tablet TAKE 1 TABLET BY MOUTH DAILY 30 Tab 12    amLODIPine (NORVASC) 5 mg tablet TAKE 1 TABLET BY MOUTH DAILY 30 Tab 12    lancets (OneTouch Delica Lancets) 30 gauge misc Use once daily;E11.9:Type 2 diabetes 100 Lancet 3    losartan (COZAAR) 50 mg tablet TAKE 1 TABLET BY MOUTH DAILY 30 Tab 12    metFORMIN (GLUCOPHAGE) 1,000 mg tablet Take 1 Tab by mouth two (2) times daily (with meals).  Si tab daily 60 Tab 12    pioglitazone (ACTOS) 45 mg tablet Take 1 Tab by mouth nightly. 30 Tab 12    potassium chloride SA (MICRO-K) 10 mEq capsule TAKE 1 CAPSULE BY MOUTH TWICE DAILY 30 Cap 12    Blood-Glucose Meter (OneTouch Ultra2 Meter) monitoring kit Test once daily; E11.9; Diabetes Type 2 1 Kit 0    Nystop powder APPLY TO THE AFFECTED AREA THREE TIMES DAILY 30 g 0    cetirizine (ZYRTEC) 10 mg tablet Take 1 Tab by mouth daily. 20 Tab 0    potassium chloride SA (MICRO-K) 10 mEq capsule TAKE ONE CAPSULE BY MOUTH TWICE DAILY 30 Cap 12    polyethylene glycol (MIRALAX) 17 gram/dose powder MIX 17 GRAMS IN A LIQUID AND THEN DRINK ONCE D UTD  3    bismuth subsalicylate (PEPTO-BISMOL MAXIMUM STRENGTH) 525 mg/15 mL susp Take 30 mL by mouth every six (6) hours as needed.  loperamide (IMODIUM A-D) 2 mg tablet Take 1 Tab by mouth four (4) times daily as needed for Diarrhea.  20 Tab 0     No Known Allergies    Objective:  Visit Vitals  /80 (BP 1 Location: Right arm, BP Patient Position: Sitting, BP Cuff Size: Adult)   Pulse 80   Temp 98.5 °F (36.9 °C) (Oral)   Resp 20   Ht 5' 3\" (1.6 m)   Wt 174 lb (78.9 kg)   LMP 03/15/2004 (Approximate)   SpO2 93%   BMI 30.82 kg/m²     Physical Exam:   General appearance - alert, well appearing, and in no distress  Mental status - alert, oriented to person, place, and time  EYE-KODY, EOMI, corneas normal, no foreign bodies  ENT-ENT exam normal, no neck nodes or sinus tenderness  Nose - normal and patent, no erythema, discharge or polyps  Mouth - mucous membranes moist, pharynx normal without lesions  Neck - supple, no significant adenopathy   Chest - clear to auscultation, no wheezes, rales or rhonchi, symmetric air entry   Heart - normal rate, regular rhythm, normal S1, S2, no murmurs, rubs, clicks or gallops   Abdomen - soft, nontender, nondistended, no masses or organomegaly  Lymph- no adenopathy palpable  Ext-peripheral pulses normal, no pedal edema, no clubbing or cyanosis  Skin-Warm and dry. no hyperpigmentation, vitiligo, or suspicious lesions  Neuro -alert, oriented, normal speech, no focal findings or movement disorder noted  Neck-normal C-spine, no tenderness, full ROM without pain  Feet-no nail deformities or callus formation with good pulses noted  Both knees-medial joint line tenderness noted      Results for orders placed or performed in visit on 08/13/21   AMB POC GLUCOSE BLOOD, BY GLUCOSE MONITORING DEVICE   Result Value Ref Range    Glucose  MG/DL   AMB POC LIPID PROFILE   Result Value Ref Range    Cholesterol (POC) 224     Triglycerides (POC) 158     HDL Cholesterol (POC) 51     LDL Cholesterol (POC) 141 MG/DL    Non-HDL Goal (POC) 173     TChol/HDL Ratio (POC) 4.4    AMB POC HEMOGLOBIN A1C   Result Value Ref Range    Hemoglobin A1c (POC) 6.4 %       Assessment/Plan:    ICD-10-CM ICD-9-CM    1. Controlled type 2 diabetes mellitus without complication, without long-term current use of insulin (HCC)  E11.9 250.00 AMB POC GLUCOSE BLOOD, BY GLUCOSE MONITORING DEVICE      AMB POC HEMOGLOBIN A1C   2. Hypercholesteremia  E78.00 272.0 AMB POC LIPID PROFILE   3. Essential hypertension  I10 401.9 AMB POC LIPID PROFILE      CBC W/O DIFF      METABOLIC PANEL, COMPREHENSIVE   4. Candidal intertrigo  B37.2 112.3      Orders Placed This Encounter    CBC W/O DIFF     Standing Status:   Future     Standing Expiration Date:   7/12/9567    METABOLIC PANEL, COMPREHENSIVE     Standing Status:   Future     Standing Expiration Date:   8/13/2022    AMB POC GLUCOSE BLOOD, BY GLUCOSE MONITORING DEVICE    AMB POC LIPID PROFILE    AMB POC HEMOGLOBIN A1C    atorvastatin (LIPITOR) 40 mg tablet     Sig: Take 1 Tablet by mouth daily.      Dispense:  30 Tablet     Refill:  12     lose weight, increase physical activity, follow low fat diet, follow low salt diet, continue present plan, call if any problems,Take 81mg aspirin daily  Patient Instructions   MyChart Activation    Thank you for requesting access to [facility]. Please follow the instructions below to securely access and download your online medical record. [facility] allows you to send messages to your doctor, view your test results, renew your prescriptions, schedule appointments, and more. How Do I Sign Up? 1. In your internet browser, go to [email or web address]  2. Click on the First Time User? Click Here link in the Sign In box. You will be redirect to the New Member Sign Up page. 3. Enter your [facility] Access Code exactly as it appears below. You will not need to use this code after youve completed the sign-up process. If you do not sign up before the expiration date, you must request a new code. [facility] Access Code: Activation code not generated  Current [facility] Status: Active (This is the date your [facility] access code will )    4. Enter the last four digits of your Social Security Number (xxxx) and Date of Birth (mm/dd/yyyy) as indicated and click Submit. You will be taken to the next sign-up page. 5. Create a [facility] ID. This will be your [facility] login ID and cannot be changed, so think of one that is secure and easy to remember. 6. Create a [facility] password. You can change your password at any time. 7. Enter your Password Reset Question and Answer. This can be used at a later time if you forget your password. 8. Enter your e-mail address. You will receive e-mail notification when new information is available in [address] 9. Click Sign Up. You can now view and download portions of your medical record. 10. Click the Download Summary menu link to download a portable copy of your medical information. Additional Information    If you have questions, please visit the Frequently Asked Questions section of the [facility] website at [email or web address] Remember, [facility] is NOT to be used for urgent needs. For medical emergencies, dial 911.            Follow-up and Dispositions    · Return in about 3 months (around 11/13/2021), or if symptoms worsen or fail to improve. I have reviewed with the patient details of the assessment and plan and all questions were answered. Relevent patient education was performed. The most recent lab findings were reviewed with the patient. An After Visit Summary was printed and given to the patient.

## 2021-09-16 RX ORDER — SUCRALFATE 1 G/1
TABLET ORAL
Qty: 120 TABLET | Refills: 1 | Status: SHIPPED | OUTPATIENT
Start: 2021-09-16 | End: 2021-11-18 | Stop reason: SDUPTHER

## 2021-09-26 RX ORDER — POTASSIUM CHLORIDE 750 MG/1
CAPSULE, EXTENDED RELEASE ORAL
Qty: 60 CAPSULE | Refills: 3 | Status: SHIPPED | OUTPATIENT
Start: 2021-09-26

## 2021-09-26 RX ORDER — FAMOTIDINE 40 MG/1
TABLET, FILM COATED ORAL
Qty: 30 TABLET | Refills: 3 | Status: SHIPPED | OUTPATIENT
Start: 2021-09-26 | End: 2021-11-18 | Stop reason: SDUPTHER

## 2021-11-10 RX ORDER — METFORMIN HYDROCHLORIDE 1000 MG/1
TABLET ORAL
Qty: 60 TABLET | Refills: 12 | Status: SHIPPED | OUTPATIENT
Start: 2021-11-10

## 2021-11-18 ENCOUNTER — OFFICE VISIT (OUTPATIENT)
Dept: INTERNAL MEDICINE CLINIC | Age: 66
End: 2021-11-18
Payer: MEDICAID

## 2021-11-18 VITALS
SYSTOLIC BLOOD PRESSURE: 130 MMHG | WEIGHT: 182 LBS | HEIGHT: 63 IN | RESPIRATION RATE: 18 BRPM | DIASTOLIC BLOOD PRESSURE: 70 MMHG | OXYGEN SATURATION: 91 % | TEMPERATURE: 98 F | BODY MASS INDEX: 32.25 KG/M2 | HEART RATE: 79 BPM

## 2021-11-18 DIAGNOSIS — R73.9 HYPERGLYCEMIA: ICD-10-CM

## 2021-11-18 DIAGNOSIS — B37.2 CANDIDAL INTERTRIGO: ICD-10-CM

## 2021-11-18 DIAGNOSIS — Z23 NEEDS FLU SHOT: Primary | ICD-10-CM

## 2021-11-18 DIAGNOSIS — I10 ESSENTIAL HYPERTENSION: ICD-10-CM

## 2021-11-18 DIAGNOSIS — G56.02 CARPAL TUNNEL SYNDROME OF LEFT WRIST: ICD-10-CM

## 2021-11-18 DIAGNOSIS — E78.00 HYPERCHOLESTEREMIA: ICD-10-CM

## 2021-11-18 DIAGNOSIS — M17.0 PRIMARY OSTEOARTHRITIS OF BOTH KNEES: ICD-10-CM

## 2021-11-18 LAB
ALBUMIN SERPL-MCNC: 3.6 G/DL (ref 3.5–5)
ALBUMIN/GLOB SERPL: 1.1 {RATIO} (ref 1.1–2.2)
ALP SERPL-CCNC: 137 U/L (ref 45–117)
ALT SERPL-CCNC: 37 U/L (ref 12–78)
ANION GAP SERPL CALC-SCNC: 6 MMOL/L (ref 5–15)
AST SERPL-CCNC: 34 U/L (ref 15–37)
BILIRUB SERPL-MCNC: 0.4 MG/DL (ref 0.2–1)
BUN SERPL-MCNC: 21 MG/DL (ref 6–20)
BUN/CREAT SERPL: 29 (ref 12–20)
CALCIUM SERPL-MCNC: 9.6 MG/DL (ref 8.5–10.1)
CHLORIDE SERPL-SCNC: 107 MMOL/L (ref 97–108)
CHOLEST SERPL-MCNC: 124 MG/DL
CO2 SERPL-SCNC: 27 MMOL/L (ref 21–32)
CREAT SERPL-MCNC: 0.73 MG/DL (ref 0.55–1.02)
ERYTHROCYTE [DISTWIDTH] IN BLOOD BY AUTOMATED COUNT: 13.6 % (ref 11.5–14.5)
GLOBULIN SER CALC-MCNC: 3.3 G/DL (ref 2–4)
GLUCOSE POC: 104 MG/DL
GLUCOSE SERPL-MCNC: 108 MG/DL (ref 65–100)
HBA1C MFR BLD HPLC: 6.4 %
HCT VFR BLD AUTO: 37.4 % (ref 35–47)
HDLC SERPL-MCNC: 50 MG/DL
HGB BLD-MCNC: 11.9 G/DL (ref 11.5–16)
LDL CHOLESTEROL POC: 52 MG/DL
MCH RBC QN AUTO: 30.4 PG (ref 26–34)
MCHC RBC AUTO-ENTMCNC: 31.8 G/DL (ref 30–36.5)
MCV RBC AUTO: 95.7 FL (ref 80–99)
NON-HDL GOAL (POC): 74
NRBC # BLD: 0 K/UL (ref 0–0.01)
NRBC BLD-RTO: 0 PER 100 WBC
PLATELET # BLD AUTO: 193 K/UL (ref 150–400)
PMV BLD AUTO: 11.1 FL (ref 8.9–12.9)
POTASSIUM SERPL-SCNC: 4 MMOL/L (ref 3.5–5.1)
PROT SERPL-MCNC: 6.9 G/DL (ref 6.4–8.2)
RBC # BLD AUTO: 3.91 M/UL (ref 3.8–5.2)
SODIUM SERPL-SCNC: 140 MMOL/L (ref 136–145)
TCHOL/HDL RATIO (POC): 2.5
TRIGL SERPL-MCNC: 110 MG/DL
WBC # BLD AUTO: 6.6 K/UL (ref 3.6–11)

## 2021-11-18 PROCEDURE — 99214 OFFICE O/P EST MOD 30 MIN: CPT | Performed by: INTERNAL MEDICINE

## 2021-11-18 PROCEDURE — 82962 GLUCOSE BLOOD TEST: CPT | Performed by: INTERNAL MEDICINE

## 2021-11-18 PROCEDURE — 80061 LIPID PANEL: CPT | Performed by: INTERNAL MEDICINE

## 2021-11-18 PROCEDURE — 90686 IIV4 VACC NO PRSV 0.5 ML IM: CPT | Performed by: INTERNAL MEDICINE

## 2021-11-18 PROCEDURE — 83036 HEMOGLOBIN GLYCOSYLATED A1C: CPT | Performed by: INTERNAL MEDICINE

## 2021-11-18 RX ORDER — DICLOFENAC SODIUM 75 MG/1
TABLET, DELAYED RELEASE ORAL
Qty: 180 TABLET | Refills: 3 | Status: SHIPPED | OUTPATIENT
Start: 2021-11-18 | End: 2022-08-17 | Stop reason: SDUPTHER

## 2021-11-18 RX ORDER — HYDROCHLOROTHIAZIDE 25 MG/1
TABLET ORAL
Qty: 30 TABLET | Refills: 12 | Status: SHIPPED | OUTPATIENT
Start: 2021-11-18

## 2021-11-18 RX ORDER — NYSTATIN 100000 [USP'U]/G
POWDER TOPICAL 2 TIMES DAILY
Qty: 60 G | Refills: 3 | OUTPATIENT
Start: 2021-11-18 | End: 2022-01-22

## 2021-11-18 RX ORDER — FAMOTIDINE 40 MG/1
40 TABLET, FILM COATED ORAL DAILY
Qty: 30 TABLET | Refills: 12 | OUTPATIENT
Start: 2021-11-18 | End: 2022-01-22

## 2021-11-18 RX ORDER — PIOGLITAZONEHYDROCHLORIDE 45 MG/1
45 TABLET ORAL
Qty: 30 TABLET | Refills: 12 | Status: SHIPPED | OUTPATIENT
Start: 2021-11-18 | End: 2022-10-11

## 2021-11-18 RX ORDER — SUCRALFATE 1 G/1
TABLET ORAL
Qty: 120 TABLET | Refills: 1 | Status: SHIPPED | OUTPATIENT
Start: 2021-11-18 | End: 2022-02-03

## 2021-11-18 RX ORDER — POTASSIUM CHLORIDE 750 MG/1
CAPSULE, EXTENDED RELEASE ORAL
Qty: 30 CAPSULE | Refills: 12 | Status: SHIPPED | OUTPATIENT
Start: 2021-11-18 | End: 2022-07-11

## 2021-11-18 RX ORDER — AMLODIPINE BESYLATE 5 MG/1
TABLET ORAL
Qty: 30 TABLET | Refills: 12 | Status: SHIPPED | OUTPATIENT
Start: 2021-11-18

## 2021-11-18 RX ORDER — LOSARTAN POTASSIUM 50 MG/1
TABLET ORAL
Qty: 30 TABLET | Refills: 12 | Status: SHIPPED | OUTPATIENT
Start: 2021-11-18

## 2021-11-18 NOTE — PROGRESS NOTES
Дмитрий Hand is a 61 y.o. female  who presents for routine immunizations. He denies any symptoms , reactions or allergies that would exclude them from being immunized today. Risks and adverse reactions were discussed and the VIS was given to them. All questions were addressed. He was observed for 5 min post injection. There were no reactions observed.     Pawel Barrera LPN

## 2021-11-18 NOTE — PROGRESS NOTES
Cindy Cota is a 61 y.o. female and presents with Diabetes, Hypertension, and Immunization/Injection    Subjective:    Knee pain Review:  Patient complainTs of bilaterally knee pains continue Onset of the symptoms was months  ago. Inciting event: longstanding problem which has been getting worse. Current symptoms include pain location: both: medial and swelling. none,  Aggravating symptoms: going up and down stairs, walking, lateral movements, any weight bearing. Patient's overall course: gradually worsening Patient has had prior knee problems. Evaluation to date: noted. Treatment to date:NSAIDS        Hypertension Review:  The patient has essential hypertension . Diet and Lifestyle: generally follows a low sodium diet, exercises sporadically  Home BP Monitoring: is not measured at home. Pertinent ROS: taking medications as instructed, no medication side effects noted, no TIA's, no chest pain on exertion, no dyspnea on exertion, no swelling of ankles. Diabetes Mellitus Review:  She has diabetes mellitus. Diabetic ROS - medication compliance: compliant all of the time, diabetic diet compliance: compliant all of the time, home glucose monitoring: is performed. Known diabetic complications: none  Cardiovascular risk factors: family history, dyslipidemia, diabetes mellitus, obesity, hypertension  Current diabetic medications include oral agents   Eye exam current (within one year): no  Weight trend: stable  Prior visit with dietician: no  Current diet: \"healthy\" diet  in general  Current exercise: walking  Home blood sugar records: trend: stable  Any episodes of hypoglycemia? no      Dyslipidemia Review:  Patient presents for evaluation of lipids. Compliance with treatment thus far has been excellent. A repeat fasting lipid profile was not done. The patient does not use medications that may worsen dyslipidemias (corticosteroids, progestins, anabolic steroids, amiodarone, cyclosporine, olanzapine).  The patient exercises some    Carpal Tunnel Syndrome Review:  Patient presents for evaluation of pain in lt. hand(s), hand paresthesias of the first three fingers. Onset of the symptoms was several months ago. Current symptoms include no tingling and weakness involving the medial aspect of the bilateral hand(s): severity: mild  Patient's overall course: gradually improved. Patient has had no prior elbow problems. Previous visits for this problem: notede. Evaluation to date:she has seen hand surgery . Treatment to date: nsaid    COPD REVIEW:  The patient is being seen for follow up of COPD,the patient has been stable. Babr Servin is a 63 y.o. female who complains of a lingering cough,  for the past few weeks, gradually improving since that time. She denies a history of shortness of breath. Evaluation to date: none. Treatment to date: OTC products. Relevant PMH: No pertinent additional PMH. Oxygen: She currently is not on home oxygen therapy. Symptoms: chronic dyspnea: severity = not present: course of sx: stable. Patient uses 2 pillows at night. Patient does not continue to smoke cigarettes. Intertrigo Review:  She still has recurrent itching under both breasts since her last visit    Health maintenance suggests the needs for an influenza injection        Review of Systems  Constitutional: negative for fevers, chills, anorexia and weight loss  Eyes:   negative for visual disturbance and irritation  ENT:   negative for tinnitus,sore throat,nasal congestion,ear pains. hoarseness  Respiratory:  cough,  CV:   negative for chest pain, palpitations, lower extremity edema  GI:   negative for nausea, vomiting, diarrhea, abdominal pain,melena  Endo:               negative for polyuria,polydipsia,polyphagia,heat intolerance  Genitourinary: negative for frequency, dysuria and hematuria  Integument:  rash and pruritus  Hematologic:  negative for easy bruising and gum/nose bleeding  Musculoskel: joint pain  Neurological:  negative for headaches, dizziness, vertigo, memory problems and gait   Behavl/Psych: negative for feelings of anxiety, depression, mood changes    Past Medical History:   Diagnosis Date    Arthritis, Degenerative, Knee 7/15/2011    COPD (chronic obstructive pulmonary disease) ([facility] [address] 7/15/2011    Diverticulitis     GERD (gastroesophageal reflux disease)     GERD, gastro - Esophageal Reflux Disease 7/15/2011    Hypercholesterolemia 7/15/2011    Hypertension     Type II or unspecified type diabetes mellitus without mention of complication, uncontrolled 7/15/2011     Past Surgical History:   Procedure Laterality Date    COLONOSCOPY N/A 2017    COL[facility] performed [facility]ney [facility] at [facility] AMBULATORY OR    COLONOSCOPY N/A 2018    COLONOSCOPY performed by Courtney Ryan MD at [facility] ENDOSCOPY    HX GYN      uterine surgery    RI LAP,TUBAL CAUTERY Bilateral      Social History     Socioeconomic History    Marital status:    Tobacco Use    Smoking status: Former Smoker     Quit date: 4/15/2015     Years since quittin.6    Smokeless tobacco: Never Used   Vaping Use    Vaping Use: Never used   Substance and Sexual Activity    Alcohol use: No    Drug use: Yes     Types: Marijuana     Comment: \"daily\"    Sexual activity: Yes     Partners: Male   Social History Narrative        She lives with her  she has five adult children denies alcohol cigarette use     Family History   Problem Relation Age of Onset    Diabetes Father     Deep Vein Thrombosis Daughter     Lung Disease Daughter     Other Daughter         cholesterol     Current Outpatient Medications   Medication Sig Dispense Refill    metFORMIN (GLUCOPHAGE) 1,000 mg tablet TAKE 1 TABLET BY MOUTH TWICE A DAY WITH MEALS 60 Tablet 12    famotidine (PEPCID) 40 mg tablet TAKE 1 TABLET BY MOUTH EVERY DAY 30 Tablet 3    potassium chloride SA (MICRO-K) 10 mEq capsule TAKE 1 CAPSULE BY MOUTH TWICE A DAY 60 Capsule 3    sucralfate (CARAFATE) 1 gram tablet TAKE 1 TABLET BY MOUTH 4 TIMES A  Tablet 1    atorvastatin (LIPITOR) 40 mg tablet Take 1 Tablet by mouth daily. 30 Tablet 12    nystatin (MYCOSTATIN) powder APPLY TO AFFECTED AREA 3 TIMES DAILY. APPLY UNTIL RASH HAS RESOLVED. 15 g 0    glucose blood VI test strips (OneTouch Ultra Test) strip USE TO CHECK BLOOD SUGAR EVERY  Strip 2    diclofenac EC (VOLTAREN) 75 mg EC tablet TAKE 1 TABLET BY MOUTH TWICE DAILY 180 Tab 3    hydroCHLOROthiazide (HYDRODIURIL) 25 mg tablet TAKE 1 TABLET BY MOUTH DAILY 30 Tab 12    amLODIPine (NORVASC) 5 mg tablet TAKE 1 TABLET BY MOUTH DAILY 30 Tab 12    lancets (OneTouch Delica Lancets) 30 gauge misc Use once daily;E11.9:Type 2 diabetes 100 Lancet 3    losartan (COZAAR) 50 mg tablet TAKE 1 TABLET BY MOUTH DAILY 30 Tab 12    pioglitazone (ACTOS) 45 mg tablet Take 1 Tab by mouth nightly. 30 Tab 12    potassium chloride SA (MICRO-K) 10 mEq capsule TAKE 1 CAPSULE BY MOUTH TWICE DAILY 30 Cap 12    Blood-Glucose Meter (OneTouch Ultra2 Meter) monitoring kit Test once daily; E11.9; Diabetes Type 2 1 Kit 0    potassium chloride SA (MICRO-K) 10 mEq capsule TAKE ONE CAPSULE BY MOUTH TWICE DAILY 30 Cap 12    bismuth subsalicylate (PEPTO-BISMOL MAXIMUM STRENGTH) 525 mg/15 mL susp Take 30 mL by mouth every six (6) hours as needed.  pantoprazole (PROTONIX) 40 mg tablet TAKE 1 TABLET BY MOUTH EVERY DAY (Patient not taking: Reported on 11/18/2021) 30 Tab 3    ibuprofen (MOTRIN) 800 mg tablet Take 1 Tab by mouth every eight (8) hours as needed for Pain. (Patient not taking: Reported on 11/18/2021) 30 Tab 0    methocarbamoL (ROBAXIN) 750 mg tablet Take 1 Tab by mouth three (3) times daily as needed for Muscle Spasm(s).  (Patient not taking: Reported on 11/18/2021) 15 Tab 0    Nystop powder APPLY TO THE AFFECTED AREA THREE TIMES DAILY (Patient not taking: Reported on 11/18/2021) 30 g 0    cetirizine (ZYRTEC) 10 mg tablet Take 1 Tab by mouth daily. (Patient not taking: Reported on 11/18/2021) 20 Tab 0    polyethylene glycol (MIRALAX) 17 gram/dose powder MIX 17 GRAMS IN A LIQUID AND THEN DRINK ONCE D UTD  3    loperamide (IMODIUM A-D) 2 mg tablet Take 1 Tab by mouth four (4) times daily as needed for Diarrhea.  (Patient not taking: Reported on 11/18/2021) 20 Tab 0     No Known Allergies    Objective:  Visit Vitals  /70 (BP 1 Location: Right arm, BP Patient Position: Sitting, BP Cuff Size: Adult)   Pulse 79   Temp 98 °F (36.7 °C) (Oral)   Resp 18   Ht 5' 3\" (1.6 m)   Wt 182 lb (82.6 kg)   LMP 03/15/2004 (Approximate)   SpO2 91%   BMI 32.24 kg/m²     Physical Exam:   General appearance - alert, well appearing, and in no distress  Mental status - alert, oriented to person, place, and time  EYE-KODY, EOMI, corneas normal, no foreign bodies  ENT-ENT exam normal, no neck nodes or sinus tenderness  Nose - normal and patent, no erythema, discharge or polyps  Mouth - mucous membranes moist, pharynx normal without lesions  Neck - supple, no significant adenopathy   Chest - clear to auscultation, no wheezes, rales or rhonchi, symmetric air entry   Heart - normal rate, regular rhythm, normal S1, S2, no murmurs, rubs, clicks or gallops   Abdomen - soft, nontender, nondistended, no masses or organomegaly  Lymph- no adenopathy palpable  Ext-peripheral pulses normal, no pedal edema, no clubbing or cyanosis  Skin-erythema both breasts  Neuro -alert, oriented, normal speech, no focal findings or movement disorder noted  Neck-normal C-spine, no tenderness, full ROM without pain  Feet-no nail deformities or callus formation with good pulses noted  Both knees-medial joint line tenderness noted      Results for orders placed or performed in visit on 93/07/55   METABOLIC PANEL, COMPREHENSIVE   Result Value Ref Range    Sodium 138 136 - 145 mmol/L    Potassium 4.4 3.5 - 5.1 mmol/L    Chloride 106 97 - 108 mmol/L    CO2 27 21 - 32 mmol/L Anion gap 5 5 - 15 mmol/L    Glucose 151 (H) 65 - 100 mg/dL    BUN 21 (H) 6 - 20 MG/DL    Creatinine 0.62 0.55 - 1.02 MG/DL    BUN/Creatinine ratio 34 (H) 12 - 20      GFR est AA >60 >60 ml/min/1.73m2    GFR est non-AA >60 >60 ml/min/1.73m2    Calcium 9.5 8.5 - 10.1 MG/DL    Bilirubin, total 0.3 0.2 - 1.0 MG/DL    ALT (SGPT) 39 12 - 78 U/L    AST (SGOT) 36 15 - 37 U/L    Alk. phosphatase 173 (H) 45 - 117 U/L    Protein, total 7.3 6.4 - 8.2 g/dL    Albumin 3.8 3.5 - 5.0 g/dL    Globulin 3.5 2.0 - 4.0 g/dL    A-G Ratio 1.1 1.1 - 2.2     CBC W/O DIFF   Result Value Ref Range    WBC 8.0 3.6 - 11.0 K/uL    RBC 4.29 3.80 - 5.20 M/uL    HGB 13.0 11.5 - 16.0 g/dL    HCT 40.0 35.0 - 47.0 %    MCV 93.2 80.0 - 99.0 FL    MCH 30.3 26.0 - 34.0 PG    MCHC 32.5 30.0 - 36.5 g/dL    RDW 13.2 11.5 - 14.5 %    PLATELET 678 619 - 412 K/uL    MPV 11.1 8.9 - 12.9 FL    NRBC 0.0 0  WBC    ABSOLUTE NRBC 0.00 0.00 - 0.01 K/uL   AMB POC GLUCOSE BLOOD, BY GLUCOSE MONITORING DEVICE   Result Value Ref Range    Glucose  MG/DL   AMB POC LIPID PROFILE   Result Value Ref Range    Cholesterol (POC) 224     Triglycerides (POC) 158     HDL Cholesterol (POC) 51     LDL Cholesterol (POC) 141 MG/DL    Non-HDL Goal (POC) 173     TChol/HDL Ratio (POC) 4.4    AMB POC HEMOGLOBIN A1C   Result Value Ref Range    Hemoglobin A1c (POC) 6.4 %       Assessment/Plan:    ICD-10-CM ICD-9-CM    1. Needs flu shot  Z23 V04.81    2. Primary osteoarthritis of both knees  M17.0 715.16      No orders of the defined types were placed in this encounter. lose weight, increase physical activity, follow low fat diet, follow low salt diet, continue present plan, call if any problems,Take 81mg aspirin daily  There are no Patient Instructions on file for this visit. I have reviewed with the patient details of the assessment and plan and all questions were answered. Relevent patient education was performed. The most recent lab findings were reviewed with the patient. An After Visit Summary was printed and given to the patient.

## 2021-11-18 NOTE — PROGRESS NOTES
Chief Complaint   Patient presents with    Diabetes    Hypertension    Immunization/Injection     3 most recent PHQ Screens 11/18/2021   PHQ Not Done -   Little interest or pleasure in doing things Not at all   Feeling down, depressed, irritable, or hopeless Not at all   Total Score PHQ 2 0     1. Have you been to the ER, urgent care clinic since your last visit? Hospitalized since your last visit?no    2. Have you seen or consulted any other health care providers outside of the [address] [place] [address] since your last visit? Include any pap smears or colon screening.  no

## 2021-11-18 NOTE — PATIENT INSTRUCTIONS
[employer] Activation    Thank you for requesting access to [employer] Please follow the instructions below to securely access and download your online medical record. [employer] allows you to send messages to your doctor, view your test results, renew your prescriptions, schedule appointments, and more. How Do I Sign Up? 1. In your internet browser, go to [email or web address]  2. Click on the First Time User? Click Here link in the Sign In box. You will be redirect to the New Member Sign Up page. 3. Enter your [employer] Access Code exactly as it appears below. You will not need to use this code after youve completed the sign-up process. If you do not sign up before the expiration date, you must request a new code. [employer] Access Code: Activation code not generated  Current [employer] Status: Active (This is the date your [employer] access code will )    4. Enter the last four digits of your Social Security Number (xxxx) and Date of Birth (mm/dd/yyyy) as indicated and click Submit. You will be taken to the next sign-up page. 5. Create a [employer] ID. This will be your [employer] login ID and cannot be changed, so think of one that is secure and easy to remember. 6. Create a [employer] password. You can change your password at any time. 7. Enter your Password Reset Question and Answer. This can be used at a later time if you forget your password. 8. Enter your e-mail address. You will receive e-mail notification when new information is available in [address] 9. Click Sign Up. You can now view and download portions of your medical record. 10. Click the Download Summary menu link to download a portable copy of your medical information. Additional Information    If you have questions, please visit the Frequently Asked Questions section of the [employer] website at [email or web address] Remember, [employer] is NOT to be used for urgent needs. For medical emergencies, dial 911.

## 2022-01-22 ENCOUNTER — APPOINTMENT (OUTPATIENT)
Dept: GENERAL RADIOLOGY | Age: 67
End: 2022-01-22
Attending: PHYSICIAN ASSISTANT
Payer: MEDICAID

## 2022-01-22 ENCOUNTER — HOSPITAL ENCOUNTER (EMERGENCY)
Age: 67
Discharge: HOME | End: 2022-01-22
Attending: EMERGENCY MEDICINE
Payer: MEDICAID

## 2022-01-22 VITALS
HEIGHT: 63 IN | WEIGHT: 173 LBS | RESPIRATION RATE: 20 BRPM | DIASTOLIC BLOOD PRESSURE: 69 MMHG | HEART RATE: 79 BPM | OXYGEN SATURATION: 94 % | TEMPERATURE: 98.5 F | SYSTOLIC BLOOD PRESSURE: 120 MMHG | BODY MASS INDEX: 30.65 KG/M2

## 2022-01-22 DIAGNOSIS — R22.42 LOCALIZED SWELLING OF LEFT FOOT: ICD-10-CM

## 2022-01-22 DIAGNOSIS — M85.872 OSTEOPENIA OF LEFT FOOT: Primary | ICD-10-CM

## 2022-01-22 PROCEDURE — 73630 X-RAY EXAM OF FOOT: CPT

## 2022-01-22 PROCEDURE — 96372 THER/PROPH/DIAG INJ SC/IM: CPT

## 2022-01-22 PROCEDURE — 99282 EMERGENCY DEPT VISIT SF MDM: CPT

## 2022-01-22 PROCEDURE — 74011250636 HC RX REV CODE- 250/636: Performed by: PHYSICIAN ASSISTANT

## 2022-01-22 RX ORDER — KETOROLAC TROMETHAMINE 30 MG/ML
15 INJECTION, SOLUTION INTRAMUSCULAR; INTRAVENOUS
Status: COMPLETED | OUTPATIENT
Start: 2022-01-22 | End: 2022-01-22

## 2022-01-22 RX ORDER — KETOROLAC TROMETHAMINE 30 MG/ML
30 INJECTION, SOLUTION INTRAMUSCULAR; INTRAVENOUS
Status: DISCONTINUED | OUTPATIENT
Start: 2022-01-22 | End: 2022-01-22

## 2022-01-22 RX ORDER — LIDOCAINE 50 MG/G
PATCH TOPICAL
Qty: 10 EACH | Refills: 0 | Status: SHIPPED | OUTPATIENT
Start: 2022-01-22

## 2022-01-22 RX ADMIN — KETOROLAC TROMETHAMINE 15 MG: 30 INJECTION, SOLUTION INTRAMUSCULAR; INTRAVENOUS at 13:12

## 2022-01-22 NOTE — ED NOTES
Post op shoe placed on left foot and cane supplied. Discharge instructions reviewed with the patient.   Patient discharged ambulatory with a steady gait

## 2022-01-22 NOTE — ED PROVIDER NOTES
EMERGENCY DEPARTMENT HISTORY AND PHYSICAL EXAM    Date: 2022  Patient Name: Maynor Graham    History of Presenting Illness     Chief Complaint   Patient presents with    Foot Pain         History Provided By: Patient    HPI: Maynor Graham is a 61 y.o. female with a PMH of COPD, hypercholesterolemia, GERD, hypertension, diabetes, diverticulitis, GERD who presents with left foot pain and swelling x2 weeks. Patient denies any injury or trauma. She does report that she has a history of a heel spur. Pt states pain is tolerable during the day but worse at night. She rates pain 10/10 and throbbing in nature. She states she already takes diclofenac for her knee pain. PCP: Krystian Ye MD    Current Outpatient Medications   Medication Sig Dispense Refill    lidocaine (LIDODERM) 5 % Apply patch to the affected area for 12 hours a day and remove for 12 hours a day. 10 Each 0    diclofenac EC (VOLTAREN) 75 mg EC tablet Si tab po bid 180 Tablet 3    pioglitazone (ACTOS) 45 mg tablet Take 1 Tablet by mouth nightly. 30 Tablet 12    amLODIPine (NORVASC) 5 mg tablet TAKE 1 TABLET BY MOUTH DAILY 30 Tablet 12    hydroCHLOROthiazide (HYDRODIURIL) 25 mg tablet TAKE 1 TABLET BY MOUTH DAILY 30 Tablet 12    losartan (COZAAR) 50 mg tablet TAKE 1 TABLET BY MOUTH DAILY 30 Tablet 12    sucralfate (CARAFATE) 1 gram tablet TAKE 1 TABLET BY MOUTH 4 TIMES A  Tablet 1    metFORMIN (GLUCOPHAGE) 1,000 mg tablet TAKE 1 TABLET BY MOUTH TWICE A DAY WITH MEALS 60 Tablet 12    atorvastatin (LIPITOR) 40 mg tablet Take 1 Tablet by mouth daily.  30 Tablet 12    glucose blood VI test strips (OneTouch Ultra Test) strip USE TO CHECK BLOOD SUGAR EVERY  Strip 2    pantoprazole (PROTONIX) 40 mg tablet TAKE 1 TABLET BY MOUTH EVERY DAY 30 Tab 3    lancets (OneTouch Delica Lancets) 30 gauge misc Use once daily;E11.9:Type 2 diabetes 100 Lancet 3    Blood-Glucose Meter (OneTouch Ultra2 Meter) monitoring kit Test once daily; E11.9; Diabetes Type 2 1 Kit 0    potassium chloride SA (MICRO-K) 10 mEq capsule TAKE ONE CAPSULE BY MOUTH TWICE DAILY 30 Cap 12    potassium chloride SA (MICRO-K) 10 mEq capsule TAKE 1 CAPSULE BY MOUTH TWICE DAILY 30 Capsule 12    potassium chloride SA (MICRO-K) 10 mEq capsule TAKE 1 CAPSULE BY MOUTH TWICE A DAY 60 Capsule 3       Past History     Past Medical History:  Past Medical History:   Diagnosis Date    Arthritis, Degenerative, Knee 7/15/2011    COPD (chronic obstructive pulmonary disease) ([facility] [address] 7/15/2011    Diverticulitis     GERD (gastroesophageal reflux disease)     GERD, gastro - Esophageal Reflux Disease 7/15/2011    Hypercholesterolemia 7/15/2011    Hypertension     Type II or unspecified type diabetes mellitus without mention of complication, uncontrolled 7/15/2011       Past Surgical History:  Past Surgical History:   Procedure Laterality Date    COLONOSCOPY N/A 2017    COL[facility] performed [facility] AM[facility]ULAT[facility] OR    COLONOSCOPY N/A 2018    COLONOSCOPY performed by [facility]if MD at [facility] ENDOSCOPY    HX GYN  2004    uterine surgery    NM LAP,TUBAL CAUTERY Bilateral        Family History:  Family History   Problem Relation Age of Onset    Diabetes Father     Deep Vein Thrombosis Daughter     Lung Disease Daughter     Other Daughter         cholesterol       Social History:  Social History     Tobacco Use    Smoking status: Former Smoker     Quit date: 4/15/2015     Years since quittin.7    Smokeless tobacco: Never Used   Vaping Use    Vaping Use: Never used   Substance Use Topics    Alcohol use: No    Drug use: Yes     Types: Marijuana     Comment: \"daily\"       Allergies:  No Known Allergies      Review of Systems   Review of Systems   Constitutional: Negative for chills and fever. Musculoskeletal: Positive for arthralgias, gait problem and joint swelling. Skin: Negative.     Allergic/Immunologic: Negative for immunocompromised state. Neurological: Negative for speech difficulty and weakness. Psychiatric/Behavioral: Negative for self-injury. All other systems reviewed and are negative. Physical Exam     Vitals:    01/22/22 1149   BP: (!) 129/94   Pulse: (!) 101   Resp: 20   Temp: 98.5 °F (36.9 °C)   SpO2: 94%   Weight: 78.5 kg (173 lb)   Height: 5' 3\" (1.6 m)     Physical Exam  Vitals and nursing note reviewed. Constitutional:       General: She is not in acute distress. Appearance: She is well-developed. HENT:      Head: Normocephalic and atraumatic. Eyes:      Conjunctiva/sclera: Conjunctivae normal.   Cardiovascular:      Rate and Rhythm: Normal rate and regular rhythm. Heart sounds: Normal heart sounds. Pulmonary:      Effort: Pulmonary effort is normal. No respiratory distress. Breath sounds: Normal breath sounds. No wheezing or rales. Musculoskeletal:      Left foot: Normal range of motion. Swelling (mild swelling to the dorsum of foot , no erythema) and tenderness (to the dorsum of foot) present. No deformity or laceration. Normal pulse. Skin:     General: Skin is warm and dry. Neurological:      Mental Status: She is alert and oriented to person, place, and time. Psychiatric:         Behavior: Behavior normal.         Thought Content: Thought content normal.         Judgment: Judgment normal.           Diagnostic Study Results     Labs -   No results found for this or any previous visit (from the past 12 hour(s)). Radiologic Studies -   XR FOOT LT MIN 3 V   Final Result   No acute abnormality. CT Results  (Last 48 hours)    None        CXR Results  (Last 48 hours)    None            Medical Decision Making   I am the first provider for this patient. I reviewed the vital signs, available nursing notes, past medical history, past surgical history, family history and social history. Vital Signs-Reviewed the patient's vital signs.     Records Reviewed: Nursing Notes and Old Medical Records    Provider Notes (Medical Decision Making):   Pt presents with L foot pain and swelling x 2 wks with NKI. DDX: strain , sprain, arthralgia, stress fx, plantar fascitis. Will get xray to evaluate    Disposition:  Discharged    DISCHARGE NOTE:   1:08 PM      Care plan outlined and precautions discussed. Patient has no new complaints, changes, or physical findings. Results of xryas were reviewed with the patient. All medications were reviewed with the patient; will d/c home. All of pt's questions and concerns were addressed. Patient was instructed and agrees to follow up with podiatry, as well as to return to the ED upon further deterioration. Patient is ready to go home. Pt states she does have her own podiatrist and has an appt on 1/25/22 already. Follow-up Information     Follow up With Specialties Details Why Contact Info    Gavin Nicole MD Internal Medicine   [address] [place] [address] Podiatry Schedule an appointment as soon as possible for a visit on 1/24/2022 As needed [address] [place] [address]  [phone]            Current Discharge Medication List      START taking these medications    Details   lidocaine (LIDODERM) 5 % Apply patch to the affected area for 12 hours a day and remove for 12 hours a day. Qty: 10 Each, Refills: 0  Start date: 1/22/2022             Procedures:  Procedures    Please note that this dictation was completed with Dragon, computer voice recognition software. Quite often unanticipated grammatical, syntax, homophones, and other interpretive errors are inadvertently transcribed by the computer software. Please disregard these errors. Additionally, please excuse any errors that have escaped final proofreading. Diagnosis     Clinical Impression:   1. Osteopenia of left foot    2.  Localized swelling of left foot

## 2022-02-03 RX ORDER — SUCRALFATE 1 G/1
TABLET ORAL
Qty: 120 TABLET | Refills: 1 | Status: SHIPPED | OUTPATIENT
Start: 2022-02-03 | End: 2022-07-11

## 2022-02-18 ENCOUNTER — OFFICE VISIT (OUTPATIENT)
Dept: INTERNAL MEDICINE CLINIC | Age: 67
End: 2022-02-18
Payer: MEDICAID

## 2022-02-18 VITALS
HEIGHT: 63 IN | WEIGHT: 167 LBS | DIASTOLIC BLOOD PRESSURE: 86 MMHG | OXYGEN SATURATION: 98 % | BODY MASS INDEX: 29.59 KG/M2 | SYSTOLIC BLOOD PRESSURE: 126 MMHG | TEMPERATURE: 98 F | HEART RATE: 76 BPM | RESPIRATION RATE: 16 BRPM

## 2022-02-18 DIAGNOSIS — I10 ESSENTIAL HYPERTENSION: ICD-10-CM

## 2022-02-18 DIAGNOSIS — E78.00 HYPERCHOLESTEREMIA: ICD-10-CM

## 2022-02-18 DIAGNOSIS — E11.9 CONTROLLED TYPE 2 DIABETES MELLITUS WITHOUT COMPLICATION, WITHOUT LONG-TERM CURRENT USE OF INSULIN (HCC): Primary | ICD-10-CM

## 2022-02-18 LAB
GLUCOSE POC: 200 MG/DL
HBA1C MFR BLD HPLC: 6.7 %

## 2022-02-18 PROCEDURE — 83036 HEMOGLOBIN GLYCOSYLATED A1C: CPT | Performed by: INTERNAL MEDICINE

## 2022-02-18 PROCEDURE — 99214 OFFICE O/P EST MOD 30 MIN: CPT | Performed by: INTERNAL MEDICINE

## 2022-02-18 PROCEDURE — 82962 GLUCOSE BLOOD TEST: CPT | Performed by: INTERNAL MEDICINE

## 2022-02-18 RX ORDER — LIDOCAINE HYDROCHLORIDE 10 MG/ML
INJECTION, SOLUTION EPIDURAL; INFILTRATION; INTRACAUDAL; PERINEURAL
Refills: 0 | Status: CANCELLED
Start: 2022-02-18

## 2022-02-18 RX ORDER — NYSTATIN 100000 [USP'U]/G
POWDER TOPICAL 2 TIMES DAILY
Qty: 60 G | Refills: 12 | Status: SHIPPED | OUTPATIENT
Start: 2022-02-18 | End: 2022-06-05 | Stop reason: SDUPTHER

## 2022-02-18 NOTE — PROGRESS NOTES
Inessa Bruce is a 61 y.o. female and presents with Diabetes, Cholesterol Problem, and Hypertension    Subjective:    Knee pain Review:  Patient complains of right  knee pain. Onset of the symptoms was months  ago. Inciting event: longstanding problem which has been stable. Current symptoms include no pain. Aggravating symptoms: going up and down stairs, walking, lateral movements, any weight bearing. Patient's overall course: stable, Patient has had prior knee problems. Evaluation to date: X-RAYS  Treatment to date:NSAIDS/Tylenol/Aspirin         Hypertension Review:  The patient has essential hypertension . Diet and Lifestyle: generally follows a low sodium diet, exercises sporadically  Home BP Monitoring: is not measured at home. Pertinent ROS: taking medications as instructed, no medication side effects noted, no TIA's, no chest pain on exertion, no dyspnea on exertion, no swelling of ankles. Diabetes Mellitus Review:  She has diabetes mellitus. Diabetic ROS - medication compliance: compliant all of the time, diabetic diet compliance: compliant all of the time, home glucose monitoring: is performed. Known diabetic complications: none  Cardiovascular risk factors: family history, dyslipidemia, diabetes mellitus, obesity, hypertension  Current diabetic medications include oral agents   Eye exam current (within one year): no  Weight trend: stable  Prior visit with dietician: no  Current diet: \"healthy\" diet  in general  Current exercise: walking  Home blood sugar records: trend: stable  Any episodes of hypoglycemia? no      Dyslipidemia Review:  Patient presents for evaluation of lipids. Compliance with treatment thus far has been excellent. A repeat fasting lipid profile was not done. The patient does not use medications that may worsen dyslipidemias (corticosteroids, progestins, anabolic steroids, amiodarone, cyclosporine, olanzapine).  The patient exercises some    Carpal Tunnel Syndrome Review:  Patient presents for evaluation of pain in lt. hand(s), hand paresthesias of the first three fingers. Onset of the symptoms was several months ago. Current symptoms include no tingling and weakness involving the medial aspect of the bilateral hand(s): severity: mild  Patient's overall course: gradually improved. Patient has had no prior elbow problems. Previous visits for this problem: notede. Evaluation to date:she has seen hand surgery . Treatment to date: nsaid    COPD REVIEW:  The patient is being seen for follow up of COPD,the patient has been stable. Leigha Medina is a 63 y.o. female who complains of a lingering cough,  for the past few weeks, gradually improving since that time. She denies a history of shortness of breath. Evaluation to date: none. Treatment to date: OTC products. Relevant PMH: No pertinent additional PMH. Oxygen: She currently is not on home oxygen therapy. Symptoms: chronic dyspnea: severity = not present: course of sx: stable. Patient uses 2 pillows at night. Patient does not continue to smoke cigarettes. Intertrigo Review:  She still has recurrent itching under both breasts since her last visit    States she has received laser surgery for a heel spur on the lt.foot  She has podiatry shoes. Review of Systems  Constitutional: negative for fevers, chills, anorexia and weight loss  Eyes:   negative for visual disturbance and irritation  ENT:   negative for tinnitus,sore throat,nasal congestion,ear pains. hoarseness  Respiratory:  cough,  CV:   negative for chest pain, palpitations, lower extremity edema  GI:   negative for nausea, vomiting, diarrhea, abdominal pain,melena  Endo:               negative for polyuria,polydipsia,polyphagia,heat intolerance  Genitourinary: negative for frequency, dysuria and hematuria  Integument:  rash and pruritus  Hematologic:  negative for easy bruising and gum/nose bleeding  Musculoskel: joint pain  Neurological:  negative for headaches, dizziness, vertigo, memory problems and gait   Behavl/Psych: negative for feelings of anxiety, depression, mood changes    Past Medical History:   Diagnosis Date    Arthritis, Degenerative, Knee 7/15/2011    COPD (chronic obstructive pulmonary disease) ([facility] [address] 7/15/2011    Diverticulitis     GERD (gastroesophageal reflux disease)     GERD, gastro - Esophageal Reflux Disease 7/15/2011    Hypercholesterolemia 7/15/2011    Hypertension     Type II or unspecified type diabetes mellitus without mention of complication, uncontrolled 7/15/2011     Past Surgical History:   Procedure Laterality Date    COLONOSCOPY N/A 2017    [facility]    COLONOSCOPY N/A 2018    COLONOSCOPY performed [facility] ENDOSCOPY    HX GYN  2004    uterine surgery    NC LAP,TUBAL CAUTERY Bilateral      Social History     Socioeconomic History    Marital status:    Tobacco Use    Smoking status: Former Smoker     Quit date: 4/15/2015     Years since quittin.8    Smokeless tobacco: Never Used   Vaping Use    Vaping Use: Never used   Substance and Sexual Activity    Alcohol use: No    Drug use: Yes     Types: Marijuana     Comment: \"daily\"    Sexual activity: Yes     Partners: Male   Social History Narrative        She lives with her  she has five adult children denies alcohol cigarette use     Family History   Problem Relation Age of Onset    Diabetes Father     Deep Vein Thrombosis Daughter     Lung Disease Daughter     Other Daughter         cholesterol     Current Outpatient Medications   Medication Sig Dispense Refill    sucralfate (CARAFATE) 1 gram tablet TAKE 1 TABLET BY MOUTH FOUR TIMES A  Tablet 1    lidocaine (LIDODERM) 5 % Apply patch to the affected area for 12 hours a day and remove for 12 hours a day.  10 Each 0    diclofenac EC (VOLTAREN) 75 mg EC tablet Si tab po bid 180 Tablet 3    pioglitazone (ACTOS) 45 mg tablet Take 1 Tablet by mouth nightly. 30 Tablet 12    amLODIPine (NORVASC) 5 mg tablet TAKE 1 TABLET BY MOUTH DAILY 30 Tablet 12    hydroCHLOROthiazide (HYDRODIURIL) 25 mg tablet TAKE 1 TABLET BY MOUTH DAILY 30 Tablet 12    losartan (COZAAR) 50 mg tablet TAKE 1 TABLET BY MOUTH DAILY 30 Tablet 12    potassium chloride SA (MICRO-K) 10 mEq capsule TAKE 1 CAPSULE BY MOUTH TWICE DAILY 30 Capsule 12    metFORMIN (GLUCOPHAGE) 1,000 mg tablet TAKE 1 TABLET BY MOUTH TWICE A DAY WITH MEALS 60 Tablet 12    potassium chloride SA (MICRO-K) 10 mEq capsule TAKE 1 CAPSULE BY MOUTH TWICE A DAY 60 Capsule 3    atorvastatin (LIPITOR) 40 mg tablet Take 1 Tablet by mouth daily.  30 Tablet 12    glucose blood VI test strips (OneTouch Ultra Test) strip USE TO CHECK BLOOD SUGAR EVERY  Strip 2    pantoprazole (PROTONIX) 40 mg tablet TAKE 1 TABLET BY MOUTH EVERY DAY 30 Tab 3    lancets (OneTouch Delica Lancets) 30 gauge misc Use once daily;E11.9:Type 2 diabetes 100 Lancet 3    Blood-Glucose Meter (OneTouch Ultra2 Meter) monitoring kit Test once daily; E11.9; Diabetes Type 2 1 Kit 0    potassium chloride SA (MICRO-K) 10 mEq capsule TAKE ONE CAPSULE BY MOUTH TWICE DAILY 30 Cap 12     No Known Allergies    Objective:  Visit Vitals  /86   Pulse 76   Temp 98 °F (36.7 °C) (Oral)   Resp 16   Ht 5' 3\" (1.6 m)   Wt 167 lb (75.8 kg)   LMP 03/15/2004 (Approximate)   SpO2 98%   BMI 29.58 kg/m²     Physical Exam:   General appearance - alert, well appearing, and in no distress  Mental status - alert, oriented to person, place, and time  EYE-KODY, EOMI, corneas normal, no foreign bodies  ENT-ENT exam normal, no neck nodes or sinus tenderness  Nose - normal and patent, no erythema, discharge or polyps  Mouth - mucous membranes moist, pharynx normal without lesions  Neck - supple, no significant adenopathy   Chest - clear to auscultation, no wheezes, rales or rhonchi, symmetric air entry   Heart - normal rate, regular rhythm, normal S1, S2, no murmurs, rubs, clicks or gallops   Abdomen - soft, nontender, nondistended, no masses or organomegaly  Lymph- no adenopathy palpable  Ext-peripheral pulses normal, no pedal edema, no clubbing or cyanosis  Skin-erythema both breasts  Neuro -alert, oriented, normal speech, no focal findings or movement disorder noted  Neck-normal C-spine, no tenderness, full ROM without pain  Feet-no nail deformities or callus formation with good pulses noted  Both knees-medial joint line tenderness noted      Results for orders placed or performed in visit on 02/18/22   AMB POC HEMOGLOBIN A1C   Result Value Ref Range    Hemoglobin A1c (POC) 6.7 %   AMB POC GLUCOSE BLOOD, BY GLUCOSE MONITORING DEVICE   Result Value Ref Range    Glucose  MG/DL       Assessment/Plan:    ICD-10-CM ICD-9-CM    1. Controlled type 2 diabetes mellitus without complication, without long-term current use of insulin (HCC)  E11.9 250.00 AMB POC HEMOGLOBIN A1C      AMB POC GLUCOSE BLOOD, BY GLUCOSE MONITORING DEVICE   2. Essential hypertension  I10 401.9 AMB POC HEMOGLOBIN A1C      AMB POC GLUCOSE BLOOD, BY GLUCOSE MONITORING DEVICE      CBC W/O DIFF      METABOLIC PANEL, COMPREHENSIVE   3.  Hypercholesteremia  E78.00 272.0 AMB POC HEMOGLOBIN A1C      AMB POC GLUCOSE BLOOD, BY GLUCOSE MONITORING DEVICE      LIPID PANEL     Orders Placed This Encounter    CBC W/O DIFF     Standing Status:   Future     Standing Expiration Date:   2/18/2023    LIPID PANEL     Standing Status:   Future     Standing Expiration Date:   1/98/1075    METABOLIC PANEL, COMPREHENSIVE     Standing Status:   Future     Standing Expiration Date:   2/18/2023    AMB POC HEMOGLOBIN A1C    AMB POC GLUCOSE BLOOD, BY GLUCOSE MONITORING DEVICE     lose weight, increase physical activity, follow low fat diet, follow low salt diet, continue present plan, call if any problems,Take 81mg aspirin daily  Patient Instructions   MyChart Activation    Thank you for requesting access to [facility]. Please follow the instructions below to securely access and download your online medical record. [facility] allows you to send messages to your doctor, view your test results, renew your prescriptions, schedule appointments, and more. How Do I Sign Up? 1. In your internet browser, go to [email or web address]  2. Click on the First Time User? Click Here link in the Sign In box. You will be redirect to the New Member Sign Up page. 3. Enter your [facility] Access Code exactly as it appears below. You will not need to use this code after youve completed the sign-up process. If you do not sign up before the expiration date, you must request a new code. [facility] Access Code: Activation code not generated  Current [facility] Status: Active (This is the date your [facility]ampa access code will )    4. Enter the last four digits of your Social Security Number (xxxx) and Date of Birth (mm/dd/yyyy) as indicated and click Submit. You will be taken to the next sign-up page. 5. Create a [facility] ID. This will be your [facility] login ID and cannot be changed, so think of one that is secure and easy to remember. 6. Create a [facility] password. You can change your password at any time. 7. Enter your Password Reset Question and Answer. This can be used at a later time if you forget your password. 8. Enter your e-mail address. You will receive e-mail notification when new information is available in [address] 9. Click Sign Up. You can now view and download portions of your medical record. 10. Click the Download Summary menu link to download a portable copy of your medical information. Additional Information    If you have questions, please visit the Frequently Asked Questions section of the [facility] website at [email or web address] Remember, [facility] is NOT to be used for urgent needs. For medical emergencies, dial 911.                  I have reviewed with the patient details of the assessment and plan and all questions were answered. Relevent patient education was performed. The most recent lab findings were reviewed with the patient. An After Visit Summary was printed and given to the patient.

## 2022-02-18 NOTE — PATIENT INSTRUCTIONS
[employer] Activation    Thank you for requesting access to [facility]. Please follow the instructions below to securely access and download your online medical record. [facility] allows you to send messages to your doctor, view your test results, renew your prescriptions, schedule appointments, and more. How Do I Sign Up? 1. In your internet browser, go to [email or web address]  2. Click on the First Time User? Click Here link in the Sign In box. You will be redirect to the New Member Sign Up page. 3. Enter your GLO Access Code exactly as it appears below. You will not need to use this code after youve completed the sign-up process. If you do not sign up before the expiration date, you must request a new code. GLO Access Code: Activation code not generated  Current GLO Status: Active (This is the date your GLO access code will )    4. Enter the last four digits of your Social Security Number (xxxx) and Date of Birth (mm/dd/yyyy) as indicated and click Submit. You will be taken to the next sign-up page. 5. Create a GLO ID. This will be your GLO login ID and cannot be changed, so think of one that is secure and easy to remember. 6. Create a GLO password. You can change your password at any time. 7. Enter your Password Reset Question and Answer. This can be used at a later time if you forget your password. 8. Enter your e-mail address. You will receive e-mail notification when new information is available in [address] 9. Click Sign Up. You can now view and download portions of your medical record. 10. Click the Download Summary menu link to download a portable copy of your medical information. Additional Information    If you have questions, please visit the Frequently Asked Questions section of the GLO website at [email or web address] Remember, GLO is NOT to be used for urgent needs. For medical emergencies, dial 911.

## 2022-02-18 NOTE — PROGRESS NOTES
1. Have you been to the ER, urgent care clinic since your last visit? Hospitalized since your last visit?no    2. Have you seen or consulted any other health care providers outside of the [address] [place] [address] since your last visit? Include any pap smears or colon screening. No    Chief Complaint   Patient presents with    Diabetes    Cholesterol Problem    Hypertension     3 most recent PHQ Screens 2/18/2022   PHQ Not Done -   Little interest or pleasure in doing things Not at all   Feeling down, depressed, irritable, or hopeless Not at all   Total Score PHQ 2 0       Per Dr. Caden Beckman.,  verbal order given for needed amb poc labs.

## 2022-02-19 LAB
ALBUMIN SERPL-MCNC: 3.9 G/DL (ref 3.5–5)
ALBUMIN/GLOB SERPL: 1.1 {RATIO} (ref 1.1–2.2)
ALP SERPL-CCNC: 196 U/L (ref 45–117)
ALT SERPL-CCNC: 38 U/L (ref 12–78)
ANION GAP SERPL CALC-SCNC: 4 MMOL/L (ref 5–15)
AST SERPL-CCNC: 39 U/L (ref 15–37)
BILIRUB SERPL-MCNC: 0.5 MG/DL (ref 0.2–1)
BUN SERPL-MCNC: 20 MG/DL (ref 6–20)
BUN/CREAT SERPL: 27 (ref 12–20)
CALCIUM SERPL-MCNC: 10.1 MG/DL (ref 8.5–10.1)
CHLORIDE SERPL-SCNC: 106 MMOL/L (ref 97–108)
CHOLEST SERPL-MCNC: 155 MG/DL
CO2 SERPL-SCNC: 29 MMOL/L (ref 21–32)
CREAT SERPL-MCNC: 0.73 MG/DL (ref 0.55–1.02)
ERYTHROCYTE [DISTWIDTH] IN BLOOD BY AUTOMATED COUNT: 13.5 % (ref 11.5–14.5)
GLOBULIN SER CALC-MCNC: 3.5 G/DL (ref 2–4)
GLUCOSE SERPL-MCNC: 151 MG/DL (ref 65–100)
HCT VFR BLD AUTO: 42 % (ref 35–47)
HDLC SERPL-MCNC: 68 MG/DL
HDLC SERPL: 2.3 {RATIO} (ref 0–5)
HGB BLD-MCNC: 13.1 G/DL (ref 11.5–16)
LDLC SERPL CALC-MCNC: 57 MG/DL (ref 0–100)
MCH RBC QN AUTO: 30.5 PG (ref 26–34)
MCHC RBC AUTO-ENTMCNC: 31.2 G/DL (ref 30–36.5)
MCV RBC AUTO: 97.7 FL (ref 80–99)
NRBC # BLD: 0 K/UL (ref 0–0.01)
NRBC BLD-RTO: 0 PER 100 WBC
PLATELET # BLD AUTO: 216 K/UL (ref 150–400)
PMV BLD AUTO: 11.3 FL (ref 8.9–12.9)
POTASSIUM SERPL-SCNC: 4.5 MMOL/L (ref 3.5–5.1)
PROT SERPL-MCNC: 7.4 G/DL (ref 6.4–8.2)
RBC # BLD AUTO: 4.3 M/UL (ref 3.8–5.2)
SODIUM SERPL-SCNC: 139 MMOL/L (ref 136–145)
TRIGL SERPL-MCNC: 150 MG/DL (ref ?–150)
VLDLC SERPL CALC-MCNC: 30 MG/DL
WBC # BLD AUTO: 7.4 K/UL (ref 3.6–11)

## 2022-03-07 RX ORDER — BLOOD SUGAR DIAGNOSTIC
STRIP MISCELLANEOUS
Qty: 100 STRIP | Refills: 2 | Status: SHIPPED | OUTPATIENT
Start: 2022-03-07

## 2022-03-18 PROBLEM — K57.92 ACUTE DIVERTICULITIS: Status: ACTIVE | Noted: 2017-08-13

## 2022-03-19 PROBLEM — R73.9 ELEVATED BLOOD SUGAR: Status: ACTIVE | Noted: 2018-04-24

## 2022-03-19 PROBLEM — K57.32 DIVERTICULITIS OF COLON: Status: ACTIVE | Noted: 2017-08-13

## 2022-05-18 ENCOUNTER — OFFICE VISIT (OUTPATIENT)
Dept: INTERNAL MEDICINE CLINIC | Age: 67
End: 2022-05-18
Payer: MEDICAID

## 2022-05-18 VITALS
HEART RATE: 82 BPM | WEIGHT: 178 LBS | TEMPERATURE: 98.1 F | SYSTOLIC BLOOD PRESSURE: 110 MMHG | OXYGEN SATURATION: 97 % | DIASTOLIC BLOOD PRESSURE: 70 MMHG | RESPIRATION RATE: 20 BRPM | BODY MASS INDEX: 31.54 KG/M2 | HEIGHT: 63 IN

## 2022-05-18 DIAGNOSIS — M17.0 PRIMARY OSTEOARTHRITIS OF BOTH KNEES: ICD-10-CM

## 2022-05-18 DIAGNOSIS — E78.00 HYPERCHOLESTEREMIA: ICD-10-CM

## 2022-05-18 DIAGNOSIS — I10 ESSENTIAL HYPERTENSION: ICD-10-CM

## 2022-05-18 DIAGNOSIS — E11.9 CONTROLLED TYPE 2 DIABETES MELLITUS WITHOUT COMPLICATION, WITHOUT LONG-TERM CURRENT USE OF INSULIN (HCC): Primary | ICD-10-CM

## 2022-05-18 DIAGNOSIS — G56.01 CARPAL TUNNEL SYNDROME, RIGHT: ICD-10-CM

## 2022-05-18 LAB
CHOLEST SERPL-MCNC: 127 MG/DL
GLUCOSE POC: 222 MG/DL
HBA1C MFR BLD HPLC: 6.5 %
HDLC SERPL-MCNC: 51 MG/DL
LDL CHOLESTEROL POC: 57 MG/DL
NON-HDL GOAL (POC): 76
TCHOL/HDL RATIO (POC): 2.5
TRIGL SERPL-MCNC: 94 MG/DL

## 2022-05-18 PROCEDURE — 83036 HEMOGLOBIN GLYCOSYLATED A1C: CPT | Performed by: INTERNAL MEDICINE

## 2022-05-18 PROCEDURE — 99214 OFFICE O/P EST MOD 30 MIN: CPT | Performed by: INTERNAL MEDICINE

## 2022-05-18 PROCEDURE — 82962 GLUCOSE BLOOD TEST: CPT | Performed by: INTERNAL MEDICINE

## 2022-05-18 PROCEDURE — 80061 LIPID PANEL: CPT | Performed by: INTERNAL MEDICINE

## 2022-05-18 NOTE — PROGRESS NOTES
Julieann Cushing is a 61 y.o. female and presents with No chief complaint on file. Subjective:    Knee pain Review:  Patient complaints of right  knee pains are improved. Hypertension Review:  The patient has essential hypertension . Diet and Lifestyle: generally follows a low sodium diet, exercises sporadically  Home BP Monitoring: is not measured at home. Pertinent ROS: taking medications as instructed, no medication side effects noted, no TIA's, no chest pain on exertion, no dyspnea on exertion, no swelling of ankles. Diabetes Mellitus Review:  She has diabetes mellitus. Diabetic ROS - medication compliance: compliant all of the time, diabetic diet compliance: compliant all of the time, home glucose monitoring: is performed. Known diabetic complications: none  Cardiovascular risk factors: family history, dyslipidemia, diabetes mellitus, obesity, hypertension  Current diabetic medications include oral agents   Eye exam current (within one year): no  Weight trend: stable  Prior visit with dietician: no  Current diet: \"healthy\" diet  in general  Current exercise: walking  Home blood sugar records: trend: stable  Any episodes of hypoglycemia? no      Dyslipidemia Review:  Patient presents for evaluation of lipids. Compliance with treatment thus far has been excellent. A repeat fasting lipid profile was not done. The patient does not use medications that may worsen dyslipidemias (corticosteroids, progestins, anabolic steroids, amiodarone, cyclosporine, olanzapine). The patient exercises some    Carpal Tunnel Syndrome Review:  Patient presents for evaluation of pain in lt. hand(s), hand paresthesias of the first three fingers Patient's overall course: gradually improved. Patient has had no prior elbow problems. Evaluation to date:she has seen hand surgery . Treatment to date: nsaid    COPD REVIEW:  The patient is being seen for follow up of COPD,the patient has been stable. Julieann Cushing is a 63 y.o. female who complains of a lingering cough,  for the past few weeks, gradually improving since that time. She denies a history of shortness of breath. Evaluation to date: none. Treatment to date: OTC products. Relevant PMH: No pertinent additional PMH. Oxygen: She currently is not on home oxygen therapy. Symptoms: chronic dyspnea: severity = not present: course of sx: stable. Patient uses 2 pillows at night. Patient does not continue to smoke cigarettes. Intertrigo Review:  She still has recurrent itching under both breasts since her last visit    States she has received laser surgery for a heel spur on the lt.foot  She has podiatry shoes. Review of Systems  Constitutional: negative for fevers, chills, anorexia and weight loss  Eyes:   negative for visual disturbance and irritation  ENT:   negative for tinnitus,sore throat,nasal congestion,ear pains. hoarseness  Respiratory:  cough,  CV:   negative for chest pain, palpitations, lower extremity edema  GI:   negative for nausea, vomiting, diarrhea, abdominal pain,melena  Endo:               negative for polyuria,polydipsia,polyphagia,heat intolerance  Genitourinary: negative for frequency, dysuria and hematuria  Integument:  rash and pruritus  Hematologic:  negative for easy bruising and gum/nose bleeding  Musculoskel: joint pain  Neurological:  negative for headaches, dizziness, vertigo, memory problems and gait   Behavl/Psych: negative for feelings of anxiety, depression, mood changes    Past Medical History:   Diagnosis Date    Arthritis, Degenerative, Knee 7/15/2011    [facility] 7/15/2011    Diverticulitis     GERD (gastroesophageal reflux disease)     GERD, gastro - Esophageal Reflux Disease 7/15/2011    Hypercholesterolemia 7/15/2011    Hypertension     Type II or unspecified type diabetes mellitus without mention of complication, uncontrolled 7/15/2011     Past Surgical History: Procedure Laterality Date    COLONOSCOPY N/A 2017    [facility]    COLONOSCOPY N/A 2018    COLONOSCOPY performed [facility] ENDOSCOPY    HX GYN  2004    uterine surgery    OR LAP,TUBAL CAUTERY Bilateral 1992     Social History     Socioeconomic History    Marital status:    Tobacco Use    Smoking status: Former Smoker     Quit date: 4/15/2015     Years since quittin.0    Smokeless tobacco: Never Used   Vaping Use    Vaping Use: Never used   Substance and Sexual Activity    Alcohol use: No    Drug use: Yes     Types: Marijuana     Comment: \"daily\"    Sexual activity: Yes     Partners: Male   Social History Narrative        She lives with her  she has five adult children denies alcohol cigarette use     Family History   Problem Relation Age of Onset    Diabetes Father     Deep Vein Thrombosis Daughter     Lung Disease Daughter     Other Daughter         cholesterol     Current Outpatient Medications   Medication Sig Dispense Refill    OneTouch Ultra Test strip USE TO CHECK BLOOD SUGAR EVERY  Strip 2    nystatin (MYCOSTATIN) powder Apply  to affected area two (2) times a day. 60 g 12    sucralfate (CARAFATE) 1 gram tablet TAKE 1 TABLET BY MOUTH FOUR TIMES A  Tablet 1    lidocaine (LIDODERM) 5 % Apply patch to the affected area for 12 hours a day and remove for 12 hours a day. 10 Each 0    diclofenac EC (VOLTAREN) 75 mg EC tablet Si tab po bid 180 Tablet 3    pioglitazone (ACTOS) 45 mg tablet Take 1 Tablet by mouth nightly.  30 Tablet 12    amLODIPine (NORVASC) 5 mg tablet TAKE 1 TABLET BY MOUTH DAILY 30 Tablet 12    hydroCHLOROthiazide (HYDRODIURIL) 25 mg tablet TAKE 1 TABLET BY MOUTH DAILY 30 Tablet 12    losartan (COZAAR) 50 mg tablet TAKE 1 TABLET BY MOUTH DAILY 30 Tablet 12    potassium chloride SA (MICRO-K) 10 mEq capsule TAKE 1 CAPSULE BY MOUTH TWICE DAILY 30 Capsule 12  metFORMIN (GLUCOPHAGE) 1,000 mg tablet TAKE 1 TABLET BY MOUTH TWICE A DAY WITH MEALS 60 Tablet 12    potassium chloride SA (MICRO-K) 10 mEq capsule TAKE 1 CAPSULE BY MOUTH TWICE A DAY 60 Capsule 3    atorvastatin (LIPITOR) 40 mg tablet Take 1 Tablet by mouth daily.  30 Tablet 12    pantoprazole (PROTONIX) 40 mg tablet TAKE 1 TABLET BY MOUTH EVERY DAY 30 Tab 3    lancets (OneTouch Delica Lancets) 30 gauge misc Use once daily;E11.9:Type 2 diabetes 100 Lancet 3    Blood-Glucose Meter (OneTouch Ultra2 Meter) monitoring kit Test once daily; E11.9; Diabetes Type 2 1 Kit 0    potassium chloride SA (MICRO-K) 10 mEq capsule TAKE ONE CAPSULE BY MOUTH TWICE DAILY 30 Cap 12     No Known Allergies    Objective:  Visit Vitals  /70 (BP 1 Location: Right arm, BP Patient Position: Sitting, BP Cuff Size: Adult)   Pulse 82   Temp 98.1 °F (36.7 °C) (Oral)   Resp 20   Ht 5' 3\" (1.6 m)   Wt 178 lb (80.7 kg)   LMP 03/15/2004 (Approximate)   SpO2 97%   BMI 31.53 kg/m²     Physical Exam:   General appearance - alert, well appearing, and in no distress  Mental status - alert, oriented to person, place, and time  EYE-KODY, EOMI, corneas normal, no foreign bodies  ENT-ENT exam normal, no neck nodes or sinus tenderness  Nose - normal and patent, no erythema, discharge or polyps  Mouth - mucous membranes moist, pharynx normal without lesions  Neck - supple, no significant adenopathy   Chest - clear to auscultation, no wheezes, rales or rhonchi, symmetric air entry   Heart - normal rate, regular rhythm, normal S1, S2, no murmurs, rubs, clicks or gallops   Abdomen - soft, nontender, nondistended, no masses or organomegaly  Lymph- no adenopathy palpable  Ext-peripheral pulses normal, no pedal edema, no clubbing or cyanosis  Skin-erythema both breasts  Neuro -alert, oriented, normal speech, no focal findings or movement disorder noted  Neck-normal C-spine, no tenderness, full ROM without pain  Feet-no nail deformities or callus formation with good pulses noted  Both knees-medial joint line tenderness noted      Results for orders placed or performed in visit on 05/18/22   AMB POC GLUCOSE BLOOD, BY GLUCOSE MONITORING DEVICE   Result Value Ref Range    Glucose  MG/DL   AMB POC LIPID PROFILE   Result Value Ref Range    Cholesterol (POC) 127     Triglycerides (POC) 94     HDL Cholesterol (POC) 51     LDL Cholesterol (POC) 57 MG/DL    Non-HDL Goal (POC) 76     TChol/HDL Ratio (POC) 2.5    AMB POC HEMOGLOBIN A1C   Result Value Ref Range    Hemoglobin A1c (POC) 6.5 %       Assessment/Plan:    ICD-10-CM ICD-9-CM    1. Controlled type 2 diabetes mellitus without complication, without long-term current use of insulin (HCC)  E11.9 250.00 AMB POC GLUCOSE BLOOD, BY GLUCOSE MONITORING DEVICE      AMB POC HEMOGLOBIN A1C   2. Hypercholesteremia  E78.00 272.0 AMB POC LIPID PROFILE   3. Essential hypertension  I10 401.9 AMB POC LIPID PROFILE      CBC W/O DIFF      METABOLIC PANEL, COMPREHENSIVE   4. Primary osteoarthritis of both knees  M17.0 715.16    5. Carpal tunnel syndrome, right  G56.01 354.0      Orders Placed This Encounter    CBC W/O DIFF     Standing Status:   Future     Standing Expiration Date:   6/65/6205    METABOLIC PANEL, COMPREHENSIVE     Standing Status:   Future     Standing Expiration Date:   5/18/2023    AMB POC GLUCOSE BLOOD, BY GLUCOSE MONITORING DEVICE    AMB POC LIPID PROFILE    AMB POC HEMOGLOBIN A1C     lose weight, increase physical activity, follow low fat diet, follow low salt diet, continue present plan, call if any problems,Take 81mg aspirin daily  Patient Instructions   [employer] Activation    Thank you for requesting access to [facility]. Please follow the instructions below to securely access and download your online medical record. [facility] allows you to send messages to your doctor, view your test results, renew your prescriptions, schedule appointments, and more. How Do I Sign Up? 1.  In your internet browser, go to [email or web address]  2. Click on the First Time User? Click Here link in the Sign In box. You will be redirect to the New Member Sign Up page. 3. Enter your [employer] Access Code exactly as it appears below. You will not need to use this code after youve completed the sign-up process. If you do not sign up before the expiration date, you must request a new code. MyChart Access Code: Activation code not generated  Current [employer] Status: Active (This is the date your flck.met access code will )    4. Enter the last four digits of your Social Security Number (xxxx) and Date of Birth (mm/dd/yyyy) as indicated and click Submit. You will be taken to the next sign-up page. 5. Create a flck.met ID. This will be your [employer] login ID and cannot be changed, so think of one that is secure and easy to remember. 6. Create a [employer] password. You can change your password at any time. 7. Enter your Password Reset Question and Answer. This can be used at a later time if you forget your password. 8. Enter your e-mail address. You will receive e-mail notification when new information is available in [address] 9. Click Sign Up. You can now view and download portions of your medical record. 10. Click the Download Summary menu link to download a portable copy of your medical information. Additional Information    If you have questions, please visit the Frequently Asked Questions section of the [employer] website at [email or web address] Remember, [employer] is NOT to be used for urgent needs. For medical emergencies, dial 911. Follow-up and Dispositions    · Return in about 3 months (around 2022), or if symptoms worsen or fail to improve. I have reviewed with the patient details of the assessment and plan and all questions were answered. Relevent patient education was performed. The most recent lab findings were reviewed with the patient.     An After Visit Summary was printed and given to the patient.

## 2022-05-18 NOTE — PATIENT INSTRUCTIONS
[employer] Activation    Thank you for requesting access to [employer]. Please follow the instructions below to securely access and download your online medical record. [employer] allows you to send messages to your doctor, view your test results, renew your prescriptions, schedule appointments, and more. How Do I Sign Up? 1. In your internet browser, go to [email or web address]  2. Click on the First Time User? Click Here link in the Sign In box. You will be redirect to the New Member Sign Up page. 3. Enter your [employer] Access Code exactly as it appears below. You will not need to use this code after youve completed the sign-up process. If you do not sign up before the expiration date, you must request a new code. [employer] Access Code: Activation code not generated  Current [employer] Status: Active (This is the date your [employer] access code will )    4. Enter the last four digits of your Social Security Number (xxxx) and Date of Birth (mm/dd/yyyy) as indicated and click Submit. You will be taken to the next sign-up page. 5. Create a [employer] ID. This will be your [employer] login ID and cannot be changed, so think of one that is secure and easy to remember. 6. Create a [employer] password. You can change your password at any time. 7. Enter your Password Reset Question and Answer. This can be used at a later time if you forget your password. 8. Enter your e-mail address. You will receive e-mail notification when new information is available in [address] 9. Click Sign Up. You can now view and download portions of your medical record. 10. Click the Download Summary menu link to download a portable copy of your medical information. Additional Information    If you have questions, please visit the Frequently Asked Questions section of the [employer] website at [email or web address] Remember, [employer] is NOT to be used for urgent needs. For medical emergencies, dial 911.

## 2022-05-18 NOTE — PROGRESS NOTES
Chief Complaint   Patient presents with    Diabetes    Cholesterol Problem     3 most recent PHQ Screens 5/18/2022   PHQ Not Done -   Little interest or pleasure in doing things Not at all   Feeling down, depressed, irritable, or hopeless Not at all   Total Score PHQ 2 0     1. Have you been to the ER, urgent care clinic since your last visit? Hospitalized since your last visit?no    2. Have you seen or consulted any other health care providers outside of the [address] [place] [address] since your last visit? Include any pap smears or colon screening.  no

## 2022-05-19 LAB
ALBUMIN SERPL-MCNC: 3.6 G/DL (ref 3.5–5)
ALBUMIN/GLOB SERPL: 1.1 {RATIO} (ref 1.1–2.2)
ALP SERPL-CCNC: 182 U/L (ref 45–117)
ALT SERPL-CCNC: 28 U/L (ref 12–78)
ANION GAP SERPL CALC-SCNC: 5 MMOL/L (ref 5–15)
AST SERPL-CCNC: 28 U/L (ref 15–37)
BILIRUB SERPL-MCNC: 0.4 MG/DL (ref 0.2–1)
BUN SERPL-MCNC: 25 MG/DL (ref 6–20)
BUN/CREAT SERPL: 27 (ref 12–20)
CALCIUM SERPL-MCNC: 9.5 MG/DL (ref 8.5–10.1)
CHLORIDE SERPL-SCNC: 103 MMOL/L (ref 97–108)
CO2 SERPL-SCNC: 30 MMOL/L (ref 21–32)
CREAT SERPL-MCNC: 0.91 MG/DL (ref 0.55–1.02)
ERYTHROCYTE [DISTWIDTH] IN BLOOD BY AUTOMATED COUNT: 13.3 % (ref 11.5–14.5)
GLOBULIN SER CALC-MCNC: 3.3 G/DL (ref 2–4)
GLUCOSE SERPL-MCNC: 173 MG/DL (ref 65–100)
HCT VFR BLD AUTO: 39.5 % (ref 35–47)
HGB BLD-MCNC: 12.3 G/DL (ref 11.5–16)
MCH RBC QN AUTO: 30.9 PG (ref 26–34)
MCHC RBC AUTO-ENTMCNC: 31.1 G/DL (ref 30–36.5)
MCV RBC AUTO: 99.2 FL (ref 80–99)
NRBC # BLD: 0.02 K/UL (ref 0–0.01)
NRBC BLD-RTO: 0.3 PER 100 WBC
PLATELET # BLD AUTO: 173 K/UL (ref 150–400)
PMV BLD AUTO: 11.5 FL (ref 8.9–12.9)
POTASSIUM SERPL-SCNC: 4.3 MMOL/L (ref 3.5–5.1)
PROT SERPL-MCNC: 6.9 G/DL (ref 6.4–8.2)
RBC # BLD AUTO: 3.98 M/UL (ref 3.8–5.2)
SODIUM SERPL-SCNC: 138 MMOL/L (ref 136–145)
WBC # BLD AUTO: 6.6 K/UL (ref 3.6–11)

## 2022-06-05 RX ORDER — NYSTATIN 100000 [USP'U]/G
POWDER TOPICAL
Qty: 60 G | Refills: 3 | Status: SHIPPED | OUTPATIENT
Start: 2022-06-05 | End: 2022-10-07

## 2022-07-11 RX ORDER — POTASSIUM CHLORIDE 750 MG/1
CAPSULE, EXTENDED RELEASE ORAL
Qty: 60 CAPSULE | Refills: 6 | Status: SHIPPED | OUTPATIENT
Start: 2022-07-11

## 2022-07-11 RX ORDER — ACETAMINOPHEN 500 MG
2000 TABLET ORAL 2 TIMES DAILY
Qty: 60 CAPSULE | Refills: 3 | Status: SHIPPED | OUTPATIENT
Start: 2022-07-11

## 2022-07-11 RX ORDER — SUCRALFATE 1 G/1
TABLET ORAL
Qty: 120 TABLET | Refills: 1 | Status: SHIPPED | OUTPATIENT
Start: 2022-07-11 | End: 2022-09-04 | Stop reason: SDUPTHER

## 2022-08-17 ENCOUNTER — OFFICE VISIT (OUTPATIENT)
Dept: INTERNAL MEDICINE CLINIC | Age: 67
End: 2022-08-17
Payer: MEDICAID

## 2022-08-17 VITALS
RESPIRATION RATE: 20 BRPM | SYSTOLIC BLOOD PRESSURE: 120 MMHG | HEIGHT: 63 IN | BODY MASS INDEX: 31.71 KG/M2 | OXYGEN SATURATION: 98 % | DIASTOLIC BLOOD PRESSURE: 86 MMHG | HEART RATE: 75 BPM | WEIGHT: 179 LBS | TEMPERATURE: 98 F

## 2022-08-17 DIAGNOSIS — I10 ESSENTIAL HYPERTENSION: ICD-10-CM

## 2022-08-17 DIAGNOSIS — E11.9 CONTROLLED TYPE 2 DIABETES MELLITUS WITHOUT COMPLICATION, WITHOUT LONG-TERM CURRENT USE OF INSULIN (HCC): Primary | ICD-10-CM

## 2022-08-17 DIAGNOSIS — M17.0 PRIMARY OSTEOARTHRITIS OF BOTH KNEES: ICD-10-CM

## 2022-08-17 LAB
CHOLEST SERPL-MCNC: 134 MG/DL
GLUCOSE POC: 134 MG/DL
HBA1C MFR BLD HPLC: 6.4 %
HDLC SERPL-MCNC: 51 MG/DL
LDL CHOLESTEROL POC: 61 MG/DL
NON-HDL GOAL (POC): 83
TCHOL/HDL RATIO (POC): 2.6
TRIGL SERPL-MCNC: 107 MG/DL

## 2022-08-17 PROCEDURE — 99214 OFFICE O/P EST MOD 30 MIN: CPT | Performed by: INTERNAL MEDICINE

## 2022-08-17 PROCEDURE — 83036 HEMOGLOBIN GLYCOSYLATED A1C: CPT | Performed by: INTERNAL MEDICINE

## 2022-08-17 PROCEDURE — 82962 GLUCOSE BLOOD TEST: CPT | Performed by: INTERNAL MEDICINE

## 2022-08-17 PROCEDURE — 80061 LIPID PANEL: CPT | Performed by: INTERNAL MEDICINE

## 2022-08-17 RX ORDER — DICLOFENAC SODIUM 75 MG/1
TABLET, DELAYED RELEASE ORAL
Qty: 180 TABLET | Refills: 3 | Status: SHIPPED | OUTPATIENT
Start: 2022-08-17

## 2022-08-17 NOTE — PROGRESS NOTES
Chief Complaint   Patient presents with    Diabetes    Hypertension    Ankle Pain     3 most recent PHQ Screens 8/17/2022   PHQ Not Done -   Little interest or pleasure in doing things Not at all   Feeling down, depressed, irritable, or hopeless Not at all   Total Score PHQ 2 0   Trouble falling or staying asleep, or sleeping too much Not at all   Feeling tired or having little energy Not at all   Poor appetite, weight loss, or overeating Not at all   Feeling bad about yourself - or that you are a failure or have let yourself or your family down Not at all   Trouble concentrating on things such as school, work, reading, or watching TV Not at all   Moving or speaking so slowly that other people could have noticed; or the opposite being so fidgety that others notice Not at all   Thoughts of being better off dead, or hurting yourself in some way Not at all   PHQ 9 Score 0   How difficult have these problems made it for you to do your work, take care of your home and get along with others Not difficult at all     1. Have you been to the ER, urgent care clinic since your last visit? Hospitalized since your last visit? yes, [facility]    2. Have you seen or consulted any other health care providers outside of the [address] [place] [address] since your last visit? Include any pap smears or colon screening.  Yes [facility]

## 2022-08-17 NOTE — PATIENT INSTRUCTIONS
[employer] Activation    Thank you for requesting access to [facility]. Please follow the instructions below to securely access and download your online medical record. [facility] allows you to send messages to your doctor, view your test results, renew your prescriptions, schedule appointments, and more. How Do I Sign Up? In your internet browser, go to [email or web address]  Click on the First Time User? Click Here link in the Sign In box. You will be redirect to the New Member Sign Up page. Enter your [facility] Access Code exactly as it appears below. You will not need to use this code after youve completed the sign-up process. If you do not sign up before the expiration date, you must request a new code. [facility] Access Code: Activation code not generated  Current [facility] Status: Active (This is the date your [facility] access code will )    Enter the last four digits of your Social Security Number (xxxx) and Date of Birth (mm/dd/yyyy) as indicated and click Submit. You will be taken to the next sign-up page. Create a [facility] ID. This will be your [facility] login ID and cannot be changed, so think of one that is secure and easy to remember. Create a [facility] password. You can change your password at any time. Enter your Password Reset Question and Answer. This can be used at a later time if you forget your password. Enter your e-mail address. You will receive e-mail notification when new information is available in [address] Click Sign Up. You can now view and download portions of your medical record. Click the [place] link to download a portable copy of your medical information. Additional Information    If you have questions, please visit the Frequently Asked Questions section of the [facility] website at [email or web address] Remember, [facility] is NOT to be used for urgent needs. For medical emergencies, dial 911.

## 2022-08-17 NOTE — PROGRESS NOTES
Lanie Rosas is a 74 y.o. female and presents with Diabetes, Hypertension, and Ankle Pain    Subjective:  Hypertension Review:  The patient has essential hypertension . Diet and Lifestyle: generally follows a low sodium diet, exercises sporadically  Home BP Monitoring: is not measured at home. Pertinent ROS: taking medications as instructed, no medication side effects noted, no TIA's, no chest pain on exertion, no dyspnea on exertion, no swelling of ankles. Diabetes Mellitus Review:  She has diabetes mellitus. Diabetic ROS - medication compliance: compliant all of the time, diabetic diet compliance: compliant all of the time, home glucose monitoring: is performed. Known diabetic complications: none  Cardiovascular risk factors: family history, dyslipidemia, diabetes mellitus, obesity, hypertension  Current diabetic medications include oral agents   Eye exam current (within one year): no  Weight trend: stable  Prior visit with dietician: no  Current diet: \"healthy\" diet  in general  Current exercise: walking  Home blood sugar records: trend: stable  Any episodes of hypoglycemia? No      Knee pain Review:  Patient complaints of right  knee pains are improved. Dyslipidemia Review:  Patient presents for evaluation of lipids. Compliance with treatment thus far has been excellent. A repeat fasting lipid profile was not done. The patient does not use medications that may worsen dyslipidemias (corticosteroids, progestins, anabolic steroids, amiodarone, cyclosporine, olanzapine). The patient exercises some    Carpal Tunnel Syndrome Review:  Patient presents for evaluation of pain in lt. hand(s), hand paresthesias of the first three fingers Patient's overall course: gradually improved. Patient has had no prior elbow problems. Evaluation to date:she has seen hand surgery . Treatment to date: nsaid    COPD REVIEW:  The patient is being seen for follow up of COPD,the patient has been stable. Aravind Styles Sandra Kumari is a 63 y.o. female who complains of a lingering cough,  for the past few weeks, gradually improving since that time. She denies a history of shortness of breath. Evaluation to date: none. Treatment to date: OTC products. Relevant PMH: No pertinent additional PMH. Oxygen: She currently is not on home oxygen therapy. Symptoms: chronic dyspnea: severity = not present: course of sx: stable. Patient uses 2 pillows at night. Patient does not continue to smoke cigarettes. Ankle Pain Review:  Patient complains of right ankle pain. Onset of the symptoms was several weeks ago. Inciting event: none known. Current symptoms include ability to bear weight, but with some pain. Aggravating symptoms: walking. Patient's overall course: gradually worsening. Patient has had no prior ankle problems. Previous visits for this problem: none. Evaluation to date: none. Treatment to date: motrin      Review of Systems  Constitutional: negative for fevers, chills, anorexia and weight loss  Eyes:   negative for visual disturbance and irritation  ENT:   negative for tinnitus,sore throat,nasal congestion,ear pains. hoarseness  Respiratory:  cough,  CV:   negative for chest pain, palpitations, lower extremity edema  GI:   negative for nausea, vomiting, diarrhea, abdominal pain,melena  Endo:               negative for polyuria,polydipsia,polyphagia,heat intolerance  Genitourinary: negative for frequency, dysuria and hematuria  Integument:  rash and pruritus  Hematologic:  negative for easy bruising and gum/nose bleeding  Musculoskel: joint pain  Neurological:  negative for headaches, dizziness, vertigo, memory problems and gait   Behavl/Psych: negative for feelings of anxiety, depression, mood changes    Past Medical History:   Diagnosis Date    Arthritis, Degenerative, Knee 7/15/2011    COPD (chronic obstructive pulmonary disease) ([facility] [address] 7/15/2011    Diverticulitis     GERD (gastroesophageal reflux disease)     GERD, gastro - Esophageal Reflux Disease 7/15/2011    Hypercholesterolemia 7/15/2011    Hypertension     Type II or unspecified type diabetes mellitus without mention of complication, uncontrolled 7/15/2011     Past Surgical History:   Procedure Laterality Date    COLONOSCOPY N/A 2017    COLONOSCOPY performed by Sendy Davis [facility] at [facility] AM[facility] OR    COLONOSCOPY N/A 2018    COLONOSCOPY performed by Sendy Davis MD at [facility] ENDOSCOPY    HX GYN  2004    uterine surgery    [place] LAP,TUBAL CAUTERY Bilateral 1992     Social History     Socioeconomic History    Marital status:    Tobacco Use    Smoking status: Former     Types: Cigarettes     Quit date: 4/15/2015     Years since quittin.3    Smokeless tobacco: Never   Vaping Use    Vaping Use: Never used   Substance and Sexual Activity    Alcohol use: No    Drug use: Yes     Types: Marijuana     Comment: \"daily\"    Sexual activity: Yes     Partners: Male   Social History Narrative        She lives with her  she has five adult children denies alcohol cigarette use     Family History   Problem Relation Age of Onset    Diabetes Father     Deep Vein Thrombosis Daughter     Lung Disease Daughter     Other Daughter         cholesterol     Current Outpatient Medications   Medication Sig Dispense Refill    diclofenac EC (VOLTAREN) 75 mg EC tablet Si tab po bid 180 Tablet 3    potassium chloride SA (MICRO-K) 10 mEq capsule TAKE 1 CAPSULE BY MOUTH TWICE A DAY 60 Capsule 6    sucralfate (CARAFATE) 1 gram tablet TAKE 1 TABLET BY MOUTH FOUR TIMES A  Tablet 1    cholecalciferol (VITAMIN D3) (2,000 UNITS /50 MCG) cap capsule Take 1 Capsule by mouth two (2) times a day. 60 Capsule 3    nystatin (MYCOSTATIN) powder APPLY TO AFFECTED AREA TWICE A DAY 60 g 3    OneTouch Ultra Test strip USE TO CHECK BLOOD SUGAR EVERY  Strip 2    lidocaine (LIDODERM) 5 % Apply patch to the affected area for 12 hours a day and remove for 12 hours a day.  10 Each 0 pioglitazone (ACTOS) 45 mg tablet Take 1 Tablet by mouth nightly. 30 Tablet 12    amLODIPine (NORVASC) 5 mg tablet TAKE 1 TABLET BY MOUTH DAILY 30 Tablet 12    hydroCHLOROthiazide (HYDRODIURIL) 25 mg tablet TAKE 1 TABLET BY MOUTH DAILY 30 Tablet 12    losartan (COZAAR) 50 mg tablet TAKE 1 TABLET BY MOUTH DAILY 30 Tablet 12    metFORMIN (GLUCOPHAGE) 1,000 mg tablet TAKE 1 TABLET BY MOUTH TWICE A DAY WITH MEALS 60 Tablet 12    potassium chloride SA (MICRO-K) 10 mEq capsule TAKE 1 CAPSULE BY MOUTH TWICE A DAY 60 Capsule 3    atorvastatin (LIPITOR) 40 mg tablet Take 1 Tablet by mouth daily.  30 Tablet 12    pantoprazole (PROTONIX) 40 mg tablet TAKE 1 TABLET BY MOUTH EVERY DAY 30 Tab 3    lancets (OneTouch Delica Lancets) 30 gauge misc Use once daily;E11.9:Type 2 diabetes 100 Lancet 3    Blood-Glucose Meter (OneTouch Ultra2 Meter) monitoring kit Test once daily; E11.9; Diabetes Type 2 1 Kit 0    potassium chloride SA (MICRO-K) 10 mEq capsule TAKE ONE CAPSULE BY MOUTH TWICE DAILY 30 Cap 12     No Known Allergies    Objective:  Visit Vitals  /86 (BP 1 Location: Right arm, BP Patient Position: Sitting, BP Cuff Size: Adult)   Pulse 75   Temp 98 °F (36.7 °C) (Oral)   Resp 20   Ht 5' 3\" (1.6 m)   Wt 179 lb (81.2 kg)   LMP 03/15/2004 (Approximate)   SpO2 98%   BMI 31.71 kg/m²     Physical Exam:   General appearance - alert, well appearing, and in no distress  Mental status - alert, oriented to person, place, and time  EYE-KODY, EOMI, corneas normal, no foreign bodies  ENT-ENT exam normal, no neck nodes or sinus tenderness  Nose - normal and patent, no erythema, discharge or polyps  Mouth - mucous membranes moist, pharynx normal without lesions  Neck - supple, no significant adenopathy   Chest - clear to auscultation, no wheezes, rales or rhonchi, symmetric air entry   Heart - normal rate, regular rhythm, normal S1, S2, no murmurs, rubs, clicks or gallops   Abdomen - soft, nontender, nondistended, no masses or organomegaly  Lymph- no adenopathy palpable  Ext-peripheral pulses normal, no pedal edema, no clubbing or cyanosis  Skin-erythema both breasts  Neuro -alert, oriented, normal speech, no focal findings or movement disorder noted  Neck-normal C-spine, no tenderness, full ROM without pain  Feet-no nail deformities or callus formation with good pulses noted  Both knees-medial joint line tenderness noted  Rt. ankle-Ankle exam - both normal; full range of motion, no pain on motion, no effusion, tenderness, ligamentous instability or deformity noted. Results for orders placed or performed in visit on 22   AMB POC GLUCOSE BLOOD, BY GLUCOSE MONITORING DEVICE   Result Value Ref Range    Glucose  MG/DL   AMB POC HEMOGLOBIN A1C   Result Value Ref Range    Hemoglobin A1c (POC) 6.4 %   AMB POC LIPID PROFILE   Result Value Ref Range    Cholesterol (POC) 134     Triglycerides (POC) 107     HDL Cholesterol (POC) 51     LDL Cholesterol (POC) 61 MG/DL    Non-HDL Goal (POC) 83     TChol/HDL Ratio (POC) 2.6        Assessment/Plan:    ICD-10-CM ICD-9-CM    1. Controlled type 2 diabetes mellitus without complication, without long-term current use of insulin (HCC)  E11.9 250.00 AMB POC GLUCOSE BLOOD, BY GLUCOSE MONITORING DEVICE      AMB POC HEMOGLOBIN A1C      2. Primary osteoarthritis of both knees  M17.0 715.16 diclofenac EC (VOLTAREN) 75 mg EC tablet      3.  Essential hypertension  I10 401.9 AMB POC LIPID PROFILE      CBC W/O DIFF      METABOLIC PANEL, COMPREHENSIVE        Orders Placed This Encounter    CBC W/O DIFF     Standing Status:   Future     Standing Expiration Date:   3524    METABOLIC PANEL, COMPREHENSIVE     Standing Status:   Future     Standing Expiration Date:   2023    AMB POC GLUCOSE BLOOD, BY GLUCOSE MONITORING DEVICE    AMB POC HEMOGLOBIN A1C    AMB POC LIPID PROFILE    diclofenac EC (VOLTAREN) 75 mg EC tablet     Sig: Si tab po bid     Dispense:  180 Tablet     Refill:  3     lose weight, increase physical activity, follow low fat diet, follow low salt diet, continue present plan, call if any problems,Take 81mg aspirin daily  Patient Instructions   [employer] Activation    Thank you for requesting access to [employer]. Please follow the instructions below to securely access and download your online medical record. [employer] allows you to send messages to your doctor, view your test results, renew your prescriptions, schedule appointments, and more. How Do I Sign Up? In your internet browser, go to [email or web address]  Click on the First Time User? Click Here link in the Sign In box. You will be redirect to the New Member Sign Up page. Enter your [employer] Access Code exactly as it appears below. You will not need to use this code after youve completed the sign-up process. If you do not sign up before the expiration date, you must request a new code. [employer] Access Code: Activation code not generated  Current [employer] Status: Active (This is the date your [employer] access code will )    Enter the last four digits of your Social Security Number (xxxx) and Date of Birth (mm/dd/yyyy) as indicated and click Submit. You will be taken to the next sign-up page. Create a [employer] ID. This will be your [employer] login ID and cannot be changed, so think of one that is secure and easy to remember. Create a [employer] password. You can change your password at any time. Enter your Password Reset Question and Answer. This can be used at a later time if you forget your password. Enter your e-mail address. You will receive e-mail notification when new information is available in [address] Click Sign Up. You can now view and download portions of your medical record. Click the [place] link to download a portable copy of your medical information.     Additional Information    If you have questions, please visit the Frequently Asked Questions section of the [employer] website at [email or web address] Remember, [employer] is NOT to be used for urgent needs. For medical emergencies, dial 911. Follow-up and Dispositions    Return in about 3 months (around 11/17/2022), or if symptoms worsen or fail to improve. I have reviewed with the patient details of the assessment and plan and all questions were answered. Relevent patient education was performed. The most recent lab findings were reviewed with the patient. An After Visit Summary was printed and given to the patient.

## 2022-08-18 LAB
ALBUMIN SERPL-MCNC: 3.6 G/DL (ref 3.5–5)
ALBUMIN/GLOB SERPL: 1.2 {RATIO} (ref 1.1–2.2)
ALP SERPL-CCNC: 185 U/L (ref 45–117)
ALT SERPL-CCNC: 34 U/L (ref 12–78)
ANION GAP SERPL CALC-SCNC: 5 MMOL/L (ref 5–15)
AST SERPL-CCNC: 24 U/L (ref 15–37)
BILIRUB SERPL-MCNC: 0.4 MG/DL (ref 0.2–1)
BUN SERPL-MCNC: 24 MG/DL (ref 6–20)
BUN/CREAT SERPL: 29 (ref 12–20)
CALCIUM SERPL-MCNC: 9.4 MG/DL (ref 8.5–10.1)
CHLORIDE SERPL-SCNC: 109 MMOL/L (ref 97–108)
CO2 SERPL-SCNC: 27 MMOL/L (ref 21–32)
CREAT SERPL-MCNC: 0.82 MG/DL (ref 0.55–1.02)
ERYTHROCYTE [DISTWIDTH] IN BLOOD BY AUTOMATED COUNT: 14.2 % (ref 11.5–14.5)
GLOBULIN SER CALC-MCNC: 3 G/DL (ref 2–4)
GLUCOSE SERPL-MCNC: 127 MG/DL (ref 65–100)
HCT VFR BLD AUTO: 37.4 % (ref 35–47)
HGB BLD-MCNC: 11.8 G/DL (ref 11.5–16)
MCH RBC QN AUTO: 31.6 PG (ref 26–34)
MCHC RBC AUTO-ENTMCNC: 31.6 G/DL (ref 30–36.5)
MCV RBC AUTO: 100.3 FL (ref 80–99)
NRBC # BLD: 0 K/UL (ref 0–0.01)
NRBC BLD-RTO: 0 PER 100 WBC
PLATELET # BLD AUTO: 186 K/UL (ref 150–400)
PMV BLD AUTO: 10.7 FL (ref 8.9–12.9)
POTASSIUM SERPL-SCNC: 5.1 MMOL/L (ref 3.5–5.1)
PROT SERPL-MCNC: 6.6 G/DL (ref 6.4–8.2)
RBC # BLD AUTO: 3.73 M/UL (ref 3.8–5.2)
SODIUM SERPL-SCNC: 141 MMOL/L (ref 136–145)
WBC # BLD AUTO: 6.3 K/UL (ref 3.6–11)

## 2022-09-04 RX ORDER — SUCRALFATE 1 G/1
TABLET ORAL
Qty: 120 TABLET | Refills: 1 | Status: SHIPPED | OUTPATIENT
Start: 2022-09-04 | End: 2022-11-02

## 2022-09-04 RX ORDER — ATORVASTATIN CALCIUM 40 MG/1
TABLET, FILM COATED ORAL
Qty: 30 TABLET | Refills: 12 | Status: SHIPPED | OUTPATIENT
Start: 2022-09-04 | End: 2022-10-11

## 2022-10-07 RX ORDER — NYSTATIN 100000 [USP'U]/G
POWDER TOPICAL
Qty: 60 G | Refills: 3 | Status: SHIPPED | OUTPATIENT
Start: 2022-10-07

## 2022-10-11 ENCOUNTER — APPOINTMENT (OUTPATIENT)
Dept: GENERAL RADIOLOGY | Age: 67
End: 2022-10-11
Attending: PHYSICIAN ASSISTANT
Payer: MEDICAID

## 2022-10-11 ENCOUNTER — HOSPITAL ENCOUNTER (EMERGENCY)
Age: 67
Discharge: HOME | End: 2022-10-11
Attending: STUDENT IN AN ORGANIZED HEALTH CARE EDUCATION/TRAINING PROGRAM | Admitting: STUDENT IN AN ORGANIZED HEALTH CARE EDUCATION/TRAINING PROGRAM
Payer: MEDICAID

## 2022-10-11 VITALS
HEART RATE: 83 BPM | OXYGEN SATURATION: 98 % | WEIGHT: 178 LBS | HEIGHT: 63 IN | DIASTOLIC BLOOD PRESSURE: 88 MMHG | SYSTOLIC BLOOD PRESSURE: 141 MMHG | TEMPERATURE: 97.7 F | RESPIRATION RATE: 18 BRPM | BODY MASS INDEX: 31.54 KG/M2

## 2022-10-11 DIAGNOSIS — M25.551 RIGHT HIP PAIN: Primary | ICD-10-CM

## 2022-10-11 LAB
APPEARANCE UR: CLEAR
BACTERIA URNS QL MICRO: ABNORMAL /HPF
BILIRUB UR QL: NEGATIVE
CAOX CRY URNS QL MICRO: ABNORMAL
COLOR UR: ABNORMAL
EPITH CASTS URNS QL MICRO: ABNORMAL /LPF
GLUCOSE UR STRIP.AUTO-MCNC: NEGATIVE MG/DL
HGB UR QL STRIP: NEGATIVE
KETONES UR QL STRIP.AUTO: ABNORMAL MG/DL
LEUKOCYTE ESTERASE UR QL STRIP.AUTO: ABNORMAL
NITRITE UR QL STRIP.AUTO: NEGATIVE
PH UR STRIP: 6.5 [PH] (ref 5–8)
PROT UR STRIP-MCNC: NEGATIVE MG/DL
RBC #/AREA URNS HPF: ABNORMAL /HPF (ref 0–5)
SP GR UR REFRACTOMETRY: 1.02
UROBILINOGEN UR QL STRIP.AUTO: 1 EU/DL (ref 0.2–1)
WBC URNS QL MICRO: ABNORMAL /HPF (ref 0–4)

## 2022-10-11 PROCEDURE — 99284 EMERGENCY DEPT VISIT MOD MDM: CPT | Performed by: STUDENT IN AN ORGANIZED HEALTH CARE EDUCATION/TRAINING PROGRAM

## 2022-10-11 PROCEDURE — 74011250637 HC RX REV CODE- 250/637: Performed by: PHYSICIAN ASSISTANT

## 2022-10-11 PROCEDURE — 73502 X-RAY EXAM HIP UNI 2-3 VIEWS: CPT

## 2022-10-11 PROCEDURE — 81001 URINALYSIS AUTO W/SCOPE: CPT

## 2022-10-11 RX ORDER — HYDROCODONE BITARTRATE AND ACETAMINOPHEN 5; 325 MG/1; MG/1
1 TABLET ORAL
Qty: 3 TABLET | Refills: 0 | Status: SHIPPED | OUTPATIENT
Start: 2022-10-11 | End: 2022-10-14

## 2022-10-11 RX ORDER — ACETAMINOPHEN 325 MG/1
650 TABLET ORAL
Status: COMPLETED | OUTPATIENT
Start: 2022-10-11 | End: 2022-10-11

## 2022-10-11 RX ORDER — ACETAMINOPHEN 500 MG
500 TABLET ORAL
Qty: 20 TABLET | Refills: 0 | Status: SHIPPED | OUTPATIENT
Start: 2022-10-11 | End: 2022-10-16

## 2022-10-11 RX ADMIN — ACETAMINOPHEN 650 MG: 325 TABLET, FILM COATED ORAL at 18:26

## 2022-10-11 NOTE — ED NOTES
Pt arrived to ED ambulatory with c/o R groin pain that shoots into her foot x 2 months. Pt states pain is constant. Pt states pain is worse when walking up the stairs. Denies any other symptoms. Pt is in no acute distress. Will continue to monitor. See nursing assessment. Safety precautions in place; call light within reach. Emergency Department Nursing Plan of Care       The Nursing Plan of Care is developed from the Nursing assessment and Emergency Department Attending provider initial evaluation. The plan of care may be reviewed in the ED Provider note.     The Plan of Care was developed with the following considerations:   Patient / Family readiness to learn indicated by:verbalized understanding  Persons(s) to be included in education: patient  Barriers to Learning/Limitations:No    Signed     Emma Naik RN    10/11/2022   5:28 PM

## 2022-10-11 NOTE — ED NOTES
Patient  given copy of dc instructions and 2 e script(s). Patient  verbalized understanding of instructions and script (s). Patient given a current medication reconciliation form and verbalized understanding of their medications. Patient  verbalized understanding of the importance of discussing medications with  his or her physician or clinic they will be following up with. Patient alert and oriented and in no acute distress. Patient discharged home ambulatory .

## 2022-10-11 NOTE — Clinical Note
[facility] EMERGENCY DEPT  [address] [phone]    Work/School Note    Date: 10/11/2022    To Whom It May concern:    Kamila George was seen and treated today in the emergency room by the following provider(s):  Attending Provider: Howard Wisdom MD  Physician Assistant: ZACK Triplett. Kamila George is excused from work/school on 10/11/22 and 10/12/22. She is medically clear to return to work/school on 10/13/2022.        Sincerely,          ZACK Latham

## 2022-10-11 NOTE — ED PROVIDER NOTES
EMERGENCY DEPARTMENT HISTORY AND PHYSICAL EXAM      Date: 10/11/2022  Patient Name: Anish Rodriguez    History of Presenting Illness     Chief Complaint   Patient presents with    Groin Pain     Intermittent right groin pain x 2 months, +right foot pain, \"feels like it twisting,\" denies urinary symptoms, abdominal pain, +N/V/D. History Provided By: Patient    HPI: Anish Rodriguez, 74 y.o. female with past medical history of diabetes, hypertension, hyperlipidemia, hypokalemia on potassium supplement, osteoporosis who presents emergency room with right hip pain. For the past 2 months she has had persistent pain in her right hip, which she localizes in her right inguinal crease. She denies any inciting injury or trauma. She occasionally has a sharp, numbness pain that radiates down the inside of her leg to her right great toe. Her pain is worse in the morning and with ambulation and going up stairs. It is better at rest.  She has been taking diclofenac and ibuprofen for 3 times a day with no relief. She denies any other symptoms. She denies any bowel or bladder dysfunction or saddle paresthesias. There are no other complaints, changes, or physical findings at this time. PCP: Frankie Solorzano., MD    No current facility-administered medications on file prior to encounter. Current Outpatient Medications on File Prior to Encounter   Medication Sig Dispense Refill    nystatin (MYCOSTATIN) powder APPLY TO AFFECTED AREA TWICE A DAY 60 g 3    sucralfate (CARAFATE) 1 gram tablet TAKE 1 TABLET BY MOUTH FOUR TIMES A  Tablet 1    diclofenac EC (VOLTAREN) 75 mg EC tablet Si tab po bid 180 Tablet 3    potassium chloride SA (MICRO-K) 10 mEq capsule TAKE 1 CAPSULE BY MOUTH TWICE A DAY 60 Capsule 6    cholecalciferol (VITAMIN D3) (2,000 UNITS /50 MCG) cap capsule Take 1 Capsule by mouth two (2) times a day.  60 Capsule 3    OneTouch Ultra Test strip USE TO CHECK BLOOD SUGAR EVERY  Strip 2 amLODIPine (NORVASC) 5 mg tablet TAKE 1 TABLET BY MOUTH DAILY 30 Tablet 12    hydroCHLOROthiazide (HYDRODIURIL) 25 mg tablet TAKE 1 TABLET BY MOUTH DAILY 30 Tablet 12    losartan (COZAAR) 50 mg tablet TAKE 1 TABLET BY MOUTH DAILY 30 Tablet 12    metFORMIN (GLUCOPHAGE) 1,000 mg tablet TAKE 1 TABLET BY MOUTH TWICE A DAY WITH MEALS 60 Tablet 12    potassium chloride SA (MICRO-K) 10 mEq capsule TAKE 1 CAPSULE BY MOUTH TWICE A DAY 60 Capsule 3    lancets (OneTouch Delica Lancets) 30 gauge misc Use once daily;E11.9:Type 2 diabetes 100 Lancet 3    Blood-Glucose Meter (OneTouch Ultra2 Meter) monitoring kit Test once daily; E11.9; Diabetes Type 2 1 Kit 0    potassium chloride SA (MICRO-K) 10 mEq capsule TAKE ONE CAPSULE BY MOUTH TWICE DAILY 30 Cap 12    [DISCONTINUED] atorvastatin (LIPITOR) 40 mg tablet TAKE 1 TABLET BY MOUTH EVERY DAY 30 Tablet 12    lidocaine (LIDODERM) 5 % Apply patch to the affected area for 12 hours a day and remove for 12 hours a day. 10 Each 0    [DISCONTINUED] pioglitazone (ACTOS) 45 mg tablet Take 1 Tablet by mouth nightly.  30 Tablet 12    [DISCONTINUED] pantoprazole (PROTONIX) 40 mg tablet TAKE 1 TABLET BY MOUTH EVERY DAY 30 Tab 3       Past History     Past Medical History:  Past Medical History:   Diagnosis Date    Arthritis, Degenerative, Knee 7/15/2011    COPD (chronic obstructive pulmonary disease) ([facility] [address] 7/15/2011    Diverticulitis     GERD (gastroesophageal reflux disease)     GERD, gastro - Esophageal Reflux Disease 7/15/2011    Hypercholesterolemia 7/15/2011    Hypertension     Type II or unspecified type diabetes mellitus without mention of complication, uncontrolled 7/15/2011       Past Surgical History:  Past Surgical History:   Procedure Laterality Date    COLONOSCOPY N/A 9/18/2017    COLONOSCOPY performed by Cherise Ortega MD at [facility] N/A 4/25/2018    COLONOSCOPY performed by Cherise Ortega MD at \[facility]"" ENDOSCOPY    HX GYN  2004    uterine surgery    [place] LAP,TUBAL CAUTERY Bilateral        Family History:  Family History   Problem Relation Age of Onset    Diabetes Father     Deep Vein Thrombosis Daughter     Lung Disease Daughter     Other Daughter         cholesterol       Social History:  Social History     Tobacco Use    Smoking status: Former     Types: Cigarettes     Quit date: 4/15/2015     Years since quittin.4    Smokeless tobacco: Never   Vaping Use    Vaping Use: Never used   Substance Use Topics    Alcohol use: No    Drug use: Yes     Types: Marijuana     Comment: \"daily\"       Allergies:  No Known Allergies      Review of Systems   Review of Systems   Constitutional:  Negative for chills and fever. HENT:  Negative for congestion and sore throat. Respiratory:  Negative for cough and shortness of breath. Cardiovascular:  Negative for chest pain. Gastrointestinal:  Negative for abdominal pain, diarrhea, nausea and vomiting. Genitourinary:  Negative for dysuria and hematuria. Musculoskeletal:  Positive for arthralgias. Negative for myalgias. Skin:  Negative for rash. Neurological:  Negative for headaches. All other systems reviewed and are negative. Physical Exam   Physical Exam  Vitals and nursing note reviewed. Constitutional:       General: She is not in acute distress. Appearance: She is not toxic-appearing. HENT:      Head: Atraumatic. Right Ear: External ear normal.      Left Ear: External ear normal.      Nose: Nose normal.      Mouth/Throat:      Mouth: Mucous membranes are moist.   Eyes:      Extraocular Movements: Extraocular movements intact. Conjunctiva/sclera: Conjunctivae normal.   Cardiovascular:      Rate and Rhythm: Normal rate and regular rhythm. Pulses: Normal pulses. Heart sounds: Normal heart sounds. No murmur heard. No friction rub. No gallop. Pulmonary:      Effort: Pulmonary effort is normal. No respiratory distress. Breath sounds: Normal breath sounds. No stridor.  No wheezing, rhonchi or rales. Abdominal:      General: Abdomen is flat. There is no distension. Palpations: Abdomen is soft. Tenderness: There is no abdominal tenderness. There is no guarding or rebound. Comments: Abdomen soft, nontender. No CVA tenderness. Musculoskeletal:         General: No swelling or tenderness. Cervical back: Neck supple. Comments: Negative straight leg test bilaterally. Pain with passive internal/external rotation of right hip. No loss of range of motion or pain out of proportion. 5/5 strength at knees and ankles bilaterally. She is neurovascular intact distally   Skin:     General: Skin is warm. Neurological:      Mental Status: She is alert and oriented to person, place, and time. Psychiatric:         Mood and Affect: Mood normal.         Behavior: Behavior normal.         Thought Content: Thought content normal.         Judgment: Judgment normal.       Diagnostic Study Results     Labs -   No results found for this or any previous visit (from the past 12 hour(s)). Radiologic Studies -   XR HIP RT W OR WO PELV 2-3 VWS    (Results Pending)     CT Results  (Last 48 hours)      None          CXR Results  (Last 48 hours)      None              Medical Decision Making   I am the first provider for this patient. I reviewed the vital signs, available nursing notes, past medical history, past surgical history, family history and social history. Vital Signs-Reviewed the patient's vital signs. Patient Vitals for the past 12 hrs:   Temp Pulse Resp BP SpO2   10/11/22 1630 97.7 °F (36.5 °C) 83 18 (!) 141/88 98 %       Records Reviewed: Nursing Notes    Provider Notes (Medical Decision Making):   Patient evaluated for a 2-month history of right hip pain. She presented well-appearing, afebrile and had no features concerning for an emergent process, to include septic arthritis, pyelonephritis, ureteral stone, appendicitis. Urinalysis was unremarkable.   X-ray showed no acute findings. I suspect this is musculoskeletal in nature, to include osteoarthritis/MSK strain. She will follow-up with orthopedics for reevaluation. She was advised on return precautions needed. Patient expressed understanding agreement the discharge instructions and treatment plan. ED Course:   Initial assessment performed. The patients presenting problems have been discussed, and they are in agreement with the care plan formulated and outlined with them. I have encouraged them to ask questions as they arise throughout their visit. ED Course as of 10/11/22 1903   Tue Oct 11, 2022   1901 X-ray and urinalysis reviewed. No concerning findings on x-ray. Urinalysis negative for infection and does not point to a clinically significant kidney stone. [AJ]      ED Course User Index  [AJ] ZACK Haro       Critical Care Time: None    Disposition:  Discharged     PLAN:  1. Current Discharge Medication List        2. Follow-up Information    None       Return to ED if worse     Diagnosis     Clinical Impression: No diagnosis found. Please note that this dictation was completed with [employer], the computer voice recognition software. Quite often unanticipated grammatical, syntax, homophones, and other interpretive errors are inadvertently transcribed by the computer software. Please disregards these errors. Please excuse any errors that have escaped final proofreading.

## 2022-11-02 RX ORDER — SUCRALFATE 1 G/1
TABLET ORAL
Qty: 120 TABLET | Refills: 1 | Status: SHIPPED | OUTPATIENT
Start: 2022-11-02

## 2022-11-21 RX ORDER — METFORMIN HYDROCHLORIDE 1000 MG/1
TABLET ORAL
Qty: 60 TABLET | Refills: 12 | Status: SHIPPED | OUTPATIENT
Start: 2022-11-21

## 2022-12-26 ENCOUNTER — HOSPITAL ENCOUNTER (EMERGENCY)
Age: 67
Discharge: HOME | End: 2022-12-26
Attending: EMERGENCY MEDICINE
Payer: MEDICAID

## 2022-12-26 ENCOUNTER — APPOINTMENT (OUTPATIENT)
Dept: GENERAL RADIOLOGY | Age: 67
End: 2022-12-26
Attending: PHYSICIAN ASSISTANT
Payer: MEDICAID

## 2022-12-26 VITALS
DIASTOLIC BLOOD PRESSURE: 97 MMHG | HEIGHT: 63 IN | RESPIRATION RATE: 18 BRPM | BODY MASS INDEX: 31.54 KG/M2 | HEART RATE: 88 BPM | TEMPERATURE: 97.6 F | OXYGEN SATURATION: 96 % | WEIGHT: 178 LBS | SYSTOLIC BLOOD PRESSURE: 178 MMHG

## 2022-12-26 DIAGNOSIS — R03.0 ELEVATED BLOOD PRESSURE READING: ICD-10-CM

## 2022-12-26 DIAGNOSIS — M17.0 PRIMARY OSTEOARTHRITIS OF BOTH KNEES: ICD-10-CM

## 2022-12-26 DIAGNOSIS — Z76.0 MEDICATION REFILL: ICD-10-CM

## 2022-12-26 DIAGNOSIS — M17.11 ARTHRITIS OF RIGHT KNEE: ICD-10-CM

## 2022-12-26 DIAGNOSIS — M25.551 RIGHT HIP PAIN: Primary | ICD-10-CM

## 2022-12-26 PROCEDURE — 73502 X-RAY EXAM HIP UNI 2-3 VIEWS: CPT

## 2022-12-26 PROCEDURE — 99283 EMERGENCY DEPT VISIT LOW MDM: CPT

## 2022-12-26 RX ORDER — TRAMADOL HYDROCHLORIDE 50 MG/1
50 TABLET ORAL
Qty: 10 TABLET | Refills: 0 | Status: SHIPPED | OUTPATIENT
Start: 2022-12-26 | End: 2022-12-29

## 2022-12-26 RX ORDER — LOSARTAN POTASSIUM 50 MG/1
50 TABLET ORAL DAILY
Qty: 7 TABLET | Refills: 0 | Status: SHIPPED | OUTPATIENT
Start: 2022-12-26 | End: 2023-01-02

## 2022-12-26 RX ORDER — AMLODIPINE BESYLATE 5 MG/1
5 TABLET ORAL DAILY
Qty: 7 TABLET | Refills: 0 | Status: SHIPPED | OUTPATIENT
Start: 2022-12-26 | End: 2023-01-02

## 2022-12-26 RX ORDER — HYDROCHLOROTHIAZIDE 25 MG/1
25 TABLET ORAL DAILY
Qty: 7 TABLET | Refills: 0 | Status: SHIPPED | OUTPATIENT
Start: 2022-12-26 | End: 2023-01-02

## 2022-12-26 NOTE — ED PROVIDER NOTES
EMERGENCY DEPARTMENT HISTORY AND PHYSICAL EXAM      Date: 2022  Patient Name: Leigha Medina    History of Presenting Illness     Chief Complaint   Patient presents with    Hip Pain       History Provided By: Patient    HPI: Leigha Medina, 74 y.o. female presents ambulatory to the emergency dept with c/o R hip pain x  3 days. She denied any known injury/strain. She states she has been on here feet a lot more over the last few days due to the holiday. No redness or warmth. She reports she has a h/o OA R knee and it has been \"acting up\" so has been walking differently and believes compensating for it has put too much strain on her hip. She states the pain radiates from her posterior hip down her R LE. No numbness/tingling/weakness. No fever. She denied dysuria/hematuria. She rates her pain a 10/10 on the pain scale and describes it as an ache. Pt is o/w healthy without chills, cough, congestion, ST, shortness of breath, chest pain, N/V/D. There are no other complaints, changes, or physical findings at this time. PCP: Argentina Alston MD    Current Outpatient Medications   Medication Sig Dispense Refill    traMADoL (Ultram) 50 mg tablet Take 1 Tablet by mouth every six (6) hours as needed for Pain for up to 3 days.  Max Daily Amount: 200 mg. 10 Tablet 0    amLODIPine (NORVASC) 5 mg tablet TAKE 1 TABLET BY MOUTH DAILY 30 Tablet 12    hydroCHLOROthiazide (HYDRODIURIL) 25 mg tablet TAKE 1 TABLET BY MOUTH DAILY 30 Tablet 12    losartan (COZAAR) 50 mg tablet TAKE 1 TABLET BY MOUTH DAILY 30 Tablet 12    metFORMIN (GLUCOPHAGE) 1,000 mg tablet TAKE 1 TABLET BY MOUTH TWICE A DAY WITH MEALS 60 Tablet 12    sucralfate (CARAFATE) 1 gram tablet TAKE 1 TABLET BY MOUTH FOUR TIMES A  Tablet 1    nystatin (MYCOSTATIN) powder APPLY TO AFFECTED AREA TWICE A DAY 60 g 3    diclofenac EC (VOLTAREN) 75 mg EC tablet Si tab po bid 180 Tablet 3    potassium chloride SA (MICRO-K) 10 mEq capsule TAKE 1 CAPSULE BY MOUTH TWICE A DAY 60 Capsule 6    cholecalciferol (VITAMIN D3) (2,000 UNITS /50 MCG) cap capsule Take 1 Capsule by mouth two (2) times a day. 60 Capsule 3    OneTouch Ultra Test strip USE TO CHECK BLOOD SUGAR EVERY  Strip 2    lidocaine (LIDODERM) 5 % Apply patch to the affected area for 12 hours a day and remove for 12 hours a day.  10 Each 0    potassium chloride SA (MICRO-K) 10 mEq capsule TAKE 1 CAPSULE BY MOUTH TWICE A DAY 60 Capsule 3    lancets (OneTouch Delica Lancets) 30 gauge misc Use once daily;E11.9:Type 2 diabetes 100 Lancet 3    Blood-Glucose Meter (OneTouch Ultra2 Meter) monitoring kit Test once daily; E11.9; Diabetes Type 2 1 Kit 0    potassium chloride SA (MICRO-K) 10 mEq capsule TAKE ONE CAPSULE BY MOUTH TWICE DAILY 30 Cap 12       Past History     Past Medical History:  Past Medical History:   Diagnosis Date    Arthritis, Degenerative, Knee 7/15/2011    COPD (chronic obstructive pulmonary disease) (HCC) 7/15/2011    Diverticulitis     GERD (gastroesophageal reflux disease)     GERD, gastro - Esophageal Reflux Disease 7/15/2011    Hypercholesterolemia 7/15/2011    Hypertension     Type II or unspecified type diabetes mellitus without mention of complication, uncontrolled 7/15/2011       Past Surgical History:  Past Surgical History:   Procedure Laterality Date    COLONOSCOPY N/A 2017    COLONOSCOPY performed by Reno Hicks MD at [facility] AMBULATORY OR    COLONOSCOPY N/A 2018    COLONOSCOPY performed by Reno Hicks MD at [facility] ENDOSCOPY    HX GYN  2004    uterine surgery    AK LAP,TUBAL CAUTERY Bilateral        Family History:  Family History   Problem Relation Age of Onset    Diabetes Father     Deep Vein Thrombosis Daughter     Lung Disease Daughter     Other Daughter         cholesterol       Social History:  Social History     Tobacco Use    Smoking status: Former     Types: Cigarettes     Quit date: 4/15/2015     Years since quittin.7    Smokeless tobacco: Never Vaping Use    Vaping Use: Never used   Substance Use Topics    Alcohol use: No    Drug use: Yes     Types: Marijuana     Comment: \"daily\"       Allergies:  No Known Allergies      Review of Systems   Review of Systems   Constitutional:  Negative for chills and fever. HENT:  Negative for congestion, rhinorrhea and sore throat. Respiratory:  Negative for cough and shortness of breath. Cardiovascular:  Negative for chest pain and palpitations. Gastrointestinal:  Negative for abdominal pain, diarrhea, nausea and vomiting. Endocrine: Negative for polydipsia, polyphagia and polyuria. Genitourinary:  Negative for dysuria and hematuria. Musculoskeletal:  Positive for arthralgias. Negative for joint swelling, neck pain and neck stiffness. Skin:  Negative for color change, pallor, rash and wound. Allergic/Immunologic: Negative for food allergies and immunocompromised state. Neurological:  Negative for weakness and numbness. Hematological:  Negative for adenopathy. Does not bruise/bleed easily. Psychiatric/Behavioral:  Negative for agitation and confusion. All other systems reviewed and are negative. Physical Exam   Physical Exam  Vitals and nursing note reviewed. Constitutional:       General: She is not in acute distress. Appearance: Normal appearance. She is well-developed. She is obese. She is not ill-appearing, toxic-appearing or diaphoretic. HENT:      Head: Normocephalic and atraumatic. Nose: Nose normal. No congestion or rhinorrhea. Eyes:      General: No scleral icterus. Right eye: No discharge. Left eye: No discharge. Conjunctiva/sclera: Conjunctivae normal.   Neck:      Thyroid: No thyromegaly. Vascular: No JVD. Trachea: No tracheal deviation. Cardiovascular:      Rate and Rhythm: Normal rate and regular rhythm. Pulses: Normal pulses. Heart sounds: Normal heart sounds.    Pulmonary:      Effort: Pulmonary effort is normal. No respiratory distress. Breath sounds: Normal breath sounds. No wheezing. Abdominal:      General: Abdomen is flat. Palpations: Abdomen is soft. Tenderness: There is no abdominal tenderness. There is no right CVA tenderness, left CVA tenderness, guarding or rebound. Musculoskeletal:         General: Tenderness present. Cervical back: Normal range of motion and neck supple. Comments: Decreased A/P ROM to R hip due to tenderness with palpation/movement, point tender over R SI joint. No deformity, no crepitus, no erythema/rash/lesion/heat. 2+ distal pulses, NVI, sensation grossly intact to light touch. Pt observed to ambulate with a limp. Pt with chronic tenderness to R knee. Skin:     General: Skin is warm and dry. Coloration: Skin is not pale. Findings: No rash. Neurological:      Mental Status: She is alert and oriented to person, place, and time. Sensory: No sensory deficit. Motor: No weakness or abnormal muscle tone. Coordination: Coordination normal.      Gait: Gait normal.   Psychiatric:         Mood and Affect: Mood normal.         Behavior: Behavior normal.         Judgment: Judgment normal.       Diagnostic Study Results     Labs -   No results found for this or any previous visit (from the past 12 hour(s)). Radiologic Studies -   XR HIP RT W OR WO PELV 2-3 VWS   Final Result   No acute process. Medical Decision Making   I am the first provider for this patient. I reviewed the vital signs, available nursing notes, past medical history, past surgical history, family history and social history. Vital Signs-Reviewed the patient's vital signs.   Patient Vitals for the past 12 hrs:   Temp Pulse Resp BP SpO2   12/26/22 1433 97.6 °F (36.4 °C) 88 18 (!) 178/97 96 %           Records Reviewed: Nursing Notes, Old Medical Records, and Previous Radiology Studies    Provider Notes (Medical Decision Making):   Strain, OA, DJD, fx, SI joint dysfunction    ED Course:   Initial assessment performed. The patients presenting problems have been discussed, and they are in agreement with the care plan formulated and outlined with them. I have encouraged them to ask questions as they arise throughout their visit. HTN COUNSELING  Reviewed the risks of uncontrolled HTN to include stroke, heart attack, renal failure, aneurysm and death. They will take their medications daily and follow up with their PCP for recheck. DISCHARGE NOTE:  3:27 PM  The care plan has been outline with the patient and/or family, who verbally conveyed understanding and agreement. Available results have been reviewed. Patient and/or family understand the follow up plan as outlined and discharge instructions. Should their condition deterioration at any time after discharge the patient agrees to return, follow up sooner than outlined or seek medical assistance at the closest Emergency Room as soon as possible. Questions have been answered. Discharge instructions and educational information regarding the patient's diagnosis as well a list of reasons why the patient would want to seek immediate medical attention, should their condition change, were reviewed directly with the patient/family          PLAN:  1. Discharge Medication List as of 12/26/2022  3:27 PM        START taking these medications    Details   traMADoL (Ultram) 50 mg tablet Take 1 Tablet by mouth every six (6) hours as needed for Pain for up to 3 days.  Max Daily Amount: 200 mg., Normal, Disp-10 Tablet, R-0           CONTINUE these medications which have NOT CHANGED    Details   amLODIPine (NORVASC) 5 mg tablet TAKE 1 TABLET BY MOUTH DAILY, Normal, Disp-30 Tablet, R-12      hydroCHLOROthiazide (HYDRODIURIL) 25 mg tablet TAKE 1 TABLET BY MOUTH DAILY, Normal, Disp-30 Tablet, R-12      losartan (COZAAR) 50 mg tablet TAKE 1 TABLET BY MOUTH DAILY, Normal, Disp-30 Tablet, R-12      metFORMIN (GLUCOPHAGE) 1,000 mg tablet TAKE 1 TABLET BY MOUTH TWICE A DAY WITH MEALS, Normal, Disp-60 Tablet, R-12      sucralfate (CARAFATE) 1 gram tablet TAKE 1 TABLET BY MOUTH FOUR TIMES A DAY, Normal, Disp-120 Tablet, R-1      nystatin (MYCOSTATIN) powder APPLY TO AFFECTED AREA TWICE A DAY, Normal, Disp-60 g, R-3      diclofenac EC (VOLTAREN) 75 mg EC tablet Si tab po bid, Normal, Disp-180 Tablet, R-3      !! potassium chloride SA (MICRO-K) 10 mEq capsule TAKE 1 CAPSULE BY MOUTH TWICE A DAY, Normal, Disp-60 Capsule, R-6      cholecalciferol (VITAMIN D3) (2,000 UNITS /50 MCG) cap capsule Take 1 Capsule by mouth two (2) times a day., Normal, Disp-60 Capsule, R-3      OneTouch Ultra Test strip USE TO CHECK BLOOD SUGAR EVERY DAY, Normal, Disp-100 Strip, R-2      lidocaine (LIDODERM) 5 % Apply patch to the affected area for 12 hours a day and remove for 12 hours a day., Normal, Disp-10 Each, R-0      !! potassium chloride SA (MICRO-K) 10 mEq capsule TAKE 1 CAPSULE BY MOUTH TWICE A DAY, Normal, Disp-60 Capsule, R-3      lancets (OneTouch Delica Lancets) 30 gauge misc Use once daily;E11.9:Type 2 diabetes, Normal, Disp-100 Lancet,R-3      Blood-Glucose Meter (OneTouch Ultra2 Meter) monitoring kit Test once daily; E11.9; Diabetes Type 2, Normal, Disp-1 Kit, R-0      !! potassium chloride SA (MICRO-K) 10 mEq capsule TAKE ONE CAPSULE BY MOUTH TWICE DAILY, Normal, Disp-30 Cap, R-12       !! - Potential duplicate medications found. Please discuss with provider. 2.   Follow-up Information       Follow up With Specialties Details Why Contact Info    Lenore Lane MD Internal Medicine Physician   [address] [address] [address]    [address] [id number] [phone]    [facility] EMERGENCY DEPT Emergency Medicine  If symptoms worsen [address] [facility]  [phone]          Return to ED if worse     Diagnosis     Clinical Impression:   1. Right hip pain    2. Elevated blood pressure reading    3. Medication refill    4. Primary osteoarthritis of both knees    5.  Arthritis of right knee

## 2022-12-26 NOTE — ED NOTES
.See ED assessment. Pt also requesting prescription refill for HCTZ, Losartan and Norvasc, out of prescription medication x 1 month. Pt is alert and oriented x 4, speech is clear, no acute distress noted. Emergency Department Nursing Plan of Care       The Nursing Plan of Care is developed from the Nursing assessment and Emergency Department Attending provider initial evaluation. The plan of care may be reviewed in the ED Provider note.     The Plan of Care was developed with the following considerations:   Patient / Family readiness to learn indicated by:verbalized understanding  Persons(s) to be included in education: patient  Barriers to Learning/Limitations:No    [id number] [facility], [facility]    12/26/2022   2:53 PM

## 2022-12-26 NOTE — ED TRIAGE NOTES
Pt reports right hip pain x3 days. Pt also reports being out of bp meds but goes to primary on Friday for a refill. Pt also reports arthritis in right knee and has been compensating on right side.

## 2022-12-27 RX ORDER — PIOGLITAZONEHYDROCHLORIDE 45 MG/1
TABLET ORAL
Qty: 30 TABLET | Refills: 12 | Status: SHIPPED | OUTPATIENT
Start: 2022-12-27

## 2022-12-27 RX ORDER — CHOLECALCIFEROL (VITAMIN D3) 125 MCG
CAPSULE ORAL
Qty: 60 TABLET | Refills: 3 | Status: SHIPPED | OUTPATIENT
Start: 2022-12-27

## 2022-12-27 RX ORDER — AMLODIPINE BESYLATE 5 MG/1
TABLET ORAL
Qty: 30 TABLET | Refills: 12 | Status: SHIPPED | OUTPATIENT
Start: 2022-12-27

## 2022-12-27 RX ORDER — HYDROCHLOROTHIAZIDE 25 MG/1
TABLET ORAL
Qty: 30 TABLET | Refills: 12 | Status: SHIPPED | OUTPATIENT
Start: 2022-12-27

## 2022-12-27 RX ORDER — LOSARTAN POTASSIUM 50 MG/1
TABLET ORAL
Qty: 30 TABLET | Refills: 12 | Status: SHIPPED | OUTPATIENT
Start: 2022-12-27

## 2022-12-27 RX ORDER — FAMOTIDINE 40 MG/1
TABLET, FILM COATED ORAL
Qty: 30 TABLET | Refills: 12 | Status: SHIPPED | OUTPATIENT
Start: 2022-12-27

## 2022-12-30 ENCOUNTER — OFFICE VISIT (OUTPATIENT)
Dept: INTERNAL MEDICINE CLINIC | Age: 67
End: 2022-12-30
Payer: MEDICAID

## 2022-12-30 VITALS
HEART RATE: 94 BPM | DIASTOLIC BLOOD PRESSURE: 82 MMHG | SYSTOLIC BLOOD PRESSURE: 114 MMHG | WEIGHT: 176 LBS | OXYGEN SATURATION: 96 % | TEMPERATURE: 98 F | BODY MASS INDEX: 31.18 KG/M2 | HEIGHT: 63 IN | RESPIRATION RATE: 16 BRPM

## 2022-12-30 DIAGNOSIS — Z23 NEEDS FLU SHOT: ICD-10-CM

## 2022-12-30 DIAGNOSIS — M54.16 LUMBAR RADICULOPATHY: Primary | ICD-10-CM

## 2022-12-30 DIAGNOSIS — E11.9 CONTROLLED TYPE 2 DIABETES MELLITUS WITHOUT COMPLICATION, WITHOUT LONG-TERM CURRENT USE OF INSULIN (HCC): ICD-10-CM

## 2022-12-30 DIAGNOSIS — E78.00 HYPERCHOLESTEREMIA: ICD-10-CM

## 2022-12-30 DIAGNOSIS — I10 ESSENTIAL HYPERTENSION: ICD-10-CM

## 2022-12-30 RX ORDER — PREDNISONE 10 MG/1
TABLET ORAL
Qty: 21 TABLET | Refills: 0 | Status: SHIPPED | OUTPATIENT
Start: 2022-12-30

## 2022-12-30 NOTE — PROGRESS NOTES
1. Have you been to the ER, urgent care clinic since your last visit? Hospitalized since your last visit? YES/Hip pain    2. Have you seen or consulted any other health care providers outside of the [address] [place] [address] since your last visit? Include any pap smears or colon screening.  No    Chief Complaint   Patient presents with    Diabetes    Hypertension

## 2022-12-30 NOTE — PATIENT INSTRUCTIONS
[email or web address] Activation    Thank you for requesting access to [facility]. Please follow the instructions below to securely access and download your online medical record. [facility] allows you to send messages to your doctor, view your test results, renew your prescriptions, schedule appointments, and more. How Do I Sign Up? In your internet browser, go to [email or web address]  Click on the First Time User? Click Here link in the Sign In box. You will be redirect to the New Member Sign Up page. Enter your [facility] Access Code exactly as it appears below. You will not need to use this code after youve completed the sign-up process. If you do not sign up before the expiration date, you must request a new code. [facility] Access Code: Activation code not generated  Current [facility] Status: Active (This is the date your [facility] access code will )    Enter the last four digits of your Social Security Number (xxxx) and Date of Birth (mm/dd/yyyy) as indicated and click Submit. You will be taken to the next sign-up page. Create a [facility] ID. This will be your [facility] login ID and cannot be changed, so think of one that is secure and easy to remember. Create a [facility] password. You can change your password at any time. Enter your Password Reset Question and Answer. This can be used at a later time if you forget your password. Enter your e-mail address. You will receive e-mail notification when new information is available in [address] Click Sign Up. You can now view and download portions of your medical record. Click the [place] link to download a portable copy of your medical information. Additional Information    If you have questions, please visit the Frequently Asked Questions section of the [facility] website at [email or web address] Remember, [facility] is NOT to be used for urgent needs. For medical emergencies, dial 911.

## 2022-12-30 NOTE — PROGRESS NOTES
Shaye Wilder is a 74 y.o. female and presents with Diabetes, Hypertension, Hip Pain, ED Follow-up, and Immunization/Injection (flu)    Subjective:    Back Pain Review:  Patient presents for evaluation of low back problems. Symptoms have been present for  weeks and include pain in lower back (dull, moderate in character; 8/10 in severity). Initial inciting event: unknown. Symptoms are worst: at times. Alleviating factors identifiable by patient are lying flat, medication . Exacerbating factors identifiable by patient are bending forwards, bending backwards. Treatments so far initiated by patient: medication Previous lower back problems: reported. She has pains radiating down the lateral aspect of the rt.leg. Hypertension Review:  The patient has essential hypertension . Diet and Lifestyle: generally follows a low sodium diet, exercises sporadically  Home BP Monitoring: is not measured at home. Pertinent ROS: taking medications as instructed, no medication side effects noted, no TIA's, no chest pain on exertion, no dyspnea on exertion, no swelling of ankles. Diabetes Mellitus Review:  She has diabetes mellitus. Diabetic ROS - medication compliance: compliant all of the time, diabetic diet compliance: compliant all of the time, home glucose monitoring: is performed. Known diabetic complications: none  Cardiovascular risk factors: family history, dyslipidemia, diabetes mellitus, obesity, hypertension  Current diabetic medications include oral agents   Eye exam current (within one year): no  Weight trend: stable  Prior visit with dietician: no  Current diet: \"healthy\" diet  in general  Current exercise: walking  Home blood sugar records: trend: stable  Any episodes of hypoglycemia? No      Knee pain Review:  Patient complaints of right  knee pains are improved. With medication     Dyslipidemia Review:  Patient presents for evaluation of lipids. Compliance with treatment thus far has been excellent. A repeat fasting lipid profile was not done. The patient does not use medications that may worsen dyslipidemias (corticosteroids, progestins, anabolic steroids, amiodarone, cyclosporine, olanzapine). The patient exercises some    Carpal Tunnel Syndrome Review:  Patient presents for evaluation of pain in lt. hand(s), hand paresthesias of the first three fingers Patient's overall course: gradually improved. Patient has had no prior elbow problems. Evaluation to date:she has seen hand surgery . Treatment to date: nsaid    COPD REVIEW:  The patient is being seen for follow up of COPD,the patient has been stable. Berta Renee is a 63 y.o. female who complains of a lingering cough,  for the past few weeks, gradually improving since that time. She denies a history of shortness of breath. Evaluation to date: none. Treatment to date: OTC products. Relevant PMH: No pertinent additional PMH. Oxygen: She currently is not on home oxygen therapy. Symptoms: chronic dyspnea: severity = not present: course of sx: stable. Patient uses 2 pillows at night. Patient does not continue to smoke cigarettes. Health maintenance suggests the needs for an influenza injection        Review of Systems  Constitutional: negative for fevers, chills, anorexia and weight loss  Eyes:   negative for visual disturbance and irritation  ENT:   negative for tinnitus,sore throat,nasal congestion,ear pains. hoarseness  Respiratory:  cough,  CV:   negative for chest pain, palpitations, lower extremity edema  GI:   negative for nausea, vomiting, diarrhea, abdominal pain,melena  Endo:               negative for polyuria,polydipsia,polyphagia,heat intolerance  Genitourinary: negative for frequency, dysuria and hematuria  Integument:  rash and pruritus  Hematologic:  negative for easy bruising and gum/nose bleeding  Musculoskel: joint pain  Neurological:  negative for headaches, dizziness, vertigo, memory problems and gait   Behavl/Psych: negative for feelings of anxiety, depression, mood changes    Past Medical History:   Diagnosis Date    Arthritis, Degenerative, Knee 7/15/2011    COPD (chronic obstructive pulmonary disease) ([facility] [address] 7/15/2011    Diverticulitis     GERD (gastroesophageal reflux disease)     GERD, gastro - Esophageal Reflux Disease 7/15/2011    Hypercholesterolemia 7/15/2011    Hypertension     Type II or unspecified type diabetes mellitus without mention of complication, uncontrolled 7/15/2011     Past Surgical History:   Procedure Laterality Date    COLONOSCOPY N/A 2017    COLONOSCOPY performed by Siva Ortega MD at [facility] AMBULATORY OR    COLONOSCOPY N/A 2018    COLONOSCOPY performed by Siva Ortega MD at [facility] ENDOSCOPY    HX GYN  2004    uterine surgery    CO LAP,TUBAL CAUTERY Bilateral      Social History     Socioeconomic History    Marital status:    Tobacco Use    Smoking status: Former     Types: Cigarettes     Quit date: 4/15/2015     Years since quittin.7    Smokeless tobacco: Never   Vaping Use    Vaping Use: Never used   Substance and Sexual Activity    Alcohol use: No    Drug use: Yes     Types: Marijuana     Comment: \"daily\"    Sexual activity: Yes     Partners: Male   Social History Narrative        She lives with her  she has five adult children denies alcohol cigarette use     Family History   Problem Relation Age of Onset    Diabetes Father     Deep Vein Thrombosis Daughter     Lung Disease Daughter     Other Daughter         cholesterol     Current Outpatient Medications   Medication Sig Dispense Refill    predniSONE (DELTASONE) 10 mg tablet 6 tabs today and reduce by 1 tab daily 21 Tablet 0    losartan (COZAAR) 50 mg tablet TAKE 1 TABLET BY MOUTH EVERY DAY 30 Tablet 12    pioglitazone (ACTOS) 45 mg tablet TAKE 1 TABLET BY MOUTH EVERY DAY AT NIGHT 30 Tablet 12    hydroCHLOROthiazide (HYDRODIURIL) 25 mg tablet TAKE 1 TABLET BY MOUTH EVERY DAY 30 Tablet 12    cholecalciferol, vitamin D3, 50 mcg (2,000 unit) tab TAKE 1 TABLET BY MOUTH TWICE A DAY 60 Tablet 3    amLODIPine (NORVASC) 5 mg tablet TAKE 1 TABLET BY MOUTH EVERY DAY 30 Tablet 12    famotidine (PEPCID) 40 mg tablet TAKE 1 TABLET BY MOUTH EVERY DAY 30 Tablet 12    hydroCHLOROthiazide (HYDRODIURIL) 25 mg tablet Take 1 Tablet by mouth daily for 7 doses. 7 Tablet 0    losartan (COZAAR) 50 mg tablet Take 1 Tablet by mouth daily for 7 days. 7 Tablet 0    amLODIPine (NORVASC) 5 mg tablet Take 1 Tablet by mouth daily for 7 days. 7 Tablet 0    metFORMIN (GLUCOPHAGE) 1,000 mg tablet TAKE 1 TABLET BY MOUTH TWICE A DAY WITH MEALS 60 Tablet 12    sucralfate (CARAFATE) 1 gram tablet TAKE 1 TABLET BY MOUTH FOUR TIMES A  Tablet 1    nystatin (MYCOSTATIN) powder APPLY TO AFFECTED AREA TWICE A DAY 60 g 3    diclofenac EC (VOLTAREN) 75 mg EC tablet Si tab po bid 180 Tablet 3    potassium chloride SA (MICRO-K) 10 mEq capsule TAKE 1 CAPSULE BY MOUTH TWICE A DAY 60 Capsule 6    OneTouch Ultra Test strip USE TO CHECK BLOOD SUGAR EVERY  Strip 2    potassium chloride SA (MICRO-K) 10 mEq capsule TAKE 1 CAPSULE BY MOUTH TWICE A DAY 60 Capsule 3    lancets (OneTouch Delica Lancets) 30 gauge misc Use once daily;E11.9:Type 2 diabetes 100 Lancet 3    Blood-Glucose Meter (OneTouch Ultra2 Meter) monitoring kit Test once daily; E11.9; Diabetes Type 2 1 Kit 0    potassium chloride SA (MICRO-K) 10 mEq capsule TAKE ONE CAPSULE BY MOUTH TWICE DAILY 30 Cap 12    lidocaine (LIDODERM) 5 % Apply patch to the affected area for 12 hours a day and remove for 12 hours a day.  (Patient not taking: Reported on 2022) 10 Each 0     No Known Allergies    Objective:  Visit Vitals  /82   Pulse 94   Temp 98 °F (36.7 °C) (Oral)   Resp 16   Ht 5' 3\" (1.6 m)   Wt 176 lb (79.8 kg)   LMP 03/15/2004 (Approximate)   SpO2 96%   BMI 31.18 kg/m²     Physical Exam:   General appearance - alert, well appearing, and in mild distress  Mental status - alert, oriented to person, place, and time  EYE-KODY, EOMI, corneas normal, no foreign bodies  ENT-ENT exam normal, no neck nodes or sinus tenderness  Nose - normal and patent, no erythema, discharge or polyps  Mouth - mucous membranes moist, pharynx normal without lesions  Neck - supple, no significant adenopathy   Chest - clear to auscultation, no wheezes, rales or rhonchi, symmetric air entry   Heart - normal rate, regular rhythm, normal S1, S2, no murmurs, rubs, clicks or gallops   Abdomen - soft, nontender, nondistended, no masses or organomegaly  Lymph- no adenopathy palpable  Ext-peripheral pulses normal, no pedal edema, no clubbing or cyanosis  Skin-erythema both breasts  Neuro -alert, oriented, normal speech, no focal findings or movement disorder noted  Neck-normal C-spine, no tenderness, full ROM without pain  Feet-no nail deformities or callus formation with good pulses noted  Back-tenderness lower lumbar spine and sacral spine noted,forward flexion,hyperextension impaired,negative straight leg raise      Results for orders placed or performed during the hospital encounter of 10/11/22   URINALYSIS W/ RFLX MICROSCOPIC   Result Value Ref Range    Color YELLOW/STRAW      Appearance CLEAR CLEAR      Specific gravity 1.020      pH (UA) 6.5 5.0 - 8.0      Protein Negative NEG mg/dL    Glucose Negative NEG mg/dL    Ketone TRACE (A) NEG mg/dL    Bilirubin Negative NEG      Blood Negative NEG      Urobilinogen 1.0 0.2 - 1.0 EU/dL    Nitrites Negative NEG      Leukocyte Esterase TRACE (A) NEG     URINE MICROSCOPIC ONLY   Result Value Ref Range    WBC 0-4 0 - 4 /hpf    RBC 0-5 0 - 5 /hpf    Epithelial cells FEW FEW /lpf    Bacteria 1+ (A) NEG /hpf    CA Oxalate crystals FEW (A) NEG         Assessment/Plan:    ICD-10-CM ICD-9-CM    1. Lumbar radiculopathy  M54.16 724.4 XR SPINE LUMB 2 OR 3 V      2. Needs flu shot  Z23 V04.81 INFLUENZA, FLUAD, (AGE 65 Y+), IM, PF, 0.5 ML      3.  Controlled type 2 diabetes mellitus without complication, without long-term current use of insulin (HCC)  E11.9 250.00       4. Essential hypertension  I10 401.9       5. Hypercholesteremia  E78.00 272.0         Orders Placed This Encounter    XR SPINE LUMB 2 OR 3 V     Standing Status:   Future     Standing Expiration Date:   2024    Influenza, FLUAD, (age 72 y+), IM, PF, 0.5 mL    predniSONE (DELTASONE) 10 mg tablet     Si tabs today and reduce by 1 tab daily     Dispense:  21 Tablet     Refill:  0     lose weight, increase physical activity, follow low fat diet, follow low salt diet, continue present plan, call if any problems,Take 81mg aspirin daily  Patient Instructions   [employer] Mobile Activation    Thank you for requesting access to [employer]. Please follow the instructions below to securely access and download your online medical record. [employer] allows you to send messages to your doctor, view your test results, renew your prescriptions, schedule appointments, and more. How Do I Sign Up? In your internet browser, go to [email or web address]  Click on the First Time User? Click Here link in the Sign In box. You will be redirect to the New Member Sign Up page. Enter your [employer] Access Code exactly as it appears below. You will not need to use this code after youve completed the sign-up process. If you do not sign up before the expiration date, you must request a new code. [facility] Access Code: Activation code not generated  Current [facility] Status: Active (This is the date your [facility] access code will )    Enter the last four digits of your Social Security Number (xxxx) and Date of Birth (mm/dd/yyyy) as indicated and click Submit. You will be taken to the next sign-up page. Create a [facility] ID. This will be your [employer] login ID and cannot be changed, so think of one that is secure and easy to remember. Create a [employer] password. You can change your password at any time. Enter your Password Reset Question and Answer.  This can be used at a later time if you forget your password. Enter your e-mail address. You will receive e-mail notification when new information is available in [address] Click Sign Up. You can now view and download portions of your medical record. Click the [employer] link to download a portable copy of your medical information. Additional Information    If you have questions, please visit the Frequently Asked Questions section of the [employer] website at [email or web address] Remember, [employer] is NOT to be used for urgent needs. For medical emergencies, dial 911. Follow-up and Dispositions    Return in about 4 weeks (around 1/27/2023), or if symptoms worsen or fail to improve. I have reviewed with the patient details of the assessment and plan and all questions were answered. Relevent patient education was performed. The most recent lab findings were reviewed with the patient. An After Visit Summary was printed and given to the patient.

## 2022-12-31 LAB
ALBUMIN SERPL-MCNC: 4.1 G/DL (ref 3.5–5)
ALBUMIN/GLOB SERPL: 1.2 {RATIO} (ref 1.1–2.2)
ALP SERPL-CCNC: 136 U/L (ref 45–117)
ALT SERPL-CCNC: 32 U/L (ref 12–78)
ANION GAP SERPL CALC-SCNC: 7 MMOL/L (ref 5–15)
AST SERPL-CCNC: 28 U/L (ref 15–37)
BILIRUB SERPL-MCNC: 0.7 MG/DL (ref 0.2–1)
BUN SERPL-MCNC: 13 MG/DL (ref 6–20)
BUN/CREAT SERPL: 15 (ref 12–20)
CALCIUM SERPL-MCNC: 10.3 MG/DL (ref 8.5–10.1)
CHLORIDE SERPL-SCNC: 102 MMOL/L (ref 97–108)
CHOLEST SERPL-MCNC: 148 MG/DL
CO2 SERPL-SCNC: 28 MMOL/L (ref 21–32)
CREAT SERPL-MCNC: 0.87 MG/DL (ref 0.55–1.02)
ERYTHROCYTE [DISTWIDTH] IN BLOOD BY AUTOMATED COUNT: 13.2 % (ref 11.5–14.5)
EST. AVERAGE GLUCOSE BLD GHB EST-MCNC: 131 MG/DL
GLOBULIN SER CALC-MCNC: 3.4 G/DL (ref 2–4)
GLUCOSE SERPL-MCNC: 167 MG/DL (ref 65–100)
HBA1C MFR BLD: 6.2 % (ref 4–5.6)
HCT VFR BLD AUTO: 41.4 % (ref 35–47)
HDLC SERPL-MCNC: 63 MG/DL
HDLC SERPL: 2.3 {RATIO} (ref 0–5)
HGB BLD-MCNC: 13.4 G/DL (ref 11.5–16)
LDLC SERPL CALC-MCNC: 59.6 MG/DL (ref 0–100)
MCH RBC QN AUTO: 30.8 PG (ref 26–34)
MCHC RBC AUTO-ENTMCNC: 32.4 G/DL (ref 30–36.5)
MCV RBC AUTO: 95.2 FL (ref 80–99)
NRBC # BLD: 0 K/UL (ref 0–0.01)
NRBC BLD-RTO: 0 PER 100 WBC
PLATELET # BLD AUTO: 228 K/UL (ref 150–400)
PMV BLD AUTO: 11.7 FL (ref 8.9–12.9)
POTASSIUM SERPL-SCNC: 4 MMOL/L (ref 3.5–5.1)
PROT SERPL-MCNC: 7.5 G/DL (ref 6.4–8.2)
RBC # BLD AUTO: 4.35 M/UL (ref 3.8–5.2)
SODIUM SERPL-SCNC: 137 MMOL/L (ref 136–145)
TRIGL SERPL-MCNC: 127 MG/DL (ref ?–150)
VLDLC SERPL CALC-MCNC: 25.4 MG/DL
WBC # BLD AUTO: 8 K/UL (ref 3.6–11)

## 2023-01-03 RX ORDER — SUCRALFATE 1 G/1
TABLET ORAL
Qty: 120 TABLET | Refills: 1 | Status: SHIPPED | OUTPATIENT
Start: 2023-01-03

## 2023-01-03 RX ORDER — HYDROCHLOROTHIAZIDE 25 MG/1
TABLET ORAL
Qty: 30 TABLET | Refills: 12 | Status: SHIPPED | OUTPATIENT
Start: 2023-01-03

## 2023-01-24 ENCOUNTER — HOSPITAL ENCOUNTER (OUTPATIENT)
Dept: GENERAL RADIOLOGY | Age: 68
Discharge: HOME | End: 2023-01-24
Payer: MEDICAID

## 2023-01-24 DIAGNOSIS — M54.16 LUMBAR RADICULOPATHY: ICD-10-CM

## 2023-01-24 PROCEDURE — 72100 X-RAY EXAM L-S SPINE 2/3 VWS: CPT

## 2023-01-27 ENCOUNTER — OFFICE VISIT (OUTPATIENT)
Dept: INTERNAL MEDICINE CLINIC | Age: 68
End: 2023-01-27
Payer: MEDICAID

## 2023-01-27 VITALS
DIASTOLIC BLOOD PRESSURE: 70 MMHG | SYSTOLIC BLOOD PRESSURE: 128 MMHG | TEMPERATURE: 98.4 F | HEART RATE: 76 BPM | RESPIRATION RATE: 20 BRPM | WEIGHT: 180 LBS | HEIGHT: 63 IN | OXYGEN SATURATION: 96 % | BODY MASS INDEX: 31.89 KG/M2

## 2023-01-27 DIAGNOSIS — H00.014 HORDEOLUM EXTERNUM OF LEFT UPPER EYELID: Primary | ICD-10-CM

## 2023-01-27 DIAGNOSIS — E55.9 VITAMIN D DEFICIENCY: ICD-10-CM

## 2023-01-27 DIAGNOSIS — E11.9 CONTROLLED TYPE 2 DIABETES MELLITUS WITHOUT COMPLICATION, WITHOUT LONG-TERM CURRENT USE OF INSULIN (HCC): ICD-10-CM

## 2023-01-27 DIAGNOSIS — I10 ESSENTIAL HYPERTENSION: ICD-10-CM

## 2023-01-27 RX ORDER — PIOGLITAZONEHYDROCHLORIDE 45 MG/1
45 TABLET ORAL
Qty: 30 TABLET | Refills: 12 | Status: SHIPPED | OUTPATIENT
Start: 2023-01-27

## 2023-01-27 RX ORDER — CHOLECALCIFEROL (VITAMIN D3) 125 MCG
1 CAPSULE ORAL 2 TIMES DAILY
Qty: 60 TABLET | Refills: 3 | Status: SHIPPED | OUTPATIENT
Start: 2023-01-27

## 2023-01-27 RX ORDER — FAMOTIDINE 40 MG/1
40 TABLET, FILM COATED ORAL DAILY
Qty: 30 TABLET | Refills: 12 | Status: SHIPPED | OUTPATIENT
Start: 2023-01-27

## 2023-01-27 RX ORDER — GENTAMICIN SULFATE 3 MG/ML
SOLUTION/ DROPS OPHTHALMIC
Qty: 5 ML | Refills: 0 | Status: SHIPPED | OUTPATIENT
Start: 2023-01-27

## 2023-01-27 RX ORDER — LOSARTAN POTASSIUM 50 MG/1
TABLET ORAL
Qty: 30 TABLET | Refills: 12 | Status: SHIPPED | OUTPATIENT
Start: 2023-01-27

## 2023-01-27 RX ORDER — AMLODIPINE BESYLATE 5 MG/1
TABLET ORAL
Qty: 30 TABLET | Refills: 12 | Status: SHIPPED | OUTPATIENT
Start: 2023-01-27

## 2023-01-27 NOTE — PROGRESS NOTES
Rosendo Maza is a 74 y.o. female and presents with Results (XRAY/) and Eyelid Inflammation  . Subjective:  She has a recent sty on the lt.upper eye lid      Hypertension Review:  The patient has essential hypertension . Diet and Lifestyle: generally follows a low sodium diet, exercises sporadically  Home BP Monitoring: is not measured at home. Pertinent ROS: taking medications as instructed, no medication side effects noted, no TIA's, no chest pain on exertion, no dyspnea on exertion, no swelling of ankles. Diabetes Mellitus Review:  She has diabetes mellitus. Diabetic ROS - medication compliance: compliant all of the time, diabetic diet compliance: compliant all of the time, home glucose monitoring: is performed. Known diabetic complications: none  Cardiovascular risk factors: family history, dyslipidemia, diabetes mellitus, obesity, hypertension  Current diabetic medications include oral agents/   Eye exam current (within one year): no  Weight trend: stable  Prior visit with dietician: no  Current diet: \"healthy\" diet  in general  Current exercise: walking  Home blood sugar records: trend: stable  Any episodes of hypoglycemia? no    GERD Review:   Patient has a history of gastroesophageal reflux with heartburn. Symptoms have been present for a few months. She denies dysphagia. She  has not lost weight. She denies melena, hematochezia, hematemesis, and coffee ground emesis. This has been associated with fullness after meals. She denies abdominal bloating and none.   Medical therapy in the past has included proton pump inhibitor    Vitamin D Deficiency Review   The patient has a previous history of vitamin d deficiency  The patient is on medication for vitamin d deficiency  The patient has denied any recent falls or fractures        Review of Systems  Constitutional: negative for fevers, chills, anorexia and weight loss  Eyes:   irritation  ENT:   negative for tinnitus,sore throat,nasal congestion,ear pains. hoarseness  Respiratory:  negative for cough, hemoptysis, dyspnea,wheezing  CV:   negative for chest pain, palpitations, lower extremity edema  GI:   negative for nausea, vomiting, diarrhea, abdominal pain,melena  Endo:               negative for polyuria,polydipsia,polyphagia,heat intolerance  Genitourinary: negative for frequency, dysuria and hematuria  Integument:  negative for rash and pruritus  Hematologic:  negative for easy bruising and gum/nose bleeding  Musculoskel: negative for myalgias, arthralgias, back pain, muscle weakness, joint pain  Neurological:  negative for headaches, dizziness, vertigo, memory problems and gait   Behavl/Psych: negative for feelings of anxiety, depression, mood changes    Past Medical History:   Diagnosis Date    Arthritis, Degenerative, Knee 7/15/2011    COPD (chronic obstructive pulmonary disease) ([facility] [address] 7/15/2011    Diverticulitis     GERD (gastroesophageal reflux disease)     GERD, gastro - Esophageal Reflux Disease 7/15/2011    Hypercholesterolemia 7/15/2011    Hypertension     Type II or unspecified type diabetes mellitus without mention of complication, uncontrolled 7/15/2011     Past Surgical History:   Procedure Laterality Date    COLONOSCOPY N/A 2017    COLONOSCOPY performed by Kacey Goncalves MD at \[facility]\"" AMBULATORY OR    COLONOSCOPY N/A 2018    COLONOSCOPY performed by Kacey Goncalves MD at \[facility]\"" ENDOSCOPY    HX GYN  2004    uterine surgery    WA LAPAROSCOPY FULGURATION OVIDUCTS Bilateral      Social History     Socioeconomic History    Marital status:    Tobacco Use    Smoking status: Former     Types: Cigarettes     Quit date: 4/15/2015     Years since quittin.7    Smokeless tobacco: Never   Vaping Use    Vaping Use: Never used   Substance and Sexual Activity    Alcohol use: No    Drug use: Yes     Types: Marijuana     Comment: \"daily\"    Sexual activity: Yes     Partners: Male   Social History Narrative        She lives with her  she has five adult children denies alcohol cigarette use     Family History   Problem Relation Age of Onset    Diabetes Father     Deep Vein Thrombosis Daughter     Lung Disease Daughter     Other Daughter         cholesterol     Current Outpatient Medications   Medication Sig Dispense Refill    hydroCHLOROthiazide (HYDRODIURIL) 25 mg tablet TAKE 1 TABLET BY MOUTH EVERY DAY 30 Tablet 12    sucralfate (CARAFATE) 1 gram tablet TAKE 1 TABLET BY MOUTH FOUR TIMES A  Tablet 1    predniSONE (DELTASONE) 10 mg tablet 6 tabs today and reduce by 1 tab daily 21 Tablet 0    losartan (COZAAR) 50 mg tablet TAKE 1 TABLET BY MOUTH EVERY DAY 30 Tablet 12    pioglitazone (ACTOS) 45 mg tablet TAKE 1 TABLET BY MOUTH EVERY DAY AT NIGHT 30 Tablet 12    cholecalciferol, vitamin D3, 50 mcg (2,000 unit) tab TAKE 1 TABLET BY MOUTH TWICE A DAY 60 Tablet 3    amLODIPine (NORVASC) 5 mg tablet TAKE 1 TABLET BY MOUTH EVERY DAY 30 Tablet 12    famotidine (PEPCID) 40 mg tablet TAKE 1 TABLET BY MOUTH EVERY DAY 30 Tablet 12    metFORMIN (GLUCOPHAGE) 1,000 mg tablet TAKE 1 TABLET BY MOUTH TWICE A DAY WITH MEALS 60 Tablet 12    nystatin (MYCOSTATIN) powder APPLY TO AFFECTED AREA TWICE A DAY 60 g 3    diclofenac EC (VOLTAREN) 75 mg EC tablet Si tab po bid 180 Tablet 3    potassium chloride SA (MICRO-K) 10 mEq capsule TAKE 1 CAPSULE BY MOUTH TWICE A DAY 60 Capsule 6    OneTouch Ultra Test strip USE TO CHECK BLOOD SUGAR EVERY  Strip 2    lidocaine (LIDODERM) 5 % Apply patch to the affected area for 12 hours a day and remove for 12 hours a day.  (Patient not taking: Reported on 2022) 10 Each 0    potassium chloride SA (MICRO-K) 10 mEq capsule TAKE 1 CAPSULE BY MOUTH TWICE A DAY 60 Capsule 3    lancets (OneTouch Delica Lancets) 30 gauge misc Use once daily;E11.9:Type 2 diabetes 100 Lancet 3    Blood-Glucose Meter (OneTouch Ultra2 Meter) monitoring kit Test once daily; E11.9; Diabetes Type 2 1 Kit 0    potassium chloride SA (MICRO-K) 10 mEq capsule TAKE ONE CAPSULE BY MOUTH TWICE DAILY 30 Cap 12     No Known Allergies    Objective:  Visit Vitals  /70 (BP 1 Location: Right arm, BP Patient Position: Sitting, BP Cuff Size: Adult)   Pulse 76   Temp 98.4 °F (36.9 °C) (Oral)   Resp 20   Ht 5' 3\" (1.6 m)   Wt 180 lb (81.6 kg)   LMP 03/15/2004 (Approximate)   SpO2 96%   BMI 31.89 kg/m²     Physical Exam:   General appearance - alert, well appearing, and in no distress  Mental status - alert, oriented to person, place, and time  EYE-KODY, EOMI, lt.upper lid sty  ENT-ENT exam normal, no neck nodes or sinus tenderness  Nose - normal and patent, no erythema, discharge or polyps  Mouth - mucous membranes moist, pharynx normal without lesions  Neck - supple, no significant adenopathy   Chest - clear to auscultation, no wheezes, rales or rhonchi, symmetric air entry   Heart - normal rate, regular rhythm, normal S1, S2, no murmurs, rubs, clicks or gallops   Abdomen - soft, nontender, nondistended, no masses or organomegaly  Lymph- no adenopathy palpable  Ext-peripheral pulses normal, no pedal edema, no clubbing or cyanosis  Skin-Warm and dry.  no hyperpigmentation, vitiligo, or suspicious lesions  Neuro -alert, oriented, normal speech, no focal findings or movement disorder noted  Neck-normal C-spine, no tenderness, full ROM without pain  Feet-no nail deformities or callus formation with good pulses noted      Results for orders placed or performed in visit on 14/87/89   METABOLIC PANEL, COMPREHENSIVE   Result Value Ref Range    Sodium 137 136 - 145 mmol/L    Potassium 4.0 3.5 - 5.1 mmol/L    Chloride 102 97 - 108 mmol/L    CO2 28 21 - 32 mmol/L    Anion gap 7 5 - 15 mmol/L    Glucose 167 (H) 65 - 100 mg/dL    BUN 13 6 - 20 MG/DL    Creatinine 0.87 0.55 - 1.02 MG/DL    BUN/Creatinine ratio 15 12 - 20      eGFR >60 >60 ml/min/1.73m2    Calcium 10.3 (H) 8.5 - 10.1 MG/DL    Bilirubin, total 0.7 0.2 - 1.0 MG/DL    ALT (SGPT) 32 12 - 78 U/L    AST (SGOT) 28 15 - 37 U/L    Alk. phosphatase 136 (H) 45 - 117 U/L    Protein, total 7.5 6.4 - 8.2 g/dL    Albumin 4.1 3.5 - 5.0 g/dL    Globulin 3.4 2.0 - 4.0 g/dL    A-G Ratio 1.2 1.1 - 2.2     LIPID PANEL   Result Value Ref Range    Cholesterol, total 148 <200 MG/DL    Triglyceride 127 <150 MG/DL    HDL Cholesterol 63 MG/DL    LDL, calculated 59.6 0 - 100 MG/DL    VLDL, calculated 25.4 MG/DL    CHOL/HDL Ratio 2.3 0.0 - 5.0     HEMOGLOBIN A1C WITH EAG   Result Value Ref Range    Hemoglobin A1c 6.2 (H) 4.0 - 5.6 %    Est. average glucose 131 mg/dL   CBC W/O DIFF   Result Value Ref Range    WBC 8.0 3.6 - 11.0 K/uL    RBC 4.35 3.80 - 5.20 M/uL    HGB 13.4 11.5 - 16.0 g/dL    HCT 41.4 35.0 - 47.0 %    MCV 95.2 80.0 - 99.0 FL    MCH 30.8 26.0 - 34.0 PG    MCHC 32.4 30.0 - 36.5 g/dL    RDW 13.2 11.5 - 14.5 %    PLATELET 856 080 - 701 K/uL    MPV 11.7 8.9 - 12.9 FL    NRBC 0.0 0  WBC    ABSOLUTE NRBC 0.00 0.00 - 0.01 K/uL       Assessment/Plan:    ICD-10-CM ICD-9-CM    1. Hordeolum externum of left upper eyelid  H00.014 373.11       2. Essential hypertension  I10 401.9       3. Controlled type 2 diabetes mellitus without complication, without long-term current use of insulin (HCC)  E11.9 250.00       4. Vitamin D deficiency  E55.9 268.9         No orders of the defined types were placed in this encounter. follow low fat diet, follow low salt diet, continue present plan, call if any problems,Take 81mg aspirin daily  There are no Patient Instructions on file for this visit. I have reviewed with the patient details of the assessment and plan and all questions were answered. Relevent patient education was performed. The most recent lab findings were reviewed with the patient. An After Visit Summary was printed and given to the patient.

## 2023-01-27 NOTE — PROGRESS NOTES
Chief Complaint   Patient presents with    Results     XRAY      Eyelid Inflammation     3 most recent PHQ Screens 1/27/2023   PHQ Not Done -   Little interest or pleasure in doing things Not at all   Feeling down, depressed, irritable, or hopeless Not at all   Total Score PHQ 2 0   Trouble falling or staying asleep, or sleeping too much -   Feeling tired or having little energy -   Poor appetite, weight loss, or overeating -   Feeling bad about yourself - or that you are a failure or have let yourself or your family down -   Trouble concentrating on things such as school, work, reading, or watching TV -   Moving or speaking so slowly that other people could have noticed; or the opposite being so fidgety that others notice -   Thoughts of being better off dead, or hurting yourself in some way -   PHQ 9 Score -   How difficult have these problems made it for you to do your work, take care of your home and get along with others -     1. Have you been to the ER, urgent care clinic since your last visit? Hospitalized since your last visit? NO    2. Have you seen or consulted any other health care providers outside of the [address] [place] [address] since your last visit? Include any pap smears or colon screening.  NO

## 2023-01-27 NOTE — PATIENT INSTRUCTIONS
[employer]    Thank you for requesting access to ForMune. Please follow the instructions below to securely access and download your online medical record. ForMune allows you to send messages to your doctor, view your test results, renew your prescriptions, schedule appointments, and more. How Do I Sign Up? In your internet browser, go to [email or web address]  Click on the First Time User? Click Here link in the Sign In box. You will be redirect to the New Member Sign Up page. Enter your ForMune Access Code exactly as it appears below. You will not need to use this code after youve completed the sign-up process. If you do not sign up before the expiration date, you must request a new code. ForMune Access Code: Activation code not generated  Current ForMune Status: Active (This is the date your ForMune access code will )    Enter the last four digits of your Social Security Number (xxxx) and Date of Birth (mm/dd/yyyy) as indicated and click Submit. You will be taken to the next sign-up page. Create a ForMune ID. This will be your ForMune login ID and cannot be changed, so think of one that is secure and easy to remember. Create a ForMune password. You can change your password at any time. Enter your Password Reset Question and Answer. This can be used at a later time if you forget your password. Enter your e-mail address. You will receive e-mail notification when new information is available in [address] Click Sign Up. You can now view and download portions of your medical record. Click the [place] link to download a portable copy of your medical information. Additional Information    If you have questions, please visit the Frequently Asked Questions section of the ForMune website at [email or web address] Remember, ForMune is NOT to be used for urgent needs. For medical emergencies, dial 911.

## 2023-03-28 RX ORDER — POTASSIUM CHLORIDE 750 MG/1
CAPSULE, EXTENDED RELEASE ORAL
Qty: 60 CAPSULE | Refills: 6 | Status: SHIPPED | OUTPATIENT
Start: 2023-03-28

## 2023-03-28 RX ORDER — BLOOD SUGAR DIAGNOSTIC
STRIP MISCELLANEOUS
Qty: 25 STRIP | Refills: 11 | Status: SHIPPED | OUTPATIENT
Start: 2023-03-28

## 2023-03-28 RX ORDER — SUCRALFATE 1 G/1
TABLET ORAL
Qty: 120 TABLET | Refills: 1 | Status: SHIPPED | OUTPATIENT
Start: 2023-03-28

## 2023-03-28 RX ORDER — NYSTATIN 100000 [USP'U]/G
POWDER TOPICAL
Qty: 60 G | Refills: 3 | Status: SHIPPED | OUTPATIENT
Start: 2023-03-28

## 2023-04-17 ENCOUNTER — TELEPHONE (OUTPATIENT)
Dept: INTERNAL MEDICINE CLINIC | Age: 68
End: 2023-04-17

## 2023-04-17 DIAGNOSIS — M25.562 LEFT KNEE PAIN, UNSPECIFIED CHRONICITY: Primary | ICD-10-CM

## 2023-04-17 DIAGNOSIS — M25.561 RIGHT KNEE PAIN, UNSPECIFIED CHRONICITY: ICD-10-CM

## 2023-04-17 RX ORDER — CIPROFLOXACIN 500 MG/1
500 TABLET ORAL 2 TIMES DAILY
Qty: 14 TABLET | Refills: 0 | Status: CANCELLED | OUTPATIENT
Start: 2023-04-17

## 2023-04-21 NOTE — TELEPHONE ENCOUNTER
Phoned in to [facility] LISTED ON CHART. CIPRO 500 MG TAKE 1 TABLET BY MOUTH TWICE A DAY FOR 7 DAYS. DISP. 14 TABLETS WITH ZERO REFILLS     PHONED IN VERBAL ORDER PER DR. Trilby Ormond.

## 2023-04-23 RX ORDER — CIPROFLOXACIN 500 MG/1
500 TABLET ORAL 2 TIMES DAILY
Qty: 14 TABLET | Refills: 0 | OUTPATIENT
Start: 2023-04-23

## 2023-04-26 ENCOUNTER — HOSPITAL ENCOUNTER (OUTPATIENT)
Dept: GENERAL RADIOLOGY | Age: 68
Discharge: HOME | End: 2023-04-26
Payer: COMMERCIAL

## 2023-04-26 ENCOUNTER — OFFICE VISIT (OUTPATIENT)
Dept: ORTHOPEDIC SURGERY | Age: 68
End: 2023-04-26
Payer: COMMERCIAL

## 2023-04-26 VITALS
RESPIRATION RATE: 17 BRPM | HEART RATE: 76 BPM | WEIGHT: 174.2 LBS | DIASTOLIC BLOOD PRESSURE: 74 MMHG | TEMPERATURE: 97.2 F | SYSTOLIC BLOOD PRESSURE: 109 MMHG | BODY MASS INDEX: 30.87 KG/M2 | HEIGHT: 63 IN | OXYGEN SATURATION: 97 %

## 2023-04-26 DIAGNOSIS — M25.561 RIGHT KNEE PAIN, UNSPECIFIED CHRONICITY: ICD-10-CM

## 2023-04-26 DIAGNOSIS — M25.562 LEFT KNEE PAIN, UNSPECIFIED CHRONICITY: ICD-10-CM

## 2023-04-26 DIAGNOSIS — M17.11 UNILATERAL PRIMARY OSTEOARTHRITIS, RIGHT KNEE: ICD-10-CM

## 2023-04-26 DIAGNOSIS — M17.12 UNILATERAL PRIMARY OSTEOARTHRITIS, LEFT KNEE: Primary | ICD-10-CM

## 2023-04-26 PROCEDURE — 73564 X-RAY EXAM KNEE 4 OR MORE: CPT

## 2023-04-26 PROCEDURE — 1124F ACP DISCUSS-NO DSCNMKR DOCD: CPT | Performed by: ORTHOPAEDIC SURGERY

## 2023-04-26 PROCEDURE — 3078F DIAST BP <80 MM HG: CPT | Performed by: ORTHOPAEDIC SURGERY

## 2023-04-26 PROCEDURE — 3074F SYST BP LT 130 MM HG: CPT | Performed by: ORTHOPAEDIC SURGERY

## 2023-04-26 PROCEDURE — 99203 OFFICE O/P NEW LOW 30 MIN: CPT | Performed by: ORTHOPAEDIC SURGERY

## 2023-04-26 NOTE — PROGRESS NOTES
4/26/2023    Chief Complaint: Left knee pain    HPI: This is a 74 y.o. female who complains of left knee pain. Onset was gradual.  The patient has had activity dependent pain for years. The patient has tried activity modification, physical therapy exercises, injections have failed to provide good relief. The pain is in the knee, it is severe in intensity. The patient feels unstable with the knee, fears falling, and has significant limitation with activities of daily living, recreation, and walks with a limp. Past Medical History:   Diagnosis Date    Arthritis, Degenerative, Knee 7/15/2011    COPD (chronic obstructive pulmonary disease) ([facility] [address] 7/15/2011    Diverticulitis     GERD (gastroesophageal reflux disease)     GERD, gastro - Esophageal Reflux Disease 7/15/2011    Hypercholesterolemia 7/15/2011    Hypertension     Type II or unspecified type diabetes mellitus without mention of complication, uncontrolled 7/15/2011       Past Surgical History:   Procedure Laterality Date    COLONOSCOPY N/A 9/18/2017    [facility] N/A 4/25/2018    COLONOSCOPY performed by Julissa Carter MD at [facility] ENDOSCOPY    HX GYN  2004    uterine surgery    [place] LAPAROSCOPY FULGURATION OVIDUCTS Bilateral 1992       Current Outpatient Medications on File Prior to Visit   Medication Sig Dispense Refill    ciprofloxacin HCl (CIPRO) 500 mg tablet Take 1 Tablet by mouth two (2) times a day. 14 Tablet 0    nystatin (MYCOSTATIN) powder APPLY TO AFFECTED AREA TWICE A DAY 60 g 3    OneTouch Ultra Test strip USE TO CHECK BLOOD SUGAR EVERY DAY 25 Strip 11    sucralfate (CARAFATE) 1 gram tablet TAKE 1 TABLET BY MOUTH FOUR TIMES A  Tablet 1    losartan (COZAAR) 50 mg tablet TAKE 1 TABLET BY MOUTH EVERY DAY 30 Tablet 12    pioglitazone (ACTOS) 45 mg tablet Take 1 Tablet by mouth nightly.  30 Tablet 12    cholecalciferol, vitamin D3, 50 mcg (2,000 unit) tab Take 1 Tablet by mouth two (2) times a day. 60 Tablet 3    amLODIPine (NORVASC) 5 mg tablet TAKE 1 TABLET BY MOUTH EVERY DAY 30 Tablet 12    famotidine (PEPCID) 40 mg tablet Take 1 Tablet by mouth daily. 30 Tablet 12    gentamicin (GARAMYCIN) 0.3 % ophthalmic solution Si drop to lt.eye tid  Indications: pink eye from bacterial infection 5 mL 0    hydroCHLOROthiazide (HYDRODIURIL) 25 mg tablet TAKE 1 TABLET BY MOUTH EVERY DAY 30 Tablet 12    metFORMIN (GLUCOPHAGE) 1,000 mg tablet TAKE 1 TABLET BY MOUTH TWICE A DAY WITH MEALS 60 Tablet 12    diclofenac EC (VOLTAREN) 75 mg EC tablet Si tab po bid 180 Tablet 3    lancets (OneTouch Delica Lancets) 30 gauge misc Use once daily;E11.9:Type 2 diabetes 100 Lancet 3    Blood-Glucose Meter (OneTouch Ultra2 Meter) monitoring kit Test once daily; E11.9; Diabetes Type 2 1 Kit 0    potassium chloride SA (MICRO-K) 10 mEq capsule TAKE ONE CAPSULE BY MOUTH TWICE DAILY 30 Cap 12    ondansetron (ZOFRAN ODT) 4 mg disintegrating tablet Take 1 Tablet by mouth every eight (8) hours as needed for Nausea or Vomiting. (Patient not taking: Reported on 2023) 10 Tablet 0    potassium chloride SA (MICRO-K) 10 mEq capsule TAKE 1 CAPSULE BY MOUTH TWICE A DAY (Patient not taking: Reported on 2023) 60 Capsule 6    predniSONE (DELTASONE) 10 mg tablet 6 tabs today and reduce by 1 tab daily (Patient not taking: Reported on 2023) 21 Tablet 0    lidocaine (LIDODERM) 5 % Apply patch to the affected area for 12 hours a day and remove for 12 hours a day. (Patient not taking: Reported on 2022) 10 Each 0    potassium chloride SA (MICRO-K) 10 mEq capsule TAKE 1 CAPSULE BY MOUTH TWICE A DAY (Patient not taking: Reported on 2023) 60 Capsule 3     No current facility-administered medications on file prior to visit.        No Known Allergies    Family History   Problem Relation Age of Onset    Diabetes Father     Deep Vein Thrombosis Daughter     Lung Disease Daughter     Other Daughter cholesterol       Social History     Socioeconomic History    Marital status:    Tobacco Use    Smoking status: Former     Types: Cigarettes     Quit date: 4/15/2015     Years since quittin.0    Smokeless tobacco: Never   Vaping Use    Vaping Use: Never used   Substance and Sexual Activity    Alcohol use: No    Drug use: Yes     Types: Marijuana     Comment: \"daily\"    Sexual activity: Yes     Partners: Male   Social History Narrative        She lives with her  she has five adult children denies alcohol cigarette use         Review of Systems:       General: Denies headache, lethargy, fever, weight loss  Ears/Nose/Throat: Denies ear discharge, drainage, nosebleeds, hoarse voice, dental problems  Cardiovascular: Denies chest pain, shortness of breath  Lungs: Denies chest pain, breathing problems, wheezing, pneumonia  Stomach: Denies stomach pain, heartburn, constipation, irritable bowel  Skin: Denies rash, sores, open wounds  Musculoskeletal: Admits to knee pain  Genitourinary: Denies dysuria, hematuria, polyuria  Gastrointestinal: Denies constipation, obstipation, diarrhea  Neurological: Denies changes in sight, smell, hearing, taste, seizures. Denies loss of consciousness.   Psychiatric: Denies depression, sleep pattern changes, anxiety, change in personality  Endocrine: Denies mood swings, heat or cold intolerance  Hematologic/Lymphatic: Denies anemia, purpura, petechia  Allergic/Immunologic: Denies swelling of throat, pain or swelling at lymph nodes      Physical Examination:    Visit Vitals  /74 (BP 1 Location: Right upper arm, BP Patient Position: Sitting, BP Cuff Size: Large adult)   Pulse 76   Temp 97.2 °F (36.2 °C) (Tympanic)   Resp 17   Ht 5' 3\" (1.6 m)   Wt 174 lb 3.2 oz (79 kg)   SpO2 97%   BMI 30.86 kg/m²        General: AOX3, no apparent distress  Psychiatric: mood and affect appropriate  Lungs: breathing is symmetric and unlabored bilaterally  Heart: regular rate and rhythm  Abdomen: no guarding  Head: normocephalic, atraumatic  Skin: No significant abnormalities, good turgor  Sensation intact to light touch: L1-S1 dermatomes  Muscular exam: 5/5 strength in all major muscle groups unless noted in specialty exam.    Extremities:      Left upper extremity: Full active and passive range of motion without pain, deformity, no open wound, strength 5/5 in all major muscle groups. Right upper extremity: Full active and passive range of motion without pain, deformity, no open wound, strength 5/5 in all major muscle groups. Right lower extremity: Full active and passive range of motion without pain, deformity, no open wound, strength 5/5 in all major muscle groups. Left lower extremity:  No deformity is noted. Range of motion of the knee is 0-110. Ligamentous testing of the knee indicates stability of the the MCL, LCL, PCL, and ACL. Lachman's, anterior and posterior drawer tests are specifically negative. Medial joint line tenderness to palpation is noted. Popliteal area is unremarkable. 1+  for effusion. No patellar crepitus. Patella tracks centrally. Pivot shift is negative. Strength testing is indicative of 5/5 strength at hip flexion, extension, knee flexion and extension, tibialis anterior, EHL, and FHL. Sensation is intact to light touch in the L1-S1 dermatomes. Capillary refill is less than 2 seconds in the toes. Diagnostics:    Pertinent Xrays:  Xrays are available of the left knee. Bone on bone osteoarthritic changes of the left knee, osteophytes and subchondral sclerosis is noted. Assessment: Osteoarthritis left knee    Plan: This patient and I did discuss the many options in treating knee osteoarthritis.   We did discuss that we could continue to seek out nonoperative modalities, such as: NSAIDs, oral and topical analgesics, knee injections, knee braces, physical therapy, stretching, strengthening, and weight loss strategies, activity modification, ambulatory assistive devices. The patient stated their understanding with this, but would like to proceed with surgical management in the form of a total knee arthroplasty. We did discuss the risks of surgery which include but are not limited to infection, nerve or blood vessel damage, failure of fixation, failure of any possible implant, need for reoperation, postoperative pain and swelling, intra-or postoperative fracture, postoperative dislocation, leg length inequality, need for reoperation, implant failure, death, disability, organ dysfunction, wound healing issues, DVT, PE, and the need for further procedures. The patient did freely state their understanding and satisfaction with our discussion. We will proceed after medical clearances. The patient was counseled at length about the risks of theo Covid-19 during their perioperative period and any recovery window from their procedure. The patient was made aware that theo Covid-19  may worsen their prognosis for recovering from their procedure and lend to a higher morbidity and/or mortality risk. All material risks, benefits, and reasonable alternatives including postponing the procedure were discussed. The patient does  wish to proceed with the procedure at this time. Ms. Eli Rizo has a reminder for a \"due or due soon\" health maintenance. I have asked that she contact her primary care provider for follow-up on this health maintenance.

## 2023-04-26 NOTE — PROGRESS NOTES
Identified pt with two pt identifiers (name and ). Reviewed chart in preparation for visit and have obtained necessary documentation. Eleanor Rush is a 74 y.o. female  Chief Complaint   Patient presents with    Knee Pain     Jalen Knee     Visit Vitals  /74 (BP 1 Location: Right upper arm, BP Patient Position: Sitting, BP Cuff Size: Large adult)   Pulse 76   Temp 97.2 °F (36.2 °C) (Tympanic)   Resp 17   Ht 5' 3\" (1.6 m)   Wt 174 lb 3.2 oz (79 kg)   LMP 03/15/2004 (Approximate)   SpO2 97%   BMI 30.86 kg/m²     1. Have you been to the ER, urgent care clinic since your last visit? Hospitalized since your last visit? Yes Where: [facility]    2. Have you seen or consulted any other health care providers outside of the [address] [place] [address] since your last visit? Include any pap smears or colon screening.  No

## 2023-05-01 ENCOUNTER — TELEPHONE (OUTPATIENT)
Dept: ORTHOPEDIC SURGERY | Age: 68
End: 2023-05-01

## 2023-05-01 NOTE — TELEPHONE ENCOUNTER
Contacted patient to schedule surgery. Scheduled for 5/22/23. Advised patient that clearances from Dr. Lisa Celis would be required, and would need to be received no less than 2 business days before surgery. Patient advised to contact the office(s) to make pre op appts as soon as possible. Patient verbalized understanding and was encouraged to contact our office with any questions or concerns regarding upcoming surgery or required clearances. Clearance letters faxed to [phone].  Confirmation was received.         ----- Message from Lorenzo Painter DO sent at 4/26/2023  1:37 PM EDT -----  Diagnosis: Unilateral Primary Osteoarthritis, left knee M17.12  Procedure: Left total knee arthroplasty  CPT: 93499  Operative minutes: 100  Inpatient  Location: [facility] Main OR  PAT: Yes  Class: Yes  Special Equipment: Regular table, Budny foot avalos, Stanton Triathlon, Plan for cemented TKA, foot avalos for prepping  Staffing: Retractor avalos  Anesthesia: spinal with adductor canal block

## 2023-05-12 ENCOUNTER — OFFICE VISIT (OUTPATIENT)
Facility: CLINIC | Age: 68
End: 2023-05-12
Payer: COMMERCIAL

## 2023-05-12 VITALS
TEMPERATURE: 98 F | WEIGHT: 172 LBS | HEART RATE: 72 BPM | DIASTOLIC BLOOD PRESSURE: 78 MMHG | HEIGHT: 63 IN | SYSTOLIC BLOOD PRESSURE: 114 MMHG | OXYGEN SATURATION: 98 % | RESPIRATION RATE: 16 BRPM | BODY MASS INDEX: 30.48 KG/M2

## 2023-05-12 DIAGNOSIS — I10 ESSENTIAL (PRIMARY) HYPERTENSION: ICD-10-CM

## 2023-05-12 DIAGNOSIS — E78.00 PURE HYPERCHOLESTEROLEMIA, UNSPECIFIED: ICD-10-CM

## 2023-05-12 DIAGNOSIS — M17.12 PRIMARY OSTEOARTHRITIS OF LEFT KNEE: Primary | ICD-10-CM

## 2023-05-12 DIAGNOSIS — E11.9 TYPE 2 DIABETES MELLITUS WITHOUT COMPLICATION, WITHOUT LONG-TERM CURRENT USE OF INSULIN (HCC): ICD-10-CM

## 2023-05-12 DIAGNOSIS — E55.9 VITAMIN D DEFICIENCY, UNSPECIFIED: ICD-10-CM

## 2023-05-12 PROCEDURE — 3078F DIAST BP <80 MM HG: CPT | Performed by: INTERNAL MEDICINE

## 2023-05-12 PROCEDURE — 3074F SYST BP LT 130 MM HG: CPT | Performed by: INTERNAL MEDICINE

## 2023-05-12 PROCEDURE — 1123F ACP DISCUSS/DSCN MKR DOCD: CPT | Performed by: INTERNAL MEDICINE

## 2023-05-12 PROCEDURE — 99214 OFFICE O/P EST MOD 30 MIN: CPT | Performed by: INTERNAL MEDICINE

## 2023-05-12 RX ORDER — HYDROCHLOROTHIAZIDE 25 MG/1
25 TABLET ORAL DAILY
COMMUNITY
Start: 2023-04-23

## 2023-05-12 RX ORDER — DICLOFENAC SODIUM 75 MG/1
TABLET, DELAYED RELEASE ORAL
COMMUNITY
Start: 2021-11-18

## 2023-05-12 RX ORDER — CHOLECALCIFEROL (VITAMIN D3) 125 MCG
1 CAPSULE ORAL 2 TIMES DAILY
COMMUNITY
Start: 2023-04-23

## 2023-05-12 RX ORDER — FAMOTIDINE 40 MG/1
40 TABLET, FILM COATED ORAL DAILY
COMMUNITY
Start: 2021-05-06

## 2023-05-12 RX ORDER — SUCRALFATE 1 G/1
1 TABLET ORAL 4 TIMES DAILY
COMMUNITY
Start: 2023-04-24

## 2023-05-12 RX ORDER — LOSARTAN POTASSIUM 50 MG/1
1 TABLET ORAL DAILY
COMMUNITY
Start: 2023-01-27

## 2023-05-12 RX ORDER — PIOGLITAZONEHYDROCHLORIDE 45 MG/1
1 TABLET ORAL NIGHTLY
COMMUNITY
Start: 2021-05-06

## 2023-05-12 RX ORDER — POTASSIUM CHLORIDE 750 MG/1
1 CAPSULE, EXTENDED RELEASE ORAL 2 TIMES DAILY
COMMUNITY
Start: 2018-05-15

## 2023-05-12 RX ORDER — AMLODIPINE BESYLATE 5 MG/1
1 TABLET ORAL DAILY
COMMUNITY
Start: 2021-11-18

## 2023-05-12 RX ORDER — ATORVASTATIN CALCIUM 40 MG/1
40 TABLET, FILM COATED ORAL DAILY
COMMUNITY
Start: 2023-04-23

## 2023-05-12 RX ORDER — NYSTATIN 100000 [USP'U]/G
POWDER TOPICAL 3 TIMES DAILY
COMMUNITY
Start: 2021-05-06

## 2023-05-12 NOTE — PROGRESS NOTES
1. Have you been to the ER, urgent care clinic since your last visit? Hospitalized since your last visit? No    2. Have you seen or consulted any other health care providers outside of the [address] [place] [address] since your last visit? Include any pap smears or colon screening.  No     Chief Complaint   Patient presents with    Pre-op Exam     Left knee

## 2023-05-12 NOTE — PROGRESS NOTES
Hannah Landeros is a 74 y.o. female and presents with Pre-op Exam (Left knee)  . Subjective:  Hypertension Review:  The patient has essential hypertension . Diet and Lifestyle: generally follows a low sodium diet, exercises sporadically  Home BP Monitoring: is not measured at home. Pertinent ROS: taking medications as instructed, no medication side effects noted, no TIA's, no chest pain on exertion, no dyspnea on exertion, no swelling of ankles. Diabetes Mellitus Review:  She has diabetes mellitus. Diabetic ROS - medication compliance: compliant all of the time, diabetic diet compliance: compliant all of the time, home glucose monitoring: is performed. Known diabetic complications: none  Cardiovascular risk factors: family history, dyslipidemia, diabetes mellitus, obesity, hypertension  Current diabetic medications include oral agents/   Eye exam current (within one year): no  Weight trend: stable  Prior visit with dietician: no  Current diet: \"healthy\" diet  in general  Current exercise: walking  Home blood sugar records: trend: stable  Any episodes of hypoglycemia? no     GERD Review:   Patient has a history of gastroesophageal reflux with heartburn. Symptoms have been present for a few months. She denies dysphagia. She  has not lost weight. She denies melena, hematochezia, hematemesis, and coffee ground emesis. This has been associated with fullness after meals. She denies abdominal bloating and none. Medical therapy in the past has included proton pump inhibitor     Vitamin D Deficiency Review   The patient has a previous history of vitamin d deficiency  The patient is on medication for vitamin d deficiency  The patient has denied any recent falls or fractures     Knee pain Review:  Patient complains of lt.knee pain. Onset of the symptoms was months  ago. Inciting event: longstanding problem which has been getting worse. Current symptoms include pain location: both: medial and swelling.  none,

## 2023-05-12 NOTE — PATIENT INSTRUCTIONS
Thank you for enrolling in [address] Please follow the instructions below to securely access your online medical record. Apollidon allows you to send messages to your doctor, view your test results, renew your prescriptions, schedule appointments, and more. How Do I Sign Up? In your Internet browser, go to [email or web address]  Click on the Sign Up Now link in the Sign In box. You will see the New Member Sign Up page. Enter your Apollidon Access Code exactly as it appears below. You will not need to use this code after youve completed the sign-up process. If you do not sign up before the expiration date, you must request a new code. Apollidon Access Code: Activation code not generated  Current Apollidon Status: Active    Enter your Social Security Number ([id number]) and Date of Birth (mm/dd/yyyy) as indicated and click Submit. You will be taken to the next sign-up page. Create a Apollidon ID. This will be your Apollidon login ID and cannot be changed, so think of one that is secure and easy to remember. Create a Apollidon password. You can change your password at any time. Enter your Password Reset Question and Answer. This can be used at a later time if you forget your password. Enter your e-mail address. You will receive e-mail notification when new information is available in [address] Click Sign Up. You can now view your medical record. Additional Information  If you have questions, please contact your physician practice where you receive care. Remember, Apollidon is NOT to be used for urgent needs. For medical emergencies, dial 911.

## 2023-05-15 ENCOUNTER — HOSPITAL ENCOUNTER (OUTPATIENT)
Facility: HOSPITAL | Age: 68
Discharge: HOME | End: 2023-05-18
Payer: COMMERCIAL

## 2023-05-15 LAB
ABO + RH BLD: NORMAL
ALBUMIN SERPL-MCNC: 3.5 G/DL (ref 3.5–5)
ALBUMIN/GLOB SERPL: 0.9 (ref 1.1–2.2)
ALP SERPL-CCNC: 156 U/L (ref 45–117)
ALT SERPL-CCNC: 32 U/L (ref 12–78)
ANION GAP SERPL CALC-SCNC: 6 MMOL/L (ref 5–15)
APPEARANCE UR: CLEAR
AST SERPL-CCNC: 22 U/L (ref 15–37)
BACTERIA URNS QL MICRO: NEGATIVE /HPF
BILIRUB SERPL-MCNC: 0.6 MG/DL (ref 0.2–1)
BILIRUB UR QL: NEGATIVE
BLOOD GROUP ANTIBODIES SERPL: NORMAL
BUN SERPL-MCNC: 29 MG/DL (ref 6–20)
BUN/CREAT SERPL: 40 (ref 12–20)
CALCIUM SERPL-MCNC: 9.7 MG/DL (ref 8.5–10.1)
CHLORIDE SERPL-SCNC: 106 MMOL/L (ref 97–108)
CO2 SERPL-SCNC: 26 MMOL/L (ref 21–32)
COLOR UR: ABNORMAL
CREAT SERPL-MCNC: 0.73 MG/DL (ref 0.55–1.02)
EKG ATRIAL RATE: 66 BPM
EKG DIAGNOSIS: NORMAL
EKG P AXIS: 20 DEGREES
EKG P-R INTERVAL: 130 MS
EKG Q-T INTERVAL: 412 MS
EKG QRS DURATION: 78 MS
EKG QTC CALCULATION (BAZETT): 431 MS
EKG R AXIS: -1 DEGREES
EKG T AXIS: 8 DEGREES
EKG VENTRICULAR RATE: 66 BPM
EPITH CASTS URNS QL MICRO: ABNORMAL /LPF
ERYTHROCYTE [DISTWIDTH] IN BLOOD BY AUTOMATED COUNT: 13.9 % (ref 11.5–14.5)
EST. AVERAGE GLUCOSE BLD GHB EST-MCNC: 140 MG/DL
GLOBULIN SER CALC-MCNC: 3.8 G/DL (ref 2–4)
GLUCOSE SERPL-MCNC: 117 MG/DL (ref 65–100)
GLUCOSE UR STRIP.AUTO-MCNC: NEGATIVE MG/DL
HBA1C MFR BLD: 6.5 % (ref 4–5.6)
HCT VFR BLD AUTO: 38.1 % (ref 35–47)
HGB BLD-MCNC: 12.2 G/DL (ref 11.5–16)
HGB UR QL STRIP: ABNORMAL
HYALINE CASTS URNS QL MICRO: ABNORMAL /LPF (ref 0–2)
INR PPP: 1 (ref 0.9–1.1)
KETONES UR QL STRIP.AUTO: NEGATIVE MG/DL
LEUKOCYTE ESTERASE UR QL STRIP.AUTO: NEGATIVE
MCH RBC QN AUTO: 30.8 PG (ref 26–34)
MCHC RBC AUTO-ENTMCNC: 32 G/DL (ref 30–36.5)
MCV RBC AUTO: 96.2 FL (ref 80–99)
NITRITE UR QL STRIP.AUTO: NEGATIVE
NRBC # BLD: 0 K/UL (ref 0–0.01)
NRBC BLD-RTO: 0 PER 100 WBC
PH UR STRIP: 6.5 (ref 5–8)
PLATELET # BLD AUTO: 218 K/UL (ref 150–400)
PMV BLD AUTO: 10.7 FL (ref 8.9–12.9)
POTASSIUM SERPL-SCNC: 4 MMOL/L (ref 3.5–5.1)
PROT SERPL-MCNC: 7.3 G/DL (ref 6.4–8.2)
PROT UR STRIP-MCNC: NEGATIVE MG/DL
PROTHROMBIN TIME: 10.8 SEC (ref 9–11.1)
RBC # BLD AUTO: 3.96 M/UL (ref 3.8–5.2)
RBC #/AREA URNS HPF: ABNORMAL /HPF (ref 0–5)
SODIUM SERPL-SCNC: 138 MMOL/L (ref 136–145)
SP GR UR REFRACTOMETRY: 1.01
SPECIMEN EXP DATE BLD: NORMAL
URINE CULTURE IF INDICATED: ABNORMAL
UROBILINOGEN UR QL STRIP.AUTO: 0.2 EU/DL (ref 0.2–1)
WBC # BLD AUTO: 8.2 K/UL (ref 3.6–11)
WBC URNS QL MICRO: ABNORMAL /HPF (ref 0–4)

## 2023-05-15 PROCEDURE — 85610 PROTHROMBIN TIME: CPT

## 2023-05-15 PROCEDURE — 36415 COLL VENOUS BLD VENIPUNCTURE: CPT

## 2023-05-15 PROCEDURE — 86900 BLOOD TYPING SEROLOGIC ABO: CPT

## 2023-05-15 PROCEDURE — 80053 COMPREHEN METABOLIC PANEL: CPT

## 2023-05-15 PROCEDURE — 83036 HEMOGLOBIN GLYCOSYLATED A1C: CPT

## 2023-05-15 PROCEDURE — 81001 URINALYSIS AUTO W/SCOPE: CPT

## 2023-05-15 PROCEDURE — 86850 RBC ANTIBODY SCREEN: CPT

## 2023-05-15 PROCEDURE — 86901 BLOOD TYPING SEROLOGIC RH(D): CPT

## 2023-05-15 PROCEDURE — 97110 THERAPEUTIC EXERCISES: CPT

## 2023-05-15 PROCEDURE — 85027 COMPLETE CBC AUTOMATED: CPT

## 2023-05-15 PROCEDURE — 97161 PT EVAL LOW COMPLEX 20 MIN: CPT

## 2023-05-15 RX ORDER — ASPIRIN 81 MG/1
81 TABLET ORAL DAILY
COMMUNITY

## 2023-05-15 RX ORDER — SODIUM CHLORIDE, SODIUM LACTATE, POTASSIUM CHLORIDE, CALCIUM CHLORIDE 600; 310; 30; 20 MG/100ML; MG/100ML; MG/100ML; MG/100ML
INJECTION, SOLUTION INTRAVENOUS CONTINUOUS
OUTPATIENT
Start: 2023-05-15

## 2023-05-15 NOTE — PERIOP NOTE

## 2023-05-15 NOTE — PERIOP NOTE
Orthopedic and Spine Patients: Instructions on When You Can   Eat or Drink Before Surgery      You have been provided a pre-surgery drink received at your pre-admission testing appointment. Night before surgery: You should drink 1/2 bottle of the  pre-surgery drink at bedtime. No food after midnight! Day of Surgery:  Complete 2nd half of the bottle of the pre-surgery drink 1 hour prior to arrival at hospital.  For questions call Pre-Admission Testing at [phone]. They are available from 8:00am-5:00pm, Monday through Friday.

## 2023-05-15 NOTE — PERIOP NOTE

## 2023-05-15 NOTE — PERIOP NOTE
[facility]  Joint/Spine Preoperative Instructions        Surgery Date 5/22/23          Time of Arrival to be called at [phone]    1. On the day of your surgery, please report to the Surgical Services Registration Desk and sign in at your designated time. The Surgery Center is located to the right of the Emergency Room. 2. You must have someone with you to drive you home. You should not drive a car for 24 hours following surgery. Please make arrangements for a friend or family member to stay with you for the first 24 hours after your surgery. 3. No food after midnight. Medications morning of surgery should be taken with a sip of water. Please follow pre-surgery drink instructions that were given at your Pre Admission Testing appointment. 4. We recommend you do not drink any alcoholic beverages for 24 hours before and after your surgery. 5. Contact your surgeons office for instructions on the following medications: non-steroidal anti-inflammatory drugs (i.e. Advil, Aleve), vitamins, and supplements. (Some surgeons will want you to stop these medications prior to surgery and others may allow you to take them)  **If you are currently taking Plavix, Coumadin, Aspirin and/or other blood-thinning agents, contact your surgeon for instructions. ** Your surgeon will partner with the physician prescribing these medications to determine if it is safe to stop or if you need to continue taking. Please do not stop taking these medications without instructions from your surgeon    6. Wear comfortable clothes. Wear glasses instead of contacts. Do not bring any money or jewelry. Please bring picture ID, insurance card, and any prearranged co-payment or hospital payment. Do not wear make-up, particularly mascara the morning of your surgery. Do not wear nail polish, particularly if you are having foot /hand surgery.   Wear your hair loose or down, no ponytails, buns, heidi

## 2023-05-15 NOTE — PROGRESS NOTES
Range Of Motion:  AROM generally decreased, functional  L knee 0 to 110 degrees. Functional Mobility:  Transfers:  Sit to stand Independent  Stand to sit Independent  Bed to chair transfer Independent  Balance:     Sitting static: Good (unsupported); Sitting dynamic: Good (unsupported); Standing static: Good (unsupported); Standing dynamic: Fair (occasional); Ambulation/Gait Training:  independence  widened  pace decreased (<100 feet/min)  antalgic  trunk sway increase keep  Distance (feet): 20 feet   no equipment             Functional Measure:  Shawanda Richard AM-PAC®      Basic Mobility Inpatient Short Form (6-Clicks) Version 2  How much HELP from another person do you currently need. .. (If the patient hasn't done an activity recently, how much help from another person do you think they would need if they tried?) Total A Lot A Little None   1. Turning from your back to your side while in a flat bed without using bedrails? []  1 []  2 []  3  [x]  4   2. Moving from lying on your back to sitting on the side of a flat bed without using bedrails? []  1 []  2 []  3  [x]  4   3. Moving to and from a bed to a chair (including a wheelchair)? []  1 []  2 []  3  [x]  4   4. Standing up from a chair using your arms (e.g. wheelchair or bedside chair)? []  1 []  2 []  3  [x]  4   5. Walking in hospital room? []  1 []  2 []  3  [x]  4   6. Climbing 3-5 steps with a railing? []  1 []  2 []  3  [x]  4     Raw Score: 24/24                            Cutoff score ?171,2,3 had higher odds of discharging home with home health or need of SNF/IPR. [address], Nina Ames, Tanya Palmer. Validity of the AM-PAC 6-Clicks Inpatient Daily Activity and Basic Mobility Short Forms. Physical Therapy Mar 2014, [phone]) [phone]; DOI: 10.2522/ptj.[id number]  [address]. [address]  Association of AM-PAC \"6-Clicks\" Basic Mobility

## 2023-05-15 NOTE — PERIOP NOTE
The [address] \"Your Path to a More Active Life\" orthopedic total knee or total hip educational video and the [facility] patient handbook provided & reviewed during the patients pre-admission testing (PAT) appointment. An opportunity for questions was provided, patient verbalized understanding.

## 2023-05-16 LAB
BACTERIA SPEC CULT: NORMAL
BACTERIA SPEC CULT: NORMAL
SERVICE CMNT-IMP: NORMAL

## 2023-05-16 NOTE — PERIOP NOTE
PAT Nurse Practitioner   Pre-Operative Chart Review/Assessment:-ORTHOPEDIC/NEUROSURGICAL SPINE                Patient Name:  Lulu Champagne                                                           Age:   74 y.o.    :  1955     Today's Date:  2023     Date of PAT:   5/15/23      Date of Surgery:    23     Procedure(s):  Left  Total Knee Arthroplasty     Surgeon:   Allison Chong     Primary Care Provider:    Dr. Jarrell Mata                    PLAN:      1)  Cardiac Clearance:  Not requested       2)  Sleep apnea screening:  Not indicated-PHILL 2.   Denies loud snoring or witnessed apnea while sleeping       3)   Program for Diabetes Health Consult:  Not indicated-A1C 6.2 on 23      4) Treatment for MRSA/MSSA:  Negative       5) Additional Concerns:  T2DM, COPD, GERD, former smoker                 Vital Signs:       /78   Pulse 77   Temp 97.9 °F (36.6 °C) (Oral)   Resp 18   Ht 1.568 m (5' 1.75\")   Wt 78.9 kg (173 lb 15.1 oz)   BMI 32.07 kg/m²                      ____________________________________________  PAST MEDICAL HISTORY  Past Medical History:   Diagnosis Date    COPD (chronic obstructive [facility] disease[facility] 7/15/2011    Diverticulitis     Esophageal reflux 7/15/2011    GERD (gastroesophageal reflux disease)     Hypertension     Osteoarthrosis, unspecified whether generalized or localized, lower leg 7/15/2011    Other and unspecified hyperlipidemia 7/15/2011    Type II or unspecified type diabetes mellitus without mention of complication, uncontrolled 7/15/2011    type 2      ____________________________________________  PAST SURGICAL HISTORY  Past Surgical History:   Procedure Laterality Date    COLONOSCOPY N/A 2017    COLONOSCOPY performed by Alisa Palacios MD at [facility] AMBULATORY OR    COLONOSCOPY N/A 2018    COLONOSCOPY performed by Alisa Palacios MD at [facility] ENDOSCOPY    GYN  2004    uterine surgery    HAND SURGERY      HYSTERECTOMY ([address] [place] [address])

## 2023-05-18 VITALS
TEMPERATURE: 97.9 F | OXYGEN SATURATION: 98 % | DIASTOLIC BLOOD PRESSURE: 78 MMHG | BODY MASS INDEX: 32.01 KG/M2 | WEIGHT: 173.94 LBS | HEIGHT: 62 IN | RESPIRATION RATE: 18 BRPM | HEART RATE: 77 BPM | SYSTOLIC BLOOD PRESSURE: 133 MMHG

## 2023-05-22 ENCOUNTER — HOSPITAL ENCOUNTER (OUTPATIENT)
Facility: HOSPITAL | Age: 68
Discharge: HOME | End: 2023-05-23
Attending: ORTHOPAEDIC SURGERY | Admitting: ORTHOPAEDIC SURGERY
Payer: COMMERCIAL

## 2023-05-22 ENCOUNTER — ANESTHESIA (OUTPATIENT)
Facility: HOSPITAL | Age: 68
End: 2023-05-22
Payer: COMMERCIAL

## 2023-05-22 ENCOUNTER — APPOINTMENT (OUTPATIENT)
Facility: HOSPITAL | Age: 68
End: 2023-05-22
Attending: ORTHOPAEDIC SURGERY
Payer: COMMERCIAL

## 2023-05-22 ENCOUNTER — ANESTHESIA EVENT (OUTPATIENT)
Facility: HOSPITAL | Age: 68
End: 2023-05-22
Payer: COMMERCIAL

## 2023-05-22 DIAGNOSIS — Z96.652 S/P TOTAL KNEE ARTHROPLASTY, LEFT: Primary | ICD-10-CM

## 2023-05-22 PROBLEM — M17.12 UNILATERAL PRIMARY OSTEOARTHRITIS, LEFT KNEE: Status: ACTIVE | Noted: 2023-05-22

## 2023-05-22 LAB
GLUCOSE BLD STRIP.AUTO-MCNC: 192 MG/DL (ref 65–117)
GLUCOSE BLD STRIP.AUTO-MCNC: 311 MG/DL (ref 65–117)
SERVICE CMNT-IMP: ABNORMAL
SERVICE CMNT-IMP: ABNORMAL

## 2023-05-22 PROCEDURE — A4217 STERILE WATER/SALINE, 500 ML: HCPCS | Performed by: ORTHOPAEDIC SURGERY

## 2023-05-22 PROCEDURE — 2709999900 HC NON-CHARGEABLE SUPPLY: Performed by: ORTHOPAEDIC SURGERY

## 2023-05-22 PROCEDURE — 2580000003 HC RX 258: Performed by: ANESTHESIOLOGY

## 2023-05-22 PROCEDURE — 6370000000 HC RX 637 (ALT 250 FOR IP): Performed by: ORTHOPAEDIC SURGERY

## 2023-05-22 PROCEDURE — 2580000003 HC RX 258: Performed by: ORTHOPAEDIC SURGERY

## 2023-05-22 PROCEDURE — 3600000015 HC SURGERY LEVEL 5 ADDTL 15MIN: Performed by: ORTHOPAEDIC SURGERY

## 2023-05-22 PROCEDURE — 6360000002 HC RX W HCPCS: Performed by: ORTHOPAEDIC SURGERY

## 2023-05-22 PROCEDURE — 6370000000 HC RX 637 (ALT 250 FOR IP): Performed by: ANESTHESIOLOGY

## 2023-05-22 PROCEDURE — 6360000002 HC RX W HCPCS: Performed by: NURSE ANESTHETIST, CERTIFIED REGISTERED

## 2023-05-22 PROCEDURE — 2500000003 HC RX 250 WO HCPCS: Performed by: ORTHOPAEDIC SURGERY

## 2023-05-22 PROCEDURE — 2500000003 HC RX 250 WO HCPCS: Performed by: ANESTHESIOLOGY

## 2023-05-22 PROCEDURE — 27447 TOTAL KNEE ARTHROPLASTY: CPT | Performed by: ORTHOPAEDIC SURGERY

## 2023-05-22 PROCEDURE — 7100000001 HC PACU RECOVERY - ADDTL 15 MIN: Performed by: ORTHOPAEDIC SURGERY

## 2023-05-22 PROCEDURE — 3700000000 HC ANESTHESIA ATTENDED CARE: Performed by: ORTHOPAEDIC SURGERY

## 2023-05-22 PROCEDURE — 3600000005 HC SURGERY LEVEL 5 BASE: Performed by: ORTHOPAEDIC SURGERY

## 2023-05-22 PROCEDURE — C1776 JOINT DEVICE (IMPLANTABLE): HCPCS | Performed by: ORTHOPAEDIC SURGERY

## 2023-05-22 PROCEDURE — 97161 PT EVAL LOW COMPLEX 20 MIN: CPT

## 2023-05-22 PROCEDURE — 64447 NJX AA&/STRD FEMORAL NRV IMG: CPT | Performed by: ANESTHESIOLOGY

## 2023-05-22 PROCEDURE — 97116 GAIT TRAINING THERAPY: CPT

## 2023-05-22 PROCEDURE — 6370000000 HC RX 637 (ALT 250 FOR IP): Performed by: NURSE PRACTITIONER

## 2023-05-22 PROCEDURE — 73560 X-RAY EXAM OF KNEE 1 OR 2: CPT

## 2023-05-22 PROCEDURE — 6360000002 HC RX W HCPCS: Performed by: ANESTHESIOLOGY

## 2023-05-22 PROCEDURE — 2500000003 HC RX 250 WO HCPCS: Performed by: NURSE ANESTHETIST, CERTIFIED REGISTERED

## 2023-05-22 PROCEDURE — 7100000000 HC PACU RECOVERY - FIRST 15 MIN: Performed by: ORTHOPAEDIC SURGERY

## 2023-05-22 PROCEDURE — 82962 GLUCOSE BLOOD TEST: CPT

## 2023-05-22 PROCEDURE — 3700000001 HC ADD 15 MINUTES (ANESTHESIA): Performed by: ORTHOPAEDIC SURGERY

## 2023-05-22 DEVICE — POSTERIOR STABILIZED FEMORAL
Type: IMPLANTABLE DEVICE | Site: KNEE | Status: FUNCTIONAL
Brand: TRIATHLON

## 2023-05-22 DEVICE — TIBIAL BEARING INSERT - PS
Type: IMPLANTABLE DEVICE | Site: KNEE | Status: FUNCTIONAL
Brand: TRIATHLON

## 2023-05-22 DEVICE — PATELLA
Type: IMPLANTABLE DEVICE | Site: KNEE | Status: FUNCTIONAL
Brand: TRIATHLON

## 2023-05-22 DEVICE — COMPONENT TOT KNEE CAPPED PRIMARY K2STRYKER] STRYKER CORP]: Type: IMPLANTABLE DEVICE | Status: FUNCTIONAL

## 2023-05-22 DEVICE — TIBIAL COMPONENT
Type: IMPLANTABLE DEVICE | Site: KNEE | Status: FUNCTIONAL
Brand: TRIATHLON

## 2023-05-22 RX ORDER — HYDRALAZINE HYDROCHLORIDE 20 MG/ML
10 INJECTION INTRAMUSCULAR; INTRAVENOUS
Status: DISCONTINUED | OUTPATIENT
Start: 2023-05-22 | End: 2023-05-22 | Stop reason: HOSPADM

## 2023-05-22 RX ORDER — SUCRALFATE 1 G/1
1 TABLET ORAL 2 TIMES DAILY
Status: DISCONTINUED | OUTPATIENT
Start: 2023-05-22 | End: 2023-05-23 | Stop reason: HOSPADM

## 2023-05-22 RX ORDER — SODIUM CHLORIDE, SODIUM LACTATE, POTASSIUM CHLORIDE, CALCIUM CHLORIDE 600; 310; 30; 20 MG/100ML; MG/100ML; MG/100ML; MG/100ML
INJECTION, SOLUTION INTRAVENOUS CONTINUOUS
Status: DISCONTINUED | OUTPATIENT
Start: 2023-05-22 | End: 2023-05-23 | Stop reason: HOSPADM

## 2023-05-22 RX ORDER — SODIUM CHLORIDE 0.9 % (FLUSH) 0.9 %
5-40 SYRINGE (ML) INJECTION PRN
Status: DISCONTINUED | OUTPATIENT
Start: 2023-05-22 | End: 2023-05-22 | Stop reason: HOSPADM

## 2023-05-22 RX ORDER — DEXTROSE MONOHYDRATE 100 MG/ML
INJECTION, SOLUTION INTRAVENOUS CONTINUOUS PRN
Status: DISCONTINUED | OUTPATIENT
Start: 2023-05-22 | End: 2023-05-23 | Stop reason: HOSPADM

## 2023-05-22 RX ORDER — LIDOCAINE HYDROCHLORIDE 20 MG/ML
INJECTION, SOLUTION EPIDURAL; INFILTRATION; INTRACAUDAL; PERINEURAL PRN
Status: DISCONTINUED | OUTPATIENT
Start: 2023-05-22 | End: 2023-05-22 | Stop reason: SDUPTHER

## 2023-05-22 RX ORDER — CELECOXIB 100 MG/1
100 CAPSULE ORAL ONCE
Status: COMPLETED | OUTPATIENT
Start: 2023-05-22 | End: 2023-05-22

## 2023-05-22 RX ORDER — OXYCODONE HYDROCHLORIDE 5 MG/1
5 TABLET ORAL
Status: DISCONTINUED | OUTPATIENT
Start: 2023-05-22 | End: 2023-05-22 | Stop reason: HOSPADM

## 2023-05-22 RX ORDER — PROCHLORPERAZINE EDISYLATE 5 MG/ML
5 INJECTION INTRAMUSCULAR; INTRAVENOUS
Status: DISCONTINUED | OUTPATIENT
Start: 2023-05-22 | End: 2023-05-22 | Stop reason: HOSPADM

## 2023-05-22 RX ORDER — KETOROLAC TROMETHAMINE 30 MG/ML
15 INJECTION, SOLUTION INTRAMUSCULAR; INTRAVENOUS EVERY 6 HOURS
Status: DISCONTINUED | OUTPATIENT
Start: 2023-05-22 | End: 2023-05-23 | Stop reason: HOSPADM

## 2023-05-22 RX ORDER — ACETAMINOPHEN 325 MG/1
650 TABLET ORAL EVERY 6 HOURS
Status: DISCONTINUED | OUTPATIENT
Start: 2023-05-22 | End: 2023-05-23 | Stop reason: HOSPADM

## 2023-05-22 RX ORDER — ATORVASTATIN CALCIUM 40 MG/1
40 TABLET, FILM COATED ORAL DAILY
Status: DISCONTINUED | OUTPATIENT
Start: 2023-05-22 | End: 2023-05-23 | Stop reason: HOSPADM

## 2023-05-22 RX ORDER — SODIUM CHLORIDE 0.9 % (FLUSH) 0.9 %
5-40 SYRINGE (ML) INJECTION EVERY 12 HOURS SCHEDULED
Status: DISCONTINUED | OUTPATIENT
Start: 2023-05-22 | End: 2023-05-23 | Stop reason: HOSPADM

## 2023-05-22 RX ORDER — TRAMADOL HYDROCHLORIDE 50 MG/1
50 TABLET ORAL EVERY 6 HOURS PRN
Status: DISCONTINUED | OUTPATIENT
Start: 2023-05-22 | End: 2023-05-23 | Stop reason: HOSPADM

## 2023-05-22 RX ORDER — ONDANSETRON 4 MG/1
4 TABLET, ORALLY DISINTEGRATING ORAL EVERY 8 HOURS PRN
Status: DISCONTINUED | OUTPATIENT
Start: 2023-05-22 | End: 2023-05-23 | Stop reason: HOSPADM

## 2023-05-22 RX ORDER — TRAMADOL HYDROCHLORIDE 50 MG/1
100 TABLET ORAL EVERY 6 HOURS PRN
Status: DISCONTINUED | OUTPATIENT
Start: 2023-05-22 | End: 2023-05-23 | Stop reason: HOSPADM

## 2023-05-22 RX ORDER — POTASSIUM CHLORIDE 750 MG/1
10 TABLET, FILM COATED, EXTENDED RELEASE ORAL 2 TIMES DAILY
Status: DISCONTINUED | OUTPATIENT
Start: 2023-05-22 | End: 2023-05-23 | Stop reason: HOSPADM

## 2023-05-22 RX ORDER — MIDAZOLAM HYDROCHLORIDE 2 MG/2ML
2 INJECTION, SOLUTION INTRAMUSCULAR; INTRAVENOUS
Status: DISCONTINUED | OUTPATIENT
Start: 2023-05-22 | End: 2023-05-22 | Stop reason: HOSPADM

## 2023-05-22 RX ORDER — HYDROMORPHONE HYDROCHLORIDE 1 MG/ML
0.5 INJECTION, SOLUTION INTRAMUSCULAR; INTRAVENOUS; SUBCUTANEOUS
Status: DISCONTINUED | OUTPATIENT
Start: 2023-05-22 | End: 2023-05-23 | Stop reason: HOSPADM

## 2023-05-22 RX ORDER — ACETAMINOPHEN 500 MG
1000 TABLET ORAL ONCE
Status: COMPLETED | OUTPATIENT
Start: 2023-05-22 | End: 2023-05-22

## 2023-05-22 RX ORDER — FENTANYL CITRATE 50 UG/ML
25 INJECTION, SOLUTION INTRAMUSCULAR; INTRAVENOUS EVERY 5 MIN PRN
Status: COMPLETED | OUTPATIENT
Start: 2023-05-22 | End: 2023-05-22

## 2023-05-22 RX ORDER — SODIUM CHLORIDE 9 MG/ML
INJECTION, SOLUTION INTRAVENOUS PRN
Status: DISCONTINUED | OUTPATIENT
Start: 2023-05-22 | End: 2023-05-23 | Stop reason: HOSPADM

## 2023-05-22 RX ORDER — SODIUM CHLORIDE 0.9 % (FLUSH) 0.9 %
5-40 SYRINGE (ML) INJECTION EVERY 12 HOURS SCHEDULED
Status: DISCONTINUED | OUTPATIENT
Start: 2023-05-22 | End: 2023-05-22 | Stop reason: HOSPADM

## 2023-05-22 RX ORDER — EPHEDRINE SULFATE/0.9% NACL/PF 50 MG/5 ML
SYRINGE (ML) INTRAVENOUS PRN
Status: DISCONTINUED | OUTPATIENT
Start: 2023-05-22 | End: 2023-05-22 | Stop reason: SDUPTHER

## 2023-05-22 RX ORDER — ONDANSETRON 2 MG/ML
INJECTION INTRAMUSCULAR; INTRAVENOUS PRN
Status: DISCONTINUED | OUTPATIENT
Start: 2023-05-22 | End: 2023-05-22 | Stop reason: SDUPTHER

## 2023-05-22 RX ORDER — HYDROMORPHONE HYDROCHLORIDE 1 MG/ML
0.25 INJECTION, SOLUTION INTRAMUSCULAR; INTRAVENOUS; SUBCUTANEOUS
Status: DISCONTINUED | OUTPATIENT
Start: 2023-05-22 | End: 2023-05-23 | Stop reason: HOSPADM

## 2023-05-22 RX ORDER — PHENYLEPHRINE HCL IN 0.9% NACL 0.4MG/10ML
SYRINGE (ML) INTRAVENOUS PRN
Status: DISCONTINUED | OUTPATIENT
Start: 2023-05-22 | End: 2023-05-22 | Stop reason: SDUPTHER

## 2023-05-22 RX ORDER — ROPIVACAINE HYDROCHLORIDE 5 MG/ML
INJECTION, SOLUTION EPIDURAL; INFILTRATION; PERINEURAL
Status: COMPLETED | OUTPATIENT
Start: 2023-05-22 | End: 2023-05-22

## 2023-05-22 RX ORDER — FENTANYL CITRATE 50 UG/ML
100 INJECTION, SOLUTION INTRAMUSCULAR; INTRAVENOUS
Status: DISCONTINUED | OUTPATIENT
Start: 2023-05-22 | End: 2023-05-22 | Stop reason: HOSPADM

## 2023-05-22 RX ORDER — INSULIN LISPRO 100 [IU]/ML
0-4 INJECTION, SOLUTION INTRAVENOUS; SUBCUTANEOUS NIGHTLY
Status: DISCONTINUED | OUTPATIENT
Start: 2023-05-22 | End: 2023-05-23 | Stop reason: HOSPADM

## 2023-05-22 RX ORDER — DEXAMETHASONE SODIUM PHOSPHATE 4 MG/ML
8 INJECTION, SOLUTION INTRA-ARTICULAR; INTRALESIONAL; INTRAMUSCULAR; INTRAVENOUS; SOFT TISSUE ONCE
Status: COMPLETED | OUTPATIENT
Start: 2023-05-22 | End: 2023-05-22

## 2023-05-22 RX ORDER — AMLODIPINE BESYLATE 5 MG/1
5 TABLET ORAL DAILY
Status: DISCONTINUED | OUTPATIENT
Start: 2023-05-22 | End: 2023-05-23 | Stop reason: HOSPADM

## 2023-05-22 RX ORDER — SODIUM CHLORIDE 9 MG/ML
INJECTION, SOLUTION INTRAVENOUS CONTINUOUS
Status: DISCONTINUED | OUTPATIENT
Start: 2023-05-22 | End: 2023-05-23 | Stop reason: HOSPADM

## 2023-05-22 RX ORDER — DEXAMETHASONE SODIUM PHOSPHATE 4 MG/ML
4 INJECTION, SOLUTION INTRA-ARTICULAR; INTRALESIONAL; INTRAMUSCULAR; INTRAVENOUS; SOFT TISSUE
Status: DISCONTINUED | OUTPATIENT
Start: 2023-05-22 | End: 2023-05-22 | Stop reason: HOSPADM

## 2023-05-22 RX ORDER — ACETAMINOPHEN 500 MG
1000 TABLET ORAL ONCE
Status: DISCONTINUED | OUTPATIENT
Start: 2023-05-22 | End: 2023-05-22 | Stop reason: HOSPADM

## 2023-05-22 RX ORDER — PIOGLITAZONEHYDROCHLORIDE 15 MG/1
45 TABLET ORAL NIGHTLY
Status: DISCONTINUED | OUTPATIENT
Start: 2023-05-22 | End: 2023-05-23 | Stop reason: HOSPADM

## 2023-05-22 RX ORDER — TRANEXAMIC ACID 100 MG/ML
INJECTION, SOLUTION INTRAVENOUS PRN
Status: DISCONTINUED | OUTPATIENT
Start: 2023-05-22 | End: 2023-05-22 | Stop reason: SDUPTHER

## 2023-05-22 RX ORDER — ONDANSETRON 2 MG/ML
4 INJECTION INTRAMUSCULAR; INTRAVENOUS EVERY 6 HOURS PRN
Status: DISCONTINUED | OUTPATIENT
Start: 2023-05-22 | End: 2023-05-23 | Stop reason: HOSPADM

## 2023-05-22 RX ORDER — INSULIN LISPRO 100 [IU]/ML
0-8 INJECTION, SOLUTION INTRAVENOUS; SUBCUTANEOUS
Status: DISCONTINUED | OUTPATIENT
Start: 2023-05-22 | End: 2023-05-23 | Stop reason: HOSPADM

## 2023-05-22 RX ORDER — ONDANSETRON 2 MG/ML
4 INJECTION INTRAMUSCULAR; INTRAVENOUS ONCE
Status: DISCONTINUED | OUTPATIENT
Start: 2023-05-22 | End: 2023-05-22 | Stop reason: HOSPADM

## 2023-05-22 RX ORDER — SODIUM CHLORIDE 9 MG/ML
INJECTION, SOLUTION INTRAVENOUS PRN
Status: DISCONTINUED | OUTPATIENT
Start: 2023-05-22 | End: 2023-05-22 | Stop reason: HOSPADM

## 2023-05-22 RX ORDER — SODIUM CHLORIDE 0.9 % (FLUSH) 0.9 %
5-40 SYRINGE (ML) INJECTION PRN
Status: DISCONTINUED | OUTPATIENT
Start: 2023-05-22 | End: 2023-05-23 | Stop reason: HOSPADM

## 2023-05-22 RX ORDER — BISACODYL 5 MG/1
5 TABLET, DELAYED RELEASE ORAL DAILY
Status: DISCONTINUED | OUTPATIENT
Start: 2023-05-22 | End: 2023-05-23 | Stop reason: HOSPADM

## 2023-05-22 RX ORDER — ROPIVACAINE HYDROCHLORIDE 5 MG/ML
INJECTION, SOLUTION EPIDURAL; INFILTRATION; PERINEURAL PRN
Status: DISCONTINUED | OUTPATIENT
Start: 2023-05-22 | End: 2023-05-22 | Stop reason: ALTCHOICE

## 2023-05-22 RX ORDER — ACETAMINOPHEN 325 MG/1
650 TABLET ORAL
Status: DISCONTINUED | OUTPATIENT
Start: 2023-05-22 | End: 2023-05-22 | Stop reason: HOSPADM

## 2023-05-22 RX ORDER — HYDROMORPHONE HYDROCHLORIDE 1 MG/ML
0.25 INJECTION, SOLUTION INTRAMUSCULAR; INTRAVENOUS; SUBCUTANEOUS EVERY 5 MIN PRN
Status: DISCONTINUED | OUTPATIENT
Start: 2023-05-22 | End: 2023-05-22 | Stop reason: HOSPADM

## 2023-05-22 RX ORDER — MIDAZOLAM HYDROCHLORIDE 1 MG/ML
INJECTION INTRAMUSCULAR; INTRAVENOUS PRN
Status: DISCONTINUED | OUTPATIENT
Start: 2023-05-22 | End: 2023-05-22 | Stop reason: SDUPTHER

## 2023-05-22 RX ORDER — ASPIRIN 81 MG/1
81 TABLET ORAL 2 TIMES DAILY
Status: DISCONTINUED | OUTPATIENT
Start: 2023-05-22 | End: 2023-05-22

## 2023-05-22 RX ORDER — FAMOTIDINE 20 MG/1
20 TABLET, FILM COATED ORAL 2 TIMES DAILY
Status: DISCONTINUED | OUTPATIENT
Start: 2023-05-22 | End: 2023-05-23 | Stop reason: HOSPADM

## 2023-05-22 RX ADMIN — ONDANSETRON HYDROCHLORIDE 4 MG: 2 INJECTION, SOLUTION INTRAMUSCULAR; INTRAVENOUS at 08:40

## 2023-05-22 RX ADMIN — Medication 80 MCG: at 08:23

## 2023-05-22 RX ADMIN — Medication 10 MG: at 08:01

## 2023-05-22 RX ADMIN — Medication 80 MCG: at 08:42

## 2023-05-22 RX ADMIN — SUCRALFATE 1 G: 1 TABLET ORAL at 21:49

## 2023-05-22 RX ADMIN — PIOGLITAZONE 45 MG: 30 TABLET ORAL at 21:50

## 2023-05-22 RX ADMIN — ACETAMINOPHEN 325MG 650 MG: 325 TABLET ORAL at 14:24

## 2023-05-22 RX ADMIN — SODIUM CHLORIDE: 9 INJECTION, SOLUTION INTRAVENOUS at 17:45

## 2023-05-22 RX ADMIN — CEFAZOLIN 2000 MG: 1 INJECTION, POWDER, FOR SOLUTION INTRAMUSCULAR; INTRAVENOUS at 14:24

## 2023-05-22 RX ADMIN — TRANEXAMIC ACID 1000 MG: 100 INJECTION, SOLUTION INTRAVENOUS at 07:30

## 2023-05-22 RX ADMIN — SODIUM CHLORIDE: 9 INJECTION, SOLUTION INTRAVENOUS at 09:34

## 2023-05-22 RX ADMIN — FENTANYL CITRATE 25 MCG: 50 INJECTION, SOLUTION INTRAMUSCULAR; INTRAVENOUS at 09:30

## 2023-05-22 RX ADMIN — FAMOTIDINE 20 MG: 20 TABLET, FILM COATED ORAL at 14:24

## 2023-05-22 RX ADMIN — SODIUM CHLORIDE, PRESERVATIVE FREE 10 ML: 5 INJECTION INTRAVENOUS at 21:51

## 2023-05-22 RX ADMIN — Medication 10 MG: at 07:41

## 2023-05-22 RX ADMIN — Medication 80 MCG: at 07:58

## 2023-05-22 RX ADMIN — Medication 10 MG: at 08:43

## 2023-05-22 RX ADMIN — CEFAZOLIN 2000 MG: 1 INJECTION, POWDER, FOR SOLUTION INTRAMUSCULAR; INTRAVENOUS at 21:55

## 2023-05-22 RX ADMIN — SODIUM CHLORIDE, POTASSIUM CHLORIDE, SODIUM LACTATE AND CALCIUM CHLORIDE: 600; 310; 30; 20 INJECTION, SOLUTION INTRAVENOUS at 07:24

## 2023-05-22 RX ADMIN — FAMOTIDINE 20 MG: 20 TABLET, FILM COATED ORAL at 21:50

## 2023-05-22 RX ADMIN — ASPIRIN 81 MG: 81 TABLET, COATED ORAL at 14:24

## 2023-05-22 RX ADMIN — MIDAZOLAM HYDROCHLORIDE 2 MG: 1 INJECTION, SOLUTION INTRAMUSCULAR; INTRAVENOUS at 07:00

## 2023-05-22 RX ADMIN — FENTANYL CITRATE 25 MCG: 50 INJECTION, SOLUTION INTRAMUSCULAR; INTRAVENOUS at 09:20

## 2023-05-22 RX ADMIN — HYDROMORPHONE HYDROCHLORIDE 0.5 MG: 1 INJECTION, SOLUTION INTRAMUSCULAR; INTRAVENOUS; SUBCUTANEOUS at 14:07

## 2023-05-22 RX ADMIN — Medication 80 MCG: at 08:04

## 2023-05-22 RX ADMIN — POTASSIUM CHLORIDE 10 MEQ: 750 TABLET, EXTENDED RELEASE ORAL at 21:50

## 2023-05-22 RX ADMIN — HYDROMORPHONE HYDROCHLORIDE 0.25 MG: 1 INJECTION, SOLUTION INTRAMUSCULAR; INTRAVENOUS; SUBCUTANEOUS at 10:08

## 2023-05-22 RX ADMIN — CELECOXIB 100 MG: 100 CAPSULE ORAL at 06:40

## 2023-05-22 RX ADMIN — Medication 60 MCG: at 07:54

## 2023-05-22 RX ADMIN — LIDOCAINE HYDROCHLORIDE 60 MG: 20 INJECTION, SOLUTION EPIDURAL; INFILTRATION; INTRACAUDAL; PERINEURAL at 07:28

## 2023-05-22 RX ADMIN — FENTANYL CITRATE 25 MCG: 50 INJECTION, SOLUTION INTRAMUSCULAR; INTRAVENOUS at 09:15

## 2023-05-22 RX ADMIN — FENTANYL CITRATE 25 MCG: 50 INJECTION, SOLUTION INTRAMUSCULAR; INTRAVENOUS at 09:25

## 2023-05-22 RX ADMIN — PROPOFOL 75 MCG/KG/MIN: 10 INJECTION, EMULSION INTRAVENOUS at 07:28

## 2023-05-22 RX ADMIN — Medication 60 MCG: at 07:49

## 2023-05-22 RX ADMIN — Medication 80 MCG: at 08:11

## 2023-05-22 RX ADMIN — ACETAMINOPHEN 1000 MG: 500 TABLET ORAL at 06:07

## 2023-05-22 RX ADMIN — ACETAMINOPHEN 325MG 650 MG: 325 TABLET ORAL at 17:49

## 2023-05-22 RX ADMIN — ROPIVACAINE HYDROCHLORIDE 30 ML: 5 INJECTION, SOLUTION EPIDURAL; INFILTRATION; PERINEURAL at 07:05

## 2023-05-22 RX ADMIN — KETOROLAC TROMETHAMINE 15 MG: 30 INJECTION, SOLUTION INTRAMUSCULAR; INTRAVENOUS at 21:50

## 2023-05-22 RX ADMIN — Medication 6 UNITS: at 17:45

## 2023-05-22 RX ADMIN — WATER 2000 MG: 1 INJECTION INTRAMUSCULAR; INTRAVENOUS; SUBCUTANEOUS at 07:25

## 2023-05-22 RX ADMIN — Medication 3 AMPULE: at 06:08

## 2023-05-22 RX ADMIN — Medication 40 MCG: at 07:51

## 2023-05-22 RX ADMIN — DEXAMETHASONE SODIUM PHOSPHATE 8 MG: 4 INJECTION, SOLUTION INTRAMUSCULAR; INTRAVENOUS at 07:30

## 2023-05-22 RX ADMIN — TRAMADOL HYDROCHLORIDE 50 MG: 50 TABLET ORAL at 11:08

## 2023-05-22 RX ADMIN — BISACODYL 5 MG: 5 TABLET, COATED ORAL at 17:46

## 2023-05-22 RX ADMIN — Medication 80 MCG: at 08:18

## 2023-05-22 RX ADMIN — MEPIVACAINE HYDROCHLORIDE 50 MG: 15 INJECTION, SOLUTION EPIDURAL; INFILTRATION at 07:00

## 2023-05-22 RX ADMIN — HYDROMORPHONE HYDROCHLORIDE 0.25 MG: 1 INJECTION, SOLUTION INTRAMUSCULAR; INTRAVENOUS; SUBCUTANEOUS at 09:50

## 2023-05-22 RX ADMIN — SODIUM CHLORIDE, POTASSIUM CHLORIDE, SODIUM LACTATE AND CALCIUM CHLORIDE: 600; 310; 30; 20 INJECTION, SOLUTION INTRAVENOUS at 06:38

## 2023-05-22 RX ADMIN — KETOROLAC TROMETHAMINE 15 MG: 30 INJECTION, SOLUTION INTRAMUSCULAR; INTRAVENOUS at 17:46

## 2023-05-22 ASSESSMENT — PAIN DESCRIPTION - ORIENTATION
ORIENTATION: LEFT

## 2023-05-22 ASSESSMENT — PAIN DESCRIPTION - DESCRIPTORS
DESCRIPTORS: SORE
DESCRIPTORS: ACHING
DESCRIPTORS: ACHING
DESCRIPTORS: THROBBING
DESCRIPTORS: SORE

## 2023-05-22 ASSESSMENT — PAIN DESCRIPTION - LOCATION
LOCATION: KNEE

## 2023-05-22 ASSESSMENT — PAIN SCALES - GENERAL
PAINLEVEL_OUTOF10: 0
PAINLEVEL_OUTOF10: 8
PAINLEVEL_OUTOF10: 0
PAINLEVEL_OUTOF10: 8
PAINLEVEL_OUTOF10: 6
PAINLEVEL_OUTOF10: 10
PAINLEVEL_OUTOF10: 9
PAINLEVEL_OUTOF10: 6
PAINLEVEL_OUTOF10: 0
PAINLEVEL_OUTOF10: 6

## 2023-05-22 ASSESSMENT — COPD QUESTIONNAIRES: CAT_SEVERITY: MODERATE

## 2023-05-22 ASSESSMENT — PAIN DESCRIPTION - PAIN TYPE: TYPE: SURGICAL PAIN

## 2023-05-22 NOTE — ANESTHESIA POSTPROCEDURE EVALUATION
Department of Anesthesiology  Postprocedure Note    Patient: Deepti Kraft  MRN: [id number]  YOB: 1955  Date of evaluation: 5/22/2023      Procedure Summary     Date: 05/22/23 Room / Location: [facility] MAIN OR M3 / [facility] MAIN OR    Anesthesia Start: 7553 Anesthesia Stop: 4694    Procedure: LEFT TOTAL KNEE ARTHROPLASTY (Left: Knee) Diagnosis:       Osteoarthritis of left knee, unspecified osteoarthritis type      (Osteoarthritis of left knee, unspecified osteoarthritis type [M17.12])    Providers: Lucien Stahl DO Responsible Provider: Aniket Nam MD    Anesthesia Type: spinal ASA Status: 2          Anesthesia Type: No value filed.     Horacio Phase I: Horacio Score: 9    Horacio Phase II:        Anesthesia Post Evaluation    Patient location during evaluation: PACU  Patient participation: complete - patient participated  Level of consciousness: sleepy but conscious and responsive to verbal stimuli  Pain score: 1  Airway patency: patent  Nausea & Vomiting: no vomiting and no nausea  Complications: no  Cardiovascular status: blood pressure returned to baseline and hemodynamically stable  Respiratory status: acceptable  Hydration status: stable

## 2023-05-22 NOTE — H&P
Date of Admission: 2023    Chief Complaint: Left knee pain    HPI: This is a 74 y.o. female who complains of left knee pain. Onset was gradual.  The patient has had activity dependent pain for years. The patient has tried activity modification, physical therapy exercises, injections have failed to provide good relief. The pain is in the knee, it is severe in intensity. The patient feels unstable with the knee, fears falling, and has significant limitation with activities of daily living, recreation, and walks with a limp. Past Medical History:   Diagnosis Date    COPD (chronic obstructive pulmonary disease) ([facility] 7/15/2011    Diverticulitis     Esophageal reflux 7/15/2011    GERD (gastroesophageal reflux disease)     Hypertension     Osteoarthrosis, unspecified whether generalized or localized, lower leg 7/15/2011    Other and unspecified hyperlipidemia 7/15/2011    Type II or unspecified type diabetes mellitus without mention of complication, uncontrolled 7/15/2011    type 2       Past Surgical History:   Procedure Laterality Date    COLONOSCOPY N/A 2017    COLONOSCOPY performed by Dali Almonte MD at [facility] AMBULATORY OR    COLONOSCOPY N/A 2018    COLONOSCOPY performed by Dali Almonte MD at [facility] ENDOSCOPY    GYN      uterine surgery    HAND SURGERY      HYSTERECTOMY (CERVIX STATUS UNKNOWN)      LAP,TUBAL CAUTERY Bilateral        No current facility-administered medications on file prior to encounter. No current outpatient medications on file prior to encounter.        No Known Allergies    Family History   Problem Relation Age of Onset    Other Daughter         cholesterol    Deep Vein Thrombosis Daughter     Diabetes Father     Lung Disease Daughter        Social History     Socioeconomic History    Marital status:    Tobacco Use    Smoking status: Former     Types: Cigarettes     Quit date: 4/15/2015     Years since quittin.1    Smokeless tobacco: Never

## 2023-05-22 NOTE — PROGRESS NOTES
Ortho / Neurosurgery NP Note    POD# 0  s/p LEFT TOTAL KNEE ARTHROPLASTY   Pt seen with no visitor present. Pt resting in bed. Awake and alert. Reports BLE numbness resolved s/p spinal block   Reports minimal postop pain, recently medicated with tramadol   (Hx intolerance to oxycodone, causes N/V and sedation)   Acute retention in PACU, straight cath for 1L     Tolerating regular diet. No nausea. VSS Afebrile. Room air. Visit Vitals  /89   Pulse 99   Temp 97.7 °F (36.5 °C) (Oral)   Resp 16   Ht 1.549 m (5' 1\")   Wt 77.5 kg (170 lb 13.7 oz)   SpO2 96%   BMI 32.28 kg/m²       Voiding status: due to void          Labs    Lab Results   Component Value Date/Time    HGB 12.2 05/15/2023 11:23 AM      Lab Results   Component Value Date/Time    INR 1.0 05/15/2023 11:23 AM      Lab Results   Component Value Date/Time     05/15/2023 11:23 AM    K 4.0 05/15/2023 11:23 AM     05/15/2023 11:23 AM    CO2 26 05/15/2023 11:23 AM    BUN 29 05/15/2023 11:23 AM     Recent Glucose Results:   Glucose   Date Value Ref Range Status   05/15/2023 117 (H) 65 - 100 mg/dL Final   04/11/2023 156 (H) 65 - 100 mg/dL Final   12/30/2022 167 (H) 65 - 100 mg/dL Final           Body mass index is 32.28 kg/m². : A BMI > 30 is classified as obesity and > 40 is classified as morbid obesity. Dressing c. d. I - ace wrap in place   Cryotherapy in place over incision  Calves soft and supple; No pain with passive stretch  Sensation and motor intact. +PF/DF/EHL intact, +pedal pulse   SCDs for mechanical DVT proph while in bed     PLAN:  1) PT BID - WBAT  2) Aspirin 81 mg PO BID for DVT Prophylaxis   3) GI Prophylaxis - Pepcid  4) Pain control - scheduled tylenol  and toradol, and prn  tramadol    5) Acute urinary retention - straight cath x 1 for 1L. Continue to monitor bladder scans/PVR and follow protocol.    6) Readniess for discharge:     [x] Vital Signs stable    [] Hgb stable    [] + Voiding    [x] Wound intact, drainage

## 2023-05-22 NOTE — ANESTHESIA PROCEDURE NOTES
Spinal Block    End time: 5/22/2023 7:04 AM  Reason for block: primary anesthetic  Staffing  Performed: anesthesiologist   Anesthesiologist: Yoon Salazar MD  Spinal Block  Patient position: sitting  Prep: DuraPrep  Patient monitoring: cardiac monitor, continuous pulse ox, frequent blood pressure checks and oxygen  Approach: midline  Location: L4/L5  Provider prep: mask, sterile gloves and sterile gown  Needle  Needle type: Pencan   Needle gauge: 24 G  Needle length: 4 in  Assessment  Sensory level: T4  Swirl obtained: Yes  CSF: clear  Hemodynamics: stable  Preanesthetic Checklist  Completed: patient identified, IV checked, site marked, risks and benefits discussed, surgical/procedural consents, equipment checked, pre-op evaluation, timeout performed, anesthesia consent given, oxygen available, monitors applied/VS acknowledged, fire risk safety assessment completed and verbalized and blood product R/B/A discussed and consented

## 2023-05-22 NOTE — ANESTHESIA PROCEDURE NOTES
Peripheral Block    Patient location during procedure: procedure area  Reason for block: post-op pain management  Start time: 5/22/2023 7:05 AM  End time: 5/22/2023 7:11 AM  Staffing  Performed: anesthesiologist   Anesthesiologist: Yg Silva MD  Preanesthetic Checklist  Completed: patient identified, IV checked, site marked, risks and benefits discussed, surgical/procedural consents, equipment checked, pre-op evaluation, timeout performed, anesthesia consent given, oxygen available, monitors applied/VS acknowledged, fire risk safety assessment completed and verbalized and blood product R/B/A discussed and consented  Peripheral Block   Patient position: supine  Prep: ChloraPrep  Provider prep: mask, sterile gloves and sterile gown  Patient monitoring: cardiac monitor, continuous pulse ox, frequent blood pressure checks, IV access, oxygen and responsive to questions  Block type: Femoral  Adductor canal  Laterality: left  Injection technique: single-shot  Guidance: ultrasound guided    Needle   Needle type: insulated echogenic nerve stimulator needle   Needle gauge: 22 G  Needle localization: anatomical landmarks and ultrasound guidance  Assessment   Injection assessment: negative aspiration for heme, no paresthesia on injection, local visualized surrounding nerve on ultrasound and no intravascular symptoms  Paresthesia pain: none  Slow fractionated injection: yes  Hemodynamics: stable  Real-time US image taken/store: yes  Outcomes: uncomplicated and patient tolerated procedure well    Medications Administered  ropivacaine (NAROPIN) injection 0.5% - Perineural   30 mL - 5/22/2023 7:05:00 AM

## 2023-05-22 NOTE — ANESTHESIA PRE PROCEDURE
Cardiovascular:Negative CV ROS    (+) hypertension: moderate,         Rhythm: regular  Rate: normal                    Neuro/Psych:   Negative Neuro/Psych ROS              GI/Hepatic/Renal: Neg GI/Hepatic/Renal ROS  (+) GERD: well controlled,           Endo/Other: Negative Endo/Other ROS   (+) DiabetesType II DM, , .                 Abdominal:             Vascular: negative vascular ROS. Other Findings:           Anesthesia Plan      spinal     ASA 2     (With adductor canal block)  Induction: intravenous. Anesthetic plan and risks discussed with patient. Plan discussed with CRNA.                     Shanna Rush MD   5/22/2023

## 2023-05-22 NOTE — PROGRESS NOTES
Irrisept Wound Debridement and Cleansing System  Ref: Carie Cranker: [id number] LOT: [id number] Expiration Date: 03/31/2025

## 2023-05-22 NOTE — DISCHARGE INSTRUCTIONS
laxatives. You should have a bowel movement 3-4 days after surgery and then at least every other day while on pain medication. To improve your recovery, you must be active! Use your walker and take short walks (in your home) about every 2 hours during the day. Try to increase how far you walk each day. You can put as much weight on your leg as you can tolerate while walking. Home health physical therapy will come to your home the day after you leave the hospital. The therapist will visit about 4 times over the next couple of weeks to teach you exercises, how to get out of bed and how to safely walk in your home. NO DRIVING until your surgeon tells you it is ok. You can return to work when cleared by a physician. Please call your physician immediately if you have:  Constant bleeding from your wound. Increasing redness or swelling around your wound (some warmth, bruising and swelling is normal). Change in wound drainage (increase in amount, color, or bad smell). Change in mental status (unusual behavior). Temperature over 101.5 degrees Fahrenheit   Pain or redness in the calf (back of your lower leg)  Increased swelling of the thigh, ankle, calf, or foot. Emergency! CALL 911 if you have:  Shortness of breath  Chest pain when you cough or taking a deep breath        Please call your surgeons office to make your follow up appointment in 2 weeks. If you have questions or concerns during normal business hours, you may reach Dr. Gian Ferrari Team at [phone].                              Instructions for 24 hours after receiving General Anesthesia or Intravenous Sedation,   and while taking prescription Narcotics    Common side effects associated with each of these medications includes:  - Drowsiness, dizziness, euphoria, sleepiness or confusion  - Impaired memory recall  - Unsteady gait, loss of fine muscle control and delayed reaction time  - Visual disturbances,

## 2023-05-22 NOTE — PERIOP NOTE
12 room assignment pending, patient updated  via cell       1142  TRANSFER - OUT REPORT:    Verbal report given to Aurora Felty RN (name) on Komal Last  being transferred to [facility](unit) for routine post-op       Report consisted of patients Situation, Background, Assessment and   Recommendations(SBAR). Information from the following report(s) Surgery Report, Intake/Output, MAR, Recent Results, and Cardiac Rhythm SR   was reviewed with the receiving nurse. Opportunity for questions and clarification was provided.       Patient transported with:   O2 @ 1 liters  Tech

## 2023-05-23 VITALS
HEIGHT: 61 IN | OXYGEN SATURATION: 99 % | RESPIRATION RATE: 16 BRPM | DIASTOLIC BLOOD PRESSURE: 80 MMHG | TEMPERATURE: 98.2 F | WEIGHT: 170.86 LBS | SYSTOLIC BLOOD PRESSURE: 137 MMHG | BODY MASS INDEX: 32.26 KG/M2 | HEART RATE: 88 BPM

## 2023-05-23 LAB
ANION GAP SERPL CALC-SCNC: 4 MMOL/L (ref 5–15)
BUN SERPL-MCNC: 22 MG/DL (ref 6–20)
BUN/CREAT SERPL: 27 (ref 12–20)
CALCIUM SERPL-MCNC: 9 MG/DL (ref 8.5–10.1)
CHLORIDE SERPL-SCNC: 108 MMOL/L (ref 97–108)
CO2 SERPL-SCNC: 26 MMOL/L (ref 21–32)
CREAT SERPL-MCNC: 0.82 MG/DL (ref 0.55–1.02)
GLUCOSE SERPL-MCNC: 161 MG/DL (ref 65–100)
HCT VFR BLD AUTO: 32.7 % (ref 35–47)
HGB BLD-MCNC: 10.3 G/DL (ref 11.5–16)
POTASSIUM SERPL-SCNC: 3.7 MMOL/L (ref 3.5–5.1)
SODIUM SERPL-SCNC: 138 MMOL/L (ref 136–145)

## 2023-05-23 PROCEDURE — 6370000000 HC RX 637 (ALT 250 FOR IP): Performed by: ORTHOPAEDIC SURGERY

## 2023-05-23 PROCEDURE — 97116 GAIT TRAINING THERAPY: CPT

## 2023-05-23 PROCEDURE — 80048 BASIC METABOLIC PNL TOTAL CA: CPT

## 2023-05-23 PROCEDURE — 85018 HEMOGLOBIN: CPT

## 2023-05-23 PROCEDURE — 2580000003 HC RX 258: Performed by: ORTHOPAEDIC SURGERY

## 2023-05-23 PROCEDURE — 36415 COLL VENOUS BLD VENIPUNCTURE: CPT

## 2023-05-23 PROCEDURE — 6360000002 HC RX W HCPCS: Performed by: ORTHOPAEDIC SURGERY

## 2023-05-23 PROCEDURE — 85014 HEMATOCRIT: CPT

## 2023-05-23 PROCEDURE — 6370000000 HC RX 637 (ALT 250 FOR IP): Performed by: NURSE PRACTITIONER

## 2023-05-23 PROCEDURE — 97530 THERAPEUTIC ACTIVITIES: CPT

## 2023-05-23 RX ORDER — ASPIRIN 81 MG/1
81 TABLET ORAL 2 TIMES DAILY
Status: DISCONTINUED | OUTPATIENT
Start: 2023-05-23 | End: 2023-05-23 | Stop reason: HOSPADM

## 2023-05-23 RX ORDER — TRAMADOL HYDROCHLORIDE 50 MG/1
50-100 TABLET ORAL EVERY 6 HOURS PRN
Qty: 30 TABLET | Refills: 0 | Status: SHIPPED | OUTPATIENT
Start: 2023-05-23 | End: 2023-05-30

## 2023-05-23 RX ORDER — SENNA PLUS 8.6 MG/1
1 TABLET ORAL 2 TIMES DAILY
Qty: 14 TABLET | Refills: 0 | Status: SHIPPED | OUTPATIENT
Start: 2023-05-23 | End: 2023-05-30

## 2023-05-23 RX ORDER — ASPIRIN 81 MG/1
81 TABLET ORAL 2 TIMES DAILY
Qty: 30 TABLET | Refills: 3 | Status: SHIPPED
Start: 2023-05-23

## 2023-05-23 RX ADMIN — TRAMADOL HYDROCHLORIDE 50 MG: 50 TABLET ORAL at 12:47

## 2023-05-23 RX ADMIN — ATORVASTATIN CALCIUM 40 MG: 40 TABLET, FILM COATED ORAL at 09:06

## 2023-05-23 RX ADMIN — ACETAMINOPHEN 325MG 650 MG: 325 TABLET ORAL at 00:20

## 2023-05-23 RX ADMIN — BISACODYL 5 MG: 5 TABLET, COATED ORAL at 09:06

## 2023-05-23 RX ADMIN — SODIUM CHLORIDE, PRESERVATIVE FREE 10 ML: 5 INJECTION INTRAVENOUS at 09:08

## 2023-05-23 RX ADMIN — FAMOTIDINE 20 MG: 20 TABLET, FILM COATED ORAL at 09:06

## 2023-05-23 RX ADMIN — ASPIRIN 81 MG: 81 TABLET, COATED ORAL at 12:46

## 2023-05-23 RX ADMIN — TRAMADOL HYDROCHLORIDE 100 MG: 50 TABLET ORAL at 07:07

## 2023-05-23 RX ADMIN — SODIUM CHLORIDE: 9 INJECTION, SOLUTION INTRAVENOUS at 09:05

## 2023-05-23 RX ADMIN — KETOROLAC TROMETHAMINE 15 MG: 30 INJECTION, SOLUTION INTRAMUSCULAR; INTRAVENOUS at 09:06

## 2023-05-23 RX ADMIN — KETOROLAC TROMETHAMINE 15 MG: 30 INJECTION, SOLUTION INTRAMUSCULAR; INTRAVENOUS at 03:42

## 2023-05-23 RX ADMIN — POTASSIUM CHLORIDE 10 MEQ: 750 TABLET, EXTENDED RELEASE ORAL at 09:06

## 2023-05-23 RX ADMIN — ACETAMINOPHEN 325MG 650 MG: 325 TABLET ORAL at 12:47

## 2023-05-23 RX ADMIN — SUCRALFATE 1 G: 1 TABLET ORAL at 09:06

## 2023-05-23 RX ADMIN — AMLODIPINE BESYLATE 5 MG: 5 TABLET ORAL at 09:06

## 2023-05-23 RX ADMIN — ACETAMINOPHEN 325MG 650 MG: 325 TABLET ORAL at 06:15

## 2023-05-23 ASSESSMENT — PAIN SCALES - GENERAL
PAINLEVEL_OUTOF10: 0
PAINLEVEL_OUTOF10: 0
PAINLEVEL_OUTOF10: 5
PAINLEVEL_OUTOF10: 0
PAINLEVEL_OUTOF10: 8
PAINLEVEL_OUTOF10: 0

## 2023-05-23 ASSESSMENT — PAIN DESCRIPTION - LOCATION
LOCATION: BACK
LOCATION: KNEE

## 2023-05-23 ASSESSMENT — PAIN DESCRIPTION - PAIN TYPE
TYPE: SURGICAL PAIN
TYPE: ACUTE PAIN

## 2023-05-23 ASSESSMENT — PAIN DESCRIPTION - DESCRIPTORS
DESCRIPTORS: ACHING
DESCRIPTORS: ACHING

## 2023-05-23 ASSESSMENT — PAIN DESCRIPTION - ORIENTATION
ORIENTATION: MID
ORIENTATION: LEFT

## 2023-05-23 NOTE — PROGRESS NOTES
Ortho / Neurosurgery NP Note    POD# 1  s/p LEFT TOTAL KNEE ARTHROPLASTY   Pt seen with no visitor present. Pt resting in bed. Awake and alert. Reports no postop pain, well controlled with tramadol. Only c/o \"stiffness\"   (Hx intolerance to oxycodone, causes N/V and sedation)   Acute retention in PACU, straight cath for 1L, now voiding     Tolerating regular diet. No nausea. VSS Afebrile. Room air. Visit Vitals  /76   Pulse 88   Temp 98.8 °F (37.1 °C) (Oral)   Resp 18   Ht 1.549 m (5' 1\")   Wt 77.5 kg (170 lb 13.7 oz)   SpO2 96%   BMI 32.28 kg/m²       Voiding status: +void. Reports frequency and nocturia PTA, hx bladder prolapse s/p bladder tack procedure, has a f/u June 6th. Labs    Lab Results   Component Value Date/Time    HGB 10.3 05/23/2023 02:48 AM      Lab Results   Component Value Date/Time    INR 1.0 05/15/2023 11:23 AM      Lab Results   Component Value Date/Time     05/23/2023 02:48 AM    K 3.7 05/23/2023 02:48 AM     05/23/2023 02:48 AM    CO2 26 05/23/2023 02:48 AM    BUN 22 05/23/2023 02:48 AM     Recent Glucose Results:   Glucose   Date Value Ref Range Status   05/23/2023 161 (H) 65 - 100 mg/dL Final   05/15/2023 117 (H) 65 - 100 mg/dL Final   04/11/2023 156 (H) 65 - 100 mg/dL Final           Body mass index is 32.28 kg/m². : A BMI > 30 is classified as obesity and > 40 is classified as morbid obesity. Dressing c. d. I - ace wrap removed   New cryotherapy in place over incision  Calves soft and supple; No pain with passive stretch  Sensation and motor intact. +PF/DF/EHL intact, +pedal pulse   SCDs for mechanical DVT proph while in bed     PLAN:  1) PT BID - WBAT  2) Aspirin 81 mg PO BID for DVT Prophylaxis   3) GI Prophylaxis - Pepcid  4) Pain control - scheduled tylenol  and toradol, and prn  tramadol    5) Acute urinary retention - resolved.  Plans to f/u with Uro/gyn on June 6th   6) Readniess for discharge:     [x] Vital Signs stable    [x] Hgb stable

## 2023-05-23 NOTE — DISCHARGE SUMMARY
Ortho Discharge Summary    Patient ID:  Joana Wilson  [id number]  female  74 y.o.  1955    Admit date: 5/22/2023    Discharge date: 5/23/2023    Admitting Physician: Lauren Mcknight DO     Consulting Physician(s):   Treatment Team: Attending Provider: Lauren Mcknight DO; Surgeon: Lauren Mcknight DO; Physical Therapist Assistant: Kerry Davila PTA    Date of Surgery:   5/22/2023     Preoperative Diagnosis:  Osteoarthritis of left knee, unspecified osteoarthritis type [M17.12]    Postoperative Diagnosis:   * No post-op diagnosis entered *    Procedure(s):   LEFT TOTAL KNEE ARTHROPLASTY     Anesthesia Type:   Spinal     Surgeon: Lauren Mcknight DO                            HPI:  Pt is a 74 y.o. female who has a history of Osteoarthritis of left knee, unspecified osteoarthritis type [M17.12]  with pain and limitations of activities of daily living who presents at this time for a LEFT TOTAL KNEE ARTHROPLASTY following the failure of conservative management. PMH:   Past Medical History:   Diagnosis Date    COPD (chronic obstructive pulmonary disease) ([facility] 7/15/2011    Diverticulitis     Esophageal reflux 7/15/2011    GERD (gastroesophageal reflux disease)     Hypertension     Osteoarthrosis, unspecified whether generalized or localized, lower leg 7/15/2011    Other and unspecified hyperlipidemia 7/15/2011    Type II or unspecified type diabetes mellitus without mention of complication, uncontrolled 7/15/2011    type 2       Body mass index is 32.28 kg/m². : A BMI > 30 is classified as obesity and > 40 is classified as morbid obesity. Medications upon admission :   Prior to Admission Medications   Prescriptions Last Dose Informant Patient Reported? Taking?    Cholecalciferol (VITAMIN D3) 50 MCG (2000 UT) TABS 5/15/2023  Yes No   Sig: Take 1 tablet by mouth 2 times daily   Multiple Vitamins-Minerals (MULTIVITAMIN WOMEN PO) 5/15/2023  Yes No   Sig: Take by mouth daily   amLODIPine (NORVASC) 5 MG tablet

## 2023-05-23 NOTE — CARE COORDINATION
CM met with pt at bedside, introduced role, & verified demographic information (address/insurance/emergency contact) is up-to-date in the chart. D/c plan discussed. Transportation for d/c:    Support post d/c:    Does pt own DME needed for d/c: RW ordered through [facility] [facility], approved, and provided to pt at bedside   Accepting [facility] agency:  [employer] [address], [place], [address]

## 2023-05-23 NOTE — OP NOTE
Operative Note      Patient: Teja Dia  YOB: 1955  MRN: [id number]    Date of Procedure: 5/22/2023    Pre-Op Diagnosis Codes:     * Osteoarthritis of left knee, unspecified osteoarthritis type [M17.12]    Post-Op Diagnosis: Same       Procedure(s):  LEFT TOTAL KNEE ARTHROPLASTY    Surgeon(s):  Dori Rosario DO    Assistant:   * No surgical staff found *    Anesthesia: Spinal    Estimated Blood Loss (mL): less than 848     Complications: None    Specimens:   * No specimens in log *    Implants:  Implant Name Type Inv. Item Serial No.  Lot No. LRB No. Used Action   COMPONENT FEM SZ 4 L KNEE TRITANIUM ERINN APATITE POST STBL - SNA  COMPONENT FEM SZ 4 L KNEE TRITANIUM ERINN APATITE POST STBL NA [employer] [id number] Left 1 Implanted   COMPONENT PAT [id number] THK9MM KNEE TRITANIUM MTL BK - SNA  COMPONENT PAT [id number] THK9MM KNEE TRITANIUM MTL BK NA [employer]  Left 1 Implanted   BASEPLATE TIB SZ 4 [id number] ML70MM KNEE TRITANIUM 4 CRUCFRM - SNA  BASEPLATE TIB SZ 4 [id number] ML70MM KNEE TRITANIUM 4 CRUCFRM NA [employer] [id number] Left 1 Implanted   INSERT TIB PS 4 11 MM KNEE 5/PK X3 TRIATHLON - SNA  INSERT TIB PS 4 11 MM KNEE 5/PK X3 TRIATHLON NA [employer] [id number] Left 1 Implanted         Drains: * No LDAs found *    Findings: OA      Detailed Description of Procedure:     Preoperative range of motion: 0 degrees extension to 110 degrees flexion  Postoperative range of motion: 0 degrees extension to 130 degrees flexion     OPERATIVE INDICATIONS: This is a 74 y. o.female who failed nonoperative management of arthrosis of the left knee. They did elect for a total knee arthroplasty.  We did discuss the risks of surgery which include but are not limited to infection, nerve or blood vessel damage, need for reoperation, death, disability, DVT, PE, postoperative pain, swelling and stiffness, loss of range of motion, implant failure, knee

## 2023-05-25 LAB
GLUCOSE BLD STRIP.AUTO-MCNC: 161 MG/DL (ref 65–117)
GLUCOSE BLD STRIP.AUTO-MCNC: 177 MG/DL (ref 65–117)
GLUCOSE BLD STRIP.AUTO-MCNC: 245 MG/DL (ref 65–117)
SERVICE CMNT-IMP: ABNORMAL

## 2023-05-25 RX ORDER — HYDROCHLOROTHIAZIDE 25 MG/1
1 TABLET ORAL DAILY
COMMUNITY
Start: 2023-01-03

## 2023-05-25 RX ORDER — LIDOCAINE 50 MG/G
PATCH TOPICAL
COMMUNITY
Start: 2022-01-22

## 2023-05-25 RX ORDER — GENTAMICIN SULFATE 3 MG/ML
SOLUTION/ DROPS OPHTHALMIC
COMMUNITY
Start: 2023-01-27

## 2023-05-25 RX ORDER — CIPROFLOXACIN 500 MG/1
TABLET, FILM COATED ORAL 2 TIMES DAILY
COMMUNITY
Start: 2023-04-23

## 2023-05-25 RX ORDER — NYSTATIN 100000 [USP'U]/G
POWDER TOPICAL 2 TIMES DAILY
COMMUNITY
Start: 2023-03-28

## 2023-05-25 RX ORDER — ONDANSETRON 4 MG/1
TABLET, ORALLY DISINTEGRATING ORAL EVERY 8 HOURS PRN
COMMUNITY
Start: 2023-04-11 | End: 2023-06-13

## 2023-05-25 RX ORDER — SUCRALFATE 1 G/1
1 TABLET ORAL 4 TIMES DAILY
COMMUNITY
Start: 2023-03-28

## 2023-05-25 RX ORDER — PREDNISONE 10 MG/1
TABLET ORAL
COMMUNITY
Start: 2022-12-30

## 2023-05-25 RX ORDER — SUCRALFATE 1 G/1
TABLET ORAL
Qty: 120 TABLET | Refills: 0 | Status: SHIPPED | OUTPATIENT
Start: 2023-05-25

## 2023-05-25 NOTE — TELEPHONE ENCOUNTER
Last appointment: 05/12/2023 MD Irving Camarena   Next appointment: 07/14/2023 MD Irving Camarena   Previous refill encounter(s):   03/28/2023 Carafate #120 with 1 refill    For Pharmacy Admin Tracking Only    Program: Medication Refill  Intervention Detail: New Rx: 1, reason: Patient Preference  Time Spent (min): 5      Requested Prescriptions     Pending Prescriptions Disp Refills    sucralfate (CARAFATE) 1 GM tablet [Pharmacy Med Name: SUCRALFATE 1 GM TABLET] 120 tablet 0     Sig: TAKE 1 TABLET BY MOUTH FOUR TIMES A DAY

## 2023-06-01 DIAGNOSIS — Z96.652 AFTERCARE FOLLOWING LEFT KNEE JOINT REPLACEMENT SURGERY: Primary | ICD-10-CM

## 2023-06-01 DIAGNOSIS — Z47.1 AFTERCARE FOLLOWING LEFT KNEE JOINT REPLACEMENT SURGERY: Primary | ICD-10-CM

## 2023-06-05 ENCOUNTER — HOSPITAL ENCOUNTER (EMERGENCY)
Facility: HOSPITAL | Age: 68
Discharge: HOME | End: 2023-06-05
Attending: EMERGENCY MEDICINE
Payer: COMMERCIAL

## 2023-06-05 ENCOUNTER — APPOINTMENT (OUTPATIENT)
Facility: HOSPITAL | Age: 68
End: 2023-06-05
Payer: COMMERCIAL

## 2023-06-05 VITALS
OXYGEN SATURATION: 97 % | HEIGHT: 63 IN | TEMPERATURE: 97.5 F | HEART RATE: 109 BPM | SYSTOLIC BLOOD PRESSURE: 142 MMHG | WEIGHT: 170.42 LBS | RESPIRATION RATE: 20 BRPM | DIASTOLIC BLOOD PRESSURE: 84 MMHG | BODY MASS INDEX: 30.2 KG/M2

## 2023-06-05 DIAGNOSIS — M25.562 ACUTE PAIN OF LEFT KNEE: Primary | ICD-10-CM

## 2023-06-05 DIAGNOSIS — L03.116 CELLULITIS OF LEFT LOWER EXTREMITY: ICD-10-CM

## 2023-06-05 LAB
ANION GAP SERPL CALC-SCNC: 7 MMOL/L (ref 5–15)
BASOPHILS # BLD: 0 K/UL (ref 0–0.1)
BASOPHILS NFR BLD: 0 % (ref 0–1)
BUN SERPL-MCNC: 35 MG/DL (ref 6–20)
BUN/CREAT SERPL: 33 (ref 12–20)
CALCIUM SERPL-MCNC: 9.7 MG/DL (ref 8.5–10.1)
CHLORIDE SERPL-SCNC: 105 MMOL/L (ref 97–108)
CO2 SERPL-SCNC: 26 MMOL/L (ref 21–32)
CREAT SERPL-MCNC: 1.05 MG/DL (ref 0.55–1.02)
CRP SERPL-MCNC: 21.8 MG/DL (ref 0–0.6)
DIFFERENTIAL METHOD BLD: ABNORMAL
EOSINOPHIL # BLD: 0.2 K/UL (ref 0–0.4)
EOSINOPHIL NFR BLD: 2 % (ref 0–7)
ERYTHROCYTE [DISTWIDTH] IN BLOOD BY AUTOMATED COUNT: 14.6 % (ref 11.5–14.5)
ERYTHROCYTE [SEDIMENTATION RATE] IN BLOOD: 128 MM/HR (ref 0–30)
GLUCOSE SERPL-MCNC: 158 MG/DL (ref 65–100)
HCT VFR BLD AUTO: 30.1 % (ref 35–47)
HGB BLD-MCNC: 9 G/DL (ref 11.5–16)
IMM GRANULOCYTES # BLD AUTO: 0 K/UL (ref 0–0.04)
IMM GRANULOCYTES NFR BLD AUTO: 0 % (ref 0–0.5)
LYMPHOCYTES # BLD: 2.4 K/UL (ref 0.8–3.5)
LYMPHOCYTES NFR BLD: 20 % (ref 12–49)
MCH RBC QN AUTO: 28.9 PG (ref 26–34)
MCHC RBC AUTO-ENTMCNC: 29.9 G/DL (ref 30–36.5)
MCV RBC AUTO: 96.8 FL (ref 80–99)
METAMYELOCYTES NFR BLD MANUAL: 2 %
MONOCYTES # BLD: 0.6 K/UL (ref 0–1)
MONOCYTES NFR BLD: 5 % (ref 5–13)
NEUTS SEG # BLD: 8.7 K/UL (ref 1.8–8)
NEUTS SEG NFR BLD: 71 % (ref 32–75)
NRBC # BLD: 0 K/UL (ref 0–0.01)
NRBC BLD-RTO: 0 PER 100 WBC
PLATELET # BLD AUTO: 534 K/UL (ref 150–400)
PMV BLD AUTO: 9.7 FL (ref 8.9–12.9)
POTASSIUM SERPL-SCNC: 3.9 MMOL/L (ref 3.5–5.1)
RBC # BLD AUTO: 3.11 M/UL (ref 3.8–5.2)
RBC MORPH BLD: ABNORMAL
SODIUM SERPL-SCNC: 138 MMOL/L (ref 136–145)
WBC # BLD AUTO: 12.2 K/UL (ref 3.6–11)

## 2023-06-05 PROCEDURE — 86140 C-REACTIVE PROTEIN: CPT

## 2023-06-05 PROCEDURE — 99284 EMERGENCY DEPT VISIT MOD MDM: CPT

## 2023-06-05 PROCEDURE — 85025 COMPLETE CBC W/AUTO DIFF WBC: CPT

## 2023-06-05 PROCEDURE — 96365 THER/PROPH/DIAG IV INF INIT: CPT

## 2023-06-05 PROCEDURE — 2580000003 HC RX 258: Performed by: EMERGENCY MEDICINE

## 2023-06-05 PROCEDURE — 80048 BASIC METABOLIC PNL TOTAL CA: CPT

## 2023-06-05 PROCEDURE — 73560 X-RAY EXAM OF KNEE 1 OR 2: CPT

## 2023-06-05 PROCEDURE — 87040 BLOOD CULTURE FOR BACTERIA: CPT

## 2023-06-05 PROCEDURE — 85652 RBC SED RATE AUTOMATED: CPT

## 2023-06-05 PROCEDURE — 6360000002 HC RX W HCPCS: Performed by: EMERGENCY MEDICINE

## 2023-06-05 PROCEDURE — 36415 COLL VENOUS BLD VENIPUNCTURE: CPT

## 2023-06-05 RX ORDER — SULFAMETHOXAZOLE AND TRIMETHOPRIM 800; 160 MG/1; MG/1
1 TABLET ORAL 2 TIMES DAILY
Qty: 18 TABLET | Refills: 0 | Status: SHIPPED | OUTPATIENT
Start: 2023-06-05 | End: 2023-06-14

## 2023-06-05 RX ADMIN — SODIUM CHLORIDE 1000 MG: 900 INJECTION INTRAVENOUS at 16:46

## 2023-06-05 ASSESSMENT — LIFESTYLE VARIABLES
HOW MANY STANDARD DRINKS CONTAINING ALCOHOL DO YOU HAVE ON A TYPICAL DAY: PATIENT DOES NOT DRINK
HOW OFTEN DO YOU HAVE A DRINK CONTAINING ALCOHOL: NEVER

## 2023-06-05 ASSESSMENT — PAIN - FUNCTIONAL ASSESSMENT: PAIN_FUNCTIONAL_ASSESSMENT: 0-10

## 2023-06-05 ASSESSMENT — PAIN SCALES - GENERAL: PAINLEVEL_OUTOF10: 8

## 2023-06-05 NOTE — ED PROVIDER NOTES
[facility] EMERGENCY DEPT  EMERGENCY DEPARTMENT ENCOUNTER       Pt Name: Diann Schneider  MRN: [id number]  [place] [birth year]  Date of evaluation: 6/5/2023  Provider: Jake Pandey MD   PCP: Vicente Pederson MD  Note Started: 9:30 PM 6/5/23     CHIEF COMPLAINT       Chief Complaint   Patient presents with    Post-op Problem     Pt ambulatory into triage with a cc of left knee drainage and redness after knee replacement 2 weeks ago; pt also complains of blood in stool once after \"straining to hard\"        HISTORY OF PRESENT ILLNESS: 1 or more elements      History From: patient, History limited by: none     Diann Schneider is a 74 y.o. female who is currently approximately 2 weeks postop for left knee replacement presents with left knee pain. Patient reports 1 day of increasing knee pain and swelling. Has noted redness along the incision and some purulent drainage. Denies fever or chills. Referred to ED by physical therapy. Scheduled for follow-up tomorrow. Please See MDM for Additional Details of the HPI/PMH  Nursing Notes were all reviewed and agreed with or any disagreements were addressed in the HPI. REVIEW OF SYSTEMS        Positives and Pertinent negatives as per HPI.     PAST HISTORY     Past Medical History:  Past Medical History:   Diagnosis Date    COPD (chronic obstructive pulmonary disease) ([facility] 7/15/2011    Diverticulitis     Esophageal reflux 7/15/2011    GERD (gastroesophageal reflux disease)     Hypertension     Osteoarthrosis, unspecified whether generalized or localized, lower leg 7/15/2011    Other and unspecified hyperlipidemia 7/15/2011    Type II or unspecified type diabetes mellitus without mention of complication, uncontrolled 7/15/2011    type 2       Past Surgical History:  Past Surgical History:   Procedure Laterality Date    COLONOSCOPY N/A 9/18/2017    COLONOSCOPY performed by Graciela Baldwin MD at [facility] AMBULATORY OR    COLONOSCOPY N/A 4/25/2018    COLONOSCOPY performed by

## 2023-06-05 NOTE — DISCHARGE INSTRUCTIONS
Please take the antibiotics and follow-up with Dr. Samir Limon in 2 days. Return for new or worsening symptoms at any time.

## 2023-06-05 NOTE — ED NOTES
Pt discharged by Adriana Ahumada RN. Discharge instructions discussed and pt given opportunity to ask questions.  Pt ambulatory out of ED        Paula Ascencio RN  06/05/23 [id number]

## 2023-06-09 ENCOUNTER — OFFICE VISIT (OUTPATIENT)
Age: 68
End: 2023-06-09

## 2023-06-09 VITALS
SYSTOLIC BLOOD PRESSURE: 106 MMHG | DIASTOLIC BLOOD PRESSURE: 72 MMHG | HEIGHT: 63 IN | OXYGEN SATURATION: 97 % | WEIGHT: 170 LBS | BODY MASS INDEX: 30.12 KG/M2 | TEMPERATURE: 98.8 F | HEART RATE: 110 BPM | RESPIRATION RATE: 18 BRPM

## 2023-06-09 DIAGNOSIS — Z47.1 AFTERCARE FOLLOWING LEFT KNEE JOINT REPLACEMENT SURGERY: Primary | ICD-10-CM

## 2023-06-09 DIAGNOSIS — Z96.652 AFTERCARE FOLLOWING LEFT KNEE JOINT REPLACEMENT SURGERY: Primary | ICD-10-CM

## 2023-06-09 ASSESSMENT — PATIENT HEALTH QUESTIONNAIRE - PHQ9
SUM OF ALL RESPONSES TO PHQ QUESTIONS 1-9: 0
2. FEELING DOWN, DEPRESSED OR HOPELESS: 0
SUM OF ALL RESPONSES TO PHQ9 QUESTIONS 1 & 2: 0
SUM OF ALL RESPONSES TO PHQ QUESTIONS 1-9: 0
1. LITTLE INTEREST OR PLEASURE IN DOING THINGS: 0

## 2023-06-09 NOTE — PROGRESS NOTES
is here for a follow up visit from a total knee arthroplasty on the left side. The patient is doing well, with no medical complications since discharge. Pain has been appropriate since surgery,and the patient is progressing well with physical therapy. Patient is ambulating with a cane. Current Outpatient Medications on File Prior to Visit   Medication Sig Dispense Refill    sulfamethoxazole-trimethoprim (BACTRIM DS) 800-160 MG per tablet Take 1 tablet by mouth 2 times daily for 9 days 18 tablet 0    sucralfate (CARAFATE) 1 GM tablet TAKE 1 TABLET BY MOUTH FOUR TIMES A  tablet 0    ciprofloxacin (CIPRO) 500 MG tablet Take by mouth 2 times daily      gentamicin (GARAMYCIN) 0.3 % ophthalmic solution Si drop to lt.eye tid  Indications: pink eye from bacterial infection      hydroCHLOROthiazide (HYDRODIURIL) 25 MG tablet Take 1 tablet by mouth daily      lidocaine (LIDODERM) 5 % Apply patch to the affected area for 12 hours a day and remove for 12 hours a day.       metFORMIN (GLUCOPHAGE) 1000 MG tablet Take 1 tablet by mouth 2 times daily (with meals)      nystatin (MYCOSTATIN) 628116 UNIT/GM powder Apply topically 2 times daily      ondansetron (ZOFRAN-ODT) 4 MG disintegrating tablet Take by mouth every 8 hours as needed      predniSONE (DELTASONE) 10 MG tablet 6 tabs today and reduce by 1 tab daily      sucralfate (CARAFATE) 1 GM tablet Take 1 tablet by mouth 4 times daily      aspirin 81 MG EC tablet Take 1 tablet by mouth in the morning and at bedtime 30 tablet 3    Multiple Vitamins-Minerals (MULTIVITAMIN WOMEN PO) Take by mouth daily      nystatin (MYCOSTATIN) 346659 UNIT/GM powder Apply topically 3 times daily      amLODIPine (NORVASC) 5 MG tablet Take 1 tablet by mouth daily      atorvastatin (LIPITOR) 40 MG tablet Take 1 tablet by mouth daily      Cholecalciferol (VITAMIN D3) 50 MCG (2000 UT) TABS Take 1 tablet by mouth 2 times daily      diclofenac (VOLTAREN) 75 MG EC tablet Si

## 2023-06-09 NOTE — PROGRESS NOTES
Identified pt with two pt identifiers (name and ). Reviewed chart in preparation for visit and have obtained necessary documentation. Casimir Mohs is a 74 y.o. female  Chief Complaint   Patient presents with    Knee Pain     LT Knee      Ht 5' 3\" (1.6 m)   Wt 170 lb (77.1 kg)   BMI 30.11 kg/m²   1. Have you been to the ER, urgent care clinic since your last visit? Hospitalized since your last visit? [id number]    2. Have you seen or consulted any other health care providers outside of the [address] [place] [address] since your last visit? Include any pap smears or colon screening.  No

## 2023-06-11 LAB
BACTERIA SPEC CULT: NORMAL
SERVICE CMNT-IMP: NORMAL

## 2023-06-15 ENCOUNTER — HOSPITAL ENCOUNTER (OUTPATIENT)
Facility: HOSPITAL | Age: 68
Setting detail: RECURRING SERIES
Discharge: HOME | End: 2023-06-18
Payer: COMMERCIAL

## 2023-06-15 PROCEDURE — 97162 PT EVAL MOD COMPLEX 30 MIN: CPT

## 2023-06-21 ENCOUNTER — HOSPITAL ENCOUNTER (OUTPATIENT)
Facility: HOSPITAL | Age: 68
Setting detail: RECURRING SERIES
Discharge: HOME | End: 2023-06-24
Payer: COMMERCIAL

## 2023-06-21 PROCEDURE — 97016 VASOPNEUMATIC DEVICE THERAPY: CPT

## 2023-06-21 PROCEDURE — 97110 THERAPEUTIC EXERCISES: CPT

## 2023-06-21 PROCEDURE — 97140 MANUAL THERAPY 1/> REGIONS: CPT

## 2023-06-21 NOTE — PROGRESS NOTES
PHYSICAL THERAPY - DAILY TREATMENT NOTE (updated 3/23)      Date: 2023          Patient Name:  Chema Ambrose :  1955   Medical   Diagnosis:  Aftercare following left knee joint replacement surgery Treatment Diagnosis:  M25.562  LEFT KNEE PAIN    Referral Source:  Pito Tabor DO Insurance:   Payor: [address] [place] [address] / Plan: [address] [place] [address] / Product Type: *No Product type* /                     Patient  verified yes     Visit #   Current  / Total 2 24   Time   In / Out 1:36pm 2:45pm   Total Treatment Time 69   Total Timed Codes 59         SUBJECTIVE    Pain Level (0-10 scale): 4    Any medication changes, allergies to medications, adverse drug reactions, diagnosis change, or new procedure performed?: [x] No    [] Yes (see summary sheet for update)  Medications: Verified on Patient Summary List    Subjective functional status/changes: The patient reports that the weather is making her a little extra achey; she's been trying to keep up with her exercises. OBJECTIVE      Therapeutic Procedures: Tx Min Billable or 1:1 Min (if diff from Tx Min) Procedure, Rationale, Specifics   49  [id number] Therapeutic Exercise (timed):  increase ROM, strength, coordination, balance, and proprioception to improve patient's ability to progress to PLOF and address remaining functional goals. (see flow sheet as applicable)     Details if applicable:     10  76377 Manual Therapy (timed):  decrease pain, increase ROM, increase tissue extensibility, and decrease edema to improve patient's ability to progress to PLOF and address remaining functional goals. The manual therapy interventions were performed at a separate and distinct time from the therapeutic activities interventions .  (see flow sheet as applicable)     Details if applicable:  Patellar mobs and PROM into extension with overpressure while long sitting; PROM into flexion at EOB with tibia mobs/oscillations         Details if

## 2023-06-23 ENCOUNTER — OFFICE VISIT (OUTPATIENT)
Age: 68
End: 2023-06-23

## 2023-06-23 VITALS
RESPIRATION RATE: 17 BRPM | DIASTOLIC BLOOD PRESSURE: 67 MMHG | TEMPERATURE: 97.9 F | WEIGHT: 165 LBS | HEART RATE: 104 BPM | OXYGEN SATURATION: 97 % | HEIGHT: 63 IN | BODY MASS INDEX: 29.23 KG/M2 | SYSTOLIC BLOOD PRESSURE: 97 MMHG

## 2023-06-23 DIAGNOSIS — Z96.652 AFTERCARE FOLLOWING LEFT KNEE JOINT REPLACEMENT SURGERY: Primary | ICD-10-CM

## 2023-06-23 DIAGNOSIS — Z47.1 AFTERCARE FOLLOWING LEFT KNEE JOINT REPLACEMENT SURGERY: Primary | ICD-10-CM

## 2023-06-23 ASSESSMENT — PATIENT HEALTH QUESTIONNAIRE - PHQ9
SUM OF ALL RESPONSES TO PHQ QUESTIONS 1-9: 0
SUM OF ALL RESPONSES TO PHQ QUESTIONS 1-9: 0
1. LITTLE INTEREST OR PLEASURE IN DOING THINGS: 0
SUM OF ALL RESPONSES TO PHQ9 QUESTIONS 1 & 2: 0
2. FEELING DOWN, DEPRESSED OR HOPELESS: 0
SUM OF ALL RESPONSES TO PHQ QUESTIONS 1-9: 0
SUM OF ALL RESPONSES TO PHQ QUESTIONS 1-9: 0

## 2023-06-23 NOTE — PROGRESS NOTES
[unfilled]  Uche Guanako is a 74 y.o. female  Chief Complaint   Patient presents with    Post-Op Check     Ht 5' 3\" (1.6 m)   Wt 165 lb (74.8 kg)   BMI 29.23 kg/m²   1. Have you been to the ER, urgent care clinic since your last visit? Hospitalized since your last visit? No    2. Have you seen or consulted any other health care providers outside of the [address] [place] [address] since your last visit? Include any pap smears or colon screening.  No
hydroCHLOROthiazide (HYDRODIURIL) 25 MG tablet Take 1 tablet by mouth daily      losartan (COZAAR) 50 MG tablet Take 1 tablet by mouth daily      pioglitazone (ACTOS) 45 MG tablet Take 1 tablet by mouth nightly      potassium chloride (MICRO-K) 10 MEQ extended release capsule Take 1 capsule by mouth 2 times daily       No current facility-administered medications on file prior to visit. ROS:  General: denies agitation, major chest pain, unexpected weakness  Patient complains of left knee pain which is  managed with tylenol. Skin: healing wound is without issue or drainage  Strength: appropriate weakness of involved extremity is resolving since surgery      Physical Examination:    There were no vitals taken for this visit. Dressing: Clean, Dry, Intact  Skin: no significant drainage, no wound dehiscence. Range of motion: 0-125  Coronal plane stability is excellent  Sensation intact to light touch at level of wound and distally  Strength is 4/5 with knee flexion and extension  Leg lengths are clinically equal  Distal swelling is noted, but appropriate for postoperative course  Distal capillary refill less than 2 seconds      Imaging:    Postoperative xrays are reviewed, they indicate a left total knee arthroplasty in excellent position without evidence of loosening or failure. Assessment: Status post left total knee arthroplasty, doing well    Plan:  Patient will continue physical therapy, with the goal of outpatient therapy and then home exercise program as soon as is possible    Wound care and dental prophylaxis instructions were reviewed  Continue to weight bear as tolerated without restriction, except to keep to the positions of comfort. Emphasis was placed on range of motion and strength exercises daily. Deep Venous Thrombosis Prophylaxis  Follow up will be at 6 weeks from now,  no xrays on follow up. MsPhilipp Vidhi Tran has a reminder for a \"due or due soon\" health maintenance.  I have asked

## 2023-06-28 ENCOUNTER — HOSPITAL ENCOUNTER (OUTPATIENT)
Facility: HOSPITAL | Age: 68
Setting detail: RECURRING SERIES
Discharge: HOME | End: 2023-07-01
Payer: COMMERCIAL

## 2023-06-28 PROCEDURE — 97016 VASOPNEUMATIC DEVICE THERAPY: CPT

## 2023-06-28 PROCEDURE — 97140 MANUAL THERAPY 1/> REGIONS: CPT

## 2023-06-28 PROCEDURE — 97110 THERAPEUTIC EXERCISES: CPT

## 2023-07-03 ENCOUNTER — HOSPITAL ENCOUNTER (OUTPATIENT)
Facility: HOSPITAL | Age: 68
Setting detail: RECURRING SERIES
Discharge: HOME | End: 2023-07-06
Payer: COMMERCIAL

## 2023-07-03 PROCEDURE — 97140 MANUAL THERAPY 1/> REGIONS: CPT

## 2023-07-03 PROCEDURE — 97016 VASOPNEUMATIC DEVICE THERAPY: CPT

## 2023-07-03 PROCEDURE — 97110 THERAPEUTIC EXERCISES: CPT

## 2023-07-03 NOTE — PROGRESS NOTES
PHYSICAL THERAPY - DAILY TREATMENT NOTE (updated 3/23)      Date: 7/3/2023          Patient Name:  Maria Ines Antoine :  1955   Medical   Diagnosis:  Aftercare following left knee joint replacement surgery Treatment Diagnosis:  M25.562  LEFT KNEE PAIN    Referral Source:  Kishor Troncoso DO Insurance:   Payor: [employer] / Plan: [address] / Product Type: *No Product type* /                     Patient  verified yes     Visit #   Current  / Total 4 24   Time   In / Out 201 308   Total Treatment Time 67   Total Timed Codes 57         SUBJECTIVE    Pain Level (0-10 scale): 0    Any medication changes, allergies to medications, adverse drug reactions, diagnosis change, or new procedure performed?: [x] No    [] Yes (see summary sheet for update)  Medications: Verified on Patient Summary List    Subjective functional status/changes: The patient noted they have been doing alright since previous treatment session, continues to note lots of \"tossing and turning,\" in order to find a comfortable position in bed to fall asleep in. Noted they continue to feel stiffness, especially at night when they are trying to get to bed. OBJECTIVE      Therapeutic Procedures: Tx Min Billable or 1:1 Min (if diff from Tx Min) Procedure, Rationale, Specifics   47  [id number] Therapeutic Exercise (timed):  increase ROM, strength, coordination, balance, and proprioception to improve patient's ability to progress to PLOF and address remaining functional goals. (see flow sheet as applicable)     Details if applicable:     10  84292 Manual Therapy (timed):  decrease pain, increase ROM, increase tissue extensibility, and decrease edema to improve patient's ability to progress to PLOF and address remaining functional goals. The manual therapy interventions were performed at a separate and distinct time from the therapeutic activities interventions .  (see flow sheet as applicable)     Details if

## 2023-07-05 ENCOUNTER — HOSPITAL ENCOUNTER (OUTPATIENT)
Facility: HOSPITAL | Age: 68
Setting detail: RECURRING SERIES
Discharge: HOME | End: 2023-07-08
Payer: COMMERCIAL

## 2023-07-05 PROCEDURE — 97016 VASOPNEUMATIC DEVICE THERAPY: CPT

## 2023-07-05 PROCEDURE — 97110 THERAPEUTIC EXERCISES: CPT

## 2023-07-05 PROCEDURE — 97140 MANUAL THERAPY 1/> REGIONS: CPT

## 2023-07-05 NOTE — PROGRESS NOTES
PHYSICAL THERAPY - DAILY TREATMENT NOTE (updated 3/23)      Date: 2023          Patient Name:  Bacilio Sheikh :  1955   Medical   Diagnosis:  Aftercare following left knee joint replacement surgery Treatment Diagnosis:  M25.562  LEFT KNEE PAIN    Referral Source:  Vinh Persaud DO Insurance:   Payor: [employer] / Plan: [address] / Product Type: *No Product type* /                     Patient  verified yes     Visit #   Current  / Total 5 24   Time   In / Out 1:32pm 2:33pm   Total Treatment Time 61   Total Timed Codes 61         SUBJECTIVE    Pain Level (0-10 scale): 5    Any medication changes, allergies to medications, adverse drug reactions, diagnosis change, or new procedure performed?: [x] No    [] Yes (see summary sheet for update)  Medications: Verified on Patient Summary List    Subjective functional status/changes: The patient reports that she walked a few blocks with her rollator to see fireworks, so she's a little extra sore today. OBJECTIVE      Therapeutic Procedures: Tx Min Billable or 1:1 Min (if diff from Tx Min) Procedure, Rationale, Specifics   41  96209 Therapeutic Exercise (timed):  increase ROM, strength, coordination, balance, and proprioception to improve patient's ability to progress to PLOF and address remaining functional goals. (see flow sheet as applicable)     Details if applicable:     10  79965 Manual Therapy (timed):  decrease pain, increase ROM, increase tissue extensibility, and decrease edema to improve patient's ability to progress to PLOF and address remaining functional goals. The manual therapy interventions were performed at a separate and distinct time from the therapeutic activities interventions .  (see flow sheet as applicable)     Details if applicable:  Patellar mobs and PROM into extension with overpressure while long sitting; PROM into flexion at EOB with tibia mobs/oscillations         Details if

## 2023-07-10 ENCOUNTER — HOSPITAL ENCOUNTER (OUTPATIENT)
Facility: HOSPITAL | Age: 68
Setting detail: RECURRING SERIES
End: 2023-07-10
Payer: COMMERCIAL

## 2023-07-12 ENCOUNTER — HOSPITAL ENCOUNTER (OUTPATIENT)
Facility: HOSPITAL | Age: 68
Setting detail: RECURRING SERIES
Discharge: HOME | End: 2023-07-15
Payer: COMMERCIAL

## 2023-07-12 PROCEDURE — 97140 MANUAL THERAPY 1/> REGIONS: CPT

## 2023-07-12 PROCEDURE — 97016 VASOPNEUMATIC DEVICE THERAPY: CPT

## 2023-07-12 PROCEDURE — 97110 THERAPEUTIC EXERCISES: CPT

## 2023-07-12 NOTE — PROGRESS NOTES
PHYSICAL THERAPY - DAILY TREATMENT NOTE (updated 3/23)      Date: 2023          Patient Name:  Laura Wu :  1955   Medical   Diagnosis:  Aftercare following left knee joint replacement surgery Treatment Diagnosis:  M25.562  LEFT KNEE PAIN    Referral Source:  Lino Danielson DO Insurance:   Payor: [employer] / Plan: [address] / Product Type: *No Product type* /                     Patient  verified yes     Visit #   Current  / Total 6 24   Time   In / Out 1:34pm 2:46pm   Total Treatment Time 72   Total Timed Codes 72         SUBJECTIVE    Pain Level (0-10 scale): 3.5    Any medication changes, allergies to medications, adverse drug reactions, diagnosis change, or new procedure performed?: [x] No    [] Yes (see summary sheet for update)  Medications: Verified on Patient Summary List    Subjective functional status/changes: The patient reports that she was doing okay until  night when her  rolled onto the knee a few times; it made it extra tender in general.     OBJECTIVE      Therapeutic Procedures: Tx Min Billable or 1:1 Min (if diff from Tx Min) Procedure, Rationale, Specifics   52  [id number] Therapeutic Exercise (timed):  increase ROM, strength, coordination, balance, and proprioception to improve patient's ability to progress to PLOF and address remaining functional goals. (see flow sheet as applicable)     Details if applicable:     10  90783 Manual Therapy (timed):  decrease pain, increase ROM, increase tissue extensibility, and decrease edema to improve patient's ability to progress to PLOF and address remaining functional goals. The manual therapy interventions were performed at a separate and distinct time from the therapeutic activities interventions .  (see flow sheet as applicable)     Details if applicable:  Patellar mobs and PROM into extension with overpressure while long sitting; PROM into flexion at EOB with tibia

## 2023-07-12 NOTE — PROGRESS NOTES
[facility]  [facility] [address] ([place]), [address], [address]  Phone: [phone]   Fax: [phone]     PHYSICAL THERAPY PROGRESS NOTE  Patient Name:  Bacilio Sheikh :  [birth year]   Treatment/Medical Diagnosis: Aftercare following left knee joint replacement surgery   Referral Source:  Vinh Persaud DO     Date of Initial Visit:  6/15/23 Attended Visits:  6 Missed Visits:  1     SUMMARY OF TREATMENT/ASSESSMENT:  Therapy has included therex, manual techniques, and modalities to assist with improving strength, ROM, and decrease pain and edema s/p left TKR 23    CURRENT STATUS  The patient has progressed with the following objective measures for functional strength and stability:  Today vs. (At eval):  5x STS: 16s (20s)  30s STS: 9x (7x)  SLS: R= 4s (4s); L= 3s (2s)  Tandem: R (fwd)= 47s (35s); L (fwd)= >1min (5s)  TUs (20s with SPC)  2MWT: 4.75laps (3.25laps with SPC)     The patient has improved her left knee A/PROM: Flexion= 125/130 (110/115 at eval); Extension= -2/0 (-12/0 at eval). Her pain range has been 0-4/10 over the past week. She's been able to be on her feet for at least 5 min without using her SPC until sitting due to fatigue or discomfort. She feels 25% better than the first day and 50% to full function, but this is mainly due to a flare up since  night when her  rolled onto her knee multiple times, causing it to be extra tender. She will benefit from continued therapy to progress her functional strength and ROM as tolerated. Short Term Goals: To be accomplished in 2 treatments:                         1.) The patient will be independent with their HEP consistently for at least one week. - MET  Long Term Goals: To be accomplished in 24 treatments:                         1.) The patient will have at most 5/10 pain with daily activities. - MET                         2.) The patient will be able

## 2023-07-14 ENCOUNTER — OFFICE VISIT (OUTPATIENT)
Facility: CLINIC | Age: 68
End: 2023-07-14
Payer: COMMERCIAL

## 2023-07-14 VITALS
WEIGHT: 165 LBS | RESPIRATION RATE: 16 BRPM | OXYGEN SATURATION: 95 % | BODY MASS INDEX: 29.23 KG/M2 | DIASTOLIC BLOOD PRESSURE: 66 MMHG | HEART RATE: 94 BPM | SYSTOLIC BLOOD PRESSURE: 110 MMHG | TEMPERATURE: 98 F | HEIGHT: 63 IN

## 2023-07-14 DIAGNOSIS — E78.00 PURE HYPERCHOLESTEROLEMIA, UNSPECIFIED: ICD-10-CM

## 2023-07-14 DIAGNOSIS — E55.9 VITAMIN D DEFICIENCY, UNSPECIFIED: ICD-10-CM

## 2023-07-14 DIAGNOSIS — E11.9 TYPE 2 DIABETES MELLITUS WITHOUT COMPLICATION, WITHOUT LONG-TERM CURRENT USE OF INSULIN (HCC): Primary | ICD-10-CM

## 2023-07-14 DIAGNOSIS — Z96.652 S/P TOTAL KNEE ARTHROPLASTY, LEFT: ICD-10-CM

## 2023-07-14 DIAGNOSIS — I10 ESSENTIAL (PRIMARY) HYPERTENSION: ICD-10-CM

## 2023-07-14 PROCEDURE — 99214 OFFICE O/P EST MOD 30 MIN: CPT | Performed by: INTERNAL MEDICINE

## 2023-07-14 PROCEDURE — 3074F SYST BP LT 130 MM HG: CPT | Performed by: INTERNAL MEDICINE

## 2023-07-14 PROCEDURE — 1123F ACP DISCUSS/DSCN MKR DOCD: CPT | Performed by: INTERNAL MEDICINE

## 2023-07-14 PROCEDURE — 3078F DIAST BP <80 MM HG: CPT | Performed by: INTERNAL MEDICINE

## 2023-07-14 PROCEDURE — 3044F HG A1C LEVEL LT 7.0%: CPT | Performed by: INTERNAL MEDICINE

## 2023-07-14 ASSESSMENT — PATIENT HEALTH QUESTIONNAIRE - PHQ9
SUM OF ALL RESPONSES TO PHQ QUESTIONS 1-9: 0
SUM OF ALL RESPONSES TO PHQ QUESTIONS 1-9: 0
SUM OF ALL RESPONSES TO PHQ9 QUESTIONS 1 & 2: 0
1. LITTLE INTEREST OR PLEASURE IN DOING THINGS: 0
SUM OF ALL RESPONSES TO PHQ QUESTIONS 1-9: 0
SUM OF ALL RESPONSES TO PHQ QUESTIONS 1-9: 0
2. FEELING DOWN, DEPRESSED OR HOPELESS: 0

## 2023-07-14 NOTE — PROGRESS NOTES
Rajwinder Olvera is a 74 y.o. female and presents with Cholesterol Problem, Hypertension, Gestational Diabetes, and Diabetes  . Subjective:  Hypertension Review:  The patient has hypertension . Diet and Lifestyle: generally follows a low sodium diet, exercises sporadically  Home BP Monitoring: is not measured at home. Pertinent ROS: taking medications as instructed, no medication side effects noted, no TIA's, no chest pain on exertion, no dyspnea on exertion, no swelling of ankles. Dyslipidemia Review:  Patient presents for evaluation of lipids. Compliance with treatment thus far has been excellent. A repeat fasting lipid profile was not done. The patient does not use medications that may worsen dyslipidemias . The patient exercises sporadically. Diabetes Mellitus Review:  She has diabetes mellitus. Diabetic ROS - medication compliance: compliant all of the time, diabetic diet compliance: compliant all of the time, home glucose monitoring: is performed. Known diabetic complications: none  Cardiovascular risk factors: family history, dyslipidemia, diabetes mellitus, obesity, hypertension  Current diabetic medications include oral agents   Eye exam current (within one year): no  Weight trend: stable  Prior visit with dietician: no  Current diet: \"healthy\" diet  in general  Current exercise: walking  Current monitoring regimen: home blood tests - daily  Home blood sugar records: trend: stable  Any episodes of hypoglycemia? no  Lab review: orders written for new lab studies as appropriate; see orders. She is s/p knee replacement  She states she has done well post-op    GERD Review:   Patient has a history of gastroesophageal reflux with heartburn. Symptoms have been present for a few months. She denies dysphagia. She  has not lost weight. She denies melena, hematochezia, hematemesis, and coffee ground emesis. This has been associated with fullness after meals.   She denies abdominal bloating and

## 2023-07-14 NOTE — PROGRESS NOTES
1. Have you been to the ER, urgent care clinic since your last visit? Hospitalized since your last visit? No    2. Have you seen or consulted any other health care providers outside of the [address] [place] [address] since your last visit? Include any pap smears or colon screening.  No     Chief Complaint   Patient presents with    Cholesterol Problem    Hypertension    Gestational Diabetes    Diabetes           PHQ-9 Total Score: 0 (7/14/2023  9:30 AM)

## 2023-07-17 ENCOUNTER — HOSPITAL ENCOUNTER (OUTPATIENT)
Facility: HOSPITAL | Age: 68
Setting detail: RECURRING SERIES
Discharge: HOME | End: 2023-07-20
Payer: COMMERCIAL

## 2023-07-17 PROCEDURE — 97110 THERAPEUTIC EXERCISES: CPT

## 2023-07-17 PROCEDURE — 97016 VASOPNEUMATIC DEVICE THERAPY: CPT

## 2023-07-17 PROCEDURE — 97140 MANUAL THERAPY 1/> REGIONS: CPT

## 2023-07-17 NOTE — PROGRESS NOTES
PHYSICAL THERAPY - DAILY TREATMENT NOTE (updated 3/23)      Date: 2023          Patient Name:  Amelia Delgadillo :  1955   Medical   Diagnosis:  Aftercare following left knee joint replacement surgery Treatment Diagnosis:  M25.562  LEFT KNEE PAIN    Referral Source:  Sandhya Pavon DO Insurance:   Payor: [employer] / Plan: [address] / Product Type: *No Product type* /                     Patient  verified yes     Visit #   Current  / Total 7 24   Time   In / Out 1:40pm 2:41pm   Total Treatment Time 61   Total Timed Codes 51         SUBJECTIVE    Pain Level (0-10 scale): 7    Any medication changes, allergies to medications, adverse drug reactions, diagnosis change, or new procedure performed?: [x] No    [] Yes (see summary sheet for update)  Medications: Verified on Patient Summary List    Subjective functional status/changes: The patient reports that the knee is just extra sore and stiff from cleaning out a cabinet this morning (she was sitting in a chair but didn't move it much). It felt fine over the weekend and it recovered from the flare up of last week. OBJECTIVE      Therapeutic Procedures: Tx Min Billable or 1:1 Min (if diff from Tx Min) Procedure, Rationale, Specifics   41  [id number] Therapeutic Exercise (timed):  increase ROM, strength, coordination, balance, and proprioception to improve patient's ability to progress to PLOF and address remaining functional goals. (see flow sheet as applicable)     Details if applicable:     10  64576 Manual Therapy (timed):  decrease pain, increase ROM, increase tissue extensibility, and decrease edema to improve patient's ability to progress to PLOF and address remaining functional goals. The manual therapy interventions were performed at a separate and distinct time from the therapeutic activities interventions .  (see flow sheet as applicable)     Details if applicable:  Patellar mobs and PROM into extension

## 2023-07-19 ENCOUNTER — HOSPITAL ENCOUNTER (OUTPATIENT)
Facility: HOSPITAL | Age: 68
Setting detail: RECURRING SERIES
Discharge: HOME | End: 2023-07-22
Payer: COMMERCIAL

## 2023-07-19 PROCEDURE — 97110 THERAPEUTIC EXERCISES: CPT

## 2023-07-19 PROCEDURE — 97016 VASOPNEUMATIC DEVICE THERAPY: CPT

## 2023-07-19 PROCEDURE — 97140 MANUAL THERAPY 1/> REGIONS: CPT

## 2023-07-19 NOTE — PROGRESS NOTES
PHYSICAL THERAPY - DAILY TREATMENT NOTE (updated 3/23)      Date: 2023          Patient Name:  Mercedes Ferreira :  1955   Medical   Diagnosis:  Aftercare following left knee joint replacement surgery Treatment Diagnosis:  M25.562  LEFT KNEE PAIN    Referral Source:  Crystal Harris DO Insurance:   Payor: [employer] / Plan: [address] / Product Type: *No Product type* /                     Patient  verified yes     Visit #   Current  / Total 8 24   Time   In / Out 1:32pm 2:29pm   Total Treatment Time 57   Total Timed Codes 57         SUBJECTIVE    Pain Level (0-10 scale): 0    Any medication changes, allergies to medications, adverse drug reactions, diagnosis change, or new procedure performed?: [x] No    [] Yes (see summary sheet for update)  Medications: Verified on Patient Summary List    Subjective functional status/changes: The patient reports that she was a little sore but not too bad after last time. OBJECTIVE      Therapeutic Procedures: Tx Min Billable or 1:1 Min (if diff from Tx Min) Procedure, Rationale, Specifics   37  72070 Therapeutic Exercise (timed):  increase ROM, strength, coordination, balance, and proprioception to improve patient's ability to progress to PLOF and address remaining functional goals. (see flow sheet as applicable)     Details if applicable:     10  44978 Manual Therapy (timed):  decrease pain, increase ROM, increase tissue extensibility, and decrease edema to improve patient's ability to progress to PLOF and address remaining functional goals. The manual therapy interventions were performed at a separate and distinct time from the therapeutic activities interventions .  (see flow sheet as applicable)     Details if applicable:  Patellar mobs and PROM into extension with overpressure while long sitting; PROM into flexion at EOB with tibia mobs/oscillations         Details if applicable:           Details if applicable:

## 2023-07-20 ENCOUNTER — APPOINTMENT (OUTPATIENT)
Facility: HOSPITAL | Age: 68
End: 2023-07-20
Payer: COMMERCIAL

## 2023-07-20 ENCOUNTER — HOSPITAL ENCOUNTER (EMERGENCY)
Facility: HOSPITAL | Age: 68
Discharge: HOME | End: 2023-07-20
Payer: COMMERCIAL

## 2023-07-20 VITALS
SYSTOLIC BLOOD PRESSURE: 118 MMHG | OXYGEN SATURATION: 94 % | HEART RATE: 74 BPM | HEIGHT: 63 IN | RESPIRATION RATE: 19 BRPM | TEMPERATURE: 97.5 F | BODY MASS INDEX: 29.23 KG/M2 | WEIGHT: 165 LBS | DIASTOLIC BLOOD PRESSURE: 74 MMHG

## 2023-07-20 DIAGNOSIS — M54.50 ACUTE RIGHT-SIDED LOW BACK PAIN, UNSPECIFIED WHETHER SCIATICA PRESENT: Primary | ICD-10-CM

## 2023-07-20 LAB
APPEARANCE UR: CLEAR
BACTERIA URNS QL MICRO: NEGATIVE /HPF
BILIRUB UR QL: NEGATIVE
COLOR UR: NORMAL
EPITH CASTS URNS QL MICRO: NORMAL /LPF
GLUCOSE UR STRIP.AUTO-MCNC: NEGATIVE MG/DL
HGB UR QL STRIP: NEGATIVE
KETONES UR QL STRIP.AUTO: NEGATIVE MG/DL
LEUKOCYTE ESTERASE UR QL STRIP.AUTO: NEGATIVE
NITRITE UR QL STRIP.AUTO: NEGATIVE
PH UR STRIP: 6 (ref 5–8)
PROT UR STRIP-MCNC: NEGATIVE MG/DL
RBC #/AREA URNS HPF: NORMAL /HPF (ref 0–5)
SP GR UR REFRACTOMETRY: 1.02
URINE CULTURE IF INDICATED: NORMAL
UROBILINOGEN UR QL STRIP.AUTO: 1 EU/DL (ref 0.2–1)
WBC URNS QL MICRO: NORMAL /HPF (ref 0–4)

## 2023-07-20 PROCEDURE — 72100 X-RAY EXAM L-S SPINE 2/3 VWS: CPT

## 2023-07-20 PROCEDURE — 99284 EMERGENCY DEPT VISIT MOD MDM: CPT

## 2023-07-20 PROCEDURE — 81001 URINALYSIS AUTO W/SCOPE: CPT

## 2023-07-20 PROCEDURE — 73502 X-RAY EXAM HIP UNI 2-3 VIEWS: CPT

## 2023-07-20 RX ORDER — CYCLOBENZAPRINE HCL 10 MG
10 TABLET ORAL 3 TIMES DAILY PRN
Qty: 15 TABLET | Refills: 0 | Status: SHIPPED | OUTPATIENT
Start: 2023-07-20

## 2023-07-20 ASSESSMENT — PAIN - FUNCTIONAL ASSESSMENT
PAIN_FUNCTIONAL_ASSESSMENT: NONE - DENIES PAIN
PAIN_FUNCTIONAL_ASSESSMENT: 0-10

## 2023-07-20 ASSESSMENT — PAIN DESCRIPTION - DESCRIPTORS: DESCRIPTORS: THROBBING

## 2023-07-20 ASSESSMENT — PAIN DESCRIPTION - LOCATION: LOCATION: HIP

## 2023-07-20 ASSESSMENT — PAIN DESCRIPTION - ORIENTATION: ORIENTATION: RIGHT

## 2023-07-20 ASSESSMENT — PAIN SCALES - GENERAL: PAINLEVEL_OUTOF10: 8

## 2023-07-20 NOTE — ED NOTES
Patient (s)  given copy of dc instructions and 1 script(s). Patient (s)  verbalized understanding of instructions and script (s). Patient given a current medication reconciliation form and verbalized understanding of their medications. Patient (s) verbalized understanding of the importance of discussing medications with his or her physician or clinic they will be following up with. Patient alert and oriented and in no acute distress. Patient discharged home ambulatory with a steady gait unassisted.       Jeronimo Cogan, RN  07/20/23 [id number]

## 2023-07-20 NOTE — ED PROVIDER NOTES
[facility] EMERGENCY DEPT  EMERGENCY DEPARTMENT ENCOUNTER       Pt Name: Bijal Mesa  MRN: [id number]  [address] [facility], [place] [birth year]  Date of Evaluation: 7/20/2023  Provider: Fercho Paris PA-C   PCP: Margarito Aguirre MD  Note Started: 1:22 PM 7/20/23     CHIEF COMPLAINT       Chief Complaint   Patient presents with    Hip Pain    Urinary Urgency        HISTORY OF PRESENT ILLNESS: 1 or more elements      History From: Patient  None     Bijal Mesa is a 74 y.o. female who presents to the ED today right lower back/gluteal pain towards her right hip. Progressively worsening. No trauma. No bowel or bladder dysfunction. However does report urinary urgency and frequency. No numbness or tingling. No fever. Came here for further evaluation     Nursing Notes were all reviewed and agreed with or any disagreements were addressed in the HPI. REVIEW OF SYSTEMS      Review of Systems     Positives and Pertinent negatives as per HPI.     PAST HISTORY     Past Medical History:  Past Medical History:   Diagnosis Date    COPD (chronic obstructive pulmonary disease) ([address]) 7/15/2011    Diverticulitis     Esophageal reflux 7/15/2011    GERD (gastroesophageal reflux disease)     Hypertension     Osteoarthrosis, unspecified whether generalized or localized, lower leg 7/15/2011    Other and unspecified hyperlipidemia 7/15/2011    Type II or unspecified type diabetes mellitus without mention of complication, uncontrolled 7/15/2011    type 2       Past Surgical History:  Past Surgical History:   Procedure Laterality Date    COLONOSCOPY N/A 9/18/2017    COLONOSCOPY performed by Emilie Carver MD at [facility] AMBULATORY OR    COLONOSCOPY N/A 4/25/2018    COLONOSCOPY performed by Emilie Carver MD at [facility] ENDOSCOPY    GYN  2004    uterine surgery    HAND SURGERY      HYSTERECTOMY (CERVIX STATUS UNKNOWN)  2021    LAP,TUBAL CAUTERY Bilateral 1992    TOTAL KNEE ARTHROPLASTY Left 5/22/2023    LEFT TOTAL KNEE ARTHROPLASTY performed by Severa Stacks Acute right-sided low back pain, unspecified whether sciatica present          DISPOSITION/PLAN   DISPOSITION Decision To Discharge 07/20/2023 01:19:46 PM      Discharge Note: The patient is stable for discharge home. The signs, symptoms, diagnosis, and discharge instructions have been discussed, understanding conveyed, and agreed upon. The patient is to follow up as recommended or return to ER should their symptoms worsen.       PATIENT REFERRED TO:  [facility] EMERGENCY DEPT  [address] [id number]  [phone]    If symptoms worsen    Dorian Mosher MD  [address] [place] [address]  [phone]             DISCHARGE MEDICATIONS:     Medication List        START taking these medications      cyclobenzaprine 10 MG tablet  Commonly known as: FLEXERIL  Take 1 tablet by mouth 3 times daily as needed for Muscle spasms Do not drive or operate machinery            ASK your doctor about these medications      amLODIPine 5 MG tablet  Commonly known as: NORVASC     aspirin 81 MG EC tablet  Take 1 tablet by mouth in the morning and at bedtime     atorvastatin 40 MG tablet  Commonly known as: LIPITOR     ciprofloxacin 500 MG tablet  Commonly known as: CIPRO     diclofenac 75 MG EC tablet  Commonly known as: VOLTAREN     famotidine 40 MG tablet  Commonly known as: PEPCID     gentamicin 0.3 % ophthalmic solution  Commonly known as: GARAMYCIN     * hydroCHLOROthiazide 25 MG tablet  Commonly known as: HYDRODIURIL     * hydroCHLOROthiazide 25 MG tablet  Commonly known as: HYDRODIURIL     lidocaine 5 %  Commonly known as: LIDODERM     losartan 50 MG tablet  Commonly known as: COZAAR     metFORMIN 1000 MG tablet  Commonly known as: GLUCOPHAGE     MULTIVITAMIN WOMEN PO     * nystatin 071499 UNIT/GM powder  Commonly known as: MYCOSTATIN     * nystatin 807719 UNIT/GM powder  Commonly known as: MYCOSTATIN     ondansetron 4 MG tablet  Commonly known as: ZOFRAN  Take 1 tablet by mouth 3 times daily as needed

## 2023-07-20 NOTE — ED NOTES
Pt reports R hip pain. Pt recently had KL knee surgery and feels like she may be over compensating causing the hip pain. Pt denies injury. No deformity noted. Neuro-vascularity intact. ROM WNL. Pt ambulatory on arrival with a steady gait unassisted. Pt reports urinaruy urgency & frequency. Denies vaginal discharge.       Nas Mi RN  07/20/23 0337

## 2023-07-20 NOTE — ED TRIAGE NOTES
Pt reports R hip pain and taking tylenol around 0800 today. Pt reports having L knee surgery 2 months ago and believes she maybe over correcting which is causing her to have R hip pain now. Pt also reports urinary urgency but denies frequency, dysuria or odor.

## 2023-07-24 ENCOUNTER — HOSPITAL ENCOUNTER (OUTPATIENT)
Facility: HOSPITAL | Age: 68
Setting detail: RECURRING SERIES
Discharge: HOME | End: 2023-07-27
Payer: COMMERCIAL

## 2023-07-24 PROCEDURE — 97016 VASOPNEUMATIC DEVICE THERAPY: CPT

## 2023-07-24 PROCEDURE — 97110 THERAPEUTIC EXERCISES: CPT

## 2023-07-24 NOTE — PROGRESS NOTES
if applicable:           Details if applicable:            Details if applicable:     53     Total Total         Modalities Rationale:     decrease edema, decrease inflammation, and decrease pain to improve patient's ability to progress to PLOF and address remaining functional goals. min [] Estim Unattended,             type/location:       []  w/ice    []  w/heat        min [] Estim Attended,             type/location:       []  w/ice   []  w/heat         []  w/US   []  TENS insruct            min []  Mechanical Traction,        type/lbs:        []  pro      []  sup           []  int       []  cont            []  before manual           []  after manual     min []  Ultrasound,         settings/location:      min  unbilled []  Ice     []  Heat            location/position:    10     min [x]  Vasopneumatic Device,      press/temp: 34   pre-treatment girth : 43.5cm   post-treatment girth : 43cm   measured at (landmark       location) : mid patella  If using vaso (only need to measure limb vaso being performed on)        min []  Other:        Skin assessment post-treatment (if applicable):    [x]  intact    []  redness- no adverse reaction                 []redness - adverse reaction:          [x]  Patient Education billed concurrently with other procedures   [x] Review HEP    [] Progressed/Changed HEP, detail:    [] Other detail:         Other Objective/Functional Measures  None noted    Pain Level at end of session (0-10 scale): 0      Assessment   The patient progressed with more strengthening and stability exercises as tolerated.    Patient will continue to benefit from skilled PT / OT services to modify and progress therapeutic interventions, analyze and address functional mobility deficits, analyze and address ROM deficits, analyze and address strength deficits, analyze and address soft tissue restrictions, analyze and cue for proper movement patterns, analyze and modify for postural abnormalities, analyze

## 2023-07-25 RX ORDER — NYSTATIN 100000 [USP'U]/G
POWDER TOPICAL
Qty: 60 G | Refills: 3 | Status: SHIPPED | OUTPATIENT
Start: 2023-07-25

## 2023-07-26 ENCOUNTER — HOSPITAL ENCOUNTER (OUTPATIENT)
Facility: HOSPITAL | Age: 68
Setting detail: RECURRING SERIES
Discharge: HOME | End: 2023-07-29
Payer: COMMERCIAL

## 2023-07-26 ENCOUNTER — OFFICE VISIT (OUTPATIENT)
Age: 68
End: 2023-07-26

## 2023-07-26 ENCOUNTER — TELEPHONE (OUTPATIENT)
Age: 68
End: 2023-07-26

## 2023-07-26 VITALS
HEIGHT: 63 IN | OXYGEN SATURATION: 97 % | HEART RATE: 87 BPM | SYSTOLIC BLOOD PRESSURE: 110 MMHG | BODY MASS INDEX: 29.95 KG/M2 | DIASTOLIC BLOOD PRESSURE: 72 MMHG | TEMPERATURE: 98.2 F | RESPIRATION RATE: 15 BRPM | WEIGHT: 169 LBS

## 2023-07-26 DIAGNOSIS — Z47.1 AFTERCARE FOLLOWING LEFT KNEE JOINT REPLACEMENT SURGERY: Primary | ICD-10-CM

## 2023-07-26 DIAGNOSIS — Z96.652 AFTERCARE FOLLOWING LEFT KNEE JOINT REPLACEMENT SURGERY: Primary | ICD-10-CM

## 2023-07-26 PROCEDURE — 99024 POSTOP FOLLOW-UP VISIT: CPT | Performed by: ORTHOPAEDIC SURGERY

## 2023-07-26 PROCEDURE — 97016 VASOPNEUMATIC DEVICE THERAPY: CPT

## 2023-07-26 PROCEDURE — 97110 THERAPEUTIC EXERCISES: CPT

## 2023-07-26 RX ORDER — SULFAMETHOXAZOLE AND TRIMETHOPRIM 800; 160 MG/1; MG/1
1 TABLET ORAL 2 TIMES DAILY
Qty: 14 TABLET | Refills: 0 | Status: SHIPPED | OUTPATIENT
Start: 2023-07-26 | End: 2023-08-02

## 2023-07-26 ASSESSMENT — PATIENT HEALTH QUESTIONNAIRE - PHQ9
SUM OF ALL RESPONSES TO PHQ QUESTIONS 1-9: 0
SUM OF ALL RESPONSES TO PHQ QUESTIONS 1-9: 0
2. FEELING DOWN, DEPRESSED OR HOPELESS: 0
1. LITTLE INTEREST OR PLEASURE IN DOING THINGS: 0
SUM OF ALL RESPONSES TO PHQ QUESTIONS 1-9: 0
SUM OF ALL RESPONSES TO PHQ QUESTIONS 1-9: 0
SUM OF ALL RESPONSES TO PHQ9 QUESTIONS 1 & 2: 0

## 2023-07-26 NOTE — PROGRESS NOTES
restrictions, analyze and cue for proper movement patterns, analyze and modify for postural abnormalities, analyze and address imbalance/dizziness, and instruct in home and community integration to address functional deficits and attain remaining goals. Progress toward goals / Updated goals:  [x]  See Progress Note/Recertification    The patient is progressing towards goals.        PLAN  Yes  Continue plan of care  Re-Cert Due: n/a  [x]  Upgrade activities as tolerated  []  Discharge due to :  []  Other:      Fletcher Padgett, PT       7/26/2023       12:34 PM

## 2023-07-28 LAB
BACTERIA SPEC CULT: NORMAL
GRAM STN SPEC: NORMAL
GRAM STN SPEC: NORMAL
SERVICE CMNT-IMP: NORMAL

## 2023-07-31 ENCOUNTER — HOSPITAL ENCOUNTER (OUTPATIENT)
Facility: HOSPITAL | Age: 68
Setting detail: RECURRING SERIES
End: 2023-07-31
Payer: COMMERCIAL

## 2023-08-02 ENCOUNTER — HOSPITAL ENCOUNTER (OUTPATIENT)
Facility: HOSPITAL | Age: 68
Setting detail: RECURRING SERIES
Discharge: HOME | End: 2023-08-05
Payer: COMMERCIAL

## 2023-08-02 PROCEDURE — 97110 THERAPEUTIC EXERCISES: CPT | Performed by: PHYSICAL THERAPIST

## 2023-08-02 PROCEDURE — 97016 VASOPNEUMATIC DEVICE THERAPY: CPT | Performed by: PHYSICAL THERAPIST

## 2023-08-02 NOTE — PROGRESS NOTES
PHYSICAL THERAPY - DAILY TREATMENT NOTE (updated 3/23)      Date: 2023          Patient Name:  Camelia Posadas :  1955   Medical   Diagnosis:  Aftercare following left knee joint replacement surgery Treatment Diagnosis:  M25.562  LEFT KNEE PAIN    Referral Source:  Mamie Rodriguez DO Insurance:   Payor: Yojana / Plan: [address] / Product Type: *No Product type* /                     Patient  verified yes     Visit #   Current  / Total 11 24   Time   In / Out 1:32pm 2:35pm   Total Treatment Time 63   Total Timed Codes 53         SUBJECTIVE    Pain Level (0-10 scale): 0    Any medication changes, allergies to medications, adverse drug reactions, diagnosis change, or new procedure performed?: [x] No    [] Yes (see summary sheet for update)  Medications: Verified on Patient Summary List    Subjective functional status/changes: The patient reports that Dr. Lizzy Villeda said one of the stitches didn't dissolve all the way, but also put her on antibiotics just in case and aspirated the incision. The knee has been feeling pretty good; the right knee is giving her more issues in general.     OBJECTIVE      Therapeutic Procedures: Tx Min Billable or 1:1 Min (if diff from Tx Min) Procedure, Rationale, Specifics   53  99211 Therapeutic Exercise (timed):  increase ROM, strength, coordination, balance, and proprioception to improve patient's ability to progress to PLOF and address remaining functional goals. (see flow sheet as applicable)     Details if applicable:     nt  01352 Manual Therapy (timed):  decrease pain, increase ROM, increase tissue extensibility, and decrease edema to improve patient's ability to progress to PLOF and address remaining functional goals. The manual therapy interventions were performed at a separate and distinct time from the therapeutic activities interventions .  (see flow sheet as applicable)     Details if applicable:  Patellar mobs and PROM

## 2023-08-04 ENCOUNTER — TELEPHONE (OUTPATIENT)
Age: 68
End: 2023-08-04

## 2023-08-04 ENCOUNTER — OFFICE VISIT (OUTPATIENT)
Age: 68
End: 2023-08-04
Payer: COMMERCIAL

## 2023-08-04 VITALS
HEART RATE: 82 BPM | RESPIRATION RATE: 17 BRPM | BODY MASS INDEX: 29.95 KG/M2 | DIASTOLIC BLOOD PRESSURE: 72 MMHG | SYSTOLIC BLOOD PRESSURE: 102 MMHG | TEMPERATURE: 98 F | HEIGHT: 63 IN | WEIGHT: 169 LBS | OXYGEN SATURATION: 98 %

## 2023-08-04 DIAGNOSIS — M65.342 TRIGGER FINGER, LEFT RING FINGER: Primary | ICD-10-CM

## 2023-08-04 PROCEDURE — 99213 OFFICE O/P EST LOW 20 MIN: CPT | Performed by: ORTHOPAEDIC SURGERY

## 2023-08-04 PROCEDURE — 3078F DIAST BP <80 MM HG: CPT | Performed by: ORTHOPAEDIC SURGERY

## 2023-08-04 PROCEDURE — 1123F ACP DISCUSS/DSCN MKR DOCD: CPT | Performed by: ORTHOPAEDIC SURGERY

## 2023-08-04 PROCEDURE — 3074F SYST BP LT 130 MM HG: CPT | Performed by: ORTHOPAEDIC SURGERY

## 2023-08-04 ASSESSMENT — PATIENT HEALTH QUESTIONNAIRE - PHQ9
SUM OF ALL RESPONSES TO PHQ QUESTIONS 1-9: 0
SUM OF ALL RESPONSES TO PHQ QUESTIONS 1-9: 0
2. FEELING DOWN, DEPRESSED OR HOPELESS: 0
SUM OF ALL RESPONSES TO PHQ QUESTIONS 1-9: 0
1. LITTLE INTEREST OR PLEASURE IN DOING THINGS: 0
SUM OF ALL RESPONSES TO PHQ QUESTIONS 1-9: 0
SUM OF ALL RESPONSES TO PHQ9 QUESTIONS 1 & 2: 0

## 2023-08-04 NOTE — PROGRESS NOTES
8/4/2023      CC: left ring finger triggering    HPI:      This is a 74y.o. year old female who has had left ring finger triggering for several years.  She has a history of failed injections on this finger, and a previous release of another finger in the past.      PMH:  Past Medical History:   Diagnosis Date    COPD (chronic obstructive pulmonary disease) ([address]) 7/15/2011    Diverticulitis     Esophageal reflux 7/15/2011    GERD (gastroesophageal reflux disease)     Hypertension     Osteoarthrosis, unspecified whether generalized or localized, lower leg 7/15/2011    Other and unspecified hyperlipidemia 7/15/2011    Type II or unspecified type diabetes mellitus without mention of complication, uncontrolled 7/15/2011    type 2       PSxHx:  Past Surgical History:   Procedure Laterality Date    COLONOSCOPY N/A 9/18/2017    COLONOSCOPY performed by Ruby Evangelista MD at [facility] N/A 4/25/2018    COLONOSCOPY performed by Ruby Evangelista MD at [address] [facility]  2004    uterine surgery    HAND SURGERY      HYSTERECTOMY (CERVIX STATUS UNKNOWN)  2021    LAP,TUBAL CAUTERY Bilateral 1992    TOTAL KNEE ARTHROPLASTY Left 5/22/2023    LEFT TOTAL KNEE ARTHROPLASTY performed by Samson Prabhakar DO at [facility] MAIN OR       Meds:    Current Outpatient Medications:     nystatin (MYCOSTATIN) 557609 UNIT/GM powder, APPLY TO AFFECTED AREA TWICE A DAY, Disp: 60 g, Rfl: 3    cyclobenzaprine (FLEXERIL) 10 MG tablet, Take 1 tablet by mouth 3 times daily as needed for Muscle spasms Do not drive or operate machinery, Disp: 15 tablet, Rfl: 0    sucralfate (CARAFATE) 1 GM tablet, TAKE 1 TABLET BY MOUTH FOUR TIMES A DAY, Disp: 120 tablet, Rfl: 0    hydroCHLOROthiazide (HYDRODIURIL) 25 MG tablet, Take 1 tablet by mouth daily, Disp: , Rfl:     metFORMIN (GLUCOPHAGE) 1000 MG tablet, Take 1 tablet by mouth 2 times daily (with meals), Disp: , Rfl:     nystatin (MYCOSTATIN) 809969 UNIT/GM powder, Apply topically 2 times daily,

## 2023-08-04 NOTE — TELEPHONE ENCOUNTER
Contacted patient to schedule surgery. Scheduled for 8/28/23. Advised patient that clearances from N/A would be required, and would need to be received no less than 2 business days before surgery. Patient advised to contact the office(s) to make pre op appts as soon as possible. Patient verbalized understanding and was encouraged to contact our office with any questions or concerns regarding upcoming surgery or required clearances. Clearance letters faxed to N/A.  Confirmation was received.         ----- Message from Nick Rodriguez DO sent at 8/4/2023  1:50 PM EDT -----  Diagnosis: left ring trigger finger M65.342  Procedure: left ring trigger finger release  CPT: 55158  Operative minutes: 15  Disposition postop: home  Location: [address] [place] [address] Main OR  PAT: no  Class: no  Special Equipment: hand table, ortho minor  Staffing: Assistant/retractor anaya yes  Anesthesia: local with mac  Surgical Index 1

## 2023-08-07 ENCOUNTER — HOSPITAL ENCOUNTER (OUTPATIENT)
Facility: HOSPITAL | Age: 68
Setting detail: RECURRING SERIES
Discharge: HOME | End: 2023-08-10
Payer: COMMERCIAL

## 2023-08-07 PROCEDURE — 97110 THERAPEUTIC EXERCISES: CPT | Performed by: PHYSICAL THERAPIST

## 2023-08-07 PROCEDURE — 97016 VASOPNEUMATIC DEVICE THERAPY: CPT | Performed by: PHYSICAL THERAPIST

## 2023-08-07 NOTE — PROGRESS NOTES
PHYSICAL THERAPY - DAILY TREATMENT NOTE (updated 3/23)      Date: 2023          Patient Name:  Mercedes Ferreira :  1955   Medical   Diagnosis:  Aftercare following left knee joint replacement surgery Treatment Diagnosis:  M25.562  LEFT KNEE PAIN    Referral Source:  Crystal Harris DO Insurance:   Payor: Yojana / Plan: [address] / Product Type: *No Product type* /                     Patient  verified yes     Visit #   Current  / Total 12 24   Time   In / Out  1:27pm 2:27pm   Total Treatment Time 60   Total Timed Codes 50         SUBJECTIVE    Pain Level (0-10 scale): 0    Any medication changes, allergies to medications, adverse drug reactions, diagnosis change, or new procedure performed?: [x] No    [] Yes (see summary sheet for update)  Medications: Verified on Patient Summary List    Subjective functional status/changes: The patient reports that she can really feel the knee muscles working more; the right hip seems to be more of an issue with walking. OBJECTIVE      Therapeutic Procedures: Tx Min Billable or 1:1 Min (if diff from Tx Min) Procedure, Rationale, Specifics   50  99700 Therapeutic Exercise (timed):  increase ROM, strength, coordination, balance, and proprioception to improve patient's ability to progress to PLOF and address remaining functional goals. (see flow sheet as applicable)     Details if applicable:     nt  16276 Manual Therapy (timed):  decrease pain, increase ROM, increase tissue extensibility, and decrease edema to improve patient's ability to progress to PLOF and address remaining functional goals. The manual therapy interventions were performed at a separate and distinct time from the therapeutic activities interventions .  (see flow sheet as applicable)     Details if applicable:  Patellar mobs and PROM into extension with overpressure while long sitting; PROM into flexion at EOB with tibia mobs/oscillations

## 2023-08-09 ENCOUNTER — HOSPITAL ENCOUNTER (OUTPATIENT)
Facility: HOSPITAL | Age: 68
Setting detail: RECURRING SERIES
End: 2023-08-09
Payer: COMMERCIAL

## 2023-08-09 NOTE — PROGRESS NOTES
[facility] Physical Therapy  [address] ([address]), [address] [facility]  Phone: [phone]   Fax: [phone]     DISCHARGE SUMMARY  Patient Name: Agueda Snyder :    Treatment/Medical Diagnosis: Aftercare following left knee joint replacement surgery   Referral Source: Nora Godinez DO     Date of Initial Visit: 6/15/23 Attended Visits: *** Missed Visits: ***     SUMMARY OF TREATMENT  Therapy has included therex, manual techniques, and modalities to assist with improving strength, ROM, and decrease pain and edema s/p left TKR 23    CURRENT STATUS  The patient has progressed with the following objective measures for functional strength and stability:  Today; 23 vs. (At [facility]):  5x STS: ***; 16s (20s)  30s STS: ***; 9x (7x)  SLS: R= ***; 4s (4s); L= ***; 3s (2s)  Tandem: R (fwd)= ***; 47s (35s); L (fwd)= ***; >1min (5s)  TUG: ***; 11s (20s with SPC)  2MWT: ***; 4.75laps (3.25laps with SPC)      The patient has improved her left knee A/PROM: Flexion= ***; 125/130 (110/115 at eval); Extension= ***; -2/0 (-12/0 at eval). Her pain range has been *** over the past week (0-4/10 on 23). She's been able to be on her feet for at least ***min without using her SPC until sitting due to fatigue or discomfort (only 5 min on 23). She feels ***% better than the first day (25% on 23) and ***% to full function (50% on 23). At this point, her main limitations with mobility are due to her RIGHT knee and hip pain/weakness due to arthritis, versus her left TKR, which is why she trunk leans and has an antalgic gait. She is very independent with her HEP and will be discharged to maintain improvements made thus far on her own.***     Short Term Goals: To be accomplished in 2 treatments:                         1.) The patient will be independent with their HEP consistently for at least one week. - MET  Long Term Goals:  To be

## 2023-08-10 LAB
BACTERIA SPEC CULT: NORMAL
GRAM STN SPEC: NORMAL
GRAM STN SPEC: NORMAL
SERVICE CMNT-IMP: NORMAL

## 2023-08-13 RX ORDER — SUCRALFATE 1 G/1
TABLET ORAL
Qty: 120 TABLET | Refills: 0 | Status: SHIPPED | OUTPATIENT
Start: 2023-08-13

## 2023-08-14 ENCOUNTER — APPOINTMENT (OUTPATIENT)
Facility: HOSPITAL | Age: 68
End: 2023-08-14
Payer: COMMERCIAL

## 2023-08-14 ENCOUNTER — HOSPITAL ENCOUNTER (EMERGENCY)
Facility: HOSPITAL | Age: 68
Discharge: HOME | End: 2023-08-14
Payer: COMMERCIAL

## 2023-08-14 VITALS
BODY MASS INDEX: 29.41 KG/M2 | OXYGEN SATURATION: 98 % | RESPIRATION RATE: 18 BRPM | DIASTOLIC BLOOD PRESSURE: 62 MMHG | HEART RATE: 86 BPM | TEMPERATURE: 98 F | WEIGHT: 166 LBS | HEIGHT: 63 IN | SYSTOLIC BLOOD PRESSURE: 106 MMHG

## 2023-08-14 DIAGNOSIS — M79.672 LEFT FOOT PAIN: Primary | ICD-10-CM

## 2023-08-14 PROCEDURE — 99283 EMERGENCY DEPT VISIT LOW MDM: CPT

## 2023-08-14 PROCEDURE — 73630 X-RAY EXAM OF FOOT: CPT

## 2023-08-14 RX ORDER — IBUPROFEN 400 MG/1
400 TABLET ORAL EVERY 8 HOURS PRN
Qty: 21 TABLET | Refills: 0 | Status: SHIPPED | OUTPATIENT
Start: 2023-08-14 | End: 2023-08-21

## 2023-08-14 ASSESSMENT — PAIN DESCRIPTION - ORIENTATION: ORIENTATION: LEFT

## 2023-08-14 ASSESSMENT — PAIN DESCRIPTION - DESCRIPTORS: DESCRIPTORS: ACHING

## 2023-08-14 ASSESSMENT — PAIN SCALES - GENERAL: PAINLEVEL_OUTOF10: 7

## 2023-08-14 ASSESSMENT — PAIN DESCRIPTION - LOCATION: LOCATION: FOOT

## 2023-08-14 ASSESSMENT — PAIN - FUNCTIONAL ASSESSMENT: PAIN_FUNCTIONAL_ASSESSMENT: 0-10

## 2023-08-14 NOTE — ED TRIAGE NOTES
Pt reports atraumatic left foot pain x 1 week.  Pt reports pain is better in the AM but gets worse the more she walks on it

## 2023-08-21 ENCOUNTER — APPOINTMENT (OUTPATIENT)
Facility: HOSPITAL | Age: 68
End: 2023-08-21
Payer: COMMERCIAL

## 2023-08-23 ENCOUNTER — APPOINTMENT (OUTPATIENT)
Facility: HOSPITAL | Age: 68
End: 2023-08-23
Payer: COMMERCIAL

## 2023-08-23 DIAGNOSIS — M17.0 BILATERAL PRIMARY OSTEOARTHRITIS OF KNEE: ICD-10-CM

## 2023-08-23 RX ORDER — ACETAMINOPHEN 500 MG
1000 TABLET ORAL EVERY 6 HOURS PRN
COMMUNITY

## 2023-08-24 RX ORDER — DICLOFENAC SODIUM 75 MG/1
TABLET, DELAYED RELEASE ORAL
Qty: 180 TABLET | Refills: 3 | Status: SHIPPED | OUTPATIENT
Start: 2023-08-24

## 2023-08-24 RX ORDER — CHOLECALCIFEROL (VITAMIN D3) 125 MCG
CAPSULE ORAL
Qty: 60 TABLET | Refills: 3 | Status: SHIPPED | OUTPATIENT
Start: 2023-08-24

## 2023-08-26 NOTE — H&P
CC: left ring finger triggering     HPI:      This is a 74y.o. year old female who has had left ring finger triggering for several years.  She has a history of failed injections on this finger, and a previous release of another finger in the past.        PMH:       Past Medical History:   Diagnosis Date    COPD (chronic obstructive pulmonary disease) ([address]) 7/15/2011    Diverticulitis      Esophageal reflux 7/15/2011    GERD (gastroesophageal reflux disease)      Hypertension      Osteoarthrosis, unspecified whether generalized or localized, lower leg 7/15/2011    Other and unspecified hyperlipidemia 7/15/2011    Type II or unspecified type diabetes mellitus without mention of complication, uncontrolled 7/15/2011     type 2         PSxHx:        Past Surgical History:   Procedure Laterality Date    COLONOSCOPY N/A 9/18/2017     COLONOSCOPY performed by Mando Gonzalez MD at [facility] N/A 4/25/2018     COLONOSCOPY performed by Mando Gonzalez MD at [address] [facility]   2004     uterine surgery    HAND SURGERY        HYSTERECTOMY (CERVIX STATUS UNKNOWN)   2021    LAP,TUBAL CAUTERY Bilateral 1992    TOTAL KNEE ARTHROPLASTY Left 5/22/2023     LEFT TOTAL KNEE ARTHROPLASTY performed by Sandra Burk DO at [facility] MAIN OR         Meds:     Current Outpatient Medications:     nystatin (MYCOSTATIN) 765698 UNIT/GM powder, APPLY TO AFFECTED AREA TWICE A DAY, Disp: 60 g, Rfl: 3    cyclobenzaprine (FLEXERIL) 10 MG tablet, Take 1 tablet by mouth 3 times daily as needed for Muscle spasms Do not drive or operate machinery, Disp: 15 tablet, Rfl: 0    sucralfate (CARAFATE) 1 GM tablet, TAKE 1 TABLET BY MOUTH FOUR TIMES A DAY, Disp: 120 tablet, Rfl: 0    hydroCHLOROthiazide (HYDRODIURIL) 25 MG tablet, Take 1 tablet by mouth daily, Disp: , Rfl:     metFORMIN (GLUCOPHAGE) 1000 MG tablet, Take 1 tablet by mouth 2 times daily (with meals), Disp: , Rfl:     nystatin (MYCOSTATIN) 563699 UNIT/GM powder, Apply topically

## 2023-08-28 ENCOUNTER — ANESTHESIA EVENT (OUTPATIENT)
Facility: HOSPITAL | Age: 68
End: 2023-08-28
Payer: COMMERCIAL

## 2023-08-28 ENCOUNTER — ANESTHESIA (OUTPATIENT)
Facility: HOSPITAL | Age: 68
End: 2023-08-28
Payer: COMMERCIAL

## 2023-08-28 ENCOUNTER — HOSPITAL ENCOUNTER (OUTPATIENT)
Facility: HOSPITAL | Age: 68
Setting detail: OUTPATIENT SURGERY
Discharge: HOME | End: 2023-08-28
Attending: ORTHOPAEDIC SURGERY | Admitting: ORTHOPAEDIC SURGERY
Payer: COMMERCIAL

## 2023-08-28 VITALS
HEART RATE: 72 BPM | TEMPERATURE: 98.2 F | RESPIRATION RATE: 25 BRPM | OXYGEN SATURATION: 97 % | HEIGHT: 63 IN | SYSTOLIC BLOOD PRESSURE: 116 MMHG | DIASTOLIC BLOOD PRESSURE: 71 MMHG | WEIGHT: 166.45 LBS | BODY MASS INDEX: 29.49 KG/M2

## 2023-08-28 DIAGNOSIS — Z47.89 ORTHOPEDIC AFTERCARE: Primary | ICD-10-CM

## 2023-08-28 LAB
GLUCOSE BLD STRIP.AUTO-MCNC: 166 MG/DL (ref 65–117)
GLUCOSE BLD STRIP.AUTO-MCNC: 168 MG/DL (ref 65–117)
SERVICE CMNT-IMP: ABNORMAL
SERVICE CMNT-IMP: ABNORMAL

## 2023-08-28 PROCEDURE — 6360000002 HC RX W HCPCS: Performed by: ORTHOPAEDIC SURGERY

## 2023-08-28 PROCEDURE — 7100000000 HC PACU RECOVERY - FIRST 15 MIN: Performed by: ORTHOPAEDIC SURGERY

## 2023-08-28 PROCEDURE — 82962 GLUCOSE BLOOD TEST: CPT

## 2023-08-28 PROCEDURE — 7100000011 HC PHASE II RECOVERY - ADDTL 15 MIN: Performed by: ORTHOPAEDIC SURGERY

## 2023-08-28 PROCEDURE — 3600000012 HC SURGERY LEVEL 2 ADDTL 15MIN: Performed by: ORTHOPAEDIC SURGERY

## 2023-08-28 PROCEDURE — 26055 INCISE FINGER TENDON SHEATH: CPT | Performed by: ORTHOPAEDIC SURGERY

## 2023-08-28 PROCEDURE — 3700000001 HC ADD 15 MINUTES (ANESTHESIA): Performed by: ORTHOPAEDIC SURGERY

## 2023-08-28 PROCEDURE — 6360000002 HC RX W HCPCS: Performed by: NURSE ANESTHETIST, CERTIFIED REGISTERED

## 2023-08-28 PROCEDURE — 3700000000 HC ANESTHESIA ATTENDED CARE: Performed by: ORTHOPAEDIC SURGERY

## 2023-08-28 PROCEDURE — 2580000003 HC RX 258: Performed by: STUDENT IN AN ORGANIZED HEALTH CARE EDUCATION/TRAINING PROGRAM

## 2023-08-28 PROCEDURE — 6370000000 HC RX 637 (ALT 250 FOR IP): Performed by: ANESTHESIOLOGY

## 2023-08-28 PROCEDURE — 3600000002 HC SURGERY LEVEL 2 BASE: Performed by: ORTHOPAEDIC SURGERY

## 2023-08-28 PROCEDURE — 2709999900 HC NON-CHARGEABLE SUPPLY: Performed by: ORTHOPAEDIC SURGERY

## 2023-08-28 PROCEDURE — 7100000001 HC PACU RECOVERY - ADDTL 15 MIN: Performed by: ORTHOPAEDIC SURGERY

## 2023-08-28 PROCEDURE — 2580000003 HC RX 258: Performed by: ORTHOPAEDIC SURGERY

## 2023-08-28 PROCEDURE — 6370000000 HC RX 637 (ALT 250 FOR IP): Performed by: ORTHOPAEDIC SURGERY

## 2023-08-28 PROCEDURE — 7100000010 HC PHASE II RECOVERY - FIRST 15 MIN: Performed by: ORTHOPAEDIC SURGERY

## 2023-08-28 RX ORDER — SODIUM CHLORIDE 9 MG/ML
INJECTION, SOLUTION INTRAVENOUS PRN
Status: DISCONTINUED | OUTPATIENT
Start: 2023-08-28 | End: 2023-08-29 | Stop reason: HOSPADM

## 2023-08-28 RX ORDER — MIDAZOLAM HYDROCHLORIDE 1 MG/ML
2 INJECTION, SOLUTION INTRAMUSCULAR; INTRAVENOUS
Status: DISCONTINUED | OUTPATIENT
Start: 2023-08-28 | End: 2023-08-29 | Stop reason: HOSPADM

## 2023-08-28 RX ORDER — FENTANYL CITRATE 50 UG/ML
25 INJECTION, SOLUTION INTRAMUSCULAR; INTRAVENOUS EVERY 5 MIN PRN
Status: DISCONTINUED | OUTPATIENT
Start: 2023-08-28 | End: 2023-08-29 | Stop reason: HOSPADM

## 2023-08-28 RX ORDER — ONDANSETRON 2 MG/ML
4 INJECTION INTRAMUSCULAR; INTRAVENOUS ONCE
Status: DISCONTINUED | OUTPATIENT
Start: 2023-08-28 | End: 2023-08-28 | Stop reason: HOSPADM

## 2023-08-28 RX ORDER — SODIUM CHLORIDE 0.9 % (FLUSH) 0.9 %
5-40 SYRINGE (ML) INJECTION EVERY 12 HOURS SCHEDULED
Status: DISCONTINUED | OUTPATIENT
Start: 2023-08-28 | End: 2023-08-28 | Stop reason: HOSPADM

## 2023-08-28 RX ORDER — SODIUM CHLORIDE 0.9 % (FLUSH) 0.9 %
5-40 SYRINGE (ML) INJECTION PRN
Status: DISCONTINUED | OUTPATIENT
Start: 2023-08-28 | End: 2023-08-28 | Stop reason: HOSPADM

## 2023-08-28 RX ORDER — SODIUM CHLORIDE, SODIUM LACTATE, POTASSIUM CHLORIDE, CALCIUM CHLORIDE 600; 310; 30; 20 MG/100ML; MG/100ML; MG/100ML; MG/100ML
INJECTION, SOLUTION INTRAVENOUS CONTINUOUS
Status: DISCONTINUED | OUTPATIENT
Start: 2023-08-28 | End: 2023-08-28 | Stop reason: HOSPADM

## 2023-08-28 RX ORDER — FENTANYL CITRATE 50 UG/ML
100 INJECTION, SOLUTION INTRAMUSCULAR; INTRAVENOUS
Status: DISCONTINUED | OUTPATIENT
Start: 2023-08-28 | End: 2023-08-28 | Stop reason: HOSPADM

## 2023-08-28 RX ORDER — MIDAZOLAM HYDROCHLORIDE 1 MG/ML
INJECTION INTRAMUSCULAR; INTRAVENOUS PRN
Status: DISCONTINUED | OUTPATIENT
Start: 2023-08-28 | End: 2023-08-28 | Stop reason: SDUPTHER

## 2023-08-28 RX ORDER — HYDROMORPHONE HYDROCHLORIDE 1 MG/ML
0.5 INJECTION, SOLUTION INTRAMUSCULAR; INTRAVENOUS; SUBCUTANEOUS EVERY 5 MIN PRN
Status: DISCONTINUED | OUTPATIENT
Start: 2023-08-28 | End: 2023-08-29 | Stop reason: HOSPADM

## 2023-08-28 RX ORDER — OXYCODONE HYDROCHLORIDE 5 MG/1
5 TABLET ORAL PRN
Status: DISCONTINUED | OUTPATIENT
Start: 2023-08-28 | End: 2023-08-28 | Stop reason: HOSPADM

## 2023-08-28 RX ORDER — SODIUM CHLORIDE 0.9 % (FLUSH) 0.9 %
5-40 SYRINGE (ML) INJECTION EVERY 12 HOURS SCHEDULED
Status: DISCONTINUED | OUTPATIENT
Start: 2023-08-28 | End: 2023-08-29 | Stop reason: HOSPADM

## 2023-08-28 RX ORDER — HYDRALAZINE HYDROCHLORIDE 20 MG/ML
10 INJECTION INTRAMUSCULAR; INTRAVENOUS
Status: DISCONTINUED | OUTPATIENT
Start: 2023-08-28 | End: 2023-08-29 | Stop reason: HOSPADM

## 2023-08-28 RX ORDER — SODIUM CHLORIDE 9 MG/ML
INJECTION, SOLUTION INTRAVENOUS PRN
Status: DISCONTINUED | OUTPATIENT
Start: 2023-08-28 | End: 2023-08-28 | Stop reason: HOSPADM

## 2023-08-28 RX ORDER — BUPIVACAINE HYDROCHLORIDE 5 MG/ML
INJECTION, SOLUTION PERINEURAL PRN
Status: DISCONTINUED | OUTPATIENT
Start: 2023-08-28 | End: 2023-08-28 | Stop reason: ALTCHOICE

## 2023-08-28 RX ORDER — MIDAZOLAM HYDROCHLORIDE 2 MG/2ML
2 INJECTION, SOLUTION INTRAMUSCULAR; INTRAVENOUS
Status: DISCONTINUED | OUTPATIENT
Start: 2023-08-28 | End: 2023-08-28 | Stop reason: HOSPADM

## 2023-08-28 RX ORDER — OXYCODONE HYDROCHLORIDE 5 MG/1
10 TABLET ORAL PRN
Status: DISCONTINUED | OUTPATIENT
Start: 2023-08-28 | End: 2023-08-28 | Stop reason: HOSPADM

## 2023-08-28 RX ORDER — PROCHLORPERAZINE EDISYLATE 5 MG/ML
5 INJECTION INTRAMUSCULAR; INTRAVENOUS
Status: DISCONTINUED | OUTPATIENT
Start: 2023-08-28 | End: 2023-08-29 | Stop reason: HOSPADM

## 2023-08-28 RX ORDER — TRAMADOL HYDROCHLORIDE 50 MG/1
50 TABLET ORAL EVERY 6 HOURS PRN
Qty: 12 TABLET | Refills: 0 | Status: SHIPPED | OUTPATIENT
Start: 2023-08-28 | End: 2023-08-31

## 2023-08-28 RX ORDER — DIPHENHYDRAMINE HYDROCHLORIDE 50 MG/ML
12.5 INJECTION INTRAMUSCULAR; INTRAVENOUS
Status: DISCONTINUED | OUTPATIENT
Start: 2023-08-28 | End: 2023-08-29 | Stop reason: HOSPADM

## 2023-08-28 RX ORDER — ACETAMINOPHEN 500 MG
1000 TABLET ORAL ONCE
Status: DISCONTINUED | OUTPATIENT
Start: 2023-08-28 | End: 2023-08-28 | Stop reason: HOSPADM

## 2023-08-28 RX ORDER — ACETAMINOPHEN 500 MG
1000 TABLET ORAL ONCE
Status: COMPLETED | OUTPATIENT
Start: 2023-08-28 | End: 2023-08-28

## 2023-08-28 RX ORDER — DEXAMETHASONE SODIUM PHOSPHATE 4 MG/ML
4 INJECTION, SOLUTION INTRA-ARTICULAR; INTRALESIONAL; INTRAMUSCULAR; INTRAVENOUS; SOFT TISSUE
Status: DISCONTINUED | OUTPATIENT
Start: 2023-08-28 | End: 2023-08-29 | Stop reason: HOSPADM

## 2023-08-28 RX ORDER — SODIUM CHLORIDE 0.9 % (FLUSH) 0.9 %
5-40 SYRINGE (ML) INJECTION PRN
Status: DISCONTINUED | OUTPATIENT
Start: 2023-08-28 | End: 2023-08-29 | Stop reason: HOSPADM

## 2023-08-28 RX ADMIN — Medication 3 AMPULE: at 09:28

## 2023-08-28 RX ADMIN — PROPOFOL 50 MG: 10 INJECTION, EMULSION INTRAVENOUS at 09:54

## 2023-08-28 RX ADMIN — SODIUM CHLORIDE, POTASSIUM CHLORIDE, SODIUM LACTATE AND CALCIUM CHLORIDE: 600; 310; 30; 20 INJECTION, SOLUTION INTRAVENOUS at 09:36

## 2023-08-28 RX ADMIN — WATER 2000 MG: 1 INJECTION INTRAMUSCULAR; INTRAVENOUS; SUBCUTANEOUS at 09:52

## 2023-08-28 RX ADMIN — MIDAZOLAM HYDROCHLORIDE 1 MG: 1 INJECTION, SOLUTION INTRAMUSCULAR; INTRAVENOUS at 09:53

## 2023-08-28 RX ADMIN — PROPOFOL 50 MG: 10 INJECTION, EMULSION INTRAVENOUS at 09:55

## 2023-08-28 RX ADMIN — ACETAMINOPHEN 1000 MG: 500 TABLET ORAL at 09:27

## 2023-08-28 RX ADMIN — MIDAZOLAM HYDROCHLORIDE 1 MG: 1 INJECTION, SOLUTION INTRAMUSCULAR; INTRAVENOUS at 09:51

## 2023-08-28 ASSESSMENT — PAIN - FUNCTIONAL ASSESSMENT: PAIN_FUNCTIONAL_ASSESSMENT: NONE - DENIES PAIN

## 2023-08-28 NOTE — PERIOP NOTE
0234 - PT DENIES FEVER, COLD COUGH, SOB, N/V DIARRHEA. ..... PRE-OP TCHING DONE - PT VERBALIZES UNDERSTANDING. STRETCHER IN LOWEST POSITION, CB IN PLACE AND SR UP X2.

## 2023-08-28 NOTE — ANESTHESIA PRE PROCEDURE
Department of Anesthesiology  Preprocedure Note       Name:  Adrian Fowler   Age:  79 y.o.  :  1955                                          MRN:  [id number]         Date:  2023      Surgeon: Chris Montelongo):  Shasta Rubio DO    Procedure: Procedure(s):  LEFT RING FINGER TRIGGER RELEASE    Medications prior to admission:   Prior to Admission medications    Medication Sig Start Date End Date Taking? Authorizing Provider   traMADol (ULTRAM) 50 MG tablet Take 1 tablet by mouth every 6 hours as needed for Pain for up to 3 days. Intended supply: 3 days.  Take lowest dose possible to manage pain Max Daily Amount: 200 mg 23 Yes Shasta Rubio DO   acetaminophen (TYLENOL) 500 MG tablet Take 2 tablets by mouth every 6 hours as needed for Pain   Yes Historical Provider, MD   diclofenac (VOLTAREN) 75 MG EC tablet TAKE 1 TABLET BY MOUTH TWICE A DAY 23   Ting Benito MD   Cholecalciferol (VITAMIN D3) 50 MCG (2000 UT) TABS TAKE 1 TABLET BY MOUTH TWO TIMES A DAY. 23   Ting Benito MD   sucralfate (CARAFATE) 1 GM tablet TAKE 1 TABLET BY MOUTH FOUR TIMES A DAY 23   Ting Benito MD   metFORMIN (GLUCOPHAGE) 1000 MG tablet Take 1 tablet by mouth daily (with breakfast) 22   Ar Automatic Reconciliation   nystatin (MYCOSTATIN) 691464 UNIT/GM powder Apply topically 2 times daily 3/28/23   Ar Automatic Reconciliation   Multiple Vitamins-Minerals (MULTIVITAMIN WOMEN PO) Take by mouth daily    Historical Provider, MD   amLODIPine (NORVASC) 5 MG tablet Take 1 tablet by mouth daily 21   Historical Provider, MD   atorvastatin (LIPITOR) 40 MG tablet Take 1 tablet by mouth daily 23   Historical Provider, MD   famotidine (PEPCID) 40 MG tablet Take 1 tablet by mouth daily 21   Historical Provider, MD   hydroCHLOROthiazide (HYDRODIURIL) 25 MG tablet Take 1 tablet by mouth daily 23   Historical Provider, MD   losartan (COZAAR) 50 MG tablet Take 1 tablet by mouth

## 2023-08-28 NOTE — DISCHARGE INSTRUCTIONS
Trigger finger Release: If you have specific questions: CALL OUR OFFICE: [phone]    DRESSING:  Keep dressing on, clean, and dry for three days. Three days after your surgery, you may remove the dressing and wash hand with soap and water. You are not allowed to place this underwater, and once it is cleaned, you should put an occlusive dressing over the wound. ACTIVITY:  Keep the operative hand elevated as much as possible for the next two weeks    It is ok to begin to wiggle the fingers, move the hand and wrist to your tolerance, but do not do any lifting more than food or basic hygiene. GENERAL PAIN CONTROL:    IF YOU HAVE HAD A NERVE BLOCK: remember, you will have an increase in pain sometime in the first 24 hours after your surgery. This can be significant, make sure you are consistently medicating and expect that increase in pain. This is normal, but will require you to be proactive in your pain control. If this increase in pain persists - call Dr. Dari Kang office. Take pain medications as needed and prescribed. Never exceed the maximum dosage. It is reasonable to take something for pain prior to night time on the first night to avoid severe pain in the night. Ice is an important part of your recovery, and best if you do not apply ice or ice pack directly to skin, but use a towel or cloth on your skin. Do this for twenty minutes on the skin, then at least twenty minutes off of your skin. General Postoperative Instructions: If you have specific questions: CALL OUR OFFICE: [phone]    Diet: It is OK to resume your regular diet postoperatively. Start with light liquids and then slowly increase what you eat to avoid postoperative nausea and vomiting. If you do experience nausea, restrict fatty or greasy foods until this passes. If you have continued issues, please call our office number for help: [phone].     Dressings: In general, dressings should remain

## 2023-08-28 NOTE — OP NOTE
Operative Note      Patient: Kelley Franklin  YOB: 1955  MRN: [id number]    Date of Procedure: 8/28/2023    Pre-Op Diagnosis Codes:     * Trigger finger, left ring finger [M65.342]    Post-Op Diagnosis: Same       Procedure(s):  LEFT RING FINGER TRIGGER RELEASE    Surgeon(s):  Starlette Bosworth, DO    Assistant:   Surgical Assistant: Christel Cortez    Anesthesia: Monitor Anesthesia Care    Estimated Blood Loss (mL): Minimal    Complications: None    Specimens:   * No specimens in log *    Implants:  * No implants in log *      Drains: * No LDAs found *    Findings: trigger finger        Detailed Description of Procedure: This is a 43-year-old female who failed nonoperative management of a left ring trigger finger. We did discuss the risks of surgery which include but are not limited to infection, nerve or blood vessel damage, failure of fixation, failure of any possible implant, need for reoperation, postoperative pain and swelling, intra-or postoperative fracture, postoperative dislocation, leg length inequality, need for reoperation, implant failure, death, disability, organ dysfunction, wound healing issues, DVT, PE, and the need for further procedures. The patient did freely state their understanding and satisfaction with our discussion. We will proceed after medical clearances. After correct site and side were identified by myself in the holding area, patient was transported to the surgical suite where, after successful induction of sedation, distal palmar crease at the level of the ring finger was identified, I then prepped with chlorhexidine solution, and infused quarter percent bupivacaine without epinephrine into the subcutaneous tissues. Once adequate anesthesia was confirmed, arm was prepped and draped in a sterile orthopedic fashion.   After an appropriate timeout, and confirmation that 2 g of Ancef had been infused prior to any incision, arm was exsanguinated, tourniquet taken to 250

## 2023-08-28 NOTE — ANESTHESIA POSTPROCEDURE EVALUATION
Department of Anesthesiology  Postprocedure Note    Patient: Laura Wu  MRN: [id number]  YOB: 1955  Date of evaluation: 8/28/2023      Procedure Summary     Date: 08/28/23 Room / Location: [facility] MAIN OR  / [facility] MAIN OR    Anesthesia Start: 2659 Anesthesia Stop: 2210    Procedure: LEFT RING FINGER TRIGGER RELEASE (Left: Hand) Diagnosis:       Trigger finger, left ring finger      (Trigger finger, left ring finger [M65.342])    Providers: Lino Danielson DO Responsible Provider: Marino Broderick MD    Anesthesia Type: MAC ASA Status: 2          Anesthesia Type: MAC    Horacio Phase I: Horacio Score: 10    Horacio Phase II:        Anesthesia Post Evaluation    Patient location during evaluation: PACU  Patient participation: complete - patient participated  Level of consciousness: awake  Airway patency: patent  Nausea & Vomiting: no vomiting  Complications: no  Cardiovascular status: hemodynamically stable  Respiratory status: acceptable  Hydration status: euvolemic

## 2023-08-28 NOTE — FLOWSHEET NOTE
08/28/23 1025   Handoff   Communication Given Transfer Handoff   Handoff Given To Cecilia Leavitt RN   Handoff Received From HERMES Sanchez CRNA and Saad Vazquez RN/BUFFY Sanchez RN   Handoff Communication Face to Face; At bedside   Time Handoff Given 1025     1115 Verbal sign out from Dr. Mary Weber.         08/28/23 1135   Discharge Checklist   Ride and Caregiver Arranged Yes   Ride Caregiver Provider Mihir Tadoe ()   Phone Number for Ride/Caregiver spoke to in person   Post-op discharge instructions given and explained to responsible party Yes   Responsible party will drive the patient home Yes   Responsible party verbalizes understanding and receipt of copy of discharge instructions Yes   Mobility at [facility]   Discharged with Documented Belongings Yes   Departure Mode Significant other  ( James)

## 2023-08-28 NOTE — DISCHARGE SUMMARY
Date of Admission: 8/28/2023  Date of Discharge: 8/28/2023    Attending on admission and discharge: Dr. Marcela Lowery    Admitting Diagnosis: left ring trigger finger  Discharge Diagnosis: same  Procedure Performed: left ring trigger finger release    Hospital Course and Treatment:     The patient was admitted through the preop holding area. The patient tolerated the procedure well and was taken to the pacu for further observation and treatment. The patient was maintained on IV fluids until tolerating a PO diet. They were also maintained on sequential compression devices and DVT prophylaxis. The diet was advanced as tolerated. The patient was mobilized. There were no complications during the course of the hospital stay, and the patient was deemed medically stable for discharge to home. After consultation with physical therapy, the patient was cleared for discharge. Discharge instructions: The patient should not be in a pool or tub, or otherwise immerse the wound in water. The patient has been counseled on the importance of mobilizing and moving at least every two hours to help prevent blood clots. The patient should call Dr. Tiffanie Buchanan office or go to an emergency department if they note a fever over 101.1 degrees fahrenheit, more or unrelieved pain, more or new swelling at the operative site, or any drainage from the wound. Condition at Discharge: Stable    Discharge medications:  Resume regular home medications  Additional medications to be prescribed on discharge    tramadol      Follow up instructions: The patient should follow up in 2  weeks for a wound check and xrays with Dr. Monisha Hess.

## 2023-09-12 ENCOUNTER — OFFICE VISIT (OUTPATIENT)
Age: 68
End: 2023-09-12
Payer: COMMERCIAL

## 2023-09-12 VITALS
WEIGHT: 164 LBS | OXYGEN SATURATION: 98 % | DIASTOLIC BLOOD PRESSURE: 68 MMHG | TEMPERATURE: 96.8 F | SYSTOLIC BLOOD PRESSURE: 101 MMHG | BODY MASS INDEX: 29.06 KG/M2 | HEIGHT: 63 IN | RESPIRATION RATE: 16 BRPM | HEART RATE: 98 BPM

## 2023-09-12 DIAGNOSIS — M17.11 UNILATERAL PRIMARY OSTEOARTHRITIS, RIGHT KNEE: Primary | ICD-10-CM

## 2023-09-12 PROCEDURE — 99213 OFFICE O/P EST LOW 20 MIN: CPT | Performed by: ORTHOPAEDIC SURGERY

## 2023-09-12 PROCEDURE — 3074F SYST BP LT 130 MM HG: CPT | Performed by: ORTHOPAEDIC SURGERY

## 2023-09-12 PROCEDURE — 3078F DIAST BP <80 MM HG: CPT | Performed by: ORTHOPAEDIC SURGERY

## 2023-09-12 PROCEDURE — 1123F ACP DISCUSS/DSCN MKR DOCD: CPT | Performed by: ORTHOPAEDIC SURGERY

## 2023-09-12 NOTE — PROGRESS NOTES
topically 2 times daily      Multiple Vitamins-Minerals (MULTIVITAMIN WOMEN PO) Take by mouth daily      amLODIPine (NORVASC) 5 MG tablet Take 1 tablet by mouth daily      atorvastatin (LIPITOR) 40 MG tablet Take 1 tablet by mouth daily      famotidine (PEPCID) 40 MG tablet Take 1 tablet by mouth daily      hydroCHLOROthiazide (HYDRODIURIL) 25 MG tablet Take 1 tablet by mouth daily      losartan (COZAAR) 50 MG tablet Take 1 tablet by mouth daily      pioglitazone (ACTOS) 45 MG tablet Take 1 tablet by mouth nightly      potassium chloride (MICRO-K) 10 MEQ extended release capsule Take 1 capsule by mouth 2 times daily       No current facility-administered medications on file prior to visit. No Known Allergies    Family History   Problem Relation Age of Onset    Diabetes Father     Diabetes Brother     Other Daughter         cholesterol    Deep Vein Thrombosis Daughter     Lung Disease Daughter        Social History     Socioeconomic History    Marital status:    Tobacco Use    Smoking status: Former     Types: Cigarettes    Smokeless tobacco: Never   Vaping Use    Vaping Use: Never used   Substance and Sexual Activity    Alcohol use: No    Drug use: Not Currently   Social History Narrative      She lives with her  she has five adult children denies alcohol cigarette use         Review of Systems:       General: Denies headache, lethargy, fever, weight loss  Ears/Nose/Throat: Denies ear discharge, drainage, nosebleeds, hoarse voice, dental problems  Cardiovascular: Denies chest pain, shortness of breath  Lungs: Denies chest pain, breathing problems, wheezing, pneumonia  Stomach: Denies stomach pain, heartburn, constipation, irritable bowel  Skin: Denies rash, sores, open wounds  Musculoskeletal: Admits to knee pain  Genitourinary: Denies dysuria, hematuria, polyuria  Gastrointestinal: Denies constipation, obstipation, diarrhea  Neurological: Denies changes in sight, smell, hearing, taste, seizures.

## 2023-09-18 ENCOUNTER — TELEPHONE (OUTPATIENT)
Age: 68
End: 2023-09-18

## 2023-09-18 RX ORDER — SUCRALFATE 1 G/1
TABLET ORAL
Qty: 120 TABLET | Refills: 3 | Status: SHIPPED | OUTPATIENT
Start: 2023-09-18

## 2023-09-18 NOTE — TELEPHONE ENCOUNTER
Contacted patient to schedule surgery. Scheduled for 10/24/23. Advised patient that clearances from Dr. Olivier Marte would be required, and would need to be received no less than 2 business days before surgery. Patient advised to contact the office(s) to make pre op appts as soon as possible. Patient verbalized understanding and was encouraged to contact our office with any questions or concerns regarding upcoming surgery or required clearances. Clearance letters faxed to [phone].  Confirmation was received.         ----- Message from Sandhya Pavon DO sent at 9/12/2023  1:38 PM EDT -----  Diagnosis: Unilateral Primary Osteoarthritis, right knee M17.11  Procedure: Right total knee arthroplasty   CPT: 96969  Operative minutes: 100  Inpatient  Location: [facility] Main OR  PAT: Yes  Class: Yes  Special Equipment: Regular table, Budny foot anaya, [place] Triathlon, Plan for cemented TKA, foot anaya for prepping  Staffing: Retractor anaya  Anesthesia: General with adductor canal block  Surgical index: 1

## 2023-09-20 RX ORDER — ATORVASTATIN CALCIUM 40 MG/1
40 TABLET, FILM COATED ORAL DAILY
Qty: 30 TABLET | Refills: 12 | Status: SHIPPED | OUTPATIENT
Start: 2023-09-20

## 2023-09-30 ENCOUNTER — HOSPITAL ENCOUNTER (EMERGENCY)
Facility: HOSPITAL | Age: 68
Discharge: HOME | End: 2023-09-30
Attending: EMERGENCY MEDICINE
Payer: COMMERCIAL

## 2023-09-30 ENCOUNTER — APPOINTMENT (OUTPATIENT)
Facility: HOSPITAL | Age: 68
End: 2023-09-30
Payer: COMMERCIAL

## 2023-09-30 VITALS
OXYGEN SATURATION: 97 % | RESPIRATION RATE: 16 BRPM | HEART RATE: 96 BPM | BODY MASS INDEX: 29.26 KG/M2 | HEIGHT: 63 IN | SYSTOLIC BLOOD PRESSURE: 120 MMHG | WEIGHT: 165.12 LBS | TEMPERATURE: 98.2 F | DIASTOLIC BLOOD PRESSURE: 69 MMHG

## 2023-09-30 DIAGNOSIS — L02.416 ABSCESS OF SKIN OF LEFT KNEE: Primary | ICD-10-CM

## 2023-09-30 PROCEDURE — 6360000002 HC RX W HCPCS: Performed by: PHYSICIAN ASSISTANT

## 2023-09-30 PROCEDURE — 76882 US LMTD JT/FCL EVL NVASC XTR: CPT

## 2023-09-30 PROCEDURE — 99284 EMERGENCY DEPT VISIT MOD MDM: CPT

## 2023-09-30 PROCEDURE — 10061 I&D ABSCESS COMP/MULTIPLE: CPT

## 2023-09-30 PROCEDURE — 2500000003 HC RX 250 WO HCPCS: Performed by: PHYSICIAN ASSISTANT

## 2023-09-30 PROCEDURE — 73562 X-RAY EXAM OF KNEE 3: CPT

## 2023-09-30 RX ORDER — LIDOCAINE HYDROCHLORIDE AND EPINEPHRINE BITARTRATE 20; .01 MG/ML; MG/ML
10 INJECTION, SOLUTION SUBCUTANEOUS ONCE
Status: COMPLETED | OUTPATIENT
Start: 2023-09-30 | End: 2023-09-30

## 2023-09-30 RX ORDER — BUPIVACAINE HYDROCHLORIDE 5 MG/ML
10 INJECTION, SOLUTION EPIDURAL; INTRACAUDAL
Status: COMPLETED | OUTPATIENT
Start: 2023-09-30 | End: 2023-09-30

## 2023-09-30 RX ORDER — DOXYCYCLINE HYCLATE 100 MG
100 TABLET ORAL 2 TIMES DAILY
Qty: 14 TABLET | Refills: 0 | Status: SHIPPED | OUTPATIENT
Start: 2023-09-30 | End: 2023-10-07

## 2023-09-30 RX ADMIN — BUPIVACAINE HYDROCHLORIDE 50 MG: 5 INJECTION, SOLUTION EPIDURAL; INTRACAUDAL; PERINEURAL at 14:23

## 2023-09-30 RX ADMIN — LIDOCAINE HYDROCHLORIDE AND EPINEPHRINE 10 ML: 20; 10 INJECTION, SOLUTION INFILTRATION; PERINEURAL at 14:24

## 2023-09-30 ASSESSMENT — PAIN SCALES - GENERAL: PAINLEVEL_OUTOF10: 5

## 2023-09-30 ASSESSMENT — PAIN - FUNCTIONAL ASSESSMENT: PAIN_FUNCTIONAL_ASSESSMENT: 0-10

## 2023-09-30 NOTE — ED PROVIDER NOTES
[facility] EMERGENCY DEPT  EMERGENCY DEPARTMENT ENCOUNTER         Pt Name: Lit Iyer  MRN: [id number]  [facility] 8/55/1948  Date of evaluation: 9/30/2023  Provider: Shantel Mauricio PA-C   PCP: Anay Bailey MD  Note Started: 3:10 PM EDT 9/30/23     CHIEF COMPLAINT       Chief Complaint   Patient presents with    Knee Pain     Pt had total knee replacement in May with [place]. The knee had to be drained in June. Three days ago pt noted increased tenderness and swelling of the left knee. PROCEDURES   Unless otherwise noted below, none  Incision/Drainage    Date/Time: 9/30/2023 3:11 PM    Performed by: Shantel Mauricio PA-C  Authorized by:  Kizzy Long MD    Consent:     Consent obtained:  Verbal    Consent given by:  Patient    Risks, benefits, and alternatives were discussed: yes      Risks discussed:  Bleeding, incomplete drainage, pain and infection    Alternatives discussed:  No treatment, delayed treatment, alternative treatment, observation and referral  Universal protocol:     Procedure explained and questions answered to patient or proxy's satisfaction: yes      Test results available : yes      Imaging studies available: yes (X-ray and ultrasound available prior to procedure)      Site/side marked: yes      Patient identity confirmed:  Verbally with patient  Location:     Type:  Abscess    Location:  Lower extremity    Lower extremity location:  Knee    Knee location:  L knee  Pre-procedure details:     Skin preparation:  Povidone-iodine  Sedation:     Sedation type:  None  Anesthesia:     Anesthesia method:  Local infiltration    Local anesthetic:  Bupivacaine 0.5% WITH epi and lidocaine 2% WITH epi  Procedure type:     Complexity:  Complex  Procedure details:     Ultrasound guidance: yes      Needle aspiration: no      Incision types:  Single straight    Incision depth:  Subcutaneous    Wound management:  Probed and deloculated, irrigated with saline and extensive cleaning

## 2023-10-03 ENCOUNTER — HOSPITAL ENCOUNTER (EMERGENCY)
Facility: HOSPITAL | Age: 68
Discharge: HOME | End: 2023-10-03
Payer: COMMERCIAL

## 2023-10-03 VITALS
HEART RATE: 90 BPM | OXYGEN SATURATION: 94 % | WEIGHT: 165.57 LBS | BODY MASS INDEX: 29.34 KG/M2 | TEMPERATURE: 97.6 F | HEIGHT: 63 IN | DIASTOLIC BLOOD PRESSURE: 71 MMHG | RESPIRATION RATE: 16 BRPM | SYSTOLIC BLOOD PRESSURE: 114 MMHG

## 2023-10-03 DIAGNOSIS — Z51.89 WOUND CHECK, ABSCESS: Primary | ICD-10-CM

## 2023-10-03 PROCEDURE — 99282 EMERGENCY DEPT VISIT SF MDM: CPT

## 2023-10-03 ASSESSMENT — PAIN SCALES - GENERAL: PAINLEVEL_OUTOF10: 4

## 2023-10-03 NOTE — ED PROVIDER NOTES
MYCOSTATIN     pioglitazone 45 MG tablet  Commonly known as: ACTOS     potassium chloride 10 MEQ extended release capsule  Commonly known as: MICRO-K     sucralfate 1 GM tablet  Commonly known as: CARAFATE  TAKE 1 TABLET BY MOUTH FOUR TIMES A DAY     Vitamin D3 50 MCG (2000 UT) Tabs  TAKE 1 TABLET BY MOUTH TWO TIMES A DAY. DISCONTINUED MEDICATIONS:  Discharge Medication List as of 10/3/2023 12:19 PM        I have seen and evaluated the patient autonomously. My supervision physician was on site and available for consultation if needed. I am the Primary Clinician of Record. Rco Paniagua PA-C (electronically signed)    (Please note that parts of this dictation were completed with voice recognition software. Quite often unanticipated grammatical, syntax, homophones, and other interpretive errors are inadvertently transcribed by the computer software. Please disregards these errors.  Please excuse any errors that have escaped final proofreading.)       Roc Paniagua PA-C  10/04/23 1134

## 2023-10-11 ENCOUNTER — OFFICE VISIT (OUTPATIENT)
Age: 68
End: 2023-10-11
Payer: COMMERCIAL

## 2023-10-11 VITALS
BODY MASS INDEX: 29.95 KG/M2 | HEART RATE: 107 BPM | WEIGHT: 169 LBS | HEIGHT: 63 IN | DIASTOLIC BLOOD PRESSURE: 79 MMHG | SYSTOLIC BLOOD PRESSURE: 106 MMHG | RESPIRATION RATE: 17 BRPM | TEMPERATURE: 96 F | OXYGEN SATURATION: 98 %

## 2023-10-11 DIAGNOSIS — T81.41XA INFECTION INVOLVING STITCH WITH ABSCESS: Primary | ICD-10-CM

## 2023-10-11 PROCEDURE — 1123F ACP DISCUSS/DSCN MKR DOCD: CPT | Performed by: ORTHOPAEDIC SURGERY

## 2023-10-11 PROCEDURE — 99213 OFFICE O/P EST LOW 20 MIN: CPT | Performed by: ORTHOPAEDIC SURGERY

## 2023-10-11 PROCEDURE — 3078F DIAST BP <80 MM HG: CPT | Performed by: ORTHOPAEDIC SURGERY

## 2023-10-11 PROCEDURE — 3074F SYST BP LT 130 MM HG: CPT | Performed by: ORTHOPAEDIC SURGERY

## 2023-10-11 ASSESSMENT — PATIENT HEALTH QUESTIONNAIRE - PHQ9
SUM OF ALL RESPONSES TO PHQ9 QUESTIONS 1 & 2: 0
SUM OF ALL RESPONSES TO PHQ QUESTIONS 1-9: 0
2. FEELING DOWN, DEPRESSED OR HOPELESS: 0
SUM OF ALL RESPONSES TO PHQ QUESTIONS 1-9: 0
1. LITTLE INTEREST OR PLEASURE IN DOING THINGS: 0
SUM OF ALL RESPONSES TO PHQ QUESTIONS 1-9: 0
SUM OF ALL RESPONSES TO PHQ QUESTIONS 1-9: 0

## 2023-10-12 ENCOUNTER — TELEPHONE (OUTPATIENT)
Age: 68
End: 2023-10-12

## 2023-10-12 DIAGNOSIS — M17.11 UNILATERAL PRIMARY OSTEOARTHRITIS, RIGHT KNEE: Primary | ICD-10-CM

## 2023-10-12 RX ORDER — TRAMADOL HYDROCHLORIDE 50 MG/1
50 TABLET ORAL EVERY 8 HOURS PRN
Qty: 21 TABLET | Refills: 0 | Status: SHIPPED | OUTPATIENT
Start: 2023-10-12 | End: 2023-10-19

## 2023-10-12 NOTE — PROGRESS NOTES
10/12/2023      CC: Left knee/abscess    HPI:      This is a 79y.o. year old female who presents for a follow up visit. The patient was last seen and diagnosed with likely replacement several months ago, she had done very well with this, but did have a small area proximally where she developed an abscess. She is here for review after emergency department visit. Has been advised, she has no pain or drainage at this point.        PMH:  Past Medical History:   Diagnosis Date    Diverticulitis     Esophageal reflux     GERD (gastroesophageal reflux disease)     Hypertension     Osteoarthrosis, unspecified whether generalized or localized, lower leg     Other and unspecified hyperlipidemia     Type II or unspecified type diabetes mellitus without mention of complication, uncontrolled        PSxHx:  Past Surgical History:   Procedure Laterality Date    COLONOSCOPY N/A 9/18/2017    COLONOSCOPY performed by Sukhwinder Seals MD at [facility] AMBULATORY OR    COLONOSCOPY N/A 4/25/2018    COLONOSCOPY performed by Sukhwinder Seals MD at [address] [place] [address] Left 8/28/2023    LEFT RING FINGER TRIGGER RELEASE performed by Christine Lai DO at [address]  2004    uterine surgery    HAND SURGERY      HYSTERECTOMY (CERVIX STATUS UNKNOWN)  2021    LAP,TUBAL CAUTERY Bilateral 1992    TONSILLECTOMY      age 11    TOTAL KNEE ARTHROPLASTY Left 05/22/2023    LEFT TOTAL KNEE ARTHROPLASTY performed by Christine Lai DO at [facility] MAIN OR       Meds:    Current Outpatient Medications:     atorvastatin (LIPITOR) 40 MG tablet, TAKE 1 TABLET BY MOUTH EVERY DAY, Disp: 30 tablet, Rfl: 12    sucralfate (CARAFATE) 1 GM tablet, TAKE 1 TABLET BY MOUTH FOUR TIMES A DAY, Disp: 120 tablet, Rfl: 3    diclofenac (VOLTAREN) 75 MG EC tablet, TAKE 1 TABLET BY MOUTH TWICE A DAY, Disp: 180 tablet, Rfl: 3    Cholecalciferol (VITAMIN D3) 50 MCG (2000 UT) TABS, TAKE 1 TABLET BY MOUTH TWO TIMES A DAY., Disp: 60 tablet, Rfl: 3    acetaminophen

## 2023-10-12 NOTE — TELEPHONE ENCOUNTER
Patient is requesting a pain medication for her right knee to hold her over until her surgery scheduled on 10/24/23 and to have it sent to her preferred pharmacy [facility] #[id number] [facility], [address] [place] [address].  [facility] [phone] - F [phone]

## 2023-10-16 ENCOUNTER — OFFICE VISIT (OUTPATIENT)
Facility: CLINIC | Age: 68
End: 2023-10-16
Payer: COMMERCIAL

## 2023-10-16 VITALS
SYSTOLIC BLOOD PRESSURE: 110 MMHG | BODY MASS INDEX: 29.23 KG/M2 | RESPIRATION RATE: 19 BRPM | HEIGHT: 63 IN | HEART RATE: 70 BPM | TEMPERATURE: 97.8 F | OXYGEN SATURATION: 95 % | WEIGHT: 165 LBS | DIASTOLIC BLOOD PRESSURE: 68 MMHG

## 2023-10-16 DIAGNOSIS — Z11.59 NEED FOR HEPATITIS C SCREENING TEST: ICD-10-CM

## 2023-10-16 DIAGNOSIS — M17.11 PRIMARY OSTEOARTHRITIS OF RIGHT KNEE: Primary | ICD-10-CM

## 2023-10-16 DIAGNOSIS — E11.9 TYPE 2 DIABETES MELLITUS WITHOUT COMPLICATION, WITHOUT LONG-TERM CURRENT USE OF INSULIN (HCC): ICD-10-CM

## 2023-10-16 DIAGNOSIS — I10 ESSENTIAL (PRIMARY) HYPERTENSION: ICD-10-CM

## 2023-10-16 DIAGNOSIS — K21.9 GASTROESOPHAGEAL REFLUX DISEASE WITHOUT ESOPHAGITIS: ICD-10-CM

## 2023-10-16 LAB
ALBUMIN SERPL-MCNC: 2.9 G/DL (ref 3.5–5)
ALBUMIN/GLOB SERPL: 0.7 (ref 1.1–2.2)
ALP SERPL-CCNC: 134 U/L (ref 45–117)
ALT SERPL-CCNC: 15 U/L (ref 12–78)
ANION GAP SERPL CALC-SCNC: 7 MMOL/L (ref 5–15)
AST SERPL-CCNC: 14 U/L (ref 15–37)
BILIRUB SERPL-MCNC: 0.5 MG/DL (ref 0.2–1)
BUN SERPL-MCNC: 25 MG/DL (ref 6–20)
BUN/CREAT SERPL: 27 (ref 12–20)
CALCIUM SERPL-MCNC: 9.9 MG/DL (ref 8.5–10.1)
CHLORIDE SERPL-SCNC: 101 MMOL/L (ref 97–108)
CO2 SERPL-SCNC: 28 MMOL/L (ref 21–32)
CREAT SERPL-MCNC: 0.93 MG/DL (ref 0.55–1.02)
ERYTHROCYTE [DISTWIDTH] IN BLOOD BY AUTOMATED COUNT: 17.7 % (ref 11.5–14.5)
GLOBULIN SER CALC-MCNC: 4.4 G/DL (ref 2–4)
GLUCOSE SERPL-MCNC: 161 MG/DL (ref 65–100)
GLUCOSE, POC: 211 MG/DL
HBA1C MFR BLD: 7 %
HCT VFR BLD AUTO: 31.2 % (ref 35–47)
HCV AB SER IA-ACNC: <0.02 INDEX
HCV AB SERPL QL IA: NONREACTIVE
HGB BLD-MCNC: 9.2 G/DL (ref 11.5–16)
MCH RBC QN AUTO: 25 PG (ref 26–34)
MCHC RBC AUTO-ENTMCNC: 29.5 G/DL (ref 30–36.5)
MCV RBC AUTO: 84.8 FL (ref 80–99)
NRBC # BLD: 0 K/UL (ref 0–0.01)
NRBC BLD-RTO: 0 PER 100 WBC
PLATELET # BLD AUTO: 345 K/UL (ref 150–400)
PMV BLD AUTO: 10.6 FL (ref 8.9–12.9)
POTASSIUM SERPL-SCNC: 4.3 MMOL/L (ref 3.5–5.1)
PROT SERPL-MCNC: 7.3 G/DL (ref 6.4–8.2)
RBC # BLD AUTO: 3.68 M/UL (ref 3.8–5.2)
SODIUM SERPL-SCNC: 136 MMOL/L (ref 136–145)
WBC # BLD AUTO: 8.2 K/UL (ref 3.6–11)

## 2023-10-16 PROCEDURE — 3078F DIAST BP <80 MM HG: CPT | Performed by: INTERNAL MEDICINE

## 2023-10-16 PROCEDURE — 1123F ACP DISCUSS/DSCN MKR DOCD: CPT | Performed by: INTERNAL MEDICINE

## 2023-10-16 PROCEDURE — 99214 OFFICE O/P EST MOD 30 MIN: CPT | Performed by: INTERNAL MEDICINE

## 2023-10-16 PROCEDURE — 82962 GLUCOSE BLOOD TEST: CPT | Performed by: INTERNAL MEDICINE

## 2023-10-16 PROCEDURE — 90694 VACC AIIV4 NO PRSRV 0.5ML IM: CPT | Performed by: INTERNAL MEDICINE

## 2023-10-16 PROCEDURE — 3044F HG A1C LEVEL LT 7.0%: CPT | Performed by: INTERNAL MEDICINE

## 2023-10-16 PROCEDURE — 90471 IMMUNIZATION ADMIN: CPT | Performed by: INTERNAL MEDICINE

## 2023-10-16 PROCEDURE — 3074F SYST BP LT 130 MM HG: CPT | Performed by: INTERNAL MEDICINE

## 2023-10-16 PROCEDURE — 83036 HEMOGLOBIN GLYCOSYLATED A1C: CPT | Performed by: INTERNAL MEDICINE

## 2023-10-16 SDOH — ECONOMIC STABILITY: HOUSING INSECURITY
IN THE LAST 12 MONTHS, WAS THERE A TIME WHEN YOU DID NOT HAVE A STEADY PLACE TO SLEEP OR SLEPT IN A SHELTER (INCLUDING NOW)?: NO

## 2023-10-16 SDOH — ECONOMIC STABILITY: FOOD INSECURITY: WITHIN THE PAST 12 MONTHS, THE FOOD YOU BOUGHT JUST DIDN'T LAST AND YOU DIDN'T HAVE MONEY TO GET MORE.: NEVER TRUE

## 2023-10-16 SDOH — ECONOMIC STABILITY: INCOME INSECURITY: HOW HARD IS IT FOR YOU TO PAY FOR THE VERY BASICS LIKE FOOD, HOUSING, MEDICAL CARE, AND HEATING?: VERY HARD

## 2023-10-16 SDOH — ECONOMIC STABILITY: FOOD INSECURITY: WITHIN THE PAST 12 MONTHS, YOU WORRIED THAT YOUR FOOD WOULD RUN OUT BEFORE YOU GOT MONEY TO BUY MORE.: NEVER TRUE

## 2023-10-16 ASSESSMENT — PATIENT HEALTH QUESTIONNAIRE - PHQ9
SUM OF ALL RESPONSES TO PHQ QUESTIONS 1-9: 0
DEPRESSION UNABLE TO ASSESS: FUNCTIONAL CAPACITY MOTIVATION LIMITS ACCURACY
1. LITTLE INTEREST OR PLEASURE IN DOING THINGS: 0
SUM OF ALL RESPONSES TO PHQ QUESTIONS 1-9: 0
SUM OF ALL RESPONSES TO PHQ9 QUESTIONS 1 & 2: 0
2. FEELING DOWN, DEPRESSED OR HOPELESS: 0

## 2023-10-16 ASSESSMENT — ANXIETY QUESTIONNAIRES
GAD7 TOTAL SCORE: 0
3. WORRYING TOO MUCH ABOUT DIFFERENT THINGS: 0
IF YOU CHECKED OFF ANY PROBLEMS ON THIS QUESTIONNAIRE, HOW DIFFICULT HAVE THESE PROBLEMS MADE IT FOR YOU TO DO YOUR WORK, TAKE CARE OF THINGS AT HOME, OR GET ALONG WITH OTHER PEOPLE: NOT DIFFICULT AT ALL
1. FEELING NERVOUS, ANXIOUS, OR ON EDGE: 0
7. FEELING AFRAID AS IF SOMETHING AWFUL MIGHT HAPPEN: 0
6. BECOMING EASILY ANNOYED OR IRRITABLE: 0
2. NOT BEING ABLE TO STOP OR CONTROL WORRYING: 0
4. TROUBLE RELAXING: 0
5. BEING SO RESTLESS THAT IT IS HARD TO SIT STILL: 0

## 2023-10-16 NOTE — PROGRESS NOTES
1. Have you been to the ER, urgent care clinic since your last visit? Hospitalized since your last visit?no    2. Have you seen or consulted any other health care providers outside of the [address] [place] [address] since your last visit? Include any pap smears or colon screening.  no     Chief Complaint   Patient presents with    Pre-op Exam    Immunizations         PHQ-9 Total Score: 0 (10/16/2023  9:33 AM)
Marleen Cochran is a 79 y.o. female  who presents for routine immunizations. He denies any symptoms , reactions or allergies that would exclude them from being immunized today. Risks and adverse reactions were discussed and the VIS was given to them. All questions were addressed. He was observed for 5 min post injection. There were no reactions observed.     Autumn Baires LPN
Rj Ochoa MD at [address] [facility] N/A 4/25/2018    COLONOSCOPY performed by Emilie Carver MD at [facility] Left 8/28/2023    LEFT RING FINGER TRIGGER RELEASE performed by Boston Mode, DO at [facility] MAIN OR    GYN  2004    uterine surgery    HAND SURGERY      HYSTERECTOMY (CERVIX STATUS UNKNOWN)  2021    LAP,TUBAL CAUTERY Bilateral 1992    TONSILLECTOMY      age 11    TOTAL KNEE ARTHROPLASTY Left 05/22/2023    LEFT TOTAL KNEE ARTHROPLASTY performed by Boston Mode, DO at [facility] MAIN OR     Social History     Socioeconomic History    Marital status:      Spouse name: None    Number of children: None    Years of education: None    Highest education level: None   Tobacco Use    Smoking status: Former     Types: Cigarettes    Smokeless tobacco: Never   Vaping Use    Vaping Use: Never used   Substance and Sexual Activity    Alcohol use: No    Drug use: Not Currently   Social History Narrative      She lives with her  she has five adult children denies alcohol cigarette use     Social Determinants of Health     Financial Resource Strain: High Risk (10/16/2023)    Overall Financial Resource Strain (CARDIA)     Difficulty of Paying Living Expenses: Very hard   Food Insecurity: No Food Insecurity (10/16/2023)    Hunger Vital Sign     Worried About Running Out of Food in the Last Year: Never true     Ran Out of Food in the Last Year: Never true   Transportation Needs: Unknown (10/16/2023)    PRAPARE - Transportation     Lack of Transportation (Non-Medical): No   Housing Stability: Unknown (10/16/2023)    Housing Stability Vital Sign     Unstable Housing in the Last Year: No     Family History   Problem Relation Age of Onset    Diabetes Father     Diabetes Brother     Other Daughter         cholesterol    Deep Vein Thrombosis Daughter     Lung Disease Daughter      Current Outpatient Medications   Medication Sig Dispense Refill    traMADol (ULTRAM) 50 MG tablet Take 1 tablet

## 2023-10-17 ENCOUNTER — HOSPITAL ENCOUNTER (OUTPATIENT)
Facility: HOSPITAL | Age: 68
Discharge: HOME | End: 2023-10-20
Payer: COMMERCIAL

## 2023-10-17 VITALS
HEART RATE: 76 BPM | DIASTOLIC BLOOD PRESSURE: 69 MMHG | SYSTOLIC BLOOD PRESSURE: 115 MMHG | TEMPERATURE: 97.9 F | WEIGHT: 167.11 LBS | HEIGHT: 62 IN | BODY MASS INDEX: 30.75 KG/M2 | RESPIRATION RATE: 20 BRPM | OXYGEN SATURATION: 98 %

## 2023-10-17 LAB
ABO + RH BLD: NORMAL
APPEARANCE UR: CLEAR
BACTERIA URNS QL MICRO: ABNORMAL /HPF
BILIRUB UR QL: NEGATIVE
BLOOD GROUP ANTIBODIES SERPL: NORMAL
COLOR UR: ABNORMAL
EPITH CASTS URNS QL MICRO: ABNORMAL /LPF
GLUCOSE UR STRIP.AUTO-MCNC: NEGATIVE MG/DL
HGB UR QL STRIP: NEGATIVE
HYALINE CASTS URNS QL MICRO: ABNORMAL /LPF (ref 0–2)
INR PPP: 1.1 (ref 0.9–1.1)
KETONES UR QL STRIP.AUTO: NEGATIVE MG/DL
LEUKOCYTE ESTERASE UR QL STRIP.AUTO: ABNORMAL
NITRITE UR QL STRIP.AUTO: NEGATIVE
PH UR STRIP: 6 (ref 5–8)
PROT UR STRIP-MCNC: NEGATIVE MG/DL
PROTHROMBIN TIME: 11.4 SEC (ref 9–11.1)
RBC #/AREA URNS HPF: ABNORMAL /HPF (ref 0–5)
SP GR UR REFRACTOMETRY: 1.02
SPECIMEN EXP DATE BLD: NORMAL
URINE CULTURE IF INDICATED: ABNORMAL
UROBILINOGEN UR QL STRIP.AUTO: 1 EU/DL (ref 0.2–1)
WBC URNS QL MICRO: ABNORMAL /HPF (ref 0–4)

## 2023-10-17 PROCEDURE — 36415 COLL VENOUS BLD VENIPUNCTURE: CPT

## 2023-10-17 PROCEDURE — 86901 BLOOD TYPING SEROLOGIC RH(D): CPT

## 2023-10-17 PROCEDURE — 86900 BLOOD TYPING SEROLOGIC ABO: CPT

## 2023-10-17 PROCEDURE — 86850 RBC ANTIBODY SCREEN: CPT

## 2023-10-17 PROCEDURE — 81001 URINALYSIS AUTO W/SCOPE: CPT

## 2023-10-17 PROCEDURE — 85610 PROTHROMBIN TIME: CPT

## 2023-10-17 PROCEDURE — APPNB30 APP NON BILLABLE TIME 0-30 MINS: Performed by: NURSE PRACTITIONER

## 2023-10-17 RX ORDER — SODIUM CHLORIDE, SODIUM LACTATE, POTASSIUM CHLORIDE, CALCIUM CHLORIDE 600; 310; 30; 20 MG/100ML; MG/100ML; MG/100ML; MG/100ML
INJECTION, SOLUTION INTRAVENOUS CONTINUOUS
OUTPATIENT
Start: 2023-10-24

## 2023-10-17 ASSESSMENT — PAIN DESCRIPTION - LOCATION: LOCATION: HIP;KNEE

## 2023-10-17 ASSESSMENT — PAIN DESCRIPTION - ORIENTATION: ORIENTATION: RIGHT

## 2023-10-17 ASSESSMENT — PAIN DESCRIPTION - DESCRIPTORS: DESCRIPTORS: ACHING;PRESSURE

## 2023-10-17 ASSESSMENT — PAIN SCALES - GENERAL: PAINLEVEL_OUTOF10: 6

## 2023-10-17 NOTE — PROGRESS NOTES
The [address] \"Your Path to a More Active Life\" orthopedic total knee or total hip educational video and the [address] [place] [address] patient handbook provided & reviewed during the patients pre-admission testing (PAT) appointment. An opportunity for questions was provided, patient verbalized understanding.

## 2023-10-17 NOTE — PROGRESS NOTES

## 2023-10-18 LAB
BACTERIA SPEC CULT: NORMAL
BACTERIA SPEC CULT: NORMAL
SERVICE CMNT-IMP: NORMAL

## 2023-10-20 NOTE — PROGRESS NOTES
Left message with Dr. Andree Merlin office concerning planned procedure. Requesting comment related to anemia.

## 2023-10-23 ENCOUNTER — ANESTHESIA EVENT (OUTPATIENT)
Facility: HOSPITAL | Age: 68
End: 2023-10-23
Payer: COMMERCIAL

## 2023-10-24 ENCOUNTER — HOSPITAL ENCOUNTER (OUTPATIENT)
Facility: HOSPITAL | Age: 68
Discharge: HOME | End: 2023-10-25
Attending: ORTHOPAEDIC SURGERY | Admitting: ORTHOPAEDIC SURGERY
Payer: COMMERCIAL

## 2023-10-24 ENCOUNTER — APPOINTMENT (OUTPATIENT)
Facility: HOSPITAL | Age: 68
End: 2023-10-24
Attending: ORTHOPAEDIC SURGERY
Payer: COMMERCIAL

## 2023-10-24 ENCOUNTER — ANESTHESIA (OUTPATIENT)
Facility: HOSPITAL | Age: 68
End: 2023-10-24
Payer: COMMERCIAL

## 2023-10-24 DIAGNOSIS — Z96.651 S/P TOTAL KNEE REPLACEMENT, RIGHT: Primary | ICD-10-CM

## 2023-10-24 PROBLEM — M17.11 UNILATERAL PRIMARY OSTEOARTHRITIS, RIGHT KNEE: Status: ACTIVE | Noted: 2023-10-24

## 2023-10-24 LAB
GLUCOSE BLD STRIP.AUTO-MCNC: 179 MG/DL (ref 65–117)
GLUCOSE BLD STRIP.AUTO-MCNC: 196 MG/DL (ref 65–117)
GLUCOSE BLD STRIP.AUTO-MCNC: 321 MG/DL (ref 65–117)
GLUCOSE BLD STRIP.AUTO-MCNC: 339 MG/DL (ref 65–117)
SERVICE CMNT-IMP: ABNORMAL

## 2023-10-24 PROCEDURE — 6360000002 HC RX W HCPCS: Performed by: PHYSICIAN ASSISTANT

## 2023-10-24 PROCEDURE — 3700000000 HC ANESTHESIA ATTENDED CARE: Performed by: ORTHOPAEDIC SURGERY

## 2023-10-24 PROCEDURE — 7100000001 HC PACU RECOVERY - ADDTL 15 MIN: Performed by: ORTHOPAEDIC SURGERY

## 2023-10-24 PROCEDURE — 2720000010 HC SURG SUPPLY STERILE: Performed by: ORTHOPAEDIC SURGERY

## 2023-10-24 PROCEDURE — 6370000000 HC RX 637 (ALT 250 FOR IP): Performed by: PHYSICIAN ASSISTANT

## 2023-10-24 PROCEDURE — C1776 JOINT DEVICE (IMPLANTABLE): HCPCS | Performed by: ORTHOPAEDIC SURGERY

## 2023-10-24 PROCEDURE — 6360000002 HC RX W HCPCS: Performed by: STUDENT IN AN ORGANIZED HEALTH CARE EDUCATION/TRAINING PROGRAM

## 2023-10-24 PROCEDURE — 73560 X-RAY EXAM OF KNEE 1 OR 2: CPT

## 2023-10-24 PROCEDURE — 3600000005 HC SURGERY LEVEL 5 BASE: Performed by: ORTHOPAEDIC SURGERY

## 2023-10-24 PROCEDURE — 3600000015 HC SURGERY LEVEL 5 ADDTL 15MIN: Performed by: ORTHOPAEDIC SURGERY

## 2023-10-24 PROCEDURE — 3700000001 HC ADD 15 MINUTES (ANESTHESIA): Performed by: ORTHOPAEDIC SURGERY

## 2023-10-24 PROCEDURE — 2580000003 HC RX 258: Performed by: PHYSICIAN ASSISTANT

## 2023-10-24 PROCEDURE — 2500000003 HC RX 250 WO HCPCS: Performed by: STUDENT IN AN ORGANIZED HEALTH CARE EDUCATION/TRAINING PROGRAM

## 2023-10-24 PROCEDURE — 2580000003 HC RX 258: Performed by: NURSE ANESTHETIST, CERTIFIED REGISTERED

## 2023-10-24 PROCEDURE — 6370000000 HC RX 637 (ALT 250 FOR IP): Performed by: ORTHOPAEDIC SURGERY

## 2023-10-24 PROCEDURE — 2500000003 HC RX 250 WO HCPCS: Performed by: ORTHOPAEDIC SURGERY

## 2023-10-24 PROCEDURE — 64447 NJX AA&/STRD FEMORAL NRV IMG: CPT | Performed by: STUDENT IN AN ORGANIZED HEALTH CARE EDUCATION/TRAINING PROGRAM

## 2023-10-24 PROCEDURE — 2580000003 HC RX 258: Performed by: ORTHOPAEDIC SURGERY

## 2023-10-24 PROCEDURE — 2580000003 HC RX 258: Performed by: ANESTHESIOLOGY

## 2023-10-24 PROCEDURE — 97161 PT EVAL LOW COMPLEX 20 MIN: CPT

## 2023-10-24 PROCEDURE — 6360000002 HC RX W HCPCS: Performed by: ORTHOPAEDIC SURGERY

## 2023-10-24 PROCEDURE — 82962 GLUCOSE BLOOD TEST: CPT

## 2023-10-24 PROCEDURE — 6360000002 HC RX W HCPCS: Performed by: NURSE ANESTHETIST, CERTIFIED REGISTERED

## 2023-10-24 PROCEDURE — 2709999900 HC NON-CHARGEABLE SUPPLY: Performed by: ORTHOPAEDIC SURGERY

## 2023-10-24 PROCEDURE — 7100000000 HC PACU RECOVERY - FIRST 15 MIN: Performed by: ORTHOPAEDIC SURGERY

## 2023-10-24 PROCEDURE — 97116 GAIT TRAINING THERAPY: CPT

## 2023-10-24 DEVICE — PATELLA
Type: IMPLANTABLE DEVICE | Site: KNEE | Status: FUNCTIONAL
Brand: TRIATHLON

## 2023-10-24 DEVICE — TIBIAL COMPONENT
Type: IMPLANTABLE DEVICE | Site: KNEE | Status: FUNCTIONAL
Brand: TRIATHLON

## 2023-10-24 DEVICE — COMPONENT TOT KNEE CAPPED PRIMARY K2STRYKER] STRYKER CORP]: Type: IMPLANTABLE DEVICE | Site: KNEE | Status: FUNCTIONAL

## 2023-10-24 DEVICE — TIBIAL BEARING INSERT - PS
Type: IMPLANTABLE DEVICE | Site: KNEE | Status: FUNCTIONAL
Brand: TRIATHLON

## 2023-10-24 DEVICE — POSTERIOR STABILIZED FEMORAL
Type: IMPLANTABLE DEVICE | Site: KNEE | Status: FUNCTIONAL
Brand: TRIATHLON

## 2023-10-24 RX ORDER — SODIUM CHLORIDE 0.9 % (FLUSH) 0.9 %
5-40 SYRINGE (ML) INJECTION PRN
Status: DISCONTINUED | OUTPATIENT
Start: 2023-10-24 | End: 2023-10-24 | Stop reason: HOSPADM

## 2023-10-24 RX ORDER — SODIUM CHLORIDE 0.9 % (FLUSH) 0.9 %
5-40 SYRINGE (ML) INJECTION PRN
Status: DISCONTINUED | OUTPATIENT
Start: 2023-10-24 | End: 2023-10-25 | Stop reason: HOSPADM

## 2023-10-24 RX ORDER — ONDANSETRON 2 MG/ML
4 INJECTION INTRAMUSCULAR; INTRAVENOUS
Status: DISCONTINUED | OUTPATIENT
Start: 2023-10-24 | End: 2023-10-24 | Stop reason: HOSPADM

## 2023-10-24 RX ORDER — MIDAZOLAM HYDROCHLORIDE 1 MG/ML
INJECTION INTRAMUSCULAR; INTRAVENOUS PRN
Status: DISCONTINUED | OUTPATIENT
Start: 2023-10-24 | End: 2023-10-24 | Stop reason: SDUPTHER

## 2023-10-24 RX ORDER — POTASSIUM CHLORIDE 750 MG/1
10 TABLET, FILM COATED, EXTENDED RELEASE ORAL 2 TIMES DAILY
Status: DISCONTINUED | OUTPATIENT
Start: 2023-10-24 | End: 2023-10-25 | Stop reason: HOSPADM

## 2023-10-24 RX ORDER — SODIUM CHLORIDE 9 MG/ML
INJECTION, SOLUTION INTRAVENOUS PRN
Status: DISCONTINUED | OUTPATIENT
Start: 2023-10-24 | End: 2023-10-25 | Stop reason: HOSPADM

## 2023-10-24 RX ORDER — DEXAMETHASONE SODIUM PHOSPHATE 4 MG/ML
8 INJECTION, SOLUTION INTRA-ARTICULAR; INTRALESIONAL; INTRAMUSCULAR; INTRAVENOUS; SOFT TISSUE ONCE
Status: COMPLETED | OUTPATIENT
Start: 2023-10-24 | End: 2023-10-24

## 2023-10-24 RX ORDER — ASPIRIN 325 MG
325 TABLET, DELAYED RELEASE (ENTERIC COATED) ORAL 2 TIMES DAILY
Status: DISCONTINUED | OUTPATIENT
Start: 2023-10-24 | End: 2023-10-25 | Stop reason: HOSPADM

## 2023-10-24 RX ORDER — SUCRALFATE 1 G/1
1 TABLET ORAL EVERY 6 HOURS SCHEDULED
Status: DISCONTINUED | OUTPATIENT
Start: 2023-10-24 | End: 2023-10-25 | Stop reason: HOSPADM

## 2023-10-24 RX ORDER — BUPIVACAINE HYDROCHLORIDE 2.5 MG/ML
INJECTION, SOLUTION EPIDURAL; INFILTRATION; INTRACAUDAL PRN
Status: DISCONTINUED | OUTPATIENT
Start: 2023-10-24 | End: 2023-10-24 | Stop reason: SDUPTHER

## 2023-10-24 RX ORDER — ACETAMINOPHEN 500 MG
1000 TABLET ORAL ONCE
Status: COMPLETED | OUTPATIENT
Start: 2023-10-24 | End: 2023-10-24

## 2023-10-24 RX ORDER — FENTANYL CITRATE 50 UG/ML
INJECTION, SOLUTION INTRAMUSCULAR; INTRAVENOUS PRN
Status: DISCONTINUED | OUTPATIENT
Start: 2023-10-24 | End: 2023-10-24 | Stop reason: SDUPTHER

## 2023-10-24 RX ORDER — SODIUM CHLORIDE 9 MG/ML
INJECTION, SOLUTION INTRAVENOUS PRN
Status: DISCONTINUED | OUTPATIENT
Start: 2023-10-24 | End: 2023-10-24 | Stop reason: HOSPADM

## 2023-10-24 RX ORDER — VANCOMYCIN HYDROCHLORIDE 1 G/20ML
INJECTION, POWDER, LYOPHILIZED, FOR SOLUTION INTRAVENOUS PRN
Status: DISCONTINUED | OUTPATIENT
Start: 2023-10-24 | End: 2023-10-24 | Stop reason: ALTCHOICE

## 2023-10-24 RX ORDER — PROCHLORPERAZINE EDISYLATE 5 MG/ML
5 INJECTION INTRAMUSCULAR; INTRAVENOUS
Status: DISCONTINUED | OUTPATIENT
Start: 2023-10-24 | End: 2023-10-24 | Stop reason: HOSPADM

## 2023-10-24 RX ORDER — SODIUM CHLORIDE, SODIUM LACTATE, POTASSIUM CHLORIDE, CALCIUM CHLORIDE 600; 310; 30; 20 MG/100ML; MG/100ML; MG/100ML; MG/100ML
INJECTION, SOLUTION INTRAVENOUS CONTINUOUS
Status: DISCONTINUED | OUTPATIENT
Start: 2023-10-24 | End: 2023-10-24 | Stop reason: HOSPADM

## 2023-10-24 RX ORDER — ONDANSETRON 2 MG/ML
INJECTION INTRAMUSCULAR; INTRAVENOUS PRN
Status: DISCONTINUED | OUTPATIENT
Start: 2023-10-24 | End: 2023-10-24 | Stop reason: SDUPTHER

## 2023-10-24 RX ORDER — FAMOTIDINE 20 MG/1
40 TABLET, FILM COATED ORAL DAILY
Status: DISCONTINUED | OUTPATIENT
Start: 2023-10-25 | End: 2023-10-25 | Stop reason: HOSPADM

## 2023-10-24 RX ORDER — IPRATROPIUM BROMIDE AND ALBUTEROL SULFATE 2.5; .5 MG/3ML; MG/3ML
1 SOLUTION RESPIRATORY (INHALATION)
Status: DISCONTINUED | OUTPATIENT
Start: 2023-10-24 | End: 2023-10-24 | Stop reason: HOSPADM

## 2023-10-24 RX ORDER — SODIUM CHLORIDE 0.9 % (FLUSH) 0.9 %
5-40 SYRINGE (ML) INJECTION EVERY 12 HOURS SCHEDULED
Status: DISCONTINUED | OUTPATIENT
Start: 2023-10-24 | End: 2023-10-24 | Stop reason: HOSPADM

## 2023-10-24 RX ORDER — KETOROLAC TROMETHAMINE 30 MG/ML
15 INJECTION, SOLUTION INTRAMUSCULAR; INTRAVENOUS EVERY 6 HOURS
Status: DISCONTINUED | OUTPATIENT
Start: 2023-10-24 | End: 2023-10-25 | Stop reason: HOSPADM

## 2023-10-24 RX ORDER — DEXTROSE MONOHYDRATE 100 MG/ML
INJECTION, SOLUTION INTRAVENOUS CONTINUOUS PRN
Status: DISCONTINUED | OUTPATIENT
Start: 2023-10-24 | End: 2023-10-24 | Stop reason: HOSPADM

## 2023-10-24 RX ORDER — PROPOFOL 10 MG/ML
INJECTION, EMULSION INTRAVENOUS PRN
Status: DISCONTINUED | OUTPATIENT
Start: 2023-10-24 | End: 2023-10-24 | Stop reason: SDUPTHER

## 2023-10-24 RX ORDER — PIOGLITAZONEHYDROCHLORIDE 15 MG/1
45 TABLET ORAL NIGHTLY
Status: DISCONTINUED | OUTPATIENT
Start: 2023-10-24 | End: 2023-10-25 | Stop reason: HOSPADM

## 2023-10-24 RX ORDER — HYDROMORPHONE HYDROCHLORIDE 1 MG/ML
0.5 INJECTION, SOLUTION INTRAMUSCULAR; INTRAVENOUS; SUBCUTANEOUS EVERY 5 MIN PRN
Status: COMPLETED | OUTPATIENT
Start: 2023-10-24 | End: 2023-10-24

## 2023-10-24 RX ORDER — SODIUM CHLORIDE 0.9 % (FLUSH) 0.9 %
5-40 SYRINGE (ML) INJECTION EVERY 12 HOURS SCHEDULED
Status: DISCONTINUED | OUTPATIENT
Start: 2023-10-24 | End: 2023-10-25 | Stop reason: HOSPADM

## 2023-10-24 RX ORDER — BISACODYL 5 MG/1
5 TABLET, DELAYED RELEASE ORAL DAILY
Status: DISCONTINUED | OUTPATIENT
Start: 2023-10-24 | End: 2023-10-25 | Stop reason: HOSPADM

## 2023-10-24 RX ORDER — ACETAMINOPHEN 325 MG/1
650 TABLET ORAL EVERY 6 HOURS
Status: DISCONTINUED | OUTPATIENT
Start: 2023-10-24 | End: 2023-10-25 | Stop reason: HOSPADM

## 2023-10-24 RX ORDER — ONDANSETRON 4 MG/1
4 TABLET, ORALLY DISINTEGRATING ORAL EVERY 8 HOURS PRN
Status: DISCONTINUED | OUTPATIENT
Start: 2023-10-24 | End: 2023-10-25 | Stop reason: HOSPADM

## 2023-10-24 RX ORDER — KETOROLAC TROMETHAMINE 30 MG/ML
15 INJECTION, SOLUTION INTRAMUSCULAR; INTRAVENOUS ONCE
Status: DISCONTINUED | OUTPATIENT
Start: 2023-10-24 | End: 2023-10-24 | Stop reason: HOSPADM

## 2023-10-24 RX ORDER — ROPIVACAINE HYDROCHLORIDE 5 MG/ML
INJECTION, SOLUTION EPIDURAL; INFILTRATION; PERINEURAL PRN
Status: DISCONTINUED | OUTPATIENT
Start: 2023-10-24 | End: 2023-10-24 | Stop reason: ALTCHOICE

## 2023-10-24 RX ORDER — ONDANSETRON 2 MG/ML
4 INJECTION INTRAMUSCULAR; INTRAVENOUS EVERY 6 HOURS PRN
Status: DISCONTINUED | OUTPATIENT
Start: 2023-10-24 | End: 2023-10-25 | Stop reason: HOSPADM

## 2023-10-24 RX ORDER — HYDROMORPHONE HYDROCHLORIDE 1 MG/ML
0.5 INJECTION, SOLUTION INTRAMUSCULAR; INTRAVENOUS; SUBCUTANEOUS
Status: DISCONTINUED | OUTPATIENT
Start: 2023-10-24 | End: 2023-10-25 | Stop reason: HOSPADM

## 2023-10-24 RX ORDER — FENTANYL CITRATE 50 UG/ML
25 INJECTION, SOLUTION INTRAMUSCULAR; INTRAVENOUS EVERY 5 MIN PRN
Status: COMPLETED | OUTPATIENT
Start: 2023-10-24 | End: 2023-10-24

## 2023-10-24 RX ORDER — PHENYLEPHRINE HCL IN 0.9% NACL 0.4MG/10ML
SYRINGE (ML) INTRAVENOUS PRN
Status: DISCONTINUED | OUTPATIENT
Start: 2023-10-24 | End: 2023-10-24 | Stop reason: SDUPTHER

## 2023-10-24 RX ORDER — SODIUM CHLORIDE 9 MG/ML
INJECTION, SOLUTION INTRAVENOUS CONTINUOUS
Status: DISCONTINUED | OUTPATIENT
Start: 2023-10-24 | End: 2023-10-25 | Stop reason: HOSPADM

## 2023-10-24 RX ORDER — ATORVASTATIN CALCIUM 40 MG/1
40 TABLET, FILM COATED ORAL DAILY
Status: DISCONTINUED | OUTPATIENT
Start: 2023-10-25 | End: 2023-10-25 | Stop reason: HOSPADM

## 2023-10-24 RX ORDER — TRAMADOL HYDROCHLORIDE 50 MG/1
50 TABLET ORAL EVERY 6 HOURS PRN
Status: DISCONTINUED | OUTPATIENT
Start: 2023-10-24 | End: 2023-10-25 | Stop reason: HOSPADM

## 2023-10-24 RX ORDER — TRAMADOL HYDROCHLORIDE 50 MG/1
100 TABLET ORAL EVERY 6 HOURS PRN
Status: DISCONTINUED | OUTPATIENT
Start: 2023-10-24 | End: 2023-10-25 | Stop reason: HOSPADM

## 2023-10-24 RX ORDER — AMLODIPINE BESYLATE 5 MG/1
5 TABLET ORAL DAILY
Status: DISCONTINUED | OUTPATIENT
Start: 2023-10-25 | End: 2023-10-25 | Stop reason: HOSPADM

## 2023-10-24 RX ADMIN — MIDAZOLAM HYDROCHLORIDE 1 MG: 1 INJECTION, SOLUTION INTRAMUSCULAR; INTRAVENOUS at 07:33

## 2023-10-24 RX ADMIN — KETOROLAC TROMETHAMINE 15 MG: 30 INJECTION, SOLUTION INTRAMUSCULAR; INTRAVENOUS at 13:53

## 2023-10-24 RX ADMIN — TRAMADOL HYDROCHLORIDE 50 MG: 50 TABLET ORAL at 17:27

## 2023-10-24 RX ADMIN — WATER 2000 MG: 1 INJECTION INTRAMUSCULAR; INTRAVENOUS; SUBCUTANEOUS at 07:43

## 2023-10-24 RX ADMIN — Medication 160 MCG: at 07:39

## 2023-10-24 RX ADMIN — FENTANYL CITRATE 25 MCG: 50 INJECTION, SOLUTION INTRAMUSCULAR; INTRAVENOUS at 10:28

## 2023-10-24 RX ADMIN — BISACODYL 5 MG: 5 TABLET, COATED ORAL at 15:00

## 2023-10-24 RX ADMIN — PIOGLITAZONE 45 MG: 15 TABLET ORAL at 21:54

## 2023-10-24 RX ADMIN — FENTANYL CITRATE 50 MCG: 50 INJECTION, SOLUTION INTRAMUSCULAR; INTRAVENOUS at 09:19

## 2023-10-24 RX ADMIN — FENTANYL CITRATE 25 MCG: 50 INJECTION, SOLUTION INTRAMUSCULAR; INTRAVENOUS at 07:54

## 2023-10-24 RX ADMIN — KETOROLAC TROMETHAMINE 15 MG: 30 INJECTION, SOLUTION INTRAMUSCULAR; INTRAVENOUS at 21:56

## 2023-10-24 RX ADMIN — FENTANYL CITRATE 50 MCG: 50 INJECTION, SOLUTION INTRAMUSCULAR; INTRAVENOUS at 09:24

## 2023-10-24 RX ADMIN — ACETAMINOPHEN 1000 MG: 500 TABLET ORAL at 06:50

## 2023-10-24 RX ADMIN — Medication 3 AMPULE: at 06:55

## 2023-10-24 RX ADMIN — ACETAMINOPHEN 650 MG: 325 TABLET ORAL at 15:04

## 2023-10-24 RX ADMIN — PHENYLEPHRINE HYDROCHLORIDE 60 MCG/MIN: 10 INJECTION INTRAVENOUS at 07:43

## 2023-10-24 RX ADMIN — FENTANYL CITRATE 25 MCG: 50 INJECTION, SOLUTION INTRAMUSCULAR; INTRAVENOUS at 09:45

## 2023-10-24 RX ADMIN — PROPOFOL 20 MG: 10 INJECTION, EMULSION INTRAVENOUS at 07:20

## 2023-10-24 RX ADMIN — MIDAZOLAM HYDROCHLORIDE 1 MG: 1 INJECTION, SOLUTION INTRAMUSCULAR; INTRAVENOUS at 07:30

## 2023-10-24 RX ADMIN — FENTANYL CITRATE 25 MCG: 50 INJECTION, SOLUTION INTRAMUSCULAR; INTRAVENOUS at 09:39

## 2023-10-24 RX ADMIN — WATER 2000 MG: 1 INJECTION INTRAMUSCULAR; INTRAVENOUS; SUBCUTANEOUS at 15:51

## 2023-10-24 RX ADMIN — Medication 80 MCG: at 08:31

## 2023-10-24 RX ADMIN — POTASSIUM CHLORIDE 10 MEQ: 750 TABLET, FILM COATED, EXTENDED RELEASE ORAL at 21:54

## 2023-10-24 RX ADMIN — SODIUM CHLORIDE, POTASSIUM CHLORIDE, SODIUM LACTATE AND CALCIUM CHLORIDE 25 ML: 600; 310; 30; 20 INJECTION, SOLUTION INTRAVENOUS at 07:09

## 2023-10-24 RX ADMIN — PROPOFOL 75 MCG/KG/MIN: 10 INJECTION, EMULSION INTRAVENOUS at 07:34

## 2023-10-24 RX ADMIN — BUPIVACAINE HYDROCHLORIDE 20 ML: 2.5 INJECTION, SOLUTION EPIDURAL; INFILTRATION; INTRACAUDAL; PERINEURAL at 07:27

## 2023-10-24 RX ADMIN — FENTANYL CITRATE 25 MCG: 50 INJECTION, SOLUTION INTRAMUSCULAR; INTRAVENOUS at 08:01

## 2023-10-24 RX ADMIN — PROPOFOL 20 MG: 10 INJECTION, EMULSION INTRAVENOUS at 09:04

## 2023-10-24 RX ADMIN — HYDROMORPHONE HYDROCHLORIDE 0.5 MG: 1 INJECTION, SOLUTION INTRAMUSCULAR; INTRAVENOUS; SUBCUTANEOUS at 09:59

## 2023-10-24 RX ADMIN — FENTANYL CITRATE 50 MCG: 50 INJECTION, SOLUTION INTRAMUSCULAR; INTRAVENOUS at 09:04

## 2023-10-24 RX ADMIN — FENTANYL CITRATE 25 MCG: 50 INJECTION, SOLUTION INTRAMUSCULAR; INTRAVENOUS at 09:54

## 2023-10-24 RX ADMIN — TRAMADOL HYDROCHLORIDE 50 MG: 50 TABLET ORAL at 10:38

## 2023-10-24 RX ADMIN — SODIUM CHLORIDE: 9 INJECTION, SOLUTION INTRAVENOUS at 14:31

## 2023-10-24 RX ADMIN — HYDROMORPHONE HYDROCHLORIDE 0.5 MG: 1 INJECTION, SOLUTION INTRAMUSCULAR; INTRAVENOUS; SUBCUTANEOUS at 12:02

## 2023-10-24 RX ADMIN — ASPIRIN 325 MG: 325 TABLET, COATED ORAL at 15:00

## 2023-10-24 RX ADMIN — ONDANSETRON 4 MG: 2 INJECTION INTRAMUSCULAR; INTRAVENOUS at 09:07

## 2023-10-24 RX ADMIN — Medication 160 MCG: at 07:30

## 2023-10-24 RX ADMIN — ACETAMINOPHEN 650 MG: 325 TABLET ORAL at 21:53

## 2023-10-24 RX ADMIN — PROPOFOL 20 MG: 10 INJECTION, EMULSION INTRAVENOUS at 07:46

## 2023-10-24 RX ADMIN — MEPIVACAINE HYDROCHLORIDE 2.5 ML: 20 INJECTION, SOLUTION EPIDURAL; INFILTRATION at 07:24

## 2023-10-24 RX ADMIN — SUCRALFATE 1 G: 1 TABLET ORAL at 15:00

## 2023-10-24 RX ADMIN — TRANEXAMIC ACID 1000 MG: 100 INJECTION, SOLUTION INTRAVENOUS at 07:44

## 2023-10-24 RX ADMIN — SODIUM CHLORIDE: 9 INJECTION, SOLUTION INTRAVENOUS at 21:55

## 2023-10-24 RX ADMIN — DEXAMETHASONE SODIUM PHOSPHATE 8 MG: 4 INJECTION INTRA-ARTICULAR; INTRALESIONAL; INTRAMUSCULAR; INTRAVENOUS; SOFT TISSUE at 07:43

## 2023-10-24 RX ADMIN — ASPIRIN 325 MG: 325 TABLET, COATED ORAL at 21:54

## 2023-10-24 ASSESSMENT — PAIN - FUNCTIONAL ASSESSMENT
PAIN_FUNCTIONAL_ASSESSMENT: 0-10
PAIN_FUNCTIONAL_ASSESSMENT: PREVENTS OR INTERFERES SOME ACTIVE ACTIVITIES AND ADLS

## 2023-10-24 ASSESSMENT — PAIN DESCRIPTION - ORIENTATION
ORIENTATION: RIGHT

## 2023-10-24 ASSESSMENT — PAIN SCALES - GENERAL
PAINLEVEL_OUTOF10: 8
PAINLEVEL_OUTOF10: 0
PAINLEVEL_OUTOF10: 4
PAINLEVEL_OUTOF10: 7
PAINLEVEL_OUTOF10: 0
PAINLEVEL_OUTOF10: 5

## 2023-10-24 ASSESSMENT — PAIN DESCRIPTION - LOCATION
LOCATION: KNEE

## 2023-10-24 ASSESSMENT — PAIN DESCRIPTION - DESCRIPTORS
DESCRIPTORS: ACHING
DESCRIPTORS: ACHING
DESCRIPTORS: ACHING;DISCOMFORT;SHARP
DESCRIPTORS: ACHING;SHARP

## 2023-10-24 NOTE — FLOWSHEET NOTE
10/24/23 3531   Handoff   Communication Given Transfer Handoff   Handoff phase Phase I transferring   Handoff Given To Gerda Zamora RN   Handoff Received From Sae Mendez RN and HERMES Sanchez CRNA and [address] Communication Face to Face; At bedside        10/24/23 1335   Handoff   Communication Given Transfer Handoff   Handoff phase Phase I transferring   Handoff Given To Samina RAE (ortho)   Handoff Received From Gerda Zamora RN   Handoff Communication Telephone   Time Handoff Given 0444

## 2023-10-24 NOTE — ANESTHESIA POSTPROCEDURE EVALUATION
Department of Anesthesiology  Postprocedure Note    Patient: Lori Eastman  MRN: [id number]  YOB: 1955  Date of evaluation: 10/24/2023      Procedure Summary     Date: 10/24/23 Room / Location: [facility] MAIN OR M3 / [facility] MAIN OR    Anesthesia Start: 0730 Anesthesia Stop: 9654    Procedure: RIGHT TOTAL KNEE ARTHROPLASTY (Right: Knee) Diagnosis:       Primary localized osteoarthrosis of the knee, right      (Primary localized osteoarthrosis of the knee, right [M17.11])    Providers: Mukesh Sotelo DO Responsible Provider: Willian Lozoya MD    Anesthesia Type: Regional, MAC, Spinal ASA Status: 3          Anesthesia Type: Regional, MAC, Spinal    Horacio Phase I: Horacio Score: 10    Horacio Phase II:        Anesthesia Post Evaluation    Patient location during evaluation: PACU  Patient participation: complete - patient participated  Level of consciousness: awake and alert  Airway patency: patent  Nausea & Vomiting: no nausea  Complications: no  Cardiovascular status: hemodynamically stable  Respiratory status: acceptable  Hydration status: euvolemic  Multimodal analgesia pain management approach  Pain management: adequate

## 2023-10-24 NOTE — H&P
10/24/2023    Chief Complaint: Right knee pain    HPI: This is a 79 y.o. female who complains of right knee pain. Onset was gradual.  The patient has had activity dependent pain for years. The patient has tried activity modification, physical therapy exercises, injections have failed to provide relief. The pain is in the knee, it is severe in intensity. The patient feels unstable with the knee, fears falling, and has significant limitation with activities of daily living, recreation, and walks with a limp. Past Medical History:   Diagnosis Date    Anemia     Diverticulitis     Esophageal reflux     GERD (gastroesophageal reflux disease)     Hypertension     Osteoarthrosis, unspecified whether generalized or localized, lower leg     Other and unspecified hyperlipidemia     Type II or unspecified type diabetes mellitus without mention of complication, uncontrolled        Past Surgical History:   Procedure Laterality Date    COLONOSCOPY N/A 9/18/2017    COLONOSCOPY performed by Ifrah ePttit MD at [facility] AMBULATORY OR    COLONOSCOPY N/A 4/25/2018    COLONOSCOPY performed by Ifrah Pettit MD at [address] [place] [address] Left 08/28/2023    LEFT RING FINGER TRIGGER RELEASE performed by Lino Danielson DO at [address] [place] [address] Dr  2004    uterine surgery    HAND SURGERY      HYSTERECTOMY (CERVIX STATUS UNKNOWN)  2021    INCISION AND DRAINAGE OF WOUND  09/30/2023    left top of knee    LAP,TUBAL CAUTERY Bilateral 1992    TONSILLECTOMY      age 11    TOTAL KNEE ARTHROPLASTY Left 05/22/2023    LEFT TOTAL KNEE ARTHROPLASTY performed by Lino Danielson DO at [facility] MAIN OR       No current facility-administered medications on file prior to encounter.      Current Outpatient Medications on File Prior to Encounter   Medication Sig Dispense Refill    atorvastatin (LIPITOR) 40 MG tablet TAKE 1 TABLET BY MOUTH EVERY DAY 30 tablet 12    sucralfate (CARAFATE) 1 GM tablet TAKE 1 TABLET BY MOUTH FOUR TIMES A DAY (Patient

## 2023-10-24 NOTE — PERIOP NOTE
Paulypt Antimicrobial Wound Lavage  GTIN: [id number]  Ref: [id number]  Lot: [id number]  Exp: 06-

## 2023-10-24 NOTE — ANESTHESIA PROCEDURE NOTES
Spinal Block    Patient location during procedure: holding area  End time: 10/24/2023 7:28 AM  Reason for block: primary anesthetic and at surgeon's request  Staffing  Performed: anesthesiologist   Anesthesiologist: Ricardo Desir MD  Performed by: Ricardo Desir MD  Authorized by: Ricardo Desir MD    Spinal Block  Patient position: sitting  Prep: ChloraPrep and site prepped and draped  Patient monitoring: cardiac monitor, continuous pulse ox, continuous capnometry, frequent blood pressure checks and oxygen  Approach: midline  Location: L3/L4  Provider prep: sterile gloves and mask  Local infiltration: lidocaine  Needle  Needle type: Quincke   Needle gauge: 22 G  Needle length: 3.5 in  Assessment  Swirl obtained: Yes  CSF: clear  Attempts: 2  Hemodynamics: stable  Preanesthetic Checklist  Completed: patient identified, IV checked, site marked, risks and benefits discussed, surgical/procedural consents, equipment checked, pre-op evaluation, timeout performed, anesthesia consent given, oxygen available, monitors applied/VS acknowledged, fire risk safety assessment completed and verbalized and blood product R/B/A discussed and consented

## 2023-10-24 NOTE — PERIOP NOTE
Pt  took   covid   vbaccine  in the past   no s/s/     no recent  tesst.   Voided  x1  in bathroom   mepilex  sent into  room  with pt    or   nurse will apply   glucose   `196 in holding   area. Time out   #1 done   0711 spinal   propofol  20cc   iv   given  by  crna   student      time  out   #2  done  at   0725     block   done  to right   thigh  . After    block   pt   starting   to become  anxious     med with versed   by  crna   student. Calming   down  into surgery. Medication  given   for  bp  by  dr. Alan Bynre   see his   not. 94/59.

## 2023-10-24 NOTE — OP NOTE
Operative Note      Patient: Aristides Santacruz  YOB: 1955  MRN: [id number]    Date of Procedure: 10/24/2023    Pre-Op Diagnosis Codes:     * Primary localized osteoarthrosis of the knee, right [M17.11]    Post-Op Diagnosis: Same       Procedure(s):  RIGHT TOTAL KNEE ARTHROPLASTY    Surgeon(s):  Faith Pascual DO    Assistant:   Surgical Assistant: Susie King    Anesthesia: Spinal    Estimated Blood Loss (mL): less than 757     Complications: None    Specimens:   * No specimens in log *    Implants:  Implant Name Type Inv. Item Serial No.  Lot No. LRB No. Used Action   BASEPLATE TIB SZ 4 [id number] ML70MM KNEE TRITANIUM 4 CRUCFRM - SNA  BASEPLATE TIB SZ 4 [id number] ML70MM KNEE TRITANIUM 4 CRUCFRM NA [facility] [id number] Right 1 Implanted   COMPONENT FEM SZ 4 R KNEE TRITANIUM ERINN APATITE POST STBL [id number]] [facility]] - SNA  COMPONENT FEM SZ 4 R KNEE TRITANIUM ERINN APATITE POST STBL [id number]] [employer]] NA [facility] [id number] Right 1 Implanted   COMPONENT PAT [id number] THK9MM KNEE TRITANIUM MTL BK - SNA  COMPONENT PAT [id number] THK9MM KNEE TRITANIUM MTL BK NA [facility] [id number] Right 1 Implanted   INSERT TIB PS 4 9 MM KNEE 5/PK X3 TRIATHLON - SNA  INSERT TIB PS 4 9 MM KNEE 5/PK X3 TRIATHLON NA [employer] [id number] Right 1 Implanted         Drains: * No LDAs found *    Findings: severe OA        Detailed Description of Procedure:   Date of Procedure: 10/24/2023    Preoperative range of motion: 0 degrees extension to 120 degrees flexion  Postoperative range of motion: 0 degrees extension to 130 degrees flexion     OPERATIVE INDICATIONS: This is a 79 y. o.female who failed nonoperative management of arthrosis of the left knee. They did elect for a total knee arthroplasty.  We did discuss the risks of surgery which include but are not limited to infection, nerve or blood vessel damage, need for reoperation,

## 2023-10-24 NOTE — ANESTHESIA PROCEDURE NOTES
Peripheral Block    Patient location during procedure: holding area  Reason for block: post-op pain management and at surgeon's request  Start time: 10/24/2023 7:20 AM  End time: 10/24/2023 7:28 AM  Staffing  Performed: anesthesiologist   Anesthesiologist: Mary Laws MD  Performed by: Mary Laws MD  Authorized by: Mary Laws MD    Preanesthetic Checklist  Completed: patient identified, IV checked, site marked, risks and benefits discussed, surgical/procedural consents, equipment checked, pre-op evaluation, timeout performed, anesthesia consent given, oxygen available, monitors applied/VS acknowledged, fire risk safety assessment completed and verbalized and blood product R/B/A discussed and consented  Peripheral Block   Patient position: supine  Prep: ChloraPrep  Provider prep: mask and sterile gloves  Patient monitoring: cardiac monitor, continuous pulse ox, continuous capnometry, frequent blood pressure checks, IV access, oxygen and responsive to questions  Block type: Femoral  Adductor canal  Laterality: right  Injection technique: single-shot  Guidance: ultrasound guided    Needle   Needle type: insulated echogenic nerve stimulator needle   Needle gauge: 20 G  Needle localization: ultrasound guidance  Needle length: 10 cm  Assessment   Injection assessment: no paresthesia on injection, negative aspiration for heme, local visualized surrounding nerve on ultrasound and no intravascular symptoms  Paresthesia pain: none  Slow fractionated injection: yes  Hemodynamics: stable  Outcomes: uncomplicated and patient tolerated procedure well    Additional Notes  With nerve block to vastus medialis utilizing ultrasound guidance

## 2023-10-24 NOTE — ANESTHESIA PRE PROCEDURE
Obesity  Hx diverticulitis. Endo/Other:    (+) DiabetesType II DM, poorly controlled, , : arthritis:., .                 Abdominal:             Vascular: negative vascular ROS. Other Findings:             Anesthesia Plan      MAC, regional and spinal     ASA 3       Induction: intravenous. MIPS: Postoperative opioids intended and Prophylactic antiemetics administered. Anesthetic plan and risks discussed with patient. Plan discussed with CRNA.     Attending anesthesiologist reviewed and agrees with Preprocedure content      Post-op pain plan if not by surgeon: single peripheral nerve block            Boaz Oswald MD   10/24/2023

## 2023-10-25 VITALS
BODY MASS INDEX: 30.93 KG/M2 | TEMPERATURE: 97.9 F | HEIGHT: 61 IN | OXYGEN SATURATION: 98 % | WEIGHT: 163.8 LBS | HEART RATE: 89 BPM | RESPIRATION RATE: 16 BRPM | SYSTOLIC BLOOD PRESSURE: 114 MMHG | DIASTOLIC BLOOD PRESSURE: 71 MMHG

## 2023-10-25 LAB
ANION GAP SERPL CALC-SCNC: 6 MMOL/L (ref 5–15)
BUN SERPL-MCNC: 20 MG/DL (ref 6–20)
BUN/CREAT SERPL: 24 (ref 12–20)
CALCIUM SERPL-MCNC: 8.6 MG/DL (ref 8.5–10.1)
CHLORIDE SERPL-SCNC: 106 MMOL/L (ref 97–108)
CO2 SERPL-SCNC: 25 MMOL/L (ref 21–32)
CREAT SERPL-MCNC: 0.84 MG/DL (ref 0.55–1.02)
GLUCOSE BLD STRIP.AUTO-MCNC: 165 MG/DL (ref 65–117)
GLUCOSE BLD STRIP.AUTO-MCNC: 173 MG/DL (ref 65–117)
GLUCOSE SERPL-MCNC: 200 MG/DL (ref 65–100)
HCT VFR BLD AUTO: 25 % (ref 35–47)
HGB BLD-MCNC: 7.5 G/DL (ref 11.5–16)
POTASSIUM SERPL-SCNC: 3.9 MMOL/L (ref 3.5–5.1)
SERVICE CMNT-IMP: ABNORMAL
SERVICE CMNT-IMP: ABNORMAL
SODIUM SERPL-SCNC: 137 MMOL/L (ref 136–145)

## 2023-10-25 PROCEDURE — 97530 THERAPEUTIC ACTIVITIES: CPT

## 2023-10-25 PROCEDURE — 2580000003 HC RX 258: Performed by: PHYSICIAN ASSISTANT

## 2023-10-25 PROCEDURE — 85014 HEMATOCRIT: CPT

## 2023-10-25 PROCEDURE — 36415 COLL VENOUS BLD VENIPUNCTURE: CPT

## 2023-10-25 PROCEDURE — 82962 GLUCOSE BLOOD TEST: CPT

## 2023-10-25 PROCEDURE — 6360000002 HC RX W HCPCS: Performed by: PHYSICIAN ASSISTANT

## 2023-10-25 PROCEDURE — 85018 HEMOGLOBIN: CPT

## 2023-10-25 PROCEDURE — 97116 GAIT TRAINING THERAPY: CPT

## 2023-10-25 PROCEDURE — 6370000000 HC RX 637 (ALT 250 FOR IP): Performed by: PHYSICIAN ASSISTANT

## 2023-10-25 PROCEDURE — 80048 BASIC METABOLIC PNL TOTAL CA: CPT

## 2023-10-25 RX ORDER — ASPIRIN 325 MG
325 TABLET, DELAYED RELEASE (ENTERIC COATED) ORAL 2 TIMES DAILY
Qty: 60 TABLET | Refills: 0 | Status: SHIPPED | OUTPATIENT
Start: 2023-10-25 | End: 2023-11-24

## 2023-10-25 RX ORDER — TRAMADOL HYDROCHLORIDE 50 MG/1
50-100 TABLET ORAL EVERY 6 HOURS PRN
Qty: 30 TABLET | Refills: 0 | Status: SHIPPED | OUTPATIENT
Start: 2023-10-25 | End: 2023-10-29 | Stop reason: ALTCHOICE

## 2023-10-25 RX ORDER — LOSARTAN POTASSIUM 50 MG/1
50 TABLET ORAL DAILY
Qty: 30 TABLET | Refills: 3 | Status: SHIPPED
Start: 2023-10-25 | End: 2023-10-25 | Stop reason: SDUPTHER

## 2023-10-25 RX ORDER — SENNA AND DOCUSATE SODIUM 50; 8.6 MG/1; MG/1
1 TABLET, FILM COATED ORAL DAILY
Qty: 14 TABLET | Refills: 0 | Status: SHIPPED | OUTPATIENT
Start: 2023-10-25 | End: 2023-11-08

## 2023-10-25 RX ORDER — AMLODIPINE BESYLATE 5 MG/1
5 TABLET ORAL DAILY
Qty: 30 TABLET | Refills: 3 | Status: SHIPPED
Start: 2023-10-25

## 2023-10-25 RX ORDER — LOSARTAN POTASSIUM 50 MG/1
50 TABLET ORAL DAILY
Qty: 30 TABLET | Refills: 3 | Status: SHIPPED
Start: 2023-10-25

## 2023-10-25 RX ORDER — HYDROCHLOROTHIAZIDE 25 MG/1
25 TABLET ORAL DAILY
Qty: 30 TABLET | Refills: 3 | Status: SHIPPED
Start: 2023-10-25 | End: 2023-10-25 | Stop reason: SDUPTHER

## 2023-10-25 RX ORDER — HYDROCHLOROTHIAZIDE 25 MG/1
25 TABLET ORAL DAILY
Qty: 30 TABLET | Refills: 3 | Status: SHIPPED
Start: 2023-10-25

## 2023-10-25 RX ADMIN — POTASSIUM CHLORIDE 10 MEQ: 750 TABLET, FILM COATED, EXTENDED RELEASE ORAL at 08:53

## 2023-10-25 RX ADMIN — FAMOTIDINE 40 MG: 20 TABLET, FILM COATED ORAL at 08:56

## 2023-10-25 RX ADMIN — BISACODYL 5 MG: 5 TABLET, COATED ORAL at 08:53

## 2023-10-25 RX ADMIN — WATER 2000 MG: 1 INJECTION INTRAMUSCULAR; INTRAVENOUS; SUBCUTANEOUS at 02:58

## 2023-10-25 RX ADMIN — ACETAMINOPHEN 650 MG: 325 TABLET ORAL at 02:58

## 2023-10-25 RX ADMIN — ASPIRIN 325 MG: 325 TABLET, COATED ORAL at 08:52

## 2023-10-25 RX ADMIN — SODIUM CHLORIDE: 9 INJECTION, SOLUTION INTRAVENOUS at 06:30

## 2023-10-25 RX ADMIN — ATORVASTATIN CALCIUM 40 MG: 40 TABLET, FILM COATED ORAL at 08:52

## 2023-10-25 RX ADMIN — SUCRALFATE 1 G: 1 TABLET ORAL at 02:58

## 2023-10-25 RX ADMIN — ACETAMINOPHEN 650 MG: 325 TABLET ORAL at 08:52

## 2023-10-25 RX ADMIN — KETOROLAC TROMETHAMINE 15 MG: 30 INJECTION, SOLUTION INTRAMUSCULAR; INTRAVENOUS at 08:53

## 2023-10-25 RX ADMIN — METFORMIN HYDROCHLORIDE 1000 MG: 500 TABLET ORAL at 08:53

## 2023-10-25 RX ADMIN — SUCRALFATE 1 G: 1 TABLET ORAL at 06:30

## 2023-10-25 RX ADMIN — KETOROLAC TROMETHAMINE 15 MG: 30 INJECTION, SOLUTION INTRAMUSCULAR; INTRAVENOUS at 02:58

## 2023-10-25 NOTE — PROGRESS NOTES
End of Shift Note    Bedside shift change report given to [employer] Jacques, RN and Kishor Vega LPN (oncoming nurse) by Sanjuanita Welsh RN (offgoing nurse). Report included the following information SBAR, Procedure Summary, Intake/Output, MAR, and Recent Results    Shift worked:  night     Shift summary and any significant changes:     POD 1, BP soft overnight, pt remains on postop IV fluids, pt c/o minimal pain, scheduled tylenol/toradol given, pt voiding with purewick while in bed. Concerns for physician to address:  See above     Zone phone for oncoming shift:   [phone]       Activity:     Number times ambulated in hallways past shift: 0  Number of times OOB to chair past shift: 0    Cardiac:   Cardiac Monitoring: No           Access:  Current line(s): PIV     Genitourinary:   Urinary status: voiding and external catheter    Respiratory:      Chronic home O2 use?: NO  Incentive spirometer at bedside: YES       GI:     Current diet:  ADULT DIET; Regular  DIET ONE TIME MESSAGE;  Passing flatus: YES  Tolerating current diet: YES       Pain Management:   Patient states pain is manageable on current regimen: YES    Skin:     Interventions: float heels, increase time out of bed, PT/OT consult, and internal/external urinary devices    Patient Safety:  Fall Score:    Interventions: bed/chair alarm, assistive device (walker, cane.  etc), gripper socks, pt to call before getting OOB, and stay with me (per policy)       Length of Stay:  Expected LOS:   Actual LOS: 0      Sanjuanita Welsh RN

## 2023-10-25 NOTE — CARE COORDINATION
Update  [facility] accepted. AVS updated. Family to transport     Initial note  Planned ortho surgery patient in need of home health services for d/c. [place] referral sent out due to limited agencies in network with pt's insurance.      [facility] [address]

## 2023-10-25 NOTE — DISCHARGE SUMMARY
mobility, transfers and ambulation. Gait:                      Discharged to: Home. Condition on Discharge:   Stable    Discharge instructions:  - Anticoagulate with Aspirin 325 mg BID   - Take pain medications as prescribed  - Resume pre hospital diet      - Discharge activity: activity as tolerated  - Ambulate with assistive device as needed. - Weight bearing status WBAT  - Wound Care Keep wound clean and dry. See discharge instruction sheet. -DISCHARGE MEDICATION LIST        Medication List        START taking these medications      aspirin 325 MG EC tablet  Take 1 tablet by mouth 2 times daily     sennosides-docusate sodium 8.6-50 MG tablet  Commonly known as: SENOKOT-S  Take 1 tablet by mouth daily for 14 days     traMADol 50 MG tablet  Commonly known as: ULTRAM  Take 1-2 tablets by mouth every 6 hours as needed for Pain for up to 7 days.  One tab for mild to moderate pain level 1-6, or 2 tabs for severe pain level 7-10 Max Daily Amount: 400 mg            CHANGE how you take these medications      amLODIPine 5 MG tablet  Commonly known as: NORVASC  Take 1 tablet by mouth daily Restart when systolic blood pressure over 328, diastolic over 90  What changed: additional instructions     hydroCHLOROthiazide 25 MG tablet  Commonly known as: HYDRODIURIL  Take 1 tablet by mouth daily Restart tomorrow  What changed: additional instructions     losartan 50 MG tablet  Commonly known as: COZAAR  Take 1 tablet by mouth daily Restart when Systolic blood pressure over 831, diastolic blood pressure over 90  What changed: additional instructions     sucralfate 1 GM tablet  Commonly known as: CARAFATE  TAKE 1 TABLET BY MOUTH FOUR TIMES A DAY  What changed:   how much to take  how to take this  when to take this            CONTINUE taking these medications      acetaminophen 500 MG tablet  Commonly known as: TYLENOL     atorvastatin 40 MG tablet  Commonly known as: LIPITOR  TAKE 1 TABLET BY MOUTH EVERY DAY

## 2023-10-27 ENCOUNTER — TELEPHONE (OUTPATIENT)
Age: 68
End: 2023-10-27

## 2023-10-27 NOTE — TELEPHONE ENCOUNTER
Patient was provided with the response from LOUIE Agudelo \"I spoke with Dr. Donnie Dial and this is not concerning. As long as the dressing is not wet, there is not persistent bleeding, and her symptoms haven't worsened, then the bleeding isn't an issue. \" Patient stated her understanding

## 2023-10-27 NOTE — TELEPHONE ENCOUNTER
Patient called in stating that she had RT TKA surgery on 10/24/23 and she has noticed some new blood at the surgery site. She states that it has not escaped her bandages, she does not have a temp, she has no increase in swelling and only a little increase in pain. She would like to know if she should be concerned about the new blood. She may be reached at [phone].

## 2023-10-29 ENCOUNTER — HOSPITAL ENCOUNTER (EMERGENCY)
Facility: HOSPITAL | Age: 68
Discharge: HOME | End: 2023-10-29
Attending: STUDENT IN AN ORGANIZED HEALTH CARE EDUCATION/TRAINING PROGRAM
Payer: COMMERCIAL

## 2023-10-29 ENCOUNTER — APPOINTMENT (OUTPATIENT)
Facility: HOSPITAL | Age: 68
End: 2023-10-29
Payer: COMMERCIAL

## 2023-10-29 VITALS
SYSTOLIC BLOOD PRESSURE: 135 MMHG | DIASTOLIC BLOOD PRESSURE: 85 MMHG | RESPIRATION RATE: 18 BRPM | HEART RATE: 103 BPM | OXYGEN SATURATION: 96 % | TEMPERATURE: 98.2 F

## 2023-10-29 DIAGNOSIS — S52.502A CLOSED FRACTURE OF DISTAL END OF LEFT RADIUS, UNSPECIFIED FRACTURE MORPHOLOGY, INITIAL ENCOUNTER: Primary | ICD-10-CM

## 2023-10-29 PROCEDURE — 99283 EMERGENCY DEPT VISIT LOW MDM: CPT

## 2023-10-29 PROCEDURE — 6370000000 HC RX 637 (ALT 250 FOR IP): Performed by: STUDENT IN AN ORGANIZED HEALTH CARE EDUCATION/TRAINING PROGRAM

## 2023-10-29 PROCEDURE — 73110 X-RAY EXAM OF WRIST: CPT

## 2023-10-29 RX ORDER — OXYCODONE HYDROCHLORIDE 5 MG/1
2.5 TABLET ORAL ONCE
Status: COMPLETED | OUTPATIENT
Start: 2023-10-29 | End: 2023-10-29

## 2023-10-29 RX ORDER — OXYCODONE HYDROCHLORIDE 5 MG/1
2.5 TABLET ORAL EVERY 6 HOURS PRN
Qty: 6 TABLET | Refills: 0 | Status: SHIPPED | OUTPATIENT
Start: 2023-10-29 | End: 2023-11-01

## 2023-10-29 RX ADMIN — OXYCODONE HYDROCHLORIDE 2.5 MG: 5 TABLET ORAL at 16:48

## 2023-10-29 ASSESSMENT — PAIN - FUNCTIONAL ASSESSMENT: PAIN_FUNCTIONAL_ASSESSMENT: ACTIVITIES ARE NOT PREVENTED

## 2023-10-29 ASSESSMENT — PAIN DESCRIPTION - DESCRIPTORS: DESCRIPTORS: DISCOMFORT

## 2023-10-29 ASSESSMENT — PAIN DESCRIPTION - ORIENTATION: ORIENTATION: LEFT

## 2023-10-29 ASSESSMENT — PAIN SCALES - GENERAL
PAINLEVEL_OUTOF10: 10
PAINLEVEL_OUTOF10: 7

## 2023-10-29 ASSESSMENT — PAIN DESCRIPTION - LOCATION: LOCATION: ARM

## 2023-10-29 NOTE — ED PROVIDER NOTES
tablet  Commonly known as: COZAAR  Take 1 tablet by mouth daily Restart when Systolic blood pressure over 624, diastolic blood pressure over 90     metFORMIN 1000 MG tablet  Commonly known as: GLUCOPHAGE     MULTIVITAMIN WOMEN PO     nystatin 666292 UNIT/GM powder  Commonly known as: MYCOSTATIN     pioglitazone 45 MG tablet  Commonly known as: ACTOS     potassium chloride 10 MEQ extended release capsule  Commonly known as: MICRO-K     sennosides-docusate sodium 8.6-50 MG tablet  Commonly known as: SENOKOT-S  Take 1 tablet by mouth daily for 14 days     sucralfate 1 GM tablet  Commonly known as: CARAFATE  TAKE 1 TABLET BY MOUTH FOUR TIMES A DAY     Vitamin D3 50 MCG (2000 UT) Tabs  TAKE 1 TABLET BY MOUTH TWO TIMES A DAY. Where to Get Your Medications        These medications were sent to [facility]/pharmacy #[id number]- [facility] -  [phone] - F [phone]  [facility] [place] [address]      Phone: [phone]   oxyCODONE 5 MG immediate release tablet           DISCONTINUED MEDICATIONS:  Discharge Medication List as of 10/29/2023  4:55 PM          I am the Primary Clinician of Record. Stanley Zapien MD (electronically signed)    (Please note that parts of this dictation were completed with voice recognition software. Quite often unanticipated grammatical, syntax, homophones, and other interpretive errors are inadvertently transcribed by the computer software. Please disregards these errors.  Please excuse any errors that have escaped final proofreading.)          Justo Brown MD  10/30/23 1157

## 2023-10-29 NOTE — DISCHARGE INSTRUCTIONS
Please make a followup with your primary care physician and orthopedist.  If you have any new or worsening concerning medical symptoms please return to the emergency department. If you have any significant pain, swelling, loss of sensation of your hand please take off the wrap of your splint and seek medical care.

## 2023-10-30 RX ORDER — POTASSIUM CHLORIDE 750 MG/1
10 CAPSULE, EXTENDED RELEASE ORAL 2 TIMES DAILY
Qty: 60 CAPSULE | Refills: 12 | Status: SHIPPED | OUTPATIENT
Start: 2023-10-30 | End: 2023-12-19

## 2023-10-30 NOTE — TELEPHONE ENCOUNTER
Pt left a voice message requesting a refill. [phone]    Last appointment: 10/16/2023 MD Meche Carey   Next appointment: 01/16/2024 MD Meche Carey   Previous refill encounter(s):   03/28/2023 [facility]60 with 6 refills.      For Pharmacy Admin Tracking Only    Program: Medication Refill  Intervention Detail: New Rx: 1, reason: Patient Preference  Time Spent (min): 5      Requested Prescriptions     Pending Prescriptions Disp Refills    potassium chloride (MICRO-K) 10 MEQ extended release capsule 60 capsule 0     Sig: Take 1 capsule by mouth 2 times daily

## 2023-10-31 ENCOUNTER — OFFICE VISIT (OUTPATIENT)
Age: 68
End: 2023-10-31
Payer: COMMERCIAL

## 2023-10-31 VITALS
HEART RATE: 100 BPM | TEMPERATURE: 97.7 F | HEIGHT: 61 IN | WEIGHT: 166 LBS | DIASTOLIC BLOOD PRESSURE: 78 MMHG | OXYGEN SATURATION: 94 % | BODY MASS INDEX: 31.34 KG/M2 | RESPIRATION RATE: 18 BRPM | SYSTOLIC BLOOD PRESSURE: 120 MMHG

## 2023-10-31 DIAGNOSIS — S52.532A CLOSED COLLES' FRACTURE OF LEFT RADIUS, INITIAL ENCOUNTER: Primary | ICD-10-CM

## 2023-10-31 PROCEDURE — 1123F ACP DISCUSS/DSCN MKR DOCD: CPT | Performed by: ORTHOPAEDIC SURGERY

## 2023-10-31 PROCEDURE — 99213 OFFICE O/P EST LOW 20 MIN: CPT | Performed by: ORTHOPAEDIC SURGERY

## 2023-10-31 PROCEDURE — 3074F SYST BP LT 130 MM HG: CPT | Performed by: ORTHOPAEDIC SURGERY

## 2023-10-31 PROCEDURE — 3078F DIAST BP <80 MM HG: CPT | Performed by: ORTHOPAEDIC SURGERY

## 2023-10-31 ASSESSMENT — PATIENT HEALTH QUESTIONNAIRE - PHQ9
SUM OF ALL RESPONSES TO PHQ9 QUESTIONS 1 & 2: 0
SUM OF ALL RESPONSES TO PHQ QUESTIONS 1-9: 0
2. FEELING DOWN, DEPRESSED OR HOPELESS: 0
SUM OF ALL RESPONSES TO PHQ QUESTIONS 1-9: 0
1. LITTLE INTEREST OR PLEASURE IN DOING THINGS: 0

## 2023-11-07 ENCOUNTER — OFFICE VISIT (OUTPATIENT)
Age: 68
End: 2023-11-07
Payer: COMMERCIAL

## 2023-11-07 VITALS
DIASTOLIC BLOOD PRESSURE: 78 MMHG | WEIGHT: 164.4 LBS | OXYGEN SATURATION: 95 % | TEMPERATURE: 97.2 F | HEART RATE: 100 BPM | RESPIRATION RATE: 18 BRPM | HEIGHT: 61 IN | BODY MASS INDEX: 31.04 KG/M2 | SYSTOLIC BLOOD PRESSURE: 118 MMHG

## 2023-11-07 DIAGNOSIS — M25.532 LEFT WRIST PAIN: Primary | ICD-10-CM

## 2023-11-07 DIAGNOSIS — Z47.1 AFTERCARE FOLLOWING RIGHT KNEE JOINT REPLACEMENT SURGERY: ICD-10-CM

## 2023-11-07 DIAGNOSIS — S52.532A CLOSED COLLES' FRACTURE OF LEFT RADIUS, INITIAL ENCOUNTER: ICD-10-CM

## 2023-11-07 DIAGNOSIS — Z96.651 AFTERCARE FOLLOWING RIGHT KNEE JOINT REPLACEMENT SURGERY: ICD-10-CM

## 2023-11-07 PROCEDURE — 1123F ACP DISCUSS/DSCN MKR DOCD: CPT | Performed by: ORTHOPAEDIC SURGERY

## 2023-11-07 PROCEDURE — 3074F SYST BP LT 130 MM HG: CPT | Performed by: ORTHOPAEDIC SURGERY

## 2023-11-07 PROCEDURE — 99213 OFFICE O/P EST LOW 20 MIN: CPT | Performed by: ORTHOPAEDIC SURGERY

## 2023-11-07 PROCEDURE — 3078F DIAST BP <80 MM HG: CPT | Performed by: ORTHOPAEDIC SURGERY

## 2023-11-07 RX ORDER — POLYETHYLENE GLYCOL 3350 17 G/17G
17 POWDER, FOR SOLUTION ORAL DAILY
Qty: 60 G | Refills: 3 | Status: SHIPPED | OUTPATIENT
Start: 2023-11-07 | End: 2023-12-07

## 2023-11-07 ASSESSMENT — PATIENT HEALTH QUESTIONNAIRE - PHQ9
SUM OF ALL RESPONSES TO PHQ QUESTIONS 1-9: 0
SUM OF ALL RESPONSES TO PHQ9 QUESTIONS 1 & 2: 0
SUM OF ALL RESPONSES TO PHQ QUESTIONS 1-9: 0
SUM OF ALL RESPONSES TO PHQ QUESTIONS 1-9: 0
2. FEELING DOWN, DEPRESSED OR HOPELESS: 0
1. LITTLE INTEREST OR PLEASURE IN DOING THINGS: 0
SUM OF ALL RESPONSES TO PHQ QUESTIONS 1-9: 0

## 2023-11-08 NOTE — PROGRESS NOTES
11/7/2023      CC: left wrist pain    HPI:      This is a 79y.o. year old female who presents for a follow up visit. The patient was last seen and diagnosed with distal radius fracture. The patient's treatments since the most recent visit have comprised of bracing. The patient has had good relief of the chief complaint.         PMH:  Past Medical History:   Diagnosis Date    Anemia     Diverticulitis     Esophageal reflux     GERD (gastroesophageal reflux disease)     Hypertension     Osteoarthrosis, unspecified whether generalized or localized, lower leg     Other and unspecified hyperlipidemia     Type II or unspecified type diabetes mellitus without mention of complication, uncontrolled        PSxHx:  Past Surgical History:   Procedure Laterality Date    COLONOSCOPY N/A 9/18/2017    COLONOSCOPY performed by Daphne Enriquez MD at [facility] AMBULATORY OR    COLONOSCOPY N/A 4/25/2018    COLONOSCOPY performed by Daphne Enriquez MD at [address] [place] [address] Left 08/28/2023    LEFT RING FINGER TRIGGER RELEASE performed by Zafar Monroe DO at [facility] MAIN OR    GYN  2004    uterine surgery    HAND SURGERY      HYSTERECTOMY (CERVIX STATUS UNKNOWN)  2021    INCISION AND DRAINAGE OF WOUND  09/30/2023    left top of knee    LAP,TUBAL CAUTERY Bilateral 1992    TONSILLECTOMY      age 11    TOTAL KNEE ARTHROPLASTY Left 05/22/2023    LEFT TOTAL KNEE ARTHROPLASTY performed by Zafar Monroe DO at [address] [place] [address] Right 10/24/2023    RIGHT TOTAL KNEE ARTHROPLASTY performed by Zafar Monroe DO at [facility] MAIN OR       Meds:    Current Outpatient Medications:     polyethylene glycol (GLYCOLAX) 17 GM/SCOOP powder, Take 17 g by mouth daily, Disp: 60 g, Rfl: 3    potassium chloride (MICRO-K) 10 MEQ extended release capsule, Take 1 capsule by mouth 2 times daily, Disp: 60 capsule, Rfl: 12    amLODIPine (NORVASC) 5 MG tablet, Take 1 tablet by mouth daily Restart when systolic blood pressure over 120,

## 2023-11-16 ENCOUNTER — HOSPITAL ENCOUNTER (OUTPATIENT)
Facility: HOSPITAL | Age: 68
Setting detail: RECURRING SERIES
Discharge: HOME | End: 2023-11-19
Payer: COMMERCIAL

## 2023-11-16 PROCEDURE — 97162 PT EVAL MOD COMPLEX 30 MIN: CPT | Performed by: PHYSICAL THERAPIST

## 2023-11-16 NOTE — THERAPY EVALUATION
[address] [facility] Physical Therapy  [address] ([address]), [address] [facility]  Phone: [phone]   Fax: [phone]          PHYSICAL THERAPY - MEDICARE EVALUATION/PLAN OF CARE NOTE (updated 3/23)      Date: 2023          Patient Name:  Mesha Hewitt :  1955   Medical   Diagnosis:  Closed Colles' fracture of left radius, initial encounter [S52.532A]  Aftercare following right knee joint replacement surgery [Z47.1, Z96.651] Treatment Diagnosis:  M25.561  RIGHT KNEE PAIN    Referral Source:  Zafar Monroe DO Provider #:  [id number]                Insurance: [facility] [place] [facility] / Product Type: *No Product type* /      Patient  verified yes     Visit #   Current  / Total 1 24   Time   In / Out 1:05pm 1:55pm   Total Treatment Time 50   Total Timed Codes 7   1:1 Treatment Time 7      [facility] Totals Reminder:  bill using total billable   min of TIMED therapeutic procedures and modalities. 8-22 min = 1 unit; 23-37 min = 2 units; 38-52 min = 3 units;  53-67 min = 4 units; 68-82 min = 5 units           SUBJECTIVE  Pain Level (0-10 scale): 8 (3-10/10)  []constant []intermittent []improving []worsening []no change since onset    Any medication changes, allergies to medications, adverse drug reactions, diagnosis change, or new procedure performed?: [x] No    [] Yes (see summary sheet for update)  Medications: Verified on Patient Summary List    Subjective functional status/changes: The patient had a right TKR on 10/24/23. She also had a fall on 10/28/23, that resulted in a left closed colles' fracture and is in a wrist immobilizer (this is feeling much better overall). She has been able to be on her feet for 10-15min until needing to sit. Her right knee hurts more than the left side did, and is keeping her up at night.  She's been using the cane outside the house since the first week, but doesn't

## 2023-11-20 ENCOUNTER — HOSPITAL ENCOUNTER (OUTPATIENT)
Facility: HOSPITAL | Age: 68
Setting detail: RECURRING SERIES
Discharge: HOME | End: 2023-11-23
Payer: COMMERCIAL

## 2023-11-20 PROCEDURE — 97016 VASOPNEUMATIC DEVICE THERAPY: CPT | Performed by: PHYSICAL THERAPIST

## 2023-11-20 PROCEDURE — 97110 THERAPEUTIC EXERCISES: CPT | Performed by: PHYSICAL THERAPIST

## 2023-11-20 PROCEDURE — 97140 MANUAL THERAPY 1/> REGIONS: CPT | Performed by: PHYSICAL THERAPIST

## 2023-11-20 NOTE — PROGRESS NOTES
PHYSICAL THERAPY - DAILY TREATMENT NOTE (updated 3/23)      Date: 2023          Patient Name:  Dana Hicks :  1955   Medical   Diagnosis:  Closed Colles' fracture of left radius, initial encounter [S52.532A]  Aftercare following right knee joint replacement surgery [Z47.1, Z96.651] Treatment Diagnosis:  M25.561  RIGHT KNEE PAIN    Referral Source:  Ross Trejo DO Insurance:   [facility]: [employer] [facility] / Product Type: *No Product type* /                     Patient  verified yes     Visit #   Current  / Total 2 24   Time   In / Out 8:35am 9:33am   Total Treatment Time 58   Total Timed Codes 48         SUBJECTIVE    Pain Level (0-10 scale): 5    Any medication changes, allergies to medications, adverse drug reactions, diagnosis change, or new procedure performed?: [x] No    [] Yes (see summary sheet for update)  Medications: Verified on Patient Summary List    Subjective functional status/changes: The patient was sore in the leg after last time, but not too bad. OBJECTIVE      Therapeutic Procedures: Tx Min Billable or 1:1 Min (if diff from Tx Min) Procedure, Rationale, Specifics   38  67292 Therapeutic Exercise (timed):  increase ROM, strength, coordination, balance, and proprioception to improve patient's ability to progress to PLOF and address remaining functional goals. (see flow sheet as applicable)     Details if applicable:     10  07471 Manual Therapy (timed):  decrease pain, increase ROM, increase tissue extensibility, and decrease edema to improve patient's ability to progress to PLOF and address remaining functional goals. The manual therapy interventions were performed at a separate and distinct time from the therapeutic activities interventions .  (see flow sheet as applicable)     Details if applicable:  Patellar mobs; PROM into extension in long sitting; tibial mobs/distraction and PROM into flexion at EOB         Details if

## 2023-11-22 ENCOUNTER — HOSPITAL ENCOUNTER (OUTPATIENT)
Facility: HOSPITAL | Age: 68
Setting detail: RECURRING SERIES
End: 2023-11-22
Payer: COMMERCIAL

## 2023-11-29 ENCOUNTER — HOSPITAL ENCOUNTER (OUTPATIENT)
Facility: HOSPITAL | Age: 68
Setting detail: RECURRING SERIES
Discharge: HOME | End: 2023-12-02
Payer: COMMERCIAL

## 2023-11-29 PROCEDURE — 97110 THERAPEUTIC EXERCISES: CPT | Performed by: PHYSICAL THERAPIST

## 2023-11-29 PROCEDURE — 97140 MANUAL THERAPY 1/> REGIONS: CPT | Performed by: PHYSICAL THERAPIST

## 2023-11-29 PROCEDURE — 97016 VASOPNEUMATIC DEVICE THERAPY: CPT | Performed by: PHYSICAL THERAPIST

## 2023-11-29 NOTE — PROGRESS NOTES
PHYSICAL THERAPY - DAILY TREATMENT NOTE (updated 3/23)      Date: 2023          Patient Name:  Warner Elizalde :  1955   Medical   Diagnosis:  Closed Colles' fracture of left radius, initial encounter [S52.532A]  Aftercare following right knee joint replacement surgery [Z47.1, Z96.651] Treatment Diagnosis:  M25.561  RIGHT KNEE PAIN    Referral Source:  Melvi Champagne DO Insurance:   Payor: [facility] / Plan: [facility] / Product Type: *No Product type* /                     Patient  verified yes     Visit #   Current  / Total 3 24   Time   In / Out 11:03am 11:58am   Total Treatment Time 55   Total Timed Codes 45         SUBJECTIVE    Pain Level (0-10 scale): 10    Any medication changes, allergies to medications, adverse drug reactions, diagnosis change, or new procedure performed?: [x] No    [] Yes (see summary sheet for update)  Medications: Verified on Patient Summary List    Subjective functional status/changes: The patient reports that she was sore for 1-2 days after last time, but not too bad; it's been hurting a lot with the cold weather. OBJECTIVE      Therapeutic Procedures: Tx Min Billable or 1:1 Min (if diff from Tx Min) Procedure, Rationale, Specifics   35  61939 Therapeutic Exercise (timed):  increase ROM, strength, coordination, balance, and proprioception to improve patient's ability to progress to PLOF and address remaining functional goals. (see flow sheet as applicable)     Details if applicable:     10  72419 Manual Therapy (timed):  decrease pain, increase ROM, increase tissue extensibility, and decrease edema to improve patient's ability to progress to PLOF and address remaining functional goals. The manual therapy interventions were performed at a separate and distinct time from the therapeutic activities interventions .  (see flow sheet as applicable)     Details if applicable:  Patellar mobs; PROM into extension in long sitting;

## 2023-12-01 ENCOUNTER — HOSPITAL ENCOUNTER (OUTPATIENT)
Facility: HOSPITAL | Age: 68
Setting detail: RECURRING SERIES
Discharge: HOME | End: 2023-12-04
Payer: COMMERCIAL

## 2023-12-01 PROCEDURE — 97140 MANUAL THERAPY 1/> REGIONS: CPT | Performed by: PHYSICAL THERAPIST

## 2023-12-01 PROCEDURE — 97016 VASOPNEUMATIC DEVICE THERAPY: CPT | Performed by: PHYSICAL THERAPIST

## 2023-12-01 PROCEDURE — 97110 THERAPEUTIC EXERCISES: CPT | Performed by: PHYSICAL THERAPIST

## 2023-12-01 NOTE — PROGRESS NOTES
PHYSICAL THERAPY - DAILY TREATMENT NOTE (updated 3/23)      Date: 2023          Patient Name:  Zaid Fox :  1955   Medical   Diagnosis:  Closed Colles' fracture of left radius, initial encounter [S52.532A]  Aftercare following right knee joint replacement surgery [Z47.1, Z96.651] Treatment Diagnosis:  M25.561  RIGHT KNEE PAIN    Referral Source:  Jimy Gibbs DO Insurance:   [facility]or: [employer] [facility] / Product Type: *No Product type* /                     Patient  verified yes     Visit #   Current  / Total 4 24   Time   In / Out 12:00pm 12:48   Total Treatment Time 48   Total Timed Codes 38         SUBJECTIVE    Pain Level (0-10 scale): 7    Any medication changes, allergies to medications, adverse drug reactions, diagnosis change, or new procedure performed?: [x] No    [] Yes (see summary sheet for update)  Medications: Verified on Patient Summary List    Subjective functional status/changes: The patient reports that she feels better than the other day, but almost didn't come in as she has a lot of errands today. OBJECTIVE      Therapeutic Procedures: Tx Min Billable or 1:1 Min (if diff from Tx Min) Procedure, Rationale, Specifics   28  91600 Therapeutic Exercise (timed):  increase ROM, strength, coordination, balance, and proprioception to improve patient's ability to progress to PLOF and address remaining functional goals. (see flow sheet as applicable)     Details if applicable:     10  62020 Manual Therapy (timed):  decrease pain, increase ROM, increase tissue extensibility, and decrease edema to improve patient's ability to progress to PLOF and address remaining functional goals. The manual therapy interventions were performed at a separate and distinct time from the therapeutic activities interventions .  (see flow sheet as applicable)     Details if applicable:  Patellar mobs; PROM into extension in long sitting; tibial

## 2023-12-04 ENCOUNTER — APPOINTMENT (OUTPATIENT)
Facility: HOSPITAL | Age: 68
End: 2023-12-04
Payer: COMMERCIAL

## 2023-12-05 ENCOUNTER — OFFICE VISIT (OUTPATIENT)
Age: 68
End: 2023-12-05
Payer: COMMERCIAL

## 2023-12-05 VITALS
BODY MASS INDEX: 29.87 KG/M2 | HEIGHT: 61 IN | OXYGEN SATURATION: 96 % | SYSTOLIC BLOOD PRESSURE: 105 MMHG | WEIGHT: 158.2 LBS | TEMPERATURE: 97.6 F | DIASTOLIC BLOOD PRESSURE: 69 MMHG | RESPIRATION RATE: 18 BRPM | HEART RATE: 115 BPM

## 2023-12-05 DIAGNOSIS — Z47.1 AFTERCARE FOLLOWING RIGHT KNEE JOINT REPLACEMENT SURGERY: ICD-10-CM

## 2023-12-05 DIAGNOSIS — S52.532A CLOSED COLLES' FRACTURE OF LEFT RADIUS, INITIAL ENCOUNTER: Primary | ICD-10-CM

## 2023-12-05 DIAGNOSIS — Z96.651 AFTERCARE FOLLOWING RIGHT KNEE JOINT REPLACEMENT SURGERY: ICD-10-CM

## 2023-12-05 PROCEDURE — 99213 OFFICE O/P EST LOW 20 MIN: CPT | Performed by: ORTHOPAEDIC SURGERY

## 2023-12-05 PROCEDURE — 3074F SYST BP LT 130 MM HG: CPT | Performed by: ORTHOPAEDIC SURGERY

## 2023-12-05 PROCEDURE — 1123F ACP DISCUSS/DSCN MKR DOCD: CPT | Performed by: ORTHOPAEDIC SURGERY

## 2023-12-05 PROCEDURE — 3078F DIAST BP <80 MM HG: CPT | Performed by: ORTHOPAEDIC SURGERY

## 2023-12-05 ASSESSMENT — PATIENT HEALTH QUESTIONNAIRE - PHQ9
SUM OF ALL RESPONSES TO PHQ QUESTIONS 1-9: 0
SUM OF ALL RESPONSES TO PHQ QUESTIONS 1-9: 0
2. FEELING DOWN, DEPRESSED OR HOPELESS: 0
1. LITTLE INTEREST OR PLEASURE IN DOING THINGS: 0
SUM OF ALL RESPONSES TO PHQ9 QUESTIONS 1 & 2: 0
SUM OF ALL RESPONSES TO PHQ QUESTIONS 1-9: 0
SUM OF ALL RESPONSES TO PHQ QUESTIONS 1-9: 0

## 2023-12-05 NOTE — PROGRESS NOTES
12/5/2023      CC: left wrist pain, right knee pain    HPI:      This is a 79y.o. year old female who presents for a follow up visit. The patient was last seen and diagnosed with left colle's fracture, she is now 6 weeks also after total knee replacement. The patient's treatments since the most recent visit have comprised of bracing to the wrist, knee PT. The patient has had good relief of the chief complaint.         PMH:  Past Medical History:   Diagnosis Date    Anemia     Diverticulitis     Esophageal reflux     GERD (gastroesophageal reflux disease)     Hypertension     Osteoarthrosis, unspecified whether generalized or localized, lower leg     Other and unspecified hyperlipidemia     Type II or unspecified type diabetes mellitus without mention of complication, uncontrolled        PSxHx:  Past Surgical History:   Procedure Laterality Date    COLONOSCOPY N/A 9/18/2017    COLONOSCOPY performed by Olinda Das MD at [facility] AMBULATORY OR    COLONOSCOPY N/A 4/25/2018    COLONOSCOPY performed by Olinda Das MD at [address] [place] [address] Left 08/28/2023    LEFT RING FINGER TRIGGER RELEASE performed by Mahogany Schwarz DO at [facility] MAIN OR    GYN  2004    uterine surgery    HAND SURGERY      HYSTERECTOMY ([facility])  2021    INCISION AND DRAINAGE OF WOUND  09/30/2023    left top of knee    LAP,TUBAL CAUTERY Bilateral 1992    TONSILLECTOMY      age 11    TOTAL KNEE ARTHROPLASTY Left 05/22/2023    LEFT TOTAL KNEE ARTHROPLASTY performed by Mahogany Schwarz DO at [address] [place] [address] Right 10/24/2023    RIGHT TOTAL KNEE ARTHROPLASTY performed by Mahogany Schwarz DO at [facility] MAIN OR       Meds:    Current Outpatient Medications:     polyethylene glycol (GLYCOLAX) 17 GM/SCOOP powder, Take 17 g by mouth daily, Disp: 60 g, Rfl: 3    potassium chloride (MICRO-K) 10 MEQ extended release capsule, Take 1 capsule by mouth 2 times daily, Disp: 60 capsule, Rfl: 12    amLODIPine (NORVASC)

## 2023-12-06 ENCOUNTER — HOSPITAL ENCOUNTER (OUTPATIENT)
Facility: HOSPITAL | Age: 68
Setting detail: RECURRING SERIES
Discharge: HOME | End: 2023-12-09
Payer: COMMERCIAL

## 2023-12-06 PROCEDURE — 97016 VASOPNEUMATIC DEVICE THERAPY: CPT | Performed by: PHYSICAL THERAPIST

## 2023-12-06 PROCEDURE — 97140 MANUAL THERAPY 1/> REGIONS: CPT | Performed by: PHYSICAL THERAPIST

## 2023-12-06 PROCEDURE — 97110 THERAPEUTIC EXERCISES: CPT | Performed by: PHYSICAL THERAPIST

## 2023-12-06 RX ORDER — POTASSIUM CHLORIDE 750 MG/1
10 CAPSULE, EXTENDED RELEASE ORAL 2 TIMES DAILY
Qty: 60 CAPSULE | Refills: 6 | Status: SHIPPED | OUTPATIENT
Start: 2023-12-06

## 2023-12-06 RX ORDER — GENTAMICIN SULFATE 3 MG/ML
SOLUTION/ DROPS OPHTHALMIC
Qty: 5 ML | Refills: 0 | Status: SHIPPED | OUTPATIENT
Start: 2023-12-06

## 2023-12-06 NOTE — PROGRESS NOTES
applicable)     Details if applicable:  Patellar mobs; PROM into extension in long sitting; tibial mobs/distraction and PROM into flexion at EOB         Details if applicable:           Details if applicable:            Details if applicable:     44     Total Total         Modalities Rationale:     decrease edema, decrease inflammation, and decrease pain to improve patient's ability to progress to PLOF and address remaining functional goals. min [] Estim Unattended,             type/location:       []  w/ice    []  w/heat        min [] Estim Attended,             type/location:       []  w/ice   []  w/heat         []  w/US   []  TENS insruct            min []  Mechanical Traction,        type/lbs:        []  pro      []  sup           []  int       []  cont            []  before manual           []  after manual     min []  Ultrasound,         settings/location:      min  unbilled []  Ice     []  Heat            location/position:    10     min [x]  Vasopneumatic Device,      press/temp: 34   pre-treatment girth : 41cm   post-treatment girth : 40.5cm   measured at (landmark       location) : mid-patella  If using vaso (only need to measure limb vaso being performed on)        min []  Other:        Skin assessment post-treatment (if applicable):    [x]  intact    []  redness- no adverse reaction                 []redness - adverse reaction:          [x]  Patient Education billed concurrently with other procedures   [x] Review HEP    [] Progressed/Changed HEP, detail:    [] Other detail:         Other Objective/Functional Measures  A/PROM knee: flexion= 124/130; Extension= -3/0    Pain Level at end of session (0-10 scale): 2      Assessment   The patient progressed with more dynamic weightbearing strengthening and improved ROM overall.    Patient will continue to benefit from skilled PT / OT services to modify and progress therapeutic interventions, analyze and address functional mobility deficits, analyze and

## 2023-12-09 ENCOUNTER — APPOINTMENT (OUTPATIENT)
Facility: HOSPITAL | Age: 68
End: 2023-12-09
Payer: COMMERCIAL

## 2023-12-09 ENCOUNTER — HOSPITAL ENCOUNTER (EMERGENCY)
Facility: HOSPITAL | Age: 68
Discharge: HOME | End: 2023-12-09
Attending: STUDENT IN AN ORGANIZED HEALTH CARE EDUCATION/TRAINING PROGRAM
Payer: COMMERCIAL

## 2023-12-09 VITALS
TEMPERATURE: 98.3 F | SYSTOLIC BLOOD PRESSURE: 123 MMHG | HEIGHT: 61 IN | OXYGEN SATURATION: 96 % | RESPIRATION RATE: 18 BRPM | HEART RATE: 92 BPM | DIASTOLIC BLOOD PRESSURE: 78 MMHG | WEIGHT: 160.72 LBS | BODY MASS INDEX: 30.34 KG/M2

## 2023-12-09 DIAGNOSIS — M70.52 PES ANSERINUS BURSITIS OF LEFT KNEE: Primary | ICD-10-CM

## 2023-12-09 PROCEDURE — 99283 EMERGENCY DEPT VISIT LOW MDM: CPT

## 2023-12-09 PROCEDURE — 73562 X-RAY EXAM OF KNEE 3: CPT

## 2023-12-09 ASSESSMENT — PAIN - FUNCTIONAL ASSESSMENT: PAIN_FUNCTIONAL_ASSESSMENT: 0-10

## 2023-12-09 ASSESSMENT — PAIN SCALES - GENERAL: PAINLEVEL_OUTOF10: 7

## 2023-12-09 ASSESSMENT — PAIN DESCRIPTION - LOCATION: LOCATION: KNEE

## 2023-12-09 NOTE — ED PROVIDER NOTES
[facility] EMERGENCY DEPT  EMERGENCY DEPARTMENT ENCOUNTER       Pt Name: Faraz Tinsley  MRN: [id number]  [address] [facility] 4[birth year]  Date of evaluation: 12/9/2023  Provider: Kristen Del Rio MD   PCP: Johanna Heath MD  Note Started: 2:01 PM EST 12/9/23     CHIEF COMPLAINT       Chief Complaint   Patient presents with    Knee Pain     Pt presents to ED with c/o left knee pain. Pt recently had her right knee replaced Oct. 24th. Pt states she fell out of bed on Saturday injuring her left knee. Pt's has swelling below the knee. HISTORY OF PRESENT ILLNESS: 1 or more elements      History From: patient, History limited by: none     Faraz Tinsley is a 79 y.o. female present with left knee pain. She fell 1 week ago. Started getting worse last night       Please See MDM for Additional Details of the HPI/PMH  Nursing Notes were all reviewed and agreed with or any disagreements were addressed in the HPI. REVIEW OF SYSTEMS        Positives and Pertinent negatives as per HPI.     PAST HISTORY     Past Medical History:  Past Medical History:   Diagnosis Date    Anemia     Diverticulitis     Esophageal reflux     GERD (gastroesophageal reflux disease)     Hypertension     Osteoarthrosis, unspecified whether generalized or localized, lower leg     Other and unspecified hyperlipidemia     Type II or unspecified type diabetes mellitus without mention of complication, uncontrolled        Past Surgical History:  Past Surgical History:   Procedure Laterality Date    COLONOSCOPY N/A 9/18/2017    COLONOSCOPY performed by Samir Bergman MD at [facility] AMBULATORY OR    COLONOSCOPY N/A 4/25/2018    COLONOSCOPY performed by Samir Bergman MD at [address] [place] [address] Left 08/28/2023    LEFT RING FINGER TRIGGER RELEASE performed by Narendra Duarte DO at [facility] MAIN OR    GYN  2004    uterine surgery    HAND SURGERY      HYSTERECTOMY (CERVIX STATUS UNKNOWN)  2021    INCISION AND DRAINAGE OF WOUND  09/30/2023    left errors.  Please excuse any errors that have escaped final proofreading.)         Xu Gaston MD  12/09/23 4568

## 2023-12-09 NOTE — ED NOTES
Pt provided D/C instructions and states understanding of D/C teaching. Pt has all belongings. Pt escorted out via wheelchair out of ED to personal vehicle at this time. Patient is A&Ox4, on RA, VSS, and is in NAD at the time of discharge.        Joyce Robles RN  12/09/23 8238

## 2023-12-10 PROBLEM — M70.52 PES ANSERINUS BURSITIS OF LEFT KNEE: Status: ACTIVE | Noted: 2023-12-10

## 2023-12-11 ENCOUNTER — TELEPHONE (OUTPATIENT)
Age: 68
End: 2023-12-11

## 2023-12-11 ENCOUNTER — APPOINTMENT (OUTPATIENT)
Facility: HOSPITAL | Age: 68
End: 2023-12-11
Payer: COMMERCIAL

## 2023-12-11 NOTE — TELEPHONE ENCOUNTER
Called patient to follow up.  She states she is doing better and will call later in the week if she needs to follow up

## 2023-12-11 NOTE — TELEPHONE ENCOUNTER
----- Message from Dominique Gazra DO sent at 12/11/2023  7:01 AM EST -----  Please see if she needs to be seen, she went back to the ED.  ----- Message -----  From: Ricki Schulz MD  Sent: 12/9/2023   2:14 PM EST  To: Dominique Garza DO

## 2023-12-13 ENCOUNTER — HOSPITAL ENCOUNTER (OUTPATIENT)
Facility: HOSPITAL | Age: 68
Setting detail: RECURRING SERIES
Discharge: HOME | End: 2023-12-16
Payer: COMMERCIAL

## 2023-12-13 ENCOUNTER — OFFICE VISIT (OUTPATIENT)
Age: 68
End: 2023-12-13

## 2023-12-13 VITALS
RESPIRATION RATE: 18 BRPM | BODY MASS INDEX: 30.36 KG/M2 | HEART RATE: 100 BPM | OXYGEN SATURATION: 95 % | DIASTOLIC BLOOD PRESSURE: 75 MMHG | SYSTOLIC BLOOD PRESSURE: 114 MMHG | HEIGHT: 61 IN | TEMPERATURE: 96.9 F | WEIGHT: 160.8 LBS

## 2023-12-13 DIAGNOSIS — M70.52 PES ANSERINUS BURSITIS OF LEFT KNEE: Primary | ICD-10-CM

## 2023-12-13 PROCEDURE — 97110 THERAPEUTIC EXERCISES: CPT | Performed by: PHYSICAL THERAPIST

## 2023-12-13 PROCEDURE — 97140 MANUAL THERAPY 1/> REGIONS: CPT | Performed by: PHYSICAL THERAPIST

## 2023-12-13 PROCEDURE — 97016 VASOPNEUMATIC DEVICE THERAPY: CPT | Performed by: PHYSICAL THERAPIST

## 2023-12-13 RX ORDER — SULFAMETHOXAZOLE AND TRIMETHOPRIM 800; 160 MG/1; MG/1
1 TABLET ORAL 2 TIMES DAILY
Qty: 14 TABLET | Refills: 0 | Status: SHIPPED | OUTPATIENT
Start: 2023-12-13 | End: 2023-12-20

## 2023-12-13 ASSESSMENT — PATIENT HEALTH QUESTIONNAIRE - PHQ9
2. FEELING DOWN, DEPRESSED OR HOPELESS: 0
SUM OF ALL RESPONSES TO PHQ QUESTIONS 1-9: 0
SUM OF ALL RESPONSES TO PHQ QUESTIONS 1-9: 0
SUM OF ALL RESPONSES TO PHQ9 QUESTIONS 1 & 2: 0
SUM OF ALL RESPONSES TO PHQ QUESTIONS 1-9: 0
1. LITTLE INTEREST OR PLEASURE IN DOING THINGS: 0
SUM OF ALL RESPONSES TO PHQ QUESTIONS 1-9: 0

## 2023-12-13 NOTE — PROGRESS NOTES
activities interventions . (see flow sheet as applicable)     Details if applicable:  Patellar mobs; PROM into extension in long sitting; tibial mobs/distraction and PROM into flexion at EOB         Details if applicable:           Details if applicable:            Details if applicable:     41     Total Total         Modalities Rationale:     decrease edema, decrease inflammation, and decrease pain to improve patient's ability to progress to PLOF and address remaining functional goals. min [] Estim Unattended,             type/location:       []  w/ice    []  w/heat        min [] Estim Attended,             type/location:       []  w/ice   []  w/heat         []  w/US   []  TENS insruct            min []  Mechanical Traction,        type/lbs:        []  pro      []  sup           []  int       []  cont            []  before manual           []  after manual     min []  Ultrasound,         settings/location:      min  unbilled []  Ice     []  Heat            location/position:    10     min [x]  Vasopneumatic Device,      press/temp: 34   pre-treatment girth : 41cm   post-treatment girth : 40.5cm   measured at (landmark       location) : mid-patella  If using vaso (only need to measure limb vaso being performed on)        min []  Other:        Skin assessment post-treatment (if applicable):    [x]  intact    []  redness- no adverse reaction                 []redness - adverse reaction:          [x]  Patient Education billed concurrently with other procedures   [x] Review HEP    [] Progressed/Changed HEP, detail:    [] Other detail:         Other Objective/Functional Measures  A/PROM knee: flexion= 124/130; Extension= 0/0    Pain Level at end of session (0-10 scale): 0      Assessment   The patient progressed with right knee strengthening today, but held off on any exercises involving the LLE as she appears to have a blood blister/pocket of fluid on the anterior knee (going to see Dr. Josemanuel Alan today).   Patient will

## 2023-12-17 LAB
BACTERIA SPEC CULT: ABNORMAL
GRAM STN SPEC: ABNORMAL
GRAM STN SPEC: ABNORMAL
SERVICE CMNT-IMP: ABNORMAL

## 2023-12-18 ENCOUNTER — APPOINTMENT (OUTPATIENT)
Facility: HOSPITAL | Age: 68
End: 2023-12-18
Payer: COMMERCIAL

## 2023-12-19 RX ORDER — IBUPROFEN 200 MG
200 TABLET ORAL EVERY 6 HOURS PRN
Status: ON HOLD | COMMUNITY
End: 2023-12-22 | Stop reason: HOSPADM

## 2023-12-20 ENCOUNTER — HOSPITAL ENCOUNTER (OUTPATIENT)
Facility: HOSPITAL | Age: 68
Setting detail: RECURRING SERIES
End: 2023-12-20
Payer: COMMERCIAL

## 2023-12-20 NOTE — PROGRESS NOTES
PHYSICAL THERAPY - DAILY TREATMENT NOTE (updated 3/23)      Date: 2023          Patient Name:  Kalpana Benoit :  1955   Medical   Diagnosis:  Closed Colles' fracture of left radius, initial encounter [S52.532A]  Aftercare following right knee joint replacement surgery [Z47.1, Z96.651] Treatment Diagnosis:  M25.561  RIGHT KNEE PAIN    Referral Source:  Matthew Garay DO Insurance:   Payor: On license of [facility] writewith [facility] / Plan: [employer] [facility] / Product Type: *No Product type* /                     Patient  verified yes     Visit #   Current  / Total *** 24   Time   In / Out *** ***   Total Treatment Time ***   Total Timed Codes ***         SUBJECTIVE    Pain Level (0-10 scale): ***    Any medication changes, allergies to medications, adverse drug reactions, diagnosis change, or new procedure performed?: [x] No    [] Yes (see summary sheet for update)  Medications: Verified on Patient Summary List    Subjective functional status/changes:     The patient ***reports that she put ice on her left knee for 5 minutes and may have burned the skin, went to the ER on Saturday, was seen by ER doctor and an ortho, it was looking fine until today as it's more swollen/puffed up. She's going to Dr. Garay after the session today.    OBJECTIVE      Therapeutic Procedures:  Tx Min Billable or 1:1 Min (if diff from Tx Min) Procedure, Rationale, Specifics   31***  18443 Therapeutic Exercise (timed):  increase ROM, strength, coordination, balance, and proprioception to improve patient's ability to progress to PLOF and address remaining functional goals. (see flow sheet as applicable)     Details if applicable:     10***  20708 Manual Therapy (timed):  decrease pain, increase ROM, increase tissue extensibility, and decrease edema to improve patient's ability to progress to PLOF and address remaining functional goals.  The manual therapy interventions were performed at a separate and distinct time from the

## 2023-12-20 NOTE — PROGRESS NOTES
[facility] Physical Therapy  [address]  [place] [address]  Phone: [phone]   Fax: [phone]     PHYSICAL THERAPY PROGRESS NOTE  Patient Name:  Kalpana Benoit :  1955   Treatment/Medical Diagnosis: Closed Colles' fracture of left radius, initial encounter [S52.532A]  Aftercare following right knee joint replacement surgery [Z47.1, Z96.651]   Referral Source:  Matthew Garay DO     Date of Initial Visit:  23 Attended Visits:  *** Missed Visits:  ***     SUMMARY OF TREATMENT/ASSESSMENT:  Therapy has included therex and manual treatments to assist with right knee pain, strength, and ROM s/p TKR on 10/24/23.    CURRENT STATUS  The patient has progressed with the following A/PROM right knee: Flex= *** (110/118, p! ); Ext= *** (Ext -15/0). She recently went to the ER for irritating her skin from an ice pack on the LEFT knee (s/p left TKR 23), and this resulted in a possible infection and is scheduled for a debridement and irrigation on 23***. As far as her right TKR, her pain range over the past week has been *** (3-10/10 at [facility]). She has also progressed with the following objective measures for functional strength and mobility (though limited in general due to her recent issues with the left knee):      Today vs. (At eval):  5x STS: *** (20s)  30s STS: *** (7x)  SLS: R= *** (2s); L= *** (3s)  Tandem: R (fwd)= *** (8s); L (fwd)= *** (33s)  TUG: *** (16s with SPC)  2MWT: *** (4laps with SPC)    The patient will benefit from continued therapy to progress her overall strength and mobility, especially after her debridement and irrigation on the left knee.    Short Term Goals: To be accomplished in 2 treatments:                         1.) The patient will be independent with their HEP consistently for at least one week.- MET  Long Term Goals: To be accomplished in 24 treatments:                         1.) The

## 2023-12-21 ENCOUNTER — HOSPITAL ENCOUNTER (OUTPATIENT)
Facility: HOSPITAL | Age: 68
Discharge: HOME | End: 2023-12-22
Attending: ORTHOPAEDIC SURGERY | Admitting: ORTHOPAEDIC SURGERY
Payer: COMMERCIAL

## 2023-12-21 DIAGNOSIS — M71.062 ABSCESS OF BURSA OF LEFT KNEE: Primary | ICD-10-CM

## 2023-12-21 LAB
GLUCOSE BLD STRIP.AUTO-MCNC: 105 MG/DL (ref 65–117)
GLUCOSE BLD STRIP.AUTO-MCNC: 122 MG/DL (ref 65–117)
GLUCOSE BLD STRIP.AUTO-MCNC: 258 MG/DL (ref 65–117)
SERVICE CMNT-IMP: ABNORMAL
SERVICE CMNT-IMP: ABNORMAL
SERVICE CMNT-IMP: NORMAL

## 2023-12-21 PROCEDURE — 6360000002 HC RX W HCPCS: Performed by: ORTHOPAEDIC SURGERY

## 2023-12-21 PROCEDURE — 82962 GLUCOSE BLOOD TEST: CPT

## 2023-12-21 PROCEDURE — 11044 DBRDMT BONE 1ST 20 SQ CM/<: CPT | Performed by: ORTHOPAEDIC SURGERY

## 2023-12-21 PROCEDURE — 6370000000 HC RX 637 (ALT 250 FOR IP): Performed by: ORTHOPAEDIC SURGERY

## 2023-12-21 PROCEDURE — C1729 CATH, DRAINAGE: HCPCS | Performed by: ORTHOPAEDIC SURGERY

## 2023-12-21 PROCEDURE — 3600000012 HC SURGERY LEVEL 2 ADDTL 15MIN: Performed by: ORTHOPAEDIC SURGERY

## 2023-12-21 PROCEDURE — 2580000003 HC RX 258: Performed by: PHYSICIAN ASSISTANT

## 2023-12-21 PROCEDURE — 3600000002 HC SURGERY LEVEL 2 BASE: Performed by: ORTHOPAEDIC SURGERY

## 2023-12-21 PROCEDURE — 6370000000 HC RX 637 (ALT 250 FOR IP): Performed by: PHYSICIAN ASSISTANT

## 2023-12-21 PROCEDURE — 87077 CULTURE AEROBIC IDENTIFY: CPT

## 2023-12-21 PROCEDURE — 87070 CULTURE OTHR SPECIMN AEROBIC: CPT

## 2023-12-21 PROCEDURE — 7100000001 HC PACU RECOVERY - ADDTL 15 MIN: Performed by: ORTHOPAEDIC SURGERY

## 2023-12-21 PROCEDURE — 3700000000 HC ANESTHESIA ATTENDED CARE: Performed by: ORTHOPAEDIC SURGERY

## 2023-12-21 PROCEDURE — 2580000003 HC RX 258: Performed by: ORTHOPAEDIC SURGERY

## 2023-12-21 PROCEDURE — 3700000001 HC ADD 15 MINUTES (ANESTHESIA): Performed by: ORTHOPAEDIC SURGERY

## 2023-12-21 PROCEDURE — 87186 SC STD MICRODIL/AGAR DIL: CPT

## 2023-12-21 PROCEDURE — 7100000000 HC PACU RECOVERY - FIRST 15 MIN: Performed by: ORTHOPAEDIC SURGERY

## 2023-12-21 PROCEDURE — 87075 CULTR BACTERIA EXCEPT BLOOD: CPT

## 2023-12-21 PROCEDURE — 87205 SMEAR GRAM STAIN: CPT

## 2023-12-21 PROCEDURE — 2580000003 HC RX 258: Performed by: STUDENT IN AN ORGANIZED HEALTH CARE EDUCATION/TRAINING PROGRAM

## 2023-12-21 PROCEDURE — 2709999900 HC NON-CHARGEABLE SUPPLY: Performed by: ORTHOPAEDIC SURGERY

## 2023-12-21 PROCEDURE — 6370000000 HC RX 637 (ALT 250 FOR IP): Performed by: STUDENT IN AN ORGANIZED HEALTH CARE EDUCATION/TRAINING PROGRAM

## 2023-12-21 PROCEDURE — 6360000002 HC RX W HCPCS: Performed by: PHYSICIAN ASSISTANT

## 2023-12-21 RX ORDER — SUCRALFATE 1 G/1
1 TABLET ORAL
Status: DISCONTINUED | OUTPATIENT
Start: 2023-12-22 | End: 2023-12-22 | Stop reason: HOSPADM

## 2023-12-21 RX ORDER — PIOGLITAZONEHYDROCHLORIDE 15 MG/1
45 TABLET ORAL NIGHTLY
Status: DISCONTINUED | OUTPATIENT
Start: 2023-12-21 | End: 2023-12-22 | Stop reason: HOSPADM

## 2023-12-21 RX ORDER — KETOROLAC TROMETHAMINE 30 MG/ML
15 INJECTION, SOLUTION INTRAMUSCULAR; INTRAVENOUS ONCE
Status: COMPLETED | OUTPATIENT
Start: 2023-12-21 | End: 2023-12-21

## 2023-12-21 RX ORDER — KETOROLAC TROMETHAMINE 30 MG/ML
15 INJECTION, SOLUTION INTRAMUSCULAR; INTRAVENOUS EVERY 6 HOURS PRN
Status: DISCONTINUED | OUTPATIENT
Start: 2023-12-21 | End: 2023-12-22 | Stop reason: HOSPADM

## 2023-12-21 RX ORDER — SODIUM CHLORIDE 0.9 % (FLUSH) 0.9 %
5-40 SYRINGE (ML) INJECTION PRN
Status: DISCONTINUED | OUTPATIENT
Start: 2023-12-21 | End: 2023-12-21 | Stop reason: HOSPADM

## 2023-12-21 RX ORDER — SODIUM CHLORIDE 0.9 % (FLUSH) 0.9 %
5-40 SYRINGE (ML) INJECTION EVERY 12 HOURS SCHEDULED
Status: DISCONTINUED | OUTPATIENT
Start: 2023-12-21 | End: 2023-12-21 | Stop reason: HOSPADM

## 2023-12-21 RX ORDER — TRAMADOL HYDROCHLORIDE 50 MG/1
50 TABLET ORAL EVERY 6 HOURS PRN
Status: DISCONTINUED | OUTPATIENT
Start: 2023-12-21 | End: 2023-12-22 | Stop reason: HOSPADM

## 2023-12-21 RX ORDER — SODIUM CHLORIDE, SODIUM LACTATE, POTASSIUM CHLORIDE, CALCIUM CHLORIDE 600; 310; 30; 20 MG/100ML; MG/100ML; MG/100ML; MG/100ML
INJECTION, SOLUTION INTRAVENOUS ONCE
Status: COMPLETED | OUTPATIENT
Start: 2023-12-21 | End: 2023-12-22

## 2023-12-21 RX ORDER — SODIUM CHLORIDE 9 MG/ML
INJECTION, SOLUTION INTRAVENOUS CONTINUOUS
Status: DISCONTINUED | OUTPATIENT
Start: 2023-12-21 | End: 2023-12-21 | Stop reason: HOSPADM

## 2023-12-21 RX ORDER — AMLODIPINE BESYLATE 5 MG/1
5 TABLET ORAL DAILY
Status: DISCONTINUED | OUTPATIENT
Start: 2023-12-22 | End: 2023-12-22 | Stop reason: HOSPADM

## 2023-12-21 RX ORDER — HYDROMORPHONE HYDROCHLORIDE 1 MG/ML
0.5 INJECTION, SOLUTION INTRAMUSCULAR; INTRAVENOUS; SUBCUTANEOUS EVERY 5 MIN PRN
Status: DISCONTINUED | OUTPATIENT
Start: 2023-12-21 | End: 2023-12-21 | Stop reason: HOSPADM

## 2023-12-21 RX ORDER — SODIUM CHLORIDE 9 MG/ML
INJECTION, SOLUTION INTRAVENOUS PRN
Status: DISCONTINUED | OUTPATIENT
Start: 2023-12-21 | End: 2023-12-21 | Stop reason: HOSPADM

## 2023-12-21 RX ORDER — FENTANYL CITRATE 50 UG/ML
25 INJECTION, SOLUTION INTRAMUSCULAR; INTRAVENOUS EVERY 5 MIN PRN
Status: DISCONTINUED | OUTPATIENT
Start: 2023-12-21 | End: 2023-12-21 | Stop reason: HOSPADM

## 2023-12-21 RX ORDER — ONDANSETRON 2 MG/ML
4 INJECTION INTRAMUSCULAR; INTRAVENOUS
Status: DISCONTINUED | OUTPATIENT
Start: 2023-12-21 | End: 2023-12-21 | Stop reason: HOSPADM

## 2023-12-21 RX ORDER — OXYCODONE HYDROCHLORIDE 5 MG/1
5 TABLET ORAL
Status: COMPLETED | OUTPATIENT
Start: 2023-12-21 | End: 2023-12-21

## 2023-12-21 RX ORDER — ROPIVACAINE HYDROCHLORIDE 5 MG/ML
INJECTION, SOLUTION EPIDURAL; INFILTRATION; PERINEURAL PRN
Status: DISCONTINUED | OUTPATIENT
Start: 2023-12-21 | End: 2023-12-21 | Stop reason: ALTCHOICE

## 2023-12-21 RX ORDER — DEXAMETHASONE SODIUM PHOSPHATE 4 MG/ML
8 INJECTION, SOLUTION INTRA-ARTICULAR; INTRALESIONAL; INTRAMUSCULAR; INTRAVENOUS; SOFT TISSUE ONCE
Status: DISCONTINUED | OUTPATIENT
Start: 2023-12-21 | End: 2023-12-21 | Stop reason: HOSPADM

## 2023-12-21 RX ORDER — HYDROMORPHONE HYDROCHLORIDE 1 MG/ML
0.5 INJECTION, SOLUTION INTRAMUSCULAR; INTRAVENOUS; SUBCUTANEOUS EVERY 4 HOURS PRN
Status: DISCONTINUED | OUTPATIENT
Start: 2023-12-21 | End: 2023-12-22 | Stop reason: HOSPADM

## 2023-12-21 RX ORDER — FAMOTIDINE 20 MG/1
40 TABLET, FILM COATED ORAL DAILY
Status: DISCONTINUED | OUTPATIENT
Start: 2023-12-22 | End: 2023-12-22 | Stop reason: HOSPADM

## 2023-12-21 RX ORDER — ACETAMINOPHEN 325 MG/1
325 TABLET ORAL EVERY 4 HOURS PRN
Status: DISCONTINUED | OUTPATIENT
Start: 2023-12-21 | End: 2023-12-22 | Stop reason: HOSPADM

## 2023-12-21 RX ORDER — ACETAMINOPHEN 500 MG
1000 TABLET ORAL ONCE
Status: COMPLETED | OUTPATIENT
Start: 2023-12-21 | End: 2023-12-21

## 2023-12-21 RX ORDER — POTASSIUM CHLORIDE 750 MG/1
10 TABLET, FILM COATED, EXTENDED RELEASE ORAL 2 TIMES DAILY
Status: DISCONTINUED | OUTPATIENT
Start: 2023-12-21 | End: 2023-12-22 | Stop reason: HOSPADM

## 2023-12-21 RX ORDER — ATORVASTATIN CALCIUM 40 MG/1
40 TABLET, FILM COATED ORAL DAILY
Status: DISCONTINUED | OUTPATIENT
Start: 2023-12-22 | End: 2023-12-22 | Stop reason: HOSPADM

## 2023-12-21 RX ADMIN — PIOGLITAZONE 45 MG: 15 TABLET ORAL at 21:27

## 2023-12-21 RX ADMIN — Medication 3 AMPULE: at 14:30

## 2023-12-21 RX ADMIN — OXYCODONE HYDROCHLORIDE 5 MG: 5 TABLET ORAL at 18:47

## 2023-12-21 RX ADMIN — KETOROLAC TROMETHAMINE 15 MG: 30 INJECTION, SOLUTION INTRAMUSCULAR; INTRAVENOUS at 23:58

## 2023-12-21 RX ADMIN — POTASSIUM CHLORIDE 10 MEQ: 750 TABLET, FILM COATED, EXTENDED RELEASE ORAL at 20:41

## 2023-12-21 RX ADMIN — ACETAMINOPHEN 1000 MG: 500 TABLET ORAL at 14:32

## 2023-12-21 RX ADMIN — KETOROLAC TROMETHAMINE 15 MG: 30 INJECTION, SOLUTION INTRAMUSCULAR; INTRAVENOUS at 14:29

## 2023-12-21 RX ADMIN — SODIUM CHLORIDE, POTASSIUM CHLORIDE, SODIUM LACTATE AND CALCIUM CHLORIDE: 600; 310; 30; 20 INJECTION, SOLUTION INTRAVENOUS at 14:28

## 2023-12-21 RX ADMIN — SODIUM CHLORIDE 1000 MG: 900 INJECTION INTRAVENOUS at 20:38

## 2023-12-21 RX ADMIN — TRAMADOL HYDROCHLORIDE 50 MG: 50 TABLET ORAL at 23:59

## 2023-12-21 RX ADMIN — VANCOMYCIN HYDROCHLORIDE 1750 MG: 10 INJECTION, POWDER, LYOPHILIZED, FOR SOLUTION INTRAVENOUS at 23:32

## 2023-12-21 NOTE — FLOWSHEET NOTE
12/21/23 1755   Handoff   Communication Given Periop Handoff/Relief   Handoff phase Phase I receiving   Handoff Given To Awilda Carbajal RN   Handoff Received From Nicolle Morse RN/Ayde Zamora CRNA   Handoff Communication Face to Face; At bedside   Time Handoff Given 4788

## 2023-12-21 NOTE — PERIOP NOTE
, Desirae Hightower updated on surgical schedule as it stands at this time. Informed that Steve Garg is in pre-op resting comfortably. Informed that Dr Bill Scott would be calling when surgery was complete.

## 2023-12-22 VITALS
SYSTOLIC BLOOD PRESSURE: 148 MMHG | HEIGHT: 61 IN | RESPIRATION RATE: 16 BRPM | DIASTOLIC BLOOD PRESSURE: 88 MMHG | HEART RATE: 108 BPM | BODY MASS INDEX: 28.8 KG/M2 | OXYGEN SATURATION: 95 % | TEMPERATURE: 98.1 F | WEIGHT: 152.56 LBS

## 2023-12-22 LAB
ANION GAP SERPL CALC-SCNC: 4 MMOL/L (ref 5–15)
BACTERIA SPEC CULT: NORMAL
BUN SERPL-MCNC: 28 MG/DL (ref 6–20)
BUN/CREAT SERPL: 36 (ref 12–20)
CALCIUM SERPL-MCNC: 8.6 MG/DL (ref 8.5–10.1)
CHLORIDE SERPL-SCNC: 108 MMOL/L (ref 97–108)
CO2 SERPL-SCNC: 26 MMOL/L (ref 21–32)
CREAT SERPL-MCNC: 0.77 MG/DL (ref 0.55–1.02)
GLUCOSE BLD STRIP.AUTO-MCNC: 123 MG/DL (ref 65–117)
GLUCOSE BLD STRIP.AUTO-MCNC: 144 MG/DL (ref 65–117)
GLUCOSE BLD STRIP.AUTO-MCNC: 146 MG/DL (ref 65–117)
GLUCOSE SERPL-MCNC: 133 MG/DL (ref 65–100)
GRAM STN SPEC: NORMAL
POTASSIUM SERPL-SCNC: 4.4 MMOL/L (ref 3.5–5.1)
SERVICE CMNT-IMP: ABNORMAL
SERVICE CMNT-IMP: NORMAL
SODIUM SERPL-SCNC: 138 MMOL/L (ref 136–145)

## 2023-12-22 PROCEDURE — 80048 BASIC METABOLIC PNL TOTAL CA: CPT

## 2023-12-22 PROCEDURE — 6370000000 HC RX 637 (ALT 250 FOR IP): Performed by: PHYSICIAN ASSISTANT

## 2023-12-22 PROCEDURE — 36415 COLL VENOUS BLD VENIPUNCTURE: CPT

## 2023-12-22 PROCEDURE — 82962 GLUCOSE BLOOD TEST: CPT

## 2023-12-22 PROCEDURE — 6360000002 HC RX W HCPCS: Performed by: ORTHOPAEDIC SURGERY

## 2023-12-22 PROCEDURE — 6370000000 HC RX 637 (ALT 250 FOR IP): Performed by: ORTHOPAEDIC SURGERY

## 2023-12-22 PROCEDURE — 2580000003 HC RX 258: Performed by: ORTHOPAEDIC SURGERY

## 2023-12-22 PROCEDURE — 6360000002 HC RX W HCPCS: Performed by: PHYSICIAN ASSISTANT

## 2023-12-22 RX ORDER — TRAMADOL HYDROCHLORIDE 50 MG/1
50 TABLET ORAL EVERY 6 HOURS PRN
Qty: 15 TABLET | Refills: 0 | Status: SHIPPED | OUTPATIENT
Start: 2023-12-22 | End: 2023-12-25

## 2023-12-22 RX ORDER — POLYETHYLENE GLYCOL 3350 17 G/17G
17 POWDER, FOR SOLUTION ORAL 2 TIMES DAILY
Status: DISCONTINUED | OUTPATIENT
Start: 2023-12-22 | End: 2023-12-22 | Stop reason: HOSPADM

## 2023-12-22 RX ADMIN — TRAMADOL HYDROCHLORIDE 50 MG: 50 TABLET ORAL at 12:34

## 2023-12-22 RX ADMIN — SODIUM CHLORIDE 1000 MG: 900 INJECTION INTRAVENOUS at 17:12

## 2023-12-22 RX ADMIN — SUCRALFATE 1 G: 1 TABLET ORAL at 17:07

## 2023-12-22 RX ADMIN — POTASSIUM CHLORIDE 10 MEQ: 750 TABLET, FILM COATED, EXTENDED RELEASE ORAL at 08:47

## 2023-12-22 RX ADMIN — ATORVASTATIN CALCIUM 40 MG: 40 TABLET, FILM COATED ORAL at 08:46

## 2023-12-22 RX ADMIN — FAMOTIDINE 40 MG: 20 TABLET, FILM COATED ORAL at 08:46

## 2023-12-22 RX ADMIN — POLYETHYLENE GLYCOL 3350 17 G: 17 POWDER, FOR SOLUTION ORAL at 08:46

## 2023-12-22 RX ADMIN — TRAMADOL HYDROCHLORIDE 50 MG: 50 TABLET ORAL at 06:20

## 2023-12-22 RX ADMIN — ACETAMINOPHEN 325 MG: 325 TABLET ORAL at 06:15

## 2023-12-22 RX ADMIN — SUCRALFATE 1 G: 1 TABLET ORAL at 06:15

## 2023-12-22 RX ADMIN — KETOROLAC TROMETHAMINE 15 MG: 30 INJECTION, SOLUTION INTRAMUSCULAR; INTRAVENOUS at 06:14

## 2023-12-22 RX ADMIN — METFORMIN HYDROCHLORIDE 1000 MG: 500 TABLET ORAL at 08:46

## 2023-12-22 NOTE — DISCHARGE SUMMARY
Discharge Summary    Admission: 12/21/23  Discharge: 12/22/23    Diagnosis: septic bursitis (Sensitive Enterbacter)  Procedure: Incision, drainage, debridement with irrigation (12/21/23)    Summary: This patient fell, incurring injury to the left anterior knee at the beginning of this month, and developed bursitis over the pes bursa, presumably from hemorrhagic  bursitis from the trauma. She had persistent swelling which became more painful until this was aspirated by Dr. Sony Lion in the office 9days ago. Cultures grew Enterobacter on delayed cultures and she continued on Bactrim. However, she had to come to the ER here 5 days ago for exacerbation and needle evaculation cleared the pain and swelling. Nevertheless, progress in recovery was slow with redness and persistent tenderness, so she was scheduled for incision, exploration and evacuation. The old scar was reopened over a small area and no communication with the joint space was seen. The wound was closed loosely around a hemovac drain. She was admitted for IV antibiotic coverage given this wound manipulation and the presence of low grade infection. The following day she had resumed ambulation, pain was manageable yet she preferred to renew Tramadol (15 tabs given) at discharge. She will continue Bactrim for two more weeks (Rx sent). A fresh bandage is applied. She will f.u in one week for recheck and SR, earlier for problems. Full weight, yet rest leg without extensive walking and elevate as much as tolerated.

## 2023-12-22 NOTE — PLAN OF CARE
Problem: Safety - Adult  Goal: Free from fall injury  12/22/2023 1822 by Delia Neumann RN  Outcome: Adequate for Discharge  12/22/2023 0302 by Delia Neumann RN  Outcome: Progressing  Flowsheets (Taken 12/22/2023 0025 by Walt Devlin RN)  Free From Fall Injury: Instruct family/caregiver on patient safety     Problem: Skin/Tissue Integrity  Goal: Absence of new skin breakdown  Description: 1. Monitor for areas of redness and/or skin breakdown  2. Assess vascular access sites hourly  3. Every 4-6 hours minimum:  Change oxygen saturation probe site  4. Every 4-6 hours:  If on nasal continuous positive airway pressure, respiratory therapy assess nares and determine need for appliance change or resting period.   12/22/2023 1822 by Delia Neumann RN  Outcome: Adequate for Discharge  12/22/2023 2412 by Delia Neumann RN  Outcome: Progressing     Problem: Pain  Goal: Verbalizes/displays adequate comfort level or baseline comfort level  12/22/2023 1822 by Delia Neumann RN  Outcome: Adequate for Discharge  12/22/2023 0814 by Delia Neumann RN  Outcome: Progressing  Flowsheets (Taken 12/22/2023 0620 by Walt Devlin RN)  Verbalizes/displays adequate comfort level or baseline comfort level:   Encourage patient to monitor pain and request assistance   Assess pain using appropriate pain scale   Administer analgesics based on type and severity of pain and evaluate response   Implement non-pharmacological measures as appropriate and evaluate response     Problem: Chronic Conditions and Co-morbidities  Goal: Patient's chronic conditions and co-morbidity symptoms are monitored and maintained or improved  12/22/2023 1822 by Delia Neumann RN  Outcome: Adequate for Discharge  12/22/2023 0814 by Delia Neumann RN  Outcome: Progressing     Problem: ABCDS Injury Assessment  Goal: Absence of physical injury  12/22/2023 1822 by Delia Neumann RN  Outcome: Adequate for Discharge  12/22/2023 0814 by Delia Neumann, RN  Outcome: Progressing

## 2023-12-22 NOTE — OP NOTE
Operative Note      Patient: Sabrina Chou  YOB: 1955  MRN: [id number]    Date of Procedure: 12/21/2023    Pre-Op Diagnosis Codes:     * Other infective bursitis, left knee [M71.162]    Post-Op Diagnosis: Same       Procedure(s):  IRRIGATION AND DEBRIDEMENT LEFT LEG    Surgeon(s):  Shukri Stephens DO    Assistant:   * No surgical staff found *    Anesthesia: General    Estimated Blood Loss (mL): Minimal    Complications: None    Specimens:   ID Type Source Tests Collected by Time Destination   A : Left Knee Culture Swab Joint, Knee CULTURE, ANAEROBIC, CULTURE, WOUND Herschell Edcouch, DO 12/21/2023 1752    B : Left Knee Culture Swab Joint, Knee CULTURE, ANAEROBIC, CULTURE, WOUND Herschell Edcouch, DO 12/21/2023 1754    C : Left Knee Culture Swab Joint, Knee CULTURE, ANAEROBIC, CULTURE, WOUND Herschell Edcouch, DO 12/21/2023 1755        Implants:  * No implants in log *      Drains:   Closed/Suction Drain Left; Anterior Knee Accordion (Active)   Site Description Clean, dry & intact 12/21/23 1755   Dressing Status Clean, dry & intact 12/21/23 1755   Drainage Appearance Bloody 12/21/23 1755   Drain Status Compressed 12/21/23 1755       Findings: abscess at pes anserine        Detailed Description of Procedure: This is a 70-year-old female who has a history of trauma to her left knee, this did initiate a hematoma which subsequently became infected. She presents with abscess. We did discuss the risks of surgery which include but are not limited to infection, nerve or blood vessel damage, failure of fixation, failure of any possible implant, need for reoperation, postoperative pain and swelling, intra-or postoperative fracture, postoperative dislocation, leg length inequality, need for reoperation, implant failure, death, disability, organ dysfunction, wound healing issues, DVT, PE, and the need for further procedures. The patient did freely state their understanding and satisfaction with our discussion.   We

## 2023-12-22 NOTE — PROGRESS NOTES
Pt is getting discharge at this time. Pt daughter is at bed side to pick her up. Provide all discharge education. Change her dressing before she left notice small drain from Hemovac drain site.

## 2023-12-22 NOTE — PROGRESS NOTES
TRANSFER - IN REPORT:    Verbal report received from [facility] on [facility]  being received from PACU for routine post-op      Report consisted of patient's Situation, Background, Assessment and   Recommendations(SBAR). Information from the following report(s) Adult Overview, Surgery Report, Intake/Output, MAR, and Recent Results was reviewed with the receiving nurse. Opportunity for questions and clarification was provided. Assessment completed upon patient's arrival to unit and care assumed.

## 2023-12-22 NOTE — PROGRESS NOTES
CLARISSA please schedule pt px End of Shift Note    Bedside shift change report given to Bernardo RAE (oncoming nurse) by Ana Burden RN (offgoing nurse). Report included the following information SBAR, Kardex, OR Summary, Procedure Summary, Intake/Output, and MAR    Shift worked:  7p-7a     Shift summary and any significant changes:     Pain control with tylenol, toradol and tramadol  Up to [facility] to void without difficulty  VSS  DC plan today after 5pm dose of Rochephin   Concerns for physician to address:  DC plan     Zone phone for oncoming shift:          Activity:     Number times ambulated in hallways past shift: 0  Number of times OOB to chair past shift: 0  BSC    Cardiac:   Cardiac Monitoring: No           Access:  Current line(s): PIV     Genitourinary:   Urinary status: voiding    Respiratory:      Chronic home O2 use?: NO  Incentive spirometer at bedside: NO       GI:     Current diet:  ADULT DIET; Regular  Passing flatus: YES  Tolerating current diet: YES       Pain Management:   Patient states pain is manageable on current regimen: YES    Skin:     Interventions: increase time out of bed, PT/OT consult, limit briefs, and nutritional support    Patient Safety:  Fall Score:    Interventions: bed/chair alarm, assistive device (walker, cane.  etc), gripper socks, pt to call before getting OOB, and stay with me (per policy)       Length of Stay:  Expected LOS:   Actual LOS: 0      Ana Burden RN No contacts and clean well before next use  Trial Pataday OTC.  Call if no improvement or worsening.  Good hand hygiene after touching eyes.  Cool compresses as needed    I have spent 8 minutes of video time with this patient in which greater than 50% was spent in counseling and coordination of care.

## 2023-12-25 LAB
BACTERIA SPEC CULT: ABNORMAL
BACTERIA SPEC CULT: ABNORMAL
SERVICE CMNT-IMP: ABNORMAL

## 2023-12-26 LAB
BACTERIA SPEC CULT: ABNORMAL
BACTERIA SPEC CULT: ABNORMAL
GRAM STN SPEC: ABNORMAL
GRAM STN SPEC: ABNORMAL
SERVICE CMNT-IMP: ABNORMAL

## 2024-01-02 ENCOUNTER — OFFICE VISIT (OUTPATIENT)
Age: 69
End: 2024-01-02

## 2024-01-02 VITALS
TEMPERATURE: 96.8 F | BODY MASS INDEX: 31.3 KG/M2 | RESPIRATION RATE: 18 BRPM | OXYGEN SATURATION: 95 % | WEIGHT: 165.8 LBS | HEIGHT: 61 IN | DIASTOLIC BLOOD PRESSURE: 76 MMHG | SYSTOLIC BLOOD PRESSURE: 117 MMHG | HEART RATE: 84 BPM

## 2024-01-02 DIAGNOSIS — Z47.89 ORTHOPEDIC AFTERCARE: ICD-10-CM

## 2024-01-02 DIAGNOSIS — M70.52 PES ANSERINUS BURSITIS OF LEFT KNEE: Primary | ICD-10-CM

## 2024-01-02 PROCEDURE — 99024 POSTOP FOLLOW-UP VISIT: CPT | Performed by: ORTHOPAEDIC SURGERY

## 2024-01-02 RX ORDER — CEPHALEXIN 500 MG/1
500 CAPSULE ORAL 4 TIMES DAILY
Qty: 50 CAPSULE | Refills: 0 | Status: SHIPPED | OUTPATIENT
Start: 2024-01-02

## 2024-01-02 NOTE — PROGRESS NOTES
is here for a follow up visit from a left pes bursitis I+D.    Pain has been appropriate since surgery, no major medical complications since surgery.      Current Outpatient Medications on File Prior to Visit   Medication Sig Dispense Refill    sulfamethoxazole-trimethoprim (BACTRIM DS;SEPTRA DS) 800-160 MG per tablet Take 1 tablet by mouth 2 times daily for 14 days 28 tablet 0    potassium chloride (MICRO-K) 10 MEQ extended release capsule TAKE 1 CAPSULE BY MOUTH TWICE A DAY 60 capsule 6    amLODIPine (NORVASC) 5 MG tablet Take 1 tablet by mouth daily Restart when systolic blood pressure over 120, diastolic over 90 30 tablet 3    losartan (COZAAR) 50 MG tablet Take 1 tablet by mouth daily Restart when Systolic blood pressure over 120, diastolic blood pressure over 90 30 tablet 3    hydroCHLOROthiazide (HYDRODIURIL) 25 MG tablet Take 1 tablet by mouth daily Restart tomorrow 30 tablet 3    atorvastatin (LIPITOR) 40 MG tablet TAKE 1 TABLET BY MOUTH EVERY DAY 30 tablet 12    sucralfate (CARAFATE) 1 GM tablet TAKE 1 TABLET BY MOUTH FOUR TIMES A DAY (Patient taking differently: in the morning and at bedtime) 120 tablet 3    diclofenac (VOLTAREN) 75 MG EC tablet TAKE 1 TABLET BY MOUTH TWICE A  tablet 3    Cholecalciferol (VITAMIN D3) 50 MCG (2000 UT) TABS TAKE 1 TABLET BY MOUTH TWO TIMES A DAY. 60 tablet 3    acetaminophen (TYLENOL) 500 MG tablet Take 2 tablets by mouth every 6 hours as needed for Pain      metFORMIN (GLUCOPHAGE) 1000 MG tablet Take 1 tablet by mouth daily (with breakfast)      nystatin (MYCOSTATIN) 334577 UNIT/GM powder Apply topically as needed      Multiple Vitamins-Minerals (MULTIVITAMIN WOMEN PO) Take by mouth daily      famotidine (PEPCID) 40 MG tablet Take 1 tablet by mouth daily      pioglitazone (ACTOS) 45 MG tablet Take 1 tablet by mouth at bedtime       No current facility-administered medications on file prior to visit.       ROS:  General: denies agitation, major chest

## 2024-01-02 NOTE — PROGRESS NOTES
Identified pt with two pt identifiers (name and ). Reviewed chart in preparation for visit and have obtained necessary documentation.    Kalpana Benoit is a 68 y.o. female Pain and Wrist Pain (Post Op left wrist pain)  .    Vitals:    24 1302   BP: 117/76   Site: Right Upper Arm   Position: Sitting   Cuff Size: Large Adult   Pulse: 84   Resp: 18   Temp: 96.8 °F (36 °C)   TempSrc: Temporal   SpO2: 95%   Weight: 75.2 kg (165 lb 12.8 oz)   Height: 1.549 m (5' 1\")          1. Have you been to the ER, urgent care clinic since your last visit?  Hospitalized since your last visit?  no     2. Have you seen or consulted any other health care providers outside of the [facility] since your last visit?  Include any pap smears or colon screening.  no

## 2024-01-04 LAB
BACTERIA SPEC CULT: ABNORMAL
BACTERIA SPEC CULT: ABNORMAL
BACTERIA SPEC CULT: NORMAL
GRAM STN SPEC: ABNORMAL
GRAM STN SPEC: ABNORMAL
GRAM STN SPEC: NORMAL
SERVICE CMNT-IMP: ABNORMAL
SERVICE CMNT-IMP: NORMAL

## 2024-01-05 RX ORDER — CHOLECALCIFEROL (VITAMIN D3) 125 MCG
1 CAPSULE ORAL 2 TIMES DAILY
Qty: 60 TABLET | Refills: 3 | Status: SHIPPED | OUTPATIENT
Start: 2024-01-05

## 2024-01-05 NOTE — TELEPHONE ENCOUNTER
Last appointment: 10/16/2023 MD Willingham   Next appointment: 01/16/2024 MD Willingham   Previous refill encounter(s):   08/24/2023 Vitamin D3 #60 with 3 refills.     For Pharmacy Admin Tracking Only    Program: Medication Refill  Intervention Detail: New Rx: 1, reason: Patient Preference  Time Spent (min): 5      Requested Prescriptions     Pending Prescriptions Disp Refills    Cholecalciferol (VITAMIN D3) 50 MCG (2000 UT) TABS [Pharmacy Med Name: VITAMIN D3 2,000 UNIT TABLET] 60 tablet 0     Sig: TAKE 1 TABLET BY MOUTH TWICE A DAY

## 2024-01-09 ENCOUNTER — OFFICE VISIT (OUTPATIENT)
Age: 69
End: 2024-01-09

## 2024-01-09 VITALS
HEIGHT: 61 IN | DIASTOLIC BLOOD PRESSURE: 80 MMHG | RESPIRATION RATE: 18 BRPM | OXYGEN SATURATION: 98 % | WEIGHT: 162 LBS | BODY MASS INDEX: 30.58 KG/M2 | TEMPERATURE: 98 F | SYSTOLIC BLOOD PRESSURE: 125 MMHG | HEART RATE: 90 BPM

## 2024-01-09 DIAGNOSIS — M70.52 PES ANSERINUS BURSITIS OF LEFT KNEE: ICD-10-CM

## 2024-01-09 DIAGNOSIS — S52.532A CLOSED COLLES' FRACTURE OF LEFT RADIUS, INITIAL ENCOUNTER: Primary | ICD-10-CM

## 2024-01-09 PROCEDURE — 99024 POSTOP FOLLOW-UP VISIT: CPT | Performed by: ORTHOPAEDIC SURGERY

## 2024-01-09 ASSESSMENT — PATIENT HEALTH QUESTIONNAIRE - PHQ9
SUM OF ALL RESPONSES TO PHQ QUESTIONS 1-9: 0
SUM OF ALL RESPONSES TO PHQ9 QUESTIONS 1 & 2: 0
1. LITTLE INTEREST OR PLEASURE IN DOING THINGS: 0
SUM OF ALL RESPONSES TO PHQ QUESTIONS 1-9: 0
SUM OF ALL RESPONSES TO PHQ QUESTIONS 1-9: 0
2. FEELING DOWN, DEPRESSED OR HOPELESS: 0
SUM OF ALL RESPONSES TO PHQ QUESTIONS 1-9: 0

## 2024-01-09 NOTE — PROGRESS NOTES
is here for a follow up visit from a left knee I+D.    Pain has been appropriate since surgery, no major medical complications since surgery.      Current Outpatient Medications on File Prior to Visit   Medication Sig Dispense Refill    Cholecalciferol (VITAMIN D3) 50 MCG (2000 UT) TABS TAKE 1 TABLET BY MOUTH TWICE A DAY 60 tablet 3    cephALEXin (KEFLEX) 500 MG capsule Take 1 capsule by mouth 4 times daily 50 capsule 0    potassium chloride (MICRO-K) 10 MEQ extended release capsule TAKE 1 CAPSULE BY MOUTH TWICE A DAY 60 capsule 6    amLODIPine (NORVASC) 5 MG tablet Take 1 tablet by mouth daily Restart when systolic blood pressure over 120, diastolic over 90 30 tablet 3    losartan (COZAAR) 50 MG tablet Take 1 tablet by mouth daily Restart when Systolic blood pressure over 120, diastolic blood pressure over 90 30 tablet 3    hydroCHLOROthiazide (HYDRODIURIL) 25 MG tablet Take 1 tablet by mouth daily Restart tomorrow 30 tablet 3    atorvastatin (LIPITOR) 40 MG tablet TAKE 1 TABLET BY MOUTH EVERY DAY 30 tablet 12    sucralfate (CARAFATE) 1 GM tablet TAKE 1 TABLET BY MOUTH FOUR TIMES A DAY (Patient taking differently: in the morning and at bedtime) 120 tablet 3    diclofenac (VOLTAREN) 75 MG EC tablet TAKE 1 TABLET BY MOUTH TWICE A  tablet 3    acetaminophen (TYLENOL) 500 MG tablet Take 2 tablets by mouth every 6 hours as needed for Pain      metFORMIN (GLUCOPHAGE) 1000 MG tablet Take 1 tablet by mouth daily (with breakfast)      nystatin (MYCOSTATIN) 865971 UNIT/GM powder Apply topically as needed      Multiple Vitamins-Minerals (MULTIVITAMIN WOMEN PO) Take by mouth daily      famotidine (PEPCID) 40 MG tablet Take 1 tablet by mouth daily      pioglitazone (ACTOS) 45 MG tablet Take 1 tablet by mouth at bedtime       No current facility-administered medications on file prior to visit.       ROS:  General: denies agitation, major chest pain, unexpected weakness  Patient states no issues  Skin: healing

## 2024-01-09 NOTE — PROGRESS NOTES
Room:   I have reviewed all needed documentation in preparation for visit. Verified patient by name and date of birth  Chief Complaint   Patient presents with    Leg Pain     1 week follow up   LT leg       Vitals:    01/09/24 1425   BP: 125/80   Site: Left Upper Arm   Position: Sitting   Cuff Size: Large Adult   Pulse: 90   Resp: 18   Temp: 98 °F (36.7 °C)   TempSrc: Oral   SpO2: 98%   Weight: 73.5 kg (162 lb)   Height: 1.549 m (5' 1\")        Pain Score:   0 - No pain    Health Maintenance Review: Patient reminded of \"due or due soon\" health maintenance. I have asked the patient to contact his/her primary care provider (PCP) for follow-up on his/her health maintenance.    \"Have you been to the ER, urgent care clinic since your last visit?  Hospitalized since your last visit?\"    NO    “Have you seen or consulted any other health care providers outside of [facility] since your last visit?”    NO

## 2024-01-16 ENCOUNTER — OFFICE VISIT (OUTPATIENT)
Facility: CLINIC | Age: 69
End: 2024-01-16
Payer: COMMERCIAL

## 2024-01-16 VITALS
TEMPERATURE: 98 F | BODY MASS INDEX: 30.96 KG/M2 | SYSTOLIC BLOOD PRESSURE: 106 MMHG | OXYGEN SATURATION: 98 % | WEIGHT: 164 LBS | HEART RATE: 64 BPM | HEIGHT: 61 IN | RESPIRATION RATE: 14 BRPM | DIASTOLIC BLOOD PRESSURE: 74 MMHG

## 2024-01-16 DIAGNOSIS — E11.9 TYPE 2 DIABETES MELLITUS WITHOUT COMPLICATION, WITHOUT LONG-TERM CURRENT USE OF INSULIN (HCC): Primary | ICD-10-CM

## 2024-01-16 DIAGNOSIS — K21.9 GASTROESOPHAGEAL REFLUX DISEASE WITHOUT ESOPHAGITIS: ICD-10-CM

## 2024-01-16 DIAGNOSIS — I10 ESSENTIAL (PRIMARY) HYPERTENSION: ICD-10-CM

## 2024-01-16 DIAGNOSIS — E78.00 PURE HYPERCHOLESTEROLEMIA, UNSPECIFIED: ICD-10-CM

## 2024-01-16 DIAGNOSIS — D50.0 IRON DEFICIENCY ANEMIA DUE TO CHRONIC BLOOD LOSS: ICD-10-CM

## 2024-01-16 DIAGNOSIS — M17.11 PRIMARY OSTEOARTHRITIS OF RIGHT KNEE: ICD-10-CM

## 2024-01-16 LAB
ERYTHROCYTE [DISTWIDTH] IN BLOOD BY AUTOMATED COUNT: 18.7 % (ref 11.5–14.5)
HCT VFR BLD AUTO: 31.1 % (ref 35–47)
HGB BLD-MCNC: 9.3 G/DL (ref 11.5–16)
MCH RBC QN AUTO: 24.7 PG (ref 26–34)
MCHC RBC AUTO-ENTMCNC: 29.9 G/DL (ref 30–36.5)
MCV RBC AUTO: 82.5 FL (ref 80–99)
NRBC # BLD: 0 K/UL (ref 0–0.01)
NRBC BLD-RTO: 0 PER 100 WBC
PLATELET # BLD AUTO: 322 K/UL (ref 150–400)
PMV BLD AUTO: 10.6 FL (ref 8.9–12.9)
RBC # BLD AUTO: 3.77 M/UL (ref 3.8–5.2)
WBC # BLD AUTO: 8.2 K/UL (ref 3.6–11)

## 2024-01-16 PROCEDURE — 99214 OFFICE O/P EST MOD 30 MIN: CPT | Performed by: INTERNAL MEDICINE

## 2024-01-16 PROCEDURE — 3074F SYST BP LT 130 MM HG: CPT | Performed by: INTERNAL MEDICINE

## 2024-01-16 PROCEDURE — 3078F DIAST BP <80 MM HG: CPT | Performed by: INTERNAL MEDICINE

## 2024-01-16 PROCEDURE — 1123F ACP DISCUSS/DSCN MKR DOCD: CPT | Performed by: INTERNAL MEDICINE

## 2024-01-16 ASSESSMENT — PATIENT HEALTH QUESTIONNAIRE - PHQ9
2. FEELING DOWN, DEPRESSED OR HOPELESS: 0
SUM OF ALL RESPONSES TO PHQ9 QUESTIONS 1 & 2: 0
SUM OF ALL RESPONSES TO PHQ QUESTIONS 1-9: 0
1. LITTLE INTEREST OR PLEASURE IN DOING THINGS: 0
SUM OF ALL RESPONSES TO PHQ QUESTIONS 1-9: 0

## 2024-01-16 NOTE — PROGRESS NOTES
1. Have you been to the ER, urgent care clinic since your last visit?  Hospitalized since your last visit?No    2. Have you seen or consulted any other health care providers outside of the [facility] since your last visit?  Include any pap smears or colon screening. No     Chief Complaint   Patient presents with    Diabetes    Hypertension    Gastroesophageal Reflux    Cholesterol Problem         PHQ-9 Total Score: 0 (1/16/2024 10:07 AM)    
sugar records: trend: stable  Any episodes of hypoglycemia? no  Lab review: orders written for new lab studies as appropriate; see orders.     GERD Review:   Patient has a history of gastroesophageal reflux with heartburn. Symptoms have been present for a few months.  She denies dysphagia.  She  has not lost weight.  She denies melena, hematochezia, hematemesis, and coffee ground emesis.  This has been associated with fullness after meals.  She denies abdominal bloating and none.  Medical therapy in the past has included proton pump inhibitor      Anemia  Patient presents for  evaluation of anemia. Anemia was found by routine CBC.  It has been present for several months.  Associated signs & symptoms: fatigue      Review of Systems  Constitutional: negative for fevers, chills, anorexia and weight loss  Eyes:   negative for visual disturbance and irritation  ENT:   negative for tinnitus,sore throat,nasal congestion,ear pains.hoarseness  Respiratory:  negative for cough, hemoptysis, dyspnea,wheezing  CV:   negative for chest pain, palpitations, lower extremity edema  GI:   negative for nausea, vomiting, diarrhea, abdominal pain,melena  Endo:               negative for polyuria,polydipsia,polyphagia,heat intolerance  Genitourinary: negative for frequency, dysuria and hematuria  Integument:  negative for rash and pruritus  Hematologic:  negative for easy bruising and gum/nose bleeding  Musculoskel:myalgias, arthralgias,  joint pain  Neurological:  negative for headaches, dizziness, vertigo, memory problems and gait   Behavl/Psych: negative for feelings of anxiety, depression, mood changes    Past Medical History:   Diagnosis Date    Anemia     Diverticulitis     Esophageal reflux     GERD (gastroesophageal reflux disease)     Hypertension     Osteoarthrosis, unspecified whether generalized or localized, lower leg     knees    Other and unspecified hyperlipidemia     Type II or unspecified type diabetes mellitus without

## 2024-01-16 NOTE — PATIENT INSTRUCTIONS
Thank you for enrolling in [facility]. Please follow the instructions below to securely access your online medical record. [employer] allows you to send messages to your doctor, view your test results, renew your prescriptions, schedule appointments, and more.     How Do I Sign Up?  In your Internet browser, go to [email or web address]  Click on the Sign Up Now link in the Sign In box. You will see the New Member Sign Up page.  Enter your [employer] Access Code exactly as it appears below. You will not need to use this code after you’ve completed the sign-up process. If you do not sign up before the expiration date, you must request a new code.  [facility] [facility] Access Code: Activation code not generated  Current [facility] Status: Active    Enter your Social Security Number ([id number]) and Date of Birth (mm/dd/yyyy) as indicated and click Submit. You will be taken to the next sign-up page.  Create a [facility] ID. This will be your [employer] [facility] login ID and cannot be changed, so think of one that is secure and easy to remember.  Create a [employer] [facility] password. You can change your password at any time.  Enter your Password Reset Question and Answer. This can be used at a later time if you forget your password.   Enter your e-mail address. You will receive e-mail notification when new information is available in [facility].  Click Sign Up. You can now view your medical record.     Additional Information  If you have questions, please contact your physician practice where you receive care. Remember, [employer] [facility] is NOT to be used for urgent needs. For medical emergencies, dial 911.

## 2024-01-30 ENCOUNTER — HOSPITAL ENCOUNTER (INPATIENT)
Facility: HOSPITAL | Age: 69
LOS: 7 days | Discharge: HOME HEALTH | DRG: 325 | End: 2024-02-06
Attending: ORTHOPAEDIC SURGERY | Admitting: ORTHOPAEDIC SURGERY
Payer: COMMERCIAL

## 2024-01-30 ENCOUNTER — ANESTHESIA EVENT (OUTPATIENT)
Facility: HOSPITAL | Age: 69
End: 2024-01-30
Payer: COMMERCIAL

## 2024-01-30 ENCOUNTER — OFFICE VISIT (OUTPATIENT)
Age: 69
End: 2024-01-30

## 2024-01-30 ENCOUNTER — ANESTHESIA (OUTPATIENT)
Facility: HOSPITAL | Age: 69
End: 2024-01-30
Payer: COMMERCIAL

## 2024-01-30 DIAGNOSIS — M70.52 PES ANSERINUS BURSITIS OF LEFT KNEE: Primary | ICD-10-CM

## 2024-01-30 DIAGNOSIS — M70.52 BURSITIS OF LEFT KNEE, UNSPECIFIED BURSA: ICD-10-CM

## 2024-01-30 DIAGNOSIS — T84.54XA INFECTION ASSOCIATED WITH INTERNAL LEFT KNEE PROSTHESIS, INITIAL ENCOUNTER (HCC): Primary | ICD-10-CM

## 2024-01-30 LAB
GLUCOSE BLD STRIP.AUTO-MCNC: 125 MG/DL (ref 65–117)
GLUCOSE BLD STRIP.AUTO-MCNC: 196 MG/DL (ref 65–117)
GLUCOSE BLD STRIP.AUTO-MCNC: 370 MG/DL (ref 65–117)
GLUCOSE BLD STRIP.AUTO-MCNC: 400 MG/DL (ref 65–117)
SERVICE CMNT-IMP: ABNORMAL

## 2024-01-30 PROCEDURE — 27331 EXPLORE/TREAT KNEE JOINT: CPT | Performed by: PHYSICIAN ASSISTANT

## 2024-01-30 PROCEDURE — 6360000002 HC RX W HCPCS: Performed by: ORTHOPAEDIC SURGERY

## 2024-01-30 PROCEDURE — 3600000012 HC SURGERY LEVEL 2 ADDTL 15MIN: Performed by: ORTHOPAEDIC SURGERY

## 2024-01-30 PROCEDURE — 2709999900 HC NON-CHARGEABLE SUPPLY: Performed by: ORTHOPAEDIC SURGERY

## 2024-01-30 PROCEDURE — 99024 POSTOP FOLLOW-UP VISIT: CPT | Performed by: ORTHOPAEDIC SURGERY

## 2024-01-30 PROCEDURE — 3700000000 HC ANESTHESIA ATTENDED CARE: Performed by: ORTHOPAEDIC SURGERY

## 2024-01-30 PROCEDURE — 87070 CULTURE OTHR SPECIMN AEROBIC: CPT

## 2024-01-30 PROCEDURE — 87075 CULTR BACTERIA EXCEPT BLOOD: CPT

## 2024-01-30 PROCEDURE — 2580000003 HC RX 258: Performed by: NURSE ANESTHETIST, CERTIFIED REGISTERED

## 2024-01-30 PROCEDURE — 1100000000 HC RM PRIVATE

## 2024-01-30 PROCEDURE — 27331 EXPLORE/TREAT KNEE JOINT: CPT | Performed by: ORTHOPAEDIC SURGERY

## 2024-01-30 PROCEDURE — 6360000002 HC RX W HCPCS: Performed by: PHYSICIAN ASSISTANT

## 2024-01-30 PROCEDURE — 87077 CULTURE AEROBIC IDENTIFY: CPT

## 2024-01-30 PROCEDURE — 6370000000 HC RX 637 (ALT 250 FOR IP): Performed by: PHYSICIAN ASSISTANT

## 2024-01-30 PROCEDURE — 2580000003 HC RX 258: Performed by: ORTHOPAEDIC SURGERY

## 2024-01-30 PROCEDURE — 7100000000 HC PACU RECOVERY - FIRST 15 MIN: Performed by: ORTHOPAEDIC SURGERY

## 2024-01-30 PROCEDURE — 87205 SMEAR GRAM STAIN: CPT

## 2024-01-30 PROCEDURE — 6370000000 HC RX 637 (ALT 250 FOR IP): Performed by: ORTHOPAEDIC SURGERY

## 2024-01-30 PROCEDURE — 82962 GLUCOSE BLOOD TEST: CPT

## 2024-01-30 PROCEDURE — 87186 SC STD MICRODIL/AGAR DIL: CPT

## 2024-01-30 PROCEDURE — 6360000002 HC RX W HCPCS: Performed by: NURSE ANESTHETIST, CERTIFIED REGISTERED

## 2024-01-30 PROCEDURE — 3600000002 HC SURGERY LEVEL 2 BASE: Performed by: ORTHOPAEDIC SURGERY

## 2024-01-30 PROCEDURE — 2500000003 HC RX 250 WO HCPCS: Performed by: NURSE ANESTHETIST, CERTIFIED REGISTERED

## 2024-01-30 PROCEDURE — C1713 ANCHOR/SCREW BN/BN,TIS/BN: HCPCS | Performed by: ORTHOPAEDIC SURGERY

## 2024-01-30 PROCEDURE — 3700000001 HC ADD 15 MINUTES (ANESTHESIA): Performed by: ORTHOPAEDIC SURGERY

## 2024-01-30 PROCEDURE — 7100000001 HC PACU RECOVERY - ADDTL 15 MIN: Performed by: ORTHOPAEDIC SURGERY

## 2024-01-30 DEVICE — STIMULAN® RAPID CURE PROVIDED STERILE FOR SINGLE PATIENT USE. STIMULAN® RAPID CURE CONTAINS CALCIUM SULFATE POWDER AND MIXING SOLUTION IN PRE-MEASURED QUANTITIES SO THAT WHEN MIXED TOGETHER IN A STERILE MIXING BOWL, THE RESULTANT PASTE IS TO BE DIGITALLY PACKED INTO OPEN BONE VOID/GAP TO SET INSITU OR PLACED INTO THE MOULD PROVIDED, THE MIXTURE SETS TO FORM BEADS. THE BIODEGRADABLE, RADIOPAQUE BEADS ARE RESORBED IN APPROXIMATELY 30 – 60 DAYS WHEN USED IN ACCORDANCE WITH THE DEVICE LABELLING. STIMULAN® RAPID CURE IS MANUFACTURED FROM SYNTHETIC IMPLANT GRADE CALCIUM SULFATE DIHYDRATE(CASO4.2H2O) THAT RESORBS AND IS REPLACED WITH BONE DURING THE HEALING PROCESS. ALSO, AS THE BONE VOID FILLER BEADS ARE BIODEGRADABLE AND BIOCOMPATIBLE, THEY MAY BE USED AT AN INFECTED SITE.
Type: IMPLANTABLE DEVICE | Site: LEG | Status: FUNCTIONAL
Brand: STIMULAN® RAPID CURE

## 2024-01-30 RX ORDER — ACETAMINOPHEN 500 MG
1000 TABLET ORAL EVERY 6 HOURS PRN
Status: DISCONTINUED | OUTPATIENT
Start: 2024-01-30 | End: 2024-02-06 | Stop reason: HOSPADM

## 2024-01-30 RX ORDER — TRAMADOL HYDROCHLORIDE 50 MG/1
50 TABLET ORAL EVERY 4 HOURS PRN
Status: DISCONTINUED | OUTPATIENT
Start: 2024-01-30 | End: 2024-02-05

## 2024-01-30 RX ORDER — HYDROMORPHONE HYDROCHLORIDE 1 MG/ML
0.5 INJECTION, SOLUTION INTRAMUSCULAR; INTRAVENOUS; SUBCUTANEOUS EVERY 4 HOURS PRN
Status: DISCONTINUED | OUTPATIENT
Start: 2024-01-30 | End: 2024-02-05

## 2024-01-30 RX ORDER — ROCURONIUM BROMIDE 10 MG/ML
INJECTION, SOLUTION INTRAVENOUS PRN
Status: DISCONTINUED | OUTPATIENT
Start: 2024-01-30 | End: 2024-01-30 | Stop reason: SDUPTHER

## 2024-01-30 RX ORDER — PROCHLORPERAZINE EDISYLATE 5 MG/ML
5 INJECTION INTRAMUSCULAR; INTRAVENOUS
Status: DISCONTINUED | OUTPATIENT
Start: 2024-01-30 | End: 2024-01-30 | Stop reason: HOSPADM

## 2024-01-30 RX ORDER — HYDROMORPHONE HYDROCHLORIDE 2 MG/ML
INJECTION, SOLUTION INTRAMUSCULAR; INTRAVENOUS; SUBCUTANEOUS PRN
Status: DISCONTINUED | OUTPATIENT
Start: 2024-01-30 | End: 2024-01-30 | Stop reason: SDUPTHER

## 2024-01-30 RX ORDER — POTASSIUM CHLORIDE 750 MG/1
10 TABLET, FILM COATED, EXTENDED RELEASE ORAL 2 TIMES DAILY
Status: DISCONTINUED | OUTPATIENT
Start: 2024-01-30 | End: 2024-02-06 | Stop reason: HOSPADM

## 2024-01-30 RX ORDER — FENTANYL CITRATE 50 UG/ML
INJECTION, SOLUTION INTRAMUSCULAR; INTRAVENOUS PRN
Status: DISCONTINUED | OUTPATIENT
Start: 2024-01-30 | End: 2024-01-30 | Stop reason: SDUPTHER

## 2024-01-30 RX ORDER — AMLODIPINE BESYLATE 5 MG/1
5 TABLET ORAL DAILY
Status: DISCONTINUED | OUTPATIENT
Start: 2024-01-30 | End: 2024-02-06 | Stop reason: HOSPADM

## 2024-01-30 RX ORDER — HYDROMORPHONE HYDROCHLORIDE 1 MG/ML
0.25 INJECTION, SOLUTION INTRAMUSCULAR; INTRAVENOUS; SUBCUTANEOUS EVERY 5 MIN PRN
Status: DISCONTINUED | OUTPATIENT
Start: 2024-01-30 | End: 2024-01-30 | Stop reason: HOSPADM

## 2024-01-30 RX ORDER — DEXAMETHASONE SODIUM PHOSPHATE 4 MG/ML
8 INJECTION, SOLUTION INTRA-ARTICULAR; INTRALESIONAL; INTRAMUSCULAR; INTRAVENOUS; SOFT TISSUE ONCE
Status: DISCONTINUED | OUTPATIENT
Start: 2024-01-30 | End: 2024-01-30 | Stop reason: HOSPADM

## 2024-01-30 RX ORDER — DEXAMETHASONE SODIUM PHOSPHATE 4 MG/ML
INJECTION, SOLUTION INTRA-ARTICULAR; INTRALESIONAL; INTRAMUSCULAR; INTRAVENOUS; SOFT TISSUE PRN
Status: DISCONTINUED | OUTPATIENT
Start: 2024-01-30 | End: 2024-01-30 | Stop reason: SDUPTHER

## 2024-01-30 RX ORDER — SODIUM CHLORIDE 9 MG/ML
INJECTION, SOLUTION INTRAVENOUS PRN
Status: DISCONTINUED | OUTPATIENT
Start: 2024-01-30 | End: 2024-01-30 | Stop reason: HOSPADM

## 2024-01-30 RX ORDER — SODIUM CHLORIDE 0.9 % (FLUSH) 0.9 %
5-40 SYRINGE (ML) INJECTION EVERY 12 HOURS SCHEDULED
Status: DISCONTINUED | OUTPATIENT
Start: 2024-01-30 | End: 2024-01-30 | Stop reason: HOSPADM

## 2024-01-30 RX ORDER — ACETAMINOPHEN 500 MG
1000 TABLET ORAL ONCE
Status: COMPLETED | OUTPATIENT
Start: 2024-01-30 | End: 2024-01-30

## 2024-01-30 RX ORDER — LIDOCAINE HYDROCHLORIDE 20 MG/ML
INJECTION, SOLUTION EPIDURAL; INFILTRATION; INTRACAUDAL; PERINEURAL PRN
Status: DISCONTINUED | OUTPATIENT
Start: 2024-01-30 | End: 2024-01-30 | Stop reason: SDUPTHER

## 2024-01-30 RX ORDER — KETOROLAC TROMETHAMINE 30 MG/ML
15 INJECTION, SOLUTION INTRAMUSCULAR; INTRAVENOUS EVERY 6 HOURS PRN
Status: DISPENSED | OUTPATIENT
Start: 2024-01-30 | End: 2024-02-04

## 2024-01-30 RX ORDER — GLYCOPYRROLATE 0.2 MG/ML
INJECTION INTRAMUSCULAR; INTRAVENOUS PRN
Status: DISCONTINUED | OUTPATIENT
Start: 2024-01-30 | End: 2024-01-30 | Stop reason: SDUPTHER

## 2024-01-30 RX ORDER — DEXTROSE MONOHYDRATE 100 MG/ML
INJECTION, SOLUTION INTRAVENOUS CONTINUOUS PRN
Status: DISCONTINUED | OUTPATIENT
Start: 2024-01-30 | End: 2024-02-03 | Stop reason: SDUPTHER

## 2024-01-30 RX ORDER — ONDANSETRON 2 MG/ML
INJECTION INTRAMUSCULAR; INTRAVENOUS PRN
Status: DISCONTINUED | OUTPATIENT
Start: 2024-01-30 | End: 2024-01-30 | Stop reason: SDUPTHER

## 2024-01-30 RX ORDER — KETOROLAC TROMETHAMINE 30 MG/ML
15 INJECTION, SOLUTION INTRAMUSCULAR; INTRAVENOUS ONCE
Status: COMPLETED | OUTPATIENT
Start: 2024-01-30 | End: 2024-01-30

## 2024-01-30 RX ORDER — PIOGLITAZONEHYDROCHLORIDE 15 MG/1
45 TABLET ORAL NIGHTLY
Status: DISCONTINUED | OUTPATIENT
Start: 2024-01-30 | End: 2024-02-06 | Stop reason: HOSPADM

## 2024-01-30 RX ORDER — SODIUM CHLORIDE 0.9 % (FLUSH) 0.9 %
5-40 SYRINGE (ML) INJECTION PRN
Status: DISCONTINUED | OUTPATIENT
Start: 2024-01-30 | End: 2024-01-30 | Stop reason: HOSPADM

## 2024-01-30 RX ORDER — FENTANYL CITRATE 50 UG/ML
50 INJECTION, SOLUTION INTRAMUSCULAR; INTRAVENOUS EVERY 5 MIN PRN
Status: DISCONTINUED | OUTPATIENT
Start: 2024-01-30 | End: 2024-01-30 | Stop reason: HOSPADM

## 2024-01-30 RX ORDER — SODIUM CHLORIDE, SODIUM LACTATE, POTASSIUM CHLORIDE, CALCIUM CHLORIDE 600; 310; 30; 20 MG/100ML; MG/100ML; MG/100ML; MG/100ML
INJECTION, SOLUTION INTRAVENOUS CONTINUOUS PRN
Status: DISCONTINUED | OUTPATIENT
Start: 2024-01-30 | End: 2024-01-30 | Stop reason: SDUPTHER

## 2024-01-30 RX ORDER — ATORVASTATIN CALCIUM 40 MG/1
40 TABLET, FILM COATED ORAL DAILY
Status: DISCONTINUED | OUTPATIENT
Start: 2024-01-30 | End: 2024-02-06 | Stop reason: HOSPADM

## 2024-01-30 RX ORDER — PHENYLEPHRINE HCL IN 0.9% NACL 0.4MG/10ML
SYRINGE (ML) INTRAVENOUS PRN
Status: DISCONTINUED | OUTPATIENT
Start: 2024-01-30 | End: 2024-01-30 | Stop reason: SDUPTHER

## 2024-01-30 RX ORDER — FAMOTIDINE 20 MG/1
40 TABLET, FILM COATED ORAL DAILY
Status: DISCONTINUED | OUTPATIENT
Start: 2024-01-30 | End: 2024-02-06 | Stop reason: HOSPADM

## 2024-01-30 RX ORDER — NEOSTIGMINE METHYLSULFATE 5 MG/5 ML
SYRINGE (ML) INTRAVENOUS PRN
Status: DISCONTINUED | OUTPATIENT
Start: 2024-01-30 | End: 2024-01-30 | Stop reason: SDUPTHER

## 2024-01-30 RX ORDER — ROPIVACAINE HYDROCHLORIDE 5 MG/ML
INJECTION, SOLUTION EPIDURAL; INFILTRATION; PERINEURAL PRN
Status: DISCONTINUED | OUTPATIENT
Start: 2024-01-30 | End: 2024-01-30 | Stop reason: ALTCHOICE

## 2024-01-30 RX ORDER — SUCRALFATE 1 G/1
1 TABLET ORAL 2 TIMES DAILY
Status: DISCONTINUED | OUTPATIENT
Start: 2024-01-30 | End: 2024-02-06 | Stop reason: HOSPADM

## 2024-01-30 RX ORDER — MIDAZOLAM HYDROCHLORIDE 1 MG/ML
INJECTION INTRAMUSCULAR; INTRAVENOUS PRN
Status: DISCONTINUED | OUTPATIENT
Start: 2024-01-30 | End: 2024-01-30 | Stop reason: SDUPTHER

## 2024-01-30 RX ADMIN — GLYCOPYRROLATE 0.4 MG: 0.2 INJECTION, SOLUTION INTRAMUSCULAR; INTRAVENOUS at 16:12

## 2024-01-30 RX ADMIN — KETOROLAC TROMETHAMINE 15 MG: 30 INJECTION INTRAMUSCULAR; INTRAVENOUS at 13:52

## 2024-01-30 RX ADMIN — PROPOFOL 30 MG: 10 INJECTION, EMULSION INTRAVENOUS at 15:16

## 2024-01-30 RX ADMIN — ONDANSETRON HYDROCHLORIDE 4 MG: 2 INJECTION, SOLUTION INTRAMUSCULAR; INTRAVENOUS at 16:12

## 2024-01-30 RX ADMIN — TRAMADOL HYDROCHLORIDE 50 MG: 50 TABLET ORAL at 18:40

## 2024-01-30 RX ADMIN — KETOROLAC TROMETHAMINE 15 MG: 30 INJECTION, SOLUTION INTRAMUSCULAR; INTRAVENOUS at 20:28

## 2024-01-30 RX ADMIN — SUCRALFATE 1 G: 1 TABLET ORAL at 20:28

## 2024-01-30 RX ADMIN — FENTANYL CITRATE 50 MCG: 50 INJECTION, SOLUTION INTRAMUSCULAR; INTRAVENOUS at 15:15

## 2024-01-30 RX ADMIN — FENTANYL CITRATE 100 MCG: 50 INJECTION, SOLUTION INTRAMUSCULAR; INTRAVENOUS at 15:47

## 2024-01-30 RX ADMIN — Medication 120 MCG: at 15:57

## 2024-01-30 RX ADMIN — ROCURONIUM BROMIDE 30 MG: 10 INJECTION INTRAVENOUS at 15:16

## 2024-01-30 RX ADMIN — DEXAMETHASONE SODIUM PHOSPHATE 8 MG: 4 INJECTION, SOLUTION INTRAMUSCULAR; INTRAVENOUS at 15:24

## 2024-01-30 RX ADMIN — Medication 80 MCG: at 15:59

## 2024-01-30 RX ADMIN — SODIUM CHLORIDE, POTASSIUM CHLORIDE, SODIUM LACTATE AND CALCIUM CHLORIDE: 600; 310; 30; 20 INJECTION, SOLUTION INTRAVENOUS at 16:11

## 2024-01-30 RX ADMIN — FENTANYL CITRATE 100 MCG: 50 INJECTION, SOLUTION INTRAMUSCULAR; INTRAVENOUS at 16:10

## 2024-01-30 RX ADMIN — Medication 80 MCG: at 15:58

## 2024-01-30 RX ADMIN — SODIUM CHLORIDE, POTASSIUM CHLORIDE, SODIUM LACTATE AND CALCIUM CHLORIDE: 600; 310; 30; 20 INJECTION, SOLUTION INTRAVENOUS at 15:11

## 2024-01-30 RX ADMIN — VANCOMYCIN HYDROCHLORIDE 1750 MG: 10 INJECTION, POWDER, LYOPHILIZED, FOR SOLUTION INTRAVENOUS at 20:31

## 2024-01-30 RX ADMIN — ACETAMINOPHEN 1000 MG: 500 TABLET ORAL at 13:34

## 2024-01-30 RX ADMIN — ROCURONIUM BROMIDE 10 MG: 10 INJECTION INTRAVENOUS at 15:43

## 2024-01-30 RX ADMIN — WATER 2000 MG: 1 INJECTION INTRAMUSCULAR; INTRAVENOUS; SUBCUTANEOUS at 15:21

## 2024-01-30 RX ADMIN — PROPOFOL 130 MG: 10 INJECTION, EMULSION INTRAVENOUS at 15:15

## 2024-01-30 RX ADMIN — POTASSIUM CHLORIDE 10 MEQ: 750 TABLET, FILM COATED, EXTENDED RELEASE ORAL at 20:28

## 2024-01-30 RX ADMIN — HYDROMORPHONE HYDROCHLORIDE 0.6 MG: 2 INJECTION INTRAMUSCULAR; INTRAVENOUS; SUBCUTANEOUS at 15:41

## 2024-01-30 RX ADMIN — HYDROMORPHONE HYDROCHLORIDE 0.4 MG: 2 INJECTION INTRAMUSCULAR; INTRAVENOUS; SUBCUTANEOUS at 15:33

## 2024-01-30 RX ADMIN — LIDOCAINE HYDROCHLORIDE 60 MG: 20 INJECTION, SOLUTION EPIDURAL; INFILTRATION; INTRACAUDAL; PERINEURAL at 15:15

## 2024-01-30 RX ADMIN — PIPERACILLIN AND TAZOBACTAM 3375 MG: 3; .375 INJECTION, POWDER, FOR SOLUTION INTRAVENOUS at 18:53

## 2024-01-30 RX ADMIN — MIDAZOLAM HYDROCHLORIDE 2 MG: 1 INJECTION, SOLUTION INTRAMUSCULAR; INTRAVENOUS at 15:11

## 2024-01-30 RX ADMIN — Medication 3 MG: at 16:12

## 2024-01-30 ASSESSMENT — PAIN SCALES - GENERAL
PAINLEVEL_OUTOF10: 0
PAINLEVEL_OUTOF10: 5
PAINLEVEL_OUTOF10: 4

## 2024-01-30 ASSESSMENT — PAIN DESCRIPTION - ORIENTATION
ORIENTATION: LEFT
ORIENTATION: LEFT

## 2024-01-30 ASSESSMENT — PAIN DESCRIPTION - ONSET: ONSET: GRADUAL

## 2024-01-30 ASSESSMENT — PAIN DESCRIPTION - PAIN TYPE
TYPE: SURGICAL PAIN
TYPE: SURGICAL PAIN

## 2024-01-30 ASSESSMENT — PAIN DESCRIPTION - LOCATION
LOCATION: KNEE
LOCATION: KNEE

## 2024-01-30 ASSESSMENT — PAIN DESCRIPTION - FREQUENCY: FREQUENCY: INTERMITTENT

## 2024-01-30 ASSESSMENT — PAIN DESCRIPTION - DESCRIPTORS
DESCRIPTORS: ACHING
DESCRIPTORS: ACHING;DISCOMFORT

## 2024-01-30 NOTE — H&P
is here for a follow up visit from a left leg irrigation and debridement of Pez anserine bursitis.    She was doing well until 3 days ago when she started to have the increased area of swelling again distally and medially.  No knee problems and she is able to ambulate well.            Current Outpatient Medications on File Prior to Visit   Medication Sig Dispense Refill    Cholecalciferol (VITAMIN D3) 50 MCG (2000 UT) TABS TAKE 1 TABLET BY MOUTH TWICE A DAY 60 tablet 3    cephALEXin (KEFLEX) 500 MG capsule Take 1 capsule by mouth 4 times daily 50 capsule 0    potassium chloride (MICRO-K) 10 MEQ extended release capsule TAKE 1 CAPSULE BY MOUTH TWICE A DAY 60 capsule 6    amLODIPine (NORVASC) 5 MG tablet Take 1 tablet by mouth daily Restart when systolic blood pressure over 120, diastolic over 90 30 tablet 3    losartan (COZAAR) 50 MG tablet Take 1 tablet by mouth daily Restart when Systolic blood pressure over 120, diastolic blood pressure over 90 30 tablet 3    hydroCHLOROthiazide (HYDRODIURIL) 25 MG tablet Take 1 tablet by mouth daily Restart tomorrow 30 tablet 3    atorvastatin (LIPITOR) 40 MG tablet TAKE 1 TABLET BY MOUTH EVERY DAY 30 tablet 12    sucralfate (CARAFATE) 1 GM tablet TAKE 1 TABLET BY MOUTH FOUR TIMES A DAY (Patient taking differently: in the morning and at bedtime) 120 tablet 3    diclofenac (VOLTAREN) 75 MG EC tablet TAKE 1 TABLET BY MOUTH TWICE A  tablet 3    acetaminophen (TYLENOL) 500 MG tablet Take 2 tablets by mouth every 6 hours as needed for Pain        metFORMIN (GLUCOPHAGE) 1000 MG tablet Take 1 tablet by mouth daily (with breakfast)        nystatin (MYCOSTATIN) 246947 UNIT/GM powder Apply topically as needed        Multiple Vitamins-Minerals (MULTIVITAMIN WOMEN PO) Take by mouth daily        famotidine (PEPCID) 40 MG tablet Take 1 tablet by mouth daily        pioglitazone (ACTOS) 45 MG tablet Take 1 tablet by mouth at bedtime          No current facility-administered

## 2024-01-30 NOTE — ANESTHESIA POSTPROCEDURE EVALUATION
Department of Anesthesiology  Postprocedure Note    Patient: Kalpana Benoit  MRN: [id number]  YOB: 1955  Date of evaluation: 1/30/2024    Procedure Summary       Date: 01/30/24 Room / Location: [facility] / [facility]    Anesthesia Start: 1511 Anesthesia Stop: 1624    Procedure: LEFT KNEE INCISION AND DRAINAGE (Left) Diagnosis:       Abscess      (Abscess [L02.91])    Providers: Matthew Garay DO Responsible Provider: Hugo Ramírez MD    Anesthesia Type: General ASA Status: 3            Anesthesia Type: General    Horacio Phase I: Horacio Score: 9    Horacio Phase II:      Anesthesia Post Evaluation    Patient location during evaluation: PACU  Patient participation: complete - patient participated  Level of consciousness: awake and alert  Airway patency: patent  Nausea & Vomiting: no nausea  Cardiovascular status: hemodynamically stable  Respiratory status: acceptable  Hydration status: euvolemic  Pain management: adequate    No notable events documented.

## 2024-01-30 NOTE — ANESTHESIA PRE PROCEDURE
Department of Anesthesiology  Preprocedure Note       Name:  Kalpana Benoit   Age:  68 y.o.  :  1955                                          MRN:  [id number]         Date:  2024      Surgeon: Surgeon(s):  Matthew Garay DO    Procedure: Procedure(s):  LEFT KNEE INCISION AND DRAINAGE    Medications prior to admission:   Prior to Admission medications    Medication Sig Start Date End Date Taking? Authorizing Provider   Cholecalciferol (VITAMIN D3) 50 MCG (2000) TABS TAKE 1 TABLET BY MOUTH TWICE A DAY 24   Nathanael Willingham Jr., MD   cephALEXin (KEFLEX) 500 MG capsule Take 1 capsule by mouth 4 times daily 24   Matthew Garay DO   potassium chloride (MICRO-K) 10 MEQ extended release capsule TAKE 1 CAPSULE BY MOUTH TWICE A DAY 23   Nathanael Willingham Jr., MD   amLODIPine (NORVASC) 5 MG tablet Take 1 tablet by mouth daily Restart when systolic blood pressure over 120, diastolic over 90 10/25/23   Francisca Barraza APRN - NP   losartan (COZAAR) 50 MG tablet Take 1 tablet by mouth daily Restart when Systolic blood pressure over 120, diastolic blood pressure over 90 10/25/23   Francisca Braraza APRN - MAUREEN   hydroCHLOROthiazide (HYDRODIURIL) 25 MG tablet Take 1 tablet by mouth daily Restart tomorrow 10/25/23   Francisca Barraza APRN - NP   atorvastatin (LIPITOR) 40 MG tablet TAKE 1 TABLET BY MOUTH EVERY DAY 23   Nathanael Willingham Jr., MD   sucralfate (CARAFATE) 1 GM tablet TAKE 1 TABLET BY MOUTH FOUR TIMES A DAY  Patient taking differently: in the morning and at bedtime 23   Nathanael Willingham Jr., MD   diclofenac (VOLTAREN) 75 MG EC tablet TAKE 1 TABLET BY MOUTH TWICE A DAY 23   Nathanael Willingham Jr., MD   acetaminophen (TYLENOL) 500 MG tablet Take 2 tablets by mouth every 6 hours as needed for Pain    Provider, MD Marcin   metFORMIN (GLUCOPHAGE) 1000 MG tablet Take 1 tablet by mouth daily (with breakfast) 22   Automatic Reconciliation, Ar   nystatin (MYCOSTATIN) 437115 UNIT/GM

## 2024-01-30 NOTE — PROGRESS NOTES
is here for a follow up visit from a left leg irrigation and debridement of Pez anserine bursitis.    She was doing well until 3 days ago when she started to have the increased area of swelling again distally and medially.  No knee problems and she is able to ambulate well.    Current Outpatient Medications on File Prior to Visit   Medication Sig Dispense Refill    Cholecalciferol (VITAMIN D3) 50 MCG (2000 UT) TABS TAKE 1 TABLET BY MOUTH TWICE A DAY 60 tablet 3    cephALEXin (KEFLEX) 500 MG capsule Take 1 capsule by mouth 4 times daily 50 capsule 0    potassium chloride (MICRO-K) 10 MEQ extended release capsule TAKE 1 CAPSULE BY MOUTH TWICE A DAY 60 capsule 6    amLODIPine (NORVASC) 5 MG tablet Take 1 tablet by mouth daily Restart when systolic blood pressure over 120, diastolic over 90 30 tablet 3    losartan (COZAAR) 50 MG tablet Take 1 tablet by mouth daily Restart when Systolic blood pressure over 120, diastolic blood pressure over 90 30 tablet 3    hydroCHLOROthiazide (HYDRODIURIL) 25 MG tablet Take 1 tablet by mouth daily Restart tomorrow 30 tablet 3    atorvastatin (LIPITOR) 40 MG tablet TAKE 1 TABLET BY MOUTH EVERY DAY 30 tablet 12    sucralfate (CARAFATE) 1 GM tablet TAKE 1 TABLET BY MOUTH FOUR TIMES A DAY (Patient taking differently: in the morning and at bedtime) 120 tablet 3    diclofenac (VOLTAREN) 75 MG EC tablet TAKE 1 TABLET BY MOUTH TWICE A  tablet 3    acetaminophen (TYLENOL) 500 MG tablet Take 2 tablets by mouth every 6 hours as needed for Pain      metFORMIN (GLUCOPHAGE) 1000 MG tablet Take 1 tablet by mouth daily (with breakfast)      nystatin (MYCOSTATIN) 382064 UNIT/GM powder Apply topically as needed      Multiple Vitamins-Minerals (MULTIVITAMIN WOMEN PO) Take by mouth daily      famotidine (PEPCID) 40 MG tablet Take 1 tablet by mouth daily      pioglitazone (ACTOS) 45 MG tablet Take 1 tablet by mouth at bedtime       No current facility-administered medications on file

## 2024-01-31 ENCOUNTER — APPOINTMENT (OUTPATIENT)
Facility: HOSPITAL | Age: 69
DRG: 325 | End: 2024-01-31
Attending: ORTHOPAEDIC SURGERY
Payer: COMMERCIAL

## 2024-01-31 ENCOUNTER — ANESTHESIA EVENT (OUTPATIENT)
Facility: HOSPITAL | Age: 69
DRG: 325 | End: 2024-01-31
Payer: COMMERCIAL

## 2024-01-31 ENCOUNTER — ANESTHESIA (OUTPATIENT)
Facility: HOSPITAL | Age: 69
DRG: 325 | End: 2024-01-31
Payer: COMMERCIAL

## 2024-01-31 LAB
ANION GAP SERPL CALC-SCNC: 5 MMOL/L (ref 5–15)
BACTERIA SPEC CULT: NORMAL
BUN SERPL-MCNC: 28 MG/DL (ref 6–20)
BUN/CREAT SERPL: 29 (ref 12–20)
CALCIUM SERPL-MCNC: 9.2 MG/DL (ref 8.5–10.1)
CHLORIDE SERPL-SCNC: 106 MMOL/L (ref 97–108)
CO2 SERPL-SCNC: 25 MMOL/L (ref 21–32)
COMMENT:: NORMAL
COMMENT:: NORMAL
CREAT SERPL-MCNC: 0.96 MG/DL (ref 0.55–1.02)
GLUCOSE BLD STRIP.AUTO-MCNC: 134 MG/DL (ref 65–117)
GLUCOSE BLD STRIP.AUTO-MCNC: 161 MG/DL (ref 65–117)
GLUCOSE BLD STRIP.AUTO-MCNC: 183 MG/DL (ref 65–117)
GLUCOSE BLD STRIP.AUTO-MCNC: 217 MG/DL (ref 65–117)
GLUCOSE BLD STRIP.AUTO-MCNC: 260 MG/DL (ref 65–117)
GLUCOSE SERPL-MCNC: 205 MG/DL (ref 65–100)
GRAM STN SPEC: NORMAL
INR PPP: 1.1 (ref 0.9–1.1)
POTASSIUM SERPL-SCNC: 4.8 MMOL/L (ref 3.5–5.1)
PROTHROMBIN TIME: 11.4 SEC (ref 9–11.1)
SERVICE CMNT-IMP: ABNORMAL
SERVICE CMNT-IMP: NORMAL
SODIUM SERPL-SCNC: 136 MMOL/L (ref 136–145)
SPECIMEN HOLD: NORMAL
SPECIMEN HOLD: NORMAL

## 2024-01-31 PROCEDURE — 2580000003 HC RX 258: Performed by: ORTHOPAEDIC SURGERY

## 2024-01-31 PROCEDURE — 87040 BLOOD CULTURE FOR BACTERIA: CPT

## 2024-01-31 PROCEDURE — 87070 CULTURE OTHR SPECIMN AEROBIC: CPT

## 2024-01-31 PROCEDURE — 87075 CULTR BACTERIA EXCEPT BLOOD: CPT

## 2024-01-31 PROCEDURE — 7100000000 HC PACU RECOVERY - FIRST 15 MIN: Performed by: ORTHOPAEDIC SURGERY

## 2024-01-31 PROCEDURE — 3700000001 HC ADD 15 MINUTES (ANESTHESIA): Performed by: ORTHOPAEDIC SURGERY

## 2024-01-31 PROCEDURE — C1776 JOINT DEVICE (IMPLANTABLE): HCPCS | Performed by: ORTHOPAEDIC SURGERY

## 2024-01-31 PROCEDURE — 3600000015 HC SURGERY LEVEL 5 ADDTL 15MIN: Performed by: ORTHOPAEDIC SURGERY

## 2024-01-31 PROCEDURE — 7100000001 HC PACU RECOVERY - ADDTL 15 MIN: Performed by: ORTHOPAEDIC SURGERY

## 2024-01-31 PROCEDURE — 2709999900 HC NON-CHARGEABLE SUPPLY: Performed by: ORTHOPAEDIC SURGERY

## 2024-01-31 PROCEDURE — 85610 PROTHROMBIN TIME: CPT

## 2024-01-31 PROCEDURE — 2580000003 HC RX 258: Performed by: PHYSICIAN ASSISTANT

## 2024-01-31 PROCEDURE — 1100000000 HC RM PRIVATE

## 2024-01-31 PROCEDURE — 6360000002 HC RX W HCPCS: Performed by: PHYSICIAN ASSISTANT

## 2024-01-31 PROCEDURE — 6370000000 HC RX 637 (ALT 250 FOR IP): Performed by: PHYSICIAN ASSISTANT

## 2024-01-31 PROCEDURE — 82962 GLUCOSE BLOOD TEST: CPT

## 2024-01-31 PROCEDURE — 87186 SC STD MICRODIL/AGAR DIL: CPT

## 2024-01-31 PROCEDURE — 87077 CULTURE AEROBIC IDENTIFY: CPT

## 2024-01-31 PROCEDURE — 99223 1ST HOSP IP/OBS HIGH 75: CPT | Performed by: INTERNAL MEDICINE

## 2024-01-31 PROCEDURE — 6360000002 HC RX W HCPCS

## 2024-01-31 PROCEDURE — 2580000003 HC RX 258: Performed by: ANESTHESIOLOGY

## 2024-01-31 PROCEDURE — 2500000003 HC RX 250 WO HCPCS

## 2024-01-31 PROCEDURE — 73700 CT LOWER EXTREMITY W/O DYE: CPT

## 2024-01-31 PROCEDURE — 36415 COLL VENOUS BLD VENIPUNCTURE: CPT

## 2024-01-31 PROCEDURE — 73560 X-RAY EXAM OF KNEE 1 OR 2: CPT

## 2024-01-31 PROCEDURE — 3600000005 HC SURGERY LEVEL 5 BASE: Performed by: ORTHOPAEDIC SURGERY

## 2024-01-31 PROCEDURE — 2580000003 HC RX 258

## 2024-01-31 PROCEDURE — 0SPD0JZ REMOVAL OF SYNTHETIC SUBSTITUTE FROM LEFT KNEE JOINT, OPEN APPROACH: ICD-10-PCS | Performed by: ORTHOPAEDIC SURGERY

## 2024-01-31 PROCEDURE — 6360000002 HC RX W HCPCS: Performed by: ANESTHESIOLOGY

## 2024-01-31 PROCEDURE — 3700000000 HC ANESTHESIA ATTENDED CARE: Performed by: ORTHOPAEDIC SURGERY

## 2024-01-31 PROCEDURE — APPNB180 APP NON BILLABLE TIME > 60 MINS

## 2024-01-31 PROCEDURE — 0SRD0EZ REPLACEMENT OF LEFT KNEE JOINT WITH ARTICULATING SPACER, OPEN APPROACH: ICD-10-PCS | Performed by: ORTHOPAEDIC SURGERY

## 2024-01-31 PROCEDURE — 6360000002 HC RX W HCPCS: Performed by: ORTHOPAEDIC SURGERY

## 2024-01-31 PROCEDURE — 80048 BASIC METABOLIC PNL TOTAL CA: CPT

## 2024-01-31 PROCEDURE — 0SBD0ZZ EXCISION OF LEFT KNEE JOINT, OPEN APPROACH: ICD-10-PCS | Performed by: ORTHOPAEDIC SURGERY

## 2024-01-31 PROCEDURE — 87205 SMEAR GRAM STAIN: CPT

## 2024-01-31 PROCEDURE — C1713 ANCHOR/SCREW BN/BN,TIS/BN: HCPCS | Performed by: ORTHOPAEDIC SURGERY

## 2024-01-31 DEVICE — CEMENT BNE 20ML 41GM FULL DOSE PMMA W/ TOBRA M VISC RADPQ: Type: IMPLANTABLE DEVICE | Site: KNEE | Status: FUNCTIONAL

## 2024-01-31 DEVICE — IMPLANTABLE DEVICE: Type: IMPLANTABLE DEVICE | Site: KNEE | Status: FUNCTIONAL

## 2024-01-31 RX ORDER — FENTANYL CITRATE 50 UG/ML
INJECTION, SOLUTION INTRAMUSCULAR; INTRAVENOUS PRN
Status: DISCONTINUED | OUTPATIENT
Start: 2024-01-31 | End: 2024-01-31 | Stop reason: SDUPTHER

## 2024-01-31 RX ORDER — PHENYLEPHRINE HYDROCHLORIDE 10 MG/ML
INJECTION INTRAVENOUS PRN
Status: DISCONTINUED | OUTPATIENT
Start: 2024-01-31 | End: 2024-01-31

## 2024-01-31 RX ORDER — FENTANYL CITRATE 50 UG/ML
25 INJECTION, SOLUTION INTRAMUSCULAR; INTRAVENOUS EVERY 5 MIN PRN
Status: DISCONTINUED | OUTPATIENT
Start: 2024-01-31 | End: 2024-01-31 | Stop reason: HOSPADM

## 2024-01-31 RX ORDER — KETOROLAC TROMETHAMINE 30 MG/ML
INJECTION, SOLUTION INTRAMUSCULAR; INTRAVENOUS PRN
Status: DISCONTINUED | OUTPATIENT
Start: 2024-01-31 | End: 2024-01-31 | Stop reason: SDUPTHER

## 2024-01-31 RX ORDER — ONDANSETRON 2 MG/ML
4 INJECTION INTRAMUSCULAR; INTRAVENOUS
Status: DISCONTINUED | OUTPATIENT
Start: 2024-01-31 | End: 2024-01-31 | Stop reason: HOSPADM

## 2024-01-31 RX ORDER — LIDOCAINE HYDROCHLORIDE 20 MG/ML
INJECTION, SOLUTION EPIDURAL; INFILTRATION; INTRACAUDAL; PERINEURAL PRN
Status: DISCONTINUED | OUTPATIENT
Start: 2024-01-31 | End: 2024-01-31 | Stop reason: SDUPTHER

## 2024-01-31 RX ORDER — PROPOFOL 10 MG/ML
INJECTION, EMULSION INTRAVENOUS PRN
Status: DISCONTINUED | OUTPATIENT
Start: 2024-01-31 | End: 2024-01-31 | Stop reason: SDUPTHER

## 2024-01-31 RX ORDER — SODIUM CHLORIDE 0.9 % (FLUSH) 0.9 %
5-40 SYRINGE (ML) INJECTION EVERY 12 HOURS SCHEDULED
Status: DISCONTINUED | OUTPATIENT
Start: 2024-01-31 | End: 2024-01-31 | Stop reason: HOSPADM

## 2024-01-31 RX ORDER — INSULIN LISPRO 100 [IU]/ML
0-8 INJECTION, SOLUTION INTRAVENOUS; SUBCUTANEOUS
Status: DISCONTINUED | OUTPATIENT
Start: 2024-02-01 | End: 2024-02-06 | Stop reason: HOSPADM

## 2024-01-31 RX ORDER — DROPERIDOL 2.5 MG/ML
0.62 INJECTION, SOLUTION INTRAMUSCULAR; INTRAVENOUS
Status: DISCONTINUED | OUTPATIENT
Start: 2024-01-31 | End: 2024-01-31 | Stop reason: HOSPADM

## 2024-01-31 RX ORDER — ONDANSETRON 2 MG/ML
INJECTION INTRAMUSCULAR; INTRAVENOUS PRN
Status: DISCONTINUED | OUTPATIENT
Start: 2024-01-31 | End: 2024-01-31 | Stop reason: SDUPTHER

## 2024-01-31 RX ORDER — SODIUM CHLORIDE 0.9 % (FLUSH) 0.9 %
5-40 SYRINGE (ML) INJECTION PRN
Status: DISCONTINUED | OUTPATIENT
Start: 2024-01-31 | End: 2024-01-31 | Stop reason: HOSPADM

## 2024-01-31 RX ORDER — DEXTROSE MONOHYDRATE 100 MG/ML
INJECTION, SOLUTION INTRAVENOUS CONTINUOUS PRN
Status: DISCONTINUED | OUTPATIENT
Start: 2024-01-31 | End: 2024-02-06 | Stop reason: HOSPADM

## 2024-01-31 RX ORDER — INSULIN LISPRO 100 [IU]/ML
0-4 INJECTION, SOLUTION INTRAVENOUS; SUBCUTANEOUS NIGHTLY
Status: DISCONTINUED | OUTPATIENT
Start: 2024-01-31 | End: 2024-02-06 | Stop reason: HOSPADM

## 2024-01-31 RX ORDER — HYDROMORPHONE HYDROCHLORIDE 2 MG/ML
INJECTION, SOLUTION INTRAMUSCULAR; INTRAVENOUS; SUBCUTANEOUS PRN
Status: DISCONTINUED | OUTPATIENT
Start: 2024-01-31 | End: 2024-01-31 | Stop reason: SDUPTHER

## 2024-01-31 RX ORDER — SODIUM CHLORIDE 9 MG/ML
INJECTION, SOLUTION INTRAVENOUS PRN
Status: DISCONTINUED | OUTPATIENT
Start: 2024-01-31 | End: 2024-01-31 | Stop reason: HOSPADM

## 2024-01-31 RX ORDER — SODIUM CHLORIDE, SODIUM LACTATE, POTASSIUM CHLORIDE, CALCIUM CHLORIDE 600; 310; 30; 20 MG/100ML; MG/100ML; MG/100ML; MG/100ML
INJECTION, SOLUTION INTRAVENOUS CONTINUOUS
Status: DISCONTINUED | OUTPATIENT
Start: 2024-01-31 | End: 2024-01-31 | Stop reason: HOSPADM

## 2024-01-31 RX ORDER — DEXAMETHASONE SODIUM PHOSPHATE 4 MG/ML
INJECTION, SOLUTION INTRA-ARTICULAR; INTRALESIONAL; INTRAMUSCULAR; INTRAVENOUS; SOFT TISSUE PRN
Status: DISCONTINUED | OUTPATIENT
Start: 2024-01-31 | End: 2024-01-31 | Stop reason: SDUPTHER

## 2024-01-31 RX ORDER — ACETAMINOPHEN 650 MG
TABLET, EXTENDED RELEASE ORAL PRN
Status: DISCONTINUED | OUTPATIENT
Start: 2024-01-31 | End: 2024-01-31 | Stop reason: ALTCHOICE

## 2024-01-31 RX ORDER — HYDROMORPHONE HYDROCHLORIDE 1 MG/ML
0.5 INJECTION, SOLUTION INTRAMUSCULAR; INTRAVENOUS; SUBCUTANEOUS EVERY 5 MIN PRN
Status: DISCONTINUED | OUTPATIENT
Start: 2024-01-31 | End: 2024-01-31 | Stop reason: HOSPADM

## 2024-01-31 RX ORDER — MIDAZOLAM HYDROCHLORIDE 1 MG/ML
INJECTION INTRAMUSCULAR; INTRAVENOUS PRN
Status: DISCONTINUED | OUTPATIENT
Start: 2024-01-31 | End: 2024-01-31 | Stop reason: SDUPTHER

## 2024-01-31 RX ORDER — LIDOCAINE HYDROCHLORIDE 10 MG/ML
1 INJECTION, SOLUTION EPIDURAL; INFILTRATION; INTRACAUDAL; PERINEURAL
Status: DISCONTINUED | OUTPATIENT
Start: 2024-01-31 | End: 2024-01-31 | Stop reason: HOSPADM

## 2024-01-31 RX ADMIN — PROPOFOL 170 MG: 10 INJECTION, EMULSION INTRAVENOUS at 15:18

## 2024-01-31 RX ADMIN — SUCRALFATE 1 G: 1 TABLET ORAL at 23:22

## 2024-01-31 RX ADMIN — VANCOMYCIN HYDROCHLORIDE 750 MG: 750 INJECTION, POWDER, LYOPHILIZED, FOR SOLUTION INTRAVENOUS at 14:39

## 2024-01-31 RX ADMIN — FENTANYL CITRATE 50 MCG: 50 INJECTION, SOLUTION INTRAMUSCULAR; INTRAVENOUS at 15:22

## 2024-01-31 RX ADMIN — MIDAZOLAM HYDROCHLORIDE 2 MG: 1 INJECTION, SOLUTION INTRAMUSCULAR; INTRAVENOUS at 15:12

## 2024-01-31 RX ADMIN — SODIUM CHLORIDE, POTASSIUM CHLORIDE, SODIUM LACTATE AND CALCIUM CHLORIDE: 600; 310; 30; 20 INJECTION, SOLUTION INTRAVENOUS at 16:43

## 2024-01-31 RX ADMIN — SUCRALFATE 1 G: 1 TABLET ORAL at 10:29

## 2024-01-31 RX ADMIN — PHENYLEPHRINE HYDROCHLORIDE 25 MCG/MIN: 10 INJECTION INTRAVENOUS at 15:18

## 2024-01-31 RX ADMIN — FENTANYL CITRATE 25 MCG: 50 INJECTION INTRAMUSCULAR; INTRAVENOUS at 18:14

## 2024-01-31 RX ADMIN — LIDOCAINE HYDROCHLORIDE 80 MG: 20 INJECTION, SOLUTION EPIDURAL; INFILTRATION; INTRACAUDAL; PERINEURAL at 15:18

## 2024-01-31 RX ADMIN — TRAMADOL HYDROCHLORIDE 50 MG: 50 TABLET ORAL at 23:22

## 2024-01-31 RX ADMIN — METFORMIN HYDROCHLORIDE 1000 MG: 500 TABLET ORAL at 11:19

## 2024-01-31 RX ADMIN — KETOROLAC TROMETHAMINE 15 MG: 30 INJECTION, SOLUTION INTRAMUSCULAR; INTRAVENOUS at 11:19

## 2024-01-31 RX ADMIN — FENTANYL CITRATE 50 MCG: 50 INJECTION, SOLUTION INTRAMUSCULAR; INTRAVENOUS at 15:28

## 2024-01-31 RX ADMIN — SODIUM CHLORIDE, POTASSIUM CHLORIDE, SODIUM LACTATE AND CALCIUM CHLORIDE: 600; 310; 30; 20 INJECTION, SOLUTION INTRAVENOUS at 15:12

## 2024-01-31 RX ADMIN — POTASSIUM CHLORIDE 10 MEQ: 750 TABLET, FILM COATED, EXTENDED RELEASE ORAL at 23:22

## 2024-01-31 RX ADMIN — TRAMADOL HYDROCHLORIDE 50 MG: 50 TABLET ORAL at 17:45

## 2024-01-31 RX ADMIN — HYDROMORPHONE HYDROCHLORIDE 0.3 MG: 2 INJECTION INTRAMUSCULAR; INTRAVENOUS; SUBCUTANEOUS at 17:26

## 2024-01-31 RX ADMIN — FENTANYL CITRATE 25 MCG: 50 INJECTION INTRAMUSCULAR; INTRAVENOUS at 17:58

## 2024-01-31 RX ADMIN — PIPERACILLIN AND TAZOBACTAM 3375 MG: 3; .375 INJECTION, POWDER, FOR SOLUTION INTRAVENOUS; PARENTERAL at 10:27

## 2024-01-31 RX ADMIN — PIPERACILLIN AND TAZOBACTAM 3375 MG: 3; .375 INJECTION, POWDER, FOR SOLUTION INTRAVENOUS; PARENTERAL at 23:39

## 2024-01-31 RX ADMIN — DEXAMETHASONE SODIUM PHOSPHATE 4 MG: 4 INJECTION INTRA-ARTICULAR; INTRALESIONAL; INTRAMUSCULAR; INTRAVENOUS; SOFT TISSUE at 15:28

## 2024-01-31 RX ADMIN — KETOROLAC TROMETHAMINE 15 MG: 30 INJECTION, SOLUTION INTRAMUSCULAR; INTRAVENOUS at 03:00

## 2024-01-31 RX ADMIN — PIOGLITAZONE 45 MG: 15 TABLET ORAL at 00:19

## 2024-01-31 RX ADMIN — ONDANSETRON 4 MG: 2 INJECTION INTRAMUSCULAR; INTRAVENOUS at 17:01

## 2024-01-31 RX ADMIN — AMLODIPINE BESYLATE 5 MG: 5 TABLET ORAL at 18:47

## 2024-01-31 RX ADMIN — FAMOTIDINE 40 MG: 20 TABLET, FILM COATED ORAL at 11:20

## 2024-01-31 RX ADMIN — FENTANYL CITRATE 25 MCG: 50 INJECTION INTRAMUSCULAR; INTRAVENOUS at 17:48

## 2024-01-31 RX ADMIN — PROPOFOL 30 MG: 10 INJECTION, EMULSION INTRAVENOUS at 15:53

## 2024-01-31 RX ADMIN — FENTANYL CITRATE 25 MCG: 50 INJECTION INTRAMUSCULAR; INTRAVENOUS at 17:39

## 2024-01-31 RX ADMIN — HYDROMORPHONE HYDROCHLORIDE 0.4 MG: 2 INJECTION INTRAMUSCULAR; INTRAVENOUS; SUBCUTANEOUS at 15:45

## 2024-01-31 RX ADMIN — ATORVASTATIN CALCIUM 40 MG: 40 TABLET, FILM COATED ORAL at 18:47

## 2024-01-31 RX ADMIN — PIPERACILLIN AND TAZOBACTAM 3375 MG: 3; .375 INJECTION, POWDER, FOR SOLUTION INTRAVENOUS; PARENTERAL at 00:22

## 2024-01-31 RX ADMIN — HYDROMORPHONE HYDROCHLORIDE 0.2 MG: 2 INJECTION INTRAMUSCULAR; INTRAVENOUS; SUBCUTANEOUS at 16:10

## 2024-01-31 RX ADMIN — PIPERACILLIN AND TAZOBACTAM 3375 MG: 3; .375 INJECTION, POWDER, FOR SOLUTION INTRAVENOUS; PARENTERAL at 18:50

## 2024-01-31 RX ADMIN — HYDROMORPHONE HYDROCHLORIDE 0.4 MG: 2 INJECTION INTRAMUSCULAR; INTRAVENOUS; SUBCUTANEOUS at 16:40

## 2024-01-31 RX ADMIN — PIOGLITAZONE 45 MG: 15 TABLET ORAL at 23:34

## 2024-01-31 RX ADMIN — PROPOFOL 150 MCG/KG/MIN: 10 INJECTION, EMULSION INTRAVENOUS at 16:22

## 2024-01-31 RX ADMIN — KETOROLAC TROMETHAMINE 15 MG: 30 INJECTION INTRAMUSCULAR; INTRAVENOUS at 17:07

## 2024-01-31 ASSESSMENT — PAIN DESCRIPTION - PAIN TYPE
TYPE: SURGICAL PAIN
TYPE: SURGICAL PAIN

## 2024-01-31 ASSESSMENT — PAIN SCALES - GENERAL
PAINLEVEL_OUTOF10: 6
PAINLEVEL_OUTOF10: 4
PAINLEVEL_OUTOF10: 8
PAINLEVEL_OUTOF10: 8
PAINLEVEL_OUTOF10: 4
PAINLEVEL_OUTOF10: 4
PAINLEVEL_OUTOF10: 8
PAINLEVEL_OUTOF10: 7

## 2024-01-31 ASSESSMENT — PAIN DESCRIPTION - ORIENTATION
ORIENTATION: LEFT

## 2024-01-31 ASSESSMENT — PAIN DESCRIPTION - LOCATION
LOCATION: KNEE

## 2024-01-31 ASSESSMENT — PAIN DESCRIPTION - DESCRIPTORS
DESCRIPTORS: ACHING
DESCRIPTORS: ACHING;DISCOMFORT
DESCRIPTORS: ACHING;DISCOMFORT
DESCRIPTORS: ACHING
DESCRIPTORS: ACHING

## 2024-01-31 ASSESSMENT — PAIN DESCRIPTION - ONSET: ONSET: GRADUAL

## 2024-01-31 ASSESSMENT — PAIN DESCRIPTION - FREQUENCY: FREQUENCY: INTERMITTENT

## 2024-01-31 NOTE — CONSULTS
10/2023; 7.0)  - currently on PO Glucophage and actose; recommend diabetic consult    Check A1C in am    Treatment plan d/w pt, pt's nurse, and Dr. Dc  Anti-inflectives:   IV zosyn and vancomycin -  Patient Active Problem List   Diagnosis    Esophageal reflux    Acute diverticulitis    Hypercholesteremia    COPD (chronic obstructive pulmonary disease) (HCC)    Insomnia    Hypertension with goal blood pressure less than 130/85    Elevated blood sugar    Diverticulitis of colon    Type 2 diabetes mellitus (HCC)    Unilateral primary osteoarthritis, left knee    Unilateral primary osteoarthritis, right knee    Pes anserinus bursitis of left knee    Abscess of bursa of left knee     Past Medical History:   Diagnosis Date    Anemia     Diverticulitis     Esophageal reflux     GERD (gastroesophageal reflux disease)     Hypertension     Osteoarthrosis, unspecified whether generalized or localized, lower leg     knees    Other and unspecified hyperlipidemia     Type II or unspecified type diabetes mellitus without mention of complication, uncontrolled       Family History   Problem Relation Age of Onset    Breast Cancer Mother     Diabetes Father     Diabetes Brother     Other Daughter         cholesterol    Deep Vein Thrombosis Daughter     Lung Disease Daughter       Social History     Tobacco Use    Smoking status: Former     Current packs/day: 0.00     Types: Cigarettes     Start date: 1980     Quit date: 2010     Years since quittin.0    Smokeless tobacco: Never   Substance Use Topics    Alcohol use: Yes     Comment: social/special occasions       Past Surgical History:   Procedure Laterality Date    ARM SURGERY Left 2023    IRRIGATION AND DEBRIDEMENT LEFT LEG performed by Matthew Garay DO at [facility] MAIN OR    COLONOSCOPY N/A 2017    COLONOSCOPY performed by Malcolm Pérez MD at [facility] AMBULATORY OR    COLONOSCOPY N/A 2018    COLONOSCOPY performed by Malcolm Pérez MD at [facility]

## 2024-01-31 NOTE — ANESTHESIA PRE PROCEDURE
Department of Anesthesiology  Preprocedure Note       Name:  Kalpana Benoit   Age:  68 y.o.  :  1955                                          MRN:  [id number]         Date:  2024      Surgeon: Surgeon(s):  Matthew Garay DO    Procedure: Procedure(s):  IRRIGATION AND DEBRIDEMENT LEFT KNEE WITH PLACEMENT OF ANTIBIOTIC SPACERS    Medications prior to admission:   Prior to Admission medications    Medication Sig Start Date End Date Taking? Authorizing Provider   Cholecalciferol (VITAMIN D3) 50 MCG (2000) TABS TAKE 1 TABLET BY MOUTH TWICE A DAY 24   Nathanael Willingham Jr., MD   cephALEXin (KEFLEX) 500 MG capsule Take 1 capsule by mouth 4 times daily 24   Matthew Garay DO   potassium chloride (MICRO-K) 10 MEQ extended release capsule TAKE 1 CAPSULE BY MOUTH TWICE A DAY 23   Nathanael Willingham Jr., MD   amLODIPine (NORVASC) 5 MG tablet Take 1 tablet by mouth daily Restart when systolic blood pressure over 120, diastolic over 90 10/25/23   Francisca Barraza, APRN - NP   losartan (COZAAR) 50 MG tablet Take 1 tablet by mouth daily Restart when Systolic blood pressure over 120, diastolic blood pressure over 90 10/25/23   Francisca Barraza APRN - MAUREEN   hydroCHLOROthiazide (HYDRODIURIL) 25 MG tablet Take 1 tablet by mouth daily Restart tomorrow 10/25/23   Francisca Barraza APRN - NP   atorvastatin (LIPITOR) 40 MG tablet TAKE 1 TABLET BY MOUTH EVERY DAY 23   Nathanael Willingham Jr., MD   sucralfate (CARAFATE) 1 GM tablet TAKE 1 TABLET BY MOUTH FOUR TIMES A DAY  Patient taking differently: in the morning and at bedtime 23   Nathanael Willingham Jr., MD   diclofenac (VOLTAREN) 75 MG EC tablet TAKE 1 TABLET BY MOUTH TWICE A DAY  Patient not taking: Reported on 2024   Nathanael Willingham Jr., MD   acetaminophen (TYLENOL) 500 MG tablet Take 2 tablets by mouth every 6 hours as needed for Pain    Provider, MD Marcin   metFORMIN (GLUCOPHAGE) 1000 MG tablet Take 1 tablet by mouth daily (with

## 2024-01-31 NOTE — OP NOTE
Output (mL) 0 mL 01/31/24 0632       Findings: Bone loss, intra-articular involvement of deep abscess        Detailed Description of Procedure:   This is a 68-year-old female who has a history of knee replacement a year ago, she did well until she fell approximately 8 weeks ago, she then developed an abscess in her pes anserine bursa which was debrided, extra-articular at the time of evaluation intraoperatively, now with increased pain and swelling in the same area.We did discuss the risks of surgery which include but are not limited to infection, nerve or blood vessel damage, failure of fixation, failure of any possible implant, need for reoperation, postoperative pain and swelling, intra-or postoperative fracture, postoperative dislocation, leg length inequality, need for reoperation, implant failure, death, disability, organ dysfunction, wound healing issues, DVT, PE, and the need for further procedures.  The patient did freely state their understanding and satisfaction with our discussion.  We will proceed after medical clearances.    LOUIE Romero, was present for the entirety of the procedure and vital to the performance of the procedure.  He assisted with positioning, prepping, draping of the patient before the procedure and instrument manipulation, extremity repositioning,, and camera operation during the procedure as well as wound closure, dressing application and brace application after the procedure.  Please note that no intern, resident, or other qualified hospital staff was available to assist during the surgery.    After correct site and side were identified by myself in holding area, patient was transported to a surgical suite where, after successful induction of general anesthesia, the left leg was prepped and draped in a sterile orthopedic fashion.  After an appropriate timeout, and confirmation that 2 g of Ancef had been infused prior to incision, leg was allowed to gravity exsanguinated,

## 2024-01-31 NOTE — FLOWSHEET NOTE
01/31/24 1725   Handoff   Communication Given Transfer Handoff   Handoff phase Phase I receiving   Handoff Given To Christy Goyal RN   Handoff Received From ALESSIA Mc RN and DREW Gaines CRNA   Handoff Communication Face to Face;At bedside        01/31/24 1810   Handoff   Communication Given Transfer Handoff   Handoff phase Phase I transferring   Handoff Given To Girma Peters RN   Handoff Received From Christy RAE   Handoff Communication Telephone     1826 Xray to left knee done prior to transfer to [facility].

## 2024-01-31 NOTE — PERIOP NOTE
Irrisept Wound Debridement and Cleansing System  Ref: [id number] GTIN: [id number] LOT: [id number] Expiration Date: 08/31/2026

## 2024-02-01 LAB
ANION GAP SERPL CALC-SCNC: 4 MMOL/L (ref 5–15)
BUN SERPL-MCNC: 25 MG/DL (ref 6–20)
BUN/CREAT SERPL: 30 (ref 12–20)
CALCIUM SERPL-MCNC: 8.5 MG/DL (ref 8.5–10.1)
CHLORIDE SERPL-SCNC: 107 MMOL/L (ref 97–108)
CO2 SERPL-SCNC: 27 MMOL/L (ref 21–32)
CREAT SERPL-MCNC: 0.84 MG/DL (ref 0.55–1.02)
CRP SERPL-MCNC: 3.09 MG/DL (ref 0–0.3)
ERYTHROCYTE [SEDIMENTATION RATE] IN BLOOD: 129 MM/HR (ref 0–30)
EST. AVERAGE GLUCOSE BLD GHB EST-MCNC: 137 MG/DL
GLUCOSE BLD STRIP.AUTO-MCNC: 122 MG/DL (ref 65–117)
GLUCOSE BLD STRIP.AUTO-MCNC: 152 MG/DL (ref 65–117)
GLUCOSE BLD STRIP.AUTO-MCNC: 218 MG/DL (ref 65–117)
GLUCOSE SERPL-MCNC: 162 MG/DL (ref 65–100)
HBA1C MFR BLD: 6.4 % (ref 4–5.6)
POTASSIUM SERPL-SCNC: 4.4 MMOL/L (ref 3.5–5.1)
SERVICE CMNT-IMP: ABNORMAL
SODIUM SERPL-SCNC: 138 MMOL/L (ref 136–145)
VANCOMYCIN SERPL-MCNC: 18.6 UG/ML

## 2024-02-01 PROCEDURE — 83036 HEMOGLOBIN GLYCOSYLATED A1C: CPT

## 2024-02-01 PROCEDURE — 80202 ASSAY OF VANCOMYCIN: CPT

## 2024-02-01 PROCEDURE — APPNB180 APP NON BILLABLE TIME > 60 MINS

## 2024-02-01 PROCEDURE — 1100000000 HC RM PRIVATE

## 2024-02-01 PROCEDURE — 82962 GLUCOSE BLOOD TEST: CPT

## 2024-02-01 PROCEDURE — 6370000000 HC RX 637 (ALT 250 FOR IP): Performed by: PHYSICIAN ASSISTANT

## 2024-02-01 PROCEDURE — 6360000002 HC RX W HCPCS: Performed by: PHYSICIAN ASSISTANT

## 2024-02-01 PROCEDURE — 80048 BASIC METABOLIC PNL TOTAL CA: CPT

## 2024-02-01 PROCEDURE — 97530 THERAPEUTIC ACTIVITIES: CPT | Performed by: PHYSICAL THERAPIST

## 2024-02-01 PROCEDURE — 85652 RBC SED RATE AUTOMATED: CPT

## 2024-02-01 PROCEDURE — 97161 PT EVAL LOW COMPLEX 20 MIN: CPT | Performed by: PHYSICAL THERAPIST

## 2024-02-01 PROCEDURE — 6370000000 HC RX 637 (ALT 250 FOR IP)

## 2024-02-01 PROCEDURE — 6360000002 HC RX W HCPCS: Performed by: ORTHOPAEDIC SURGERY

## 2024-02-01 PROCEDURE — 99233 SBSQ HOSP IP/OBS HIGH 50: CPT | Performed by: INTERNAL MEDICINE

## 2024-02-01 PROCEDURE — 2580000003 HC RX 258: Performed by: ORTHOPAEDIC SURGERY

## 2024-02-01 PROCEDURE — 36415 COLL VENOUS BLD VENIPUNCTURE: CPT

## 2024-02-01 PROCEDURE — 86140 C-REACTIVE PROTEIN: CPT

## 2024-02-01 PROCEDURE — 2580000003 HC RX 258: Performed by: PHYSICIAN ASSISTANT

## 2024-02-01 RX ORDER — LACTOBACILLUS RHAMNOSUS GG 10B CELL
1 CAPSULE ORAL
Status: DISCONTINUED | OUTPATIENT
Start: 2024-02-01 | End: 2024-02-06 | Stop reason: HOSPADM

## 2024-02-01 RX ADMIN — KETOROLAC TROMETHAMINE 15 MG: 30 INJECTION, SOLUTION INTRAMUSCULAR; INTRAVENOUS at 03:10

## 2024-02-01 RX ADMIN — ACETAMINOPHEN 1000 MG: 500 TABLET ORAL at 23:45

## 2024-02-01 RX ADMIN — METFORMIN HYDROCHLORIDE 1000 MG: 500 TABLET ORAL at 09:08

## 2024-02-01 RX ADMIN — ATORVASTATIN CALCIUM 40 MG: 40 TABLET, FILM COATED ORAL at 09:08

## 2024-02-01 RX ADMIN — KETOROLAC TROMETHAMINE 15 MG: 30 INJECTION, SOLUTION INTRAMUSCULAR; INTRAVENOUS at 21:50

## 2024-02-01 RX ADMIN — SUCRALFATE 1 G: 1 TABLET ORAL at 09:09

## 2024-02-01 RX ADMIN — PIOGLITAZONE 45 MG: 15 TABLET ORAL at 21:47

## 2024-02-01 RX ADMIN — POTASSIUM CHLORIDE 10 MEQ: 750 TABLET, FILM COATED, EXTENDED RELEASE ORAL at 21:49

## 2024-02-01 RX ADMIN — VANCOMYCIN HYDROCHLORIDE 750 MG: 750 INJECTION, POWDER, LYOPHILIZED, FOR SOLUTION INTRAVENOUS at 14:42

## 2024-02-01 RX ADMIN — POTASSIUM CHLORIDE 10 MEQ: 750 TABLET, FILM COATED, EXTENDED RELEASE ORAL at 09:08

## 2024-02-01 RX ADMIN — PIPERACILLIN AND TAZOBACTAM 3375 MG: 3; .375 INJECTION, POWDER, FOR SOLUTION INTRAVENOUS; PARENTERAL at 16:13

## 2024-02-01 RX ADMIN — KETOROLAC TROMETHAMINE 15 MG: 30 INJECTION, SOLUTION INTRAMUSCULAR; INTRAVENOUS at 16:11

## 2024-02-01 RX ADMIN — SUCRALFATE 1 G: 1 TABLET ORAL at 21:48

## 2024-02-01 RX ADMIN — FAMOTIDINE 40 MG: 20 TABLET, FILM COATED ORAL at 09:08

## 2024-02-01 RX ADMIN — VANCOMYCIN HYDROCHLORIDE 750 MG: 750 INJECTION, POWDER, LYOPHILIZED, FOR SOLUTION INTRAVENOUS at 03:25

## 2024-02-01 RX ADMIN — KETOROLAC TROMETHAMINE 15 MG: 30 INJECTION, SOLUTION INTRAMUSCULAR; INTRAVENOUS at 09:14

## 2024-02-01 RX ADMIN — AMLODIPINE BESYLATE 5 MG: 5 TABLET ORAL at 09:08

## 2024-02-01 RX ADMIN — PIPERACILLIN AND TAZOBACTAM 3375 MG: 3; .375 INJECTION, POWDER, FOR SOLUTION INTRAVENOUS; PARENTERAL at 09:04

## 2024-02-01 RX ADMIN — Medication 1 CAPSULE: at 14:37

## 2024-02-01 ASSESSMENT — PAIN DESCRIPTION - DESCRIPTORS
DESCRIPTORS: ACHING

## 2024-02-01 ASSESSMENT — PAIN SCALES - GENERAL
PAINLEVEL_OUTOF10: 7
PAINLEVEL_OUTOF10: 3
PAINLEVEL_OUTOF10: 3
PAINLEVEL_OUTOF10: 2
PAINLEVEL_OUTOF10: 0
PAINLEVEL_OUTOF10: 0
PAINLEVEL_OUTOF10: 7
PAINLEVEL_OUTOF10: 3
PAINLEVEL_OUTOF10: 3
PAINLEVEL_OUTOF10: 0

## 2024-02-01 ASSESSMENT — PAIN DESCRIPTION - LOCATION
LOCATION: KNEE

## 2024-02-01 ASSESSMENT — PAIN DESCRIPTION - ORIENTATION
ORIENTATION: LEFT

## 2024-02-01 ASSESSMENT — PAIN DESCRIPTION - PAIN TYPE
TYPE: SURGICAL PAIN
TYPE: SURGICAL PAIN

## 2024-02-01 NOTE — OP NOTE
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  Two-stage exchange was indicated.We did discuss the risks of surgery which include but are not limited to infection, nerve or blood vessel damage, failure of fixation, failure of any possible implant, need for reoperation, postoperative pain and swelling, intra-or postoperative fracture, postoperative dislocation, leg length inequality, need for reoperation, implant failure, death, disability, organ dysfunction, wound healing issues, DVT, PE, and the need for further procedures.  The patient did freely state their understanding and satisfaction with our discussion.  We will proceed after medical clearances.      After correct site and side were identified by myself in the holding area, patient was transported to a surgical suite where, after successful induction of general anesthesia, the left leg was prepped and draped in a sterile orthopedic fashion.  After an appropriate timeout, and confirmation that she was on scheduled antibiotics, leg was allowed to gravity exsanguinated and the tourniquet was taken at 250 mmHg.  Once this was performed, incision was made over the midline over the previous scar, sharp dissection was carried down to fascia, median parapatellar arthrotomy was performed, the joint was then exposed, there was a chronic appearing synovitis present, I performed an extensive synovectomy, I then took the knee into flexion, I utilized a small osteotome to remove the polyethylene, I then began to work with a flexible osteotome as well as oscillating blade to remove the femoral component, this was removed with a minimum of bone loss, I then inspected the tibial component, there was a cavitary lesion medially which

## 2024-02-01 NOTE — PLAN OF CARE
Problem: Physical Therapy - Adult  Goal: By Discharge: Performs mobility at highest level of function for planned discharge setting.  See evaluation for individualized goals.  Description: FUNCTIONAL STATUS PRIOR TO ADMISSION: Patient was independent and active without use of DME. Patient would use rolling walker if needed.    HOME SUPPORT PRIOR TO ADMISSION: The patient lived with , son, and daughter but did not require assist.    Physical Therapy Goals  Initiated 2/1/2024  1.  Patient will move from supine to sit and sit to supine  in bed with supervision/set-up within 7 day(s).    2.  Patient will transfer from bed to chair and chair to bed with supervision/set-up using the least restrictive device within 7 day(s).  3.  Patient will perform sit to stand with independence within 7 day(s).  4.  Patient will ambulate with supervision/set-up for 100 feet with the least restrictive device within 7 day(s).   5.  Patient will ascend/descend 5 stairs with 1 handrail(s) with minimal assistance/contact guard assist within 7 day(s).    Outcome: Progressing   PHYSICAL THERAPY EVALUATION    Patient: Kalpana Benoit (68 y.o. female)  Date: 2/1/2024  Primary Diagnosis: Abscess [L02.91]  Abscess of bursa of left knee [M71.062]  Procedure(s) (LRB):  IRRIGATION AND DEBRIDEMENT LEFT KNEE WITH PLACEMENT OF ANTIBIOTIC SPACERS (Left) 1 Day Post-Op   Precautions: Restrictions/Precautions: Weight Bearing   Lower Extremity Weight Bearing Restrictions  Left Lower Extremity Weight Bearing: Weight Bearing As Tolerated                  ASSESSMENT :   DEFICITS/IMPAIRMENTS:   The patient is limited by decreased functional mobility, independence in ADLs, strength, activity tolerance, safety awareness, balance, increased pain levels .  Patient supine upon arrival; anxious about mobility due to earlier bleeding from incision.  NP present and cleared patient for mobility and requested to limit ROM somewhat left for today due to drainage

## 2024-02-02 PROBLEM — Z96.653 HISTORY OF TOTAL BILATERAL KNEE REPLACEMENT (TKR): Status: ACTIVE | Noted: 2024-02-02

## 2024-02-02 PROBLEM — B96.20 E. COLI UTI: Status: ACTIVE | Noted: 2024-02-02

## 2024-02-02 PROBLEM — T84.54XA INFECTION OF PROSTHETIC LEFT KNEE JOINT (HCC): Status: ACTIVE | Noted: 2024-02-02

## 2024-02-02 PROBLEM — M00.9 INFECTION OF LEFT KNEE (HCC): Status: ACTIVE | Noted: 2024-02-02

## 2024-02-02 PROBLEM — B95.7 COAGULASE-NEGATIVE STAPHYLOCOCCAL INFECTION: Status: ACTIVE | Noted: 2024-02-02

## 2024-02-02 PROBLEM — A49.8 INFECTION CAUSED BY ENTEROBACTER CLOACAE: Status: ACTIVE | Noted: 2024-02-02

## 2024-02-02 PROBLEM — M70.50 PES ANSERINE BURSITIS: Status: ACTIVE | Noted: 2024-02-02

## 2024-02-02 PROBLEM — N39.0 E. COLI UTI: Status: ACTIVE | Noted: 2024-02-02

## 2024-02-02 LAB
ANION GAP SERPL CALC-SCNC: 3 MMOL/L (ref 5–15)
BACTERIA SPEC CULT: ABNORMAL
BACTERIA SPEC CULT: ABNORMAL
BACTERIA SPEC CULT: NORMAL
BACTERIA SPEC CULT: NORMAL
BUN SERPL-MCNC: 19 MG/DL (ref 6–20)
BUN/CREAT SERPL: 23 (ref 12–20)
CALCIUM SERPL-MCNC: 8.4 MG/DL (ref 8.5–10.1)
CHLORIDE SERPL-SCNC: 111 MMOL/L (ref 97–108)
CO2 SERPL-SCNC: 26 MMOL/L (ref 21–32)
CREAT SERPL-MCNC: 0.83 MG/DL (ref 0.55–1.02)
GLUCOSE BLD STRIP.AUTO-MCNC: 143 MG/DL (ref 65–117)
GLUCOSE BLD STRIP.AUTO-MCNC: 159 MG/DL (ref 65–117)
GLUCOSE BLD STRIP.AUTO-MCNC: 175 MG/DL (ref 65–117)
GLUCOSE BLD STRIP.AUTO-MCNC: 203 MG/DL (ref 65–117)
GLUCOSE SERPL-MCNC: 142 MG/DL (ref 65–100)
GRAM STN SPEC: ABNORMAL
GRAM STN SPEC: ABNORMAL
POTASSIUM SERPL-SCNC: 4.1 MMOL/L (ref 3.5–5.1)
SERVICE CMNT-IMP: ABNORMAL
SERVICE CMNT-IMP: NORMAL
SERVICE CMNT-IMP: NORMAL
SODIUM SERPL-SCNC: 140 MMOL/L (ref 136–145)

## 2024-02-02 PROCEDURE — 80048 BASIC METABOLIC PNL TOTAL CA: CPT

## 2024-02-02 PROCEDURE — 6360000002 HC RX W HCPCS: Performed by: PHYSICIAN ASSISTANT

## 2024-02-02 PROCEDURE — 82962 GLUCOSE BLOOD TEST: CPT

## 2024-02-02 PROCEDURE — 36415 COLL VENOUS BLD VENIPUNCTURE: CPT

## 2024-02-02 PROCEDURE — 99233 SBSQ HOSP IP/OBS HIGH 50: CPT | Performed by: INTERNAL MEDICINE

## 2024-02-02 PROCEDURE — 1100000000 HC RM PRIVATE

## 2024-02-02 PROCEDURE — 2580000003 HC RX 258: Performed by: PHYSICIAN ASSISTANT

## 2024-02-02 PROCEDURE — 6370000000 HC RX 637 (ALT 250 FOR IP): Performed by: PHYSICIAN ASSISTANT

## 2024-02-02 PROCEDURE — 6370000000 HC RX 637 (ALT 250 FOR IP)

## 2024-02-02 RX ADMIN — KETOROLAC TROMETHAMINE 15 MG: 30 INJECTION, SOLUTION INTRAMUSCULAR; INTRAVENOUS at 04:42

## 2024-02-02 RX ADMIN — METFORMIN HYDROCHLORIDE 1000 MG: 500 TABLET ORAL at 08:55

## 2024-02-02 RX ADMIN — FAMOTIDINE 40 MG: 20 TABLET, FILM COATED ORAL at 08:54

## 2024-02-02 RX ADMIN — POTASSIUM CHLORIDE 10 MEQ: 750 TABLET, FILM COATED, EXTENDED RELEASE ORAL at 21:15

## 2024-02-02 RX ADMIN — PIPERACILLIN AND TAZOBACTAM 3375 MG: 3; .375 INJECTION, POWDER, FOR SOLUTION INTRAVENOUS; PARENTERAL at 00:12

## 2024-02-02 RX ADMIN — PIPERACILLIN AND TAZOBACTAM 3375 MG: 3; .375 INJECTION, POWDER, FOR SOLUTION INTRAVENOUS; PARENTERAL at 08:58

## 2024-02-02 RX ADMIN — PIPERACILLIN AND TAZOBACTAM 3375 MG: 3; .375 INJECTION, POWDER, FOR SOLUTION INTRAVENOUS; PARENTERAL at 16:49

## 2024-02-02 RX ADMIN — TRAMADOL HYDROCHLORIDE 50 MG: 50 TABLET ORAL at 18:15

## 2024-02-02 RX ADMIN — KETOROLAC TROMETHAMINE 15 MG: 30 INJECTION, SOLUTION INTRAMUSCULAR; INTRAVENOUS at 19:53

## 2024-02-02 RX ADMIN — AMLODIPINE BESYLATE 5 MG: 5 TABLET ORAL at 08:56

## 2024-02-02 RX ADMIN — SUCRALFATE 1 G: 1 TABLET ORAL at 21:15

## 2024-02-02 RX ADMIN — TRAMADOL HYDROCHLORIDE 50 MG: 50 TABLET ORAL at 11:14

## 2024-02-02 RX ADMIN — ATORVASTATIN CALCIUM 40 MG: 40 TABLET, FILM COATED ORAL at 08:56

## 2024-02-02 RX ADMIN — KETOROLAC TROMETHAMINE 15 MG: 30 INJECTION, SOLUTION INTRAMUSCULAR; INTRAVENOUS at 12:58

## 2024-02-02 RX ADMIN — SUCRALFATE 1 G: 1 TABLET ORAL at 08:56

## 2024-02-02 RX ADMIN — POTASSIUM CHLORIDE 10 MEQ: 750 TABLET, FILM COATED, EXTENDED RELEASE ORAL at 08:55

## 2024-02-02 RX ADMIN — Medication 1 CAPSULE: at 08:55

## 2024-02-02 RX ADMIN — PIOGLITAZONE 45 MG: 15 TABLET ORAL at 21:15

## 2024-02-02 ASSESSMENT — PAIN SCALES - GENERAL
PAINLEVEL_OUTOF10: 3
PAINLEVEL_OUTOF10: 0
PAINLEVEL_OUTOF10: 6
PAINLEVEL_OUTOF10: 3
PAINLEVEL_OUTOF10: 3
PAINLEVEL_OUTOF10: 6
PAINLEVEL_OUTOF10: 3
PAINLEVEL_OUTOF10: 5
PAINLEVEL_OUTOF10: 3
PAINLEVEL_OUTOF10: 6
PAINLEVEL_OUTOF10: 7

## 2024-02-02 ASSESSMENT — PAIN DESCRIPTION - ORIENTATION
ORIENTATION: LEFT

## 2024-02-02 ASSESSMENT — PAIN DESCRIPTION - LOCATION
LOCATION: KNEE
LOCATION: HIP
LOCATION: KNEE

## 2024-02-02 ASSESSMENT — PAIN DESCRIPTION - DESCRIPTORS
DESCRIPTORS: THROBBING
DESCRIPTORS: ACHING
DESCRIPTORS: PINS AND NEEDLES
DESCRIPTORS: ACHING
DESCRIPTORS: ACHING

## 2024-02-02 ASSESSMENT — PAIN - FUNCTIONAL ASSESSMENT: PAIN_FUNCTIONAL_ASSESSMENT: ACTIVITIES ARE NOT PREVENTED

## 2024-02-02 NOTE — PLAN OF CARE
Problem: Chronic Conditions and Co-morbidities  Goal: Patient's chronic conditions and co-morbidity symptoms are monitored and maintained or improved  Outcome: Progressing  Flowsheets (Taken 2/2/2024 8350)  Care Plan - Patient's Chronic Conditions and Co-Morbidity Symptoms are Monitored and Maintained or Improved: Monitor and assess patient's chronic conditions and comorbid symptoms for stability, deterioration, or improvement     Problem: Pain  Goal: Verbalizes/displays adequate comfort level or baseline comfort level  Outcome: Progressing     Problem: Safety - Adult  Goal: Free from fall injury  Outcome: Progressing     Problem: ABCDS Injury Assessment  Goal: Absence of physical injury  Outcome: Progressing

## 2024-02-03 LAB
BACTERIA SPEC CULT: ABNORMAL
GLUCOSE BLD STRIP.AUTO-MCNC: 132 MG/DL (ref 65–117)
GLUCOSE BLD STRIP.AUTO-MCNC: 157 MG/DL (ref 65–117)
GLUCOSE BLD STRIP.AUTO-MCNC: 160 MG/DL (ref 65–117)
GLUCOSE BLD STRIP.AUTO-MCNC: 174 MG/DL (ref 65–117)
GRAM STN SPEC: ABNORMAL
GRAM STN SPEC: ABNORMAL
SERVICE CMNT-IMP: ABNORMAL

## 2024-02-03 PROCEDURE — 36569 INSJ PICC 5 YR+ W/O IMAGING: CPT

## 2024-02-03 PROCEDURE — 2580000003 HC RX 258: Performed by: PHYSICIAN ASSISTANT

## 2024-02-03 PROCEDURE — 1100000000 HC RM PRIVATE

## 2024-02-03 PROCEDURE — 82962 GLUCOSE BLOOD TEST: CPT

## 2024-02-03 PROCEDURE — 6360000002 HC RX W HCPCS: Performed by: PHYSICIAN ASSISTANT

## 2024-02-03 PROCEDURE — 6370000000 HC RX 637 (ALT 250 FOR IP)

## 2024-02-03 PROCEDURE — 02HV33Z INSERTION OF INFUSION DEVICE INTO SUPERIOR VENA CAVA, PERCUTANEOUS APPROACH: ICD-10-PCS | Performed by: ORTHOPAEDIC SURGERY

## 2024-02-03 PROCEDURE — 6370000000 HC RX 637 (ALT 250 FOR IP): Performed by: PHYSICIAN ASSISTANT

## 2024-02-03 PROCEDURE — 76937 US GUIDE VASCULAR ACCESS: CPT

## 2024-02-03 PROCEDURE — C1751 CATH, INF, PER/CENT/MIDLINE: HCPCS

## 2024-02-03 RX ADMIN — SUCRALFATE 1 G: 1 TABLET ORAL at 20:58

## 2024-02-03 RX ADMIN — TRAMADOL HYDROCHLORIDE 50 MG: 50 TABLET ORAL at 17:29

## 2024-02-03 RX ADMIN — KETOROLAC TROMETHAMINE 15 MG: 30 INJECTION, SOLUTION INTRAMUSCULAR; INTRAVENOUS at 11:13

## 2024-02-03 RX ADMIN — Medication 1 CAPSULE: at 08:09

## 2024-02-03 RX ADMIN — AMLODIPINE BESYLATE 5 MG: 5 TABLET ORAL at 08:09

## 2024-02-03 RX ADMIN — KETOROLAC TROMETHAMINE 15 MG: 30 INJECTION, SOLUTION INTRAMUSCULAR; INTRAVENOUS at 19:34

## 2024-02-03 RX ADMIN — POTASSIUM CHLORIDE 10 MEQ: 750 TABLET, FILM COATED, EXTENDED RELEASE ORAL at 20:58

## 2024-02-03 RX ADMIN — PIPERACILLIN AND TAZOBACTAM 3375 MG: 3; .375 INJECTION, POWDER, FOR SOLUTION INTRAVENOUS; PARENTERAL at 00:01

## 2024-02-03 RX ADMIN — ATORVASTATIN CALCIUM 40 MG: 40 TABLET, FILM COATED ORAL at 08:09

## 2024-02-03 RX ADMIN — PIPERACILLIN AND TAZOBACTAM 3375 MG: 3; .375 INJECTION, POWDER, FOR SOLUTION INTRAVENOUS; PARENTERAL at 08:18

## 2024-02-03 RX ADMIN — PIPERACILLIN AND TAZOBACTAM 3375 MG: 3; .375 INJECTION, POWDER, FOR SOLUTION INTRAVENOUS; PARENTERAL at 16:29

## 2024-02-03 RX ADMIN — PIOGLITAZONE 45 MG: 15 TABLET ORAL at 20:58

## 2024-02-03 RX ADMIN — METFORMIN HYDROCHLORIDE 1000 MG: 500 TABLET ORAL at 08:09

## 2024-02-03 RX ADMIN — POTASSIUM CHLORIDE 10 MEQ: 750 TABLET, FILM COATED, EXTENDED RELEASE ORAL at 08:09

## 2024-02-03 RX ADMIN — TRAMADOL HYDROCHLORIDE 50 MG: 50 TABLET ORAL at 00:16

## 2024-02-03 RX ADMIN — KETOROLAC TROMETHAMINE 15 MG: 30 INJECTION, SOLUTION INTRAMUSCULAR; INTRAVENOUS at 04:19

## 2024-02-03 RX ADMIN — FAMOTIDINE 40 MG: 20 TABLET, FILM COATED ORAL at 08:12

## 2024-02-03 RX ADMIN — TRAMADOL HYDROCHLORIDE 50 MG: 50 TABLET ORAL at 08:12

## 2024-02-03 RX ADMIN — SUCRALFATE 1 G: 1 TABLET ORAL at 08:08

## 2024-02-03 ASSESSMENT — PAIN DESCRIPTION - ORIENTATION
ORIENTATION: LEFT

## 2024-02-03 ASSESSMENT — PAIN DESCRIPTION - LOCATION
LOCATION: KNEE

## 2024-02-03 ASSESSMENT — PAIN SCALES - GENERAL
PAINLEVEL_OUTOF10: 3
PAINLEVEL_OUTOF10: 7
PAINLEVEL_OUTOF10: 3
PAINLEVEL_OUTOF10: 2
PAINLEVEL_OUTOF10: 7
PAINLEVEL_OUTOF10: 4
PAINLEVEL_OUTOF10: 6
PAINLEVEL_OUTOF10: 0
PAINLEVEL_OUTOF10: 4
PAINLEVEL_OUTOF10: 4
PAINLEVEL_OUTOF10: 7
PAINLEVEL_OUTOF10: 6

## 2024-02-03 ASSESSMENT — PAIN DESCRIPTION - DESCRIPTORS
DESCRIPTORS: ACHING;THROBBING
DESCRIPTORS: ACHING
DESCRIPTORS: ACHING;THROBBING
DESCRIPTORS: ACHING
DESCRIPTORS: ACHING;THROBBING
DESCRIPTORS: ACHING

## 2024-02-03 NOTE — PROCEDURES
PICC tip is located in the SVC or at CAJ.    PICC is ready for use.    PICC education/ instructions provided to patient    Nancy BestRN, BSN, CRNI, [facility]-BC  Vascular Access Nurse

## 2024-02-04 ENCOUNTER — TELEPHONE (OUTPATIENT)
Age: 69
End: 2024-02-04

## 2024-02-04 LAB
BASOPHILS # BLD: 0 K/UL (ref 0–0.1)
BASOPHILS NFR BLD: 0 % (ref 0–1)
DIFFERENTIAL METHOD BLD: ABNORMAL
EOSINOPHIL # BLD: 0.4 K/UL (ref 0–0.4)
EOSINOPHIL NFR BLD: 6 % (ref 0–7)
ERYTHROCYTE [DISTWIDTH] IN BLOOD BY AUTOMATED COUNT: 18.4 % (ref 11.5–14.5)
GLUCOSE BLD STRIP.AUTO-MCNC: 139 MG/DL (ref 65–117)
GLUCOSE BLD STRIP.AUTO-MCNC: 148 MG/DL (ref 65–117)
GLUCOSE BLD STRIP.AUTO-MCNC: 155 MG/DL (ref 65–117)
GLUCOSE BLD STRIP.AUTO-MCNC: 162 MG/DL (ref 65–117)
HCT VFR BLD AUTO: 23.7 % (ref 35–47)
HCT VFR BLD AUTO: 26.7 % (ref 35–47)
HGB BLD-MCNC: 6.9 G/DL (ref 11.5–16)
HGB BLD-MCNC: 8.1 G/DL (ref 11.5–16)
HISTORY CHECK: NORMAL
IMM GRANULOCYTES # BLD AUTO: 0.1 K/UL (ref 0–0.04)
IMM GRANULOCYTES NFR BLD AUTO: 1 % (ref 0–0.5)
LYMPHOCYTES # BLD: 1.7 K/UL (ref 0.8–3.5)
LYMPHOCYTES NFR BLD: 23 % (ref 12–49)
MCH RBC QN AUTO: 23.9 PG (ref 26–34)
MCHC RBC AUTO-ENTMCNC: 29.1 G/DL (ref 30–36.5)
MCV RBC AUTO: 82 FL (ref 80–99)
MONOCYTES # BLD: 0.5 K/UL (ref 0–1)
MONOCYTES NFR BLD: 7 % (ref 5–13)
NEUTS SEG # BLD: 4.5 K/UL (ref 1.8–8)
NEUTS SEG NFR BLD: 63 % (ref 32–75)
NRBC # BLD: 0 K/UL (ref 0–0.01)
NRBC BLD-RTO: 0 PER 100 WBC
PLATELET # BLD AUTO: 340 K/UL (ref 150–400)
PMV BLD AUTO: 9.8 FL (ref 8.9–12.9)
RBC # BLD AUTO: 2.89 M/UL (ref 3.8–5.2)
RBC MORPH BLD: ABNORMAL
RBC MORPH BLD: ABNORMAL
SERVICE CMNT-IMP: ABNORMAL
WBC # BLD AUTO: 7.2 K/UL (ref 3.6–11)

## 2024-02-04 PROCEDURE — 99233 SBSQ HOSP IP/OBS HIGH 50: CPT | Performed by: INTERNAL MEDICINE

## 2024-02-04 PROCEDURE — 86923 COMPATIBILITY TEST ELECTRIC: CPT

## 2024-02-04 PROCEDURE — 2580000003 HC RX 258: Performed by: INTERNAL MEDICINE

## 2024-02-04 PROCEDURE — 86850 RBC ANTIBODY SCREEN: CPT

## 2024-02-04 PROCEDURE — 30233N1 TRANSFUSION OF NONAUTOLOGOUS RED BLOOD CELLS INTO PERIPHERAL VEIN, PERCUTANEOUS APPROACH: ICD-10-PCS | Performed by: ORTHOPAEDIC SURGERY

## 2024-02-04 PROCEDURE — 6360000002 HC RX W HCPCS: Performed by: INTERNAL MEDICINE

## 2024-02-04 PROCEDURE — 6360000002 HC RX W HCPCS: Performed by: PHYSICIAN ASSISTANT

## 2024-02-04 PROCEDURE — 6370000000 HC RX 637 (ALT 250 FOR IP): Performed by: PHYSICIAN ASSISTANT

## 2024-02-04 PROCEDURE — 86900 BLOOD TYPING SEROLOGIC ABO: CPT

## 2024-02-04 PROCEDURE — 2580000003 HC RX 258: Performed by: PHYSICIAN ASSISTANT

## 2024-02-04 PROCEDURE — 2500000003 HC RX 250 WO HCPCS: Performed by: PHYSICIAN ASSISTANT

## 2024-02-04 PROCEDURE — 85025 COMPLETE CBC W/AUTO DIFF WBC: CPT

## 2024-02-04 PROCEDURE — 82962 GLUCOSE BLOOD TEST: CPT

## 2024-02-04 PROCEDURE — 6370000000 HC RX 637 (ALT 250 FOR IP)

## 2024-02-04 PROCEDURE — 36430 TRANSFUSION BLD/BLD COMPNT: CPT

## 2024-02-04 PROCEDURE — 86901 BLOOD TYPING SEROLOGIC RH(D): CPT

## 2024-02-04 PROCEDURE — 1100000000 HC RM PRIVATE

## 2024-02-04 PROCEDURE — P9016 RBC LEUKOCYTES REDUCED: HCPCS

## 2024-02-04 PROCEDURE — 36415 COLL VENOUS BLD VENIPUNCTURE: CPT

## 2024-02-04 PROCEDURE — 85014 HEMATOCRIT: CPT

## 2024-02-04 PROCEDURE — 85018 HEMOGLOBIN: CPT

## 2024-02-04 RX ORDER — SODIUM CHLORIDE 9 MG/ML
INJECTION, SOLUTION INTRAVENOUS PRN
Status: DISCONTINUED | OUTPATIENT
Start: 2024-02-04 | End: 2024-02-06 | Stop reason: HOSPADM

## 2024-02-04 RX ADMIN — PIPERACILLIN AND TAZOBACTAM 3375 MG: 3; .375 INJECTION, POWDER, FOR SOLUTION INTRAVENOUS; PARENTERAL at 00:08

## 2024-02-04 RX ADMIN — SUCRALFATE 1 G: 1 TABLET ORAL at 07:28

## 2024-02-04 RX ADMIN — Medication 1 CAPSULE: at 07:28

## 2024-02-04 RX ADMIN — KETOROLAC TROMETHAMINE 15 MG: 30 INJECTION, SOLUTION INTRAMUSCULAR; INTRAVENOUS at 12:59

## 2024-02-04 RX ADMIN — TRAMADOL HYDROCHLORIDE 50 MG: 50 TABLET ORAL at 07:28

## 2024-02-04 RX ADMIN — POTASSIUM CHLORIDE 10 MEQ: 750 TABLET, FILM COATED, EXTENDED RELEASE ORAL at 21:24

## 2024-02-04 RX ADMIN — PIOGLITAZONE 45 MG: 15 TABLET ORAL at 21:23

## 2024-02-04 RX ADMIN — PIPERACILLIN AND TAZOBACTAM 3375 MG: 3; .375 INJECTION, POWDER, FOR SOLUTION INTRAVENOUS; PARENTERAL at 07:34

## 2024-02-04 RX ADMIN — METFORMIN HYDROCHLORIDE 1000 MG: 500 TABLET ORAL at 07:28

## 2024-02-04 RX ADMIN — CEFEPIME 2000 MG: 2 INJECTION, POWDER, FOR SOLUTION INTRAVENOUS at 09:02

## 2024-02-04 RX ADMIN — AMLODIPINE BESYLATE 5 MG: 5 TABLET ORAL at 07:28

## 2024-02-04 RX ADMIN — HYDROMORPHONE HYDROCHLORIDE 0.5 MG: 1 INJECTION, SOLUTION INTRAMUSCULAR; INTRAVENOUS; SUBCUTANEOUS at 22:53

## 2024-02-04 RX ADMIN — CEFEPIME 2000 MG: 2 INJECTION, POWDER, FOR SOLUTION INTRAVENOUS at 17:20

## 2024-02-04 RX ADMIN — FAMOTIDINE 40 MG: 20 TABLET, FILM COATED ORAL at 07:28

## 2024-02-04 RX ADMIN — POTASSIUM CHLORIDE 10 MEQ: 750 TABLET, FILM COATED, EXTENDED RELEASE ORAL at 07:27

## 2024-02-04 RX ADMIN — ATORVASTATIN CALCIUM 40 MG: 40 TABLET, FILM COATED ORAL at 07:28

## 2024-02-04 RX ADMIN — SUCRALFATE 1 G: 1 TABLET ORAL at 21:24

## 2024-02-04 RX ADMIN — TRAMADOL HYDROCHLORIDE 50 MG: 50 TABLET ORAL at 19:53

## 2024-02-04 ASSESSMENT — PAIN DESCRIPTION - ORIENTATION
ORIENTATION: LEFT

## 2024-02-04 ASSESSMENT — PAIN SCALES - GENERAL
PAINLEVEL_OUTOF10: 0
PAINLEVEL_OUTOF10: 5
PAINLEVEL_OUTOF10: 8
PAINLEVEL_OUTOF10: 4
PAINLEVEL_OUTOF10: 7
PAINLEVEL_OUTOF10: 7
PAINLEVEL_OUTOF10: 3
PAINLEVEL_OUTOF10: 3

## 2024-02-04 ASSESSMENT — PAIN DESCRIPTION - LOCATION
LOCATION: KNEE

## 2024-02-04 ASSESSMENT — PAIN DESCRIPTION - DESCRIPTORS
DESCRIPTORS: ACHING;THROBBING
DESCRIPTORS: BURNING
DESCRIPTORS: ACHING
DESCRIPTORS: ACHING

## 2024-02-04 ASSESSMENT — PAIN - FUNCTIONAL ASSESSMENT: PAIN_FUNCTIONAL_ASSESSMENT: PREVENTS OR INTERFERES SOME ACTIVE ACTIVITIES AND ADLS

## 2024-02-04 NOTE — TELEPHONE ENCOUNTER
Please add pt to ID schedule as follows  -ID follow-up - 3/12 at 3 pm - in person or virtual

## 2024-02-05 LAB
GLUCOSE BLD STRIP.AUTO-MCNC: 124 MG/DL (ref 65–117)
GLUCOSE BLD STRIP.AUTO-MCNC: 139 MG/DL (ref 65–117)
GLUCOSE BLD STRIP.AUTO-MCNC: 154 MG/DL (ref 65–117)
GLUCOSE BLD STRIP.AUTO-MCNC: 177 MG/DL (ref 65–117)
SERVICE CMNT-IMP: ABNORMAL

## 2024-02-05 PROCEDURE — 1100000000 HC RM PRIVATE

## 2024-02-05 PROCEDURE — 6370000000 HC RX 637 (ALT 250 FOR IP): Performed by: PHYSICIAN ASSISTANT

## 2024-02-05 PROCEDURE — 82962 GLUCOSE BLOOD TEST: CPT

## 2024-02-05 PROCEDURE — 6370000000 HC RX 637 (ALT 250 FOR IP)

## 2024-02-05 PROCEDURE — 2500000003 HC RX 250 WO HCPCS: Performed by: PHYSICIAN ASSISTANT

## 2024-02-05 PROCEDURE — 6360000002 HC RX W HCPCS: Performed by: INTERNAL MEDICINE

## 2024-02-05 PROCEDURE — 97116 GAIT TRAINING THERAPY: CPT

## 2024-02-05 PROCEDURE — 2580000003 HC RX 258: Performed by: INTERNAL MEDICINE

## 2024-02-05 PROCEDURE — APPNB180 APP NON BILLABLE TIME > 60 MINS

## 2024-02-05 RX ORDER — TRAMADOL HYDROCHLORIDE 50 MG/1
50 TABLET ORAL EVERY 6 HOURS PRN
Status: DISCONTINUED | OUTPATIENT
Start: 2024-02-05 | End: 2024-02-06 | Stop reason: HOSPADM

## 2024-02-05 RX ORDER — TRAMADOL HYDROCHLORIDE 50 MG/1
100 TABLET ORAL EVERY 6 HOURS PRN
Status: DISCONTINUED | OUTPATIENT
Start: 2024-02-05 | End: 2024-02-06 | Stop reason: HOSPADM

## 2024-02-05 RX ORDER — TRAMADOL HYDROCHLORIDE 50 MG/1
50 TABLET ORAL EVERY 6 HOURS PRN
Qty: 28 TABLET | Refills: 0 | Status: SHIPPED | OUTPATIENT
Start: 2024-02-05 | End: 2024-02-12

## 2024-02-05 RX ORDER — LACTOBACILLUS RHAMNOSUS GG 10B CELL
1 CAPSULE ORAL
Qty: 30 CAPSULE | Refills: 1 | Status: SHIPPED | OUTPATIENT
Start: 2024-02-06 | End: 2024-04-06

## 2024-02-05 RX ADMIN — CEFEPIME 2000 MG: 2 INJECTION, POWDER, FOR SOLUTION INTRAVENOUS at 08:51

## 2024-02-05 RX ADMIN — ACETAMINOPHEN 1000 MG: 500 TABLET ORAL at 22:45

## 2024-02-05 RX ADMIN — Medication 1 CAPSULE: at 08:37

## 2024-02-05 RX ADMIN — FAMOTIDINE 40 MG: 20 TABLET, FILM COATED ORAL at 08:37

## 2024-02-05 RX ADMIN — TRAMADOL HYDROCHLORIDE 50 MG: 50 TABLET ORAL at 05:34

## 2024-02-05 RX ADMIN — AMLODIPINE BESYLATE 5 MG: 5 TABLET ORAL at 08:37

## 2024-02-05 RX ADMIN — PIOGLITAZONE 45 MG: 15 TABLET ORAL at 21:09

## 2024-02-05 RX ADMIN — SUCRALFATE 1 G: 1 TABLET ORAL at 08:37

## 2024-02-05 RX ADMIN — CEFEPIME 2000 MG: 2 INJECTION, POWDER, FOR SOLUTION INTRAVENOUS at 00:46

## 2024-02-05 RX ADMIN — CEFEPIME 2000 MG: 2 INJECTION, POWDER, FOR SOLUTION INTRAVENOUS at 17:06

## 2024-02-05 RX ADMIN — POTASSIUM CHLORIDE 10 MEQ: 750 TABLET, FILM COATED, EXTENDED RELEASE ORAL at 08:37

## 2024-02-05 RX ADMIN — METFORMIN HYDROCHLORIDE 1000 MG: 500 TABLET ORAL at 08:37

## 2024-02-05 RX ADMIN — ATORVASTATIN CALCIUM 40 MG: 40 TABLET, FILM COATED ORAL at 08:37

## 2024-02-05 RX ADMIN — SUCRALFATE 1 G: 1 TABLET ORAL at 21:09

## 2024-02-05 RX ADMIN — HYDROMORPHONE HYDROCHLORIDE 0.5 MG: 1 INJECTION, SOLUTION INTRAMUSCULAR; INTRAVENOUS; SUBCUTANEOUS at 07:51

## 2024-02-05 RX ADMIN — POTASSIUM CHLORIDE 10 MEQ: 750 TABLET, FILM COATED, EXTENDED RELEASE ORAL at 21:09

## 2024-02-05 RX ADMIN — TRAMADOL HYDROCHLORIDE 100 MG: 50 TABLET ORAL at 18:41

## 2024-02-05 ASSESSMENT — PAIN DESCRIPTION - ORIENTATION
ORIENTATION: LEFT

## 2024-02-05 ASSESSMENT — PAIN SCALES - GENERAL
PAINLEVEL_OUTOF10: 6
PAINLEVEL_OUTOF10: 4
PAINLEVEL_OUTOF10: 8
PAINLEVEL_OUTOF10: 2
PAINLEVEL_OUTOF10: 2
PAINLEVEL_OUTOF10: 3
PAINLEVEL_OUTOF10: 8

## 2024-02-05 ASSESSMENT — PAIN DESCRIPTION - LOCATION
LOCATION: KNEE

## 2024-02-05 ASSESSMENT — PAIN DESCRIPTION - DESCRIPTORS
DESCRIPTORS: ACHING

## 2024-02-05 ASSESSMENT — PAIN - FUNCTIONAL ASSESSMENT: PAIN_FUNCTIONAL_ASSESSMENT: PREVENTS OR INTERFERES SOME ACTIVE ACTIVITIES AND ADLS

## 2024-02-05 NOTE — DISCHARGE INSTRUCTIONS
Discharge Instructions:  Kalpana Benoit    Surgery:  I&D left knee with antibiotic spacer placement .        To relieve pain:  Use ice/gel packs.    -Put the ice pack directly over the wound, or anywhere you are hurting or swollen.   -To control pain and swelling, keep ice on regularly, especially after physical activity.  -The packs should stay cold for 3-4 hours.  When it is not cold anymore, rotate with the packs in the freezer.      Elevate your leg.  This will also keep swelling down.    Rest for at least 20 minutes between activity or exercises.    To keep track of your pain medications, write down what you take and when you take it.    The last dose of pain medication you got in the hospital was:     Medication    Dose    Date & Time      Choose your medications based on the pain scale below:    To keep your pain under control, take Tylenol every 6 hours for 14 days - even if you feel like you don’t need it.     For mild to moderate pain (4-6 on pain scale), take one pain pill every 4 hours or as instructed.     For severe pain (7-10 on pain scale), take two pain pills every 4 hours or as instructed.     To prevent nausea, take your pain medications with food.                                            Pain Scale              As your pain lessens:    Slowly start taking less pain medication. You may do this by waiting longer between doses or by taking smaller doses.    Stop using the pain medications as soon as you no longer need it, usually in 2-3 weeks.        Aspirin  To prevent blood clots, you will need to take Aspirin 325 mg twice a day for 30 days.              To prevent stomach upset or bleeding:  Take Pepcid 20 mg twice a day, or a similar home medication, while you are taking a blood thinner.         Keep your waterproof dressing in place. It will be removed by your surgeon during your follow-up appointment in 2 weeks.     You may need to change the dressing if you are having drainage to where

## 2024-02-05 NOTE — ANESTHESIA POSTPROCEDURE EVALUATION
Department of Anesthesiology  Postprocedure Note    Patient: Kalpana Benoit  MRN: [id number]  YOB: 1955  Date of evaluation: 2/5/2024    Procedure Summary       Date: 01/31/24 Room / Location: [facility] MAIN OR  / [facility] MAIN OR    Anesthesia Start: 1512 Anesthesia Stop: 1731    Procedure: IRRIGATION AND DEBRIDEMENT LEFT KNEE WITH PLACEMENT OF ANTIBIOTIC SPACERS (Left: Knee) Diagnosis:       Bursitis of left knee, unspecified bursa      (Bursitis of left knee, unspecified bursa [M70.52])    Providers: Matthew Garay DO Responsible Provider: Bry Olivares MD    Anesthesia Type: General ASA Status: 3            Anesthesia Type: General    Horacio Phase I: Horacio Score: 9    Horacio Phase II:      Anesthesia Post Evaluation    Patient location during evaluation: PACU  Patient participation: complete - patient participated  Level of consciousness: sleepy but conscious and responsive to verbal stimuli  Airway patency: patent  Nausea & Vomiting: no vomiting and no nausea  Cardiovascular status: blood pressure returned to baseline and hemodynamically stable  Respiratory status: acceptable  Hydration status: stable    No notable events documented.

## 2024-02-05 NOTE — PLAN OF CARE
Problem: Physical Therapy - Adult  Goal: By Discharge: Performs mobility at highest level of function for planned discharge setting.  See evaluation for individualized goals.  Description: FUNCTIONAL STATUS PRIOR TO ADMISSION: Patient was independent and active without use of DME. Patient would use rolling walker if needed.    HOME SUPPORT PRIOR TO ADMISSION: The patient lived with , son, and daughter but did not require assist.    Physical Therapy Goals  Initiated 2/1/2024  1.  Patient will move from supine to sit and sit to supine  in bed with supervision/set-up within 7 day(s).    2.  Patient will transfer from bed to chair and chair to bed with supervision/set-up using the least restrictive device within 7 day(s).  3.  Patient will perform sit to stand with independence within 7 day(s).  4.  Patient will ambulate with supervision/set-up for 100 feet with the least restrictive device within 7 day(s).   5.  Patient will ascend/descend 5 stairs with 1 handrail(s) with minimal assistance/contact guard assist within 7 day(s).    Outcome: Progressing     PHYSICAL THERAPY TREATMENT    Patient: Kalpana Benoit (68 y.o. female)  Date: 2/5/2024  Diagnosis: Abscess [L02.91]  Abscess of bursa of left knee [M71.062] Abscess of bursa of left knee  Procedure(s) (LRB):  IRRIGATION AND DEBRIDEMENT LEFT KNEE WITH PLACEMENT OF ANTIBIOTIC SPACERS (Left) 5 Days Post-Op  Precautions: Weight Bearing   Left Lower Extremity Weight Bearing: Weight Bearing As Tolerated                  ASSESSMENT:  Patient continues to benefit from skilled PT services and is progressing towards goals. Pt performed bed mobility and transfers with SBA, increased time d/t LLE pain/stiffness. Pt ambulated to/from rehab gym with RW for UE support, CGA, cueing for improved gait mechanics 2/2 increased forward flexed posture. Pt negotiated 4 steps with BUE support on L handrail, sidestepping pattern, with CGA. Following mobility, pt with c/o mild fatigue

## 2024-02-06 ENCOUNTER — TELEPHONE (OUTPATIENT)
Age: 69
End: 2024-02-06

## 2024-02-06 VITALS
OXYGEN SATURATION: 99 % | WEIGHT: 160.94 LBS | TEMPERATURE: 97.9 F | SYSTOLIC BLOOD PRESSURE: 130 MMHG | HEART RATE: 82 BPM | DIASTOLIC BLOOD PRESSURE: 76 MMHG | HEIGHT: 61 IN | RESPIRATION RATE: 18 BRPM | BODY MASS INDEX: 30.39 KG/M2

## 2024-02-06 LAB
ABO + RH BLD: NORMAL
ALBUMIN SERPL-MCNC: 2.2 G/DL (ref 3.5–5)
ALBUMIN/GLOB SERPL: 0.5 (ref 1.1–2.2)
ALP SERPL-CCNC: 133 U/L (ref 45–117)
ALT SERPL-CCNC: 13 U/L (ref 12–78)
ANION GAP SERPL CALC-SCNC: 3 MMOL/L (ref 5–15)
AST SERPL-CCNC: 10 U/L (ref 15–37)
BACTERIA SPEC CULT: ABNORMAL
BACTERIA SPEC CULT: NORMAL
BACTERIA SPEC CULT: NORMAL
BASOPHILS # BLD: 0.1 K/UL (ref 0–0.1)
BASOPHILS NFR BLD: 1 % (ref 0–1)
BILIRUB SERPL-MCNC: 0.4 MG/DL (ref 0.2–1)
BLD PROD TYP BPU: NORMAL
BLOOD BANK DISPENSE STATUS: NORMAL
BLOOD GROUP ANTIBODIES SERPL: NORMAL
BPU ID: NORMAL
BUN SERPL-MCNC: 12 MG/DL (ref 6–20)
BUN/CREAT SERPL: 18 (ref 12–20)
CALCIUM SERPL-MCNC: 9 MG/DL (ref 8.5–10.1)
CHLORIDE SERPL-SCNC: 108 MMOL/L (ref 97–108)
CO2 SERPL-SCNC: 28 MMOL/L (ref 21–32)
CREAT SERPL-MCNC: 0.67 MG/DL (ref 0.55–1.02)
CROSSMATCH RESULT: NORMAL
DIFFERENTIAL METHOD BLD: ABNORMAL
EOSINOPHIL # BLD: 0.4 K/UL (ref 0–0.4)
EOSINOPHIL NFR BLD: 6 % (ref 0–7)
ERYTHROCYTE [DISTWIDTH] IN BLOOD BY AUTOMATED COUNT: 19.1 % (ref 11.5–14.5)
GLOBULIN SER CALC-MCNC: 4.5 G/DL (ref 2–4)
GLUCOSE BLD STRIP.AUTO-MCNC: 114 MG/DL (ref 65–117)
GLUCOSE BLD STRIP.AUTO-MCNC: 150 MG/DL (ref 65–117)
GLUCOSE SERPL-MCNC: 136 MG/DL (ref 65–100)
GRAM STN SPEC: ABNORMAL
GRAM STN SPEC: ABNORMAL
HCT VFR BLD AUTO: 29.2 % (ref 35–47)
HGB BLD-MCNC: 8.6 G/DL (ref 11.5–16)
IMM GRANULOCYTES # BLD AUTO: 0.1 K/UL (ref 0–0.04)
IMM GRANULOCYTES NFR BLD AUTO: 1 % (ref 0–0.5)
LYMPHOCYTES # BLD: 2.3 K/UL (ref 0.8–3.5)
LYMPHOCYTES NFR BLD: 32 % (ref 12–49)
MCH RBC QN AUTO: 25.1 PG (ref 26–34)
MCHC RBC AUTO-ENTMCNC: 29.5 G/DL (ref 30–36.5)
MCV RBC AUTO: 85.4 FL (ref 80–99)
MONOCYTES # BLD: 0.6 K/UL (ref 0–1)
MONOCYTES NFR BLD: 8 % (ref 5–13)
NEUTS SEG # BLD: 3.7 K/UL (ref 1.8–8)
NEUTS SEG NFR BLD: 52 % (ref 32–75)
NRBC # BLD: 0 K/UL (ref 0–0.01)
NRBC BLD-RTO: 0 PER 100 WBC
PLATELET # BLD AUTO: 342 K/UL (ref 150–400)
PMV BLD AUTO: 9.3 FL (ref 8.9–12.9)
POTASSIUM SERPL-SCNC: 4.3 MMOL/L (ref 3.5–5.1)
PROT SERPL-MCNC: 6.7 G/DL (ref 6.4–8.2)
RBC # BLD AUTO: 3.42 M/UL (ref 3.8–5.2)
SERVICE CMNT-IMP: ABNORMAL
SERVICE CMNT-IMP: NORMAL
SODIUM SERPL-SCNC: 139 MMOL/L (ref 136–145)
SPECIMEN EXP DATE BLD: NORMAL
UNIT DIVISION: 0
WBC # BLD AUTO: 7.1 K/UL (ref 3.6–11)

## 2024-02-06 PROCEDURE — 85025 COMPLETE CBC W/AUTO DIFF WBC: CPT

## 2024-02-06 PROCEDURE — 6370000000 HC RX 637 (ALT 250 FOR IP)

## 2024-02-06 PROCEDURE — APPNB180 APP NON BILLABLE TIME > 60 MINS

## 2024-02-06 PROCEDURE — 82962 GLUCOSE BLOOD TEST: CPT

## 2024-02-06 PROCEDURE — 97530 THERAPEUTIC ACTIVITIES: CPT

## 2024-02-06 PROCEDURE — 36415 COLL VENOUS BLD VENIPUNCTURE: CPT

## 2024-02-06 PROCEDURE — 6370000000 HC RX 637 (ALT 250 FOR IP): Performed by: PHYSICIAN ASSISTANT

## 2024-02-06 PROCEDURE — 2580000003 HC RX 258: Performed by: INTERNAL MEDICINE

## 2024-02-06 PROCEDURE — 6360000002 HC RX W HCPCS: Performed by: INTERNAL MEDICINE

## 2024-02-06 PROCEDURE — 80053 COMPREHEN METABOLIC PANEL: CPT

## 2024-02-06 RX ORDER — ASPIRIN 325 MG
325 TABLET ORAL DAILY
Qty: 30 TABLET | Refills: 0 | Status: SHIPPED | OUTPATIENT
Start: 2024-02-06 | End: 2024-02-06

## 2024-02-06 RX ORDER — ASPIRIN 325 MG
325 TABLET ORAL DAILY
Qty: 30 TABLET | Refills: 0 | Status: SHIPPED | OUTPATIENT
Start: 2024-02-06

## 2024-02-06 RX ADMIN — CEFEPIME 2000 MG: 2 INJECTION, POWDER, FOR SOLUTION INTRAVENOUS at 14:14

## 2024-02-06 RX ADMIN — ATORVASTATIN CALCIUM 40 MG: 40 TABLET, FILM COATED ORAL at 08:34

## 2024-02-06 RX ADMIN — TRAMADOL HYDROCHLORIDE 100 MG: 50 TABLET ORAL at 08:33

## 2024-02-06 RX ADMIN — POTASSIUM CHLORIDE 10 MEQ: 750 TABLET, FILM COATED, EXTENDED RELEASE ORAL at 08:34

## 2024-02-06 RX ADMIN — FAMOTIDINE 40 MG: 20 TABLET, FILM COATED ORAL at 08:34

## 2024-02-06 RX ADMIN — METFORMIN HYDROCHLORIDE 1000 MG: 500 TABLET ORAL at 08:34

## 2024-02-06 RX ADMIN — CEFEPIME 2000 MG: 2 INJECTION, POWDER, FOR SOLUTION INTRAVENOUS at 00:42

## 2024-02-06 RX ADMIN — Medication 1 CAPSULE: at 08:33

## 2024-02-06 RX ADMIN — CEFEPIME 2000 MG: 2 INJECTION, POWDER, FOR SOLUTION INTRAVENOUS at 08:35

## 2024-02-06 RX ADMIN — SUCRALFATE 1 G: 1 TABLET ORAL at 08:33

## 2024-02-06 RX ADMIN — TRAMADOL HYDROCHLORIDE 100 MG: 50 TABLET ORAL at 00:34

## 2024-02-06 RX ADMIN — AMLODIPINE BESYLATE 5 MG: 5 TABLET ORAL at 08:34

## 2024-02-06 ASSESSMENT — PAIN SCALES - GENERAL
PAINLEVEL_OUTOF10: 2
PAINLEVEL_OUTOF10: 2
PAINLEVEL_OUTOF10: 3
PAINLEVEL_OUTOF10: 7
PAINLEVEL_OUTOF10: 2
PAINLEVEL_OUTOF10: 8
PAINLEVEL_OUTOF10: 2

## 2024-02-06 ASSESSMENT — PAIN DESCRIPTION - LOCATION
LOCATION: KNEE

## 2024-02-06 ASSESSMENT — PAIN DESCRIPTION - ORIENTATION
ORIENTATION: LEFT

## 2024-02-06 ASSESSMENT — PAIN DESCRIPTION - DESCRIPTORS
DESCRIPTORS: ACHING

## 2024-02-06 ASSESSMENT — PAIN - FUNCTIONAL ASSESSMENT: PAIN_FUNCTIONAL_ASSESSMENT: PREVENTS OR INTERFERES SOME ACTIVE ACTIVITIES AND ADLS

## 2024-02-06 NOTE — PROGRESS NOTES
DISCHARGE NOTE FROM ORTHO NURSE    Patient determined to be stable for discharge by attending provider. I have reviewed the discharge instructions with the patient and son. They verbalized understanding and all questions were answered to their satisfaction. No complaints or further questions were expressed.      Medications sent to pharmacy. Appropriate educational materials and medication side effect teaching were provided.      N/A were removed prior to discharge.     PICC ashley care provided to patient. Teach back method utilized.    Personal items and valuables accounted for at discharge by patient and/or family: Yes    Post-op patient: Yes-Patient given post-op discharge kit and instructed on use.    Deni Jimenez, RN    
ID Progress Note          Admission Summary:   Patient is a 68 y.o. female with medical hx of hypertension, hyperlipidemia, type II DM, GERD, COPD, OA, bursitis, hx of TKA (right - 10/2023, Left - 5/2023) was admitted to the hospital on 1/30 with worsening of left knee pain, underwent for I & D of left knee upon admission. Pt was started on IV zosyn and vancomycin upon admission.     Pt reports having left knee pain ever since fell off from bed after her right knee surgery in October, 2023. Pt was hospitalized between 12/21/2023 to 12/22/2023 under Dr. Plunkett's care for septic bursitis. Pt underwent for debridement with irrigation and I & D of left knee upon admission. Intra-op cx grew enterobacter, received IV vancomycin during hospitalization then completed 2 weeks of abx therapy with po bactrim.                  ID team was consulted for left knee infection, bursiitis chronic OM evaluation and treatment recommendation.       Interval history    Consult date (1/31) left knee infection    2/1: tachycardia yesterday afternoon  Antimicrobial therapy history      IV zosyn and vancomycin 1/30-    Assessment and Plan     Left knee abscess  Left Pez anserine bursitis and chronic OM  S/p left knee I & D (1/30, 1/31)  - afebrile, wbc 8.2 (1/16)    , CRP 3.09    Blood cx (1/31) no growth so far    Intra-op cx (1/30) GNR; identification and susceptibility pending    Wound cx (12/21/2023) staphylococcus capitis and anaerobic enterobacter cloacae; was treated with PO Bactrim     Post op care per primary team        Continue with IV zosyn and vancomycin. We will deescalate therapy once we reviewed final cx results     Vancomycin dosing per clinical pharmacist     Monitor renal function closely     We discussed about the long term IV abx therapy with the therapy.       Final abx order will be provided after review the final cx.       Discussed potential side effects from abx therapy also; n/v, diarrhea, affecting renal 
Infectious Disease Progress        Impression     Left prosthetic knee infection  Pes anserine bursitis  H/o L/TKA 5/2023  R/ TKA 10/2023  S/p I&D 1/30.  S/p I&D/placement of antibiotic spacer 1/31  IntraOp culture 1/30 + for Enterobacter cloacae complex ( thio only)  IntraOp culture 1/31+ for  Enterobacter cloacae complex  Change to Cefepime IV  Blood cultures 1/31-NG TD  Bactrim PTA.     Previous cultures  Culture 12/13- E. Cloacae( thio only)  12/17-E.cloacae  12/21-Staph capitis( thio only)  Rare E cloacae complex.     H/o E. coli UTI 4/2023     Diabetes type 2  A1c 6.4     HTN  Continue Norvasc.       CRP 3.09     Plan     Change to Cefepime 2G IV Q8h  Plan for 6 weeks of antimicrobial therapy IV  1/31- 3/13  Adequate fluids, daily probiotic.    Antimicrobial orders for discharge  - Cefepime 2G IV Q8h  end date 3/13  -Pull line at end of therapy.    -Weekly CBC, CMP & ESR/ CRP every other week  -Fax reports to [phone], call with critical labs  at [phone]  -Encourage adequate fluids, daily probiotic/yogurt  -If line malfunction occurs and home health cannot reposition  please send patient to ED immediately  -ID follow-up - 3/12 at 3 pm - in person or virtual  - If persistent side effects occur stop antibiotic and call-ID/PCP.    May DC from ID standpoint  Please call with questions, concerns        Possible adverse effects of long term antibiotics are inclusive of but not limited to following  BM suppression, neutropenia , cytopenias , aplastic anemia hemorrhage liver & renal dysfunction/ liver , renal failure  , GI dysfunction- N, V  Diarrhea,C.difficile disease, rash , allergy , anaphylaxis.toxic epidermal necrolysis  Neuro toxicity , seizure disorder  Side effects tend to be more pronounced in the elderly. D/w pt          Extensive review of chart notes, labs, imaging, cultures done  Additionally review of done: Recent reports-Labs, cultures, imaging  D/w -hospitalist, RN      Patient seen 
ORTHO - Progress Note  Post Op day: 4 Days Post-Op    Kalpana Benoit     [id number]  female    68 y.o.    1955    Admit date:2024  Date of Surgery:  Procedures:Procedure(s):  IRRIGATION AND DEBRIDEMENT LEFT KNEE WITH PLACEMENT OF ANTIBIOTIC SPACERS  Surgeon:Surgeon(s) and Role:   * Matthew Garay, DO - Primary        SUBJECTIVE:     Kalpana Benoit is a 68 y.o. female sitting upright in the bed.  Patient has complaints of appropriate post-op pain, tolerating PO pain medications  --- tramodol.  \"I'm a little tired\"  \"It always gets worse before it gets better\"  Denies F/C, nausea, vomiting, dizziness, lightheadedness, chest pain, or shortness of breath.    OBJECTIVE:       Physical Exam:  General: alert, cooperative, no distress.   Gastrointestinal:  non-distended .    Cardiovascular: equal pulses in the lower extremities,  Brisk cap refill in all distal extremities   Genitourinary: Voiding independently   Respiratory: No respiratory distress   Neurological:Neurovascular exam within normal limits.     Senstion intact: LE bilat.    Motor: + DF/PF/EHL.   Musculoskeletal: Mamie's sign negative in bilateral lower extremities.  Calves soft, supple, non-tender upon palpation or with passive stretch.    Dressing/Wound:  thin bloody drainage noted(not saturated- No need to change at this time),  intact. No significant erythema or swelling.    Vital Signs:       Patient Vitals for the past 8 hrs:   BP Temp Temp src Pulse Resp SpO2   24 0758 -- -- -- -- 16 --   24 0728 -- -- -- -- 16 --   24 0726 128/82 98.1 °F (36.7 °C) Oral 81 16 96 %                                          Temp (24hrs), Av.5 °F (36.9 °C), Min:98.1 °F (36.7 °C), Max:98.8 °F (37.1 °C)      Labs:        Recent Labs     24  1034   HCT 23.7*   HGB 6.9*     PT/OT:              ASSESSMENT / PLAN:   Principal Problem:    Abscess of bursa of left knee  Active Problems:    Primary hypertension    Infection of left knee 
Order acknowledged. Line to be placed pending blood culture results, final ID recommendations are made and discharge plans are finalized. Will continue to follow.    Nancy Best RN, BSN, CRNI, [facility]-BC  Vascular Access Team    
Ortho    Continues in good spirits. Says that PIC placement went well despite anxiety.    Trace drainage at the lower left knee wound incision continues. I changed the dressing. Serous output, slight and only at inferior end.    VS stable, I&O good, labs show nl WBC and diff and Hct holding up, trace elev BSs.    Cultures from initial I&D one week ago growing E.Cloacae sens to all but cephalosporings 1st and 2nd get.     On ZoSyn with final regimen for 6 weeks pending.    CM two days ago and was in favor of pt's desire to return home with HH and home infusion, with limited help from either  or son.     Pt is no unhappy to stay longer; believes care given more easily here still. Awaiting weekend CM input.  
Ortho / Neurosurgery NP Note    POD# 1  s/p LEFT KNEE INCISION AND DRAINAGE   Pt seen with no visitor present    Hx of left knee bursitis w I&D 12/2023  Hx of R TKA 10/2023  Hx of L TKA 5/2023    Pt resting in bed.   Reports pain controlled.   NPO for surgery later today.   Patient seen by Dr. Garay earlier this AM. All questions answered about procedure      VSS Afebrile.    Visit Vitals  /73   Pulse 78   Temp 98.1 °F (36.7 °C) (Oral)   Resp 16   Ht 1.549 m (5' 1\")   Wt 73 kg (160 lb 15 oz)   SpO2 95%   BMI 30.41 kg/m²       Voiding status: voiding          Labs    Lab Results   Component Value Date/Time    HGB 9.3 01/16/2024 11:20 AM      Lab Results   Component Value Date/Time    INR 1.1 10/17/2023 02:20 PM      Lab Results   Component Value Date/Time     01/31/2024 06:38 AM    K 4.8 01/31/2024 06:38 AM     01/31/2024 06:38 AM    CO2 25 01/31/2024 06:38 AM    BUN 28 01/31/2024 06:38 AM     Recent Glucose Results:   Glucose   Date Value Ref Range Status   01/31/2024 205 (H) 65 - 100 mg/dL Final   12/22/2023 133 (H) 65 - 100 mg/dL Final   12/17/2023 157 (H) 65 - 100 mg/dL Final           Body mass index is 30.41 kg/m². : A BMI > 30 is classified as obesity and > 40 is classified as morbid obesity.       Dressing c.d.I-ACE wrap  Hemovac removed   Cryotherapy in place over incision  Calves soft and supple; No pain with passive stretch  BLE Sensation and motor intact. +PF/DF/EHL intact   SCDs for mechanical DVT proph while in bed     PLAN:  1) PT/OT-Will eval after surgery today  2) SCD for DVT Prophylaxis until after surgery today.   3) GI Prophylaxis - Pepcid   4) Pain control - scheduled tylenol  and toradol, and prn  tramadol    5) Left knee abscess-surgery later today. Now receiving Vancomycin and Zosyn. Infectious disease consulted, appreciate assistance. Wound cultures 1/30 pending.     Discharge Plan pending above.     Francisca Barraza, APRN - NP      
Ortho / Neurosurgery NP Note    POD# 2  s/p LEFT KNEE INCISION AND DRAINAGE   POD#1 I&D Left knee w ABX spacer   Pt seen with no visitor present    Hx of left knee bursitis w I&D 12/2023  Hx of R TKA 10/2023  Hx of L TKA 5/2023    Pt in bed.   Therapist present to start therapy session.   Tolerating diet, denies nausea  Dressing changed this AM due to drainage.         VSS Afebrile.    Visit Vitals  /77   Pulse 85   Temp 98.1 °F (36.7 °C) (Oral)   Resp 18   Ht 1.549 m (5' 1\")   Wt 73 kg (160 lb 15 oz)   SpO2 100%   BMI 30.41 kg/m²       Voiding status: voiding          Labs    Lab Results   Component Value Date/Time    HGB 9.3 01/16/2024 11:20 AM      Lab Results   Component Value Date/Time    INR 1.1 01/31/2024 08:30 AM      Lab Results   Component Value Date/Time     02/01/2024 03:27 AM    K 4.4 02/01/2024 03:27 AM     02/01/2024 03:27 AM    CO2 27 02/01/2024 03:27 AM    BUN 25 02/01/2024 03:27 AM     Recent Glucose Results:   Glucose   Date Value Ref Range Status   02/01/2024 162 (H) 65 - 100 mg/dL Final   01/31/2024 205 (H) 65 - 100 mg/dL Final   12/22/2023 133 (H) 65 - 100 mg/dL Final           Body mass index is 30.41 kg/m². : A BMI > 30 is classified as obesity and > 40 is classified as morbid obesity.       Dressing c.d.I-ACE wrap-Optifoam w no drainage.   Hemovac removed   Cryotherapy in place over incision  Calves soft and supple; No pain with passive stretch  BLE Sensation and motor intact. +PF/DF/EHL intact   SCDs for mechanical DVT proph while in bed     PLAN:  1) PT/OT-WBAT  2) SCD for DVT Prophylaxis   3) GI Prophylaxis - Pepcid   4) Pain control - scheduled tylenol  and toradol, and prn  tramadol    5) S/p Left knee I&D. Now receiving Vancomycin and Zosyn. Infectious disease consulted, appreciate assistance. Wound cultures 1/30 pending. Blood CX 1/31 pending. Will need PICC once final recommendations are made. Probiotic added today    Discharge Plan pending above.     Francisca LUNA 
Ortho / Neurosurgery NP Note    S/p I&D left knee 1/30  S/p  I&D Left knee w ABX spacer 1/31  Pt seen with no visitor present    Hx of left knee bursitis w I&D 12/2023  Hx of R TKA 10/2023  Hx of L TKA 5/2023    Pt in bed, awake and alert.   Reports pain improved w PRN Tramadol   Cleared therapy yesterday  S/p one unit RBC on 2.4.   PICC placed 2/3  Tolerating diet, denies nausea        VSS Afebrile.    Visit Vitals  /78   Pulse 79   Temp 98.4 °F (36.9 °C)   Resp 17   Ht 1.549 m (5' 1\")   Wt 73 kg (160 lb 15 oz)   SpO2 94%   BMI 30.41 kg/m²       Voiding status: voiding   Last BM: 2/5/2024         Labs    Lab Results   Component Value Date/Time    HGB 8.6 02/06/2024 06:34 AM      Lab Results   Component Value Date/Time    INR 1.1 01/31/2024 08:30 AM      Lab Results   Component Value Date/Time     02/06/2024 06:34 AM    K 4.3 02/06/2024 06:34 AM     02/06/2024 06:34 AM    CO2 28 02/06/2024 06:34 AM    BUN 12 02/06/2024 06:34 AM     Recent Glucose Results:   Glucose   Date Value Ref Range Status   02/06/2024 136 (H) 65 - 100 mg/dL Final   02/02/2024 142 (H) 65 - 100 mg/dL Final   02/01/2024 162 (H) 65 - 100 mg/dL Final           Body mass index is 30.41 kg/m². : A BMI > 30 is classified as obesity and > 40 is classified as morbid obesity.       Dressing c.d.I-Optifoam w old drainage removed. Incision intact w staples. No drainage. No erythema. Expected mild swelling.  New Optifoam applied.   Cryotherapy in place over incision  Calves soft and supple; No pain with passive stretch  BLE Sensation and motor intact. +PF/DF/EHL intact   SCDs for mechanical DVT proph while in bed     PLAN:  1) PT/OT-WBAT  2) SCD for DVT Prophylaxis   3) GI Prophylaxis - Pepcid   4) Pain control - scheduled tylenol   and prn  tramadol  . . Cryotherapy as needed  5) s/p one unit RBC-hgb today 8.6   6) S/p Left knee I&D. IntraOp culture 1/30 -Enterobacter cloacae complex ( thio only). IntraOp culture 1/31- Enterobacter 
Ortho attending coverage    Pt continues in good spirits    Small spot on bandages changed yesterday, indicating slowing of small amount of output    Agree in this case that transfusion indicated for declining blood levels, at high risk for additional complications, and likely continued decline next few days with equilibration and continued blood draws for management.    Fortunately, is a candidate for Cefepime and tolerated first dose today without problems.    I agree with more extensive record left today by LOUIE Titus  
Ortho attending coverage    Pt remains in good spirits. I agree with all noted by  Aure.    Pt has been seen by I.D. and treatment plan underway.    Pt is under the impression that she is likely going home tomorrow for home IV antibiotics under the care of  and son, with me being available if questions or problems occur over the weekend.    She may have PIC line placed later today for long term access.  
Pharmacy Antimicrobial Kinetic Dosing    Indication for Antimicrobials:  Prosthetic joint infection, left knee s/p I&D with placement of abx spacers     Current Regimen of Each Antimicrobial:  Vancomycin 1750 mg iv X1 then 750 mg iv q 12 hr; Start Date ; Day # 3  Zosyn 3.375 gm iv every 8 hr; Start Date ; Day # 3    Previous Antimicrobial Therapy:       Goal Level: Vancomycin -600    Date Dose & Interval Measured (mcg/mL) Predicted AUC    1221 750 mg q 12 h 18.6 592/18.8                 Significant Cultures:   : Anaerobic cx: pending   wound - pending  : Blood cx: pending  : Anaerobic cx: rare GNR rods - pending   wound - rare GNR -  pending    Labs:  Recent Labs     Units 24  0327 24  0638   CREATININE MG/DL 0.84 0.96   BUN MG/DL 25* 28*     Temp (24hrs), Av.9 °F (36.6 °C), Min:97.5 °F (36.4 °C), Max:98.1 °F (36.7 °C)      Conditions for Dosing Consideration:     Creatinine Clearance (mL/min): Estimated Creatinine Clearance: 60 mL/min (based on SCr of 0.84 mg/dL).       Impression/Plan:   Will adjust the Vancomycin to 1250 mg q 24 h for an estimated AUC of 506  Continue with zosyn   Bmp daily ordered  Antimicrobial stop date 7 days     Pharmacy will follow daily and adjust medications as appropriate for renal function and/or serum levels.    Thank you,  Yue Najera, [facility]        
S:  She is having mild residual pain from the surgery two days ago, though controlled with medications, otherwise no acute events.     O:   Vitals:    02/02/24 0400   BP: 118/75   Pulse: 83   Resp: 16   Temp: 98.2 °F (36.8 °C)   SpO2:        Dressing there is scant dried blood, otherwise C/D/I - dressing removed and incision intact with no bleeding, purulence, cellulitic changes or dehiscence. There is mild serosanguinous drainage. Dressing changed.   Sensation intact L1-S1  TA/GCS/EHL: 5/5  Capillary refill less than 2 seconds.        A: POD #3 s/p left knee incision and drainage, POD #2 incision and drainage left knee w antibiotic spacer.  She has started therapy. Tolerating diet with no nausea, fevers/chills or systemic symptoms.     P:    - Continue PT/OT with weight bearing as tolerated.    - pain controlled with tylenol, toradol and PRN tramadol  - continue pepcid for GI prophylaxis  - Continue Vancomycin and Zosyn. ID consulted, awaiting final wound cultures from 1/30 for final recommendations. Awaiting PICC line placement prior to DC.   - DVT prophylaxis - SCDs  D/C planning   
Spiritual Care Partner Volunteer visited patient at [facility] in [facility] on 2/5/2024   Documented by:  Leona Alvarez, MPS, BCC, Staff   [facility]     Paging Service  [phone]  
RBC-repeat hgb yesterday 8.1. will follow hemoglobin.   6) S/p Left knee I&D. IntraOp culture 1/30 -Enterobacter cloacae complex ( thio only). IntraOp culture 1/31- Enterobacter cloacae complex.  Infectious disease consulted, appreciate assistance. PICC placed 2/3/2024.     Antimicrobial orders for discharge  - Cefepime 2G IV Q8h  end date 3/13  -Pull line at end of therapy.    -Weekly CBC, CMP & ESR/ CRP every other week  -Fax reports to [phone], call with critical labs  at [phone]  -Encourage adequate fluids, daily probiotic/yogurt  -If line malfunction occurs and home health cannot reposition  please send patient to ED immediately  -ID follow-up - 3/12 at 3 pm - in person or virtual  - If persistent side effects occur stop antibiotic and call-ID/PCP.        Discharge Plan: Home w family support once cleared by therapy and IV ABX arranged.     Will need RW and requesting BSC. Discussed that insurance may not cover some DME- will let her know if RW will be covered.     Addendum@1400-Patient cleared for discharge by therapy. Will discharge home once HH and IV ABX arranged.     CORY Bliss - NP      
01/31/24 1301     Special Requests NO SPECIAL REQUESTS        Gram stain RARE WBCS SEEN         NO ORGANISMS SEEN        Culture       No growth thus far, holding 14 days.          Culture, Anaerobic [[id number]]  (Abnormal) Collected: 01/30/24 1545    Order Status: Completed Specimen: Knee, left Updated: 02/01/24 1746     Special Requests NO SPECIAL REQUESTS        Culture       Gram negative rods SEEN ON GRAM STAIN OF THE THIO BROTH            (NOTE) GNR SEEN IN BROTH CALLED TO [facility] ON 2.1.24 AT 1946.     Culture, Anaerobic [[id number]]  (Abnormal) Collected: 01/30/24 1545    Order Status: Completed Specimen: Knee, left Updated: 02/01/24 1740     Special Requests NO SPECIAL REQUESTS        Culture       Gram negative rods SEEN ON GRAM STAIN OF THE THIO BROTH                   Labs:     Lab Results   Component Value Date/Time    WBC 8.2 01/16/2024 11:20 AM    HGB 9.3 01/16/2024 11:20 AM    HCT 31.1 01/16/2024 11:20 AM     01/16/2024 11:20 AM    MCV 82.5 01/16/2024 11:20 AM     Lab Results   Component Value Date/Time     02/01/2024 03:27 AM    K 4.4 02/01/2024 03:27 AM     02/01/2024 03:27 AM    CO2 27 02/01/2024 03:27 AM    BUN 25 02/01/2024 03:27 AM    GFRAA >60 08/17/2022 11:09 AM    GLOB 5.8 12/17/2023 12:00 PM    ALT 12 12/17/2023 12:00 PM               The above plan of care that has been discussed and agreed upon by Dr. Dc.    Gunnar Negron APRN - NP

## 2024-02-06 NOTE — TELEPHONE ENCOUNTER
Tamiko with [facility] will be caring for pt    They need to know if Dr. Dc will be following this pt due to IV antibiotics and signing the POC    Tamiko's # [phone]   08-Sep-2023 19:53

## 2024-02-06 NOTE — TELEPHONE ENCOUNTER
Spoke with Tamiko with [facility] . Advised that Dr Dc will follow patient for antibiotic therapy only.

## 2024-02-06 NOTE — PLAN OF CARE
Problem: Physical Therapy - Adult  Goal: By Discharge: Performs mobility at highest level of function for planned discharge setting.  See evaluation for individualized goals.  Description: FUNCTIONAL STATUS PRIOR TO ADMISSION: Patient was independent and active without use of DME. Patient would use rolling walker if needed.    HOME SUPPORT PRIOR TO ADMISSION: The patient lived with , son, and daughter but did not require assist.    Physical Therapy Goals  Initiated 2/1/2024  1.  Patient will move from supine to sit and sit to supine  in bed with supervision/set-up within 7 day(s).    2.  Patient will transfer from bed to chair and chair to bed with supervision/set-up using the least restrictive device within 7 day(s).  3.  Patient will perform sit to stand with independence within 7 day(s).  4.  Patient will ambulate with supervision/set-up for 100 feet with the least restrictive device within 7 day(s).   5.  Patient will ascend/descend 5 stairs with 1 handrail(s) with minimal assistance/contact guard assist within 7 day(s).    Outcome: Progressing    PHYSICAL THERAPY TREATMENT    Patient: Kalpana Benoit (68 y.o. female)  Date: 2/6/2024  Diagnosis: Abscess [L02.91]  Abscess of bursa of left knee [M71.062] Abscess of bursa of left knee  Procedure(s) (LRB):  IRRIGATION AND DEBRIDEMENT LEFT KNEE WITH PLACEMENT OF ANTIBIOTIC SPACERS (Left) 6 Days Post-Op  Precautions: Weight Bearing   Left Lower Extremity Weight Bearing: Weight Bearing As Tolerated                  ASSESSMENT:  Patient continues to benefit from skilled PT services and is progressing towards goals. Pt received supine in bed, reporting continued pain with activity, pleasantly declining any PT at this time. Pt ed for continued LE ex in bed to increase strength and mobility with demonstration, as well as pt did not have CP in room. CP given to pt and placement on L knee with all needs met at the time and RN notified of session.

## 2024-02-06 NOTE — DISCHARGE SUMMARY
mg at noon and 2,000 mg in the evening. Compound per protocol.     lactobacillus capsule  Take 1 capsule by mouth daily (with breakfast)     traMADol 50 MG tablet  Commonly known as: ULTRAM  Take 1 tablet by mouth every 6 hours as needed for Pain for up to 7 days. Max Daily Amount: 200 mg            CHANGE how you take these medications      sucralfate 1 GM tablet  Commonly known as: CARAFATE  TAKE 1 TABLET BY MOUTH FOUR TIMES A DAY  What changed:   how much to take  how to take this  when to take this            CONTINUE taking these medications      acetaminophen 500 MG tablet  Commonly known as: TYLENOL     amLODIPine 5 MG tablet  Commonly known as: NORVASC  Take 1 tablet by mouth daily Restart when systolic blood pressure over 120, diastolic over 90     atorvastatin 40 MG tablet  Commonly known as: LIPITOR  TAKE 1 TABLET BY MOUTH EVERY DAY     famotidine 40 MG tablet  Commonly known as: PEPCID     hydroCHLOROthiazide 25 MG tablet  Commonly known as: HYDRODIURIL  Take 1 tablet by mouth daily Restart tomorrow     losartan 50 MG tablet  Commonly known as: COZAAR  Take 1 tablet by mouth daily Restart when Systolic blood pressure over 120, diastolic blood pressure over 90     metFORMIN 1000 MG tablet  Commonly known as: GLUCOPHAGE     MULTIVITAMIN WOMEN PO     nystatin 877390 UNIT/GM powder  Commonly known as: MYCOSTATIN     pioglitazone 45 MG tablet  Commonly known as: ACTOS     potassium chloride 10 MEQ extended release capsule  Commonly known as: MICRO-K  TAKE 1 CAPSULE BY MOUTH TWICE A DAY     Vitamin D3 50 MCG (2000 UT) Tabs  TAKE 1 TABLET BY MOUTH TWICE A DAY            STOP taking these medications      cephALEXin 500 MG capsule  Commonly known as: KEFLEX     diclofenac 75 MG EC tablet  Commonly known as: VOLTAREN               Where to Get Your Medications        These medications were sent to [facility]/pharmacy #[id number] - [place], [place] - [address] - P [phone] - F [phone]  [address] [facility] [address] [facility]

## 2024-02-06 NOTE — PLAN OF CARE
Problem: Pain  Goal: Verbalizes/displays adequate comfort level or baseline comfort level  2/6/2024 1008 by Deni Jimenez RN  Outcome: Progressing  2/5/2024 2034 by Amita Delarosa LPN  Outcome: Progressing     Problem: Safety - Adult  Goal: Free from fall injury  2/6/2024 1008 by Deni Jimenez RN  Outcome: Progressing  2/5/2024 2034 by Amita Delarosa LPN  Outcome: Progressing     Problem: ABCDS Injury Assessment  Goal: Absence of physical injury  2/5/2024 2034 by Amita Delarosa LPN  Outcome: Progressing     Problem: Physical Therapy - Adult  Goal: By Discharge: Performs mobility at highest level of function for planned discharge setting.  See evaluation for individualized goals.  Description: FUNCTIONAL STATUS PRIOR TO ADMISSION: Patient was independent and active without use of DME. Patient would use rolling walker if needed.    HOME SUPPORT PRIOR TO ADMISSION: The patient lived with , son, and daughter but did not require assist.    Physical Therapy Goals  Initiated 2/1/2024  1.  Patient will move from supine to sit and sit to supine  in bed with supervision/set-up within 7 day(s).    2.  Patient will transfer from bed to chair and chair to bed with supervision/set-up using the least restrictive device within 7 day(s).  3.  Patient will perform sit to stand with independence within 7 day(s).  4.  Patient will ambulate with supervision/set-up for 100 feet with the least restrictive device within 7 day(s).   5.  Patient will ascend/descend 5 stairs with 1 handrail(s) with minimal assistance/contact guard assist within 7 day(s).    2/6/2024 0946 by Ignacio Najera, PTA  Outcome: Progressing

## 2024-02-06 NOTE — PLAN OF CARE
Problem: Pain  Goal: Verbalizes/displays adequate comfort level or baseline comfort level  2/5/2024 2034 by Amita Delarosa LPN  Outcome: Progressing  2/5/2024 1045 by Deni Jimenez RN  Outcome: Progressing     Problem: Safety - Adult  Goal: Free from fall injury  2/5/2024 2034 by Amita Delarosa LPN  Outcome: Progressing  2/5/2024 1045 by Deni Jimenez RN  Outcome: Progressing     Problem: ABCDS Injury Assessment  Goal: Absence of physical injury  Outcome: Progressing     Problem: Physical Therapy - Adult  Goal: By Discharge: Performs mobility at highest level of function for planned discharge setting.  See evaluation for individualized goals.  Description: FUNCTIONAL STATUS PRIOR TO ADMISSION: Patient was independent and active without use of DME. Patient would use rolling walker if needed.    HOME SUPPORT PRIOR TO ADMISSION: The patient lived with , son, and daughter but did not require assist.    Physical Therapy Goals  Initiated 2/1/2024  1.  Patient will move from supine to sit and sit to supine  in bed with supervision/set-up within 7 day(s).    2.  Patient will transfer from bed to chair and chair to bed with supervision/set-up using the least restrictive device within 7 day(s).  3.  Patient will perform sit to stand with independence within 7 day(s).  4.  Patient will ambulate with supervision/set-up for 100 feet with the least restrictive device within 7 day(s).   5.  Patient will ascend/descend 5 stairs with 1 handrail(s) with minimal assistance/contact guard assist within 7 day(s).    2/5/2024 1344 by Shantal Norton, PT  Outcome: Progressing

## 2024-02-06 NOTE — CARE COORDINATION
Care Management Initial Assessment       RUR: 8  Readmission? No  1st IM letter given? Yes   1st  letter given: No         02/01/24 2128   Service Assessment   Patient Orientation Alert and Oriented   Cognition Alert   History Provided By Patient   Primary Caregiver Self   Support Systems Spouse/Significant Other;Children   Patient's Healthcare Decision Maker is: Legal Next of Kin   PCP Verified by CM Yes   Last Visit to PCP Within last 3 months  (Last month)   Prior Functional Level Independent in ADLs/IADLs   Current Functional Level Independent in ADLs/IADLs   Can patient return to prior living arrangement Yes   Ability to make needs known: Good   Family able to assist with home care needs: Yes   Financial Resources Medicaid   Social/Functional History   Lives With Spouse;Son   Type of Home House   Home Layout One level   Home Access Stairs to enter with rails   Entrance Stairs - Number of Steps 7 steps at entrance and 3 steps at back door   Home Equipment Rollator;Walker, rolling   Receives Help From Family   ADL Assistance Independent   Homemaking Assistance Independent   Ambulation Assistance Independent   Transfer Assistance Independent   Active  Yes   Mode of Transportation Car   Occupation Retired   Discharge Planning   Type of Residence House   Patient expects to be discharged to: House   One/Two Story Residence One story   Services At/After Discharge   Services At/After Discharge Home Health  (Pt may also need home infusion. Pt had previous Op rehab experiances.)   Mode of Transport at Discharge Self   Confirm Follow Up Transport Self  (Daughter will transport pt back to home)       Pharmacy- [facility] [address]    CM spoke to pt and completed Cm initial assessment. Pt ws independent with ADLs and IADLs. Pt lives with  and son in a single level home. PT is recommending for HH. Pt reported that she may also need SN HH and home infusion upon d/c. Floor cm will follow up. 
Initial note: Chart reviewed, consult for IV Abx acknowledged. CM actively working to upload order for IV Abx, PICC report, & supporting clinical documentation via [employer] for [facility] to review. [facility] will need to run pt's insurance to determine coverage for services. Update on status of home infusion services to be provided once available. CM also working to send mass referral for HH in effort to identify agencies that accept pt's insurance ([employer] Medicaid). CM will continue to follow & remain accessible for d/c planning.    CHANTEL Hdz  [facility] CM   [phone]    
care/goals, treatment preferences, and shares the quality data associated with the providers?  Yes       CHANTEL Hdz  [facility] CM   [phone]

## 2024-02-12 ENCOUNTER — OFFICE VISIT (OUTPATIENT)
Age: 69
End: 2024-02-12

## 2024-02-12 VITALS
WEIGHT: 158.2 LBS | HEART RATE: 63 BPM | HEIGHT: 61 IN | BODY MASS INDEX: 29.87 KG/M2 | TEMPERATURE: 97.4 F | DIASTOLIC BLOOD PRESSURE: 75 MMHG | OXYGEN SATURATION: 96 % | RESPIRATION RATE: 18 BRPM | SYSTOLIC BLOOD PRESSURE: 106 MMHG

## 2024-02-12 DIAGNOSIS — Z47.89 ORTHOPEDIC AFTERCARE: Primary | ICD-10-CM

## 2024-02-12 PROCEDURE — 99024 POSTOP FOLLOW-UP VISIT: CPT | Performed by: ORTHOPAEDIC SURGERY

## 2024-02-12 RX ORDER — HYDROCODONE BITARTRATE AND ACETAMINOPHEN 5; 325 MG/1; MG/1
1 TABLET ORAL EVERY 8 HOURS PRN
Qty: 21 TABLET | Refills: 0 | Status: SHIPPED | OUTPATIENT
Start: 2024-02-12 | End: 2024-02-19

## 2024-02-12 NOTE — PROGRESS NOTES
Identified pt with two pt identifiers (name and ). Reviewed chart in preparation for visit and have obtained necessary documentation.    Kalpana Benoit is a 68 y.o. female Knee Pain (Follow up I and D Left Knee)  .    Vitals:    24 1621   BP: 106/75   Site: Right Upper Arm   Position: Sitting   Cuff Size: Medium Adult   Pulse: 63   Resp: 18   Temp: 97.4 °F (36.3 °C)   TempSrc: Oral   SpO2: 96%   Weight: 71.8 kg (158 lb 3.2 oz)   Height: 1.549 m (5' 1\")          1. Have you been to the ER, urgent care clinic since your last visit?  Hospitalized since your last visit?  no     2. Have you seen or consulted any other health care providers outside of the [facility] since your last visit?  Include any pap smears or colon screening.  no

## 2024-02-12 NOTE — PROGRESS NOTES
is here for a follow up visit from a total knee arthroplasty on the left side.  The patient is doing well, with no medical complications since discharge.  Pain has been appropriate since surgery,and the patient is progressing well with physical therapy.  Patient is ambulating with a walker.    Current Outpatient Medications on File Prior to Visit   Medication Sig Dispense Refill    aspirin (CLAUS ASPIRIN) 325 MG tablet Take 1 tablet by mouth daily For blood clot prevention 30 tablet 0    lactobacillus (CULTURELLE) capsule Take 1 capsule by mouth daily (with breakfast) 30 capsule 1    traMADol (ULTRAM) 50 MG tablet Take 1 tablet by mouth every 6 hours as needed for Pain for up to 7 days. Max Daily Amount: 200 mg 28 tablet 0    cefepime (MAXIPIME) infusion Infuse 2,000 mg intravenously in the morning and 2,000 mg at noon and 2,000 mg in the evening. Compound per protocol. 222 g 0    Cholecalciferol (VITAMIN D3) 50 MCG (2000 UT) TABS TAKE 1 TABLET BY MOUTH TWICE A DAY 60 tablet 3    potassium chloride (MICRO-K) 10 MEQ extended release capsule TAKE 1 CAPSULE BY MOUTH TWICE A DAY 60 capsule 6    amLODIPine (NORVASC) 5 MG tablet Take 1 tablet by mouth daily Restart when systolic blood pressure over 120, diastolic over 90 30 tablet 3    losartan (COZAAR) 50 MG tablet Take 1 tablet by mouth daily Restart when Systolic blood pressure over 120, diastolic blood pressure over 90 30 tablet 3    hydroCHLOROthiazide (HYDRODIURIL) 25 MG tablet Take 1 tablet by mouth daily Restart tomorrow 30 tablet 3    atorvastatin (LIPITOR) 40 MG tablet TAKE 1 TABLET BY MOUTH EVERY DAY 30 tablet 12    sucralfate (CARAFATE) 1 GM tablet TAKE 1 TABLET BY MOUTH FOUR TIMES A DAY (Patient taking differently: in the morning and at bedtime) 120 tablet 3    acetaminophen (TYLENOL) 500 MG tablet Take 2 tablets by mouth every 6 hours as needed for Pain      metFORMIN (GLUCOPHAGE) 1000 MG tablet Take 1 tablet by mouth daily (with breakfast)

## 2024-02-13 LAB
BACTERIA SPEC CULT: NORMAL
GRAM STN SPEC: NORMAL
SERVICE CMNT-IMP: NORMAL

## 2024-02-13 RX ORDER — BLOOD-GLUCOSE METER
EACH MISCELLANEOUS
Qty: 1 KIT | Refills: 0 | Status: SHIPPED | OUTPATIENT
Start: 2024-02-13

## 2024-02-14 RX ORDER — FAMOTIDINE 40 MG/1
40 TABLET, FILM COATED ORAL DAILY
Qty: 90 TABLET | Refills: 0 | Status: SHIPPED | OUTPATIENT
Start: 2024-02-14

## 2024-02-14 RX ORDER — LOSARTAN POTASSIUM 50 MG/1
50 TABLET ORAL DAILY
Qty: 90 TABLET | Refills: 0 | Status: SHIPPED | OUTPATIENT
Start: 2024-02-14

## 2024-02-14 RX ORDER — AMLODIPINE BESYLATE 5 MG/1
5 TABLET ORAL DAILY
Qty: 90 TABLET | Refills: 0 | Status: SHIPPED | OUTPATIENT
Start: 2024-02-14

## 2024-02-14 RX ORDER — PIOGLITAZONEHYDROCHLORIDE 45 MG/1
45 TABLET ORAL NIGHTLY
Qty: 90 TABLET | Refills: 0 | Status: SHIPPED | OUTPATIENT
Start: 2024-02-14

## 2024-02-14 NOTE — TELEPHONE ENCOUNTER
Last appointment: 01/16/2024 MD Willingham   Next appointment: 04/16/2024 MD Willingham   Previous refill encounter(s):   01/27/2023:  - Pepcid #30 with 12 refills,   - Actos #30 with 12 refills,   10/25/2023:  - Cozaar #30 with 3 refills,   - Norvasc #30 with 3 refills. Both prescribed by Madi HELM.     For Pharmacy Admin Tracking Only    Program: Medication Refill  Intervention Detail: New Rx: 4, reason: Patient Preference  Time Spent (min): 5    Requested Prescriptions     Pending Prescriptions Disp Refills    amLODIPine (NORVASC) 5 MG tablet [Pharmacy Med Name: AMLODIPINE BESYLATE 5 MG TAB] 90 tablet 0     Sig: TAKE 1 TABLET BY MOUTH EVERY DAY    losartan (COZAAR) 50 MG tablet [Pharmacy Med Name: LOSARTAN POTASSIUM 50 MG TAB] 90 tablet 0     Sig: TAKE 1 TABLET BY MOUTH EVERY DAY    pioglitazone (ACTOS) 45 MG tablet [Pharmacy Med Name: PIOGLITAZONE HCL 45 MG TABLET] 90 tablet 0     Sig: TAKE 1 TABLET BY MOUTH NIGHTLY    famotidine (PEPCID) 40 MG tablet [Pharmacy Med Name: FAMOTIDINE 40 MG TABLET] 90 tablet 0     Sig: TAKE 1 TABLET BY MOUTH EVERY DAY

## 2024-02-23 RX ORDER — BLOOD SUGAR DIAGNOSTIC
STRIP MISCELLANEOUS
Qty: 100 EACH | Refills: 3 | Status: SHIPPED | OUTPATIENT
Start: 2024-02-23

## 2024-02-29 ENCOUNTER — TELEPHONE (OUTPATIENT)
Age: 69
End: 2024-02-29

## 2024-02-29 NOTE — TELEPHONE ENCOUNTER
Spoke with Juan Alberto from [facility] with critical labs neutrophils absolute is 0.3. will fax labs to office

## 2024-03-01 NOTE — TELEPHONE ENCOUNTER
Spoke with El from [facility] and patient advised per Dr Dc that Labs received and reviewed.  Date of collection not clear on paperwork.  BUNs/creatinine 18/0.01    WBC 2.8, neutrophils 0.3  Advised  patient/home health to hold antibiotic due to critically low neutrophil count.  Wear mask around people, do not eat any raw fruits or vegetables.  Please ask home health to do stat CBC on Monday and call us with results.  Patient would need to hold antibiotic until then.

## 2024-03-01 NOTE — TELEPHONE ENCOUNTER
Labs received and reviewed.  Date of collection not clear on paperwork.  BUNs/creatinine 18/0.01    WBC 2.8, neutrophils 0.3  Please ask patient/home health to hold antibiotic due to critically low neutrophil count.  Wear mask around people, do not eat any raw fruits or vegetables.  Please ask home health to do stat CBC on Monday and call us with results.  Patient would need to hold antibiotic until then

## 2024-03-01 NOTE — TELEPHONE ENCOUNTER
Spoke with Juan Alberto from [facility] that will contact [facility] to get updated labs.

## 2024-03-05 ENCOUNTER — OFFICE VISIT (OUTPATIENT)
Age: 69
End: 2024-03-05

## 2024-03-05 DIAGNOSIS — Z47.89 ORTHOPEDIC AFTERCARE: Primary | ICD-10-CM

## 2024-03-05 PROCEDURE — 99024 POSTOP FOLLOW-UP VISIT: CPT | Performed by: ORTHOPAEDIC SURGERY

## 2024-03-05 RX ORDER — HYDROCODONE BITARTRATE AND ACETAMINOPHEN 5; 325 MG/1; MG/1
1 TABLET ORAL EVERY 8 HOURS PRN
Qty: 21 TABLET | Refills: 0 | Status: SHIPPED | OUTPATIENT
Start: 2024-03-05 | End: 2024-03-12

## 2024-03-05 NOTE — PROGRESS NOTES
is here for a follow up visit from a left knee I+D.    Pain has been appropriate since surgery, no major medical complications since surgery.      Current Outpatient Medications on File Prior to Visit   Medication Sig Dispense Refill    blood glucose test strips (ONETOUCH VERIO) strip Test once daily. E11.9, Diabetes Type 2. 100 each 3    amLODIPine (NORVASC) 5 MG tablet TAKE 1 TABLET BY MOUTH EVERY DAY 90 tablet 0    losartan (COZAAR) 50 MG tablet TAKE 1 TABLET BY MOUTH EVERY DAY 90 tablet 0    pioglitazone (ACTOS) 45 MG tablet TAKE 1 TABLET BY MOUTH NIGHTLY 90 tablet 0    famotidine (PEPCID) 40 MG tablet TAKE 1 TABLET BY MOUTH EVERY DAY 90 tablet 0    Blood Glucose Monitoring Suppl (ONE TOUCH ULTRA 2) w/Device KIT Test once daily. E11.9. Type 2 Diabetes 1 kit 0    metFORMIN (GLUCOPHAGE) 1000 MG tablet Take 1 tablet by mouth daily (with breakfast) 60 tablet 3    aspirin (CLAUS ASPIRIN) 325 MG tablet Take 1 tablet by mouth daily For blood clot prevention 30 tablet 0    lactobacillus (CULTURELLE) capsule Take 1 capsule by mouth daily (with breakfast) 30 capsule 1    cefepime (MAXIPIME) infusion Infuse 2,000 mg intravenously in the morning and 2,000 mg at noon and 2,000 mg in the evening. Compound per protocol. 222 g 0    Cholecalciferol (VITAMIN D3) 50 MCG (2000 UT) TABS TAKE 1 TABLET BY MOUTH TWICE A DAY 60 tablet 3    potassium chloride (MICRO-K) 10 MEQ extended release capsule TAKE 1 CAPSULE BY MOUTH TWICE A DAY 60 capsule 6    hydroCHLOROthiazide (HYDRODIURIL) 25 MG tablet Take 1 tablet by mouth daily Restart tomorrow 30 tablet 3    atorvastatin (LIPITOR) 40 MG tablet TAKE 1 TABLET BY MOUTH EVERY DAY 30 tablet 12    sucralfate (CARAFATE) 1 GM tablet TAKE 1 TABLET BY MOUTH FOUR TIMES A DAY (Patient taking differently: in the morning and at bedtime) 120 tablet 3    acetaminophen (TYLENOL) 500 MG tablet Take 2 tablets by mouth every 6 hours as needed for Pain      nystatin (MYCOSTATIN) 595312 UNIT/GM

## 2024-03-07 ENCOUNTER — TELEPHONE (OUTPATIENT)
Age: 69
End: 2024-03-07

## 2024-03-07 NOTE — TELEPHONE ENCOUNTER
Received labs  neutrophils 0.9 . Spoke with Yuval from [email or web address] advised per Dr liriano the IV antibiotic should continue to be held

## 2024-03-08 NOTE — TELEPHONE ENCOUNTER
Spoke with Kristin from [facility]. Advised per Dr Dc that   Antibiotic would need to be held until neutrophil count is above 1,000.

## 2024-03-12 ENCOUNTER — OFFICE VISIT (OUTPATIENT)
Age: 69
End: 2024-03-12
Payer: COMMERCIAL

## 2024-03-12 ENCOUNTER — TELEPHONE (OUTPATIENT)
Age: 69
End: 2024-03-12

## 2024-03-12 VITALS
TEMPERATURE: 97.9 F | HEIGHT: 61 IN | BODY MASS INDEX: 30.93 KG/M2 | DIASTOLIC BLOOD PRESSURE: 80 MMHG | SYSTOLIC BLOOD PRESSURE: 131 MMHG | WEIGHT: 163.8 LBS | OXYGEN SATURATION: 97 % | RESPIRATION RATE: 18 BRPM | HEART RATE: 93 BPM

## 2024-03-12 DIAGNOSIS — E11.8 CONTROLLED DIABETES MELLITUS TYPE 2 WITH COMPLICATIONS, UNSPECIFIED WHETHER LONG TERM INSULIN USE (HCC): ICD-10-CM

## 2024-03-12 DIAGNOSIS — I10 PRIMARY HYPERTENSION: ICD-10-CM

## 2024-03-12 DIAGNOSIS — A49.8 INFECTION DUE TO ENTEROBACTER CLOACAE: ICD-10-CM

## 2024-03-12 DIAGNOSIS — T84.54XD INFECTION ASSOCIATED WITH INTERNAL LEFT KNEE PROSTHESIS, SUBSEQUENT ENCOUNTER: Primary | ICD-10-CM

## 2024-03-12 DIAGNOSIS — M70.50 PES ANSERINE BURSITIS: ICD-10-CM

## 2024-03-12 DIAGNOSIS — D70.2 OTHER DRUG-INDUCED NEUTROPENIA (HCC): ICD-10-CM

## 2024-03-12 DIAGNOSIS — T36.95XA ANTIBIOTIC CAUSING ADVERSE EFFECT: ICD-10-CM

## 2024-03-12 PROCEDURE — 3079F DIAST BP 80-89 MM HG: CPT | Performed by: INTERNAL MEDICINE

## 2024-03-12 PROCEDURE — 99214 OFFICE O/P EST MOD 30 MIN: CPT | Performed by: INTERNAL MEDICINE

## 2024-03-12 PROCEDURE — 3044F HG A1C LEVEL LT 7.0%: CPT | Performed by: INTERNAL MEDICINE

## 2024-03-12 PROCEDURE — 3075F SYST BP GE 130 - 139MM HG: CPT | Performed by: INTERNAL MEDICINE

## 2024-03-12 PROCEDURE — 1123F ACP DISCUSS/DSCN MKR DOCD: CPT | Performed by: INTERNAL MEDICINE

## 2024-03-12 ASSESSMENT — PATIENT HEALTH QUESTIONNAIRE - PHQ9
1. LITTLE INTEREST OR PLEASURE IN DOING THINGS: NOT AT ALL
SUM OF ALL RESPONSES TO PHQ QUESTIONS 1-9: 0
SUM OF ALL RESPONSES TO PHQ9 QUESTIONS 1 & 2: 0
2. FEELING DOWN, DEPRESSED OR HOPELESS: NOT AT ALL
SUM OF ALL RESPONSES TO PHQ QUESTIONS 1-9: 0

## 2024-03-12 NOTE — TELEPHONE ENCOUNTER
Please ask home health to draw the labs on patient tomorrow  CBC, CMP, ESR, CRP and place a stat order.  Patient has not been on antibiotics for past 2 weeks due to low neutrophil count.

## 2024-03-12 NOTE — PROGRESS NOTES
Infectious Disease Progress        Impression     Left prosthetic knee infection  Pes anserine bursitis  H/o L/TKA 5/2023  R/ TKA 10/2023  S/p I&D 1/30.  S/p I&D/placement of antibiotic spacer 1/31  IntraOp culture 1/30 + for Enterobacter cloacae complex ( thio only)  IntraOp culture 1/31+ for  Enterobacter cloacae complex  Change to Cefepime IV  Blood cultures 1/31-NG TD  Bactrim PTA.     Previous cultures  Culture 12/13- E. Cloacae( thio only)  12/17-E.cloacae  12/21-Staph capitis( thio only)  Rare E cloacae complex.     H/o E. coli UTI 4/2023     Diabetes type 2  A1c 6.4     HTN  Continue Norvasc.       CRP 3.09     Plan     Cefepime 2G IV Q8h  Plan for 6 weeks of antimicrobial therapy IV  1/31- 3/13  Adequate fluids, daily probiotic.    Antimicrobial orders   - Cefepime 2G IV Q8h  end date 3/13  -Antibiotic on hold due to low ANC since 3/1  -Weekly CBC, CMP & ESR/ CRP every other week  -Fax reports to [phone], call with critical labs  at [phone]  -Encourage adequate fluids, daily probiotic/yogurt  -If line malfunction occurs and home health cannot reposition  please send patient to ED immediately  -      Possible adverse effects of long term antibiotics are inclusive of but not limited to following  BM suppression, neutropenia , cytopenias , aplastic anemia hemorrhage liver & renal dysfunction/ liver , renal failure  , GI dysfunction- N, V  Diarrhea,C.difficile disease, rash , allergy , anaphylaxis.toxic epidermal necrolysis  Neuro toxicity , seizure disorder  Side effects tend to be more pronounced in the elderly. D/w pt          Extensive review of chart notes, labs, imaging, cultures done  Additionally review of done: Recent reports-Labs, cultures, imaging      Patient seen today in office on hospital follow-up.  Denies new complaints.  Patient reports that knee feels better overall.  Antibiotic is on hold since 3/1 due to low WBC and neutrophil count.  Patient states she feels fine.  Advised patient

## 2024-03-12 NOTE — PROGRESS NOTES
Rm    Chief Complaint   Patient presents with    Follow-up        /80 (Site: Left Upper Arm)   Pulse 93   Temp 97.9 °F (36.6 °C)   Resp 18   Ht 1.549 m (5' 1\")   Wt 74.3 kg (163 lb 12.8 oz)   SpO2 97%   BMI 30.95 kg/m²      1. Have you been to the ER, urgent care clinic since your last visit?  Hospitalized since your last visit? no    2. Have you seen or consulted any other health care providers outside of the [facility] since your last visit?  Include any pap smears or colon screening.  no    Health Maintenance Due   Topic Date Due    Pneumococcal 65+ years Vaccine (1 - PCV) Never done    Diabetic Alb to Cr ratio (uACR) test  Never done    DTaP/Tdap/Td vaccine (1 - Tdap) Never done    Shingles vaccine (1 of 2) Never done    Respiratory Syncytial Virus (RSV) Pregnant or age 60 yrs+ (1 - 1-dose 60+ series) Never done    Diabetic foot exam  02/03/2022    Diabetic retinal exam  12/07/2022    COVID-19 Vaccine (4 - 2023-24 season) 09/01/2023    Lipids  12/30/2023             No data to display                 Failed to redirect to the Timeline version of the [employer] SmartLink.    Failed to redirect to the Timeline version of the [employer] SmartLink.

## 2024-03-12 NOTE — TELEPHONE ENCOUNTER
Spoke with EL from [facility] advised per Dr Dc that CBC, CMP, ESR, CRP and place a stat order.  Patient has not been on antibiotics for past 2 weeks due to low neutrophil count.verbalized understanding

## 2024-03-13 ENCOUNTER — OFFICE VISIT (OUTPATIENT)
Age: 69
End: 2024-03-13

## 2024-03-13 VITALS
OXYGEN SATURATION: 94 % | RESPIRATION RATE: 18 BRPM | HEIGHT: 61 IN | HEART RATE: 110 BPM | WEIGHT: 163.2 LBS | DIASTOLIC BLOOD PRESSURE: 77 MMHG | SYSTOLIC BLOOD PRESSURE: 133 MMHG | BODY MASS INDEX: 30.81 KG/M2

## 2024-03-13 DIAGNOSIS — Z47.1 AFTERCARE FOLLOWING RIGHT KNEE JOINT REPLACEMENT SURGERY: ICD-10-CM

## 2024-03-13 DIAGNOSIS — Z96.651 AFTERCARE FOLLOWING RIGHT KNEE JOINT REPLACEMENT SURGERY: ICD-10-CM

## 2024-03-13 DIAGNOSIS — Z47.89 ORTHOPEDIC AFTERCARE: Primary | ICD-10-CM

## 2024-03-13 DIAGNOSIS — Z47.89 ORTHOPEDIC AFTERCARE: ICD-10-CM

## 2024-03-13 RX ORDER — HYDROCHLOROTHIAZIDE 25 MG/1
25 TABLET ORAL DAILY
Qty: 30 TABLET | Refills: 12 | OUTPATIENT
Start: 2024-03-13

## 2024-03-13 RX ORDER — SUCRALFATE 1 G/1
TABLET ORAL
Qty: 120 TABLET | Refills: 3 | OUTPATIENT
Start: 2024-03-13

## 2024-03-13 ASSESSMENT — PATIENT HEALTH QUESTIONNAIRE - PHQ9
1. LITTLE INTEREST OR PLEASURE IN DOING THINGS: 0
SUM OF ALL RESPONSES TO PHQ QUESTIONS 1-9: 0
SUM OF ALL RESPONSES TO PHQ QUESTIONS 1-9: 0
SUM OF ALL RESPONSES TO PHQ9 QUESTIONS 1 & 2: 0
2. FEELING DOWN, DEPRESSED OR HOPELESS: 0
SUM OF ALL RESPONSES TO PHQ QUESTIONS 1-9: 0
SUM OF ALL RESPONSES TO PHQ QUESTIONS 1-9: 0

## 2024-03-13 NOTE — PROGRESS NOTES
is here for a follow up visit from a left knee antibiotic spacer placement.    Pain has been appropriate since surgery, no major medical complications since surgery.  She has had neutropenia due to her antibiotic therapy and has been off for two weeks.    Current Outpatient Medications on File Prior to Visit   Medication Sig Dispense Refill    blood glucose test strips (ONETOUCH VERIO) strip Test once daily. E11.9, Diabetes Type 2. 100 each 3    amLODIPine (NORVASC) 5 MG tablet TAKE 1 TABLET BY MOUTH EVERY DAY 90 tablet 0    losartan (COZAAR) 50 MG tablet TAKE 1 TABLET BY MOUTH EVERY DAY 90 tablet 0    pioglitazone (ACTOS) 45 MG tablet TAKE 1 TABLET BY MOUTH NIGHTLY 90 tablet 0    famotidine (PEPCID) 40 MG tablet TAKE 1 TABLET BY MOUTH EVERY DAY 90 tablet 0    Blood Glucose Monitoring Suppl (ONE TOUCH ULTRA 2) w/Device KIT Test once daily. E11.9. Type 2 Diabetes 1 kit 0    metFORMIN (GLUCOPHAGE) 1000 MG tablet Take 1 tablet by mouth daily (with breakfast) 60 tablet 3    aspirin (CLAUS ASPIRIN) 325 MG tablet Take 1 tablet by mouth daily For blood clot prevention 30 tablet 0    lactobacillus (CULTURELLE) capsule Take 1 capsule by mouth daily (with breakfast) 30 capsule 1    cefepime (MAXIPIME) infusion Infuse 2,000 mg intravenously in the morning and 2,000 mg at noon and 2,000 mg in the evening. Compound per protocol. 222 g 0    Cholecalciferol (VITAMIN D3) 50 MCG (2000 UT) TABS TAKE 1 TABLET BY MOUTH TWICE A DAY 60 tablet 3    potassium chloride (MICRO-K) 10 MEQ extended release capsule TAKE 1 CAPSULE BY MOUTH TWICE A DAY 60 capsule 6    hydroCHLOROthiazide (HYDRODIURIL) 25 MG tablet Take 1 tablet by mouth daily Restart tomorrow 30 tablet 3    atorvastatin (LIPITOR) 40 MG tablet TAKE 1 TABLET BY MOUTH EVERY DAY 30 tablet 12    acetaminophen (TYLENOL) 500 MG tablet Take 2 tablets by mouth every 6 hours as needed for Pain      nystatin (MYCOSTATIN) 814504 UNIT/GM powder Apply topically as needed

## 2024-03-13 NOTE — PROGRESS NOTES
Identified pt with two pt identifiers (name and ). Reviewed chart in preparation for visit and have obtained necessary documentation.    Kalpana Benoit is a 68 y.o. female Post-Op Check (Lt TKA f/u )  .    Vitals:    24 1412 24 1414   BP: (!) 144/89 133/77   Site: Left Upper Arm Left Upper Arm   Position: Sitting Sitting   Cuff Size: Large Adult Large Adult   Pulse: (!) 114 (!) 110   Resp: 18    SpO2: 94%    Weight: 74 kg (163 lb 3.2 oz)    Height: 1.549 m (5' 1\")           1. Have you been to the ER, urgent care clinic since your last visit?  Hospitalized since your last visit?  no     2. Have you seen or consulted any other health care providers outside of the [facility] since your last visit?  Include any pap smears or colon screening.  no

## 2024-03-14 LAB
BACTERIA SPEC CULT: NORMAL
GRAM STN SPEC: NORMAL
GRAM STN SPEC: NORMAL
SERVICE CMNT-IMP: NORMAL

## 2024-03-25 ENCOUNTER — TELEPHONE (OUTPATIENT)
Age: 69
End: 2024-03-25

## 2024-03-25 RX ORDER — SUCRALFATE 1 G/1
TABLET ORAL
Qty: 120 TABLET | Refills: 3 | OUTPATIENT
Start: 2024-03-25

## 2024-03-25 RX ORDER — HYDROCHLOROTHIAZIDE 25 MG/1
25 TABLET ORAL DAILY
Qty: 30 TABLET | Refills: 12 | OUTPATIENT
Start: 2024-03-25

## 2024-03-25 NOTE — TELEPHONE ENCOUNTER
Susanne with [facility].  Pt was being seen for IV therapy.    Does she need more labs and when can the Pic line be removed.    If there are any new order please fax to [phone]    Susanne # [phone]

## 2024-03-25 NOTE — TELEPHONE ENCOUNTER
Spoke with Susanne from [facility]  advised per Dr Dc that    need CBC, CMP, ESR, CRP.  If satisfactory for DC of Line.

## 2024-03-26 ENCOUNTER — TELEPHONE (OUTPATIENT)
Age: 69
End: 2024-03-26

## 2024-03-26 NOTE — TELEPHONE ENCOUNTER
Called and verified name and date of birth.   [facility] asking if picc line can be pulled   Most recent labs came in via fax, in folder for review.     No other questions.   Sent to Dr liriano to advise.     Signed By: Derrell Garcia Clinical Care Technician    03/26/24  1:13 PM

## 2024-03-26 NOTE — TELEPHONE ENCOUNTER
Called [employer] and left detailed voice message for pulling picc line. Called [phone]

## 2024-03-27 ENCOUNTER — TELEPHONE (OUTPATIENT)
Age: 69
End: 2024-03-27

## 2024-03-27 LAB
BACTERIA SPEC CULT: NORMAL
GRAM STN SPEC: NORMAL
GRAM STN SPEC: NORMAL
SERVICE CMNT-IMP: NORMAL

## 2024-03-27 NOTE — TELEPHONE ENCOUNTER
Patient is interested in scheduling LT knee replacement. She says she has had her PICC line removed as of 3/27/24 and no more infections is visible in her most recent lab results for her left leg. She had been told by Dr. Garay that infection in her left leg would need to be resolved before she could move forward with getting scheduled for knee replacement. She would like to have a return phone call to know if she is able to be scheduled or if she needs to be seen by the doctor prior to getting scheduled for lt knee replacement. The patient may be reached at [phone].

## 2024-03-29 ENCOUNTER — OFFICE VISIT (OUTPATIENT)
Age: 69
End: 2024-03-29

## 2024-03-29 VITALS — BODY MASS INDEX: 30.78 KG/M2 | HEIGHT: 61 IN | WEIGHT: 163 LBS

## 2024-03-29 DIAGNOSIS — Z47.89 ORTHOPEDIC AFTERCARE: Primary | ICD-10-CM

## 2024-03-29 DIAGNOSIS — T84.093A OTHER MECHANICAL COMPLICATION OF INTERNAL LEFT KNEE PROSTHESIS, INITIAL ENCOUNTER (HCC): ICD-10-CM

## 2024-03-29 PROCEDURE — 99024 POSTOP FOLLOW-UP VISIT: CPT | Performed by: ORTHOPAEDIC SURGERY

## 2024-03-29 ASSESSMENT — PATIENT HEALTH QUESTIONNAIRE - PHQ9
SUM OF ALL RESPONSES TO PHQ QUESTIONS 1-9: 0
1. LITTLE INTEREST OR PLEASURE IN DOING THINGS: NOT AT ALL
SUM OF ALL RESPONSES TO PHQ9 QUESTIONS 1 & 2: 0
SUM OF ALL RESPONSES TO PHQ QUESTIONS 1-9: 0
SUM OF ALL RESPONSES TO PHQ QUESTIONS 1-9: 0
2. FEELING DOWN, DEPRESSED OR HOPELESS: NOT AT ALL
SUM OF ALL RESPONSES TO PHQ QUESTIONS 1-9: 0

## 2024-03-29 NOTE — PROGRESS NOTES
Identified pt with two pt identifiers (name and ). Reviewed chart in preparation for visit and have obtained necessary documentation.    Kalpana Benoit is a 68 y.o. female Follow-up (2 week f/u lt knee antibiotic spacer)  .    Vitals:    24 1308   Weight: 73.9 kg (163 lb)   Height: 1.549 m (5' 1\")          1. Have you been to the ER, urgent care clinic since your last visit?  Hospitalized since your last visit?  no     2. Have you seen or consulted any other health care providers outside of the [facility] since your last visit?  Include any pap smears or colon screening.  no

## 2024-03-29 NOTE — PROGRESS NOTES
3/29/2024      CC: left knee pain    HPI:      This is a 68 y.o. year old female who presents for a follow up visit.  The patient was last seen and diagnosed with periprosthetic joint infection, status post antibiotic spacer.   The patient's treatments since the most recent visit have comprised of walker.   The patient has had no relief of the chief complaint.        PMH:  Past Medical History:   Diagnosis Date    Anemia     Diverticulitis     Esophageal reflux     GERD (gastroesophageal reflux disease)     Hypertension     Osteoarthrosis, unspecified whether generalized or localized, lower leg     knees    Other and unspecified hyperlipidemia     Type II or unspecified type diabetes mellitus without mention of complication, uncontrolled        PSxHx:  Past Surgical History:   Procedure Laterality Date    ARM SURGERY Left 12/21/2023    IRRIGATION AND DEBRIDEMENT LEFT LEG performed by Matthew Garay DO at [facility] MAIN OR    COLONOSCOPY N/A 9/18/2017    COLONOSCOPY performed by Malcolm Pérez MD at [facility] AMBULATORY OR    COLONOSCOPY N/A 4/25/2018    COLONOSCOPY performed by Malcolm Pérez MD at [facility] ENDOSCOPY    FINGER TRIGGER RELEASE Left 08/28/2023    LEFT RING FINGER TRIGGER RELEASE performed by Matthew Garay DO at [facility] MAIN OR    GYN  2004    uterine surgery    HAND SURGERY      HYSTERECTOMY (CERVIX STATUS UNKNOWN)  2021    INCISION AND DRAINAGE OF WOUND  09/30/2023    left top of knee    LAP,TUBAL CAUTERY Bilateral 1992    LEG SURGERY Left 1/30/2024    LEFT KNEE INCISION AND DRAINAGE performed by Matthew Garay DO at [facility] MAIN OR    TONSILLECTOMY      age 9    TOTAL KNEE ARTHROPLASTY Left 05/22/2023    LEFT TOTAL KNEE ARTHROPLASTY performed by Matthew Garay DO at [facility] MAIN OR    TOTAL KNEE ARTHROPLASTY Right 10/24/2023    RIGHT TOTAL KNEE ARTHROPLASTY performed by Matthew Garay DO at [facility] MAIN OR    TOTAL KNEE ARTHROPLASTY Left 1/31/2024    IRRIGATION AND DEBRIDEMENT LEFT KNEE WITH PLACEMENT OF ANTIBIOTIC SPACERS

## 2024-04-01 ENCOUNTER — PREP FOR PROCEDURE (OUTPATIENT)
Age: 69
End: 2024-04-01

## 2024-04-01 PROBLEM — T84.093A FAILED TOTAL LEFT KNEE REPLACEMENT (HCC): Status: ACTIVE | Noted: 2024-04-01

## 2024-04-04 ENCOUNTER — HOSPITAL ENCOUNTER (OUTPATIENT)
Facility: HOSPITAL | Age: 69
Discharge: HOME | End: 2024-04-04
Payer: COMMERCIAL

## 2024-04-04 ENCOUNTER — TELEPHONE (OUTPATIENT)
Age: 69
End: 2024-04-04

## 2024-04-04 VITALS
DIASTOLIC BLOOD PRESSURE: 76 MMHG | RESPIRATION RATE: 16 BRPM | WEIGHT: 168.43 LBS | HEART RATE: 70 BPM | TEMPERATURE: 97.6 F | HEIGHT: 61 IN | BODY MASS INDEX: 31.8 KG/M2 | OXYGEN SATURATION: 98 % | SYSTOLIC BLOOD PRESSURE: 127 MMHG

## 2024-04-04 PROBLEM — Z01.818 ENCOUNTER FOR PREADMISSION TESTING: Status: ACTIVE | Noted: 2024-04-04

## 2024-04-04 LAB
ALBUMIN SERPL-MCNC: 3.2 G/DL (ref 3.5–5)
ALBUMIN/GLOB SERPL: 0.8 (ref 1.1–2.2)
ALP SERPL-CCNC: 178 U/L (ref 45–117)
ALT SERPL-CCNC: 17 U/L (ref 12–78)
ANION GAP SERPL CALC-SCNC: 3 MMOL/L (ref 5–15)
APPEARANCE UR: ABNORMAL
AST SERPL-CCNC: 16 U/L (ref 15–37)
BACTERIA URNS QL MICRO: ABNORMAL /HPF
BILIRUB SERPL-MCNC: 0.4 MG/DL (ref 0.2–1)
BILIRUB UR QL: NEGATIVE
BUN SERPL-MCNC: 18 MG/DL (ref 6–20)
BUN/CREAT SERPL: 23 (ref 12–20)
CALCIUM SERPL-MCNC: 9.1 MG/DL (ref 8.5–10.1)
CHLORIDE SERPL-SCNC: 108 MMOL/L (ref 97–108)
CO2 SERPL-SCNC: 27 MMOL/L (ref 21–32)
COLOR UR: ABNORMAL
CREAT SERPL-MCNC: 0.8 MG/DL (ref 0.55–1.02)
EPITH CASTS URNS QL MICRO: ABNORMAL /LPF
ERYTHROCYTE [DISTWIDTH] IN BLOOD BY AUTOMATED COUNT: 20.3 % (ref 11.5–14.5)
EST. AVERAGE GLUCOSE BLD GHB EST-MCNC: 128 MG/DL
GLOBULIN SER CALC-MCNC: 3.8 G/DL (ref 2–4)
GLUCOSE SERPL-MCNC: 175 MG/DL (ref 65–100)
GLUCOSE UR STRIP.AUTO-MCNC: NEGATIVE MG/DL
HBA1C MFR BLD: 6.1 % (ref 4–5.6)
HCT VFR BLD AUTO: 38.1 % (ref 35–47)
HGB BLD-MCNC: 11.2 G/DL (ref 11.5–16)
HGB UR QL STRIP: ABNORMAL
INR PPP: 1 (ref 0.9–1.1)
KETONES UR QL STRIP.AUTO: NEGATIVE MG/DL
LEUKOCYTE ESTERASE UR QL STRIP.AUTO: ABNORMAL
MCH RBC QN AUTO: 26 PG (ref 26–34)
MCHC RBC AUTO-ENTMCNC: 29.4 G/DL (ref 30–36.5)
MCV RBC AUTO: 88.6 FL (ref 80–99)
NITRITE UR QL STRIP.AUTO: POSITIVE
NRBC # BLD: 0 K/UL (ref 0–0.01)
NRBC BLD-RTO: 0 PER 100 WBC
PH UR STRIP: 6 (ref 5–8)
PLATELET # BLD AUTO: 221 K/UL (ref 150–400)
PMV BLD AUTO: 10 FL (ref 8.9–12.9)
POTASSIUM SERPL-SCNC: 4.1 MMOL/L (ref 3.5–5.1)
PROT SERPL-MCNC: 7 G/DL (ref 6.4–8.2)
PROT UR STRIP-MCNC: NEGATIVE MG/DL
PROTHROMBIN TIME: 10.4 SEC (ref 9–11.1)
RBC # BLD AUTO: 4.3 M/UL (ref 3.8–5.2)
RBC #/AREA URNS HPF: ABNORMAL /HPF (ref 0–5)
SODIUM SERPL-SCNC: 138 MMOL/L (ref 136–145)
SP GR UR REFRACTOMETRY: 1.02
URINE CULTURE IF INDICATED: ABNORMAL
UROBILINOGEN UR QL STRIP.AUTO: 0.2 EU/DL (ref 0.2–1)
WBC # BLD AUTO: 5.6 K/UL (ref 3.6–11)
WBC URNS QL MICRO: ABNORMAL /HPF (ref 0–4)

## 2024-04-04 PROCEDURE — 80053 COMPREHEN METABOLIC PANEL: CPT

## 2024-04-04 PROCEDURE — 36415 COLL VENOUS BLD VENIPUNCTURE: CPT

## 2024-04-04 PROCEDURE — 85610 PROTHROMBIN TIME: CPT

## 2024-04-04 PROCEDURE — 87088 URINE BACTERIA CULTURE: CPT

## 2024-04-04 PROCEDURE — 83036 HEMOGLOBIN GLYCOSYLATED A1C: CPT

## 2024-04-04 PROCEDURE — 87086 URINE CULTURE/COLONY COUNT: CPT

## 2024-04-04 PROCEDURE — 81001 URINALYSIS AUTO W/SCOPE: CPT

## 2024-04-04 PROCEDURE — 87186 SC STD MICRODIL/AGAR DIL: CPT

## 2024-04-04 PROCEDURE — 85027 COMPLETE CBC AUTOMATED: CPT

## 2024-04-04 PROCEDURE — APPNB30 APP NON BILLABLE TIME 0-30 MINS: Performed by: NURSE PRACTITIONER

## 2024-04-04 RX ORDER — SODIUM CHLORIDE, SODIUM LACTATE, POTASSIUM CHLORIDE, CALCIUM CHLORIDE 600; 310; 30; 20 MG/100ML; MG/100ML; MG/100ML; MG/100ML
INJECTION, SOLUTION INTRAVENOUS CONTINUOUS
OUTPATIENT
Start: 2024-04-08

## 2024-04-04 ASSESSMENT — KOOS JR
STRAIGHTENING KNEE FULLY: SEVERE
RISING FROM SITTING: MILD
KOOS JR TOTAL INTERVAL SCORE: 52.465
BENDING TO THE FLOOR TO PICK UP OBJECT: MODERATE
TWISING OR PIVOTING ON KNEE: MODERATE
STANDING UPRIGHT: MODERATE
GOING UP OR DOWN STAIRS: MODERATE
HOW SEVERE IS YOUR KNEE STIFFNESS AFTER FIRST WAKING IN MORNING: MODERATE

## 2024-04-04 ASSESSMENT — PROMIS GLOBAL HEALTH SCALE
HOW IS THE PROMIS V1.1 BEING ADMINISTERED?: PAPER
IN THE PAST 7 DAYS, HOW WOULD YOU RATE YOUR FATIGUE ON AVERAGE [ON A SCALE FROM 1 (NONE) TO 5 (VERY SEVERE)]?: MILD
IN GENERAL, WOULD YOU SAY YOUR HEALTH IS...[ON A SCALE OF 1 (POOR) TO 5 (EXCELLENT)]: GOOD
IN GENERAL, HOW WOULD YOU RATE YOUR PHYSICAL HEALTH [ON A SCALE OF 1 (POOR) TO 5 (EXCELLENT)]?: GOOD
IN GENERAL, HOW WOULD YOU RATE YOUR SATISFACTION WITH YOUR SOCIAL ACTIVITIES AND RELATIONSHIPS [ON A SCALE OF 1 (POOR) TO 5 (EXCELLENT)]?: GOOD
IN GENERAL, WOULD YOU SAY YOUR QUALITY OF LIFE IS...[ON A SCALE OF 1 (POOR) TO 5 (EXCELLENT)]: GOOD
WHO IS THE PERSON COMPLETING THE PROMIS V1.1 SURVEY?: SELF
SUM OF RESPONSES TO QUESTIONS 2, 4, 5, & 10: 13
IN GENERAL, HOW WOULD YOU RATE YOUR MENTAL HEALTH, INCLUDING YOUR MOOD AND YOUR ABILITY TO THINK [ON A SCALE OF 1 (POOR) TO 5 (EXCELLENT)]?: GOOD
IN THE PAST 7 DAYS, HOW WOULD YOU RATE YOUR PAIN ON AVERAGE [ON A SCALE FROM 0 (NO PAIN) TO 10 (WORST IMAGINABLE PAIN)]?: 3
SUM OF RESPONSES TO QUESTIONS 3, 6, 7, & 8: 12
IN THE PAST 7 DAYS, HOW OFTEN HAVE YOU BEEN BOTHERED BY EMOTIONAL PROBLEMS, SUCH AS FEELING ANXIOUS, DEPRESSED, OR IRRITABLE [ON A SCALE FROM 1 (NEVER) TO 5 (ALWAYS)]?: RARELY
IN GENERAL, PLEASE RATE HOW WELL YOU CARRY OUT YOUR USUAL SOCIAL ACTIVITIES (INCLUDES ACTIVITIES AT HOME, AT WORK, AND IN YOUR COMMUNITY, AND RESPONSIBILITIES AS A PARENT, CHILD, SPOUSE, EMPLOYEE, FRIEND, ETC) [ON A SCALE OF 1 (POOR) TO 5 (EXCELLENT)]?: GOOD
TO WHAT EXTENT ARE YOU ABLE TO CARRY OUT YOUR EVERYDAY PHYSICAL ACTIVITIES SUCH AS WALKING, CLIMBING STAIRS, CARRYING GROCERIES, OR MOVING A CHAIR [ON A SCALE OF 1 (NOT AT ALL) TO 5 (COMPLETELY)]?: A LITTLE

## 2024-04-04 ASSESSMENT — PAIN SCALES - GENERAL: PAINLEVEL_OUTOF10: 0

## 2024-04-04 NOTE — PROGRESS NOTES

## 2024-04-04 NOTE — PROGRESS NOTES
PAT Nurse Practitioner   Pre-Operative Chart Review/Assessment:-ORTHOPEDIC/NEUROSURGICAL SPINE                Patient Name:  Kalpana Benoit                                                           Age:   68 y.o.    :  1955     Today's Date:  2024     Date of PAT:   24      Date of Surgery:    24     Procedure(s):  LEFT KNEE TOTAL ARTHROPLASTY REVISION      Anesthesia:  General WITH ADDUCTOR CANAL BLOCK      Surgeon:   Tanisha     Primary Care Provider:    Dr. Willingham                   PLAN:      1)  Cardiac Clearance:  Not requested  EKG from 5/15/23       2)  Sleep apnea screening:  STOP Bang 2.  Denies loud snoring or witnessed apnea while sleeping       3)   Program for Diabetes Health Consult:  Not indicated-A1C 6.1      4) Treatment for MRSA/MSSA:  Negative       5) Discharge plan: Home w/ spouse/SO       6) Additional Concerns:  Revision:  T2DM, anemia, COPD, GERD, former smoker.  Multiple ED encounters in last 6 months.  S/P L TKA 23, R TKA 10/23, Irrigation and debridement L knee 23, 24 (incision and drainage) and 24 (irrigation and debridement)       7) Medication recommendations prior to surgery:  Per surgeon's instructions for ASA/NSAIDS/aspirin containing products, vitamins, and supplements prior to surgery                 Vital Signs:       /76   Pulse 70   Temp 97.6 °F (36.4 °C) (Oral)   Resp 16   Ht 1.549 m (5' 1\")   Wt 76.4 kg (168 lb 6.9 oz)   SpO2 98%   BMI 31.82 kg/m²                      ____________________________________________  PAST MEDICAL HISTORY  Past Medical History:   Diagnosis Date    Anemia     blood transfusion on 24 after surgery    Diverticulitis     GERD (gastroesophageal reflux disease)     Hypertension     Osteoarthrosis, unspecified whether generalized or localized, lower leg     knees    Other and unspecified hyperlipidemia     Type II or unspecified type diabetes mellitus without mention of complication, uncontrolled   acute abnormality.         Skin:       Denies open wounds, cuts, sores, rashes or other areas of concern in PAT assessment.          CORY Addison - NP     KNEE DISABILITY OSTEOARTHRITIS AND OUTCOME SCORE    Stiffness - The following question concerns the amount of joing stiffness you have experienced during the last week in your knee. Stiffness is a sensation of restriction or slowness in the ease with which you move your knee joint.  How severe is your knee stiffness after first wakening in the morning?: Moderate    Pain - What amount of knee pain have you experienced the last week during the following activities?  Twisting/pivoting on your knee: Moderate  Straightening knee fully: Severe  Going up or down stairs: Moderate  Standing upright: Moderate    Function - Please indicate the degree of difficulty you have experienced in the last week due to your knee.  Rising from sitting: Mild  Bending to floor/ an object: Moderate    Raw Score  Jr JANE. Knee Survey Score: 14  KOOS JR Total Interval Score (0-100 Scale): 52.465

## 2024-04-04 NOTE — PROGRESS NOTES
EKG done on 5/15/23 and results in Epic.  Called and left voice mail for Dr. Garay's medical assistant. Need to know if Dr. Garay wants an updated medical clearance for this surgery. Patient's last medical clearance was done by her PCP, Dr. Willingham on 10/16/23. Also asked to tell Dr. Garay that patient has taken her Aspirin 325mg today and I have instructed her not to take it after today until after her surgery. Want to know that Dr. Garay is ok with that. Asked for call back.  Deepika Gardner from Dr. Garay's office called back and states patient does not need an updated medical clearance per Dr. Garay and he states it is ok that patient took her Aspirin today but to tell her not to take it again until after surgery. Patient is aware.

## 2024-04-04 NOTE — TELEPHONE ENCOUNTER
Rachel with [facility] PAT is requesting a return phone call to learn if the patient needs PCP clearance for her LT TKA surgery on 4/8/24 and to make sure that the doctor is aware that the patient has been taking Asprin and took her daily dose of 320 MG today but has been instructed to stop until after surgery. Rachel may be reached at [phone].

## 2024-04-04 NOTE — TELEPHONE ENCOUNTER
Returned Sherry's Call Unable to Speak with her LVM For her To call our office back . Patient Sx is approved No Auth Required Scanned into Patient Chart .

## 2024-04-04 NOTE — PROGRESS NOTES
Orthopedic and Spine Patients:  Instructions on When You Can   Eat or Drink Before Surgery      You have been provided a pre-surgery drink received at your pre-admission testing appointment.    Night before surgery:  You should drink 1/2 bottle of the  pre-surgery drink at bedtime. No food after midnight!    Day of Surgery:  Complete 2nd half of the bottle of the pre-surgery drink 1 hour prior to arrival at hospital.  For questions call Pre-Admission Testing at [phone].  They are available from 8:00am-5:00pm, Monday through Friday.

## 2024-04-04 NOTE — TELEPHONE ENCOUNTER
Sherry with [facility] Prior Auth Dept LVM stating that the patient does not have a PA on file for her surgery on 4/8/24 and [employer] does require it. The PA may be initiated through [employer]. Sherry may be reached at [phone]

## 2024-04-05 ENCOUNTER — TELEPHONE (OUTPATIENT)
Age: 69
End: 2024-04-05

## 2024-04-05 ENCOUNTER — HOSPITAL ENCOUNTER (OUTPATIENT)
Facility: HOSPITAL | Age: 69
DRG: 325 | End: 2024-04-05
Payer: COMMERCIAL

## 2024-04-05 LAB
ABO + RH BLD: NORMAL
BACTERIA SPEC CULT: NORMAL
BACTERIA SPEC CULT: NORMAL
BLOOD GROUP ANTIBODIES SERPL: NORMAL
SERVICE CMNT-IMP: NORMAL
SPECIMEN EXP DATE BLD: NORMAL

## 2024-04-05 PROCEDURE — 86901 BLOOD TYPING SEROLOGIC RH(D): CPT

## 2024-04-05 PROCEDURE — 36415 COLL VENOUS BLD VENIPUNCTURE: CPT

## 2024-04-05 PROCEDURE — 86900 BLOOD TYPING SEROLOGIC ABO: CPT

## 2024-04-05 PROCEDURE — 86850 RBC ANTIBODY SCREEN: CPT

## 2024-04-05 RX ORDER — CIPROFLOXACIN 250 MG/1
250 TABLET, FILM COATED ORAL 2 TIMES DAILY
COMMUNITY
End: 2024-04-16

## 2024-04-05 RX ORDER — SUCRALFATE 1 G/1
1 TABLET ORAL 2 TIMES DAILY
COMMUNITY

## 2024-04-05 NOTE — PERIOP NOTE
Spoke with Thalia at Dr. Garay's office about pending abnormal urine culture results. Called in Cipro 250 mg BID x 3 days per Lily Walls NP to pharmacy listed on chart.

## 2024-04-05 NOTE — PROGRESS NOTES
Patient notified that her urine culture shows a UTI. Cipro sent to patient's pharmacy. Instructed patient to get Cipro and take it twice a day for 3 days. Patient verbalized understanding.

## 2024-04-05 NOTE — PROGRESS NOTES
4/5/24.1118.  Called Dr. Garay's office to make them aware that patient's urine culture does not have a final result at this time and her surgery is on Monday 4/8.    97.5

## 2024-04-05 NOTE — TELEPHONE ENCOUNTER
Spoke to PAT about Culture Results. Dr. Garay informed per Dr. Garay   ok for PAT to Call in [facility] x 3 days. PAT Informed.

## 2024-04-06 LAB
BACTERIA SPEC CULT: ABNORMAL
CC UR VC: ABNORMAL
SERVICE CMNT-IMP: ABNORMAL

## 2024-04-07 NOTE — H&P
: left knee pain     HPI:      This is a 68 y.o. year old female who presents for a follow up visit.  The patient was last seen and diagnosed with periprosthetic joint infection, status post antibiotic spacer.   The patient's treatments since the most recent visit have comprised of walker.   The patient has had no relief of the chief complaint.          PMH:  Past Medical History        Past Medical History:   Diagnosis Date    Anemia      Diverticulitis      Esophageal reflux      GERD (gastroesophageal reflux disease)      Hypertension      Osteoarthrosis, unspecified whether generalized or localized, lower leg       knees    Other and unspecified hyperlipidemia      Type II or unspecified type diabetes mellitus without mention of complication, uncontrolled              PSxHx:  Past Surgical History         Past Surgical History:   Procedure Laterality Date    ARM SURGERY Left 12/21/2023     IRRIGATION AND DEBRIDEMENT LEFT LEG performed by Matthew Garay DO at [facility] MAIN OR    COLONOSCOPY N/A 9/18/2017     COLONOSCOPY performed by Malcolm Pérez MD at [facility] AMBULATORY OR    COLONOSCOPY N/A 4/25/2018     COLONOSCOPY performed by Malcolm Pérez MD at [facility] ENDOSCOPY    FINGER TRIGGER RELEASE Left 08/28/2023     LEFT RING FINGER TRIGGER RELEASE performed by Matthew Garay DO at [facility] MAIN OR    GYN   2004     uterine surgery    HAND SURGERY        HYSTERECTOMY (CERVIX STATUS UNKNOWN)   2021    INCISION AND DRAINAGE OF WOUND   09/30/2023     left top of knee    LAP,TUBAL CAUTERY Bilateral 1992    LEG SURGERY Left 1/30/2024     LEFT KNEE INCISION AND DRAINAGE performed by Matthew Garay DO at [facility] MAIN OR    TONSILLECTOMY         age 9    TOTAL KNEE ARTHROPLASTY Left 05/22/2023     LEFT TOTAL KNEE ARTHROPLASTY performed by Matthew Garay DO at [facility] MAIN OR    TOTAL KNEE ARTHROPLASTY Right 10/24/2023     RIGHT TOTAL KNEE ARTHROPLASTY performed by Matthew Garay DO at [facility] MAIN OR    TOTAL KNEE ARTHROPLASTY Left 1/31/2024

## 2024-04-08 ENCOUNTER — ANESTHESIA EVENT (OUTPATIENT)
Facility: HOSPITAL | Age: 69
DRG: 325 | End: 2024-04-08
Payer: COMMERCIAL

## 2024-04-08 ENCOUNTER — APPOINTMENT (OUTPATIENT)
Facility: HOSPITAL | Age: 69
DRG: 325 | End: 2024-04-08
Attending: ORTHOPAEDIC SURGERY
Payer: COMMERCIAL

## 2024-04-08 ENCOUNTER — HOSPITAL ENCOUNTER (INPATIENT)
Facility: HOSPITAL | Age: 69
LOS: 1 days | Discharge: HOME | DRG: 325 | End: 2024-04-09
Attending: ORTHOPAEDIC SURGERY | Admitting: ORTHOPAEDIC SURGERY
Payer: COMMERCIAL

## 2024-04-08 ENCOUNTER — ANESTHESIA (OUTPATIENT)
Facility: HOSPITAL | Age: 69
DRG: 325 | End: 2024-04-08
Payer: COMMERCIAL

## 2024-04-08 DIAGNOSIS — T84.093D OTHER MECHANICAL COMPLICATION OF INTERNAL LEFT KNEE PROSTHESIS, SUBSEQUENT ENCOUNTER: Primary | ICD-10-CM

## 2024-04-08 LAB
GLUCOSE BLD STRIP.AUTO-MCNC: 135 MG/DL (ref 65–117)
GLUCOSE BLD STRIP.AUTO-MCNC: 138 MG/DL (ref 65–117)
GLUCOSE BLD STRIP.AUTO-MCNC: 266 MG/DL (ref 65–117)
GLUCOSE BLD STRIP.AUTO-MCNC: 298 MG/DL (ref 65–117)
SERVICE CMNT-IMP: ABNORMAL

## 2024-04-08 PROCEDURE — 64447 NJX AA&/STRD FEMORAL NRV IMG: CPT | Performed by: ANESTHESIOLOGY

## 2024-04-08 PROCEDURE — 2720000010 HC SURG SUPPLY STERILE: Performed by: ORTHOPAEDIC SURGERY

## 2024-04-08 PROCEDURE — 6360000002 HC RX W HCPCS: Performed by: ANESTHESIOLOGY

## 2024-04-08 PROCEDURE — 3600000005 HC SURGERY LEVEL 5 BASE: Performed by: ORTHOPAEDIC SURGERY

## 2024-04-08 PROCEDURE — 6360000002 HC RX W HCPCS: Performed by: ORTHOPAEDIC SURGERY

## 2024-04-08 PROCEDURE — 73560 X-RAY EXAM OF KNEE 1 OR 2: CPT

## 2024-04-08 PROCEDURE — 97530 THERAPEUTIC ACTIVITIES: CPT | Performed by: PHYSICAL THERAPIST

## 2024-04-08 PROCEDURE — 2580000003 HC RX 258: Performed by: ORTHOPAEDIC SURGERY

## 2024-04-08 PROCEDURE — 2580000003 HC RX 258: Performed by: ANESTHESIOLOGY

## 2024-04-08 PROCEDURE — 97161 PT EVAL LOW COMPLEX 20 MIN: CPT | Performed by: PHYSICAL THERAPIST

## 2024-04-08 PROCEDURE — 7100000000 HC PACU RECOVERY - FIRST 15 MIN: Performed by: ORTHOPAEDIC SURGERY

## 2024-04-08 PROCEDURE — 87205 SMEAR GRAM STAIN: CPT

## 2024-04-08 PROCEDURE — 6370000000 HC RX 637 (ALT 250 FOR IP): Performed by: ANESTHESIOLOGY

## 2024-04-08 PROCEDURE — C1713 ANCHOR/SCREW BN/BN,TIS/BN: HCPCS | Performed by: ORTHOPAEDIC SURGERY

## 2024-04-08 PROCEDURE — 0SRD0J9 REPLACEMENT OF LEFT KNEE JOINT WITH SYNTHETIC SUBSTITUTE, CEMENTED, OPEN APPROACH: ICD-10-PCS | Performed by: ORTHOPAEDIC SURGERY

## 2024-04-08 PROCEDURE — 2709999900 HC NON-CHARGEABLE SUPPLY: Performed by: ORTHOPAEDIC SURGERY

## 2024-04-08 PROCEDURE — 6370000000 HC RX 637 (ALT 250 FOR IP): Performed by: PHYSICIAN ASSISTANT

## 2024-04-08 PROCEDURE — 27487 REVISE/REPLACE KNEE JOINT: CPT | Performed by: ORTHOPAEDIC SURGERY

## 2024-04-08 PROCEDURE — 7100000001 HC PACU RECOVERY - ADDTL 15 MIN: Performed by: ORTHOPAEDIC SURGERY

## 2024-04-08 PROCEDURE — 27487 REVISE/REPLACE KNEE JOINT: CPT | Performed by: PHYSICIAN ASSISTANT

## 2024-04-08 PROCEDURE — 3700000001 HC ADD 15 MINUTES (ANESTHESIA): Performed by: ORTHOPAEDIC SURGERY

## 2024-04-08 PROCEDURE — 97116 GAIT TRAINING THERAPY: CPT | Performed by: PHYSICAL THERAPIST

## 2024-04-08 PROCEDURE — 82962 GLUCOSE BLOOD TEST: CPT

## 2024-04-08 PROCEDURE — 3700000000 HC ANESTHESIA ATTENDED CARE: Performed by: ORTHOPAEDIC SURGERY

## 2024-04-08 PROCEDURE — 0SPD08Z REMOVAL OF SPACER FROM LEFT KNEE JOINT, OPEN APPROACH: ICD-10-PCS | Performed by: ORTHOPAEDIC SURGERY

## 2024-04-08 PROCEDURE — 6370000000 HC RX 637 (ALT 250 FOR IP): Performed by: ORTHOPAEDIC SURGERY

## 2024-04-08 PROCEDURE — C1776 JOINT DEVICE (IMPLANTABLE): HCPCS | Performed by: ORTHOPAEDIC SURGERY

## 2024-04-08 PROCEDURE — G0378 HOSPITAL OBSERVATION PER HR: HCPCS

## 2024-04-08 PROCEDURE — 87070 CULTURE OTHR SPECIMN AEROBIC: CPT

## 2024-04-08 PROCEDURE — 2500000003 HC RX 250 WO HCPCS: Performed by: ORTHOPAEDIC SURGERY

## 2024-04-08 PROCEDURE — 3600000015 HC SURGERY LEVEL 5 ADDTL 15MIN: Performed by: ORTHOPAEDIC SURGERY

## 2024-04-08 PROCEDURE — 1100000000 HC RM PRIVATE

## 2024-04-08 PROCEDURE — 6360000002 HC RX W HCPCS: Performed by: PHYSICIAN ASSISTANT

## 2024-04-08 PROCEDURE — 2580000003 HC RX 258: Performed by: PHYSICIAN ASSISTANT

## 2024-04-08 PROCEDURE — 87075 CULTR BACTERIA EXCEPT BLOOD: CPT

## 2024-04-08 DEVICE — BONE PREPARATION KIT
Type: IMPLANTABLE DEVICE | Site: KNEE | Status: FUNCTIONAL
Brand: BIOPREP

## 2024-04-08 DEVICE — FEMORAL DISTAL AUGMENT
Type: IMPLANTABLE DEVICE | Site: KNEE | Status: FUNCTIONAL
Brand: TRIATHLON

## 2024-04-08 DEVICE — UNIVERSAL TIBIAL BASEPLATE
Type: IMPLANTABLE DEVICE | Site: KNEE | Status: FUNCTIONAL
Brand: TRIATHLON

## 2024-04-08 DEVICE — SYMMETRIC PATELLA
Type: IMPLANTABLE DEVICE | Site: KNEE | Status: FUNCTIONAL
Brand: TRIATHLON

## 2024-04-08 DEVICE — CEMENTED STEM
Type: IMPLANTABLE DEVICE | Site: KNEE | Status: FUNCTIONAL
Brand: TRIATHLON

## 2024-04-08 DEVICE — TOBRA FULL DOSE ANTIBIOTIC BONE CEMENT, 10 PACK CATALOG NUMBER IS 6197-9-010
Type: IMPLANTABLE DEVICE | Site: KNEE | Status: FUNCTIONAL
Brand: SIMPLEX

## 2024-04-08 DEVICE — TOTAL STABILIZER FEMORAL COMPONENT
Type: IMPLANTABLE DEVICE | Site: KNEE | Status: FUNCTIONAL
Brand: TRIATHLON

## 2024-04-08 DEVICE — TOTAL STABILIZER+ TIBIAL INSERT
Type: IMPLANTABLE DEVICE | Site: KNEE | Status: FUNCTIONAL
Brand: TRIATHLON

## 2024-04-08 DEVICE — FEMORAL POSTERIOR AUGMENT
Type: IMPLANTABLE DEVICE | Site: KNEE | Status: FUNCTIONAL
Brand: TRIATHLON

## 2024-04-08 DEVICE — TIBIAL AUGMENT HALF BLOCK
Type: IMPLANTABLE DEVICE | Site: KNEE | Status: FUNCTIONAL
Brand: TRIATHLON

## 2024-04-08 RX ORDER — DEXAMETHASONE SODIUM PHOSPHATE 4 MG/ML
8 INJECTION, SOLUTION INTRA-ARTICULAR; INTRALESIONAL; INTRAMUSCULAR; INTRAVENOUS; SOFT TISSUE ONCE
Status: COMPLETED | OUTPATIENT
Start: 2024-04-08 | End: 2024-04-08

## 2024-04-08 RX ORDER — ASPIRIN 325 MG
325 TABLET, DELAYED RELEASE (ENTERIC COATED) ORAL DAILY
Status: DISCONTINUED | OUTPATIENT
Start: 2024-04-08 | End: 2024-04-09 | Stop reason: HOSPADM

## 2024-04-08 RX ORDER — ONDANSETRON 2 MG/ML
4 INJECTION INTRAMUSCULAR; INTRAVENOUS ONCE
Status: COMPLETED | OUTPATIENT
Start: 2024-04-08 | End: 2024-04-08

## 2024-04-08 RX ORDER — PROCHLORPERAZINE EDISYLATE 5 MG/ML
5 INJECTION INTRAMUSCULAR; INTRAVENOUS
Status: DISCONTINUED | OUTPATIENT
Start: 2024-04-08 | End: 2024-04-08 | Stop reason: HOSPADM

## 2024-04-08 RX ORDER — ACETAMINOPHEN 500 MG
1000 TABLET ORAL ONCE
Status: COMPLETED | OUTPATIENT
Start: 2024-04-08 | End: 2024-04-08

## 2024-04-08 RX ORDER — FENTANYL CITRATE 50 UG/ML
INJECTION, SOLUTION INTRAMUSCULAR; INTRAVENOUS PRN
Status: DISCONTINUED | OUTPATIENT
Start: 2024-04-08 | End: 2024-04-08 | Stop reason: SDUPTHER

## 2024-04-08 RX ORDER — ACETAMINOPHEN 325 MG/1
650 TABLET ORAL EVERY 6 HOURS
Status: DISCONTINUED | OUTPATIENT
Start: 2024-04-08 | End: 2024-04-09 | Stop reason: HOSPADM

## 2024-04-08 RX ORDER — ONDANSETRON 2 MG/ML
4 INJECTION INTRAMUSCULAR; INTRAVENOUS EVERY 6 HOURS PRN
Status: DISCONTINUED | OUTPATIENT
Start: 2024-04-08 | End: 2024-04-09 | Stop reason: HOSPADM

## 2024-04-08 RX ORDER — SODIUM CHLORIDE, SODIUM LACTATE, POTASSIUM CHLORIDE, CALCIUM CHLORIDE 600; 310; 30; 20 MG/100ML; MG/100ML; MG/100ML; MG/100ML
INJECTION, SOLUTION INTRAVENOUS CONTINUOUS
Status: DISCONTINUED | OUTPATIENT
Start: 2024-04-08 | End: 2024-04-08 | Stop reason: HOSPADM

## 2024-04-08 RX ORDER — ACETAMINOPHEN 500 MG
1000 TABLET ORAL ONCE
Status: DISCONTINUED | OUTPATIENT
Start: 2024-04-08 | End: 2024-04-08 | Stop reason: HOSPADM

## 2024-04-08 RX ORDER — FENTANYL CITRATE 50 UG/ML
100 INJECTION, SOLUTION INTRAMUSCULAR; INTRAVENOUS
Status: DISCONTINUED | OUTPATIENT
Start: 2024-04-08 | End: 2024-04-08 | Stop reason: HOSPADM

## 2024-04-08 RX ORDER — SODIUM CHLORIDE 9 MG/ML
INJECTION, SOLUTION INTRAVENOUS PRN
Status: DISCONTINUED | OUTPATIENT
Start: 2024-04-08 | End: 2024-04-08 | Stop reason: HOSPADM

## 2024-04-08 RX ORDER — ACETAMINOPHEN 325 MG/1
650 TABLET ORAL
Status: DISCONTINUED | OUTPATIENT
Start: 2024-04-08 | End: 2024-04-08 | Stop reason: HOSPADM

## 2024-04-08 RX ORDER — HYDRALAZINE HYDROCHLORIDE 20 MG/ML
10 INJECTION INTRAMUSCULAR; INTRAVENOUS
Status: DISCONTINUED | OUTPATIENT
Start: 2024-04-08 | End: 2024-04-08 | Stop reason: HOSPADM

## 2024-04-08 RX ORDER — BISACODYL 5 MG/1
5 TABLET, DELAYED RELEASE ORAL DAILY
Status: DISCONTINUED | OUTPATIENT
Start: 2024-04-08 | End: 2024-04-09 | Stop reason: HOSPADM

## 2024-04-08 RX ORDER — TRAMADOL HYDROCHLORIDE 50 MG/1
50 TABLET ORAL EVERY 4 HOURS PRN
Status: DISCONTINUED | OUTPATIENT
Start: 2024-04-08 | End: 2024-04-09 | Stop reason: HOSPADM

## 2024-04-08 RX ORDER — SUCRALFATE 1 G/1
1 TABLET ORAL 4 TIMES DAILY
Qty: 120 TABLET | Refills: 2 | Status: SHIPPED | OUTPATIENT
Start: 2024-04-08 | End: 2024-07-07

## 2024-04-08 RX ORDER — SODIUM CHLORIDE 0.9 % (FLUSH) 0.9 %
5-40 SYRINGE (ML) INJECTION PRN
Status: DISCONTINUED | OUTPATIENT
Start: 2024-04-08 | End: 2024-04-08 | Stop reason: HOSPADM

## 2024-04-08 RX ORDER — CIPROFLOXACIN 500 MG/1
250 TABLET, FILM COATED ORAL 2 TIMES DAILY
Status: DISCONTINUED | OUTPATIENT
Start: 2024-04-08 | End: 2024-04-09 | Stop reason: HOSPADM

## 2024-04-08 RX ORDER — ONDANSETRON 4 MG/1
4 TABLET, ORALLY DISINTEGRATING ORAL EVERY 8 HOURS PRN
Status: DISCONTINUED | OUTPATIENT
Start: 2024-04-08 | End: 2024-04-09 | Stop reason: HOSPADM

## 2024-04-08 RX ORDER — ROPIVACAINE HYDROCHLORIDE 5 MG/ML
INJECTION, SOLUTION EPIDURAL; INFILTRATION; PERINEURAL PRN
Status: DISCONTINUED | OUTPATIENT
Start: 2024-04-08 | End: 2024-04-08 | Stop reason: ALTCHOICE

## 2024-04-08 RX ORDER — ATORVASTATIN CALCIUM 40 MG/1
40 TABLET, FILM COATED ORAL DAILY
Status: DISCONTINUED | OUTPATIENT
Start: 2024-04-08 | End: 2024-04-09 | Stop reason: HOSPADM

## 2024-04-08 RX ORDER — KETOROLAC TROMETHAMINE 30 MG/ML
15 INJECTION, SOLUTION INTRAMUSCULAR; INTRAVENOUS EVERY 6 HOURS
Status: DISCONTINUED | OUTPATIENT
Start: 2024-04-08 | End: 2024-04-09 | Stop reason: HOSPADM

## 2024-04-08 RX ORDER — HYDROMORPHONE HYDROCHLORIDE 1 MG/ML
0.5 INJECTION, SOLUTION INTRAMUSCULAR; INTRAVENOUS; SUBCUTANEOUS
Status: DISCONTINUED | OUTPATIENT
Start: 2024-04-08 | End: 2024-04-09 | Stop reason: HOSPADM

## 2024-04-08 RX ORDER — SODIUM CHLORIDE 0.9 % (FLUSH) 0.9 %
5-40 SYRINGE (ML) INJECTION EVERY 12 HOURS SCHEDULED
Status: DISCONTINUED | OUTPATIENT
Start: 2024-04-08 | End: 2024-04-08 | Stop reason: HOSPADM

## 2024-04-08 RX ORDER — VANCOMYCIN HYDROCHLORIDE 1 G/20ML
INJECTION, POWDER, LYOPHILIZED, FOR SOLUTION INTRAVENOUS PRN
Status: DISCONTINUED | OUTPATIENT
Start: 2024-04-08 | End: 2024-04-08 | Stop reason: ALTCHOICE

## 2024-04-08 RX ORDER — KETOROLAC TROMETHAMINE 30 MG/ML
15 INJECTION, SOLUTION INTRAMUSCULAR; INTRAVENOUS ONCE
Status: COMPLETED | OUTPATIENT
Start: 2024-04-08 | End: 2024-04-08

## 2024-04-08 RX ORDER — SODIUM CHLORIDE 0.9 % (FLUSH) 0.9 %
5-40 SYRINGE (ML) INJECTION EVERY 12 HOURS SCHEDULED
Status: DISCONTINUED | OUTPATIENT
Start: 2024-04-08 | End: 2024-04-09 | Stop reason: HOSPADM

## 2024-04-08 RX ORDER — MIDAZOLAM HYDROCHLORIDE 2 MG/2ML
2 INJECTION, SOLUTION INTRAMUSCULAR; INTRAVENOUS
Status: DISCONTINUED | OUTPATIENT
Start: 2024-04-08 | End: 2024-04-08 | Stop reason: HOSPADM

## 2024-04-08 RX ORDER — PHENYLEPHRINE HCL IN 0.9% NACL 0.4MG/10ML
SYRINGE (ML) INTRAVENOUS PRN
Status: DISCONTINUED | OUTPATIENT
Start: 2024-04-08 | End: 2024-04-08 | Stop reason: SDUPTHER

## 2024-04-08 RX ORDER — HYDROXYZINE HYDROCHLORIDE 10 MG/1
10 TABLET, FILM COATED ORAL EVERY 8 HOURS PRN
Status: DISCONTINUED | OUTPATIENT
Start: 2024-04-08 | End: 2024-04-09 | Stop reason: HOSPADM

## 2024-04-08 RX ORDER — DEXAMETHASONE SODIUM PHOSPHATE 4 MG/ML
INJECTION, SOLUTION INTRA-ARTICULAR; INTRALESIONAL; INTRAMUSCULAR; INTRAVENOUS; SOFT TISSUE PRN
Status: DISCONTINUED | OUTPATIENT
Start: 2024-04-08 | End: 2024-04-08

## 2024-04-08 RX ORDER — HYDROMORPHONE HYDROCHLORIDE 1 MG/ML
0.25 INJECTION, SOLUTION INTRAMUSCULAR; INTRAVENOUS; SUBCUTANEOUS EVERY 5 MIN PRN
Status: DISCONTINUED | OUTPATIENT
Start: 2024-04-08 | End: 2024-04-08 | Stop reason: HOSPADM

## 2024-04-08 RX ORDER — OXYCODONE HYDROCHLORIDE 5 MG/1
5 TABLET ORAL
Status: DISCONTINUED | OUTPATIENT
Start: 2024-04-08 | End: 2024-04-08 | Stop reason: HOSPADM

## 2024-04-08 RX ORDER — SODIUM CHLORIDE 9 MG/ML
INJECTION, SOLUTION INTRAVENOUS PRN
Status: DISCONTINUED | OUTPATIENT
Start: 2024-04-08 | End: 2024-04-09 | Stop reason: HOSPADM

## 2024-04-08 RX ORDER — FENTANYL CITRATE 50 UG/ML
25 INJECTION, SOLUTION INTRAMUSCULAR; INTRAVENOUS EVERY 5 MIN PRN
Status: DISCONTINUED | OUTPATIENT
Start: 2024-04-08 | End: 2024-04-08 | Stop reason: HOSPADM

## 2024-04-08 RX ORDER — MIDAZOLAM HYDROCHLORIDE 1 MG/ML
INJECTION INTRAMUSCULAR; INTRAVENOUS
Status: COMPLETED | OUTPATIENT
Start: 2024-04-08 | End: 2024-04-08

## 2024-04-08 RX ORDER — NALOXONE HYDROCHLORIDE 0.4 MG/ML
INJECTION, SOLUTION INTRAMUSCULAR; INTRAVENOUS; SUBCUTANEOUS PRN
Status: DISCONTINUED | OUTPATIENT
Start: 2024-04-08 | End: 2024-04-08 | Stop reason: HOSPADM

## 2024-04-08 RX ORDER — POTASSIUM CHLORIDE 1.5 G/1.58G
10 POWDER, FOR SOLUTION ORAL 2 TIMES DAILY
Status: DISCONTINUED | OUTPATIENT
Start: 2024-04-08 | End: 2024-04-09 | Stop reason: HOSPADM

## 2024-04-08 RX ORDER — SUCRALFATE 1 G/1
1 TABLET ORAL 2 TIMES DAILY
Status: DISCONTINUED | OUTPATIENT
Start: 2024-04-08 | End: 2024-04-09 | Stop reason: HOSPADM

## 2024-04-08 RX ORDER — SODIUM CHLORIDE 9 MG/ML
INJECTION, SOLUTION INTRAVENOUS CONTINUOUS
Status: DISCONTINUED | OUTPATIENT
Start: 2024-04-08 | End: 2024-04-09 | Stop reason: HOSPADM

## 2024-04-08 RX ORDER — DEXAMETHASONE SODIUM PHOSPHATE 4 MG/ML
4 INJECTION, SOLUTION INTRA-ARTICULAR; INTRALESIONAL; INTRAMUSCULAR; INTRAVENOUS; SOFT TISSUE
Status: DISCONTINUED | OUTPATIENT
Start: 2024-04-08 | End: 2024-04-08 | Stop reason: HOSPADM

## 2024-04-08 RX ORDER — ROPIVACAINE HYDROCHLORIDE 5 MG/ML
INJECTION, SOLUTION EPIDURAL; INFILTRATION; PERINEURAL
Status: COMPLETED | OUTPATIENT
Start: 2024-04-08 | End: 2024-04-08

## 2024-04-08 RX ORDER — SODIUM CHLORIDE 0.9 % (FLUSH) 0.9 %
5-40 SYRINGE (ML) INJECTION PRN
Status: DISCONTINUED | OUTPATIENT
Start: 2024-04-08 | End: 2024-04-09 | Stop reason: HOSPADM

## 2024-04-08 RX ORDER — AMLODIPINE BESYLATE 5 MG/1
5 TABLET ORAL DAILY
Status: DISCONTINUED | OUTPATIENT
Start: 2024-04-08 | End: 2024-04-09 | Stop reason: HOSPADM

## 2024-04-08 RX ORDER — PIOGLITAZONE 15 MG/1
45 TABLET ORAL NIGHTLY
Status: DISCONTINUED | OUTPATIENT
Start: 2024-04-08 | End: 2024-04-09 | Stop reason: HOSPADM

## 2024-04-08 RX ADMIN — PHENYLEPHRINE HYDROCHLORIDE 20 MCG/MIN: 10 INJECTION INTRAVENOUS at 09:10

## 2024-04-08 RX ADMIN — Medication 3 AMPULE: at 08:07

## 2024-04-08 RX ADMIN — PROPOFOL 25 MG: 10 INJECTION, EMULSION INTRAVENOUS at 08:40

## 2024-04-08 RX ADMIN — SODIUM CHLORIDE: 9 INJECTION, SOLUTION INTRAVENOUS at 14:53

## 2024-04-08 RX ADMIN — POTASSIUM CHLORIDE 10 MEQ: 1.5 POWDER, FOR SOLUTION ORAL at 20:46

## 2024-04-08 RX ADMIN — PROPOFOL 25 MG: 10 INJECTION, EMULSION INTRAVENOUS at 09:04

## 2024-04-08 RX ADMIN — WATER 2000 MG: 1 INJECTION INTRAMUSCULAR; INTRAVENOUS; SUBCUTANEOUS at 17:57

## 2024-04-08 RX ADMIN — SODIUM CHLORIDE, POTASSIUM CHLORIDE, SODIUM LACTATE AND CALCIUM CHLORIDE: 600; 310; 30; 20 INJECTION, SOLUTION INTRAVENOUS at 09:45

## 2024-04-08 RX ADMIN — FENTANYL CITRATE 50 MCG: 50 INJECTION, SOLUTION INTRAMUSCULAR; INTRAVENOUS at 08:37

## 2024-04-08 RX ADMIN — TRAMADOL HYDROCHLORIDE 50 MG: 50 TABLET ORAL at 14:53

## 2024-04-08 RX ADMIN — WATER 2000 MG: 1 INJECTION INTRAMUSCULAR; INTRAVENOUS; SUBCUTANEOUS at 14:53

## 2024-04-08 RX ADMIN — CEFEPIME 2000 MG: 2 INJECTION, POWDER, FOR SOLUTION INTRAVENOUS at 21:46

## 2024-04-08 RX ADMIN — KETOROLAC TROMETHAMINE 15 MG: 30 INJECTION, SOLUTION INTRAMUSCULAR at 08:08

## 2024-04-08 RX ADMIN — FENTANYL CITRATE 50 MCG: 50 INJECTION, SOLUTION INTRAMUSCULAR; INTRAVENOUS at 09:16

## 2024-04-08 RX ADMIN — MEPIVACAINE HYDROCHLORIDE 50 MG: 20 INJECTION, SOLUTION EPIDURAL; INFILTRATION at 08:20

## 2024-04-08 RX ADMIN — SUCRALFATE 1 G: 1 TABLET ORAL at 20:46

## 2024-04-08 RX ADMIN — SODIUM CHLORIDE, POTASSIUM CHLORIDE, SODIUM LACTATE AND CALCIUM CHLORIDE: 600; 310; 30; 20 INJECTION, SOLUTION INTRAVENOUS at 08:32

## 2024-04-08 RX ADMIN — ROPIVACAINE HYDROCHLORIDE 30 ML: 5 INJECTION, SOLUTION EPIDURAL; INFILTRATION; PERINEURAL at 08:25

## 2024-04-08 RX ADMIN — KETOROLAC TROMETHAMINE 15 MG: 30 INJECTION, SOLUTION INTRAMUSCULAR at 20:48

## 2024-04-08 RX ADMIN — ACETAMINOPHEN 650 MG: 325 TABLET ORAL at 14:53

## 2024-04-08 RX ADMIN — TRANEXAMIC ACID 1000 MG: 100 INJECTION, SOLUTION INTRAVENOUS at 08:52

## 2024-04-08 RX ADMIN — SODIUM CHLORIDE, PRESERVATIVE FREE 10 ML: 5 INJECTION INTRAVENOUS at 20:48

## 2024-04-08 RX ADMIN — CIPROFLOXACIN HYDROCHLORIDE 250 MG: 500 TABLET, FILM COATED ORAL at 15:01

## 2024-04-08 RX ADMIN — PROPOFOL 25 MG: 10 INJECTION, EMULSION INTRAVENOUS at 09:16

## 2024-04-08 RX ADMIN — CIPROFLOXACIN HYDROCHLORIDE 250 MG: 500 TABLET, FILM COATED ORAL at 20:47

## 2024-04-08 RX ADMIN — ACETAMINOPHEN 650 MG: 325 TABLET ORAL at 20:48

## 2024-04-08 RX ADMIN — DEXAMETHASONE SODIUM PHOSPHATE 8 MG: 4 INJECTION, SOLUTION INTRAMUSCULAR; INTRAVENOUS at 08:59

## 2024-04-08 RX ADMIN — ONDANSETRON HYDROCHLORIDE 4 MG: 2 INJECTION, SOLUTION INTRAMUSCULAR; INTRAVENOUS at 10:34

## 2024-04-08 RX ADMIN — PIOGLITAZONE 45 MG: 15 TABLET ORAL at 20:46

## 2024-04-08 RX ADMIN — FENTANYL CITRATE 25 MCG: 50 INJECTION INTRAMUSCULAR; INTRAVENOUS at 12:31

## 2024-04-08 RX ADMIN — Medication 40 MCG: at 08:41

## 2024-04-08 RX ADMIN — ASPIRIN 325 MG: 325 TABLET, COATED ORAL at 15:01

## 2024-04-08 RX ADMIN — SODIUM CHLORIDE, POTASSIUM CHLORIDE, SODIUM LACTATE AND CALCIUM CHLORIDE 25 ML: 600; 310; 30; 20 INJECTION, SOLUTION INTRAVENOUS at 08:17

## 2024-04-08 RX ADMIN — ACETAMINOPHEN 1000 MG: 500 TABLET ORAL at 08:08

## 2024-04-08 RX ADMIN — WATER 2000 MG: 1 INJECTION INTRAMUSCULAR; INTRAVENOUS; SUBCUTANEOUS at 08:45

## 2024-04-08 RX ADMIN — MIDAZOLAM HYDROCHLORIDE 2 MG: 1 INJECTION, SOLUTION INTRAMUSCULAR; INTRAVENOUS at 08:25

## 2024-04-08 RX ADMIN — KETOROLAC TROMETHAMINE 15 MG: 30 INJECTION, SOLUTION INTRAMUSCULAR at 14:53

## 2024-04-08 RX ADMIN — PROPOFOL 50 MCG/KG/MIN: 10 INJECTION, EMULSION INTRAVENOUS at 08:37

## 2024-04-08 ASSESSMENT — PAIN DESCRIPTION - ORIENTATION
ORIENTATION: LEFT
ORIENTATION: RIGHT;LEFT
ORIENTATION: RIGHT;LEFT
ORIENTATION: LEFT

## 2024-04-08 ASSESSMENT — PAIN DESCRIPTION - DESCRIPTORS
DESCRIPTORS: ACHING

## 2024-04-08 ASSESSMENT — PAIN DESCRIPTION - LOCATION
LOCATION: KNEE

## 2024-04-08 ASSESSMENT — COPD QUESTIONNAIRES: CAT_SEVERITY: MODERATE

## 2024-04-08 ASSESSMENT — PAIN DESCRIPTION - PAIN TYPE: TYPE: SURGICAL PAIN

## 2024-04-08 ASSESSMENT — PAIN SCALES - GENERAL
PAINLEVEL_OUTOF10: 4
PAINLEVEL_OUTOF10: 6
PAINLEVEL_OUTOF10: 0
PAINLEVEL_OUTOF10: 7
PAINLEVEL_OUTOF10: 6

## 2024-04-08 NOTE — OP NOTE
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  He assisted with positioning, prepping, draping of the patient before the procedure and instrument manipulation, extremity repositioning,, and camera operation during the procedure as well as wound closure, dressing application and brace application after the procedure.  Please note that no intern, resident, or other qualified hospital staff was available to assist during the surgery.    Anesthesia: General    Estimated Blood Loss (mL): less than 100     Complications: None    Specimens:   ID Type Source Tests Collected by Time Destination   A : Left Knee Swab Joint, Knee CULTURE, ANAEROBIC Matthew Garay, DO 4/8/2024 0857    B : Left Knee Swab Joint, Knee CULTURE, ANAEROBIC Matthew Garay, DO 4/8/2024 0857    C : Left Knee Swab Joint, Knee CULTURE, ANAEROBIC Matthew Garay, DO 4/8/2024 0857    D : Left Knee Swab Joint, Knee CULTURE, WOUND Matthew Garay, DO 4/8/2024 0857    E : Left Knee Swab Joint, Knee CULTURE, WOUND Matthew Garay, DO 4/8/2024 0857    F : Left Knee Swab Joint, Knee CULTURE, WOUND Matthew Garay, DO 4/8/2024 0857        Implants:  Implant Name Type Inv. Item Serial No.  Lot No. LRB No. Used Action   KIT BNE RENO PREP UNIV W/ RESTRIC INSRT 2 RENO SCULP FEM CNL - [id number]  KIT BNE RENO PREP UNIV W/ RESTRIC INSRT 2 RENO SCULP FEM CNL  [employer] IN[employer]UMENT DIV-WD [id number] Left 1 Implanted   CEMENT BNE 20ML 41GM FULL DOSE PMMA W/ TOBRA M VISC RADPQ - [id number]  CEMENT BNE

## 2024-04-08 NOTE — TELEPHONE ENCOUNTER
[id number] is requesting a new prescription for the Sucralfate 1 gm via fax.     Last appointment: 01/16/2024 MD Willingham   Next appointment: 04/16/2024 MD Willingham     For Pharmacy Admin Tracking Only    Program: Medication Refill  Intervention Detail: New Rx: 1, reason: Patient Preference  Time Spent (min): 5    Requested Prescriptions     Pending Prescriptions Disp Refills    sucralfate (CARAFATE) 1 GM tablet 120 tablet 0     Sig: Take 1 tablet by mouth 4 times daily

## 2024-04-08 NOTE — PERIOP NOTE
No  recent   covid  test  no s/s   mepilex   sacrum   border to   be  put  on in room   as  pt  is  spinal  would  be in the surgical field.   Skin intact.   Pt   voided   x1  in bathroom .   Pt  administered   tylenol and   toraldol  see   mar.    Time  outs   0820   for  spinal  and    0825    for   left adductor   canal  block    monitor  and   nasal   2  liters in place  for  the   times outs.   .   See   time  out   section.   Pt  into  or   as   soon  as   blocks   finished

## 2024-04-08 NOTE — PROGRESS NOTES
PHYSICAL THERAPY EVALUATION/DISCHARGE    Patient: Kalpana Benoit (68 y.o. 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 Patient demonstrating good active ROM in L knee. Patient demonstrates mildly impaired functional mobility and balance. She is able to complete bed mobility, transfers and ambulation with SBA . Patient able to ambulate 200 feet using RW for support. Gait is slow and mildly antalgic but steady with no overt LOB noted. Patient completed stair training with CGA  and was able to perform car transfer with SBA. Issued HEP.  No further acute care PT needs identified. Patient is cleared for discharge from PT standpoint. Recommend HHPT following discharge.  Of note, patient reports recent king infestation in her home which she reports has been sprayed for. Ortho NP notiifed       PLAN :  Recommendation for discharge: (in order for the patient to meet his/her long term goals): Therapy 2x a week in the home    Other factors to consider for discharge:  patient reports recent king infestation in her home    IF patient discharges home will need the following DME: patient owns DME required for discharge       SUBJECTIVE:   Patient stated “That spacer was terrible.”    OBJECTIVE DATA SUMMARY:     Past Medical History:   Diagnosis Date    Anemia     blood transfusion on 2/4/24 after surgery    Diverticulitis     GERD (gastroesophageal reflux disease)     Hypertension     Osteoarthrosis, unspecified whether generalized or localized, lower leg     knees    Other and

## 2024-04-08 NOTE — CARE COORDINATION
CM met with pt at bedside, introduced role, & verified demographic information (address/insurance/emergency contact) is up-to-date in the chart. D/c plan discussed, pt stated she is current with home health, did not recall agency name, reported that it had been set up by [facility] ; per chart review, pt receives services through [facility]. Referral opened in [facility] for resumption of services, contact information placed in AVS for pt's review. Pt stated she has RW at home, brought cane with her, son will transport at discharge.    Transportation for d/c: son  Support post d/c: family  Does pt own DME needed for d/c: yes, RW  Accepting HH agency: [facility]  FOC offered: pt is currently receiving services from [facility]      Awilda Deras LMSW  Care Management  [phone]

## 2024-04-08 NOTE — ANESTHESIA POSTPROCEDURE EVALUATION
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    Horacio Phase I: Horacio Score: 10    Horacio Phase II:      Anesthesia Post Evaluation    Patient location during evaluation: PACU  Patient participation: complete - patient participated  Level of consciousness: sleepy but conscious and responsive to verbal stimuli  Pain score: 2  Airway patency: patent  Nausea & Vomiting: no vomiting and no nausea  Cardiovascular status: blood pressure returned to baseline and hemodynamically stable  Respiratory status: acceptable  Hydration status: stable  Multimodal analgesia pain management approach  Pain management: adequate    No notable events documented.   Alert-The patient is alert, awake and responds to voice. The patient is oriented to time, place, and person. The triage nurse is able to obtain subjective information.

## 2024-04-08 NOTE — PERIOP NOTE
Irrisept Wound Debridement and Cleansing System  Ref: [id number] GTIN: [id number] LOT: [id number] Expiration Date: 02/28/2027

## 2024-04-08 NOTE — PROGRESS NOTES
Ortho / Neurosurgery NP Note    POD# 0  s/p LEFT KNEE TOTAL ARTHROPLASTY REVISION   Pt seen with no visitor present    Patient in bed. Awake and alert.   Reports pain 8/10-requesting PRN Tramadol  Tolerating clears, would like lunch    S/p I&D placement of antibiotic spacer 1/31  IntraOp culture 1/30 + for Enterobacter cloacae complex ( thio only)  IntraOp culture 1/31+ for  Enterobacter cloacae complex  -completed cefepime on 3/13/2024  Urine cx 4/4 +E.coli. prescribed Cipro PTA    VSS Afebrile.    Visit Vitals  /81   Pulse 80   Temp 97.7 °F (36.5 °C) (Oral)   Resp 16   Ht 1.549 m (5' 1\")   Wt 76.3 kg (168 lb 3.4 oz)   SpO2 94%   BMI 31.78 kg/m²       Voiding status: due to void            Labs    Lab Results   Component Value Date/Time    HGB 11.2 04/04/2024 10:22 AM      Lab Results   Component Value Date/Time    INR 1.0 04/04/2024 10:22 AM      Lab Results   Component Value Date/Time     04/04/2024 10:22 AM    K 4.1 04/04/2024 10:22 AM     04/04/2024 10:22 AM    CO2 27 04/04/2024 10:22 AM    BUN 18 04/04/2024 10:22 AM     Recent Glucose Results:   Glucose   Date Value Ref Range Status   04/04/2024 175 (H) 65 - 100 mg/dL Final   02/06/2024 136 (H) 65 - 100 mg/dL Final   02/02/2024 142 (H) 65 - 100 mg/dL Final           Body mass index is 31.78 kg/m². : A BMI > 30 is classified as obesity and > 40 is classified as morbid obesity.     Awake and alert. No acute distress.    Dressing: Ace Wrap C.D.I.   No significant erythema or swelling  Cryotherapy in place over incision.   BLE sensation to light touch intact  BLE motor intact. Strength 5/5    SCD for mechanical DVT proph while in bed        PLAN:  1) PT BID - WBAT.   2) DVT Prophylaxis: Aspirin 325 mg BID for DVT Prophylaxis   3) GI Prophylaxis - Pepcid   4) Pain control - scheduled tylenol  , and prn  tramadol  . Continue cryotherapy  5) Readiness for discharge:     [x] Vital Signs stable    [] Labs stable    [] + Voiding    [x] Wound intact,

## 2024-04-08 NOTE — PROGRESS NOTES
Patient has RW for home use.    Discussed patient pain medications and other new medications patient prescribed on discharge from hospital with their side effects.  Discussed the importance of using other methods for pain control such as ice/rest/elevate, pre-medicating prior to physical therapy.  Discussed the importance of being safe at hospital and home by using RW until cleared by PT, wearing safe shoes, preparing home for after surgery and having a  to help at home.      Discussed risk after surgery and the importance of preventing infection by good hand hygiene, using clean towels and wash cloths at each shower, inspect wound/dressing daily, moving every two hours while awake, using the incentive spirometer every hour while awake.  When to call the doctor for help, or when to call 911 for emergency.    Discussed the importance of home PT, daily exercises as instructed by physical therapist and post-op appointment with surgeon and the need for transportation to this appointment until the surgeon has cleared you for driving.    Opportunity given for patient to ask additional questions about any special concerns regarding your care while on the Ortho unit.Patient states class information was valuable in preparing for surgery.

## 2024-04-08 NOTE — ANESTHESIA PRE PROCEDURE
daily Restart tomorrow (Patient taking differently: Take 1 tablet by mouth daily) 30 tablet 3   • atorvastatin (LIPITOR) 40 MG tablet TAKE 1 TABLET BY MOUTH EVERY DAY 30 tablet 12   • acetaminophen (TYLENOL) 500 MG tablet Take 2 tablets by mouth every 6 hours as needed for Pain     • nystatin (MYCOSTATIN) 949424 UNIT/GM powder Apply topically as needed     • Multiple Vitamins-Minerals (MULTIVITAMIN WOMEN PO) Take by mouth daily         Allergies:  No Known Allergies    Problem List:    Patient Active Problem List   Diagnosis Code   • Esophageal reflux K21.9   • Acute diverticulitis K57.92   • Hypercholesteremia E78.00   • COPD (chronic obstructive pulmonary disease) ([facility]) J44.9   • Insomnia G47.00   • Primary hypertension I10   • Elevated blood sugar R73.9   • Diverticulitis of colon K57.32   • Type 2 diabetes mellitus ([facility]) E11.9   • Unilateral primary osteoarthritis, left knee M17.12   • Unilateral primary osteoarthritis, right knee M17.11   • Pes anserinus bursitis of left knee M70.52   • Abscess of bursa of left knee M71.062   • Infection of left knee ([facility]) M00.9   • History of total bilateral knee replacement (TKR) Z96.653   • Pes anserine bursitis M70.50   • Infection caused by Enterobacter cloacae A49.8   • Coagulase-negative staphylococcal infection B95.7   • E. coli UTI N39.0, B96.20   • Infection of prosthetic left knee joint ([facility]) T84.54XA   • Failed total left knee replacement ([facility]) T84.093A   • Encounter for preadmission testing Z01.818       Past Medical History:        Diagnosis Date   • Anemia     blood transfusion on 2/4/24 after surgery   • Diverticulitis    • GERD (gastroesophageal reflux disease)    • Hypertension    • Osteoarthrosis, unspecified whether generalized or localized, lower leg     knees   • Other and unspecified hyperlipidemia    • Type II or unspecified type diabetes mellitus without mention of complication, uncontrolled        Past Surgical History:        Procedure Laterality Date

## 2024-04-08 NOTE — DISCHARGE INSTRUCTIONS
Discharge Instructions:  Kalpana Benoit    Surgery:  Revision of Left knee replacement .        To relieve pain:  Use ice/gel packs.    -Put the ice pack directly over the wound, or anywhere you are hurting or swollen.   -To control pain and swelling, keep ice on regularly, especially after physical activity.  -The packs should stay cold for 3-4 hours.  When it is not cold anymore, rotate with the packs in the freezer.      Elevate your leg.  This will also keep swelling down.    Rest for at least 20 minutes between activity or exercises.    To keep track of your pain medications, write down what you take and when you take it.    The last dose of pain medication you got in the hospital was:     Medication    Dose    Date & Time      Choose your medications based on the pain scale below:    To keep your pain under control, take Tylenol every 6 hours for 14 days - even if you feel like you don’t need it.     For mild to moderate pain (4-6 on pain scale), take one pain pill every 4 hours or as instructed.     For severe pain (7-10 on pain scale), take two pain pills every 4 hours or as instructed.     To prevent nausea, take your pain medications with food.                                            Pain Scale              As your pain lessens:    Slowly start taking less pain medication. You may do this by waiting longer between doses or by taking smaller doses.    Stop using the pain medications as soon as you no longer need it, usually in 2-3 weeks.        Aspirin  To prevent blood clots, you will need to take Aspirin 325 mg twice a day for 30 days.              To prevent stomach upset or bleeding:  Take Pepcid 20 mg twice a day, or a similar home medication, while you are taking a blood thinner.         Keep your waterproof dressing in place. It will be removed by your surgeon during your follow-up appointment in 2 weeks.     You may need to change the dressing if you are having drainage to where the dressing

## 2024-04-08 NOTE — ANESTHESIA PROCEDURE NOTES
Peripheral Block    Patient location during procedure: procedure area  Reason for block: post-op pain management  Start time: 4/8/2024 8:25 AM  End time: 4/8/2024 8:37 AM  Staffing  Performed: anesthesiologist   Anesthesiologist: Gera Rodarte MD  Performed by: Gera Rodarte MD  Authorized by: Gera Rodarte MD    Preanesthetic Checklist  Completed: patient identified, IV checked, site marked, risks and benefits discussed, surgical/procedural consents, equipment checked, pre-op evaluation, timeout performed, anesthesia consent given, oxygen available, monitors applied/VS acknowledged, fire risk safety assessment completed and verbalized and blood product R/B/A discussed and consented  Peripheral Block   Patient position: supine  Prep: ChloraPrep  Provider prep: mask, sterile gloves and sterile gown  Patient monitoring: cardiac monitor, continuous pulse ox, frequent blood pressure checks, IV access, oxygen and responsive to questions  Block type: Femoral  Adductor canal  Laterality: left  Injection technique: single-shot  Guidance: ultrasound guided    Needle   Needle type: insulated echogenic nerve stimulator needle   Needle gauge: 22 G  Needle localization: anatomical landmarks and ultrasound guidance  Assessment   Injection assessment: negative aspiration for heme, no paresthesia on injection, local visualized surrounding nerve on ultrasound and no intravascular symptoms  Paresthesia pain: none  Slow fractionated injection: yes  Hemodynamics: stable  Outcomes: uncomplicated and patient tolerated procedure well    Medications Administered  ropivacaine (NAROPIN) injection 0.5% - Perineural   30 mL - 4/8/2024 8:25:00 AM

## 2024-04-08 NOTE — ANESTHESIA PROCEDURE NOTES
Spinal Block    End time: 4/8/2024 8:24 AM  Reason for block: primary anesthetic and at surgeon's request  Staffing  Performed: anesthesiologist   Anesthesiologist: Gera Rodarte MD  Performed by: Gera Rodarte MD  Authorized by: Gera Rodarte MD    Spinal Block  Patient position: sitting  Prep: DuraPrep  Patient monitoring: cardiac monitor, continuous pulse ox, frequent blood pressure checks and oxygen  Approach: midline  Location: L4/L5  Provider prep: mask, sterile gloves and sterile gown  Needle  Needle type: Pencan   Needle gauge: 24 G  Needle length: 4 in  Assessment  Sensory level: T4  Swirl obtained: Yes  CSF: clear  Attempts: 1  Hemodynamics: stable  Preanesthetic Checklist  Completed: patient identified, IV checked, site marked, risks and benefits discussed, surgical/procedural consents, equipment checked, pre-op evaluation, timeout performed, anesthesia consent given, oxygen available, monitors applied/VS acknowledged, fire risk safety assessment completed and verbalized and blood product R/B/A discussed and consented

## 2024-04-08 NOTE — FLOWSHEET NOTE
04/08/24 1145   Handoff   Communication Given Transfer Handoff   Handoff phase Phase I receiving   Handoff Given To Oren RAE   Handoff Received From Natan GROSS/ Natalio RAE   Handoff Communication Face to Face;At bedside   Time Handoff Given 1143     1237- Xray complete.  at bedside. Patient unable to completely empty bladder straight cath complete. Patient tolerated well.

## 2024-04-08 NOTE — FLOWSHEET NOTE
04/08/24 1245   Handoff   Communication Given Transfer Handoff   Handoff phase Phase I transferring   Handoff Given To ALESSIA Peters RN   Handoff Received From Oren RAE   Handoff Communication Telephone   Time Handoff Given 1250     Patient notified  of new room number. Patient transferred with all personal belonging including cell phone, glasses and blue tooth.

## 2024-04-09 LAB
ANION GAP SERPL CALC-SCNC: 3 MMOL/L (ref 5–15)
BACTERIA SPEC CULT: NORMAL
BUN SERPL-MCNC: 24 MG/DL (ref 6–20)
BUN/CREAT SERPL: 29 (ref 12–20)
CALCIUM SERPL-MCNC: 8.8 MG/DL (ref 8.5–10.1)
CHLORIDE SERPL-SCNC: 111 MMOL/L (ref 97–108)
CO2 SERPL-SCNC: 24 MMOL/L (ref 21–32)
CREAT SERPL-MCNC: 0.83 MG/DL (ref 0.55–1.02)
GLUCOSE BLD STRIP.AUTO-MCNC: 114 MG/DL (ref 65–117)
GLUCOSE BLD STRIP.AUTO-MCNC: 143 MG/DL (ref 65–117)
GLUCOSE SERPL-MCNC: 125 MG/DL (ref 65–100)
GRAM STN SPEC: NORMAL
HCT VFR BLD AUTO: 32.1 % (ref 35–47)
HGB BLD-MCNC: 9.4 G/DL (ref 11.5–16)
POTASSIUM SERPL-SCNC: 4.5 MMOL/L (ref 3.5–5.1)
SERVICE CMNT-IMP: ABNORMAL
SERVICE CMNT-IMP: NORMAL
SODIUM SERPL-SCNC: 138 MMOL/L (ref 136–145)

## 2024-04-09 PROCEDURE — 36415 COLL VENOUS BLD VENIPUNCTURE: CPT

## 2024-04-09 PROCEDURE — 6370000000 HC RX 637 (ALT 250 FOR IP): Performed by: PHYSICIAN ASSISTANT

## 2024-04-09 PROCEDURE — 82962 GLUCOSE BLOOD TEST: CPT

## 2024-04-09 PROCEDURE — APPNB180 APP NON BILLABLE TIME > 60 MINS

## 2024-04-09 PROCEDURE — 96374 THER/PROPH/DIAG INJ IV PUSH: CPT

## 2024-04-09 PROCEDURE — 2580000003 HC RX 258: Performed by: PHYSICIAN ASSISTANT

## 2024-04-09 PROCEDURE — 6360000002 HC RX W HCPCS: Performed by: PHYSICIAN ASSISTANT

## 2024-04-09 PROCEDURE — 85018 HEMOGLOBIN: CPT

## 2024-04-09 PROCEDURE — 51798 US URINE CAPACITY MEASURE: CPT

## 2024-04-09 PROCEDURE — 96376 TX/PRO/DX INJ SAME DRUG ADON: CPT

## 2024-04-09 PROCEDURE — 2580000003 HC RX 258

## 2024-04-09 PROCEDURE — G0378 HOSPITAL OBSERVATION PER HR: HCPCS

## 2024-04-09 PROCEDURE — 85014 HEMATOCRIT: CPT

## 2024-04-09 PROCEDURE — 80048 BASIC METABOLIC PNL TOTAL CA: CPT

## 2024-04-09 PROCEDURE — 6360000002 HC RX W HCPCS

## 2024-04-09 RX ORDER — TRAMADOL HYDROCHLORIDE 50 MG/1
50 TABLET ORAL EVERY 6 HOURS PRN
Qty: 28 TABLET | Refills: 0 | Status: SHIPPED | OUTPATIENT
Start: 2024-04-09 | End: 2024-04-16

## 2024-04-09 RX ORDER — SENNA AND DOCUSATE SODIUM 50; 8.6 MG/1; MG/1
1 TABLET, FILM COATED ORAL DAILY
Qty: 14 TABLET | Refills: 0 | Status: SHIPPED | OUTPATIENT
Start: 2024-04-09 | End: 2024-04-16 | Stop reason: SDUPTHER

## 2024-04-09 RX ADMIN — KETOROLAC TROMETHAMINE 15 MG: 30 INJECTION, SOLUTION INTRAMUSCULAR at 01:37

## 2024-04-09 RX ADMIN — AMLODIPINE BESYLATE 5 MG: 5 TABLET ORAL at 09:01

## 2024-04-09 RX ADMIN — ACETAMINOPHEN 650 MG: 325 TABLET ORAL at 01:37

## 2024-04-09 RX ADMIN — METFORMIN HYDROCHLORIDE 1000 MG: 500 TABLET ORAL at 09:00

## 2024-04-09 RX ADMIN — ACETAMINOPHEN 650 MG: 325 TABLET ORAL at 09:00

## 2024-04-09 RX ADMIN — ATORVASTATIN CALCIUM 40 MG: 40 TABLET, FILM COATED ORAL at 09:01

## 2024-04-09 RX ADMIN — CEFEPIME 2000 MG: 2 INJECTION, POWDER, FOR SOLUTION INTRAVENOUS at 05:35

## 2024-04-09 RX ADMIN — CEFEPIME 2000 MG: 2 INJECTION, POWDER, FOR SOLUTION INTRAVENOUS at 12:08

## 2024-04-09 RX ADMIN — KETOROLAC TROMETHAMINE 15 MG: 30 INJECTION, SOLUTION INTRAMUSCULAR at 09:00

## 2024-04-09 RX ADMIN — WATER 2000 MG: 1 INJECTION INTRAMUSCULAR; INTRAVENOUS; SUBCUTANEOUS at 01:38

## 2024-04-09 RX ADMIN — SUCRALFATE 1 G: 1 TABLET ORAL at 09:01

## 2024-04-09 RX ADMIN — ASPIRIN 325 MG: 325 TABLET, COATED ORAL at 09:00

## 2024-04-09 RX ADMIN — KETOROLAC TROMETHAMINE 15 MG: 30 INJECTION, SOLUTION INTRAMUSCULAR at 12:09

## 2024-04-09 RX ADMIN — ACETAMINOPHEN 650 MG: 325 TABLET ORAL at 12:08

## 2024-04-09 RX ADMIN — BISACODYL 5 MG: 5 TABLET, COATED ORAL at 09:01

## 2024-04-09 RX ADMIN — SODIUM CHLORIDE, PRESERVATIVE FREE 10 ML: 5 INJECTION INTRAVENOUS at 09:07

## 2024-04-09 RX ADMIN — TRAMADOL HYDROCHLORIDE 50 MG: 50 TABLET ORAL at 12:09

## 2024-04-09 RX ADMIN — TRAMADOL HYDROCHLORIDE 50 MG: 50 TABLET ORAL at 07:20

## 2024-04-09 RX ADMIN — CIPROFLOXACIN HYDROCHLORIDE 250 MG: 500 TABLET, FILM COATED ORAL at 09:00

## 2024-04-09 RX ADMIN — POTASSIUM CHLORIDE 10 MEQ: 1.5 POWDER, FOR SOLUTION ORAL at 09:00

## 2024-04-09 ASSESSMENT — PAIN SCALES - GENERAL
PAINLEVEL_OUTOF10: 6
PAINLEVEL_OUTOF10: 6
PAINLEVEL_OUTOF10: 3
PAINLEVEL_OUTOF10: 0
PAINLEVEL_OUTOF10: 4
PAINLEVEL_OUTOF10: 6
PAINLEVEL_OUTOF10: 0
PAINLEVEL_OUTOF10: 4
PAINLEVEL_OUTOF10: 6
PAINLEVEL_OUTOF10: 6

## 2024-04-09 ASSESSMENT — PAIN DESCRIPTION - LOCATION
LOCATION: KNEE

## 2024-04-09 ASSESSMENT — PAIN DESCRIPTION - DESCRIPTORS
DESCRIPTORS: THROBBING
DESCRIPTORS: ACHING

## 2024-04-09 ASSESSMENT — PAIN DESCRIPTION - ORIENTATION
ORIENTATION: LEFT

## 2024-04-09 ASSESSMENT — PAIN DESCRIPTION - ONSET: ONSET: ON-GOING

## 2024-04-09 ASSESSMENT — PAIN DESCRIPTION - PAIN TYPE
TYPE: SURGICAL PAIN
TYPE: SURGICAL PAIN

## 2024-04-09 ASSESSMENT — PAIN - FUNCTIONAL ASSESSMENT: PAIN_FUNCTIONAL_ASSESSMENT: PREVENTS OR INTERFERES SOME ACTIVE ACTIVITIES AND ADLS

## 2024-04-09 NOTE — DISCHARGE SUMMARY
mouth 4 times daily      Facility-Administered Medications: None        Allergies:  No Known Allergies     Hospital Course:  The patient underwent surgery.  Complications:  None; patient tolerated the procedure well. Was taken to the PACU in stable condition and then transferred to the ortho floor.  Patient cleared for discharge on day of surgery by physical therapy with pain controlled. Patient given Cefepime IV for 4 doses during admission given previous infection.     Perioperative Antibiotics:  Ancef and cefepime     Postoperative Pain Management:  Tramadol    DVT Prophylaxis:   Aspirin 325 mg     Postoperative transfusions:    Number of units banked PRBCs =   none     Post Op complications: none    Hemoglobin at discharge:    Lab Results   Component Value Date/Time    HGB 9.4 (L) 04/09/2024 05:49 AM    INR 1.0 04/04/2024 10:22 AM       Dressing remained clean, dry and intact. No significant erythema or swelling. Wound appears to be healing without any evidence of infection. Neurovascular exam found to be within normal limits.     Physical Therapy started following surgery and participated in bed mobility, transfers and ambulation.            Discharged to: Home with .    Condition on Discharge:   Stable    Discharge instructions:  - Anticoagulate with Aspirin   - Take pain medications as prescribed  - Resume pre hospital diet      - Discharge activity: activity as tolerated  - Ambulate with assistive device as needed.  - Weight bearing status WBAT  - Wound Care Keep wound clean and dry.  See discharge instruction sheet.            -DISCHARGE MEDICATION LIST        Medication List        START taking these medications      sennosides-docusate sodium 8.6-50 MG tablet  Commonly known as: SENOKOT-S  Take 1 tablet by mouth daily for 14 days     traMADol 50 MG tablet  Commonly known as: ULTRAM  Take 1 tablet by mouth every 6 hours as needed for Pain for up to 7 days. Max Daily Amount: 200 mg            CHANGE how

## 2024-04-09 NOTE — PLAN OF CARE
Problem: Chronic Conditions and Co-morbidities  Goal: Patient's chronic conditions and co-morbidity symptoms are monitored and maintained or improved  Outcome: Adequate for Discharge     Problem: Safety - Adult  Goal: Free from fall injury  Outcome: Adequate for Discharge     Problem: ABCDS Injury Assessment  Goal: Absence of physical injury  Outcome: Adequate for Discharge     Problem: Skin/Tissue Integrity  Goal: Absence of new skin breakdown  Description: 1.  Monitor for areas of redness and/or skin breakdown  2.  Assess vascular access sites hourly  3.  Every 4-6 hours minimum:  Change oxygen saturation probe site  4.  Every 4-6 hours:  If on nasal continuous positive airway pressure, respiratory therapy assess nares and determine need for appliance change or resting period.  Outcome: Adequate for Discharge     Problem: Pain  Goal: Verbalizes/displays adequate comfort level or baseline comfort level  Outcome: Adequate for Discharge

## 2024-04-09 NOTE — PROGRESS NOTES
Ortho / Neurosurgery NP Note    POD# 1  s/p LEFT KNEE TOTAL ARTHROPLASTY REVISION   Pt seen with no visitor present    Patient in bed. Awake and alert.   Reports pain controlled with Tramadol  Tolerating diet, denies nausea  Cleared by therapy for discharge yesterday.   Anticipating discharge today    S/p I&D placement of antibiotic spacer 1/31  IntraOp culture 1/30 + for Enterobacter cloacae complex ( thio only)  IntraOp culture 1/31+ for  Enterobacter cloacae complex  -completed cefepime on 3/13/2024  Urine cx 4/4 +E.coli. prescribed Cipro PTA    VSS Afebrile.    Visit Vitals  BP (!) 128/90   Pulse 76   Temp 97.9 °F (36.6 °C) (Oral)   Resp 16   Ht 1.549 m (5' 1\")   Wt 76.3 kg (168 lb 3.4 oz)   SpO2 92%   BMI 31.78 kg/m²       Voiding status: voiding            Labs    Lab Results   Component Value Date/Time    HGB 9.4 04/09/2024 05:49 AM      Lab Results   Component Value Date/Time    INR 1.0 04/04/2024 10:22 AM      Lab Results   Component Value Date/Time     04/09/2024 05:49 AM    K 4.5 04/09/2024 05:49 AM     04/09/2024 05:49 AM    CO2 24 04/09/2024 05:49 AM    BUN 24 04/09/2024 05:49 AM     Recent Glucose Results:   Glucose   Date Value Ref Range Status   04/09/2024 125 (H) 65 - 100 mg/dL Final   04/04/2024 175 (H) 65 - 100 mg/dL Final   02/06/2024 136 (H) 65 - 100 mg/dL Final           Body mass index is 31.78 kg/m². : A BMI > 30 is classified as obesity and > 40 is classified as morbid obesity.     Awake and alert. No acute distress.    Dressing:  C.D.I.   No significant erythema. Mild ecchymosis. Expected swelling.   Cryotherapy in place over incision.   BLE sensation to light touch intact  BLE motor intact. Strength 5/5    SCD for mechanical DVT proph while in bed        PLAN:  1) PT BID - WBAT.   2) DVT Prophylaxis: Aspirin 325 mg BID for DVT Prophylaxis   3) GI Prophylaxis - Pepcid   4) Pain control - scheduled tylenol  , and prn  tramadol  . Continue cryotherapy  5) Readiness for

## 2024-04-10 VITALS
WEIGHT: 168.21 LBS | OXYGEN SATURATION: 95 % | RESPIRATION RATE: 18 BRPM | DIASTOLIC BLOOD PRESSURE: 73 MMHG | HEIGHT: 61 IN | BODY MASS INDEX: 31.76 KG/M2 | SYSTOLIC BLOOD PRESSURE: 118 MMHG | HEART RATE: 86 BPM | TEMPERATURE: 97.7 F

## 2024-04-16 ENCOUNTER — OFFICE VISIT (OUTPATIENT)
Facility: CLINIC | Age: 69
End: 2024-04-16
Payer: COMMERCIAL

## 2024-04-16 VITALS
OXYGEN SATURATION: 97 % | HEART RATE: 94 BPM | DIASTOLIC BLOOD PRESSURE: 86 MMHG | HEIGHT: 61 IN | RESPIRATION RATE: 16 BRPM | SYSTOLIC BLOOD PRESSURE: 126 MMHG | WEIGHT: 168 LBS | TEMPERATURE: 98 F | BODY MASS INDEX: 31.72 KG/M2

## 2024-04-16 DIAGNOSIS — K21.9 GASTROESOPHAGEAL REFLUX DISEASE WITHOUT ESOPHAGITIS: ICD-10-CM

## 2024-04-16 DIAGNOSIS — I10 PRIMARY HYPERTENSION: ICD-10-CM

## 2024-04-16 DIAGNOSIS — M17.11 PRIMARY OSTEOARTHRITIS OF RIGHT KNEE: ICD-10-CM

## 2024-04-16 DIAGNOSIS — E11.9 TYPE 2 DIABETES MELLITUS WITHOUT COMPLICATION, WITHOUT LONG-TERM CURRENT USE OF INSULIN (HCC): Primary | ICD-10-CM

## 2024-04-16 PROCEDURE — 3079F DIAST BP 80-89 MM HG: CPT | Performed by: INTERNAL MEDICINE

## 2024-04-16 PROCEDURE — 3074F SYST BP LT 130 MM HG: CPT | Performed by: INTERNAL MEDICINE

## 2024-04-16 PROCEDURE — 1123F ACP DISCUSS/DSCN MKR DOCD: CPT | Performed by: INTERNAL MEDICINE

## 2024-04-16 PROCEDURE — 3044F HG A1C LEVEL LT 7.0%: CPT | Performed by: INTERNAL MEDICINE

## 2024-04-16 PROCEDURE — 99214 OFFICE O/P EST MOD 30 MIN: CPT | Performed by: INTERNAL MEDICINE

## 2024-04-16 RX ORDER — BLOOD SUGAR DIAGNOSTIC
STRIP MISCELLANEOUS
Qty: 100 EACH | Refills: 3 | Status: SHIPPED | OUTPATIENT
Start: 2024-04-16

## 2024-04-16 RX ORDER — SUCRALFATE 1 G/1
1 TABLET ORAL 4 TIMES DAILY
Qty: 120 TABLET | Refills: 2 | Status: SHIPPED | OUTPATIENT
Start: 2024-04-16 | End: 2024-07-15

## 2024-04-16 RX ORDER — POTASSIUM CHLORIDE 750 MG/1
10 CAPSULE, EXTENDED RELEASE ORAL 2 TIMES DAILY
Qty: 60 CAPSULE | Refills: 6 | Status: SHIPPED | OUTPATIENT
Start: 2024-04-16

## 2024-04-16 RX ORDER — SENNA AND DOCUSATE SODIUM 50; 8.6 MG/1; MG/1
1 TABLET, FILM COATED ORAL DAILY
Qty: 14 TABLET | Refills: 0 | Status: SHIPPED | OUTPATIENT
Start: 2024-04-16 | End: 2024-04-30

## 2024-04-16 RX ORDER — FAMOTIDINE 40 MG/1
40 TABLET, FILM COATED ORAL DAILY
Qty: 90 TABLET | Refills: 0 | Status: SHIPPED | OUTPATIENT
Start: 2024-04-16

## 2024-04-16 RX ORDER — HYDROCHLOROTHIAZIDE 25 MG/1
25 TABLET ORAL DAILY
Qty: 90 TABLET | Refills: 3 | Status: SHIPPED | OUTPATIENT
Start: 2024-04-16

## 2024-04-16 RX ORDER — VALSARTAN 320 MG/1
320 TABLET ORAL DAILY
Qty: 90 TABLET | Refills: 1 | Status: SHIPPED | OUTPATIENT
Start: 2024-04-16

## 2024-04-16 RX ORDER — ATORVASTATIN CALCIUM 40 MG/1
40 TABLET, FILM COATED ORAL DAILY
Qty: 30 TABLET | Refills: 12 | Status: SHIPPED | OUTPATIENT
Start: 2024-04-16

## 2024-04-16 RX ORDER — CHOLECALCIFEROL (VITAMIN D3) 125 MCG
1 CAPSULE ORAL 2 TIMES DAILY
Qty: 60 TABLET | Refills: 3 | Status: SHIPPED | OUTPATIENT
Start: 2024-04-16

## 2024-04-16 RX ORDER — PIOGLITAZONEHYDROCHLORIDE 45 MG/1
45 TABLET ORAL NIGHTLY
Qty: 90 TABLET | Refills: 0 | Status: SHIPPED | OUTPATIENT
Start: 2024-04-16

## 2024-04-16 RX ORDER — ASPIRIN 325 MG
325 TABLET ORAL DAILY
Qty: 30 TABLET | Refills: 0 | Status: SHIPPED | OUTPATIENT
Start: 2024-04-16

## 2024-04-16 RX ORDER — AMLODIPINE BESYLATE 5 MG/1
5 TABLET ORAL DAILY
Qty: 90 TABLET | Refills: 0 | Status: SHIPPED | OUTPATIENT
Start: 2024-04-16

## 2024-04-16 SDOH — ECONOMIC STABILITY: FOOD INSECURITY: WITHIN THE PAST 12 MONTHS, THE FOOD YOU BOUGHT JUST DIDN'T LAST AND YOU DIDN'T HAVE MONEY TO GET MORE.: NEVER TRUE

## 2024-04-16 SDOH — ECONOMIC STABILITY: FOOD INSECURITY: WITHIN THE PAST 12 MONTHS, YOU WORRIED THAT YOUR FOOD WOULD RUN OUT BEFORE YOU GOT MONEY TO BUY MORE.: NEVER TRUE

## 2024-04-16 SDOH — ECONOMIC STABILITY: INCOME INSECURITY: HOW HARD IS IT FOR YOU TO PAY FOR THE VERY BASICS LIKE FOOD, HOUSING, MEDICAL CARE, AND HEATING?: NOT HARD AT ALL

## 2024-04-16 ASSESSMENT — PATIENT HEALTH QUESTIONNAIRE - PHQ9
SUM OF ALL RESPONSES TO PHQ QUESTIONS 1-9: 0
2. FEELING DOWN, DEPRESSED OR HOPELESS: NOT AT ALL
2. FEELING DOWN, DEPRESSED OR HOPELESS: NOT AT ALL
SUM OF ALL RESPONSES TO PHQ QUESTIONS 1-9: 0
SUM OF ALL RESPONSES TO PHQ QUESTIONS 1-9: 0
SUM OF ALL RESPONSES TO PHQ9 QUESTIONS 1 & 2: 0
SUM OF ALL RESPONSES TO PHQ QUESTIONS 1-9: 0
SUM OF ALL RESPONSES TO PHQ9 QUESTIONS 1 & 2: 0
SUM OF ALL RESPONSES TO PHQ QUESTIONS 1-9: 0
1. LITTLE INTEREST OR PLEASURE IN DOING THINGS: NOT AT ALL
1. LITTLE INTEREST OR PLEASURE IN DOING THINGS: NOT AT ALL

## 2024-04-16 NOTE — PROGRESS NOTES
1. Have you been to the ER, urgent care clinic since your last visit?  Hospitalized since your last visit?Yes [facility]    2. Have you seen or consulted any other health care providers outside of the [facility] since your last visit?  Include any pap smears or colon screening. No     Chief Complaint   Patient presents with    Diabetes    Hypertension    Cholesterol Problem    Gastroesophageal Reflux           PHQ-9 Total Score: 0 (4/16/2024 10:19 AM)

## 2024-04-16 NOTE — PATIENT INSTRUCTIONS
Thank you for enrolling in [employer]. Please follow the instructions below to securely access your online medical record. [employer] allows you to send messages to your doctor, view your test results, renew your prescriptions, schedule appointments, and more.     How Do I Sign Up?  In your Internet browser, go to [email or web address]  Click on the Sign Up Now link in the Sign In box. You will see the New Member Sign Up page.  Enter your [employer] Access Code exactly as it appears below. You will not need to use this code after you’ve completed the sign-up process. If you do not sign up before the expiration date, you must request a new code.  [employer] Access Code: Activation code not generated  Current [employer] Status: Active    Enter your Social Security Number ([id number]) and Date of Birth (mm/dd/yyyy) as indicated and click Submit. You will be taken to the next sign-up page.  Create a [employer] ID. This will be your [employer] login ID and cannot be changed, so think of one that is secure and easy to remember.  Create a [employer] password. You can change your password at any time.  Enter your Password Reset Question and Answer. This can be used at a later time if you forget your password.   Enter your e-mail address. You will receive e-mail notification when new information is available in [employer].  Click Sign Up. You can now view your medical record.     Additional Information  If you have questions, please contact your physician practice where you receive care. Remember, [employer] is NOT to be used for urgent needs. For medical emergencies, dial 911.

## 2024-04-16 NOTE — PROGRESS NOTES
Kalpana Benoit is a 68 y.o. female and presents with Diabetes, Hypertension, Cholesterol Problem, and Gastroesophageal Reflux  .  Subjective:    Hypertension Review:  The patient has hypertension .  Diet and Lifestyle: generally follows a low sodium diet, exercises sporadically  Home BP Monitoring: is not measured at home.  Pertinent ROS: taking medications as instructed, no medication side effects noted, no TIA's, no chest pain on exertion, no dyspnea on exertion, no swelling of ankles.    Dyslipidemia Review:  Patient presents for evaluation of lipids.  Compliance with treatment thus far has been excellent.  A repeat fasting lipid profile was not done.  The patient does not use medications that may worsen dyslipidemias . The patient exercises sporadically.      Diabetes Mellitus Review:  She has diabetes mellitus.  Diabetic ROS - medication compliance: compliant all of the time, diabetic diet compliance: compliant all of the time, home glucose monitoring: is performed.   Known diabetic complications: none  Cardiovascular risk factors: family history, dyslipidemia, diabetes mellitus, obesity, hypertension  Current diabetic medications include oral agents   Eye exam current (within one year): no  Weight trend: stable  Prior visit with dietician: no  Current diet: \"healthy\" diet  in general  Current exercise: walking  Current monitoring regimen: home blood tests - daily  Home blood sugar records: trend: stable  Any episodes of hypoglycemia? no  Lab review: orders written for new lab studies as appropriate; see orders.     GERD Review:   Patient has a history of gastroesophageal reflux with heartburn. Symptoms have been present for a few months.  She denies dysphagia.  She  has not lost weight.  She denies melena, hematochezia, hematemesis, and coffee ground emesis.  This has been associated with fullness after meals.  She denies abdominal bloating and none.  Medical therapy in the past has included proton pump

## 2024-04-22 ENCOUNTER — OFFICE VISIT (OUTPATIENT)
Age: 69
End: 2024-04-22

## 2024-04-22 DIAGNOSIS — Z47.1 AFTERCARE FOLLOWING LEFT KNEE JOINT REPLACEMENT SURGERY: Primary | ICD-10-CM

## 2024-04-22 DIAGNOSIS — Z96.652 AFTERCARE FOLLOWING LEFT KNEE JOINT REPLACEMENT SURGERY: Primary | ICD-10-CM

## 2024-04-22 LAB
BACTERIA SPEC CULT: NORMAL
GRAM STN SPEC: NORMAL
SERVICE CMNT-IMP: NORMAL

## 2024-04-22 PROCEDURE — 99024 POSTOP FOLLOW-UP VISIT: CPT | Performed by: ORTHOPAEDIC SURGERY

## 2024-04-23 NOTE — PROGRESS NOTES
is here for a follow up visit from a total knee arthroplasty on the left side.  The patient is doing well, with no medical complications since discharge.  Pain has been appropriate since surgery,and the patient is progressing well with physical therapy.  Patient is ambulating with a cane.    Current Outpatient Medications on File Prior to Visit   Medication Sig Dispense Refill    amLODIPine (NORVASC) 5 MG tablet Take 1 tablet by mouth daily 90 tablet 0    aspirin (CLAUS ASPIRIN) 325 MG tablet Take 1 tablet by mouth daily For blood clot prevention 30 tablet 0    atorvastatin (LIPITOR) 40 MG tablet Take 1 tablet by mouth daily 30 tablet 12    Cholecalciferol (VITAMIN D3) 50 MCG (2000 UT) TABS Take 1 tablet by mouth 2 times daily 60 tablet 3    famotidine (PEPCID) 40 MG tablet Take 1 tablet by mouth daily 90 tablet 0    hydroCHLOROthiazide (HYDRODIURIL) 25 MG tablet Take 1 tablet by mouth daily 90 tablet 3    potassium chloride (MICRO-K) 10 MEQ extended release capsule Take 1 capsule by mouth 2 times daily 60 capsule 6    sucralfate (CARAFATE) 1 GM tablet Take 1 tablet by mouth 4 times daily 120 tablet 2    pioglitazone (ACTOS) 45 MG tablet Take 1 tablet by mouth nightly 90 tablet 0    sennosides-docusate sodium (SENOKOT-S) 8.6-50 MG tablet Take 1 tablet by mouth daily for 14 days 14 tablet 0    metFORMIN (GLUCOPHAGE) 1000 MG tablet Take 1 tablet by mouth daily (with breakfast) 60 tablet 3    blood glucose test strips (ONETOUCH VERIO) strip Test once daily. E11.9, Diabetes Type 2. 100 each 3    valsartan (DIOVAN) 320 MG tablet Take 1 tablet by mouth daily 90 tablet 1    Blood Glucose Monitoring Suppl (ONE TOUCH ULTRA 2) w/Device KIT Test once daily. E11.9. Type 2 Diabetes 1 kit 0    acetaminophen (TYLENOL) 500 MG tablet Take 2 tablets by mouth every 6 hours as needed for Pain      Multiple Vitamins-Minerals (MULTIVITAMIN WOMEN PO) Take by mouth daily       No current facility-administered medications on 
Identified pt with two pt identifiers (name and ). Reviewed chart in preparation for visit and have obtained necessary documentation.    Kalpana Benoit is a 68 y.o. female Post-Op Check (Left Total knee)  .    There were no vitals filed for this visit.       1. Have you been to the ER, urgent care clinic since your last visit?  Hospitalized since your last visit?  no     2. Have you seen or consulted any other health care providers outside of the [facility] since your last visit?  Include any pap smears or colon screening.  no  
n/a

## 2024-05-10 ENCOUNTER — HOSPITAL ENCOUNTER (OUTPATIENT)
Facility: HOSPITAL | Age: 69
Setting detail: RECURRING SERIES
Discharge: HOME | End: 2024-05-13
Attending: ORTHOPAEDIC SURGERY
Payer: COMMERCIAL

## 2024-05-10 PROCEDURE — 97162 PT EVAL MOD COMPLEX 30 MIN: CPT

## 2024-05-10 NOTE — THERAPY EVALUATION
the original hardware removed and a antibiotic spacer placed for about a month prior to having the knee replaced again in April.  She presents today with objective data as above.  Pt is a good candidate for PT and will benefit from skilled services to address these deficits and work toward the goals listed below.        Evaluation Complexity:  History:  HIGH Complexity :3+ comorbidities / personal factors will impact the outcome/ POC ; Examination:  HIGH Complexity : 4+ Standardized tests and measures addressing body structure, function, activity limitation and / or participation in recreation  ;Presentation:  MEDIUM Complexity : Evolving with changing characteristics  ;Clinical Decision Making:  MEDIUM Complexity : FOTO score of 26-74 Overall Complexity Rating: MEDIUM  Problem List: pain affecting function, decrease ROM, decrease strength, edema affection function, impaired gait/balance, decrease ADL/functional abilities, decrease activity tolerance, decrease flexibility/joint mobility, and decrease transfer abilities    Treatment Plan may include any combination of the followin Therapeutic Exercise, 02247 Neuromuscular Re-Education, 33713 Manual Therapy, 93346 Therapeutic Activity, 38908 Self Care/Home Management, 18493 Electrical Stim unattended, 15148 Vasopneumatic Device  (Vasopnuematic compression justification:  Per bilateral girth measures taken and listed above the edema is considered significant and having an impact on the patient's strength, balance, gait, transfers, self care, and ADL's), 31768 Gait Training, 59691 Ultrasound, and (Elective Self Pay) Needle Insertion w/o Injection (1 or 2 muscles), (3+ muscles)  Patient / Family readiness to learn indicated by: asking questions, trying to perform skills, and interest  Persons(s) to be included in education: patient (P)  Barriers to Learning/Limitations: none  Measures taken if barriers to learning present: NA  Patient Self Reported Health Status:

## 2024-05-17 ENCOUNTER — HOSPITAL ENCOUNTER (OUTPATIENT)
Facility: HOSPITAL | Age: 69
Setting detail: RECURRING SERIES
Discharge: HOME | End: 2024-05-20
Attending: ORTHOPAEDIC SURGERY
Payer: COMMERCIAL

## 2024-05-17 PROCEDURE — 97110 THERAPEUTIC EXERCISES: CPT

## 2024-05-17 NOTE — PROGRESS NOTES
PHYSICAL THERAPY - DAILY TREATMENT NOTE (updated 3/23)      Date: 2024          Patient Name:  Kalpana Benoit :  1955   Medical   Diagnosis:  Aftercare following left knee joint replacement surgery [Z47.1, Z96.652] Treatment Diagnosis:  M25.562  LEFT KNEE PAIN    Referral Source:  Matthew Garay DO Insurance:   Payor: [facility] / Plan: [facility] AET BETTER [facility] / Product Type: *No Product type* /                     Patient  verified yes     Visit #   Current  / Total 2 24   Time   In / Out [phone]   Total Treatment Time 45   Total Timed Codes 35         SUBJECTIVE    Pain Level (0-10 scale): 1    Any medication changes, allergies to medications, adverse drug reactions, diagnosis change, or new procedure performed?: [x] No    [] Yes (see summary sheet for update)  Medications: Verified on Patient Summary List    Subjective functional status/changes:     Pt reports that the left foot is bothering all from increased walking.  She states that the knee is doing well.      OBJECTIVE      Therapeutic Procedures:  Tx Min Billable or 1:1 Min (if diff from Tx Min) Procedure, Rationale, Specifics   35  [id number] Therapeutic Exercise (timed):  increase ROM, strength, coordination, balance, and proprioception to improve patient's ability to progress to PLOF and address remaining functional goals. (see flow sheet as applicable)     Details if applicable:                         35     Total Total         Modalities Rationale:     decrease inflammation and decrease pain to improve patient's ability to progress to PLOF and address remaining functional goals.       min [] Estim Unattended,             type/location:       []  w/ice    []  w/heat        min [] Estim Attended,             type/location:       []  w/ice   []  w/heat         []  w/US   []  TENS insruct            min []  Mechanical Traction,        type/lbs:        []  pro      []  sup           []  int       []  cont            []

## 2024-05-21 ENCOUNTER — OFFICE VISIT (OUTPATIENT)
Age: 69
End: 2024-05-21
Payer: COMMERCIAL

## 2024-05-21 DIAGNOSIS — Z96.652 AFTERCARE FOLLOWING LEFT KNEE JOINT REPLACEMENT SURGERY: Primary | ICD-10-CM

## 2024-05-21 DIAGNOSIS — G89.29 CHRONIC PAIN OF LEFT ANKLE: ICD-10-CM

## 2024-05-21 DIAGNOSIS — Z47.1 AFTERCARE FOLLOWING LEFT KNEE JOINT REPLACEMENT SURGERY: Primary | ICD-10-CM

## 2024-05-21 DIAGNOSIS — M25.572 CHRONIC PAIN OF LEFT ANKLE: ICD-10-CM

## 2024-05-21 PROCEDURE — 1123F ACP DISCUSS/DSCN MKR DOCD: CPT | Performed by: ORTHOPAEDIC SURGERY

## 2024-05-21 PROCEDURE — 99213 OFFICE O/P EST LOW 20 MIN: CPT | Performed by: ORTHOPAEDIC SURGERY

## 2024-05-21 NOTE — PROGRESS NOTES
medications on file prior to visit.       ROS:  General: denies agitation, major chest pain, unexpected weakness  Patient complains of left knee pain which is managed with home medications.  Skin: healing wound is without issue or drainage  Strength: appropriate weakness of involved extremity is resolving since surgery      Physical Examination:    There were no vitals taken for this visit.    Dressing: Clean, Dry, Intact  Skin: no significant drainage, no wound dehiscence.    Range of motion: 0-1 25  Coronal plane stability is excellent  Sensation intact to light touch at level of wound and distally  Strength is 4/5 with knee flexion and extension  Leg lengths are clinically equal  Distal swelling is noted, but appropriate for postoperative course  Distal capillary refill less than 2 seconds      Imaging:    Postoperative xrays are reviewed, they indicate a left total knee arthroplasty in excellent position without evidence of loosening or failure.       Assessment: Status post left total knee arthroplasty, doing well    Plan:  Patient will continue physical therapy, with the goal of outpatient therapy and then home exercise program as soon as is possible  I have prescribed an ankle brace for her, this was distributed in office today.  Wound care and dental prophylaxis instructions were reviewed  Continue to weight bear as tolerated without restriction, except to keep to the positions of comfort.  Emphasis was placed on range of motion and strength exercises daily.  Deep Venous Thrombosis Prophylaxis  Follow up will be at 6 weeks from now, no xrays on follow up.        Ms. Benoit has a reminder for a \"due or due soon\" health maintenance. I have asked that she contact her primary care provider for follow-up on this health maintenance.

## 2024-05-22 RX ORDER — ASPIRIN 325 MG
325 TABLET ORAL DAILY
Qty: 30 TABLET | Refills: 12 | Status: SHIPPED | OUTPATIENT
Start: 2024-05-22

## 2024-05-22 RX ORDER — LOSARTAN POTASSIUM 50 MG/1
50 TABLET ORAL DAILY
Qty: 90 TABLET | Refills: 0 | OUTPATIENT
Start: 2024-05-22

## 2024-05-28 ENCOUNTER — HOSPITAL ENCOUNTER (OUTPATIENT)
Facility: HOSPITAL | Age: 69
Setting detail: RECURRING SERIES
Discharge: HOME | End: 2024-05-31
Attending: ORTHOPAEDIC SURGERY
Payer: COMMERCIAL

## 2024-05-28 PROCEDURE — 97110 THERAPEUTIC EXERCISES: CPT

## 2024-05-28 NOTE — PROGRESS NOTES
[]  int       []  cont            []  before manual           []  after manual     min []  Ultrasound,         settings/location:      min  unbilled [x]  Ice     []  Heat            location/position: Bilat knees        min []  Vasopneumatic Device,      press/temp:   pre-treatment girth :    post-treatment girth :    measured at (landmark       location) :   If using vaso (only need to measure limb vaso being performed on)        min []  Other:        Skin assessment post-treatment (if applicable):    [x]  intact    []  redness- no adverse reaction                 []redness - adverse reaction:          [x]  Patient Education billed concurrently with other procedures   [x] Review HEP    [] Progressed/Changed HEP, detail:    [] Other detail:         Other Objective/Functional Measures  NA    Pain Level at end of session (0-10 scale): 0      Assessment   Pt is progressing well and demonstrated improved ability to perform activity today.  Will continue to progress as able.  Patient will continue to benefit from skilled PT / OT services to modify and progress therapeutic interventions, analyze and address functional mobility deficits, analyze and address ROM deficits, analyze and address strength deficits, analyze and address soft tissue restrictions, analyze and cue for proper movement patterns, analyze and modify for postural abnormalities, analyze and address imbalance/dizziness, and instruct in home and community integration to address functional deficits and attain remaining goals.    Progress toward goals / Updated goals:  []  See Progress Note/Recertification    Progressing toward goals      PLAN  Yes  Continue plan of care  Re-Cert Due: NA  [x]  Upgrade activities as tolerated  []  Discharge due to:  []  Other:      Gary Ziegler, PT       5/28/2024       1:55 PM

## 2024-05-30 ENCOUNTER — HOSPITAL ENCOUNTER (OUTPATIENT)
Facility: HOSPITAL | Age: 69
Setting detail: RECURRING SERIES
End: 2024-05-30
Attending: ORTHOPAEDIC SURGERY
Payer: COMMERCIAL

## 2024-05-30 PROCEDURE — 97110 THERAPEUTIC EXERCISES: CPT

## 2024-05-30 NOTE — PROGRESS NOTES
PHYSICAL THERAPY - DAILY TREATMENT NOTE (updated 3/23)      Date: 2024          Patient Name:  Kalpana Benoit :  1955   Medical   Diagnosis:  Aftercare following left knee joint replacement surgery [Z47.1, Z96.652] Treatment Diagnosis:  M25.562  LEFT KNEE PAIN    Referral Source:  Matthew Garay DO Insurance:   Payor: [facility] / Plan: [facility] AET BETTER [facility] / Product Type: *No Product type* /                     Patient  verified yes     Visit #   Current  / Total 4 24   Time   In / Out 136 211   Total Treatment Time 35   Total Timed Codes 35         SUBJECTIVE    Pain Level (0-10 scale): 0    Any medication changes, allergies to medications, adverse drug reactions, diagnosis change, or new procedure performed?: [x] No    [] Yes (see summary sheet for update)  Medications: Verified on Patient Summary List    Subjective functional status/changes:     Pt reports that she is doing well.  No new reports of pain.  She is still packing some things leading up to her move.       OBJECTIVE      Therapeutic Procedures:  Tx Min Billable or 1:1 Min (if diff from Tx Min) Procedure, Rationale, Specifics   35  [id number] Therapeutic Exercise (timed):  increase ROM, strength, coordination, balance, and proprioception to improve patient's ability to progress to PLOF and address remaining functional goals. (see flow sheet as applicable)     Details if applicable:                         35     Total Total         Modalities Rationale:     decrease inflammation and decrease pain to improve patient's ability to progress to PLOF and address remaining functional goals.       min [] Estim Unattended,             type/location:       []  w/ice    []  w/heat        min [] Estim Attended,             type/location:       []  w/ice   []  w/heat         []  w/US   []  TENS insruct            min []  Mechanical Traction,        type/lbs:        []  pro      []  sup           []  int       []  cont

## 2024-06-18 RX ORDER — VALSARTAN 320 MG/1
320 TABLET ORAL DAILY
Qty: 90 TABLET | Refills: 1 | Status: SHIPPED | OUTPATIENT
Start: 2024-06-18

## 2024-06-18 RX ORDER — LOSARTAN POTASSIUM 50 MG/1
50 TABLET ORAL DAILY
Qty: 90 TABLET | Refills: 0 | OUTPATIENT
Start: 2024-06-18

## 2024-07-03 NOTE — TELEPHONE ENCOUNTER
Last appointment: 04/16/2024 MD Willingham   Next appointment: 07/16/2024 MD Willingham   Previous refill encounter(s):   04/16/2024:  - Actos #90,   - Pepcid #90,   - Norvasc #90.     For Pharmacy Admin Tracking Only    Program: Medication Refill  Intervention Detail: New Rx: 3, reason: Patient Preference  Time Spent (min): 5    Requested Prescriptions     Pending Prescriptions Disp Refills    amLODIPine (NORVASC) 5 MG tablet [Pharmacy Med Name: AMLODIPINE BESYLATE 5 MG TAB] 90 tablet 0     Sig: TAKE 1 TABLET BY MOUTH EVERY DAY    famotidine (PEPCID) 40 MG tablet [Pharmacy Med Name: FAMOTIDINE 40 MG TABLET] 90 tablet 0     Sig: TAKE 1 TABLET BY MOUTH EVERY DAY    pioglitazone (ACTOS) 45 MG tablet [Pharmacy Med Name: PIOGLITAZONE HCL 45 MG TABLET] 90 tablet 0     Sig: TAKE 1 TABLET BY MOUTH EVERY DAY AT NIGHT

## 2024-07-05 RX ORDER — FAMOTIDINE 40 MG/1
40 TABLET, FILM COATED ORAL DAILY
Qty: 30 TABLET | Refills: 11 | Status: SHIPPED | OUTPATIENT
Start: 2024-07-05 | End: 2025-07-05

## 2024-07-05 RX ORDER — AMLODIPINE BESYLATE 5 MG/1
5 TABLET ORAL DAILY
Qty: 90 TABLET | Refills: 0 | Status: SHIPPED | OUTPATIENT
Start: 2024-07-05

## 2024-07-05 RX ORDER — PIOGLITAZONEHYDROCHLORIDE 45 MG/1
45 TABLET ORAL NIGHTLY
Qty: 90 TABLET | Refills: 0 | Status: SHIPPED | OUTPATIENT
Start: 2024-07-05

## 2024-07-09 ENCOUNTER — OFFICE VISIT (OUTPATIENT)
Age: 69
End: 2024-07-09
Payer: COMMERCIAL

## 2024-07-09 VITALS
SYSTOLIC BLOOD PRESSURE: 113 MMHG | DIASTOLIC BLOOD PRESSURE: 76 MMHG | OXYGEN SATURATION: 98 % | WEIGHT: 184.6 LBS | HEART RATE: 97 BPM | BODY MASS INDEX: 34.85 KG/M2 | RESPIRATION RATE: 16 BRPM | HEIGHT: 61 IN

## 2024-07-09 DIAGNOSIS — Z48.89 AFTERCARE FOLLOWING SURGERY: Primary | ICD-10-CM

## 2024-07-09 DIAGNOSIS — Z47.89 ORTHOPEDIC AFTERCARE: ICD-10-CM

## 2024-07-09 DIAGNOSIS — Z47.1 AFTERCARE FOLLOWING LEFT KNEE JOINT REPLACEMENT SURGERY: Primary | ICD-10-CM

## 2024-07-09 DIAGNOSIS — Z96.652 AFTERCARE FOLLOWING LEFT KNEE JOINT REPLACEMENT SURGERY: Primary | ICD-10-CM

## 2024-07-09 PROCEDURE — 3074F SYST BP LT 130 MM HG: CPT | Performed by: ORTHOPAEDIC SURGERY

## 2024-07-09 PROCEDURE — 3078F DIAST BP <80 MM HG: CPT | Performed by: ORTHOPAEDIC SURGERY

## 2024-07-09 PROCEDURE — 99212 OFFICE O/P EST SF 10 MIN: CPT | Performed by: ORTHOPAEDIC SURGERY

## 2024-07-09 PROCEDURE — L1906 AFO MULTILIG ANK SUP PRE OTS: HCPCS | Performed by: ORTHOPAEDIC SURGERY

## 2024-07-09 PROCEDURE — 1123F ACP DISCUSS/DSCN MKR DOCD: CPT | Performed by: ORTHOPAEDIC SURGERY

## 2024-07-09 ASSESSMENT — PATIENT HEALTH QUESTIONNAIRE - PHQ9
SUM OF ALL RESPONSES TO PHQ QUESTIONS 1-9: 0
2. FEELING DOWN, DEPRESSED OR HOPELESS: NOT AT ALL
SUM OF ALL RESPONSES TO PHQ QUESTIONS 1-9: 0
1. LITTLE INTEREST OR PLEASURE IN DOING THINGS: NOT AT ALL
SUM OF ALL RESPONSES TO PHQ9 QUESTIONS 1 & 2: 0

## 2024-07-09 NOTE — PROGRESS NOTES
7/9/2024    Chief Complaint: Follow up for bilateral knee replacement    HPI:  is a 68 y.o.  female who is now several months status post bilateral total knee arthroplasty.  The patient states that they are doing well.  The preoperative pain is gone and the postoperative pain is minimal.  The pain is minimal in intensity.  Regular exercises have helped with residual symptoms.        Past Medical History:   Diagnosis Date    Anemia     blood transfusion on 2/4/24 after surgery    Diverticulitis     GERD (gastroesophageal reflux disease)     Hypertension     Osteoarthrosis, unspecified whether generalized or localized, lower leg     knees    Other and unspecified hyperlipidemia     Type II or unspecified type diabetes mellitus without mention of complication, uncontrolled        Past Surgical History:   Procedure Laterality Date    ARM SURGERY Left 12/21/2023    IRRIGATION AND DEBRIDEMENT LEFT LEG performed by Matthew Garay DO at [facility] MAIN OR    COLONOSCOPY N/A 9/18/2017    COLONOSCOPY performed by Malcolm Pérez MD at [facility] AMBULATORY OR    COLONOSCOPY N/A 4/25/2018    COLONOSCOPY performed by Malcolm Pérez MD at [facility] ENDOSCOPY    FINGER TRIGGER RELEASE Left 08/28/2023    LEFT RING FINGER TRIGGER RELEASE performed by Matthew Garay DO at [facility] MAIN OR    HYSTERECTOMY (CERVIX STATUS UNKNOWN)  2021    INCISION AND DRAINAGE OF WOUND  09/30/2023    left top of knee    LAP,TUBAL CAUTERY Bilateral 1992    LEG SURGERY Left 01/30/2024    LEFT KNEE INCISION AND DRAINAGE performed by Matthew Garay DO at [facility] MAIN OR    REVISION TOTAL KNEE ARTHROPLASTY Left 4/8/2024    LEFT KNEE TOTAL ARTHROPLASTY REVISION performed by Matthew Garay DO at [facility] MAIN OR    TONSILLECTOMY      age 9    TOTAL KNEE ARTHROPLASTY Left 05/22/2023    LEFT TOTAL KNEE ARTHROPLASTY performed by Matthew Garay DO at [facility] MAIN OR    TOTAL KNEE ARTHROPLASTY Right 10/24/2023    RIGHT TOTAL KNEE ARTHROPLASTY performed by Matthew Garay DO at [facility]

## 2024-07-09 NOTE — PROGRESS NOTES
Identified pt with two pt identifiers (name and ). Reviewed chart in preparation for visit and have obtained necessary documentation.    Kalpana Benoit is a 68 y.o. female Leg Pain (F/u b/l leg pain R>L)  .    Vitals:    24 1305   BP: 113/76   Site: Left Upper Arm   Position: Sitting   Cuff Size: Large Adult   Pulse: 97   Resp: 16   SpO2: 98%   Weight: 83.7 kg (184 lb 9.6 oz)   Height: 1.549 m (5' 1\")          1. Have you been to the ER, urgent care clinic since your last visit?  Hospitalized since your last visit?  no     2. Have you seen or consulted any other health care providers outside of the [facility] since your last visit?  Include any pap smears or colon screening.  no

## 2024-07-16 ENCOUNTER — OFFICE VISIT (OUTPATIENT)
Facility: CLINIC | Age: 69
End: 2024-07-16
Payer: COMMERCIAL

## 2024-07-16 VITALS
HEIGHT: 61 IN | WEIGHT: 188 LBS | SYSTOLIC BLOOD PRESSURE: 100 MMHG | OXYGEN SATURATION: 98 % | DIASTOLIC BLOOD PRESSURE: 76 MMHG | RESPIRATION RATE: 16 BRPM | BODY MASS INDEX: 35.5 KG/M2 | HEART RATE: 81 BPM | TEMPERATURE: 98 F

## 2024-07-16 DIAGNOSIS — K21.9 GASTROESOPHAGEAL REFLUX DISEASE WITHOUT ESOPHAGITIS: ICD-10-CM

## 2024-07-16 DIAGNOSIS — I10 PRIMARY HYPERTENSION: Primary | ICD-10-CM

## 2024-07-16 DIAGNOSIS — M17.11 PRIMARY OSTEOARTHRITIS OF RIGHT KNEE: ICD-10-CM

## 2024-07-16 DIAGNOSIS — E11.9 TYPE 2 DIABETES MELLITUS WITHOUT COMPLICATION, WITHOUT LONG-TERM CURRENT USE OF INSULIN (HCC): ICD-10-CM

## 2024-07-16 DIAGNOSIS — L30.4 INTERTRIGO: ICD-10-CM

## 2024-07-16 LAB
ALBUMIN SERPL-MCNC: 3.6 G/DL (ref 3.5–5)
ALBUMIN/GLOB SERPL: 1.1 (ref 1.1–2.2)
ALP SERPL-CCNC: 206 U/L (ref 45–117)
ALT SERPL-CCNC: 22 U/L (ref 12–78)
ANION GAP SERPL CALC-SCNC: 6 MMOL/L (ref 5–15)
AST SERPL-CCNC: 16 U/L (ref 15–37)
BILIRUB SERPL-MCNC: 0.4 MG/DL (ref 0.2–1)
BUN SERPL-MCNC: 27 MG/DL (ref 6–20)
BUN/CREAT SERPL: 34 (ref 12–20)
CALCIUM SERPL-MCNC: 9.8 MG/DL (ref 8.5–10.1)
CHLORIDE SERPL-SCNC: 105 MMOL/L (ref 97–108)
CHOLEST SERPL-MCNC: 146 MG/DL
CO2 SERPL-SCNC: 27 MMOL/L (ref 21–32)
CREAT SERPL-MCNC: 0.8 MG/DL (ref 0.55–1.02)
ERYTHROCYTE [DISTWIDTH] IN BLOOD BY AUTOMATED COUNT: 16.6 % (ref 11.5–14.5)
GLOBULIN SER CALC-MCNC: 3.2 G/DL (ref 2–4)
GLUCOSE SERPL-MCNC: 161 MG/DL (ref 65–100)
HCT VFR BLD AUTO: 34.8 % (ref 35–47)
HDLC SERPL-MCNC: 63 MG/DL
HDLC SERPL: 2.3 (ref 0–5)
HGB BLD-MCNC: 11.3 G/DL (ref 11.5–16)
LDLC SERPL CALC-MCNC: 56.4 MG/DL (ref 0–100)
MCH RBC QN AUTO: 30.7 PG (ref 26–34)
MCHC RBC AUTO-ENTMCNC: 32.5 G/DL (ref 30–36.5)
MCV RBC AUTO: 94.6 FL (ref 80–99)
NRBC # BLD: 0 K/UL (ref 0–0.01)
NRBC BLD-RTO: 0 PER 100 WBC
PLATELET # BLD AUTO: 172 K/UL (ref 150–400)
PMV BLD AUTO: 11.2 FL (ref 8.9–12.9)
POTASSIUM SERPL-SCNC: 4.6 MMOL/L (ref 3.5–5.1)
PROT SERPL-MCNC: 6.8 G/DL (ref 6.4–8.2)
RBC # BLD AUTO: 3.68 M/UL (ref 3.8–5.2)
SODIUM SERPL-SCNC: 138 MMOL/L (ref 136–145)
TRIGL SERPL-MCNC: 133 MG/DL
VLDLC SERPL CALC-MCNC: 26.6 MG/DL
WBC # BLD AUTO: 5 K/UL (ref 3.6–11)

## 2024-07-16 PROCEDURE — 99214 OFFICE O/P EST MOD 30 MIN: CPT | Performed by: INTERNAL MEDICINE

## 2024-07-16 PROCEDURE — 3044F HG A1C LEVEL LT 7.0%: CPT | Performed by: INTERNAL MEDICINE

## 2024-07-16 PROCEDURE — 3078F DIAST BP <80 MM HG: CPT | Performed by: INTERNAL MEDICINE

## 2024-07-16 PROCEDURE — 1123F ACP DISCUSS/DSCN MKR DOCD: CPT | Performed by: INTERNAL MEDICINE

## 2024-07-16 PROCEDURE — 3074F SYST BP LT 130 MM HG: CPT | Performed by: INTERNAL MEDICINE

## 2024-07-16 RX ORDER — SUCRALFATE 1 G/1
1 TABLET ORAL 4 TIMES DAILY
Qty: 120 TABLET | Refills: 2 | Status: SHIPPED | OUTPATIENT
Start: 2024-07-16 | End: 2024-10-14

## 2024-07-16 RX ORDER — AMLODIPINE BESYLATE 5 MG/1
5 TABLET ORAL DAILY
Qty: 90 TABLET | Refills: 0 | Status: SHIPPED | OUTPATIENT
Start: 2024-07-16

## 2024-07-16 RX ORDER — PIOGLITAZONEHYDROCHLORIDE 45 MG/1
45 TABLET ORAL NIGHTLY
Qty: 90 TABLET | Refills: 0 | Status: SHIPPED | OUTPATIENT
Start: 2024-07-16

## 2024-07-16 RX ORDER — NYSTATIN 100000 [USP'U]/G
POWDER TOPICAL
Qty: 60 G | Refills: 3 | Status: SHIPPED | OUTPATIENT
Start: 2024-07-16

## 2024-07-16 RX ORDER — CHOLECALCIFEROL (VITAMIN D3) 125 MCG
1 CAPSULE ORAL 2 TIMES DAILY
Qty: 60 TABLET | Refills: 3 | Status: SHIPPED | OUTPATIENT
Start: 2024-07-16

## 2024-07-16 SDOH — ECONOMIC STABILITY: FOOD INSECURITY: WITHIN THE PAST 12 MONTHS, YOU WORRIED THAT YOUR FOOD WOULD RUN OUT BEFORE YOU GOT MONEY TO BUY MORE.: NEVER TRUE

## 2024-07-16 SDOH — ECONOMIC STABILITY: FOOD INSECURITY: WITHIN THE PAST 12 MONTHS, THE FOOD YOU BOUGHT JUST DIDN'T LAST AND YOU DIDN'T HAVE MONEY TO GET MORE.: NEVER TRUE

## 2024-07-16 SDOH — ECONOMIC STABILITY: INCOME INSECURITY: HOW HARD IS IT FOR YOU TO PAY FOR THE VERY BASICS LIKE FOOD, HOUSING, MEDICAL CARE, AND HEATING?: NOT HARD AT ALL

## 2024-07-16 ASSESSMENT — PATIENT HEALTH QUESTIONNAIRE - PHQ9
2. FEELING DOWN, DEPRESSED OR HOPELESS: NOT AT ALL
SUM OF ALL RESPONSES TO PHQ9 QUESTIONS 1 & 2: 0
1. LITTLE INTEREST OR PLEASURE IN DOING THINGS: NOT AT ALL
SUM OF ALL RESPONSES TO PHQ QUESTIONS 1-9: 0

## 2024-07-16 NOTE — PATIENT INSTRUCTIONS
Thank you for enrolling in [employer]. Please follow the instructions below to securely access your online medical record. [employer] allows you to send messages to your doctor, view your test results, renew your prescriptions, schedule appointments, and more.     How Do I Sign Up?  In your Internet browser, go to [email or web address]  Click on the Sign Up Now link in the Sign In box. You will see the New Member Sign Up page.  Enter your [employer] Access Code exactly as it appears below. You will not need to use this code after you’ve completed the sign-up process. If you do not sign up before the expiration date, you must request a new code.  [employer] Access Code: Activation code not generated  Current [employer] Status: Active    Enter your Social Security Number ([id number]) and Date of Birth (mm/dd/yyyy) as indicated and click Submit. You will be taken to the next sign-up page.  Create a [employer]bat ID. This will be your [employer] login ID and cannot be changed, so think of one that is secure and easy to remember.  Create a [employer]bat password. You can change your password at any time.  Enter your Password Reset Question and Answer. This can be used at a later time if you forget your password.   Enter your e-mail address. You will receive e-mail notification when new information is available in [employer].  Click Sign Up. You can now view your medical record.     Additional Information  If you have questions, please contact your physician practice where you receive care. Remember, [employer] is NOT to be used for urgent needs. For medical emergencies, dial 911.

## 2024-07-16 NOTE — PROGRESS NOTES
1. Have you been to the ER, urgent care clinic since your last visit?  Hospitalized since your last visit?No    2. Have you seen or consulted any other health care providers outside of the [facility] since your last visit?  Include any pap smears or colon screening. No     Chief Complaint   Patient presents with    Diabetes    Gastroesophageal Reflux    Hypertension    Cholesterol Problem         PHQ-9 Total Score: 0 (7/16/2024  9:58 AM)    
Primary osteoarthritis of right knee  M17.11       4. Type 2 diabetes mellitus without complication, without long-term current use of insulin (HCC)  E11.9       5. Intertrigo  L30.4         No orders of the defined types were placed in this encounter.    lose weight, increase physical activity, follow low fat diet, follow low salt diet, routine labs ordered, call if any problems,  Patient Instructions   Thank you for enrolling in [employer]. Please follow the instructions below to securely access your online medical record. [employer] allows you to send messages to your doctor, view your test results, renew your prescriptions, schedule appointments, and more.     How Do I Sign Up?  In your Internet browser, go to [email or web address]  Click on the Sign Up Now link in the Sign In box. You will see the New Member Sign Up page.  Enter your [employer] Access Code exactly as it appears below. You will not need to use this code after you’ve completed the sign-up process. If you do not sign up before the expiration date, you must request a new code.  [employer] Access Code: Activation code not generated  Current [employer] Status: Active    Enter your Social Security Number ([id number]) and Date of Birth (mm/dd/yyyy) as indicated and click Submit. You will be taken to the next sign-up page.  Create a [employer]ax ID. This will be your [employer] login ID and cannot be changed, so think of one that is secure and easy to remember.  Create a [employer] password. You can change your password at any time.  Enter your Password Reset Question and Answer. This can be used at a later time if you forget your password.   Enter your e-mail address. You will receive e-mail notification when new information is available in [employer].  Click Sign Up. You can now view your medical record.     Additional Information  If you have questions, please contact your physician practice where you receive care. Remember, [employer] is NOT to be used

## 2024-07-18 ENCOUNTER — HOSPITAL ENCOUNTER (EMERGENCY)
Facility: HOSPITAL | Age: 69
Discharge: HOME | End: 2024-07-18
Payer: COMMERCIAL

## 2024-07-18 VITALS
SYSTOLIC BLOOD PRESSURE: 137 MMHG | DIASTOLIC BLOOD PRESSURE: 65 MMHG | WEIGHT: 189.82 LBS | HEIGHT: 61 IN | OXYGEN SATURATION: 95 % | HEART RATE: 90 BPM | TEMPERATURE: 97.8 F | RESPIRATION RATE: 16 BRPM | BODY MASS INDEX: 35.84 KG/M2

## 2024-07-18 DIAGNOSIS — S39.012A STRAIN OF LUMBAR REGION, INITIAL ENCOUNTER: Primary | ICD-10-CM

## 2024-07-18 LAB
APPEARANCE UR: CLEAR
BACTERIA URNS QL MICRO: NEGATIVE /HPF
BILIRUB UR QL: NEGATIVE
COLOR UR: ABNORMAL
EPITH CASTS URNS QL MICRO: ABNORMAL /LPF
GLUCOSE UR STRIP.AUTO-MCNC: 100 MG/DL
HGB UR QL STRIP: ABNORMAL
KETONES UR QL STRIP.AUTO: NEGATIVE MG/DL
LEUKOCYTE ESTERASE UR QL STRIP.AUTO: ABNORMAL
NITRITE UR QL STRIP.AUTO: NEGATIVE
PH UR STRIP: 6 (ref 5–8)
PROT UR STRIP-MCNC: NEGATIVE MG/DL
RBC #/AREA URNS HPF: ABNORMAL /HPF (ref 0–5)
SP GR UR REFRACTOMETRY: 1.02
URINE CULTURE IF INDICATED: ABNORMAL
UROBILINOGEN UR QL STRIP.AUTO: 0.2 EU/DL (ref 0.2–1)
WBC URNS QL MICRO: ABNORMAL /HPF (ref 0–4)

## 2024-07-18 PROCEDURE — 99283 EMERGENCY DEPT VISIT LOW MDM: CPT

## 2024-07-18 PROCEDURE — 81001 URINALYSIS AUTO W/SCOPE: CPT

## 2024-07-18 RX ORDER — ACETAMINOPHEN 500 MG
1000 TABLET ORAL EVERY 6 HOURS PRN
Qty: 30 TABLET | Refills: 0 | Status: SHIPPED | OUTPATIENT
Start: 2024-07-18

## 2024-07-18 RX ORDER — LIDOCAINE 50 MG/G
1 PATCH TOPICAL DAILY
Qty: 10 PATCH | Refills: 0 | Status: SHIPPED | OUTPATIENT
Start: 2024-07-18 | End: 2024-07-28

## 2024-07-18 ASSESSMENT — ENCOUNTER SYMPTOMS
ABDOMINAL PAIN: 0
EYE PAIN: 0
RHINORRHEA: 0
BACK PAIN: 1
CHEST TIGHTNESS: 0
VOMITING: 0
DIARRHEA: 0
COUGH: 0
SORE THROAT: 0
WHEEZING: 0
SHORTNESS OF BREATH: 0
NAUSEA: 0

## 2024-07-18 ASSESSMENT — PAIN DESCRIPTION - LOCATION: LOCATION: BACK

## 2024-07-18 ASSESSMENT — PAIN - FUNCTIONAL ASSESSMENT: PAIN_FUNCTIONAL_ASSESSMENT: 0-10

## 2024-07-18 ASSESSMENT — PAIN DESCRIPTION - ORIENTATION: ORIENTATION: RIGHT;LOWER

## 2024-07-18 NOTE — ED NOTES
Pt presents to ED complaining of right lower back pain x 2 weeks. Pt states that she had knee surgery in January, 2024, and that she believes the back pain is due to a gait change following the procedure. Pt also states that she received blood work that showed kidney abnormalities that she is seeking to clarify. Pt endorses irritation with urination and that she has been wiping from back to front. Pt is alert and oriented x 4, RR even and unlabored, skin is warm and dry. Pt appears in NAD at this time. Assessment completed and pt updated on plan of care.  Call bell in reach.   Emergency Department Nursing Plan of Care  The Nursing Plan of Care is developed from the Nursing assessment and Emergency Department Attending provider initial evaluation.  The plan of care may be reviewed in the “ED Provider note”.  The Plan of Care was developed with the following considerations:  Patient / Family readiness to learn indicated by:Refer to Medical chart in [facility]  Persons(s) to be included in education: Refer to Medical chart in [facility]  Barriers to Learning/Limitations:Normal

## 2024-07-18 NOTE — ED TRIAGE NOTES
Pt presents ambulatory to ED complaining of right lower back pain x2 weeks. Pt reports she originally thought it was due to having bilateral knee replacements and favoring one side. Pt reports recent blood work that reported possible kidney injury. PCP reported to patient to drink plenty of fluids. Pt wanting to come in to be checked to make sure everything is okay. Pt denies GI/ symptoms.

## 2024-07-18 NOTE — ED PROVIDER NOTES
[facility] EMERGENCY DEPT  EMERGENCY DEPARTMENT ENCOUNTER         Pt Name: Kalpana Benoit  MRN: [id number]  Birthdate 1955  Date of evaluation: 7/18/2024  Provider: Carito Horne PA-C   PCP: Nathanael Willingham Jr., MD  Note Started: 12:14 PM EDT on 7/18/24     CHIEF COMPLAINT       Chief Complaint   Patient presents with    Back Pain    Discuss Labs        HISTORY OF PRESENT ILLNESS: 1 or more elements      History From: Patient  None     Kalpana Benoit is a 68 y.o. female with medical history significant for hypertension, non-insulin-dependent diabetes, osteoarthrosis, GERD, diverticulitis, anemia, bilateral knee replacements who presents via self with complaints of  acute mild aching right-sided lower back pain X 2 weeks.  Endorses that she initially thought that it was related to her bilateral knee replacements and having to lean towards her right side because of her recent procedure on her left knee.  She states that she then followed up with her PCP on 7/16/2024 and had labs drawn and was called and informed that she had a \"kidney injury\" and to drink her water.  She denies any fever, chills, nausea, vomiting, dysuria, frequency or urgency, hematuria, abdominal pain, pelvic pain, chest pain, shortness of breath, saddle paresthesias, continence, urinary retention, gait abnormalities otherwise.       Nursing Notes were all reviewed and agreed with or any disagreements were addressed in the HPI.     REVIEW OF SYSTEMS      Review of Systems   Constitutional:  Negative for activity change, appetite change, chills, diaphoresis, fatigue, fever and unexpected weight change.   HENT:  Negative for congestion, rhinorrhea and sore throat.    Eyes:  Negative for pain and visual disturbance.   Respiratory:  Negative for cough, chest tightness, shortness of breath and wheezing.    Cardiovascular:  Negative for chest pain and palpitations.   Gastrointestinal:  Negative for abdominal pain, diarrhea, nausea and vomiting.  DIAGNOSIS/MDM   Initial assessment performed. The patients presenting problems have been discussed, and they are in agreement with the care plan formulated and outlined with them.  I have encouraged them to ask questions as they arise throughout their visit.    Vitals:    Vitals:    07/18/24 1152   BP: 137/65   Pulse: 90   Resp: 16   Temp: 97.8 °F (36.6 °C)   TempSrc: Oral   SpO2: 95%   Weight: 86.1 kg (189 lb 13.1 oz)   Height: 1.549 m (5' 1\")        Patient was given the following medications:  Medications - No data to display    CONSULTS: (Who and What was discussed)  None      Chronic Conditions: hypertension, non-insulin-dependent diabetes, osteoarthrosis, GERD, diverticulitis, anemia, bilateral knee replacements     Social Determinants affecting Dx or Tx: None    Records Reviewed (source and summary): Nursing Notes, Old Medical Records, Previous Radiology Studies, and Previous Laboratory Studies    Results    Collected Updated Procedure    07/16/2024 1029 07/16/2024 2032 CBC [[id number]]   (Abnormal)   Blood from WHOLE BLOOD    Component Value Units   WBC 5.0 K/uL   RBC 3.68 Low  M/uL   Hemoglobin 11.3 Low  g/dL   Hematocrit 34.8 Low  %   MCV 94.6 FL   MCH 30.7 PG   MCHC 32.5 g/dL   RDW 16.6 High  %   Platelets 172 K/uL   MPV 11.2 FL   Nucleated RBCs 0.0  WBC   nRBC 0.00 K/uL          07/16/2024 1029 07/16/2024 2032 Lipid Panel [[id number]]   Blood    Component Value Units   Cholesterol, Total 146 MG/DL   Triglycerides 133  MG/DL   HDL 63  MG/DL   LDL Cholesterol 56.4  MG/DL   VLDL Cholesterol Calculated 26.6 MG/DL   Chol/HDL Ratio 2.3            07/16/2024 1029 07/16/2024 2032 Comprehensive Metabolic Panel [[id number]]   (Abnormal)   Blood    Component Value Units   Sodium 138 mmol/L   Potassium 4.6 mmol/L   Chloride 105 mmol/L   CO2 27 mmol/L   Anion Gap 6 mmol/L   Glucose 161 High  mg/dL   BUN 27 High  MG/DL   Creatinine 0.80 MG/DL   BUN/Creatinine Ratio 34 High      Est, Glom Filt Rate 81

## 2024-07-18 NOTE — ED NOTES
Discharge instructions were given to the patient by Carlo Martínez RN  .  The patient left the Emergency Department alert and oriented and in no acute distress with 2 prescription(s). The patient was encouraged to call or return to the ED for worsening issues or problems and was encouraged to schedule a follow up appointment for continuing care.  The patient verbalized understanding of discharge instructions and prescriptions; all questions were answered. The patient has no further concerns at this time.

## 2024-08-01 ENCOUNTER — OFFICE VISIT (OUTPATIENT)
Age: 69
End: 2024-08-01

## 2024-08-01 VITALS
WEIGHT: 189 LBS | BODY MASS INDEX: 35.68 KG/M2 | DIASTOLIC BLOOD PRESSURE: 72 MMHG | HEART RATE: 81 BPM | OXYGEN SATURATION: 96 % | RESPIRATION RATE: 16 BRPM | SYSTOLIC BLOOD PRESSURE: 104 MMHG | HEIGHT: 61 IN

## 2024-08-01 DIAGNOSIS — Z96.652 AFTERCARE FOLLOWING LEFT KNEE JOINT REPLACEMENT SURGERY: Primary | ICD-10-CM

## 2024-08-01 DIAGNOSIS — Z47.1 AFTERCARE FOLLOWING LEFT KNEE JOINT REPLACEMENT SURGERY: Primary | ICD-10-CM

## 2024-08-01 DIAGNOSIS — Z47.1 AFTERCARE FOLLOWING RIGHT KNEE JOINT REPLACEMENT SURGERY: ICD-10-CM

## 2024-08-01 DIAGNOSIS — Z96.651 AFTERCARE FOLLOWING RIGHT KNEE JOINT REPLACEMENT SURGERY: ICD-10-CM

## 2024-08-01 ASSESSMENT — PATIENT HEALTH QUESTIONNAIRE - PHQ9
2. FEELING DOWN, DEPRESSED OR HOPELESS: NOT AT ALL
1. LITTLE INTEREST OR PLEASURE IN DOING THINGS: NOT AT ALL
SUM OF ALL RESPONSES TO PHQ9 QUESTIONS 1 & 2: 0
SUM OF ALL RESPONSES TO PHQ QUESTIONS 1-9: 0

## 2024-08-01 NOTE — PROGRESS NOTES
8/1/2024    Chief Complaint: Follow up for right knee replacement    HPI:  is a 68 y.o. female who is now 10 months status post right total knee arthroplasty.  The patient states that they are doing well.  The preoperative pain is gone and the postoperative pain is gone.  The pain is negligible in intensity.  Regular exercises have helped with residual symptoms.  She had some minor nonpainful swelling prepatellar area without other symptoms      Past Medical History:   Diagnosis Date    Anemia     blood transfusion on 2/4/24 after surgery    Diverticulitis     GERD (gastroesophageal reflux disease)     Hypertension     Osteoarthrosis, unspecified whether generalized or localized, lower leg     knees    Other and unspecified hyperlipidemia     Type II or unspecified type diabetes mellitus without mention of complication, uncontrolled        Past Surgical History:   Procedure Laterality Date    ARM SURGERY Left 12/21/2023    IRRIGATION AND DEBRIDEMENT LEFT LEG performed by Matthew Garay DO at [facility] MA[facility] OR    COLONOSCOPY N/A 9/18/2017    COLONOSCOPY performed by Malcolm Pérez MD at [facility] AMBULATORY OR    COLONOSCOPY N/A 4/25/2018    COLONOSCOPY performed by Malcolm Pérez MD at [facility] ENDOSCOPY    FINGER TRIGGER RELEASE Left 08/28/2023    LEFT RING FINGER TRIGGER RELEASE performed by Matthew Garay DO at [facility] MAIN OR    HYSTERECTOMY (CERVIX STATUS UNKNOWN)  2021    INCISION AND DRAINAGE OF WOUND  09/30/2023    left top of knee    LAP,TUBAL CAUTERY Bilateral 1992    LEG SURGERY Left 01/30/2024    LEFT KNEE INCISION AND DRAINAGE performed by Matthew Garay DO at [facility] MAIN OR    REVISION TOTAL KNEE ARTHROPLASTY Left 4/8/2024    LEFT KNEE TOTAL ARTHROPLASTY REVISION performed by Matthew Garay DO at [facility] MAIN OR    TONSILLECTOMY      age 9    TOTAL KNEE ARTHROPLASTY Left 05/22/2023    LEFT TOTAL KNEE ARTHROPLASTY performed by Matthew Garay DO at [facility] MAIN OR    TOTAL KNEE ARTHROPLASTY Right 10/24/2023    RIGHT

## 2024-08-01 NOTE — PROGRESS NOTES
Identified pt with two pt identifiers (name and ). Reviewed chart in preparation for visit and have obtained necessary documentation.    Kalpana Benoit is a 68 y.o. female  Chief Complaint   Patient presents with    Follow-up     Rt knee swelling     /72 (Site: Left Upper Arm, Position: Sitting, Cuff Size: Large Adult)   Pulse 81   Resp 16   Ht 1.549 m (5' 1\")   Wt 85.7 kg (189 lb)   SpO2 96%   BMI 35.71 kg/m²     1. Have you been to the ER, urgent care clinic since your last visit?  Hospitalized since your last visit?no    2. Have you seen or consulted any other health care providers outside of the [facility] since your last visit?  Include any pap smears or colon screening. no

## 2024-08-13 ENCOUNTER — HOSPITAL ENCOUNTER (EMERGENCY)
Facility: HOSPITAL | Age: 69
Discharge: HOME | End: 2024-08-13
Attending: EMERGENCY MEDICINE
Payer: COMMERCIAL

## 2024-08-13 ENCOUNTER — APPOINTMENT (OUTPATIENT)
Facility: HOSPITAL | Age: 69
End: 2024-08-13
Payer: COMMERCIAL

## 2024-08-13 ENCOUNTER — OFFICE VISIT (OUTPATIENT)
Age: 69
End: 2024-08-13
Payer: COMMERCIAL

## 2024-08-13 VITALS
WEIGHT: 190.7 LBS | TEMPERATURE: 97.7 F | HEART RATE: 65 BPM | DIASTOLIC BLOOD PRESSURE: 98 MMHG | OXYGEN SATURATION: 100 % | HEIGHT: 61 IN | SYSTOLIC BLOOD PRESSURE: 145 MMHG | BODY MASS INDEX: 36 KG/M2 | RESPIRATION RATE: 20 BRPM

## 2024-08-13 DIAGNOSIS — Z47.1 AFTERCARE FOLLOWING RIGHT KNEE JOINT REPLACEMENT SURGERY: ICD-10-CM

## 2024-08-13 DIAGNOSIS — S80.01XA CONTUSION OF RIGHT KNEE, INITIAL ENCOUNTER: ICD-10-CM

## 2024-08-13 DIAGNOSIS — Z96.652 AFTERCARE FOLLOWING LEFT KNEE JOINT REPLACEMENT SURGERY: Primary | ICD-10-CM

## 2024-08-13 DIAGNOSIS — S80.02XA CONTUSION OF LEFT KNEE, INITIAL ENCOUNTER: Primary | ICD-10-CM

## 2024-08-13 DIAGNOSIS — Z47.1 AFTERCARE FOLLOWING LEFT KNEE JOINT REPLACEMENT SURGERY: Primary | ICD-10-CM

## 2024-08-13 DIAGNOSIS — Z96.651 AFTERCARE FOLLOWING RIGHT KNEE JOINT REPLACEMENT SURGERY: ICD-10-CM

## 2024-08-13 PROCEDURE — 73562 X-RAY EXAM OF KNEE 3: CPT

## 2024-08-13 PROCEDURE — 1123F ACP DISCUSS/DSCN MKR DOCD: CPT | Performed by: ORTHOPAEDIC SURGERY

## 2024-08-13 PROCEDURE — 99213 OFFICE O/P EST LOW 20 MIN: CPT | Performed by: ORTHOPAEDIC SURGERY

## 2024-08-13 PROCEDURE — 99283 EMERGENCY DEPT VISIT LOW MDM: CPT

## 2024-08-13 ASSESSMENT — PAIN - FUNCTIONAL ASSESSMENT: PAIN_FUNCTIONAL_ASSESSMENT: 0-10

## 2024-08-13 ASSESSMENT — PAIN SCALES - GENERAL: PAINLEVEL_OUTOF10: 8

## 2024-08-13 NOTE — ED PROVIDER NOTES
[facility] EMERGENCY DEPT  EMERGENCY DEPARTMENT ENCOUNTER       Pt Name: Kalpana Benoit  MRN: [id number]  Birthdate 1955  Date of evaluation: 8/13/2024  Provider: Rocio Mckeon MD   PCP: Nathanael Willingham Jr., MD  Note Started: 11:30 AM EDT 8/13/24     CHIEF COMPLAINT       Chief Complaint   Patient presents with    Knee Pain     Patient ambulatory to triage w c/o suffering a GLF prior to arrival reports bilateral knee replacements. Patient has abrasions to the knee.        HISTORY OF PRESENT ILLNESS: 1 or more elements      History From: patient, History limited by: none     Kalpana Benoit is a 68 y.o. female who presents with a cc of bilateral knee pain after a fell       Please See MDM for Additional Details of the HPI/PMH  Nursing Notes were all reviewed and agreed with or any disagreements were addressed in the HPI.     REVIEW OF SYSTEMS        Positives and Pertinent negatives as per HPI.    PAST HISTORY     Past Medical History:  Past Medical History:   Diagnosis Date    Anemia     blood transfusion on 2/4/24 after surgery    Diverticulitis     GERD (gastroesophageal reflux disease)     Hypertension     Osteoarthrosis, unspecified whether generalized or localized, lower leg     knees    Other and unspecified hyperlipidemia     Type II or unspecified type diabetes mellitus without mention of complication, uncontrolled        Past Surgical History:  Past Surgical History:   Procedure Laterality Date    ARM SURGERY Left 12/21/2023    IRRIGATION AND DEBRIDEMENT LEFT LEG performed by Matthew Garay DO at [facility] MAIN OR    COLONOSCOPY N/A 9/18/2017    COLONOSCOPY performed by Malcolm Pérez MD at [facility] AMBULATORY OR    COLONOSCOPY N/A 4/25/2018    COLONOSCOPY performed by Malcolm Pérez MD at [facility] ENDOSCOPY    FINGER TRIGGER RELEASE Left 08/28/2023    LEFT RING FINGER TRIGGER RELEASE performed by Matthew Garay DO at [facility] MAIN OR    HYSTERECTOMY (CERVIX STATUS UNKNOWN)  2021    INCISION AND DRAINAGE OF WOUND

## 2024-08-13 NOTE — PROGRESS NOTES
8/13/2024    Chief Complaint: Right knee pain    HPI: This is a(n) 68 y.o. female  who complains of Right knee pain.  Onset was sudden when she fell down today.  The patient has had pain for 1 day.   The pain is in the anterior knee, it is moderate in intensity.   She seen in the emergency department and asked to follow-up.  Past Medical History:   Diagnosis Date    Anemia     blood transfusion on 2/4/24 after surgery    Diverticulitis     GERD (gastroesophageal reflux disease)     Hypertension     Osteoarthrosis, unspecified whether generalized or localized, lower leg     knees    Other and unspecified hyperlipidemia     Type II or unspecified type diabetes mellitus without mention of complication, uncontrolled        Past Surgical History:   Procedure Laterality Date    ARM SURGERY Left 12/21/2023    IRRIGATION AND DEBRIDEMENT LEFT LEG performed by Matthew Garay DO at [facility] MAIN OR    COLONOSCOPY N/A 9/18/2017    COLONOSCOPY performed by Malcolm Pérez MD at [facility] AMBULATORY OR    COLONOSCOPY N/A 4/25/2018    COLONOSCOPY performed by Malcolm Pérez MD at [facility] ENDOSCOPY    FINGER TRIGGER RELEASE Left 08/28/2023    LEFT RING FINGER TRIGGER RELEASE performed by Matthew Garay DO at [facility] MAIN OR    HYSTERECTOMY (CERVIX STATUS UNKNOWN)  2021    INCISION AND DRAINAGE OF WOUND  09/30/2023    left top of knee    LAP,TUBAL CAUTERY Bilateral 1992    LEG SURGERY Left 01/30/2024    LEFT KNEE INCISION AND DRAINAGE performed by Matthew Garay DO at [facility] MAIN OR    REVISION TOTAL KNEE ARTHROPLASTY Left 4/8/2024    LEFT KNEE TOTAL ARTHROPLASTY REVISION performed by Matthew Garay DO at [facility] MAIN OR    TONSILLECTOMY      age 9    TOTAL KNEE ARTHROPLASTY Left 05/22/2023    LEFT TOTAL KNEE ARTHROPLASTY performed by Matthew Garay DO at [facility] MAIN OR    TOTAL KNEE ARTHROPLASTY Right 10/24/2023    RIGHT TOTAL KNEE ARTHROPLASTY performed by Matthew Garay DO at [facility] MAIN OR    TOTAL KNEE ARTHROPLASTY Left 01/31/2024    IRRIGATION AND

## 2024-09-20 ENCOUNTER — HOSPITAL ENCOUNTER (EMERGENCY)
Facility: HOSPITAL | Age: 69
Discharge: HOME | End: 2024-09-20
Payer: COMMERCIAL

## 2024-09-20 ENCOUNTER — APPOINTMENT (OUTPATIENT)
Facility: HOSPITAL | Age: 69
End: 2024-09-20
Payer: COMMERCIAL

## 2024-09-20 VITALS
HEIGHT: 61 IN | HEART RATE: 82 BPM | OXYGEN SATURATION: 96 % | TEMPERATURE: 97.7 F | RESPIRATION RATE: 20 BRPM | SYSTOLIC BLOOD PRESSURE: 117 MMHG | WEIGHT: 199.5 LBS | BODY MASS INDEX: 37.66 KG/M2 | DIASTOLIC BLOOD PRESSURE: 66 MMHG

## 2024-09-20 DIAGNOSIS — M51.36 DDD (DEGENERATIVE DISC DISEASE), LUMBAR: Primary | ICD-10-CM

## 2024-09-20 PROCEDURE — 72100 X-RAY EXAM L-S SPINE 2/3 VWS: CPT

## 2024-09-20 PROCEDURE — 99284 EMERGENCY DEPT VISIT MOD MDM: CPT

## 2024-09-20 PROCEDURE — 96372 THER/PROPH/DIAG INJ SC/IM: CPT

## 2024-09-20 PROCEDURE — 6360000002 HC RX W HCPCS: Performed by: NURSE PRACTITIONER

## 2024-09-20 RX ORDER — SENNOSIDES 8.6 MG
650 CAPSULE ORAL EVERY 8 HOURS PRN
Qty: 60 TABLET | Refills: 3 | Status: SHIPPED | OUTPATIENT
Start: 2024-09-20

## 2024-09-20 RX ORDER — DICLOFENAC SODIUM 75 MG/1
75 TABLET, DELAYED RELEASE ORAL 2 TIMES DAILY
Qty: 30 TABLET | Refills: 3 | Status: SHIPPED | OUTPATIENT
Start: 2024-09-20

## 2024-09-20 RX ORDER — KETOROLAC TROMETHAMINE 30 MG/ML
15 INJECTION, SOLUTION INTRAMUSCULAR; INTRAVENOUS
Status: COMPLETED | OUTPATIENT
Start: 2024-09-20 | End: 2024-09-20

## 2024-09-20 RX ADMIN — KETOROLAC TROMETHAMINE 15 MG: 30 INJECTION, SOLUTION INTRAMUSCULAR at 16:33

## 2024-09-20 ASSESSMENT — PAIN - FUNCTIONAL ASSESSMENT: PAIN_FUNCTIONAL_ASSESSMENT: 0-10

## 2024-09-20 ASSESSMENT — PAIN DESCRIPTION - ORIENTATION
ORIENTATION: MID;RIGHT
ORIENTATION: RIGHT

## 2024-09-20 ASSESSMENT — PAIN SCALES - GENERAL
PAINLEVEL_OUTOF10: 7
PAINLEVEL_OUTOF10: 5

## 2024-09-20 ASSESSMENT — PAIN DESCRIPTION - LOCATION
LOCATION: HIP;BACK
LOCATION: BACK;HIP

## 2024-09-20 ASSESSMENT — PAIN DESCRIPTION - DESCRIPTORS
DESCRIPTORS: ACHING
DESCRIPTORS: SHARP

## 2024-10-16 ENCOUNTER — OFFICE VISIT (OUTPATIENT)
Facility: CLINIC | Age: 69
End: 2024-10-16
Payer: COMMERCIAL

## 2024-10-16 VITALS
DIASTOLIC BLOOD PRESSURE: 70 MMHG | RESPIRATION RATE: 20 BRPM | HEIGHT: 61 IN | TEMPERATURE: 97.7 F | WEIGHT: 197 LBS | OXYGEN SATURATION: 95 % | BODY MASS INDEX: 37.19 KG/M2 | SYSTOLIC BLOOD PRESSURE: 110 MMHG | HEART RATE: 94 BPM

## 2024-10-16 DIAGNOSIS — K29.00 OTHER ACUTE GASTRITIS WITHOUT HEMORRHAGE: ICD-10-CM

## 2024-10-16 DIAGNOSIS — I10 ESSENTIAL (PRIMARY) HYPERTENSION: ICD-10-CM

## 2024-10-16 DIAGNOSIS — E11.9 TYPE 2 DIABETES MELLITUS WITHOUT COMPLICATION, WITHOUT LONG-TERM CURRENT USE OF INSULIN (HCC): Primary | ICD-10-CM

## 2024-10-16 DIAGNOSIS — K21.9 GASTROESOPHAGEAL REFLUX DISEASE, UNSPECIFIED WHETHER ESOPHAGITIS PRESENT: ICD-10-CM

## 2024-10-16 DIAGNOSIS — E78.00 PURE HYPERCHOLESTEROLEMIA, UNSPECIFIED: ICD-10-CM

## 2024-10-16 LAB
ALBUMIN SERPL-MCNC: 3.8 G/DL (ref 3.5–5)
ALBUMIN/GLOB SERPL: 1.1 (ref 1.1–2.2)
ALP SERPL-CCNC: 158 U/L (ref 45–117)
ALT SERPL-CCNC: 22 U/L (ref 12–78)
ANION GAP SERPL CALC-SCNC: 5 MMOL/L (ref 2–12)
AST SERPL-CCNC: 18 U/L (ref 15–37)
BILIRUB SERPL-MCNC: 0.4 MG/DL (ref 0.2–1)
BUN SERPL-MCNC: 25 MG/DL (ref 6–20)
BUN/CREAT SERPL: 22 (ref 12–20)
CALCIUM SERPL-MCNC: 10.5 MG/DL (ref 8.5–10.1)
CHLORIDE SERPL-SCNC: 106 MMOL/L (ref 97–108)
CO2 SERPL-SCNC: 25 MMOL/L (ref 21–32)
CREAT SERPL-MCNC: 1.15 MG/DL (ref 0.55–1.02)
ERYTHROCYTE [DISTWIDTH] IN BLOOD BY AUTOMATED COUNT: 13.9 % (ref 11.5–14.5)
GLOBULIN SER CALC-MCNC: 3.5 G/DL (ref 2–4)
GLUCOSE SERPL-MCNC: 199 MG/DL (ref 65–100)
GLUCOSE, POC: 278 MG/DL
HCT VFR BLD AUTO: 39.6 % (ref 35–47)
HGB BLD-MCNC: 12.2 G/DL (ref 11.5–16)
MCH RBC QN AUTO: 29.7 PG (ref 26–34)
MCHC RBC AUTO-ENTMCNC: 30.8 G/DL (ref 30–36.5)
MCV RBC AUTO: 96.4 FL (ref 80–99)
NRBC # BLD: 0 K/UL (ref 0–0.01)
NRBC BLD-RTO: 0 PER 100 WBC
PLATELET # BLD AUTO: 276 K/UL (ref 150–400)
PMV BLD AUTO: 11.1 FL (ref 8.9–12.9)
POTASSIUM SERPL-SCNC: 4.5 MMOL/L (ref 3.5–5.1)
PROT SERPL-MCNC: 7.3 G/DL (ref 6.4–8.2)
RBC # BLD AUTO: 4.11 M/UL (ref 3.8–5.2)
SODIUM SERPL-SCNC: 136 MMOL/L (ref 136–145)
WBC # BLD AUTO: 7 K/UL (ref 3.6–11)

## 2024-10-16 PROCEDURE — 99214 OFFICE O/P EST MOD 30 MIN: CPT | Performed by: INTERNAL MEDICINE

## 2024-10-16 PROCEDURE — 82962 GLUCOSE BLOOD TEST: CPT | Performed by: INTERNAL MEDICINE

## 2024-10-16 PROCEDURE — 1123F ACP DISCUSS/DSCN MKR DOCD: CPT | Performed by: INTERNAL MEDICINE

## 2024-10-16 PROCEDURE — 3044F HG A1C LEVEL LT 7.0%: CPT | Performed by: INTERNAL MEDICINE

## 2024-10-16 PROCEDURE — 3074F SYST BP LT 130 MM HG: CPT | Performed by: INTERNAL MEDICINE

## 2024-10-16 PROCEDURE — 3078F DIAST BP <80 MM HG: CPT | Performed by: INTERNAL MEDICINE

## 2024-10-16 RX ORDER — AMLODIPINE BESYLATE 5 MG/1
5 TABLET ORAL DAILY
Qty: 90 TABLET | Refills: 0 | Status: SHIPPED | OUTPATIENT
Start: 2024-10-16

## 2024-10-16 RX ORDER — PIOGLITAZONEHYDROCHLORIDE 45 MG/1
45 TABLET ORAL NIGHTLY
Qty: 90 TABLET | Refills: 0 | Status: SHIPPED | OUTPATIENT
Start: 2024-10-16

## 2024-10-16 RX ORDER — DICLOFENAC SODIUM 75 MG/1
75 TABLET, DELAYED RELEASE ORAL 2 TIMES DAILY
Qty: 60 TABLET | Refills: 12 | Status: SHIPPED | OUTPATIENT
Start: 2024-10-16

## 2024-10-16 RX ORDER — PANTOPRAZOLE SODIUM 40 MG/1
40 TABLET, DELAYED RELEASE ORAL
Qty: 30 TABLET | Refills: 0 | Status: SHIPPED | OUTPATIENT
Start: 2024-10-16

## 2024-10-16 RX ORDER — DICLOFENAC SODIUM 75 MG/1
75 TABLET, DELAYED RELEASE ORAL 2 TIMES DAILY
Qty: 30 TABLET | Refills: 3 | Status: CANCELLED | OUTPATIENT
Start: 2024-10-16

## 2024-10-16 RX ORDER — SUCRALFATE 1 G/1
1 TABLET ORAL 4 TIMES DAILY
Qty: 120 TABLET | Refills: 2 | Status: SHIPPED | OUTPATIENT
Start: 2024-10-16 | End: 2025-01-14

## 2024-10-16 SDOH — ECONOMIC STABILITY: FOOD INSECURITY: WITHIN THE PAST 12 MONTHS, YOU WORRIED THAT YOUR FOOD WOULD RUN OUT BEFORE YOU GOT MONEY TO BUY MORE.: NEVER TRUE

## 2024-10-16 SDOH — ECONOMIC STABILITY: FOOD INSECURITY: WITHIN THE PAST 12 MONTHS, THE FOOD YOU BOUGHT JUST DIDN'T LAST AND YOU DIDN'T HAVE MONEY TO GET MORE.: NEVER TRUE

## 2024-10-16 SDOH — ECONOMIC STABILITY: INCOME INSECURITY: HOW HARD IS IT FOR YOU TO PAY FOR THE VERY BASICS LIKE FOOD, HOUSING, MEDICAL CARE, AND HEATING?: SOMEWHAT HARD

## 2024-10-16 ASSESSMENT — PATIENT HEALTH QUESTIONNAIRE - PHQ9
2. FEELING DOWN, DEPRESSED OR HOPELESS: NOT AT ALL
SUM OF ALL RESPONSES TO PHQ QUESTIONS 1-9: 0
SUM OF ALL RESPONSES TO PHQ9 QUESTIONS 1 & 2: 0
SUM OF ALL RESPONSES TO PHQ QUESTIONS 1-9: 0
1. LITTLE INTEREST OR PLEASURE IN DOING THINGS: NOT AT ALL

## 2024-10-16 ASSESSMENT — ANXIETY QUESTIONNAIRES
GAD7 TOTAL SCORE: 0
6. BECOMING EASILY ANNOYED OR IRRITABLE: NOT AT ALL
IF YOU CHECKED OFF ANY PROBLEMS ON THIS QUESTIONNAIRE, HOW DIFFICULT HAVE THESE PROBLEMS MADE IT FOR YOU TO DO YOUR WORK, TAKE CARE OF THINGS AT HOME, OR GET ALONG WITH OTHER PEOPLE: NOT DIFFICULT AT ALL
7. FEELING AFRAID AS IF SOMETHING AWFUL MIGHT HAPPEN: NOT AT ALL
2. NOT BEING ABLE TO STOP OR CONTROL WORRYING: NOT AT ALL
5. BEING SO RESTLESS THAT IT IS HARD TO SIT STILL: NOT AT ALL
3. WORRYING TOO MUCH ABOUT DIFFERENT THINGS: NOT AT ALL
4. TROUBLE RELAXING: NOT AT ALL
1. FEELING NERVOUS, ANXIOUS, OR ON EDGE: NOT AT ALL

## 2024-10-16 NOTE — PROGRESS NOTES
Kalpana Benoit is a 69 y.o. female and presents with Lower Back Pain, Diabetes, Abdominal Pain, and Nausea  .  Subjective:  Back Pain Review:  Patient presents for evaluation of low back problems.  Symptoms have been present for  weeks and include pain in lower back (dull, moderate in character; 9/10 in severity). Initial inciting event: unknown. Symptoms are worst: at times. Alleviating factors identifiable by patient are lying flat, medication . Exacerbating factors identifiable by patient are bending forwards, bending backwards. Treatments so far initiated by patient: medication Previous lower back problems: reported. Previous workup: xray     Hypertension Review:  The patient has hypertension .  Diet and Lifestyle: generally follows a low sodium diet, exercises sporadically  Home BP Monitoring: is not measured at home.  Pertinent ROS: taking medications as instructed, no medication side effects noted, no TIA's, no chest pain on exertion, no dyspnea on exertion, no swelling of ankles.    Dyslipidemia Review:  Patient presents for evaluation of lipids.  Compliance with treatment thus far has been excellent.  A repeat fasting lipid profile was not done.  The patient does not use medications that may worsen dyslipidemias . The patient exercises sporadically.      Diabetes Mellitus Review:  She has diabetes mellitus.  Diabetic ROS - medication compliance: compliant all of the time, diabetic diet compliance: compliant all of the time, home glucose monitoring: is performed.   Known diabetic complications: none  Cardiovascular risk factors: family history, dyslipidemia, diabetes mellitus, obesity, hypertension  Current diabetic medications include oral agents/  Eye exam current (within one year): no  Weight trend: stable  Prior visit with dietician: no  Current diet: \"healthy\" diet  in general  Current exercise: walking  Current monitoring regimen: home blood tests - daily  Home blood sugar records: trend:

## 2024-10-16 NOTE — PROGRESS NOTES
\"Have you been to the ER, urgent care clinic since your last visit?  Hospitalized since your last visit?\"    yes    “Have you seen or consulted any other health care providers outside our system since your last visit?”    no    Have you had a mammogram?”   no    Date of last Mammogram: 5/15/2023       “Have you had a diabetic eye exam?”    no     Date of last diabetic eye exam: 12/7/2021

## 2024-11-20 RX ORDER — PANTOPRAZOLE SODIUM 40 MG/1
40 TABLET, DELAYED RELEASE ORAL
Qty: 30 TABLET | Refills: 0 | Status: SHIPPED | OUTPATIENT
Start: 2024-11-20

## 2024-11-20 NOTE — TELEPHONE ENCOUNTER
Last appointment: 10/16/2024 MD Willingham   Next appointment: 01/16/2025 MD Willingham   Previous refill encounter(s):   10/16/2024 Protonix #30     For Pharmacy Admin Tracking Only    Program: Medication Refill  Intervention Detail: New Rx: 1, reason: Patient Preference  Time Spent (min): 5    Requested Prescriptions     Pending Prescriptions Disp Refills    pantoprazole (PROTONIX) 40 MG tablet [Pharmacy Med Name: PANTOPRAZOLE SOD DR 40 MG TAB] 30 tablet 0     Sig: TAKE 1 TABLET BY MOUTH EVERY DAY BEFORE BREAKFAST

## 2024-12-20 RX ORDER — PANTOPRAZOLE SODIUM 40 MG/1
40 TABLET, DELAYED RELEASE ORAL
Qty: 30 TABLET | Refills: 0 | Status: SHIPPED | OUTPATIENT
Start: 2024-12-20

## 2024-12-30 RX ORDER — NYSTATIN 100000 [USP'U]/G
POWDER TOPICAL
Qty: 60 G | Refills: 3 | OUTPATIENT
Start: 2024-12-30

## 2025-01-15 ENCOUNTER — HOSPITAL ENCOUNTER (EMERGENCY)
Facility: HOSPITAL | Age: 70
Discharge: HOME | End: 2025-01-15
Attending: EMERGENCY MEDICINE
Payer: COMMERCIAL

## 2025-01-15 ENCOUNTER — APPOINTMENT (OUTPATIENT)
Facility: HOSPITAL | Age: 70
End: 2025-01-15
Payer: COMMERCIAL

## 2025-01-15 VITALS
HEIGHT: 61 IN | DIASTOLIC BLOOD PRESSURE: 78 MMHG | TEMPERATURE: 97.7 F | OXYGEN SATURATION: 95 % | RESPIRATION RATE: 18 BRPM | BODY MASS INDEX: 37.57 KG/M2 | WEIGHT: 199 LBS | SYSTOLIC BLOOD PRESSURE: 129 MMHG | HEART RATE: 82 BPM

## 2025-01-15 DIAGNOSIS — N39.0 ACUTE UTI: Primary | ICD-10-CM

## 2025-01-15 LAB
ALBUMIN SERPL-MCNC: 3.4 G/DL (ref 3.5–5)
ALBUMIN/GLOB SERPL: 0.9 (ref 1.1–2.2)
ALP SERPL-CCNC: 154 U/L (ref 45–117)
ALT SERPL-CCNC: 31 U/L (ref 12–78)
ANION GAP SERPL CALC-SCNC: 12 MMOL/L (ref 2–12)
APPEARANCE UR: ABNORMAL
AST SERPL-CCNC: 27 U/L (ref 15–37)
BACTERIA URNS QL MICRO: ABNORMAL /HPF
BILIRUB DIRECT SERPL-MCNC: 0.1 MG/DL (ref 0–0.2)
BILIRUB SERPL-MCNC: 0.4 MG/DL (ref 0.2–1)
BILIRUB UR QL: NEGATIVE
BUN SERPL-MCNC: 26 MG/DL (ref 6–20)
BUN/CREAT SERPL: 22 (ref 12–20)
CALCIUM SERPL-MCNC: 9.5 MG/DL (ref 8.5–10.1)
CHLORIDE SERPL-SCNC: 101 MMOL/L (ref 97–108)
CO2 SERPL-SCNC: 27 MMOL/L (ref 21–32)
COLOR UR: ABNORMAL
CREAT SERPL-MCNC: 1.19 MG/DL (ref 0.55–1.02)
EPITH CASTS URNS QL MICRO: ABNORMAL /LPF
ERYTHROCYTE [DISTWIDTH] IN BLOOD BY AUTOMATED COUNT: 13.8 % (ref 11.5–14.5)
GLOBULIN SER CALC-MCNC: 4 G/DL (ref 2–4)
GLUCOSE SERPL-MCNC: 169 MG/DL (ref 65–100)
GLUCOSE UR STRIP.AUTO-MCNC: NEGATIVE MG/DL
HCT VFR BLD AUTO: 36.2 % (ref 35–47)
HGB BLD-MCNC: 11.9 G/DL (ref 11.5–16)
HGB UR QL STRIP: ABNORMAL
KETONES UR QL STRIP.AUTO: ABNORMAL MG/DL
LEUKOCYTE ESTERASE UR QL STRIP.AUTO: ABNORMAL
MCH RBC QN AUTO: 29.1 PG (ref 26–34)
MCHC RBC AUTO-ENTMCNC: 32.9 G/DL (ref 30–36.5)
MCV RBC AUTO: 88.5 FL (ref 80–99)
NITRITE UR QL STRIP.AUTO: NEGATIVE
NRBC # BLD: 0 K/UL (ref 0–0.01)
NRBC BLD-RTO: 0 PER 100 WBC
PH UR STRIP: 5 (ref 5–8)
PLATELET # BLD AUTO: 199 K/UL (ref 150–400)
PMV BLD AUTO: 11 FL (ref 8.9–12.9)
POTASSIUM SERPL-SCNC: 4.4 MMOL/L (ref 3.5–5.1)
PROT SERPL-MCNC: 7.4 G/DL (ref 6.4–8.2)
PROT UR STRIP-MCNC: ABNORMAL MG/DL
RBC # BLD AUTO: 4.09 M/UL (ref 3.8–5.2)
RBC #/AREA URNS HPF: ABNORMAL /HPF (ref 0–5)
SODIUM SERPL-SCNC: 140 MMOL/L (ref 136–145)
SP GR UR REFRACTOMETRY: 1.02
URINE CULTURE IF INDICATED: ABNORMAL
UROBILINOGEN UR QL STRIP.AUTO: 1 EU/DL (ref 0.2–1)
WBC # BLD AUTO: 7 K/UL (ref 3.6–11)
WBC URNS QL MICRO: ABNORMAL /HPF (ref 0–4)

## 2025-01-15 PROCEDURE — 80048 BASIC METABOLIC PNL TOTAL CA: CPT

## 2025-01-15 PROCEDURE — 81001 URINALYSIS AUTO W/SCOPE: CPT

## 2025-01-15 PROCEDURE — 6370000000 HC RX 637 (ALT 250 FOR IP): Performed by: EMERGENCY MEDICINE

## 2025-01-15 PROCEDURE — 80076 HEPATIC FUNCTION PANEL: CPT

## 2025-01-15 PROCEDURE — 74018 RADEX ABDOMEN 1 VIEW: CPT

## 2025-01-15 PROCEDURE — 85027 COMPLETE CBC AUTOMATED: CPT

## 2025-01-15 PROCEDURE — 99284 EMERGENCY DEPT VISIT MOD MDM: CPT

## 2025-01-15 PROCEDURE — 36415 COLL VENOUS BLD VENIPUNCTURE: CPT

## 2025-01-15 RX ORDER — CEPHALEXIN 500 MG/1
500 CAPSULE ORAL 3 TIMES DAILY
Qty: 21 CAPSULE | Refills: 0 | Status: SHIPPED | OUTPATIENT
Start: 2025-01-15 | End: 2025-01-16

## 2025-01-15 RX ORDER — ONDANSETRON 4 MG/1
4 TABLET, ORALLY DISINTEGRATING ORAL ONCE
Status: COMPLETED | OUTPATIENT
Start: 2025-01-15 | End: 2025-01-15

## 2025-01-15 RX ORDER — ONDANSETRON 4 MG/1
4 TABLET, ORALLY DISINTEGRATING ORAL 3 TIMES DAILY PRN
Qty: 15 TABLET | Refills: 0 | Status: SHIPPED | OUTPATIENT
Start: 2025-01-15

## 2025-01-15 RX ADMIN — ONDANSETRON 4 MG: 4 TABLET, ORALLY DISINTEGRATING ORAL at 13:38

## 2025-01-15 ASSESSMENT — ENCOUNTER SYMPTOMS
NAUSEA: 1
VOMITING: 1
DIARRHEA: 1
ABDOMINAL PAIN: 1

## 2025-01-15 ASSESSMENT — PAIN - FUNCTIONAL ASSESSMENT: PAIN_FUNCTIONAL_ASSESSMENT: 0-10

## 2025-01-15 ASSESSMENT — PAIN DESCRIPTION - LOCATION: LOCATION: ABDOMEN

## 2025-01-15 ASSESSMENT — PAIN SCALES - GENERAL: PAINLEVEL_OUTOF10: 7

## 2025-01-15 ASSESSMENT — PAIN DESCRIPTION - DESCRIPTORS: DESCRIPTORS: DISCOMFORT

## 2025-01-15 NOTE — ED PROVIDER NOTES
personally performed the services described in this documentation on this date 1/15/25  for patient Kalpana Benoit.      Reji Gomez MD  2:40 PM         Reji Gomez MD  01/15/25 [id number]

## 2025-01-15 NOTE — ED NOTES
Pt presents to ED complaining of nausea with a few episodes of vomiting and diarrhea since last night. Pt reports decreased PO intake today but states she is able to keep down ice water and had some coffee this morning. Pt reports taking Pepto with some relief. Pt states she has an appointment with her PCP tomorrow. Pt is alert and oriented x 4, RR even and unlabored, skin is warm and dry. Pt appears in NAD at this time. Assessment completed and pt updated on plan of care.  Call bell in reach.    Emergency Department Nursing Plan of Care  The Nursing Plan of Care is developed from the Nursing assessment and Emergency Department Attending provider initial evaluation.  The plan of care may be reviewed in the “ED Provider note”.      The Plan of Care was developed with the following considerations:  Patient / Family readiness to learn indicated by:Refer to Medical chart in [facility]  Persons(s) to be included in education: Refer to Medical chart in [facility]  Barriers to Learning/Limitations:Normal     Signed    Dee Hidalgo RN  1/15/2025 1:02 PM

## 2025-01-15 NOTE — ED TRIAGE NOTES
Pt presents to ED c/o N/V/D and abd pain since lst night and decrease po intake today. Pt reports taking pepto with no relief.

## 2025-01-15 NOTE — ED NOTES
Discharge instructions were given to the patient by Provider.     The patient left the Emergency Department alert and oriented and in no acute distress with prescriptions. The patient was encouraged to call or return to the ED for worsening issues or problems and was encouraged to schedule a follow up appointment for continuing care.     Ambulation assessment completed before discharge.  Pt left Emergency Department ambulating at baseline with no ortho devices  Ortho device education: none    The patient verbalized understanding of discharge instructions and prescriptions, all questions were answered. The patient has no further concerns at this time.

## 2025-01-15 NOTE — DISCHARGE INSTRUCTIONS
It was a pleasure taking care of you at [facility] Emergency Department today.      We know that when you come to [facility], you are entrusting us with your health, comfort, and safety.  Our physicians and nurses honor that trust, and we appreciate the opportunity to care for you and your loved ones.  We also value your feedback, and we would like to hear from you.    When you receive a  >>> survey <<< about your Emergency Department experience today, please fill it out. We review every single response from our patients. Thank you!    Sincerely,  Dr. Reji Gomez MD

## 2025-01-16 ENCOUNTER — OFFICE VISIT (OUTPATIENT)
Facility: CLINIC | Age: 70
End: 2025-01-16
Payer: COMMERCIAL

## 2025-01-16 VITALS
WEIGHT: 199 LBS | TEMPERATURE: 98 F | SYSTOLIC BLOOD PRESSURE: 124 MMHG | HEIGHT: 61 IN | RESPIRATION RATE: 16 BRPM | OXYGEN SATURATION: 98 % | BODY MASS INDEX: 37.57 KG/M2 | DIASTOLIC BLOOD PRESSURE: 84 MMHG | HEART RATE: 98 BPM

## 2025-01-16 DIAGNOSIS — E11.9 TYPE 2 DIABETES MELLITUS WITHOUT COMPLICATION, WITHOUT LONG-TERM CURRENT USE OF INSULIN (HCC): ICD-10-CM

## 2025-01-16 DIAGNOSIS — N30.00 ACUTE CYSTITIS WITHOUT HEMATURIA: Primary | ICD-10-CM

## 2025-01-16 DIAGNOSIS — I10 PRIMARY HYPERTENSION: ICD-10-CM

## 2025-01-16 DIAGNOSIS — E78.00 PURE HYPERCHOLESTEROLEMIA, UNSPECIFIED: ICD-10-CM

## 2025-01-16 PROCEDURE — 3079F DIAST BP 80-89 MM HG: CPT | Performed by: INTERNAL MEDICINE

## 2025-01-16 PROCEDURE — 99214 OFFICE O/P EST MOD 30 MIN: CPT | Performed by: INTERNAL MEDICINE

## 2025-01-16 PROCEDURE — 1123F ACP DISCUSS/DSCN MKR DOCD: CPT | Performed by: INTERNAL MEDICINE

## 2025-01-16 PROCEDURE — 3074F SYST BP LT 130 MM HG: CPT | Performed by: INTERNAL MEDICINE

## 2025-01-16 RX ORDER — PANTOPRAZOLE SODIUM 40 MG/1
40 TABLET, DELAYED RELEASE ORAL
Qty: 30 TABLET | Refills: 0 | Status: SHIPPED | OUTPATIENT
Start: 2025-01-16

## 2025-01-16 RX ORDER — LEVOFLOXACIN 500 MG/1
500 TABLET, FILM COATED ORAL DAILY
Qty: 10 TABLET | Refills: 0 | Status: SHIPPED | OUTPATIENT
Start: 2025-01-16 | End: 2025-01-26

## 2025-01-16 ASSESSMENT — PATIENT HEALTH QUESTIONNAIRE - PHQ9
SUM OF ALL RESPONSES TO PHQ9 QUESTIONS 1 & 2: 0
SUM OF ALL RESPONSES TO PHQ QUESTIONS 1-9: 0
2. FEELING DOWN, DEPRESSED OR HOPELESS: NOT AT ALL
1. LITTLE INTEREST OR PLEASURE IN DOING THINGS: NOT AT ALL
SUM OF ALL RESPONSES TO PHQ QUESTIONS 1-9: 0

## 2025-01-16 NOTE — PROGRESS NOTES
Kalpana Benoit is a 69 y.o. female and presents with Cholesterol Problem, Gastroesophageal Reflux, Hypertension, Diabetes, and Follow-up (GI)  .  Subjective:  Abdominal Pain   Patient complains of abdominal DISCOMFORT. . Pain is located in the epigastric without radiation. Onset was a few days ago. Symptoms have been unchanged since. Aggravating factors: none. Alleviating factors: recumbency. Associated symptoms: diarrhea. The patient denies belching, chills, constipation, flatus, and hematuria.    Hypertension Review:  The patient has hypertension .  Diet and Lifestyle: generally follows a low sodium diet, exercises sporadically  Home BP Monitoring: is not measured at home.  Pertinent ROS: taking medications as instructed, no medication side effects noted, no TIA's, no chest pain on exertion, no dyspnea on exertion, no swelling of ankles.    Dyslipidemia Review:  Patient presents for evaluation of lipids.  Compliance with treatment thus far has been excellent.  A repeat fasting lipid profile was done.  The patient does not use medications that may worsen dyslipidemias . The patient exercises sporadically.    Diabetes Mellitus Review:  She has diabetes mellitus.  Diabetic ROS - medication compliance: compliant at times, diabetic diet compliance: at times, home glucose monitoring: is performed on occasion.   Known diabetic complications: none  Cardiovascular risk factors: family history, dyslipidemia, diabetes mellitus, obesity, hypertension  Current diabetic medications include oral agents  Eye exam current (within one year): [facility]  Weight trend: labile  Prior visit with dietician: no  Current diet: \"healthy\" diet  in general  Current exercise: walking  Current monitoring regimen: home blood tests at times  Any episodes of hypoglycemia? no         Review of Systems  Constitutional: negative for fevers, chills, anorexia and weight loss  Eyes:   negative for visual disturbance and irritation  ENT:   negative for

## 2025-01-16 NOTE — PROGRESS NOTES
1. Have you been to the ER, urgent care clinic since your last visit?  Hospitalized since your last visit?Yes Where: [facility]    2. Have you seen or consulted any other health care providers outside of the [facility] since your last visit?  Include any pap smears or colon screening. No     Chief Complaint   Patient presents with    Cholesterol Problem    Gastroesophageal Reflux    Hypertension    Diabetes         PHQ-9 Total Score: 0 (1/16/2025 10:15 AM)

## 2025-02-19 RX ORDER — PANTOPRAZOLE SODIUM 40 MG/1
40 TABLET, DELAYED RELEASE ORAL
Qty: 90 TABLET | Refills: 3 | Status: SHIPPED | OUTPATIENT
Start: 2025-02-19

## 2025-02-28 ENCOUNTER — APPOINTMENT (OUTPATIENT)
Facility: HOSPITAL | Age: 70
End: 2025-02-28
Payer: COMMERCIAL

## 2025-02-28 ENCOUNTER — HOSPITAL ENCOUNTER (EMERGENCY)
Facility: HOSPITAL | Age: 70
Discharge: HOME | End: 2025-02-28
Payer: COMMERCIAL

## 2025-02-28 VITALS
OXYGEN SATURATION: 91 % | WEIGHT: 199.08 LBS | HEIGHT: 61 IN | TEMPERATURE: 98 F | HEART RATE: 90 BPM | DIASTOLIC BLOOD PRESSURE: 96 MMHG | RESPIRATION RATE: 19 BRPM | BODY MASS INDEX: 37.59 KG/M2 | SYSTOLIC BLOOD PRESSURE: 157 MMHG

## 2025-02-28 DIAGNOSIS — S20.212A CONTUSION OF LEFT CHEST WALL, INITIAL ENCOUNTER: Primary | ICD-10-CM
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
## 2025-02-28 LAB
ALBUMIN SERPL-MCNC: 3.7 G/DL (ref 3.5–5)
ALBUMIN/GLOB SERPL: 0.9 (ref 1.1–2.2)
ALP SERPL-CCNC: 192 U/L (ref 45–117)
ALT SERPL-CCNC: 31 U/L (ref 12–78)
ANION GAP SERPL CALC-SCNC: 6 MMOL/L (ref 2–12)
AST SERPL-CCNC: 24 U/L (ref 15–37)
BASOPHILS # BLD: 0.05 K/UL (ref 0–0.1)
BASOPHILS NFR BLD: 0.7 % (ref 0–1)
BILIRUB SERPL-MCNC: 0.4 MG/DL (ref 0.2–1)
BUN SERPL-MCNC: 22 MG/DL (ref 6–20)
BUN/CREAT SERPL: 25 (ref 12–20)
CALCIUM SERPL-MCNC: 9.6 MG/DL (ref 8.5–10.1)
CHLORIDE SERPL-SCNC: 105 MMOL/L (ref 97–108)
CO2 SERPL-SCNC: 25 MMOL/L (ref 21–32)
CREAT SERPL-MCNC: 0.87 MG/DL (ref 0.55–1.02)
DIFFERENTIAL METHOD BLD: ABNORMAL
EKG ATRIAL RATE: 89 BPM
EKG DIAGNOSIS: NORMAL
EKG P AXIS: 28 DEGREES
EKG P-R INTERVAL: 132 MS
EKG Q-T INTERVAL: 360 MS
EKG QRS DURATION: 74 MS
EKG QTC CALCULATION (BAZETT): 438 MS
EKG R AXIS: -1 DEGREES
EKG T AXIS: 7 DEGREES
EKG VENTRICULAR RATE: 89 BPM
EOSINOPHIL # BLD: 0.33 K/UL (ref 0–0.4)
EOSINOPHIL NFR BLD: 4.9 % (ref 0–7)
ERYTHROCYTE [DISTWIDTH] IN BLOOD BY AUTOMATED COUNT: 14.7 % (ref 11.5–14.5)
GLOBULIN SER CALC-MCNC: 3.9 G/DL (ref 2–4)
GLUCOSE SERPL-MCNC: 198 MG/DL (ref 65–100)
HCT VFR BLD AUTO: 39.1 % (ref 35–47)
HGB BLD-MCNC: 12.8 G/DL (ref 11.5–16)
IMM GRANULOCYTES # BLD AUTO: 0.04 K/UL (ref 0–0.04)
IMM GRANULOCYTES NFR BLD AUTO: 0.6 % (ref 0–0.5)
INR PPP: 1 (ref 0.9–1.1)
LYMPHOCYTES # BLD: 1.76 K/UL (ref 0.8–3.5)
LYMPHOCYTES NFR BLD: 26.4 % (ref 12–49)
MCH RBC QN AUTO: 29.6 PG (ref 26–34)
MCHC RBC AUTO-ENTMCNC: 32.7 G/DL (ref 30–36.5)
MCV RBC AUTO: 90.5 FL (ref 80–99)
MONOCYTES # BLD: 0.51 K/UL (ref 0–1)
MONOCYTES NFR BLD: 7.6 % (ref 5–13)
NEUTS SEG # BLD: 3.98 K/UL (ref 1.8–8)
NEUTS SEG NFR BLD: 59.8 % (ref 32–75)
NRBC # BLD: 0 K/UL (ref 0–0.01)
NRBC BLD-RTO: 0 PER 100 WBC
PLATELET # BLD AUTO: 221 K/UL (ref 150–400)
PMV BLD AUTO: 10.4 FL (ref 8.9–12.9)
POTASSIUM SERPL-SCNC: 3.9 MMOL/L (ref 3.5–5.1)
PROT SERPL-MCNC: 7.6 G/DL (ref 6.4–8.2)
PROTHROMBIN TIME: 10.6 SEC (ref 9.2–11.2)
RBC # BLD AUTO: 4.32 M/UL (ref 3.8–5.2)
SODIUM SERPL-SCNC: 136 MMOL/L (ref 136–145)
TROPONIN I SERPL HS-MCNC: 8 NG/L (ref 0–51)
WBC # BLD AUTO: 6.7 K/UL (ref 3.6–11)

## 2025-02-28 PROCEDURE — 71275 CT ANGIOGRAPHY CHEST: CPT

## 2025-02-28 PROCEDURE — 74177 CT ABD & PELVIS W/CONTRAST: CPT

## 2025-02-28 PROCEDURE — 84484 ASSAY OF TROPONIN QUANT: CPT

## 2025-02-28 PROCEDURE — 85025 COMPLETE CBC W/AUTO DIFF WBC: CPT

## 2025-02-28 PROCEDURE — 36415 COLL VENOUS BLD VENIPUNCTURE: CPT

## 2025-02-28 PROCEDURE — 80053 COMPREHEN METABOLIC PANEL: CPT

## 2025-02-28 PROCEDURE — 6370000000 HC RX 637 (ALT 250 FOR IP): Performed by: PHYSICIAN ASSISTANT

## 2025-02-28 PROCEDURE — 85610 PROTHROMBIN TIME: CPT

## 2025-02-28 PROCEDURE — 70450 CT HEAD/BRAIN W/O DYE: CPT

## 2025-02-28 PROCEDURE — 6360000004 HC RX CONTRAST MEDICATION: Performed by: PHYSICIAN ASSISTANT

## 2025-02-28 PROCEDURE — 99285 EMERGENCY DEPT VISIT HI MDM: CPT

## 2025-02-28 PROCEDURE — 93005 ELECTROCARDIOGRAM TRACING: CPT | Performed by: PHYSICIAN ASSISTANT

## 2025-02-28 PROCEDURE — 72125 CT NECK SPINE W/O DYE: CPT

## 2025-02-28 RX ORDER — LIDOCAINE 50 MG/G
1 PATCH TOPICAL DAILY
Qty: 10 PATCH | Refills: 0 | Status: SHIPPED | OUTPATIENT
Start: 2025-02-28 | End: 2025-03-10

## 2025-02-28 RX ORDER — ACETAMINOPHEN 500 MG
1000 TABLET ORAL
Status: COMPLETED | OUTPATIENT
Start: 2025-02-28 | End: 2025-02-28

## 2025-02-28 RX ORDER — ACETAMINOPHEN 500 MG
1000 TABLET ORAL EVERY 6 HOURS PRN
Qty: 20 TABLET | Refills: 0 | Status: SHIPPED | OUTPATIENT
Start: 2025-02-28

## 2025-02-28 RX ORDER — IOPAMIDOL 755 MG/ML
100 INJECTION, SOLUTION INTRAVASCULAR
Status: COMPLETED | OUTPATIENT
Start: 2025-02-28 | End: 2025-02-28

## 2025-02-28 RX ADMIN — ACETAMINOPHEN 1000 MG: 500 TABLET, FILM COATED ORAL at 13:15

## 2025-02-28 RX ADMIN — IOPAMIDOL 100 ML: 755 INJECTION, SOLUTION INTRAVENOUS at 13:00

## 2025-02-28 ASSESSMENT — LIFESTYLE VARIABLES
HOW OFTEN DO YOU HAVE A DRINK CONTAINING ALCOHOL: NEVER
HOW MANY STANDARD DRINKS CONTAINING ALCOHOL DO YOU HAVE ON A TYPICAL DAY: PATIENT DOES NOT DRINK

## 2025-02-28 ASSESSMENT — PAIN DESCRIPTION - FREQUENCY: FREQUENCY: CONTINUOUS

## 2025-02-28 ASSESSMENT — ENCOUNTER SYMPTOMS
WHEEZING: 0
VOMITING: 0
CHEST TIGHTNESS: 0
RHINORRHEA: 0
BACK PAIN: 0
NAUSEA: 0
SHORTNESS OF BREATH: 0
DIARRHEA: 0
ABDOMINAL PAIN: 1
EYE PAIN: 0
CONSTIPATION: 0
ABDOMINAL DISTENTION: 0
COUGH: 0
SORE THROAT: 0
BLOOD IN STOOL: 0
ANAL BLEEDING: 0

## 2025-02-28 ASSESSMENT — PAIN DESCRIPTION - LOCATION
LOCATION: HEAD
LOCATION: CHEST;OTHER (COMMENT)

## 2025-02-28 ASSESSMENT — VISUAL ACUITY: OU: 1

## 2025-02-28 ASSESSMENT — PAIN DESCRIPTION - PAIN TYPE: TYPE: ACUTE PAIN

## 2025-02-28 ASSESSMENT — PAIN SCALES - GENERAL
PAINLEVEL_OUTOF10: 2
PAINLEVEL_OUTOF10: 7

## 2025-02-28 ASSESSMENT — PAIN DESCRIPTION - DESCRIPTORS: DESCRIPTORS: ACHING

## 2025-02-28 NOTE — ED PROVIDER NOTES
soon as possible for a visit in 2 days  As needed    [place] [id number] [facility] [address]  [phone]  Schedule an appointment as soon as possible for a visit in 1 week  As needed       DISCHARGE MEDICATIONS:     Medication List        START taking these medications      acetaminophen 500 MG tablet  Commonly known as: TYLENOL  Take 2 tablets by mouth every 6 hours as needed for Pain     lidocaine 5 %  Commonly known as: LIDODERM  Place 1 patch onto the skin daily for 10 days 12 hours on, 12 hours off.            CONTINUE taking these medications      ONE TOUCH ULTRA 2 w/Device Kit  Test once daily. E11.9. Type 2 Diabetes            ASK your doctor about these medications      amLODIPine 5 MG tablet  Commonly known as: NORVASC  Take 1 tablet by mouth daily     aspirin 325 MG tablet  TAKE 1 TABLET BY MOUTH DAILY FOR BLOOD CLOT PREVENTION     atorvastatin 40 MG tablet  Commonly known as: LIPITOR  Take 1 tablet by mouth daily     * diclofenac 75 MG EC tablet  Commonly known as: VOLTAREN  Take 1 tablet by mouth 2 times daily     * diclofenac 75 MG EC tablet  Commonly known as: VOLTAREN  Take 1 tablet by mouth 2 times daily     famotidine 40 MG tablet  Commonly known as: PEPCID  TAKE 1 TABLET BY MOUTH EVERY DAY     hydroCHLOROthiazide 25 MG tablet  Commonly known as: HYDRODIURIL  Take 1 tablet by mouth daily     metFORMIN 1000 MG tablet  Commonly known as: GLUCOPHAGE  Take 1 tablet by mouth daily (with breakfast)     MULTIVITAMIN WOMEN PO     nystatin 499094 UNIT/GM powder  Commonly known as: MYCOSTATIN  Apply 3 times daily.     ondansetron 4 MG disintegrating tablet  Commonly known as: ZOFRAN-ODT  Take 1 tablet by mouth 3 times daily as needed for Nausea or Vomiting     OneTouch Verio strip  Generic drug: blood glucose test strips  Test once daily. E11.9, Diabetes Type 2.     pantoprazole 40 MG tablet  Commonly known as: PROTONIX  TAKE 1 TABLET BY MOUTH EVERY DAY BEFORE BREAKFAST     pioglitazone 45
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
## 2025-03-12 ENCOUNTER — TELEPHONE (OUTPATIENT)
Facility: CLINIC | Age: 70
End: 2025-03-12

## 2025-03-12 NOTE — TELEPHONE ENCOUNTER
PATIENT TAKES METFORMIN AND STATES THAT IT IS NOW CAUSING HER TO HAVE DIARRHEA.  WOULD LIKE TO KNOW IF SOMETHING ELSE CAN BE PRESCRIBED OR DOES SHE NEED TO SCHEDULE AN APPOINTMENT.

## 2025-03-19 ENCOUNTER — OFFICE VISIT (OUTPATIENT)
Age: 70
End: 2025-03-19

## 2025-03-19 VITALS — HEIGHT: 61 IN | BODY MASS INDEX: 37.63 KG/M2

## 2025-03-19 DIAGNOSIS — Z96.651 AFTERCARE FOLLOWING RIGHT KNEE JOINT REPLACEMENT SURGERY: Primary | ICD-10-CM

## 2025-03-19 DIAGNOSIS — Z47.1 AFTERCARE FOLLOWING RIGHT KNEE JOINT REPLACEMENT SURGERY: Primary | ICD-10-CM

## 2025-03-19 RX ORDER — CELECOXIB 200 MG/1
200 CAPSULE ORAL 2 TIMES DAILY PRN
Qty: 60 CAPSULE | Refills: 0 | Status: SHIPPED | OUTPATIENT
Start: 2025-03-19

## 2025-03-19 ASSESSMENT — PATIENT HEALTH QUESTIONNAIRE - PHQ9
SUM OF ALL RESPONSES TO PHQ QUESTIONS 1-9: 0
SUM OF ALL RESPONSES TO PHQ QUESTIONS 1-9: 0
1. LITTLE INTEREST OR PLEASURE IN DOING THINGS: NOT AT ALL
2. FEELING DOWN, DEPRESSED OR HOPELESS: NOT AT ALL
SUM OF ALL RESPONSES TO PHQ QUESTIONS 1-9: 0
SUM OF ALL RESPONSES TO PHQ QUESTIONS 1-9: 0

## 2025-03-19 NOTE — PROGRESS NOTES
Identified pt with two pt identifiers (name and ). Reviewed chart in preparation for visit and have obtained necessary documentation.    Kalpana Benoit is a 69 y.o. female  Chief Complaint   Patient presents with    Follow-up     - F/U R. knee contusion with abraision     Ht 1.549 m (5' 0.98\")   BMI 37.63 kg/m²     1. Have you been to the ER, urgent care clinic since your last visit?  Hospitalized since your last visit?yes see chart    2. Have you seen or consulted any other health care providers outside of the [facility] since your last visit?  Include any pap smears or colon screening. no  
No       Family Hx:  Family History   Problem Relation Age of Onset    Breast Cancer Mother     Diabetes Father     Diabetes Brother     Other Daughter         cholesterol    Deep Vein Thrombosis Daughter     Lung Disease Daughter          Review of Systems:       General: Denies headache, lethargy, fever, weight loss  Ears/Nose/Throat: Denies ear discharge, drainage, nosebleeds, hoarse voice, dental problems  Cardiovascular: Denies chest pain, shortness of breath  Lungs: Denies chest pain, breathing problems, wheezing, pneumonia  Stomach: Denies stomach pain, heartburn, constipation, irritable bowel  Skin: Denies rash, sores, open wounds  Musculoskeletal: Lateral knee pain  Genitourinary: Denies dysuria, hematuria, polyuria  Gastrointestinal: Denies constipation, obstipation, diarrhea  Neurological: Denies changes in sight, smell, hearing, taste, seizures. Denies loss of consciousness.  Psychiatric: Denies depression, sleep pattern changes, anxiety, change in personality  Endocrine: Denies mood swings, heat or cold intolerance  Hematologic/Lymphatic: Denies anemia, purpura, petechia  Allergic/Immunologic: Denies swelling of throat, pain or swelling at lymph nodes      Physical Examination:    There were no vitals filed for this visit.     General: AOX3, no apparent distress  Psychiatric: mood and affect appropriate  Lungs: breathing is symmetric and unlabored bilaterally  Heart: regular rate and rhythm  Abdomen: no guarding  Head: normocephalic, atraumatic  Skin: No significant abnormalities, good turgor  Sensation intact to light touch: C5-T1 dermatomes  Muscular exam: 5/5 strength in all major muscle groups unless noted in specialty exam.    Extremities        Left lower extremity: Well-healed surgical scar, range of motion 0-1 25.  No effusion.  Sensation intact light touch in the L1-S1 dermatomes.  Right lower extremity: Well-healed surgical scar.  No effusion.  Patella tracks centrally.  Range of motion 0-1

## 2025-04-17 ENCOUNTER — OFFICE VISIT (OUTPATIENT)
Facility: CLINIC | Age: 70
End: 2025-04-17
Payer: COMMERCIAL

## 2025-04-17 VITALS
WEIGHT: 200 LBS | RESPIRATION RATE: 16 BRPM | DIASTOLIC BLOOD PRESSURE: 74 MMHG | HEIGHT: 61 IN | SYSTOLIC BLOOD PRESSURE: 116 MMHG | TEMPERATURE: 98 F | BODY MASS INDEX: 37.76 KG/M2 | HEART RATE: 80 BPM | OXYGEN SATURATION: 96 %

## 2025-04-17 DIAGNOSIS — I10 PRIMARY HYPERTENSION: ICD-10-CM

## 2025-04-17 DIAGNOSIS — E11.9 TYPE 2 DIABETES MELLITUS WITHOUT COMPLICATION, WITHOUT LONG-TERM CURRENT USE OF INSULIN: Primary | ICD-10-CM

## 2025-04-17 DIAGNOSIS — K21.9 GASTROESOPHAGEAL REFLUX DISEASE WITHOUT ESOPHAGITIS: ICD-10-CM

## 2025-04-17 DIAGNOSIS — Z47.1 AFTERCARE FOLLOWING RIGHT KNEE JOINT REPLACEMENT SURGERY: ICD-10-CM

## 2025-04-17 DIAGNOSIS — M51.9 LUMBAR DISC DISEASE: ICD-10-CM

## 2025-04-17 DIAGNOSIS — Z96.651 AFTERCARE FOLLOWING RIGHT KNEE JOINT REPLACEMENT SURGERY: ICD-10-CM

## 2025-04-17 PROCEDURE — 99214 OFFICE O/P EST MOD 30 MIN: CPT | Performed by: INTERNAL MEDICINE

## 2025-04-17 PROCEDURE — 3078F DIAST BP <80 MM HG: CPT | Performed by: INTERNAL MEDICINE

## 2025-04-17 PROCEDURE — 3074F SYST BP LT 130 MM HG: CPT | Performed by: INTERNAL MEDICINE

## 2025-04-17 PROCEDURE — 1123F ACP DISCUSS/DSCN MKR DOCD: CPT | Performed by: INTERNAL MEDICINE

## 2025-04-17 RX ORDER — CHOLECALCIFEROL (VITAMIN D3) 50 MCG
1 TABLET ORAL 2 TIMES DAILY
Qty: 60 TABLET | Refills: 3 | OUTPATIENT
Start: 2025-04-17

## 2025-04-17 RX ORDER — CHOLECALCIFEROL (VITAMIN D3) 50 MCG
1 TABLET ORAL 2 TIMES DAILY
Qty: 60 TABLET | Refills: 3 | Status: SHIPPED | OUTPATIENT
Start: 2025-04-17

## 2025-04-17 RX ORDER — HYDROCHLOROTHIAZIDE 25 MG/1
25 TABLET ORAL DAILY
Qty: 90 TABLET | Refills: 3 | OUTPATIENT
Start: 2025-04-17

## 2025-04-17 RX ORDER — ATORVASTATIN CALCIUM 40 MG/1
40 TABLET, FILM COATED ORAL DAILY
Qty: 30 TABLET | Refills: 12 | Status: SHIPPED | OUTPATIENT
Start: 2025-04-17

## 2025-04-17 RX ORDER — PANTOPRAZOLE SODIUM 40 MG/1
40 TABLET, DELAYED RELEASE ORAL
Qty: 90 TABLET | Refills: 3 | Status: SHIPPED | OUTPATIENT
Start: 2025-04-17

## 2025-04-17 RX ORDER — CELECOXIB 200 MG/1
200 CAPSULE ORAL 2 TIMES DAILY PRN
Qty: 60 CAPSULE | Refills: 0 | Status: CANCELLED | OUTPATIENT
Start: 2025-04-17

## 2025-04-17 RX ORDER — AMLODIPINE BESYLATE 5 MG/1
5 TABLET ORAL DAILY
Qty: 90 TABLET | Refills: 0 | OUTPATIENT
Start: 2025-04-17

## 2025-04-17 RX ORDER — ASPIRIN 325 MG
325 TABLET ORAL DAILY
Qty: 30 TABLET | Refills: 12 | Status: SHIPPED | OUTPATIENT
Start: 2025-04-17

## 2025-04-17 RX ORDER — AMLODIPINE BESYLATE 5 MG/1
5 TABLET ORAL DAILY
Qty: 90 TABLET | Refills: 0 | Status: SHIPPED | OUTPATIENT
Start: 2025-04-17

## 2025-04-17 RX ORDER — HYDROCHLOROTHIAZIDE 25 MG/1
25 TABLET ORAL DAILY
Qty: 90 TABLET | Refills: 3 | Status: SHIPPED | OUTPATIENT
Start: 2025-04-17

## 2025-04-17 RX ORDER — DICLOFENAC SODIUM 75 MG/1
75 TABLET, DELAYED RELEASE ORAL 2 TIMES DAILY
Qty: 60 TABLET | Refills: 3 | Status: SHIPPED | OUTPATIENT
Start: 2025-04-17

## 2025-04-17 SDOH — ECONOMIC STABILITY: FOOD INSECURITY: WITHIN THE PAST 12 MONTHS, THE FOOD YOU BOUGHT JUST DIDN'T LAST AND YOU DIDN'T HAVE MONEY TO GET MORE.: NEVER TRUE

## 2025-04-17 SDOH — ECONOMIC STABILITY: FOOD INSECURITY: WITHIN THE PAST 12 MONTHS, YOU WORRIED THAT YOUR FOOD WOULD RUN OUT BEFORE YOU GOT MONEY TO BUY MORE.: NEVER TRUE

## 2025-04-17 ASSESSMENT — PATIENT HEALTH QUESTIONNAIRE - PHQ9
SUM OF ALL RESPONSES TO PHQ QUESTIONS 1-9: 0
2. FEELING DOWN, DEPRESSED OR HOPELESS: NOT AT ALL
1. LITTLE INTEREST OR PLEASURE IN DOING THINGS: NOT AT ALL

## 2025-04-21 RX ORDER — PIOGLITAZONE 45 MG/1
45 TABLET ORAL NIGHTLY
Qty: 90 TABLET | Refills: 0 | Status: SHIPPED | OUTPATIENT
Start: 2025-04-21

## 2025-04-21 RX ORDER — VALSARTAN 320 MG/1
320 TABLET ORAL DAILY
Qty: 90 TABLET | Refills: 1 | Status: SHIPPED | OUTPATIENT
Start: 2025-04-21

## 2025-04-21 RX ORDER — NYSTATIN 100000 [USP'U]/G
POWDER TOPICAL
Qty: 60 G | Refills: 3 | Status: SHIPPED | OUTPATIENT
Start: 2025-04-21

## 2025-04-24 ENCOUNTER — OFFICE VISIT (OUTPATIENT)
Age: 70
End: 2025-04-24
Payer: COMMERCIAL

## 2025-04-24 DIAGNOSIS — S80.01XA CONTUSION OF RIGHT KNEE, INITIAL ENCOUNTER: Primary | ICD-10-CM

## 2025-04-24 PROCEDURE — 99212 OFFICE O/P EST SF 10 MIN: CPT | Performed by: ORTHOPAEDIC SURGERY

## 2025-04-24 PROCEDURE — 1123F ACP DISCUSS/DSCN MKR DOCD: CPT | Performed by: ORTHOPAEDIC SURGERY

## 2025-04-24 ASSESSMENT — PATIENT HEALTH QUESTIONNAIRE - PHQ9
SUM OF ALL RESPONSES TO PHQ QUESTIONS 1-9: 0
SUM OF ALL RESPONSES TO PHQ QUESTIONS 1-9: 0
1. LITTLE INTEREST OR PLEASURE IN DOING THINGS: NOT AT ALL
SUM OF ALL RESPONSES TO PHQ QUESTIONS 1-9: 0
SUM OF ALL RESPONSES TO PHQ QUESTIONS 1-9: 0
2. FEELING DOWN, DEPRESSED OR HOPELESS: NOT AT ALL

## 2025-04-24 NOTE — PROGRESS NOTES
4/24/2025      CC: Right knee pain    HPI:      This is a 69 y.o. year old female who presents for a follow up visit.  The patient was last seen and diagnosed with contusion right knee, she is status post knee replacement in the past.   The patient's treatments since the most recent visit have comprised of Celebrex, she feels as though this has not helped her, but doing some stretching and biking has helped.   The patient has had quite good relief of the chief complaint.        PMH:  Past Medical History:   Diagnosis Date    Anemia     blood transfusion on 2/4/24 after surgery    Diverticulitis     GERD (gastroesophageal reflux disease)     Hypertension     Osteoarthrosis, unspecified whether generalized or localized, lower leg     knees    Other and unspecified hyperlipidemia     Type II or unspecified type diabetes mellitus without mention of complication, uncontrolled        PSxHx:  Past Surgical History:   Procedure Laterality Date    ARM SURGERY Left 12/21/2023    IRRIGATION AND DEBRIDEMENT LEFT LEG performed by Matthew Garay DO at [facility] MAIN OR    COLONOSCOPY N/A 9/18/2017    COLONOSCOPY performed by Malcolm Pérez MD at [facility] AMBULATORY OR    COLONOSCOPY N/A 4/25/2018    COLONOSCOPY performed by Malcolm Pérez MD at [facility] ENDOSCOPY    FINGER TRIGGER RELEASE Left 08/28/2023    LEFT RING FINGER TRIGGER RELEASE performed by Matthew Gaary DO at [facility] MAIN OR    HYSTERECTOMY (CERVIX STATUS UNKNOWN)  2021    INCISION AND DRAINAGE OF WOUND  09/30/2023    left top of knee    LAP,TUBAL CAUTERY Bilateral 1992    LEG SURGERY Left 01/30/2024    LEFT KNEE INCISION AND DRAINAGE performed by Matthew Garay DO at [facility] MAIN OR    REVISION TOTAL KNEE ARTHROPLASTY Left 4/8/2024    LEFT KNEE TOTAL ARTHROPLASTY REVISION performed by Matthew Garay DO at [facility] MAIN OR    TONSILLECTOMY      age 9    TOTAL KNEE ARTHROPLASTY Left 05/22/2023    LEFT TOTAL KNEE ARTHROPLASTY performed by Matthew Garay DO at [facility] MAIN OR    TOTAL KNEE
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
## 2025-04-25 RX ORDER — BLOOD-GLUCOSE METER
EACH MISCELLANEOUS
OUTPATIENT
Start: 2025-04-25

## 2025-05-15 ENCOUNTER — OFFICE VISIT (OUTPATIENT)
Facility: CLINIC | Age: 70
End: 2025-05-15
Payer: COMMERCIAL

## 2025-05-15 VITALS
DIASTOLIC BLOOD PRESSURE: 86 MMHG | SYSTOLIC BLOOD PRESSURE: 104 MMHG | TEMPERATURE: 98 F | HEIGHT: 61 IN | BODY MASS INDEX: 38.51 KG/M2 | OXYGEN SATURATION: 97 % | HEART RATE: 76 BPM | WEIGHT: 204 LBS | RESPIRATION RATE: 98 BRPM

## 2025-05-15 DIAGNOSIS — M51.9 LUMBAR DISC DISEASE: Primary | ICD-10-CM

## 2025-05-15 DIAGNOSIS — I10 PRIMARY HYPERTENSION: ICD-10-CM

## 2025-05-15 DIAGNOSIS — K21.9 GASTROESOPHAGEAL REFLUX DISEASE WITHOUT ESOPHAGITIS: ICD-10-CM

## 2025-05-15 DIAGNOSIS — E11.9 TYPE 2 DIABETES MELLITUS WITHOUT COMPLICATION, WITHOUT LONG-TERM CURRENT USE OF INSULIN (HCC): ICD-10-CM

## 2025-05-15 DIAGNOSIS — Z12.31 ENCOUNTER FOR SCREENING MAMMOGRAM FOR MALIGNANT NEOPLASM OF BREAST: ICD-10-CM

## 2025-05-15 PROCEDURE — 3074F SYST BP LT 130 MM HG: CPT | Performed by: INTERNAL MEDICINE

## 2025-05-15 PROCEDURE — 99214 OFFICE O/P EST MOD 30 MIN: CPT | Performed by: INTERNAL MEDICINE

## 2025-05-15 PROCEDURE — 3079F DIAST BP 80-89 MM HG: CPT | Performed by: INTERNAL MEDICINE

## 2025-05-15 PROCEDURE — 1123F ACP DISCUSS/DSCN MKR DOCD: CPT | Performed by: INTERNAL MEDICINE

## 2025-05-15 RX ORDER — BLOOD SUGAR DIAGNOSTIC
STRIP MISCELLANEOUS
Qty: 100 STRIP | Refills: 3 | OUTPATIENT
Start: 2025-05-15

## 2025-05-15 RX ORDER — GLIPIZIDE 5 MG/1
5 TABLET ORAL 2 TIMES DAILY
Qty: 60 TABLET | Refills: 3 | Status: SHIPPED | OUTPATIENT
Start: 2025-05-15

## 2025-05-15 RX ORDER — BLOOD SUGAR DIAGNOSTIC
STRIP MISCELLANEOUS
Qty: 100 EACH | Refills: 3 | Status: SHIPPED | OUTPATIENT
Start: 2025-05-15

## 2025-05-15 RX ORDER — POTASSIUM CHLORIDE 750 MG/1
10 CAPSULE, EXTENDED RELEASE ORAL 2 TIMES DAILY
Qty: 60 CAPSULE | Refills: 6 | Status: SHIPPED | OUTPATIENT
Start: 2025-05-15

## 2025-05-15 RX ORDER — NYSTATIN 100000 [USP'U]/G
POWDER TOPICAL
Qty: 60 G | Refills: 3 | Status: SHIPPED | OUTPATIENT
Start: 2025-05-15

## 2025-05-15 RX ORDER — POTASSIUM CHLORIDE 750 MG/1
10 CAPSULE, EXTENDED RELEASE ORAL 2 TIMES DAILY
Qty: 60 CAPSULE | Refills: 6 | OUTPATIENT
Start: 2025-05-15

## 2025-05-15 SDOH — ECONOMIC STABILITY: FOOD INSECURITY: WITHIN THE PAST 12 MONTHS, YOU WORRIED THAT YOUR FOOD WOULD RUN OUT BEFORE YOU GOT MONEY TO BUY MORE.: NEVER TRUE

## 2025-05-15 SDOH — ECONOMIC STABILITY: FOOD INSECURITY: WITHIN THE PAST 12 MONTHS, THE FOOD YOU BOUGHT JUST DIDN'T LAST AND YOU DIDN'T HAVE MONEY TO GET MORE.: NEVER TRUE

## 2025-05-15 ASSESSMENT — PATIENT HEALTH QUESTIONNAIRE - PHQ9
SUM OF ALL RESPONSES TO PHQ QUESTIONS 1-9: 0
SUM OF ALL RESPONSES TO PHQ QUESTIONS 1-9: 0
1. LITTLE INTEREST OR PLEASURE IN DOING THINGS: NOT AT ALL
SUM OF ALL RESPONSES TO PHQ QUESTIONS 1-9: 0
2. FEELING DOWN, DEPRESSED OR HOPELESS: NOT AT ALL
SUM OF ALL RESPONSES TO PHQ QUESTIONS 1-9: 0

## 2025-05-15 NOTE — PROGRESS NOTES
1. Have you been to the ER, urgent care clinic since your last visit?  Hospitalized since your last visit?No    2. Have you seen or consulted any other health care providers outside of the [facility] since your last visit?  Include any pap smears or colon screening. No     Chief Complaint   Patient presents with    Back Pain    Gastroesophageal Reflux         PHQ-9 Total Score: 0 (5/15/2025 10:36 AM)

## 2025-05-15 NOTE — PROGRESS NOTES
Kalpana Benoit is a 69 y.o. female and presents with Back Pain and Gastroesophageal Reflux  .  Subjective:    Back Pain Review:  Patient presents for evaluation of low back problems.  Symptoms have been present for  weeks and include pain in lower back (dull, moderate in character; 8/10 in severity). Initial inciting event: unknown. Symptoms are worst: at times. Alleviating factors identifiable by patient are lying flat, medication . Exacerbating factors identifiable by patient are bending forwards, bending backwards. Treatments so far initiated by patient: medication Previous lower back problems: reported. 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 Disc base narrowing and endplate  degeneration are again seen at the L1-L2 level. No acute fracture deformity is  identified. The vertebral body heights are preserved. There is increased disc  base narrowing and endplate degeneration at the L2-L3 level compared to the  prior radiographs.     IMPRESSION:  No acute fracture or dislocation. L1-L2 and L2-L3 degenerative disc disease       Hypertension Review:  The patient has hypertension .  Diet and Lifestyle: generally follows a low sodium diet, exercises sporadically  Home BP Monitoring: is not measured at home.  Pertinent ROS: taking medications as instructed, no medication side effects noted, no TIA's, no chest pain on exertion, no dyspnea on exertion, no swelling of ankles.    Dyslipidemia Review:  Patient presents for evaluation of lipids.  Compliance with treatment thus far has been excellent.  A repeat fasting lipid profile was not done.  The patient does not use medications that may worsen dyslipidemias . The patient exercises sporadically.      Diabetes Mellitus Review:  She has diabetes mellitus.  Diabetic ROS - medication compliance: compliant all of the time, diabetic diet compliance: compliant all of the time, home glucose monitoring: is performed.   Known diabetic complications:

## 2025-05-27 ENCOUNTER — HOSPITAL ENCOUNTER (EMERGENCY)
Facility: HOSPITAL | Age: 70
Discharge: HOME | End: 2025-05-27
Attending: EMERGENCY MEDICINE
Payer: COMMERCIAL

## 2025-05-27 VITALS
RESPIRATION RATE: 18 BRPM | DIASTOLIC BLOOD PRESSURE: 96 MMHG | HEIGHT: 61 IN | TEMPERATURE: 97.8 F | SYSTOLIC BLOOD PRESSURE: 176 MMHG | BODY MASS INDEX: 39.08 KG/M2 | OXYGEN SATURATION: 98 % | WEIGHT: 207 LBS | HEART RATE: 77 BPM

## 2025-05-27 DIAGNOSIS — M53.3 SACROILIAC JOINT PAIN: Primary | ICD-10-CM

## 2025-05-27 LAB
APPEARANCE UR: CLEAR
BACTERIA URNS QL MICRO: NEGATIVE /HPF
BILIRUB UR QL: NEGATIVE
COLOR UR: ABNORMAL
EPITH CASTS URNS QL MICRO: ABNORMAL /LPF
GLUCOSE UR STRIP.AUTO-MCNC: NEGATIVE MG/DL
HGB UR QL STRIP: NEGATIVE
KETONES UR QL STRIP.AUTO: ABNORMAL MG/DL
LEUKOCYTE ESTERASE UR QL STRIP.AUTO: NEGATIVE
NITRITE UR QL STRIP.AUTO: NEGATIVE
PH UR STRIP: 5.5 (ref 5–8)
PROT UR STRIP-MCNC: NEGATIVE MG/DL
RBC #/AREA URNS HPF: ABNORMAL /HPF (ref 0–5)
SP GR UR REFRACTOMETRY: 1.02
URINE CULTURE IF INDICATED: ABNORMAL
UROBILINOGEN UR QL STRIP.AUTO: 1 EU/DL (ref 0.2–1)
WBC URNS QL MICRO: ABNORMAL /HPF (ref 0–4)

## 2025-05-27 PROCEDURE — 6370000000 HC RX 637 (ALT 250 FOR IP): Performed by: EMERGENCY MEDICINE

## 2025-05-27 PROCEDURE — 81001 URINALYSIS AUTO W/SCOPE: CPT

## 2025-05-27 PROCEDURE — 99283 EMERGENCY DEPT VISIT LOW MDM: CPT

## 2025-05-27 RX ORDER — PREDNISONE 20 MG/1
40 TABLET ORAL
Status: COMPLETED | OUTPATIENT
Start: 2025-05-27 | End: 2025-05-27

## 2025-05-27 RX ORDER — IBUPROFEN 600 MG/1
600 TABLET, FILM COATED ORAL
Status: COMPLETED | OUTPATIENT
Start: 2025-05-27 | End: 2025-05-27

## 2025-05-27 RX ORDER — PREDNISONE 20 MG/1
40 TABLET ORAL DAILY
Qty: 14 TABLET | Refills: 0 | Status: SHIPPED | OUTPATIENT
Start: 2025-05-27 | End: 2025-06-03

## 2025-05-27 RX ADMIN — PREDNISONE 40 MG: 20 TABLET ORAL at 15:29

## 2025-05-27 RX ADMIN — IBUPROFEN 600 MG: 600 TABLET ORAL at 15:30

## 2025-05-27 ASSESSMENT — ENCOUNTER SYMPTOMS: ABDOMINAL PAIN: 0

## 2025-05-27 ASSESSMENT — PAIN SCALES - GENERAL: PAINLEVEL_OUTOF10: 9

## 2025-05-27 ASSESSMENT — PAIN - FUNCTIONAL ASSESSMENT: PAIN_FUNCTIONAL_ASSESSMENT: 0-10

## 2025-05-27 NOTE — ED NOTES
Discharge instructions were given to the patient by Dax Cruz RN .     The patient left the Emergency Department ambulatory, alert and oriented and in no acute distress with 1 prescription. The patient was encouraged to call or return to the ED for worsening issues or problems and was encouraged to schedule a follow up appointment for continuing care. Patient discharged home ambulatory with a steady gait.    The patient verbalized understanding of discharge instructions and prescriptions, all questions were answered. The patient has no further concerns at this time.

## 2025-05-27 NOTE — ED NOTES
This RN assumed role of preceptor. This RN reviewed preceptees charting and is in agreement with RNs documentation.

## 2025-05-27 NOTE — DISCHARGE INSTRUCTIONS
It was a pleasure taking care of you at [facility] Emergency Department today.      We know that when you come to [facility], you are entrusting us with your health, comfort, and safety.  Our physicians and nurses honor that trust, and we appreciate the opportunity to care for you and your loved ones.  We also value your feedback, and we would like to hear from you.    When you receive a  >>> survey <<< about your Emergency Department experience today, please fill it out. We review every single response from our patients. Thank you!    Sincerely,  Dr. Reji Gomez MD

## 2025-05-27 NOTE — ED PROVIDER NOTES
EMERGENCY DEPARTMENT HISTORY AND PHYSICAL EXAM    Date: 5/27/2025  Patient Name: Kalpana Benoit  Patient Age and Sex: 69 y.o. female  MRN:  [id number]  CSN:  [id number]    History of Present Illness     Chief Complaint   Patient presents with    Flank Pain       History Provided By: Patient    Ability to gather history was limited by:     HPI: Kalpana Benoit, 69 y.o. female   With history of diverticulitis, diabetes, complains of left flank pain for the past couple days.  No other associated symptoms.  Pain is aching in nature, moderate severity, nonradiating.  No significant anterior abdominal pain, no dysuria or nausea or vomiting.      Tobacco Use      Smoking status: Former        Packs/day: 0.00        Types: Cigarettes        Start date: 1/1/1980        Quit date: 1/1/2010        Years since quitting: 15.4        Passive exposure: Past      Smokeless tobacco: Never     Past History   The patient's medical, surgical, and social history were reviewed by me today.    Current Medications:  No current facility-administered medications on file prior to encounter.     Current Outpatient Medications on File Prior to Encounter   Medication Sig Dispense Refill    potassium chloride (MICRO-K) 10 MEQ extended release capsule Take 1 capsule by mouth 2 times daily 60 capsule 6    blood glucose test strips (ONETOUCH VERIO) strip Test once daily. E11.9, Diabetes Type 2. 100 each 3    nystatin (MYCOSTATIN) 987300 UNIT/GM powder APPLY TO AFFECTED AREA 3 TIMES A DAY 60 g 3    glipiZIDE (GLUCOTROL) 5 MG tablet Take 1 tablet by mouth 2 times daily 60 tablet 3    valsartan (DIOVAN) 320 MG tablet TAKE 1 TABLET BY MOUTH EVERY DAY 90 tablet 1    pioglitazone (ACTOS) 45 MG tablet TAKE 1 TABLET BY MOUTH EVERY DAY AT NIGHT 90 tablet 0    amLODIPine (NORVASC) 5 MG tablet Take 1 tablet by mouth daily 90 tablet 0    aspirin 325 MG tablet Take 1 tablet by mouth daily For blood clot prevention 30 tablet 12    atorvastatin (LIPITOR) 40

## 2025-05-27 NOTE — ED NOTES
Pt came in at the ED, ambulatory, complaining of left flank pain x 2 days.Pt denies dysuria and any urinary problems.Pt stated taking Tylenol for it with minimal pain relief. Pt denies any fall and injury.Pt stated she has diabetes and taking glipizide as maintenance.

## 2025-06-16 ENCOUNTER — HOSPITAL ENCOUNTER (EMERGENCY)
Facility: HOSPITAL | Age: 70
Discharge: HOME | End: 2025-06-16
Payer: COMMERCIAL

## 2025-06-16 VITALS
OXYGEN SATURATION: 98 % | WEIGHT: 205.47 LBS | HEART RATE: 88 BPM | BODY MASS INDEX: 38.82 KG/M2 | TEMPERATURE: 97.8 F | RESPIRATION RATE: 18 BRPM | DIASTOLIC BLOOD PRESSURE: 64 MMHG | SYSTOLIC BLOOD PRESSURE: 125 MMHG

## 2025-06-16 DIAGNOSIS — N30.00 ACUTE CYSTITIS WITHOUT HEMATURIA: Primary | ICD-10-CM

## 2025-06-16 LAB
APPEARANCE UR: ABNORMAL
BACTERIA URNS QL MICRO: ABNORMAL /HPF
BILIRUB UR QL: NEGATIVE
COLOR UR: ABNORMAL
EPITH CASTS URNS QL MICRO: ABNORMAL /LPF
GLUCOSE BLD STRIP.AUTO-MCNC: 254 MG/DL (ref 65–117)
GLUCOSE UR STRIP.AUTO-MCNC: 250 MG/DL
HGB UR QL STRIP: ABNORMAL
KETONES UR QL STRIP.AUTO: ABNORMAL MG/DL
LEUKOCYTE ESTERASE UR QL STRIP.AUTO: ABNORMAL
NITRITE UR QL STRIP.AUTO: POSITIVE
PH UR STRIP: 5.5 (ref 5–8)
PROT UR STRIP-MCNC: ABNORMAL MG/DL
RBC #/AREA URNS HPF: ABNORMAL /HPF (ref 0–5)
SERVICE CMNT-IMP: ABNORMAL
SP GR UR REFRACTOMETRY: 1.02
URINE CULTURE IF INDICATED: ABNORMAL
UROBILINOGEN UR QL STRIP.AUTO: 0.2 EU/DL (ref 0.2–1)
WBC URNS QL MICRO: ABNORMAL /HPF (ref 0–4)

## 2025-06-16 PROCEDURE — 87186 SC STD MICRODIL/AGAR DIL: CPT

## 2025-06-16 PROCEDURE — 82962 GLUCOSE BLOOD TEST: CPT

## 2025-06-16 PROCEDURE — 87086 URINE CULTURE/COLONY COUNT: CPT

## 2025-06-16 PROCEDURE — 99283 EMERGENCY DEPT VISIT LOW MDM: CPT

## 2025-06-16 PROCEDURE — 81001 URINALYSIS AUTO W/SCOPE: CPT

## 2025-06-16 PROCEDURE — 87088 URINE BACTERIA CULTURE: CPT

## 2025-06-16 RX ORDER — NITROFURANTOIN 25; 75 MG/1; MG/1
100 CAPSULE ORAL 2 TIMES DAILY
Qty: 14 CAPSULE | Refills: 0 | Status: SHIPPED | OUTPATIENT
Start: 2025-06-16 | End: 2025-06-23

## 2025-06-16 RX ORDER — PHENAZOPYRIDINE HYDROCHLORIDE 100 MG/1
100 TABLET, FILM COATED ORAL 3 TIMES DAILY PRN
Qty: 6 TABLET | Refills: 0 | Status: SHIPPED | OUTPATIENT
Start: 2025-06-16 | End: 2025-06-19

## 2025-06-16 ASSESSMENT — PAIN DESCRIPTION - DESCRIPTORS: DESCRIPTORS: ACHING

## 2025-06-16 ASSESSMENT — PAIN DESCRIPTION - LOCATION: LOCATION: GENERALIZED

## 2025-06-16 ASSESSMENT — PAIN DESCRIPTION - PAIN TYPE: TYPE: CHRONIC PAIN

## 2025-06-16 ASSESSMENT — PAIN - FUNCTIONAL ASSESSMENT: PAIN_FUNCTIONAL_ASSESSMENT: 0-10

## 2025-06-16 ASSESSMENT — PAIN SCALES - GENERAL: PAINLEVEL_OUTOF10: 7

## 2025-06-16 NOTE — ED NOTES
Discharge instructions were given to the patient by Nadeen RAE. The patient left the Emergency Department ambulatory, alert and oriented and in no acute distress with 2 prescriptions. The patient was encouraged to call or return to the ED for worsening issues or problems and was encouraged to schedule a follow up appointment for continuing care. The patient verbalized understanding of discharge instructions and prescriptions, all questions were answered. The patient has no further concerns at this time.

## 2025-06-16 NOTE — ED PROVIDER NOTES
[facility] EMERGENCY DEPARTMENT  EMERGENCY DEPARTMENT ENCOUNTER         Pt Name: Kalpana Benoit  MRN: [id number]  Birthdate 1955  Date of evaluation: 6/16/2025  Provider: Terry Renee PA-C   PCP: Nathanael Willingham Jr., MD  Note Started: 3:54 PM EDT 6/16/25     CHIEF COMPLAINT       Chief Complaint   Patient presents with    Urinary Frequency        HISTORY OF PRESENT ILLNESS: 1 or more elements      History From: Patient  HPI Limitations: None  Arrival Mode: Private vehicle     Kalpana Benoit is a 69 y.o. female who presents to the ED complaining of urinary frequency and urgency x 4 days.  She has been taking Azo over-the-counter with no relief.  She denies any fevers or chills, no nausea or vomiting but rates discomfort 7 out of 10.     Nursing Notes were all reviewed and agreed with or any disagreements were addressed in the HPI.  Please see MDM for additional details of HPI and ROS     REVIEW OF SYSTEMS      Review of Systems     Positives and Pertinent negatives as per HPI.    PAST HISTORY     Past Medical History:  Past Medical History:   Diagnosis Date    Anemia     blood transfusion on 2/4/24 after surgery    Diverticulitis     GERD (gastroesophageal reflux disease)     Hypertension     Osteoarthrosis, unspecified whether generalized or localized, lower leg     knees    Other and unspecified hyperlipidemia     Type II or unspecified type diabetes mellitus without mention of complication, uncontrolled        Past Surgical History:  Past Surgical History:   Procedure Laterality Date    ARM SURGERY Left 12/21/2023    IRRIGATION AND DEBRIDEMENT LEFT LEG performed by Matthew Garay DO at [facility] MAIN OR    COLONOSCOPY N/A 9/18/2017    COLONOSCOPY performed by Malcolm Pérez MD at [facility] AMBULATORY OR    COLONOSCOPY N/A 4/25/2018    COLONOSCOPY performed by Malcolm Pérez MD at [facility] ENDOSCOPY    FINGER TRIGGER RELEASE Left 08/28/2023    LEFT RING FINGER TRIGGER RELEASE performed by Matthew Garay

## 2025-06-16 NOTE — ED NOTES
Pt presents to ED ambulatory complaining of urinary frequency and urinary retention x 4 days ago. Pt reports hx of UTIs. Pt states taking AZO ( Methenamine 162 mg and Sodium Salicylate), no relief. Pt is alert and oriented x 4, RR even and unlabored, skin is warm and dry. Assessment completed and pt updated on plan of care.  Call bell in reach.        Emergency Department Nursing Plan of Care       The Nursing Plan of Care is developed from the Nursing assessment and Emergency Department Attending provider initial evaluation.  The plan of care may be reviewed in the “ED Provider note”.    The Plan of Care was developed with the following considerations:   Patient / Family readiness to learn indicated by:verbalized understanding  Persons(s) to be included in education: patient  Barriers to Learning/Limitations:None    Signed

## 2025-06-16 NOTE — ED TRIAGE NOTES
Pt c/o urinary frequency x3 days. Pt states she has been taking OTC medication for UTI with no improvement.

## 2025-06-17 ENCOUNTER — RESULTS FOLLOW-UP (OUTPATIENT)
Facility: HOSPITAL | Age: 70
End: 2025-06-17

## 2025-06-19 LAB
BACTERIA SPEC CULT: ABNORMAL
CC UR VC: ABNORMAL
SERVICE CMNT-IMP: ABNORMAL

## 2025-06-20 ENCOUNTER — HOSPITAL ENCOUNTER (EMERGENCY)
Facility: HOSPITAL | Age: 70
Discharge: HOME | End: 2025-06-20
Attending: EMERGENCY MEDICINE
Payer: COMMERCIAL

## 2025-06-20 VITALS
WEIGHT: 205.03 LBS | BODY MASS INDEX: 38.71 KG/M2 | HEIGHT: 61 IN | HEART RATE: 85 BPM | TEMPERATURE: 97.8 F | RESPIRATION RATE: 18 BRPM | OXYGEN SATURATION: 100 % | DIASTOLIC BLOOD PRESSURE: 100 MMHG | SYSTOLIC BLOOD PRESSURE: 170 MMHG

## 2025-06-20 DIAGNOSIS — T50.905A ADVERSE EFFECT OF DRUG, INITIAL ENCOUNTER: Primary | ICD-10-CM

## 2025-06-20 DIAGNOSIS — R19.7 DIARRHEA, UNSPECIFIED TYPE: ICD-10-CM

## 2025-06-20 LAB
ALBUMIN SERPL-MCNC: 3.4 G/DL (ref 3.5–5)
ALBUMIN/GLOB SERPL: 0.9 (ref 1.1–2.2)
ALP SERPL-CCNC: 248 U/L (ref 45–117)
ALT SERPL-CCNC: 40 U/L (ref 12–78)
ANION GAP SERPL CALC-SCNC: 6 MMOL/L (ref 2–12)
APPEARANCE UR: CLEAR
AST SERPL-CCNC: 28 U/L (ref 15–37)
BACTERIA URNS QL MICRO: NEGATIVE /HPF
BASOPHILS # BLD: 0.04 K/UL (ref 0–0.1)
BASOPHILS NFR BLD: 0.6 % (ref 0–1)
BILIRUB SERPL-MCNC: 0.5 MG/DL (ref 0.2–1)
BILIRUB UR QL: NEGATIVE
BUN SERPL-MCNC: 14 MG/DL (ref 6–20)
BUN/CREAT SERPL: 18 (ref 12–20)
C DIFF GDH STL QL: NEGATIVE
C DIFF TOX A+B STL QL IA: NEGATIVE
C DIFF TOXIN INTERPRETATION: NORMAL
CALCIUM SERPL-MCNC: 9.1 MG/DL (ref 8.5–10.1)
CHLORIDE SERPL-SCNC: 99 MMOL/L (ref 97–108)
CO2 SERPL-SCNC: 30 MMOL/L (ref 21–32)
COLOR UR: ABNORMAL
CREAT SERPL-MCNC: 0.8 MG/DL (ref 0.55–1.02)
DIFFERENTIAL METHOD BLD: NORMAL
EOSINOPHIL # BLD: 0.25 K/UL (ref 0–0.4)
EOSINOPHIL NFR BLD: 4 % (ref 0–7)
EPITH CASTS URNS QL MICRO: ABNORMAL /LPF
ERYTHROCYTE [DISTWIDTH] IN BLOOD BY AUTOMATED COUNT: 13.5 % (ref 11.5–14.5)
GLOBULIN SER CALC-MCNC: 3.7 G/DL (ref 2–4)
GLUCOSE SERPL-MCNC: 262 MG/DL (ref 65–100)
GLUCOSE UR STRIP.AUTO-MCNC: 100 MG/DL
HCT VFR BLD AUTO: 37.4 % (ref 35–47)
HGB BLD-MCNC: 12.2 G/DL (ref 11.5–16)
HGB UR QL STRIP: NEGATIVE
IMM GRANULOCYTES # BLD AUTO: 0.02 K/UL (ref 0–0.04)
IMM GRANULOCYTES NFR BLD AUTO: 0.3 % (ref 0–0.5)
KETONES UR QL STRIP.AUTO: NEGATIVE MG/DL
LEUKOCYTE ESTERASE UR QL STRIP.AUTO: NEGATIVE
LYMPHOCYTES # BLD: 1.41 K/UL (ref 0.8–3.5)
LYMPHOCYTES NFR BLD: 22.6 % (ref 12–49)
MCH RBC QN AUTO: 29.2 PG (ref 26–34)
MCHC RBC AUTO-ENTMCNC: 32.6 G/DL (ref 30–36.5)
MCV RBC AUTO: 89.5 FL (ref 80–99)
MONOCYTES # BLD: 0.54 K/UL (ref 0–1)
MONOCYTES NFR BLD: 8.7 % (ref 5–13)
NEUTS SEG # BLD: 3.98 K/UL (ref 1.8–8)
NEUTS SEG NFR BLD: 63.8 % (ref 32–75)
NITRITE UR QL STRIP.AUTO: NEGATIVE
NRBC # BLD: 0 K/UL (ref 0–0.01)
NRBC BLD-RTO: 0 PER 100 WBC
PH UR STRIP: 6.5 (ref 5–8)
PLATELET # BLD AUTO: 210 K/UL (ref 150–400)
PMV BLD AUTO: 10.4 FL (ref 8.9–12.9)
POTASSIUM SERPL-SCNC: 3.7 MMOL/L (ref 3.5–5.1)
PROT SERPL-MCNC: 7.1 G/DL (ref 6.4–8.2)
PROT UR STRIP-MCNC: NEGATIVE MG/DL
RBC # BLD AUTO: 4.18 M/UL (ref 3.8–5.2)
RBC #/AREA URNS HPF: ABNORMAL /HPF (ref 0–5)
SODIUM SERPL-SCNC: 135 MMOL/L (ref 136–145)
SP GR UR REFRACTOMETRY: 1.01
UROBILINOGEN UR QL STRIP.AUTO: 0.2 EU/DL (ref 0.2–1)
WBC # BLD AUTO: 6.2 K/UL (ref 3.6–11)
WBC URNS QL MICRO: ABNORMAL /HPF (ref 0–4)

## 2025-06-20 PROCEDURE — 80053 COMPREHEN METABOLIC PANEL: CPT

## 2025-06-20 PROCEDURE — 85025 COMPLETE CBC W/AUTO DIFF WBC: CPT

## 2025-06-20 PROCEDURE — 87324 CLOSTRIDIUM AG IA: CPT

## 2025-06-20 PROCEDURE — 81001 URINALYSIS AUTO W/SCOPE: CPT

## 2025-06-20 PROCEDURE — 87449 NOS EACH ORGANISM AG IA: CPT

## 2025-06-20 PROCEDURE — 2580000003 HC RX 258: Performed by: EMERGENCY MEDICINE

## 2025-06-20 PROCEDURE — 99284 EMERGENCY DEPT VISIT MOD MDM: CPT

## 2025-06-20 PROCEDURE — 6370000000 HC RX 637 (ALT 250 FOR IP): Performed by: EMERGENCY MEDICINE

## 2025-06-20 PROCEDURE — 36415 COLL VENOUS BLD VENIPUNCTURE: CPT

## 2025-06-20 RX ORDER — 0.9 % SODIUM CHLORIDE 0.9 %
1000 INTRAVENOUS SOLUTION INTRAVENOUS ONCE
Status: COMPLETED | OUTPATIENT
Start: 2025-06-20 | End: 2025-06-20

## 2025-06-20 RX ORDER — LOPERAMIDE HYDROCHLORIDE 2 MG/1
4 CAPSULE ORAL 4 TIMES DAILY PRN
Status: DISCONTINUED | OUTPATIENT
Start: 2025-06-20 | End: 2025-06-20 | Stop reason: HOSPADM

## 2025-06-20 RX ORDER — LOPERAMIDE HYDROCHLORIDE 2 MG/1
2 CAPSULE ORAL 4 TIMES DAILY PRN
Qty: 12 CAPSULE | Refills: 0 | Status: SHIPPED | OUTPATIENT
Start: 2025-06-20 | End: 2025-06-30

## 2025-06-20 RX ORDER — DICYCLOMINE HYDROCHLORIDE 10 MG/1
20 CAPSULE ORAL
Status: DISCONTINUED | OUTPATIENT
Start: 2025-06-20 | End: 2025-06-20 | Stop reason: HOSPADM

## 2025-06-20 RX ADMIN — DICYCLOMINE HYDROCHLORIDE 20 MG: 10 CAPSULE ORAL at 11:59

## 2025-06-20 RX ADMIN — LOPERAMIDE HYDROCHLORIDE 4 MG: 2 CAPSULE ORAL at 11:56

## 2025-06-20 RX ADMIN — SODIUM CHLORIDE 1000 ML: 0.9 INJECTION, SOLUTION INTRAVENOUS at 12:14

## 2025-06-20 ASSESSMENT — PAIN DESCRIPTION - PAIN TYPE: TYPE: ACUTE PAIN

## 2025-06-20 ASSESSMENT — PAIN SCALES - GENERAL: PAINLEVEL_OUTOF10: 5

## 2025-06-20 ASSESSMENT — PAIN DESCRIPTION - LOCATION: LOCATION: ABDOMEN

## 2025-06-20 ASSESSMENT — PAIN - FUNCTIONAL ASSESSMENT: PAIN_FUNCTIONAL_ASSESSMENT: PREVENTS OR INTERFERES SOME ACTIVE ACTIVITIES AND ADLS

## 2025-06-20 ASSESSMENT — PAIN DESCRIPTION - FREQUENCY: FREQUENCY: INTERMITTENT

## 2025-06-20 ASSESSMENT — PAIN DESCRIPTION - ORIENTATION: ORIENTATION: OTHER (COMMENT)

## 2025-06-20 ASSESSMENT — PAIN DESCRIPTION - DESCRIPTORS: DESCRIPTORS: CRAMPING

## 2025-06-20 ASSESSMENT — PAIN DESCRIPTION - ONSET: ONSET: ON-GOING

## 2025-06-20 NOTE — ED TRIAGE NOTES
Pt ambulatory to triage for c/o diarrhea over last 2 days with intermittent abd cramping. Denies vomiting.     Pt admits to being on macrobid since 6/16 after dx of UTI

## 2025-06-20 NOTE — ED PROVIDER NOTES
[facility] EMERGENCY DEPARTMENT  EMERGENCY DEPARTMENT ENCOUNTER       Pt Name: Kalpana Benoit  MRN: [id number]  Birthdate 1955  Date of evaluation: 6/20/2025  Provider: Jaclyn Rosario MD   PCP: Nathanael Willingham Jr., MD  Note Started: 5:14 PM 6/20/25     (Please note that parts of this dictation were completed with voice recognition software. Quite often unanticipated grammatical, syntax, homophones, and other interpretive errors are inadvertently transcribed by the computer software. Please disregards these errors. Please excuse any errors that have escaped final proofreading.)    CHIEF COMPLAINT       Chief Complaint   Patient presents with    Diarrhea        HISTORY OF PRESENT ILLNESS: 1 or more elements      History From: patient, History limited by:  none     Kalpana Benoit is a 69 y.o. female who presents after waking up 2 days ago with diarrhea.  She states it was transient 2 days ago but yesterday the diarrhea persisted all day and every time she eats or drink she is running to the bathroom.  Denies nausea or vomiting.  Admits to mild headache as well as abdominal cramping.  Denies fever, rectal bleeding.  Recently treated with antibiotic for UTI     Nursing Notes were all reviewed and agreed with or any disagreements were addressed in the HPI.     REVIEW OF SYSTEMS        Positives and Pertinent negatives as per HPI.    PAST HISTORY     Past Medical History:  Past Medical History:   Diagnosis Date    Anemia     blood transfusion on 2/4/24 after surgery    Diverticulitis     GERD (gastroesophageal reflux disease)     Hypertension     Osteoarthrosis, unspecified whether generalized or localized, lower leg     knees    Other and unspecified hyperlipidemia     Type II or unspecified type diabetes mellitus without mention of complication, uncontrolled        Past Surgical History:  Past Surgical History:   Procedure Laterality Date    ARM SURGERY Left 12/21/2023    IRRIGATION AND DEBRIDEMENT LEFT  [address]  [facility] [id number]  [phone]          [facility] Emergency Department  [address]  [place] [id number]  [phone]    As needed, If symptoms worsen       DISCHARGE MEDICATIONS:     Medication List        START taking these medications      loperamide 2 MG capsule  Commonly known as: Imodium A-D  Take 1 capsule by mouth 4 times daily as needed for Diarrhea            CONTINUE taking these medications      ONE TOUCH ULTRA 2 w/Device Kit  Test once daily. E11.9. Type 2 Diabetes            ASK your doctor about these medications      acetaminophen 500 MG tablet  Commonly known as: TYLENOL  Take 2 tablets by mouth every 6 hours as needed for Pain     amLODIPine 5 MG tablet  Commonly known as: NORVASC  Take 1 tablet by mouth daily     aspirin 325 MG tablet  Take 1 tablet by mouth daily For blood clot prevention     atorvastatin 40 MG tablet  Commonly known as: LIPITOR  Take 1 tablet by mouth daily     diclofenac 75 MG EC tablet  Commonly known as: VOLTAREN  Take 1 tablet by mouth 2 times daily     glipiZIDE 5 MG tablet  Commonly known as: GLUCOTROL  Take 1 tablet by mouth 2 times daily     hydroCHLOROthiazide 25 MG tablet  Commonly known as: HYDRODIURIL  Take 1 tablet by mouth daily     MULTIVITAMIN WOMEN PO     nitrofurantoin (macrocrystal-monohydrate) 100 MG capsule  Commonly known as: MACROBID  Take 1 capsule by mouth 2 times daily for 7 days     nystatin 971289 UNIT/GM powder  Commonly known as: MYCOSTATIN  APPLY TO AFFECTED AREA 3 TIMES A DAY     OneTouch Verio strip  Generic drug: blood glucose test strips  Test once daily. E11.9, Diabetes Type 2.     pantoprazole 40 MG tablet  Commonly known as: PROTONIX  Take 1 tablet by mouth every morning (before breakfast)     pioglitazone 45 MG tablet  Commonly known as: ACTOS  TAKE 1 TABLET BY MOUTH EVERY DAY AT NIGHT     potassium chloride 10 MEQ extended release capsule  Commonly known as: MICRO-K  Take 1 capsule by mouth 2 times daily

## 2025-06-20 NOTE — ED NOTES
Pt presents ambulatory to the ED c/o green, loose, jelly-like stool that started Wednesday accompanied with cramping. Pt has been taking Macrobid since Saturday or Sunday of this last week. Pt denies nausea/vomiting.     Emergency Department Nursing Plan of Care       The Nursing Plan of Care is developed from the Nursing assessment and Emergency Department Attending provider initial evaluation.  The plan of care may be reviewed in the “ED Provider note”.      The Plan of Care was developed with the following considerations:  Patient / Family readiness to learn indicated by:verbalized understanding  Persons(s) to be included in education: patient  Barriers to Learning/Limitations:None      Signed     NICOLÁS RUELAS RN    6/20/2025   11:36 AM

## 2025-08-06 RX ORDER — SUCRALFATE 1 G/1
1 TABLET ORAL 4 TIMES DAILY
Qty: 120 TABLET | Refills: 2 | Status: SHIPPED | OUTPATIENT
Start: 2025-08-06

## 2025-08-18 RX ORDER — BLOOD SUGAR DIAGNOSTIC
STRIP MISCELLANEOUS
Qty: 100 EACH | Refills: 12 | Status: SHIPPED | OUTPATIENT
Start: 2025-08-18

## 2025-08-18 RX ORDER — PIOGLITAZONE 45 MG/1
45 TABLET ORAL NIGHTLY
Qty: 90 TABLET | Refills: 3 | Status: SHIPPED | OUTPATIENT
Start: 2025-08-18

## 2025-08-18 RX ORDER — VALSARTAN 320 MG/1
320 TABLET ORAL DAILY
Qty: 90 TABLET | Refills: 3 | Status: SHIPPED | OUTPATIENT
Start: 2025-08-18

## 2025-08-18 RX ORDER — AMLODIPINE BESYLATE 5 MG/1
5 TABLET ORAL DAILY
Qty: 90 TABLET | Refills: 3 | Status: SHIPPED | OUTPATIENT
Start: 2025-08-18

## 2025-08-19 RX ORDER — DICLOFENAC SODIUM 75 MG/1
75 TABLET, DELAYED RELEASE ORAL 2 TIMES DAILY
Qty: 180 TABLET | Refills: 3 | Status: SHIPPED | OUTPATIENT
Start: 2025-08-19

## 2025-08-25 ENCOUNTER — OFFICE VISIT (OUTPATIENT)
Facility: CLINIC | Age: 70
End: 2025-08-25
Payer: COMMERCIAL

## 2025-08-25 VITALS
OXYGEN SATURATION: 100 % | HEART RATE: 94 BPM | RESPIRATION RATE: 20 BRPM | TEMPERATURE: 97.6 F | WEIGHT: 191 LBS | DIASTOLIC BLOOD PRESSURE: 66 MMHG | HEIGHT: 61 IN | SYSTOLIC BLOOD PRESSURE: 110 MMHG | BODY MASS INDEX: 36.06 KG/M2

## 2025-08-25 DIAGNOSIS — E11.9 TYPE 2 DIABETES MELLITUS WITHOUT COMPLICATION, WITHOUT LONG-TERM CURRENT USE OF INSULIN (HCC): Primary | ICD-10-CM

## 2025-08-25 DIAGNOSIS — I10 PRIMARY HYPERTENSION: ICD-10-CM

## 2025-08-25 DIAGNOSIS — E78.00 PURE HYPERCHOLESTEROLEMIA, UNSPECIFIED: ICD-10-CM

## 2025-08-25 LAB
GLUCOSE, POC: 359 MG/DL
HBA1C MFR BLD: 11.6 %

## 2025-08-25 PROCEDURE — 3078F DIAST BP <80 MM HG: CPT | Performed by: INTERNAL MEDICINE

## 2025-08-25 PROCEDURE — 99214 OFFICE O/P EST MOD 30 MIN: CPT | Performed by: INTERNAL MEDICINE

## 2025-08-25 PROCEDURE — 1123F ACP DISCUSS/DSCN MKR DOCD: CPT | Performed by: INTERNAL MEDICINE

## 2025-08-25 PROCEDURE — 3074F SYST BP LT 130 MM HG: CPT | Performed by: INTERNAL MEDICINE

## 2025-08-25 PROCEDURE — 83036 HEMOGLOBIN GLYCOSYLATED A1C: CPT | Performed by: INTERNAL MEDICINE

## 2025-08-25 PROCEDURE — 3046F HEMOGLOBIN A1C LEVEL >9.0%: CPT | Performed by: INTERNAL MEDICINE

## 2025-08-25 PROCEDURE — 82962 GLUCOSE BLOOD TEST: CPT | Performed by: INTERNAL MEDICINE

## 2025-08-25 SDOH — ECONOMIC STABILITY: FOOD INSECURITY: WITHIN THE PAST 12 MONTHS, YOU WORRIED THAT YOUR FOOD WOULD RUN OUT BEFORE YOU GOT MONEY TO BUY MORE.: NEVER TRUE

## 2025-08-25 SDOH — ECONOMIC STABILITY: FOOD INSECURITY: WITHIN THE PAST 12 MONTHS, THE FOOD YOU BOUGHT JUST DIDN'T LAST AND YOU DIDN'T HAVE MONEY TO GET MORE.: NEVER TRUE

## 2025-09-02 ENCOUNTER — LAB (OUTPATIENT)
Facility: CLINIC | Age: 70
End: 2025-09-02

## 2025-09-02 DIAGNOSIS — I10 PRIMARY HYPERTENSION: ICD-10-CM

## 2025-09-02 LAB
ALBUMIN SERPL-MCNC: 3.6 G/DL (ref 3.5–5.2)
ALBUMIN/GLOB SERPL: 1.2 (ref 1.1–2.2)
ALP SERPL-CCNC: 207 U/L (ref 35–104)
ALT SERPL-CCNC: 41 U/L (ref 10–35)
ANION GAP SERPL CALC-SCNC: 11 MMOL/L (ref 2–14)
AST SERPL-CCNC: 37 U/L (ref 10–35)
BILIRUB SERPL-MCNC: 0.4 MG/DL (ref 0–1.2)
BUN SERPL-MCNC: 22 MG/DL (ref 8–23)
BUN/CREAT SERPL: 23 (ref 12–20)
CALCIUM SERPL-MCNC: 9.5 MG/DL (ref 8.8–10.2)
CHLORIDE SERPL-SCNC: 101 MMOL/L (ref 98–107)
CO2 SERPL-SCNC: 25 MMOL/L (ref 20–29)
CREAT SERPL-MCNC: 0.97 MG/DL (ref 0.6–1)
ERYTHROCYTE [DISTWIDTH] IN BLOOD BY AUTOMATED COUNT: 13.9 % (ref 11.5–14.5)
GLOBULIN SER CALC-MCNC: 3 G/DL (ref 2–4)
GLUCOSE SERPL-MCNC: 270 MG/DL (ref 65–100)
HCT VFR BLD AUTO: 39.8 % (ref 35–47)
HGB BLD-MCNC: 12.6 G/DL (ref 11.5–16)
MCH RBC QN AUTO: 28.4 PG (ref 26–34)
MCHC RBC AUTO-ENTMCNC: 31.7 G/DL (ref 30–36.5)
MCV RBC AUTO: 89.8 FL (ref 80–99)
NRBC # BLD: 0 K/UL (ref 0–0.01)
NRBC BLD-RTO: 0 PER 100 WBC
PLATELET # BLD AUTO: 188 K/UL (ref 150–400)
PMV BLD AUTO: 12 FL (ref 8.9–12.9)
POTASSIUM SERPL-SCNC: 4.7 MMOL/L (ref 3.5–5.1)
PROT SERPL-MCNC: 6.6 G/DL (ref 6.4–8.3)
RBC # BLD AUTO: 4.43 M/UL (ref 3.8–5.2)
SODIUM SERPL-SCNC: 137 MMOL/L (ref 136–145)
WBC # BLD AUTO: 5.5 K/UL (ref 3.6–11)

## (undated) DEVICE — PREP KIT PEEL PTCH POVIDONE IOD

## (undated) DEVICE — Device: Brand: JELCO

## (undated) DEVICE — BANDAGE COMPR W6INXL5YD WHT BGE POLY COT M E WRP WV HK AND

## (undated) DEVICE — NEEDLE HYPO 18GA L1.5IN PNK S STL HUB POLYPR SHLD REG BVL

## (undated) DEVICE — TRANSFER SET 3": Brand: MEDLINE INDUSTRIES, INC.

## (undated) DEVICE — DRESSING,GAUZE,XEROFORM,CURAD,5"X9",ST: Brand: CURAD

## (undated) DEVICE — GLOVE SURG SZ 85 L12IN FNGR THK79MIL GRN LTX FREE

## (undated) DEVICE — (D)SYR 10ML 1/5ML GRAD NSAF -- PKGING CHANGE USE ITEM 338027

## (undated) DEVICE — ELECTRODE BLDE L4IN NONINSULATED EDGE

## (undated) DEVICE — HANDPIECE SET WITH COAXIAL HIGH FLOW TIP AND SUCTION TUBE: Brand: INTERPULSE

## (undated) DEVICE — TUBING SUCT 12FR MAL ALUM SHFT FN CAP VENT UNIV CONN W/ OBT

## (undated) DEVICE — AEGIS 1" DISK 4MM HOLE, PEEL OPEN: Brand: MEDLINE

## (undated) DEVICE — 3M™ IOBAN™ 2 ANTIMICROBIAL INCISE DRAPE 6648EZ: Brand: IOBAN™ 2

## (undated) DEVICE — ZIMMER® STERILE DISPOSABLE TOURNIQUET CUFF WITH PROTECTIVE SLEEVE AND PLC, DUAL PORT, SINGLE BLADDER, 34 IN. (86 CM)

## (undated) DEVICE — SUTURE MCRYL SZ 3-0 L27IN ABSRB UD L24MM PS-1 3/8 CIR PRIM Y936H

## (undated) DEVICE — SOLUTION IRRIG 1000ML STRL H2O USP PLAS POUR BTL

## (undated) DEVICE — Device

## (undated) DEVICE — TOTAL JOINT-MRMC: Brand: MEDLINE INDUSTRIES, INC.

## (undated) DEVICE — BASIN EMSIS 16OZ GRAPHITE PLAS KID SHP MOLD GRAD FOR ORAL

## (undated) DEVICE — SOLUTION SURG PREP 26 CC PURPREP

## (undated) DEVICE — CONTAINER SPEC ANAERB VACTNR

## (undated) DEVICE — 1200 GUARD II KIT W/5MM TUBE W/O VAC TUBE: Brand: GUARDIAN

## (undated) DEVICE — SYR 10ML LUER LOK 1/5ML GRAD --

## (undated) DEVICE — 3M™ IOBAN™ 2 ANTIMICROBIAL INCISE DRAPE 6651EZ: Brand: IOBAN™ 2

## (undated) DEVICE — SOLUTION IRRIG 3000ML 0.9% SOD CHL USP UROMATIC PLAS CONT

## (undated) DEVICE — SOLIDIFIER MEDC 1200ML -- CONVERT TO 356117

## (undated) DEVICE — SOLUTION SCRB 4% CHG RED ANTIMIC SKIN CLN PREOPERATIVE DISP

## (undated) DEVICE — GOWN,SIRUS,NONRNF,XLN/2XL,18/CS: Brand: MEDLINE

## (undated) DEVICE — TRAP FLUID BUFFALO FLTR

## (undated) DEVICE — PREMIUM WET SKIN PREP TRAY: Brand: MEDLINE INDUSTRIES, INC.

## (undated) DEVICE — GLOVE ORANGE PI 7 1/2   MSG9075

## (undated) DEVICE — SPONGE LAPAROTOMY W18XL18IN WHITE STRUNG RADIOPAQUE STERILE

## (undated) DEVICE — SOLUTION IV 1000ML 0.9% SOD CHL PH 5 INJ USP VIAFLX PLAS

## (undated) DEVICE — 450 ML BOTTLE OF 0.05% CHLORHEXIDINE GLUCONATE IN 99.95% STERILE WATER FOR IRRIGATION, USP AND APPLICATOR.: Brand: IRRISEPT ANTIMICROBIAL WOUND LAVAGE

## (undated) DEVICE — ZIMMER® STERILE DISPOSABLE TOURNIQUET CUFF WITH PROTECTIVE SLEEVE AND PLC, DUAL PORT, SINGLE BLADDER, 18 IN. (46 CM)

## (undated) DEVICE — KENDALL RADIOLUCENT FOAM MONITORING ELECTRODE RECTANGULAR SHAPE: Brand: KENDALL

## (undated) DEVICE — NEONATAL-ADULT SPO2 SENSOR: Brand: NELLCOR

## (undated) DEVICE — Z DISCONTINUED USE 2860885 SUTURE STRATAFIX SPRL SZ 1 L14IN ABSRB VLT L48CM CTX 1/2 SXPD2B405

## (undated) DEVICE — STRYKER PERFORMANCE SERIES SAGITTAL BLADE: Brand: STRYKER PERFORMANCE SERIES

## (undated) DEVICE — YANKAUER,SMOOTH HANDLE,HIGH CAPACITY: Brand: MEDLINE INDUSTRIES, INC.

## (undated) DEVICE — BONE WAX WHITE: Brand: BONE WAX WHITE

## (undated) DEVICE — PENCIL SMK EVAC ALL IN 1 DSGN ENH VISIBILITY IMPROVED AIR

## (undated) DEVICE — KIT EVAC 400CC DIA1/8IN H PAT 12.5IN 3 SPR RND SHP PVC DRN

## (undated) DEVICE — SUTURE MONOCRYL SZ 3-0 L27IN ABSRB UD L24MM PS-1 3/8 CIR PRIM Y936H

## (undated) DEVICE — SUTURE STRATAFIX SPRL SZ 1 L14IN ABSRB VLT L48CM CTX 1/2 SXPD2B405

## (undated) DEVICE — LIQUIBAND RAPID ADHESIVE 36/CS 0.8ML: Brand: MEDLINE

## (undated) DEVICE — 3M™ TEGADERM™ TRANSPARENT FILM DRESSING FRAME STYLE, 1624W, 2-3/8 IN X 2-3/4 IN (6 CM X 7 CM), 100/CT 4CT/CASE: Brand: 3M™ TEGADERM™

## (undated) DEVICE — SUTURE PDS II SZ 0 L27IN ABSRB VLT L36MM CT-1 1/2 CIR Z340H

## (undated) DEVICE — DRESSING,GAUZE,XEROFORM,CURAD,1"X8",ST: Brand: CURAD

## (undated) DEVICE — SOLUTION IRRIG 500ML 0.9% SOD CHLO USP POUR PLAS BTL

## (undated) DEVICE — SUTURE VCRL SZ 1 L36IN ABSRB UD L36MM CT-1 1/2 CIR J947H

## (undated) DEVICE — GLOVE SURG SZ 85 L12IN FNGR ORTHO 126MIL CRM LTX FREE

## (undated) DEVICE — CORD ES L12FT BPLR FRCP

## (undated) DEVICE — 4-PORT MANIFOLD: Brand: NEPTUNE 2

## (undated) DEVICE — SYRINGE 20ML LL S/C 50

## (undated) DEVICE — HOOD: Brand: FLYTE

## (undated) DEVICE — PICO 7 10CM X 30CM: Brand: PICO™ 7

## (undated) DEVICE — CATH IV AUTOGRD BC PNK 20GA 25 -- INSYTE

## (undated) DEVICE — 3M™ TEGADERM™ TRANSPARENT FILM DRESSING FRAME STYLE, 1626W, 4 IN X 4-3/4 IN (10 CM X 12 CM), 50/CT 4CT/CASE: Brand: 3M™ TEGADERM™

## (undated) DEVICE — SPONGE GZ W4XL4IN COT 12 PLY TYP VII WVN C FLD DSGN STERILE

## (undated) DEVICE — SUTURE VCRL SZ 3-0 L18IN ABSRB UD PS-2 L19MM 3/8 CRV PRIM J497H

## (undated) DEVICE — TOWEL 4 PLY TISS 19X30 SUE WHT

## (undated) DEVICE — SUTURE VICRYL SZ 2-0 L36IN ABSRB UD L36MM CT-1 1/2 CIR J945H

## (undated) DEVICE — SUTURE ETHLN SZ 3-0 L18IN NONABSORBABLE BLK PS-2 L19MM 3/8 1669H

## (undated) DEVICE — CONTINU-FLO SOLUTION SET, 2 INJECTION SITES, MALE LUER LOCK ADAPTER WITH RETRACTABLE COLLAR, LARGE BORE STOPCOCK WITH ROTATING MALE LUER LOCK EXTENSION SET, 2 INJECTION SITES, MALE LUER LOCK ADAPTER WITH RETRACTABLE COLLAR: Brand: INTERLINK/CONTINU-FLO

## (undated) DEVICE — BANDAGE,ELASTIC,ESMARK,STERILE,4"X9',LF: Brand: MEDLINE

## (undated) DEVICE — SET ADMIN 16ML TBNG L100IN 2 Y INJ SITE IV PIGGY BK DISP

## (undated) DEVICE — BANDAGE COMPR M W6INXL10YD WHT BGE VELC E MTRX HK AND LOOP

## (undated) DEVICE — ENDO CARRY-ON PROCEDURE KIT INCLUDES ENZYMATIC SPONGE, GAUZE, BIOHAZARD LABEL, TRAY, LUBRICANT, DIRTY SCOPE LABEL, WATER LABEL, TRAY, DRAWSTRING PAD, AND DEFENDO 4-PIECE KIT.: Brand: ENDO CARRY-ON PROCEDURE KIT

## (undated) DEVICE — STRIP,CLOSURE,WOUND,MEDI-STRIP,1/2X4: Brand: MEDLINE

## (undated) DEVICE — BANDAGE,GAUZE,CONFORMING,3"X75",STRL,LF: Brand: MEDLINE

## (undated) DEVICE — 3M™ TEGADERM™ TRANSPARENT FILM DRESSING FRAME STYLE, 1627, 4 IN X 10 IN (10 CM X 25 CM), 20/CT 4CT/CASE: Brand: 3M™ TEGADERM™

## (undated) DEVICE — SUTURE VCRL SZ 2-0 L36IN ABSRB UD L36MM CT-1 1/2 CIR J945H

## (undated) DEVICE — SUTURE MCRYL SZ 2-0 L36IN ABSRB UD L36MM CT-1 1/2 CIR Y945H

## (undated) DEVICE — Z DISCONTINUED PER MEDLINE LINE GAS SAMPLING O2/CO2 LNG AD 13 FT NSL W/ TBNG FILTERLINE

## (undated) DEVICE — GLOVE SURG SZ 8 L12IN FNGR THK79MIL GRN LTX FREE

## (undated) DEVICE — EXTREMITY-MRMC: Brand: MEDLINE INDUSTRIES, INC.

## (undated) DEVICE — BOWL MED L 32OZ PLAS W/ MOLD GRAD EZ OPN PEEL PCH

## (undated) DEVICE — BANDAGE COBAN 4 IN COMPR W4INXL5YD FOAM COHESIVE QUIK STK SELF ADH SFT

## (undated) DEVICE — SWAB CULT LIQ STUART AGR AERB MOD IN BRK SGL RAYON TIP PLAS

## (undated) DEVICE — SOLUTION LACTATED RINGERS INJECTION USP

## (undated) DEVICE — SUTURE MCRYL SZ 2-0 L27IN ABSRB UD SH L26MM TAPERPOINT NDL Y417H

## (undated) DEVICE — CUFF ADULT 1 PC 1 VINYL DISP --

## (undated) DEVICE — KENDALL DL ECG CABLE AND LEAD WIRE SYSTEM, 3-LEAD, SINGLE PATIENT USE: Brand: KENDALL

## (undated) DEVICE — STOCKINETTE,IMPERVIOUS,12X48,STERILE: Brand: MEDLINE

## (undated) DEVICE — RECIPROCATING BLADE HEAVY DUTY, OFFSET  (77.5 X 1.23 X 11.0MM)

## (undated) DEVICE — WRAP COHESIVE W2INXL5YD TAN SELF ADH BNDG HND NON STERILE TEAR CARING

## (undated) DEVICE — GLOVE ORANGE PI 8   MSG9080

## (undated) DEVICE — INSTA-GARD PROCEDURE MASK, YELLOW: Brand: CONVERTORS

## (undated) DEVICE — CEMENT MIXING SYSTEM WITH FEMORAL BREAKWAY NOZZLE: Brand: REVOLUTION

## (undated) DEVICE — SYR 3ML LL TIP 1/10ML GRAD --

## (undated) DEVICE — DRAPE,U/ SHT,SPLIT,PLAS,STERIL: Brand: MEDLINE

## (undated) DEVICE — APPLICATOR MEDICATED 10.5 CC SOLUTION HI LT ORNG CHLORAPREP

## (undated) DEVICE — SUTURE VICRYL SZ 1 L36IN ABSRB UD L36MM CT-1 1/2 CIR J947H